# Patient Record
Sex: MALE | Race: WHITE | NOT HISPANIC OR LATINO | Employment: OTHER | URBAN - METROPOLITAN AREA
[De-identification: names, ages, dates, MRNs, and addresses within clinical notes are randomized per-mention and may not be internally consistent; named-entity substitution may affect disease eponyms.]

---

## 2017-01-11 RX ORDER — TRAMADOL HYDROCHLORIDE 50 MG/1
TABLET ORAL
Qty: 60 TAB | Refills: 1 | Status: SHIPPED
Start: 2017-01-11 | End: 2017-03-28 | Stop reason: SDUPTHER

## 2017-02-10 RX ORDER — ALPRAZOLAM 1 MG/1
TABLET ORAL
Qty: 30 TAB | Refills: 1 | Status: SHIPPED
Start: 2017-02-10 | End: 2017-04-09 | Stop reason: SDUPTHER

## 2017-03-28 RX ORDER — TRAMADOL HYDROCHLORIDE 50 MG/1
TABLET ORAL
Qty: 60 TAB | Refills: 0 | Status: SHIPPED
Start: 2017-03-28 | End: 2017-05-02 | Stop reason: SDUPTHER

## 2017-03-28 NOTE — TELEPHONE ENCOUNTER
Was the patient seen in the last year in this department? Yes     Does patient have an active prescription for medications requested? No     Received Request Via: Pharmacy       in media

## 2017-03-29 RX ORDER — NAPROXEN 500 MG/1
500 TABLET ORAL 2 TIMES DAILY PRN
Qty: 60 TAB | Refills: 3 | Status: SHIPPED | OUTPATIENT
Start: 2017-03-29 | End: 2017-12-22

## 2017-03-29 NOTE — TELEPHONE ENCOUNTER
Was the patient seen in the last year in this department? Yes     Does patient have an active prescription for medications requested? No     Received Request Via: Pharmacy     RX: Naprosyn

## 2017-04-09 DIAGNOSIS — F41.9 ANXIETY: ICD-10-CM

## 2017-04-10 RX ORDER — ALPRAZOLAM 1 MG/1
TABLET ORAL
Qty: 30 TAB | Refills: 0 | Status: SHIPPED
Start: 2017-04-10 | End: 2017-05-18 | Stop reason: SDUPTHER

## 2017-04-10 NOTE — TELEPHONE ENCOUNTER
Was the patient seen in the last year in this department? Yes     Does patient have an active prescription for medications requested? No     Received Request Via: Pharmacy      scanned in media

## 2017-04-19 RX ORDER — PANTOPRAZOLE SODIUM 20 MG/1
20 TABLET, DELAYED RELEASE ORAL DAILY
Qty: 30 TAB | Refills: 0 | Status: SHIPPED | OUTPATIENT
Start: 2017-04-19 | End: 2017-05-26 | Stop reason: SDUPTHER

## 2017-05-02 NOTE — TELEPHONE ENCOUNTER
Was the patient seen in the last year in this department? Yes   last filled 3/28/2017 #60  Does patient have an active prescription for medications requested? No   Received Request Via: Pharmacy

## 2017-05-03 RX ORDER — TRAMADOL HYDROCHLORIDE 50 MG/1
50 TABLET ORAL EVERY 8 HOURS PRN
Qty: 60 TAB | Refills: 0 | Status: SHIPPED
Start: 2017-05-03 | End: 2017-05-28 | Stop reason: SDUPTHER

## 2017-05-19 RX ORDER — ALPRAZOLAM 1 MG/1
TABLET ORAL
Qty: 30 TAB | Refills: 0 | Status: SHIPPED
Start: 2017-05-19 | End: 2017-06-28 | Stop reason: SDUPTHER

## 2017-05-31 RX ORDER — PANTOPRAZOLE SODIUM 20 MG/1
TABLET, DELAYED RELEASE ORAL
Qty: 30 TAB | Refills: 0 | Status: SHIPPED | OUTPATIENT
Start: 2017-05-31 | End: 2017-12-22

## 2017-05-31 RX ORDER — TRAMADOL HYDROCHLORIDE 50 MG/1
TABLET ORAL
Qty: 60 TAB | Refills: 0 | Status: SHIPPED
Start: 2017-05-31 | End: 2017-06-28 | Stop reason: SDUPTHER

## 2017-06-28 DIAGNOSIS — M25.50 CHRONIC JOINT PAIN: ICD-10-CM

## 2017-06-28 DIAGNOSIS — G89.29 CHRONIC JOINT PAIN: ICD-10-CM

## 2017-06-28 RX ORDER — TRAMADOL HYDROCHLORIDE 50 MG/1
50 TABLET ORAL 2 TIMES DAILY PRN
Qty: 60 TAB | Refills: 3 | Status: SHIPPED | OUTPATIENT
Start: 2017-06-28 | End: 2017-11-20 | Stop reason: SDUPTHER

## 2017-06-28 RX ORDER — ALPRAZOLAM 1 MG/1
1 TABLET ORAL
Qty: 30 TAB | Refills: 0 | Status: SHIPPED | OUTPATIENT
Start: 2017-06-28 | End: 2017-08-07 | Stop reason: SDUPTHER

## 2017-07-12 DIAGNOSIS — R26.89 SHUFFLING GAIT: ICD-10-CM

## 2017-07-12 DIAGNOSIS — R27.8 WORSENED HANDWRITING: ICD-10-CM

## 2017-07-14 DIAGNOSIS — R27.8 WORSENED HANDWRITING: ICD-10-CM

## 2017-07-14 DIAGNOSIS — R26.89 SHUFFLING GAIT: ICD-10-CM

## 2017-08-07 RX ORDER — ALPRAZOLAM 1 MG/1
1 TABLET ORAL
Qty: 30 TAB | Refills: 0 | Status: SHIPPED
Start: 2017-08-07 | End: 2017-09-01 | Stop reason: SDUPTHER

## 2017-09-01 RX ORDER — ALPRAZOLAM 1 MG/1
1 TABLET ORAL
Qty: 30 TAB | Refills: 0 | Status: SHIPPED
Start: 2017-09-01 | End: 2017-09-29 | Stop reason: SDUPTHER

## 2017-09-29 RX ORDER — ALPRAZOLAM 1 MG/1
1 TABLET ORAL
Qty: 30 TAB | Refills: 0 | Status: SHIPPED | OUTPATIENT
Start: 2017-09-29 | End: 2017-10-30 | Stop reason: SDUPTHER

## 2017-09-29 NOTE — TELEPHONE ENCOUNTER
Was the patient seen in the last year in this department? NO   Last seen 12/16//2016    Does patient have an active prescription for medications requested? No     Received Request Via: Pharmacy

## 2017-10-30 RX ORDER — ALPRAZOLAM 1 MG/1
1 TABLET ORAL
Qty: 30 TAB | Refills: 0 | Status: SHIPPED
Start: 2017-10-30 | End: 2017-11-28 | Stop reason: SDUPTHER

## 2017-11-07 ENCOUNTER — OFFICE VISIT (OUTPATIENT)
Dept: NEUROLOGY | Facility: MEDICAL CENTER | Age: 67
End: 2017-11-07
Payer: MEDICARE

## 2017-11-07 VITALS
WEIGHT: 188.45 LBS | SYSTOLIC BLOOD PRESSURE: 136 MMHG | DIASTOLIC BLOOD PRESSURE: 76 MMHG | HEART RATE: 103 BPM | BODY MASS INDEX: 24.98 KG/M2 | TEMPERATURE: 97.8 F | HEIGHT: 73 IN | RESPIRATION RATE: 16 BRPM | OXYGEN SATURATION: 96 %

## 2017-11-07 DIAGNOSIS — G20.C PARKINSONISM, UNSPECIFIED PARKINSONISM TYPE: Primary | ICD-10-CM

## 2017-11-07 PROCEDURE — 99205 OFFICE O/P NEW HI 60 MIN: CPT | Performed by: PSYCHIATRY & NEUROLOGY

## 2017-11-07 RX ORDER — FLUTICASONE PROPIONATE 0.05 %
1 CREAM (GRAM) TOPICAL 2 TIMES DAILY PRN
Status: ON HOLD | COMMUNITY
End: 2020-06-20

## 2017-11-07 RX ORDER — AMOXICILLIN 500 MG/1
500 CAPSULE ORAL 3 TIMES DAILY
COMMUNITY
End: 2018-03-08

## 2017-11-07 RX ORDER — CELECOXIB 200 MG/1
200 CAPSULE ORAL 2 TIMES DAILY
COMMUNITY
End: 2017-12-01 | Stop reason: SDUPTHER

## 2017-11-07 RX ORDER — OMEPRAZOLE 20 MG/1
20 CAPSULE, DELAYED RELEASE ORAL DAILY
COMMUNITY
End: 2018-03-08

## 2017-11-07 ASSESSMENT — ENCOUNTER SYMPTOMS
LOSS OF CONSCIOUSNESS: 0
DEPRESSION: 0
TREMORS: 0
DIZZINESS: 0
HEADACHES: 0
MEMORY LOSS: 0

## 2017-11-07 ASSESSMENT — PATIENT HEALTH QUESTIONNAIRE - PHQ9: CLINICAL INTERPRETATION OF PHQ2 SCORE: 0

## 2017-11-07 ASSESSMENT — PAIN SCALES - GENERAL: PAINLEVEL: 4=SLIGHT-MODERATE PAIN

## 2017-11-07 NOTE — PROGRESS NOTES
Subjective:      Jeremiah Huber is a 67 y.o. male who presents with his wife Nolvia, from the office of Dr. Granados, for consultation with a history of parkinsonism.    MAY Lamar pleasant 67 year right-handed  gentleman who got his first wiff of parkinsonian symptoms when he was in physical therapy after undergoing a right TKA. The physical therapist asked him a direct question as to whether or not he had Parkinson's disease. They had noticed that his stride length was diminished and that he was shuffling. In light of this, they did eventually see an outside neurologist. With his evaluations, they recognized that he had over this preceding several years several issues including a soft voice, he acknowledges he was drooling a little bit more though swallowing was stable, actually has a loss of smell for quite some time, and movements in general were slower. It takes him longer to finish physical activities though he does do so independently. He is also been feeling unsteady, but he does not trip or fall with any type of regularity. Dexterity is clearly curtailed, especially with the right hand, handwriting has gotten smaller. His posture is maintained, but both he and Nolvia have noted that his stride can shorten if he is not careful. When walking, she will often have to wait for him to catch up.    In general, he has remained very physically active, he walks regularly, he would like to get back to skiing. Energy level has been good. He denies issues with loss of appetite, early satiety with nausea and vomiting, excessive constipation, orthostatic dizziness and increasing syncope, dizziness, visual changes with double vision, emotional lability, etc. Cognition has been stable, he is no more forgetful than usual, he is a little more distractible, and can get back on task though takes him a little longer. They deny any slowness of mental processing or emotional lability. There are no issues with sleep  "distortion or non-REM sleep behavior patterns.    He has a history of gout, DJD, dyslipidemia, hypertension (no longer on medication since he has lost weight), GERD, but no history of diagnosed neurodegenerative disease, MS, seizure, migraine, CVA, CAD, thyroid disease, malignancy, other autoimmune disease, blood dyscrasia, liver or kidney disease, with significant psychiatric disease. He is status post right TKA, he is pending the same procedure on the left. No one in the immediate family has a history of diagnosed neurodegenerative disease, his mother had heart disease, his father heart disease, diabetes and bladder cancer. His maternal grandfather did suffer from Parkinson's disease. He does not smoke, but drinks one bottle of wine or more every evening. He is a retired , trained as a .    He is on Prilosec, Celebrex, Ultram, Protonix, Zyloprim, Naprosyn and niacin. The rest as per the electronic health record, is noncontributory from my standpoint.    Review of Systems   Cardiovascular: Negative for leg swelling.   Neurological: Negative for dizziness, tremors, loss of consciousness and headaches.   Psychiatric/Behavioral: Negative for depression and memory loss.        Objective:     /76   Pulse (!) 103   Temp 36.6 °C (97.8 °F)   Resp 16   Ht 1.854 m (6' 1\")   Wt 85.5 kg (188 lb 7.2 oz)   SpO2 96%   BMI 24.86 kg/m²      Physical Exam    He appears in no acute distress. His vital signs are stable, his depression rating scale is zero, he has no fall risk. There is no malar rash, sialorrhea, he is not drooling excessively. The neck is supple, range of motion is full, carotid pulses are present without asymmetry. Cardiac evaluation reveals a regular rhythm. There is no lower extremity edema.    He is fully oriented, there is no aphasia, agnosia, apraxia, or inattention.    PERRLA/EOMI, visual fields are full to finger counting confrontation, funduscopic exam reveals " sharp disc margins, there is no facial asymmetry, or sensory loss. There is hypophonia, facial bradykinesia is also clearly present, glabellar tap is absent. The tongue and uvula are midline, shoulder shrug is symmetric.    There is no tremor, either at rest or with action, there is minimal rigidity but only with distraction in the right upper extremity. There is minimal bradykinesia with movements in general. There is no drift or asterixis. Strength is 5/5 in all muscle groups. Reflexes are present at all points without asymmetries, both toes are downgoing.    He stands easily, there is some minimal hesitancy with gait initiation only. There is some minimal retropulsion, but otherwise no major postural instability is demonstrated. Repetitive movements in all 4 extremities are diminished in amplitude and increased in frequency. There is no appendicular dystaxia. He walks maintaining good stride length, posture is quite good, he still requires 4 steps to turn. Arm swing is symmetric.    Sensory exam is intact to vibration and temperature, Romberg is absent.    He did undergo MRI imaging in August of this year, I reviewed a report which revealed only generalized atrophy, no mention of white matter ischemic changes, NPH, tumor or mass, etc.     Assessment/Plan:     1. Parkinsonism, unspecified Parkinsonism type (CMS-HCC)  I agree with Dr. Purvis that this generally does have parkinsonism, most likely early Parkinson's disease but the unusual feature is the absence of tremor. He has some axial rigidity and general bradykinesia, eye movements are symmetric and intact, and he has no history suggestive of dysautonomia, bulbar dysfunction, or upper motor neuron dysfunction. Imaging his rule out significant structural pathologies so far. Still, Parkinson's disease in its earliest stages will often resemble illnesses such as PSP, Shy-Drager or other MSA, even Lewy body dementia, etc. This is not medication effect, it is  not related to exposure to manganese, zinc, mercury, etc., all of which he had exposure to when he was much younger, not surprising given that he is a . This is not likely hereditary issue.    For now, I agree also that we can simply watch and observe. Was told that regardless of the cause, all of these conditions are degenerative, we do not have medicines that you're or slow the process is down, they're used symptomatically only. Thus treatment can be started when there is significant disability from the symptoms. With the use of dopamine, a positive response is more suggestive of Parkinson's disease rather than some of these other conditions. Signs and symptoms suggestive of Parkinson's disease versus some of the other possibilities were reviewed. Were told that all of these conditions progressed very slowly, typically over years, and that circumstance, and annual visit will be scheduled. I may elect to repeat his MRI of the brain at that point for my own perusal.     Face-to-face time was spent reviewing all of the above, we spent some time especially talking about Parkinson's disease, its manifestations, etc. I encouraged him to remain physically active, hopefully he can get back to skiing downhill though he was told to do so cautiously even some of postural instability is that he does have.    I will follow-up in one year. They are electing to follow with me at this time rather than follow back up with Dr. Purvis.    Time: Evaluation of 60 minutes for exam, review, discussion, and education  Discussion: As mentioned in the assessment, over 50% of the time spent face-to-face counseling and coronary care

## 2017-11-20 RX ORDER — TRAMADOL HYDROCHLORIDE 50 MG/1
50 TABLET ORAL 2 TIMES DAILY PRN
Qty: 60 TAB | Refills: 0 | Status: SHIPPED
Start: 2017-11-20 | End: 2017-12-22 | Stop reason: SDUPTHER

## 2017-11-28 NOTE — TELEPHONE ENCOUNTER
Was the patient seen in the last year in this department? Yes       Does patient have an active prescription for medications requested? No     Received Request Via: Pharmacy     in media.

## 2017-11-29 RX ORDER — ALPRAZOLAM 1 MG/1
1 TABLET ORAL
Qty: 30 TAB | Refills: 0 | Status: SHIPPED
Start: 2017-11-29 | End: 2017-12-26 | Stop reason: SDUPTHER

## 2017-12-01 RX ORDER — CELECOXIB 200 MG/1
200 CAPSULE ORAL 2 TIMES DAILY
Qty: 60 CAP | Refills: 0 | Status: SHIPPED | OUTPATIENT
Start: 2017-12-01 | End: 2018-03-08 | Stop reason: SDUPTHER

## 2017-12-21 ENCOUNTER — TELEPHONE (OUTPATIENT)
Dept: MEDICAL GROUP | Facility: PHYSICIAN GROUP | Age: 67
End: 2017-12-21

## 2017-12-21 NOTE — TELEPHONE ENCOUNTER
ESTABLISHED PATIENT PRE-VISIT PLANNING     Note: Patient will not be contacted if there is no indication to call.     1.  Reviewed notes from the last few office visits within the medical group: Yes    2.  If any orders were placed at last visit or intended to be done for this visit (i.e. 6 mos follow-up), do we have Results/Consult Notes?        •  Labs - Labs ordered, NOT completed. Patient advised to complete prior to next appointment.   Note: If patient appointment is for lab review and patient did not complete labs, check with provider if OK to reschedule patient until labs completed.       •  Imaging - Imaging was not ordered at last office visit.       •  Referrals - No referrals were ordered at last office visit.    3. Is this appointment scheduled as a Hospital Follow-Up? No    4.  Immunizations were updated in [employer] using WebIZ?: Yes       •  Web Iz Recommendations: PREVNAR (PCV13)  and TD    5.  Patient is due for the following Health Maintenance Topics:   Health Maintenance Due   Topic Date Due   • Annual Wellness Visit  1950   • IMM PNEUMOCOCCAL 65+ (ADULT) LOW/MEDIUM RISK SERIES (1 of 2 - PCV13) 07/13/2017   • IMM INFLUENZA (1) 09/01/2017       - Patient has completed FLU, TDAP and ZOSTAVAX (Shingles) Immunization(s) per WebIZ. Chart has been updated.      6.  Patient was NOT informed to arrive 15 min prior to their scheduled appointment and bring in their medication bottles.

## 2017-12-22 ENCOUNTER — OFFICE VISIT (OUTPATIENT)
Dept: MEDICAL GROUP | Facility: PHYSICIAN GROUP | Age: 67
End: 2017-12-22
Payer: MEDICARE

## 2017-12-22 VITALS
HEART RATE: 74 BPM | WEIGHT: 186 LBS | SYSTOLIC BLOOD PRESSURE: 130 MMHG | TEMPERATURE: 97.1 F | RESPIRATION RATE: 16 BRPM | OXYGEN SATURATION: 94 % | DIASTOLIC BLOOD PRESSURE: 88 MMHG | HEIGHT: 73 IN | BODY MASS INDEX: 24.65 KG/M2

## 2017-12-22 DIAGNOSIS — F41.9 ANXIETY: ICD-10-CM

## 2017-12-22 DIAGNOSIS — M17.0 PRIMARY OSTEOARTHRITIS OF BOTH KNEES: ICD-10-CM

## 2017-12-22 DIAGNOSIS — M25.50 CHRONIC JOINT PAIN: ICD-10-CM

## 2017-12-22 DIAGNOSIS — K21.9 GASTROESOPHAGEAL REFLUX DISEASE WITHOUT ESOPHAGITIS: ICD-10-CM

## 2017-12-22 DIAGNOSIS — Z86.79 HISTORY OF HYPERTENSION: ICD-10-CM

## 2017-12-22 DIAGNOSIS — Z87.39 HISTORY OF GOUT: ICD-10-CM

## 2017-12-22 DIAGNOSIS — F10.90 CHRONIC ALCOHOL USE: ICD-10-CM

## 2017-12-22 DIAGNOSIS — G89.29 CHRONIC JOINT PAIN: ICD-10-CM

## 2017-12-22 PROCEDURE — 99214 OFFICE O/P EST MOD 30 MIN: CPT | Performed by: INTERNAL MEDICINE

## 2017-12-22 RX ORDER — TRAMADOL HYDROCHLORIDE 50 MG/1
50 TABLET ORAL 2 TIMES DAILY PRN
Qty: 60 TAB | Refills: 65 | Status: SHIPPED | OUTPATIENT
Start: 2017-12-22 | End: 2018-01-21

## 2017-12-22 ASSESSMENT — PAIN SCALES - GENERAL: PAINLEVEL: NO PAIN

## 2017-12-22 NOTE — PROGRESS NOTES
"Subjective:  Jeremiah is a 67 y.o. male with the following   Past Medical History:   Diagnosis Date   • Back spasm    • GOUT    • Gout    • Hypertension       Family History   Problem Relation Age of Onset   • Heart Disease Mother    • Heart Disease Father    • Cancer Father      prostate cancer   • Arthritis Brother      The patient is on the following medications:   Current Outpatient Prescriptions:   •  celecoxib (CELEBREX) 200 MG Cap, Take 1 Cap by mouth 2 times a day., Disp: 60 Cap, Rfl: 0  •  alprazolam (XANAX) 1 MG Tab, Take 1 Tab by mouth 1 time daily as needed for Sleep., Disp: 30 Tab, Rfl: 0  •  tramadol (ULTRAM) 50 MG Tab, Take 1 Tab by mouth 2 times a day as needed. AS NEEDED FOR PAIN, Disp: 60 Tab, Rfl: 0  •  omeprazole (PRILOSEC) 20 MG delayed-release capsule, Take 20 mg by mouth every day., Disp: , Rfl:   •  amoxicillin (AMOXIL) 500 MG Cap, Take 500 mg by mouth 3 times a day., Disp: , Rfl:   •  fluticasone (CUTIVATE) 0.05 % cream, Apply  to affected area(s) 2 times a day., Disp: , Rfl:   •  Multiple Vitamins-Minerals (PROTEC PO), Take  by mouth., Disp: , Rfl:     HPI; Patient is here today for follow-up visit regarding his chronic arthritic pain, stating that had right knee total replacement, continues with Celebrex and tramadol as needed, requesting refill of medication. Patient is also on omeprazole for GERD denies having dysphagia, on Xanax as needed for anxiety denies having suicidal ideation. Patient continues with chronic alcohol use, was seen by neurologist after physical therapist had suggested for early parkinsonian symptoms denies having resting tremor or difficulty with balance..         ROS: All other systems reviewed and they are negative.    Blood pressure 130/88, pulse 74, temperature 36.2 °C (97.1 °F), resp. rate 16, height 1.854 m (6' 1\"), weight 84.4 kg (186 lb), SpO2 94 %.on RA        Objective:      Patient is well appearing and in no acute distress.  Eyes; pupils are equally reactive " to light and accommodation. Ears are clear, no tympanic membranes bulging. Pharynx is clear.  Neck is soft and supple, nontender,no thyromegaly or mass.  lymphatic;  no cervical or supraclavicular lymphadenopathy.  Respiratory;  Lungs clear to auscultation bilaterally with normal respiratory effort. Gastrointestinal; abdomen is soft, non-tender on palpation,not distended. Cardiovascular ; Heart regular rate and rhythm without murmur, no JVD.  Extremities without any clubbing, cyanosis, or edema. Neuro - psychiatric ;  alert and oriented to time, location and person, motor and sensory intact. Skin; no rash or erythema. Musculoskeletal; no tenderness on palpation, no joint swelling or erythema    Assessment ;   1. History of HTN; BP is  stable off  valsartan 320mg daily, off hydrochlorothiazide.        2. History of Gout; stable,  no longer needed allopurinol 300 mg as a prophylactic medication after being off hydrochlorothiazide ,          4. Intermittent low back pain/ osteoarthritic knee pain; responds to tramadol prn or celebrex.         5. Chronic alcohol use; continoues to drink, one bottle of white wine per night        6. Increased BMI; per patient he has lost 50 pounds intentionally.            7. Elevated PSA; status post urology consultation and negative biopsies. Currently is asymptomatic.   Ref. Range 11/11/2015 11:41 12/15/2015 13:03 12/14/2016 14:26   Prostatic Specific Antigen Tot Latest Ref Range: 0.00 - 4.00 ng/mL 4.84 (H) 4.92 (H) 3.16        8. GERD; responds to omeprazole 20 mg as needed.        9. Anxiety; responds to Xanax as needed.       10. Early parkinsonian; suggested by neurologist, currently has no resting tremor or cogwheel rigidity.        Plan;    Patient is advised in preventive and supportive care, diet and lifestyle modification, daily walking ,exercise and weight loss, cutting back on alcohol consumption and potential complication of chronic drinking, discuss alternative  therapies.    Please note that this dictation was created using voice recognition software. I have worked with consultants from the vendor as well as technical experts from [facility] to optimize the interface. I have made every reasonable attempt to correct obvious errors, but I expect that there are errors of grammar and possibly content that I did not discover before finalizing the note.

## 2017-12-26 DIAGNOSIS — F41.9 ANXIETY: ICD-10-CM

## 2017-12-27 RX ORDER — ALPRAZOLAM 1 MG/1
TABLET ORAL
Qty: 30 TAB | Refills: 0 | Status: SHIPPED | OUTPATIENT
Start: 2017-12-27 | End: 2018-01-26

## 2018-01-24 ENCOUNTER — TELEPHONE (OUTPATIENT)
Dept: NEUROLOGY | Facility: MEDICAL CENTER | Age: 68
End: 2018-01-24

## 2018-01-24 DIAGNOSIS — G20.C PARKINSONISM, UNSPECIFIED PARKINSONISM TYPE: ICD-10-CM

## 2018-01-24 NOTE — TELEPHONE ENCOUNTER
Please tell the patient that we had discussed treating him for his parkinsonian symptoms, but that he had elected to watch and wait, something I had agreed to. We can always start medications for his symptoms when the symptoms are severe enough and incapacitating enough. I understand what his brother's point is, but since medicines are used to treat symptoms only, not affecting the underlying disease process, there was no need to decker. I explained this to him at length when I last saw him in November, 2017. If he does wish to start medicine, I can always call some in to the pharmacy.    PS: Tell the patient to go easy on his brother.

## 2018-01-24 NOTE — TELEPHONE ENCOUNTER
----- Message from Dilip Medrano, Med Ass't sent at 1/24/2018  8:42 AM PST -----  Regarding: FW: Non-Urgent Medical Question  Contact: [phone]      ----- Message -----  From: Jeremiah Huber  Sent: 1/23/2018   5:43 PM  To: Neurology [facility]  Subject: Non-Urgent Medical Question                      Hi Dr. 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  He was wondering why I was not on medication for my parkinson.  He mentioned some various medications he thought would help.   I told him I would ask your advice.  I am very week from my knee issues and have joined [employer] too get back my strength.  I have also cut my drinking to one glass of wine a night.  I still have a left leg that seems to drag a bit and when tired I slightly slur my words.      What do you think.  Other than telling my brother to stick to his OBGYN.    Thanks  Farhat

## 2018-01-24 NOTE — TELEPHONE ENCOUNTER
"----- Message from Dilip SARMIENTO Mitchell, Med Ass't sent at 1/24/2018  2:08 PM PST -----  Regarding: FW:Reply from Neurology  Contact: [phone]      ----- Message -----  From: Jeremiah Huber  Sent: 1/24/2018   1:02 PM  To: Dilip SARMIENTO Mitchell Med Ass't  Subject: RE:Reply from Neurology                          Thanks for the quick reply. I would like to try medication to see if it would work.   I'll take it easy on my brother,,  but after all he is my brother.  Thanks   Farhat  ----- Message -----  From: Dilip SARMIENTO Mitchell Med Ass't  Sent: 1/24/2018 12:53 PM PST  To: Jeremiah Huber  Subject: Reply from Neurology  Hi Jeremiah-    Per Dr. Mclain:    \"Please tell the patient that we had discussed treating him for his parkinsonian symptoms, but that he had elected to watch and wait, something I had agreed to. We can always start medications for his symptoms when the symptoms are severe enough and incapacitating enough. I understand what his brother's point is, but since medicines are used to treat symptoms only, not affecting the underlying disease process, there was no need to decker. I explained this to him at length when I last saw him in November, 2017. If he does wish to start medicine, I can always call some in to the pharmacy.     PS: Tell the patient to go easy on his brother.\"      Please let us know if you would like Dr. Mclain to prescribe you any medication.     Thank you.  "

## 2018-01-24 NOTE — TELEPHONE ENCOUNTER
I have called in Sinemet 25/100 tablets, #1 tablet, 3 times a day. Tell him the dose is low, he may not see benefit initially, but I want to go cautiously, and increase slowly to avoid side effects.

## 2018-01-29 ENCOUNTER — PATIENT MESSAGE (OUTPATIENT)
Dept: NEUROLOGY | Facility: MEDICAL CENTER | Age: 68
End: 2018-01-29

## 2018-01-30 NOTE — TELEPHONE ENCOUNTER
Let the patient know that [facility] itself has a long list of internists that are still excepting patients. The easiest way to get a hold of these physicians is to review the list that is available on the [facility] website. Any one of these individuals would be acceptable.

## 2018-02-02 ENCOUNTER — NON-PROVIDER VISIT (OUTPATIENT)
Dept: MEDICAL GROUP | Facility: PHYSICIAN GROUP | Age: 68
End: 2018-02-02
Payer: MEDICARE

## 2018-02-02 ENCOUNTER — OFFICE VISIT (OUTPATIENT)
Dept: MEDICAL GROUP | Facility: PHYSICIAN GROUP | Age: 68
End: 2018-02-02
Payer: MEDICARE

## 2018-02-02 VITALS
RESPIRATION RATE: 18 BRPM | HEART RATE: 75 BPM | HEIGHT: 73 IN | TEMPERATURE: 98.1 F | BODY MASS INDEX: 24.78 KG/M2 | OXYGEN SATURATION: 98 % | WEIGHT: 187 LBS | SYSTOLIC BLOOD PRESSURE: 128 MMHG | DIASTOLIC BLOOD PRESSURE: 88 MMHG

## 2018-02-02 DIAGNOSIS — Z23 NEED FOR PNEUMOCOCCAL VACCINATION: ICD-10-CM

## 2018-02-02 DIAGNOSIS — Z02.9 ADMINISTRATIVE ENCOUNTER: ICD-10-CM

## 2018-02-02 DIAGNOSIS — F41.9 ANXIETY: ICD-10-CM

## 2018-02-02 DIAGNOSIS — G47.00 INSOMNIA, UNSPECIFIED TYPE: ICD-10-CM

## 2018-02-02 PROCEDURE — 90670 PCV13 VACCINE IM: CPT | Performed by: PHYSICIAN ASSISTANT

## 2018-02-02 PROCEDURE — G0009 ADMIN PNEUMOCOCCAL VACCINE: HCPCS | Performed by: PHYSICIAN ASSISTANT

## 2018-02-02 PROCEDURE — 99214 OFFICE O/P EST MOD 30 MIN: CPT | Performed by: PHYSICIAN ASSISTANT

## 2018-02-02 RX ORDER — ALPRAZOLAM 1 MG/1
1 TABLET ORAL NIGHTLY PRN
COMMUNITY
End: 2018-02-02 | Stop reason: SDUPTHER

## 2018-02-02 RX ORDER — ALPRAZOLAM 1 MG/1
1 TABLET ORAL NIGHTLY PRN
Qty: 30 TAB | Refills: 0 | Status: SHIPPED | OUTPATIENT
Start: 2018-02-02 | End: 2018-03-04

## 2018-02-02 RX ORDER — TRAMADOL HYDROCHLORIDE 50 MG/1
50 TABLET ORAL EVERY 4 HOURS PRN
COMMUNITY
End: 2018-06-28 | Stop reason: SDUPTHER

## 2018-02-02 ASSESSMENT — PAIN SCALES - GENERAL: PAINLEVEL: NO PAIN

## 2018-02-02 NOTE — PROGRESS NOTES
"Jeremiah Huber is a 67 y.o. male here for a non-provider visit for:   PREVNAR (PCV13) 1 of 1    Reason for immunization: Overdue/Provider Recommended  Immunization records indicate need for vaccine: Yes, confirmed with Epic  Minimum interval has been met for this vaccine: Yes  ABN completed: Yes    Order and dose verified by: MB  VIS Dated   was given to patient: Yes  All IAC Questionnaire questions were answered \"No.\"    Patient tolerated injection and no adverse effects were observed or reported: Yes    Pt scheduled for next dose in series: Not Indicated    "

## 2018-02-05 NOTE — PROGRESS NOTES
Chief Complaint   Patient presents with   • Other     Paper workfor parkinsons       HISTORY OF PRESENT ILLNESS: Jeremiah Huber is an established 67 y.o. male here to discuss the evaluation and management of:    Patient states he was diagnosed with Parkinsonism  summer of 2017. States he is following up with neurology regularly. Patient is requesting for paperwork to be completed for [facility]'s 3 month course on chronic disease management for Parkinson's disease. Patient is requesting a Xanax refill for insomnia and anxiety. 8. Then prescribed the medication for several years. Denies drug use. Admits to drinking 4 glasses of wine daily. Patient states he has been out of Xanax prescription for 5 days. Denies side effects or complications from not being on medication. States he exercises rarely. Exercise were contained consist of swimming 5 days a week 30-60 minutes per time.      Patient Active Problem List    Diagnosis Date Noted   • Hypertension 11/11/2014     Priority: Medium     Class: Chronic   • Dyslipidemia 11/11/2014     Priority: Medium     Class: Chronic   • Chronic joint pain 11/11/2014     Priority: Medium     Class: Chronic   • Gout, joint 12/19/2013     Priority: Medium   • Insomnia 12/19/2013     Priority: Low   • Chronic alcohol use 12/22/2017   • Parkinsonism (CMS-HCC) 11/07/2017   • Elevated PSA 06/10/2016   • Hoarse voice quality 06/10/2016   • Anxiety 06/10/2016   • Right-sided low back pain without sciatica 11/11/2015   • Right knee pain 05/11/2015       Allergies:Nkda.    Current Outpatient Prescriptions   Medication Sig Dispense Refill   • tramadol (ULTRAM) 50 MG Tab Take 50 mg by mouth every four hours as needed.     • ALPRAZolam (XANAX) 1 MG Tab Take 1 Tab by mouth at bedtime as needed for Sleep for up to 30 days. 30 Tab 0   • carbidopa-levodopa (SINEMET)  MG Tab Take 1 Tab by mouth 3 times a day. 90 Tab 2   • celecoxib (CELEBREX) 200 MG Cap Take 1 Cap by mouth 2 times a day. 60 Cap  0   • fluticasone (CUTIVATE) 0.05 % cream Apply  to affected area(s) 2 times a day.     • omeprazole (PRILOSEC) 20 MG delayed-release capsule Take 20 mg by mouth every day.     • amoxicillin (AMOXIL) 500 MG Cap Take 500 mg by mouth 3 times a day.     • Multiple Vitamins-Minerals (PROTEC PO) Take  by mouth.       No current facility-administered medications for this visit.        Social History   Substance Use Topics   • Smoking status: Never Smoker   • Smokeless tobacco: Never Used   • Alcohol use 4.2 oz/week     4 Glasses of wine, 3 Standard drinks or equivalent per week      Comment: daily       Family Status   Relation Status   • Mother    • Father    • Sister Alive   • Brother Alive     Family History   Problem Relation Age of Onset   • Heart Disease Mother    • Heart Disease Father    • Cancer Father      prostate cancer   • Arthritis Brother        ROS:  Review of Systems   Constitutional: Negative for fever, chills, weight loss and malaise/fatigue.   HENT: Negative for ear pain, nosebleeds, congestion, sore throat and neck pain.    Eyes: Negative for blurred vision.   Respiratory: Negative for cough, sputum production, shortness of breath and wheezing.    Cardiovascular: Negative for chest pain, palpitations, orthopnea and leg swelling.   Gastrointestinal: Negative for heartburn, nausea, vomiting and abdominal pain.   Genitourinary: Negative for dysuria, urgency and frequency.   Musculoskeletal: Negative for myalgias, back pain and joint pain.   Skin: Negative for rash and itching.   Neurological: Negative for dizziness, tingling, tremors, sensory change, focal weakness and headaches.   Endo/Heme/Allergies: Does not bruise/bleed easily.   Psychiatric/Behavioral: Negative for depression, suicidal ideas and memory loss.  The patient is not nervous/anxious and does not insomnia.    All other systems reviewed and are negative except as in HPI.    Exam: Blood pressure 128/88, pulse 75, temperature  "36.7 °C (98.1 °F), resp. rate 18, height 1.854 m (6' 1\"), weight 84.8 kg (187 lb), SpO2 98 %. Body mass index is 24.67 kg/m².  General: Normal appearing. No distress.  HEENT: Normocephalic. Eyes conjunctiva clear lids without ptosis, pupils equal and reactive to light accommodation, ears normal shape and contour, canals are clear bilaterally, tympanic membranes are benign, nasal mucosa benign, oropharynx is without erythema, edema or exudates. Positive for facial bradykinesia.  Neck: Supple without JVD or bruit. Thyroid is not enlarged.  Pulmonary: Clear to ausculation.  Normal effort. No rales, ronchi, or wheezing.  Cardiovascular: Regular rate and rhythm without murmur. Carotid and radial pulses are intact and equal bilaterally.  Abdomen: Soft, nontender, nondistended. Normal bowel sounds. Liver and spleen are not palpable  Neurologic: Grossly nonfocal.  Cranial nerves are normal. LE DTR's normal and symmetric.  Lymph: No cervical, supraclavicular or axillary lymph nodes are palpable  Skin: Warm and dry.  No rashes or suspicious skin lesions.  Musculoskeletal: Abnormal gait. No extremity cyanosis, clubbing, or edema. + for bradykinesia.  Psych: Normal mood and affect. Alert and oriented x3. Judgment and insight is normal.      Medical decision-making and discussion:    1. Anxiety  2. Insomnia, unspecified type  Today's appointment patient has been prescribed 30 Xanax 1 mg tab; take one tablet by mouth at bedtime as needed for sleep and anxiety. Discussed with patient I will refill his prescription one time. Patient does not have an updated urine drug screen on file or controlled substance agreement. Did not urine drug screen patient during today's appointment due to the fact patient has been out of medication for several days.  Patient understands this prescription is a controlled substance which is potentially habit-forming and its use is regulated by the [employer]. We also discussed the new \"black box\" warning " regarding the lethal combination of opioids and benzodiazepines. Refills are subject to terms of a controlled substance agreement and patient does not have an updated one on file. Any refill requires an office visit. Narcotics have may adverse effects and the risks of addiction, accidental overdose and death were emphasized.     - ALPRAZolam (XANAX) 1 MG Tab; Take 1 Tab by mouth at bedtime as needed for Sleep for up to 30 days.  Dispense: 30 Tab; Refill: 0    NARxCHeck: No red flags.    3. Administrative encounter  Completed form for  3 month course on chronic disease management for Parkinson's disease. Form has been scanned into patient's chart.      Please note that this dictation was created using voice recognition software. I have made every reasonable attempt to correct obvious errors, but I expect that there are errors of grammar and possibly content that I did not discover before finalizing the note.      Return if symptoms worsen or fail to improve.

## 2018-03-08 ENCOUNTER — OFFICE VISIT (OUTPATIENT)
Dept: MEDICAL GROUP | Facility: MEDICAL CENTER | Age: 68
End: 2018-03-08
Payer: MEDICARE

## 2018-03-08 VITALS
DIASTOLIC BLOOD PRESSURE: 78 MMHG | OXYGEN SATURATION: 95 % | RESPIRATION RATE: 16 BRPM | HEART RATE: 84 BPM | SYSTOLIC BLOOD PRESSURE: 138 MMHG | BODY MASS INDEX: 24.84 KG/M2 | WEIGHT: 187.39 LBS | HEIGHT: 73 IN | TEMPERATURE: 98.2 F

## 2018-03-08 DIAGNOSIS — M25.50 CHRONIC JOINT PAIN: ICD-10-CM

## 2018-03-08 DIAGNOSIS — F10.90 CHRONIC ALCOHOL USE: ICD-10-CM

## 2018-03-08 DIAGNOSIS — R97.20 ELEVATED PSA: ICD-10-CM

## 2018-03-08 DIAGNOSIS — F10.982 ALCOHOL-INDUCED INSOMNIA (HCC): ICD-10-CM

## 2018-03-08 DIAGNOSIS — Z00.00 PREVENTATIVE HEALTH CARE: ICD-10-CM

## 2018-03-08 DIAGNOSIS — G89.29 CHRONIC RIGHT-SIDED LOW BACK PAIN WITHOUT SCIATICA: ICD-10-CM

## 2018-03-08 DIAGNOSIS — G89.29 CHRONIC JOINT PAIN: ICD-10-CM

## 2018-03-08 DIAGNOSIS — G20.C PRIMARY PARKINSONISM: ICD-10-CM

## 2018-03-08 DIAGNOSIS — Z76.89 ESTABLISHING CARE WITH NEW DOCTOR, ENCOUNTER FOR: ICD-10-CM

## 2018-03-08 DIAGNOSIS — M54.50 CHRONIC RIGHT-SIDED LOW BACK PAIN WITHOUT SCIATICA: ICD-10-CM

## 2018-03-08 PROCEDURE — 99214 OFFICE O/P EST MOD 30 MIN: CPT | Performed by: INTERNAL MEDICINE

## 2018-03-08 RX ORDER — ALPRAZOLAM 1 MG/1
1 TABLET ORAL NIGHTLY PRN
COMMUNITY
End: 2018-03-08 | Stop reason: SDUPTHER

## 2018-03-08 RX ORDER — ALPRAZOLAM 1 MG/1
1 TABLET ORAL NIGHTLY PRN
Qty: 30 TAB | Refills: 0 | Status: SHIPPED | OUTPATIENT
Start: 2018-03-08 | End: 2018-04-07

## 2018-03-08 RX ORDER — CELECOXIB 200 MG/1
200 CAPSULE ORAL DAILY
Qty: 30 CAP | Refills: 3 | Status: SHIPPED | OUTPATIENT
Start: 2018-03-08 | End: 2018-07-05 | Stop reason: SDUPTHER

## 2018-03-08 NOTE — PROGRESS NOTES
Chief Complaint   Patient presents with   • Establish Care   • Medication Refill     celebrex,xanax     Chief complaint: Insomnia and joint pain, requesting refills      HISTORY OF PRESENT ILLNESS: Patient is a 67 y.o. male patient who presents today to discuss the evaluation and management of:          1. Establishing care with new doctor, encounter for    Patient was previously followed by , last seen December 2017. Several chronic medical problems, see below.    2. Alcohol-induced insomnia (CMS-HCC)    Patient takes Xanax 1 mg tablet about every other night for insomnia. He is requesting a refill today.  was checked, last filled February 2, 2018.    3. Chronic right-sided low back pain without sciatica    Patient has chronic back pain as well as chronic knee pain. He is status post right total knee replacement and is planning on having his left knee replaced this summer. He takes tramadol 50 mg twice a day routinely for this.    4. Chronic joint pain    See above, patient also takes Celebrex 200 mg daily. He ran out of this and was just taking naproxen which he is concerned will upset his stomach. He is requesting a refill of the Celebrex.    5. Primary Parkinsonism (CMS-HCC)    Patient was recently diagnosed with Parkinson's disease. He is followed at the [facility], they recommended exercise and diet in addition to medication. Patient has no tremor, primary symptoms are bradykinesia. He currently is taking Sinemet 3 times a day, he has not noticed a significant change in his symptoms.    6. Preventative health care    Last blood work to check cholesterol, and blood sugar was in May 2016.  He is up-to-date on his colonoscopy.    7. Elevated PSA    Patient was evaluated by urology with several prostate biopsies due to an elevated PSA. Last PSA was 3.16 in December 2016.    8. Chronic alcohol use    Patient drinks approximately one bottle of wine nightly.        Patient Active Problem  "List    Diagnosis Date Noted   • Chronic joint pain 11/11/2014     Priority: Medium     Class: Chronic   • Insomnia 12/19/2013     Priority: Low   • Chronic alcohol use 12/22/2017   • Parkinsonism (CMS-HCC) 11/07/2017   • Elevated PSA 06/10/2016   • Right-sided low back pain without sciatica 11/11/2015        Allergies:Nkda [no known drug allergy]    Current meds including changes today  Current Outpatient Prescriptions   Medication Sig Dispense Refill   • celecoxib (CELEBREX) 200 MG Cap Take 1 Cap by mouth every day. 30 Cap 3   • ALPRAZolam (XANAX) 1 MG Tab Take 1 Tab by mouth at bedtime as needed for Sleep for up to 30 days. 30 Tab 0   • tramadol (ULTRAM) 50 MG Tab Take 50 mg by mouth every four hours as needed.     • carbidopa-levodopa (SINEMET)  MG Tab Take 1 Tab by mouth 3 times a day. 90 Tab 2   • fluticasone (CUTIVATE) 0.05 % cream Apply  to affected area(s) 2 times a day.     • Multiple Vitamins-Minerals (PROTEC PO) Take  by mouth.       No current facility-administered medications for this visit.      Social History   Substance Use Topics   • Smoking status: Never Smoker   • Smokeless tobacco: Never Used   • Alcohol use 4.2 oz/week     4 Glasses of wine, 3 Standard drinks or equivalent per week      Comment: daily     Social History     Social History Narrative   • No narrative on file       Family History   Problem Relation Age of Onset   • Heart Disease Mother    • Heart Disease Father    • Cancer Father      prostate cancer   • Arthritis Brother        Review of Systems:  No chest pain, No shortness of breath, No dyspnea on exertion  Gastrointestinal ROS: No abdominal pain, No nausea, vomiting, diarrhea, or constipation        Exam:      Blood pressure 138/78, pulse 84, temperature 36.8 °C (98.2 °F), resp. rate 16, height 1.854 m (6' 1\"), weight 85 kg (187 lb 6.3 oz), SpO2 95 %.  General:  Well nourished, well developed male in NAD affect and mood within normal limits  Head is grossly " normal.  Neck: Supple without adenopathy  Pulmonary: Clear to ausculation.  Normal effort. No rales, rhonchi, or wheezing.  Cardiovascular: Regular rate and rhythm without murmur.   Extremities: no clubbing, cyanosis, or edema.  Neuro: moves all extremities symmetrically    Please note that this dictation was created using voice recognition software. I have made every reasonable attempt to correct obvious errors, but I expect that there are errors of grammar and possibly content that I did not discover before finalizing the note.    Assessment/Plan:  1. Establishing care with new doctor, encounter for        2. Alcohol-induced insomnia (CMS-HCC)      - ALPRAZolam (XANAX) 1 MG Tab; Take 1 Tab by mouth at bedtime as needed for Sleep for up to 30 days.  Dispense: 30 Tab; Refill: 0    3. Chronic right-sided low back pain without sciatica      - celecoxib (CELEBREX) 200 MG Cap; Take 1 Cap by mouth every day.  Dispense: 30 Cap; Refill: 3    4. Chronic joint pain      - celecoxib (CELEBREX) 200 MG Cap; Take 1 Cap by mouth every day.  Dispense: 30 Cap; Refill: 3    5. Primary Parkinsonism (CMS-HCC)    Stable, continue Sinemet and follow up with neurology.    6. Preventative health care      - LIPID PROFILE; Future  - COMP METABOLIC PANEL; Future    7. Elevated PSA      - PROSTATE SPECIFIC AG SCREENING; Future    8. Chronic alcohol use        Followup: Return in about 6 months (around 9/8/2018).

## 2018-05-01 DIAGNOSIS — G20.C PARKINSONISM, UNSPECIFIED PARKINSONISM TYPE: ICD-10-CM

## 2018-05-22 ENCOUNTER — TELEPHONE (OUTPATIENT)
Dept: MEDICAL GROUP | Facility: MEDICAL CENTER | Age: 68
End: 2018-05-22

## 2018-05-22 DIAGNOSIS — M17.11 PRIMARY OSTEOARTHRITIS OF RIGHT KNEE: ICD-10-CM

## 2018-05-22 DIAGNOSIS — G20.C PRIMARY PARKINSONISM: ICD-10-CM

## 2018-05-22 NOTE — TELEPHONE ENCOUNTER
1. Caller Name: Jeremiah Huber                                           Call Back Number: [phone] (home)         Patient approves a detailed voicemail message: yes    2. SPECIFIC Action To Be Taken: Spoke with both the Pt and 's office about pt having a surgical clearance done. He will be have a knee surgery and needs to be cleared by his PCP. Pt has an appt this Thursday 5/24 with you to get his clearance done. Please order labs and chest x-ray for pt to have done before his appt. Pt informed to get these done tomorrow. Pt has forms that need to be filled out.     3. Diagnosis/Clinical Reason for Request: knee surgery    4. Specialty & Provider Name/Lab/Imaging Location: labs and chest x-ray     5. Is appointment scheduled for requested order/referral: yes - 5/24/18    Patient informed they will receive a return phone call from the office ONLY if there are any questions before processing their request. Advised to call back if they haven't received a call from the referral department in 5 days.

## 2018-05-24 ENCOUNTER — OFFICE VISIT (OUTPATIENT)
Dept: MEDICAL GROUP | Facility: MEDICAL CENTER | Age: 68
End: 2018-05-24
Payer: MEDICARE

## 2018-05-24 VITALS
WEIGHT: 185.19 LBS | RESPIRATION RATE: 16 BRPM | BODY MASS INDEX: 24.54 KG/M2 | SYSTOLIC BLOOD PRESSURE: 126 MMHG | DIASTOLIC BLOOD PRESSURE: 80 MMHG | OXYGEN SATURATION: 95 % | TEMPERATURE: 97.4 F | HEART RATE: 86 BPM | HEIGHT: 73 IN

## 2018-05-24 DIAGNOSIS — M17.12 PRIMARY OSTEOARTHRITIS OF LEFT KNEE: ICD-10-CM

## 2018-05-24 DIAGNOSIS — G89.29 CHRONIC RIGHT-SIDED LOW BACK PAIN WITHOUT SCIATICA: ICD-10-CM

## 2018-05-24 DIAGNOSIS — G20.C PARKINSONISM, UNSPECIFIED PARKINSONISM TYPE: ICD-10-CM

## 2018-05-24 DIAGNOSIS — F10.982 ALCOHOL-INDUCED INSOMNIA (HCC): ICD-10-CM

## 2018-05-24 DIAGNOSIS — F10.90 CHRONIC ALCOHOL USE: ICD-10-CM

## 2018-05-24 DIAGNOSIS — M54.50 CHRONIC RIGHT-SIDED LOW BACK PAIN WITHOUT SCIATICA: ICD-10-CM

## 2018-05-24 PROCEDURE — 99213 OFFICE O/P EST LOW 20 MIN: CPT | Performed by: INTERNAL MEDICINE

## 2018-05-24 NOTE — PROGRESS NOTES
Chief Complaint   Patient presents with   • Medical Clearance     surgery L knee     Chief complaint: Osteoarthritis left knee      HISTORY OF PRESENT ILLNESS: Patient is a 67 y.o. male patient who presents today to discuss the evaluation and management of:          1. Parkinsonism, unspecified Parkinsonism type (HCC)    Patient was diagnosed with Parkinson's disease several months ago when he went to a clinic at [facility]. He notes that his voice has been softer and his wife has a difficult time hearing him. It was recommended that he see a speech therapist at [facility]. He feels that since starting Sinemet this is somewhat better.    2. Primary osteoarthritis of left knee    Patient has OA of the left knee, he currently takes Celebrex 200 mg in the morning. He is scheduled to have knee replacement surgery in October. He has a preop clearance form for this, however we will wait until closer to the time of his surgery to complete it.    3. Alcohol-induced insomnia (HCC)    Patient takes alprazolam one half of a 1 mg tablet nightly for insomnia.    4. Chronic right-sided low back pain without sciatica    Patient has chronic low back pain, he takes tramadol 50 mg occasionally for this. He does not need a refill at this time.    5. Chronic alcohol use    Patient has actually has a cut back from one bottle of wine a night to half a bottle of wine per night.        Patient Active Problem List    Diagnosis Date Noted   • Insomnia 12/19/2013     Priority: Low   • Primary osteoarthritis of left knee 05/24/2018   • Chronic alcohol use 12/22/2017   • Parkinsonism (HCC) 11/07/2017   • Elevated PSA 06/10/2016   • Right-sided low back pain without sciatica 11/11/2015        Allergies:Nkda [no known drug allergy]    Current meds including changes today  Current Outpatient Prescriptions   Medication Sig Dispense Refill   • carbidopa-levodopa (SINEMET)  MG Tab TAKE ONE (1) TABLET BY MOUTH THREE TIMES A DAY  90 Tab 11   •  "ALPRAZolam (XANAX) 1 MG Tab TAKE ONE TABLET BY MOUTH AT BEDTIME AS NEEDED FOR SLEEP.  30 Tab 0   • celecoxib (CELEBREX) 200 MG Cap Take 1 Cap by mouth every day. 30 Cap 3   • tramadol (ULTRAM) 50 MG Tab Take 50 mg by mouth every four hours as needed.     • fluticasone (CUTIVATE) 0.05 % cream Apply  to affected area(s) 2 times a day.     • Multiple Vitamins-Minerals (PROTEC PO) Take  by mouth.       No current facility-administered medications for this visit.      Social History   Substance Use Topics   • Smoking status: Never Smoker   • Smokeless tobacco: Never Used   • Alcohol use 4.2 oz/week     4 Glasses of wine, 3 Standard drinks or equivalent per week      Comment: daily     Social History     Social History Narrative   • No narrative on file       Family History   Problem Relation Age of Onset   • Heart Disease Mother    • Heart Disease Father    • Cancer Father      prostate cancer   • Arthritis Brother        Review of Systems:  No chest pain, No shortness of breath, No dyspnea on exertion  Gastrointestinal ROS: No abdominal pain, No nausea, vomiting, diarrhea, or constipation        Exam:      Blood pressure 126/80, pulse 86, temperature 36.3 °C (97.4 °F), resp. rate 16, height 1.854 m (6' 1\"), weight 84 kg (185 lb 3 oz), SpO2 95 %.  General:  Well nourished, well developed male in NAD affect and mood within normal limits  Head is grossly normal.  Neck: Supple without adenopathy  Pulmonary: Clear to ausculation.  Normal effort. No rales, rhonchi, or wheezing.  Cardiovascular: Regular rate and rhythm without murmur.   Extremities: no clubbing, cyanosis, or edema.  Neuro: moves all extremities symmetrically    Please note that this dictation was created using voice recognition software. I have made every reasonable attempt to correct obvious errors, but I expect that there are errors of grammar and possibly content that I did not discover before finalizing the note.    Assessment/Plan:  1. Parkinsonism, " unspecified Parkinsonism type (HCC)    -Stable, continue Sinemet  - REFERRAL TO SPEECH THERAPY Reason for Therapy: Eval/Treat/Report    2. Primary osteoarthritis of left knee    We will postpone patient's surgical clearance paperwork until September, he will have his labs done prior to that visit. We will also do an EKG at that time.    3. Alcohol-induced insomnia (HCC)    Stable, continue alprazolam.    4. Chronic right-sided low back pain without sciatica    Stable, continue when necessary tramadol    5. Chronic alcohol use    Patient counseled on importance of moderating his alcohol intake, he has actually been compliant with this and has cut down to half a bottle of wine nightly.    Followup: Return in about 4 months (around 9/24/2018).

## 2018-06-26 DIAGNOSIS — F10.982 INSOMNIA DUE TO ALCOHOL (HCC): ICD-10-CM

## 2018-06-26 RX ORDER — ALPRAZOLAM 1 MG/1
TABLET ORAL
Qty: 30 TAB | Refills: 0 | Status: SHIPPED
Start: 2018-06-26 | End: 2018-07-10 | Stop reason: SDUPTHER

## 2018-06-26 NOTE — TELEPHONE ENCOUNTER
Was the patient seen in the last year in this department? Yes     Does patient have an active prescription for medications requested? No     Received Request Via: Pharmacy      in media, last fill 5/1/18  Last office visit 5/24/18

## 2018-06-28 DIAGNOSIS — M54.40 CHRONIC RIGHT-SIDED LOW BACK PAIN WITH SCIATICA, SCIATICA LATERALITY UNSPECIFIED: ICD-10-CM

## 2018-06-28 DIAGNOSIS — G89.29 CHRONIC RIGHT-SIDED LOW BACK PAIN WITH SCIATICA, SCIATICA LATERALITY UNSPECIFIED: ICD-10-CM

## 2018-06-28 RX ORDER — TRAMADOL HYDROCHLORIDE 50 MG/1
50 TABLET ORAL EVERY 8 HOURS PRN
Qty: 60 TAB | Refills: 0 | Status: SHIPPED
Start: 2018-06-28 | End: 2018-07-10 | Stop reason: SDUPTHER

## 2018-06-28 NOTE — TELEPHONE ENCOUNTER
Was the patient seen in the last year in this department? Yes   last filled 5/9/2018 #60  Does patient have an active prescription for medications requested? No   Received Request Via: Pharmacy

## 2018-07-03 ENCOUNTER — TELEPHONE (OUTPATIENT)
Dept: MEDICAL GROUP | Facility: MEDICAL CENTER | Age: 68
End: 2018-07-03

## 2018-07-03 NOTE — TELEPHONE ENCOUNTER
At this point, patient would need to do a UDS to document that he does not have Xanax or tramadol in his system. If the UDS is negative then I can refill his tramadol and Xanax.

## 2018-07-03 NOTE — TELEPHONE ENCOUNTER
----- Message from Suyapa Rueda sent at 7/2/2018  9:22 AM PDT -----  Regarding:  RX refill  So patient walked in asking if he can get a refill on his ALPRAZolam (XANAX) 1 MG and his tramadol (ULTRAM) 50 MG   I realized that he has just gotten a refill 06/26 he showed me where he put his pills in and said he washed them, did look a little suspicious but I told them I would send  a message and we will go from there. Patient looked a bit off and unresponsive when I was speaking to him.

## 2018-07-03 NOTE — TELEPHONE ENCOUNTER
Informed Pt he will need to come down to sign a controlled sub. Agreement since we do not have one on file. I also informed Pt his insurance may not cover his prescriptions since he just filled them end of June and may pay out of pocket. Pt voiced understanding and will be coming in the office Thursday.

## 2018-07-05 ENCOUNTER — HOSPITAL ENCOUNTER (OUTPATIENT)
Facility: MEDICAL CENTER | Age: 68
End: 2018-07-05
Attending: INTERNAL MEDICINE
Payer: MEDICARE

## 2018-07-05 DIAGNOSIS — M54.50 CHRONIC RIGHT-SIDED LOW BACK PAIN WITHOUT SCIATICA: ICD-10-CM

## 2018-07-05 DIAGNOSIS — G89.29 CHRONIC JOINT PAIN: ICD-10-CM

## 2018-07-05 DIAGNOSIS — G89.29 CHRONIC RIGHT-SIDED LOW BACK PAIN WITHOUT SCIATICA: ICD-10-CM

## 2018-07-05 DIAGNOSIS — M25.50 CHRONIC JOINT PAIN: ICD-10-CM

## 2018-07-05 PROCEDURE — G0480 DRUG TEST DEF 1-7 CLASSES: HCPCS

## 2018-07-05 PROCEDURE — 80299 QUANTITATIVE ASSAY DRUG: CPT

## 2018-07-05 PROCEDURE — 80307 DRUG TEST PRSMV CHEM ANLYZR: CPT

## 2018-07-05 RX ORDER — CELECOXIB 200 MG/1
200 CAPSULE ORAL DAILY
Qty: 30 CAP | Refills: 3 | Status: SHIPPED | OUTPATIENT
Start: 2018-07-05 | End: 2019-03-25

## 2018-07-07 LAB
AMPHET CTO UR CFM-MCNC: NEGATIVE NG/ML
BARBITURATES CTO UR CFM-MCNC: NEGATIVE NG/ML
BENZODIAZ CTO UR CFM-MCNC: POSITIVE NG/ML
BUPRENORPHINE UR-MCNC: NEGATIVE NG/ML
CANNABINOIDS CTO UR CFM-MCNC: NEGATIVE NG/ML
CARISOPRODOL UR-MCNC: NEGATIVE NG/ML
COCAINE CTO UR CFM-MCNC: NEGATIVE NG/ML
DRUG SCREEN COMMENT UR-IMP: NORMAL
ETHYL GLUCURONIDE UR CFM-MCNC: NORMAL NG/ML
ETHYL GLUCURONIDE UR QL SCN: POSITIVE NG/ML
ETHYL SULFATE UR CFM-MCNC: NORMAL NG/ML
FENTANYL UR-MCNC: NEGATIVE NG/ML
MEPERIDINE CTO UR SCN-MCNC: NEGATIVE NG/ML
METHADONE CTO UR CFM-MCNC: NEGATIVE NG/ML
OPIATES UR QL SCN: NEGATIVE NG/ML
OXYCDOXYM URSCRN 2005102: NEGATIVE NG/ML
PCP CTO UR CFM-MCNC: NEGATIVE NG/ML
PROPOXYPH CTO UR CFM-MCNC: NEGATIVE NG/ML
TAPENTADOL UR-MCNC: NEGATIVE NG/ML
TRAMADOL CTO UR SCN-MCNC: POSITIVE NG/ML
ZOLPIDEM UR-MCNC: NEGATIVE NG/ML

## 2018-07-09 LAB
1OH-MIDAZOLAM UR CFM-MCNC: <20 NG/ML
7AMINOCLONAZEPAM UR CFM-MCNC: <5 NG/ML
A-OH ALPRAZ UR CFM-MCNC: 92 NG/ML
ALPRAZ UR CFM-MCNC: 42 NG/ML
CHLORDIAZEP UR CFM-MCNC: <20 NG/ML
CLONAZEPAM UR CFM-MCNC: <5 NG/ML
DIAZEPAM UR CFM-MCNC: <20 NG/ML
LORAZEPAM UR CFM-MCNC: <20 NG/ML
MIDAZOLAM UR CFM-MCNC: <20 NG/ML
NORDIAZEPAM UR CFM-MCNC: <20 NG/ML
OXAZEPAM UR CFM-MCNC: <20 NG/ML
TEMAZEPAM UR CFM-MCNC: <20 NG/ML

## 2018-07-10 ENCOUNTER — PATIENT MESSAGE (OUTPATIENT)
Dept: MEDICAL GROUP | Facility: MEDICAL CENTER | Age: 68
End: 2018-07-10

## 2018-07-10 DIAGNOSIS — M54.40 CHRONIC RIGHT-SIDED LOW BACK PAIN WITH SCIATICA, SCIATICA LATERALITY UNSPECIFIED: ICD-10-CM

## 2018-07-10 DIAGNOSIS — F10.982 INSOMNIA DUE TO ALCOHOL (HCC): ICD-10-CM

## 2018-07-10 DIAGNOSIS — G89.29 CHRONIC RIGHT-SIDED LOW BACK PAIN WITH SCIATICA, SCIATICA LATERALITY UNSPECIFIED: ICD-10-CM

## 2018-07-10 LAB
NORTRAMADOL UR-MCNC: 295 NG/ML
TRAMADOL UR-MCNC: 764 NG/ML

## 2018-07-10 RX ORDER — TRAMADOL HYDROCHLORIDE 50 MG/1
50 TABLET ORAL EVERY 8 HOURS PRN
Qty: 60 TAB | Refills: 0 | Status: SHIPPED
Start: 2018-07-10 | End: 2018-08-09

## 2018-07-10 RX ORDER — ALPRAZOLAM 1 MG/1
TABLET ORAL
Qty: 30 TAB | Refills: 0 | Status: SHIPPED
Start: 2018-07-10 | End: 2018-08-28 | Stop reason: SDUPTHER

## 2018-07-11 NOTE — TELEPHONE ENCOUNTER
From: Jeremiah Huber  To: Shawna Fay M.D.  Sent: 7/10/2018 5:03 PM PDT  Subject: Prescription Question    I had put both in a  change purse to keep them with me at all times. I carry them in my front left pocket and forgot to take them out prior to putting my pants in the wash. I have never done this before. The pills were in lumps or completely desolved. The Xanax I scraped together and had approximately 1/2 pill the night before the test. The Tramadol I had not taken since I washed the batch on Tuesday. I have never had this happen before. Please confirm this with the staff at the pharmacy, which I have used for several years. Sorry for the hassle.    Jeremiah   ----- Message -----  From: Shawna Fay M.D.  Sent: 7/10/2018 4:28 PM PDT  To: Jeremiah Huber  Subject: RE: Prescription Question  Your urine drug test showed that you had tramadol and Xanax in your system. When did you last take the medication? What happened to the month's prescription that I gave to you at the end of last month?  Dr. Fay    ----- Message -----   From: Jeremiah Huber   Sent: 7/10/2018 1:23 PM PDT   To: Shawna Fay M.D.  Subject: Prescription Question    I went in last thursday for a drug test and to sign the drug form. I went to [facility] [employer] to  my request this morning and they had heard nothing. Just want to know what is going on.

## 2018-08-28 DIAGNOSIS — F10.982 INSOMNIA DUE TO ALCOHOL (HCC): ICD-10-CM

## 2018-08-28 DIAGNOSIS — M54.40 CHRONIC RIGHT-SIDED LOW BACK PAIN WITH SCIATICA, SCIATICA LATERALITY UNSPECIFIED: ICD-10-CM

## 2018-08-28 DIAGNOSIS — G89.29 CHRONIC RIGHT-SIDED LOW BACK PAIN WITH SCIATICA, SCIATICA LATERALITY UNSPECIFIED: ICD-10-CM

## 2018-08-28 NOTE — TELEPHONE ENCOUNTER
Was the patient seen in the last year in this department? Yes    Does patient have an active prescription for medications requested? No     Received Request Via: Pharmacy     Last fill 07/11/2018

## 2018-08-30 RX ORDER — TRAMADOL HYDROCHLORIDE 50 MG/1
50 TABLET ORAL EVERY 8 HOURS PRN
Qty: 60 TAB | Refills: 0
Start: 2018-08-30 | End: 2018-09-29

## 2018-08-30 RX ORDER — ALPRAZOLAM 1 MG/1
TABLET ORAL
Qty: 30 TAB | Refills: 0 | Status: SHIPPED
Start: 2018-08-30 | End: 2018-10-22 | Stop reason: SDUPTHER

## 2018-09-07 ENCOUNTER — HOSPITAL ENCOUNTER (OUTPATIENT)
Dept: LAB | Facility: MEDICAL CENTER | Age: 68
End: 2018-09-07
Attending: INTERNAL MEDICINE
Payer: MEDICARE

## 2018-09-07 DIAGNOSIS — R97.20 ELEVATED PSA: ICD-10-CM

## 2018-09-07 DIAGNOSIS — Z00.00 PREVENTATIVE HEALTH CARE: ICD-10-CM

## 2018-09-07 LAB
ALBUMIN SERPL BCP-MCNC: 4.3 G/DL (ref 3.2–4.9)
ALBUMIN/GLOB SERPL: 1.7 G/DL
ALP SERPL-CCNC: 50 U/L (ref 30–99)
ALT SERPL-CCNC: 5 U/L (ref 2–50)
ANION GAP SERPL CALC-SCNC: 6 MMOL/L (ref 0–11.9)
AST SERPL-CCNC: 14 U/L (ref 12–45)
BILIRUB SERPL-MCNC: 0.8 MG/DL (ref 0.1–1.5)
BUN SERPL-MCNC: 13 MG/DL (ref 8–22)
CALCIUM SERPL-MCNC: 9.5 MG/DL (ref 8.5–10.5)
CHLORIDE SERPL-SCNC: 105 MMOL/L (ref 96–112)
CHOLEST SERPL-MCNC: 188 MG/DL (ref 100–199)
CO2 SERPL-SCNC: 27 MMOL/L (ref 20–33)
CREAT SERPL-MCNC: 1 MG/DL (ref 0.5–1.4)
FASTING STATUS PATIENT QL REPORTED: NORMAL
GLOBULIN SER CALC-MCNC: 2.5 G/DL (ref 1.9–3.5)
GLUCOSE SERPL-MCNC: 87 MG/DL (ref 65–99)
HDLC SERPL-MCNC: 83 MG/DL
LDLC SERPL CALC-MCNC: 92 MG/DL
POTASSIUM SERPL-SCNC: 4.8 MMOL/L (ref 3.6–5.5)
PROT SERPL-MCNC: 6.8 G/DL (ref 6–8.2)
PSA SERPL-MCNC: 2.34 NG/ML (ref 0–4)
SODIUM SERPL-SCNC: 138 MMOL/L (ref 135–145)
TRIGL SERPL-MCNC: 67 MG/DL (ref 0–149)

## 2018-09-07 PROCEDURE — 80053 COMPREHEN METABOLIC PANEL: CPT

## 2018-09-07 PROCEDURE — 80061 LIPID PANEL: CPT | Mod: GA

## 2018-09-07 PROCEDURE — 84153 ASSAY OF PSA TOTAL: CPT | Mod: GA

## 2018-09-07 PROCEDURE — 36415 COLL VENOUS BLD VENIPUNCTURE: CPT | Mod: GA

## 2018-09-11 ENCOUNTER — OFFICE VISIT (OUTPATIENT)
Dept: MEDICAL GROUP | Facility: MEDICAL CENTER | Age: 68
End: 2018-09-11
Payer: MEDICARE

## 2018-09-11 VITALS
HEART RATE: 76 BPM | BODY MASS INDEX: 24.12 KG/M2 | DIASTOLIC BLOOD PRESSURE: 78 MMHG | TEMPERATURE: 97.4 F | WEIGHT: 182 LBS | RESPIRATION RATE: 16 BRPM | HEIGHT: 73 IN | SYSTOLIC BLOOD PRESSURE: 126 MMHG | OXYGEN SATURATION: 95 %

## 2018-09-11 DIAGNOSIS — Z23 NEED FOR VACCINATION: ICD-10-CM

## 2018-09-11 DIAGNOSIS — M54.50 CHRONIC RIGHT-SIDED LOW BACK PAIN WITHOUT SCIATICA: ICD-10-CM

## 2018-09-11 DIAGNOSIS — G89.29 CHRONIC RIGHT-SIDED LOW BACK PAIN WITHOUT SCIATICA: ICD-10-CM

## 2018-09-11 DIAGNOSIS — Z01.818 ENCOUNTER FOR OTHER PREPROCEDURAL EXAMINATION: ICD-10-CM

## 2018-09-11 DIAGNOSIS — R97.20 ELEVATED PSA: ICD-10-CM

## 2018-09-11 DIAGNOSIS — G20.A1 PARKINSON'S DISEASE: ICD-10-CM

## 2018-09-11 DIAGNOSIS — F10.90 CHRONIC ALCOHOL USE: ICD-10-CM

## 2018-09-11 DIAGNOSIS — M17.12 PRIMARY OSTEOARTHRITIS OF LEFT KNEE: ICD-10-CM

## 2018-09-11 PROCEDURE — 99214 OFFICE O/P EST MOD 30 MIN: CPT | Mod: 25 | Performed by: INTERNAL MEDICINE

## 2018-09-11 PROCEDURE — 90662 IIV NO PRSV INCREASED AG IM: CPT | Performed by: INTERNAL MEDICINE

## 2018-09-11 PROCEDURE — G0008 ADMIN INFLUENZA VIRUS VAC: HCPCS | Performed by: INTERNAL MEDICINE

## 2018-09-11 RX ORDER — TRAMADOL HYDROCHLORIDE 50 MG/1
50 TABLET ORAL
Qty: 30 TAB | Refills: 0 | Status: SHIPPED
Start: 2018-09-11 | End: 2018-10-11

## 2018-09-11 RX ORDER — TRAMADOL HYDROCHLORIDE 50 MG/1
50 TABLET ORAL
COMMUNITY
End: 2018-09-11 | Stop reason: SDUPTHER

## 2018-09-11 NOTE — PROGRESS NOTES
Chief Complaint   Patient presents with   • Medical Clearance     surgery for L knee replacement       HISTORY OF PRESENT ILLNESS: Patient is a 68 y.o. male patient who presents today to discuss the evaluation and management of:      Chief complaint: Chronic back pain and left knee osteoarthritis      1. Chronic right-sided low back pain without sciatica    Patient states that he has scoliosis and has had chronic back pain due to this for many years.  He has never had back surgery.  He takes Celebrex, which she feels does not help.  He relies on tramadol 50 mg which he takes about once a day for the pain.  He also will take as much as 4-6 aspirin daily.  Patient is quite insistent that he needs to have tramadol, as the alternatives are not effective for him.  I let them know that there is a black box warning regarding pain medication combined with benzodiazepines.  He takes alprazolam nightly for sleep.  He acknowledges the increased risk of combining these medications, however still wishes to have a small amount of tramadol.    2. Primary osteoarthritis of left knee    Patient is status post right total knee replacement 1 year ago, he is scheduled to undergo left total knee replacement in October.  He is here for surgical clearance.  Patient is able to exercise on a daily basis, he walks and hikes up in [place], and goes to the gym and rides a stationary bike.  He denies any chest pain or undue dyspnea with this.    3. Parkinson's disease (HCC)    Stable, on Sinemet.  Recently went to speech therapy at [facility], no therapy indicated apparently.    4. Need for vaccination    Requesting flu vaccine    5. Chronic alcohol use    Patient states he is drinking about half a bottle at night, which is stable for him.    6. Elevated PSA    Patient had elevated PSA a couple of years ago of 4.92.  He underwent prostate biopsy which was negative.  Recent PSA was normal at 2.34.        Patient Active Problem List    Diagnosis  "Date Noted   • Insomnia 12/19/2013     Priority: Low   • Primary osteoarthritis of left knee 05/24/2018   • Chronic alcohol use 12/22/2017   • Parkinsonism (HCC) 11/07/2017   • Elevated PSA 06/10/2016   • Right-sided low back pain without sciatica 11/11/2015        Allergies:Nkda [no known drug allergy]    Current meds including changes today  Current Outpatient Prescriptions   Medication Sig Dispense Refill   • tramadol (ULTRAM) 50 MG Tab Take 1 Tab by mouth 1 time daily as needed for up to 30 days. 30 Tab 0   • ALPRAZolam (XANAX) 1 MG Tab TAKE ONE TABLET BY MOUTH AT BEDTIME AS NEEDED FOR SLEEP. 30 Tab 0   • celecoxib (CELEBREX) 200 MG Cap Take 1 Cap by mouth every day. 30 Cap 3   • carbidopa-levodopa (SINEMET)  MG Tab TAKE ONE (1) TABLET BY MOUTH THREE TIMES A DAY  90 Tab 11   • fluticasone (CUTIVATE) 0.05 % cream Apply  to affected area(s) 2 times a day.     • Multiple Vitamins-Minerals (PROTEC PO) Take  by mouth.       No current facility-administered medications for this visit.      Social History   Substance Use Topics   • Smoking status: Never Smoker   • Smokeless tobacco: Never Used   • Alcohol use 4.2 oz/week     4 Glasses of wine, 3 Standard drinks or equivalent per week      Comment: daily     Social History     Social History Narrative   • No narrative on file       Family History   Problem Relation Age of Onset   • Heart Disease Mother    • Heart Disease Father    • Cancer Father         prostate cancer   • Arthritis Brother        Review of Systems:  No chest pain, No shortness of breath, No dyspnea on exertion  Gastrointestinal ROS: No abdominal pain, No nausea, vomiting, diarrhea, or constipation        Exam:      Blood pressure 126/78, pulse 76, temperature 36.3 °C (97.4 °F), resp. rate 16, height 1.854 m (6' 1\"), weight 82.6 kg (182 lb), SpO2 95 %.  General:  Well nourished, well developed male in NAD affect and mood within normal limits  Head is grossly normal.  Neck: Supple without " adenopathy  Pulmonary: Clear to ausculation.  Normal effort. No rales, rhonchi, or wheezing.  Cardiovascular: Regular rate and rhythm without murmur.   Extremities: no clubbing, cyanosis, or edema.  Neuro: moves all extremities symmetrically    Please note that this dictation was created using voice recognition software. I have made every reasonable attempt to correct obvious errors, but I expect that there are errors of grammar and possibly content that I did not discover before finalizing the note.    Assessment/Plan:  1. Chronic right-sided low back pain without sciatica    -Up-to-date with UDS and consent has been signed  - tramadol (ULTRAM) 50 MG Tab; Take 1 Tab by mouth 1 time daily as needed for up to 30 days.  Dispense: 30 Tab; Refill: 0    2. Primary osteoarthritis of left knee    -Patient deemed low surgical risk for proposed surgery.  EKG in office today normal.    3. Parkinson's disease (HCC)    Stable, continue Sinemet    4. Need for vaccination      - INFLUENZA VACCINE, HIGH DOSE (65+ ONLY)    5. Chronic alcohol use        6. Elevated PSA    No longer an issue    Followup: No appointment was made today, patient will need to be seen in about 6 months, 3 months if he continues to require tramadol.

## 2018-09-25 ENCOUNTER — OFFICE VISIT (OUTPATIENT)
Dept: NEUROLOGY | Facility: MEDICAL CENTER | Age: 68
End: 2018-09-25
Payer: MEDICARE

## 2018-09-25 VITALS
BODY MASS INDEX: 24.12 KG/M2 | DIASTOLIC BLOOD PRESSURE: 78 MMHG | SYSTOLIC BLOOD PRESSURE: 140 MMHG | HEIGHT: 73 IN | TEMPERATURE: 98.2 F | WEIGHT: 182 LBS | HEART RATE: 97 BPM | RESPIRATION RATE: 16 BRPM | OXYGEN SATURATION: 89 %

## 2018-09-25 DIAGNOSIS — G20.C PARKINSONISM, UNSPECIFIED PARKINSONISM TYPE: Primary | ICD-10-CM

## 2018-09-25 PROCEDURE — 99213 OFFICE O/P EST LOW 20 MIN: CPT | Performed by: PSYCHIATRY & NEUROLOGY

## 2018-09-25 ASSESSMENT — ENCOUNTER SYMPTOMS
MEMORY LOSS: 0
TREMORS: 0
CONSTIPATION: 0
FALLS: 0

## 2018-09-25 ASSESSMENT — PAIN SCALES - GENERAL: PAINLEVEL: 5=MODERATE PAIN

## 2018-09-25 ASSESSMENT — PATIENT HEALTH QUESTIONNAIRE - PHQ9: CLINICAL INTERPRETATION OF PHQ2 SCORE: 0

## 2018-09-25 NOTE — PROGRESS NOTES
"Subjective:      Jeremiah Huber is a 68 y.o. male who presents with his wife Nolvia, for follow-up, with a history of parkinsonism.    MAY Daniels states the symptoms have continued, he still has the stiffness in the muscles, there is a loss of dexterity with the right hand, his handwriting is still small, he is having more difficulties with buttoning buttons, etc. Tremor is not an issue. He walks well, has not been falling, his voice is a little softer, but he is not drooling and voice quality and swallowing are normal. He does have anosmia. Constipation is not a problem. He sleeps well, they deny REM-behavioral changes, he has not had issues with early satiety with nausea and vomiting or urinary retention and orthostatic dizziness/syncope.    We had started Sinemet 25/100, 3 times a day, late last year, neither one of them is convinced it is providing any benefit. He denies any side effects, on/off episodes, freezing or wearing-off.    Medical, surgical and family histories are reviewed in the electronic health record, there are no new drug allergies. He is scheduled for left knee surgery in several weeks, is asking for clearance. He is on Sinemet 25/100, 3 times a day, Xanax and Celebrex.    Review of Systems   Gastrointestinal: Negative for constipation.   Genitourinary: Negative for dysuria.   Musculoskeletal: Negative for falls.   Neurological: Negative for tremors.   Psychiatric/Behavioral: Negative for memory loss.   All other systems reviewed and are negative.       Objective:     /78 (BP Location: Right leg, Patient Position: Sitting)   Pulse 97   Temp 36.8 °C (98.2 °F) (Temporal)   Resp 16   Ht 1.854 m (6' 1\")   Wt 82.6 kg (182 lb)   SpO2 89%   BMI 24.01 kg/m²      Physical Exam    He appears in no acute distress. His vital signs are stable, there is no malar rash or sialorrhea. The neck is supple, range of motion is full. Cardiac evaluation is unremarkable.    He is fully oriented, " there is no aphasia or inattention. Eye blink frequency is still diminished, PERRLA/EOMI, visual fields are full, there is hypophonia but no tachyphemia or dysarthria. Facial movements are symmetric. There is no tremor, rigidity is mild, more with right upper extremity, only with distraction. There is no drift. Strength is 5/5. Repetitive movements again show a diminished amplitude and increased frequency with all 4 extremities, right greater than left-sided. He can stand independently, the right leg is moved more stiffly, stride length is diminished with the right as well. He can stand on his heels and toes without issue. There is no postural instability. There is no appendicular dystaxia. Romberg is absent.     Assessment/Plan:     1. Parkinsonism, unspecified Parkinsonism type (HCC)  Clinically stable, since his symptoms were always mild, never tremor dominant, it is sometimes difficult to determine whether medication is providing benefit. In the circumstances, I recommend stopping the drug since loss of efficacy is more often easily discerned. He will let me know about this. If there is benefit, we will simply reinitiate and follow along. Unfortunately, positive response can be seen in any of the parkinsonian conditions we are considering including Parkinson's disease. Only time will tell. Again, absent dementia and dysautonomia, Lewy Body Disease and Shy-Drager, respectively, are less likely. We will follow-up in 6 months. He is surgically cleared from my standpoint to get his left knee TKA done.    Time: 20 minutes spent face-to-face with the patient for exam, review, discussion, and education, this over 60% of the time spent counseling and coordinating care.

## 2018-10-05 ENCOUNTER — TELEPHONE (OUTPATIENT)
Dept: NEUROLOGY | Facility: MEDICAL CENTER | Age: 68
End: 2018-10-05

## 2018-10-05 NOTE — TELEPHONE ENCOUNTER
----- Message from Dilip Medrano, Med Ass't sent at 10/5/2018  9:06 AM PDT -----  Regarding: FW: Non-Urgent Medical Question  Contact: [phone]      ----- Message -----  From: Jeremiah Huber  Sent: 10/1/2018   6:18 PM  To: Neu[facility]  Subject: Non-Urgent Medical Question                      Per your request:  I stopped taking Sinemet and we have not noticed any changes - good or bad.   Is this good news or bad.  Or is it too soon to know?    I also had stopped taking the daily dose of Celebrex prior to seeing you last week.  No ill affects noticed.      Regards,  Farhat

## 2018-10-05 NOTE — TELEPHONE ENCOUNTER
It is good in the fact he does not need the drug. Over the long-term, an absence of response now, symptoms worsening off the drug, does not mean much. What we do right now is observe. I think it is simply excellent back he doesn't need it.

## 2018-10-22 DIAGNOSIS — F10.982 INSOMNIA DUE TO ALCOHOL (HCC): ICD-10-CM

## 2018-10-22 NOTE — TELEPHONE ENCOUNTER
Was the patient seen in the last year in this department? Yes  last filled 8/30/2018 #30  Does patient have an active prescription for medications requested? No   Received Request Via: Pharmacy

## 2018-10-23 RX ORDER — ALPRAZOLAM 1 MG/1
TABLET ORAL
Qty: 30 TAB | Refills: 0 | Status: SHIPPED
Start: 2018-10-23 | End: 2018-11-19 | Stop reason: SDUPTHER

## 2018-11-19 DIAGNOSIS — F10.982 INSOMNIA DUE TO ALCOHOL (HCC): ICD-10-CM

## 2018-11-19 NOTE — TELEPHONE ENCOUNTER
Was the patient seen in the last year in this department? Yes     Does patient have an active prescription for medications requested? No     Received Request Via: Pharmacy      printed and placed on Dr. Markham Desk.

## 2018-11-20 RX ORDER — ALPRAZOLAM 1 MG/1
TABLET ORAL
Qty: 30 TAB | Refills: 0 | Status: SHIPPED
Start: 2018-11-20 | End: 2018-12-21 | Stop reason: SDUPTHER

## 2018-11-29 ENCOUNTER — APPOINTMENT (RX ONLY)
Dept: URBAN - METROPOLITAN AREA CLINIC 35 | Facility: CLINIC | Age: 68
Setting detail: DERMATOLOGY
End: 2018-11-29

## 2018-11-29 DIAGNOSIS — L81.4 OTHER MELANIN HYPERPIGMENTATION: ICD-10-CM

## 2018-11-29 DIAGNOSIS — L82.1 OTHER SEBORRHEIC KERATOSIS: ICD-10-CM

## 2018-11-29 DIAGNOSIS — L57.0 ACTINIC KERATOSIS: ICD-10-CM

## 2018-11-29 DIAGNOSIS — B35.3 TINEA PEDIS: ICD-10-CM

## 2018-11-29 DIAGNOSIS — D22 MELANOCYTIC NEVI: ICD-10-CM

## 2018-11-29 DIAGNOSIS — Z71.89 OTHER SPECIFIED COUNSELING: ICD-10-CM

## 2018-11-29 PROBLEM — D22.5 MELANOCYTIC NEVI OF TRUNK: Status: ACTIVE | Noted: 2018-11-29

## 2018-11-29 PROCEDURE — ? PRESCRIPTION

## 2018-11-29 PROCEDURE — 17000 DESTRUCT PREMALG LESION: CPT

## 2018-11-29 PROCEDURE — ? COUNSELING

## 2018-11-29 PROCEDURE — ? LIQUID NITROGEN

## 2018-11-29 PROCEDURE — 17003 DESTRUCT PREMALG LES 2-14: CPT

## 2018-11-29 PROCEDURE — 99213 OFFICE O/P EST LOW 20 MIN: CPT | Mod: 25

## 2018-11-29 RX ORDER — FLUOROURACIL 50 MG/G
1 CREAM TOPICAL BID
Qty: 1 | Refills: 1 | Status: ERX

## 2018-11-29 RX ORDER — CALCIPOTRIENE 50 UG/G
1 CREAM TOPICAL BID
Qty: 1 | Refills: 0 | Status: ERX

## 2018-11-29 ASSESSMENT — LOCATION ZONE DERM
LOCATION ZONE: FACE
LOCATION ZONE: ARM
LOCATION ZONE: FEET
LOCATION ZONE: TRUNK
LOCATION ZONE: SCALP
LOCATION ZONE: LEG

## 2018-11-29 ASSESSMENT — LOCATION DETAILED DESCRIPTION DERM
LOCATION DETAILED: LEFT SUPERIOR OCCIPITAL SCALP
LOCATION DETAILED: LEFT LATERAL MALAR CHEEK
LOCATION DETAILED: RIGHT PROXIMAL PRETIBIAL REGION
LOCATION DETAILED: RIGHT INFERIOR MEDIAL UPPER BACK
LOCATION DETAILED: LEFT DORSAL FOOT
LOCATION DETAILED: RIGHT SUPERIOR OCCIPITAL SCALP
LOCATION DETAILED: RIGHT DORSAL FOOT
LOCATION DETAILED: SUPERIOR THORACIC SPINE
LOCATION DETAILED: LEFT CLAVICULAR SKIN
LOCATION DETAILED: LEFT PROXIMAL PRETIBIAL REGION
LOCATION DETAILED: LEFT DISTAL POSTERIOR UPPER ARM
LOCATION DETAILED: RIGHT SUPERIOR POSTERIOR PARIETAL SCALP
LOCATION DETAILED: RIGHT SUPERIOR FOREHEAD
LOCATION DETAILED: RIGHT PROXIMAL POSTERIOR UPPER ARM
LOCATION DETAILED: LEFT SUPERIOR POSTERIOR PARIETAL SCALP
LOCATION DETAILED: LEFT SUPERIOR MEDIAL FOREHEAD
LOCATION DETAILED: INFERIOR THORACIC SPINE
LOCATION DETAILED: LEFT MEDIAL SUPERIOR CHEST

## 2018-11-29 ASSESSMENT — LOCATION SIMPLE DESCRIPTION DERM
LOCATION SIMPLE: LEFT CHEEK
LOCATION SIMPLE: LEFT FOOT
LOCATION SIMPLE: RIGHT UPPER ARM
LOCATION SIMPLE: RIGHT UPPER BACK
LOCATION SIMPLE: LEFT PRETIBIAL REGION
LOCATION SIMPLE: RIGHT FOREHEAD
LOCATION SIMPLE: LEFT UPPER ARM
LOCATION SIMPLE: CHEST
LOCATION SIMPLE: LEFT CLAVICULAR SKIN
LOCATION SIMPLE: LEFT FOREHEAD
LOCATION SIMPLE: RIGHT PRETIBIAL REGION
LOCATION SIMPLE: RIGHT FOOT
LOCATION SIMPLE: UPPER BACK
LOCATION SIMPLE: POSTERIOR SCALP

## 2018-11-29 NOTE — PROCEDURE: LIQUID NITROGEN
Detail Level: Detailed
Post-Care Instructions: I reviewed with the patient in detail post-care instructions. Patient is to wear sunprotection, and avoid picking at any of the treated lesions. Pt may apply Vaseline to crusted or scabbing areas.
Render Post-Care Instructions In Note?: no
Duration Of Freeze Thaw-Cycle (Seconds): 10
Consent: The patient's consent was obtained including but not limited to risks of crusting, scabbing, blistering, scarring, darker or lighter pigmentary change, recurrence, incomplete removal and infection.

## 2018-12-21 DIAGNOSIS — F10.982 INSOMNIA DUE TO ALCOHOL (HCC): ICD-10-CM

## 2018-12-21 NOTE — TELEPHONE ENCOUNTER
Was the patient seen in the last year in this department? Yes    Does patient have an active prescription for medications requested? No     Received Request Via: Pharmacy        Last Fill 11/20/2018

## 2018-12-26 RX ORDER — CEPHALEXIN 250 MG/1
CAPSULE ORAL
COMMUNITY
Start: 2018-10-18 | End: 2019-03-25

## 2018-12-26 RX ORDER — TAMSULOSIN HYDROCHLORIDE 0.4 MG/1
CAPSULE ORAL
COMMUNITY
Start: 2018-10-18 | End: 2019-03-25

## 2018-12-26 RX ORDER — OXYCODONE HYDROCHLORIDE AND ACETAMINOPHEN 5; 325 MG/1; MG/1
TABLET ORAL
COMMUNITY
Start: 2018-10-22 | End: 2019-03-25

## 2018-12-27 RX ORDER — ALPRAZOLAM 1 MG/1
TABLET ORAL
Qty: 30 TAB | Refills: 0 | Status: SHIPPED
Start: 2018-12-27 | End: 2019-01-28 | Stop reason: SDUPTHER

## 2019-01-28 DIAGNOSIS — F10.982 INSOMNIA DUE TO ALCOHOL (HCC): ICD-10-CM

## 2019-01-29 RX ORDER — ALPRAZOLAM 1 MG/1
TABLET ORAL
Qty: 30 TAB | Refills: 0 | Status: SHIPPED
Start: 2019-01-29 | End: 2019-02-17

## 2019-03-25 ENCOUNTER — OFFICE VISIT (OUTPATIENT)
Dept: NEUROLOGY | Facility: MEDICAL CENTER | Age: 69
End: 2019-03-25
Payer: MEDICARE

## 2019-03-25 VITALS
HEIGHT: 73 IN | SYSTOLIC BLOOD PRESSURE: 118 MMHG | WEIGHT: 195.4 LBS | DIASTOLIC BLOOD PRESSURE: 80 MMHG | OXYGEN SATURATION: 94 % | RESPIRATION RATE: 16 BRPM | TEMPERATURE: 97 F | BODY MASS INDEX: 25.9 KG/M2 | HEART RATE: 74 BPM

## 2019-03-25 DIAGNOSIS — G20.C PARKINSONISM, UNSPECIFIED PARKINSONISM TYPE: Primary | ICD-10-CM

## 2019-03-25 PROCEDURE — 99214 OFFICE O/P EST MOD 30 MIN: CPT | Performed by: PSYCHIATRY & NEUROLOGY

## 2019-03-25 RX ORDER — AMOXICILLIN 500 MG/1
500 CAPSULE ORAL 3 TIMES DAILY
COMMUNITY
End: 2020-06-12

## 2019-03-25 ASSESSMENT — ENCOUNTER SYMPTOMS
DEPRESSION: 0
TREMORS: 0
FALLS: 0
SPEECH CHANGE: 1
CONSTIPATION: 0
INSOMNIA: 0
MEMORY LOSS: 0

## 2019-03-25 ASSESSMENT — PATIENT HEALTH QUESTIONNAIRE - PHQ9: CLINICAL INTERPRETATION OF PHQ2 SCORE: 0

## 2019-03-25 ASSESSMENT — PAIN SCALES - GENERAL: PAINLEVEL: 3=SLIGHT PAIN

## 2019-06-05 DIAGNOSIS — G20.C PARKINSONISM, UNSPECIFIED PARKINSONISM TYPE: ICD-10-CM

## 2019-09-12 ENCOUNTER — RX ONLY (OUTPATIENT)
Age: 69
Setting detail: RX ONLY
End: 2019-09-12

## 2019-09-12 RX ORDER — FLUTICASONE PROPIONATE 0.5 MG/G
CREAM TOPICAL BID
Qty: 1 | Refills: 1 | Status: ERX

## 2019-09-16 ENCOUNTER — OFFICE VISIT (OUTPATIENT)
Dept: MEDICAL GROUP | Facility: MEDICAL CENTER | Age: 69
End: 2019-09-16
Payer: MEDICARE

## 2019-09-16 VITALS
WEIGHT: 196.43 LBS | TEMPERATURE: 97.7 F | OXYGEN SATURATION: 91 % | DIASTOLIC BLOOD PRESSURE: 62 MMHG | HEIGHT: 73 IN | BODY MASS INDEX: 26.03 KG/M2 | SYSTOLIC BLOOD PRESSURE: 104 MMHG | HEART RATE: 87 BPM

## 2019-09-16 DIAGNOSIS — M67.40 GANGLION CYST: ICD-10-CM

## 2019-09-16 DIAGNOSIS — Z00.00 WELL ADULT EXAM: ICD-10-CM

## 2019-09-16 DIAGNOSIS — G89.29 CHRONIC RIGHT-SIDED LOW BACK PAIN WITHOUT SCIATICA: ICD-10-CM

## 2019-09-16 DIAGNOSIS — M54.50 CHRONIC RIGHT-SIDED LOW BACK PAIN WITHOUT SCIATICA: ICD-10-CM

## 2019-09-16 DIAGNOSIS — Z23 NEED FOR VACCINATION: ICD-10-CM

## 2019-09-16 DIAGNOSIS — F10.90 CHRONIC ALCOHOL USE: ICD-10-CM

## 2019-09-16 DIAGNOSIS — Z79.891 CHRONIC USE OF OPIATE FOR THERAPEUTIC PURPOSE: ICD-10-CM

## 2019-09-16 PROCEDURE — 90662 IIV NO PRSV INCREASED AG IM: CPT | Performed by: FAMILY MEDICINE

## 2019-09-16 PROCEDURE — G0008 ADMIN INFLUENZA VIRUS VAC: HCPCS | Performed by: FAMILY MEDICINE

## 2019-09-16 PROCEDURE — 99214 OFFICE O/P EST MOD 30 MIN: CPT | Mod: 25 | Performed by: FAMILY MEDICINE

## 2019-09-16 RX ORDER — TRAMADOL HYDROCHLORIDE 50 MG/1
50 TABLET ORAL
Qty: 30 TAB | Refills: 0 | Status: SHIPPED | OUTPATIENT
Start: 2019-09-16 | End: 2019-10-16

## 2019-09-16 NOTE — ASSESSMENT & PLAN NOTE
Chronic problem. Takes tramadol daily when it is available. However, haven't taken it in some time. Has been taking tylenol, aleve or asa over the past year or so but states the tramadol works much better. He used to take xanax but does not take that anymore either. 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 Never seen by pain management. He did see Dr. Griffin in the past for his knees which is who ordered his imaging.

## 2019-09-17 NOTE — PROGRESS NOTES
Subjective:     CC: Diagnoses of Chronic right-sided low back pain without sciatica, Ganglion cyst, Chronic alcohol use, Well adult exam, Chronic use of opiate for therapeutic purpose, and Need for vaccination were pertinent to this visit.    HPI: Patient is a 69 y.o. male established patient who presents today for a refill on tramadol.      Right-sided low back pain without sciatica  Chronic problem. Takes tramadol daily when it is available. However, haven't taken it in some time. Has been taking tylenol, aleve or asa over the past year or so but states the tramadol works much better. He used to take xanax but does not take that anymore either. 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 Never seen by pain management. He did see Dr. Griffin in the past for his knees which is who ordered his imaging.     Chronic alcohol use  Patient reports that he drinks 2-3 beers every night.  He denies use.    Ganglion cyst  New problem.  The patient states that he has a cystic-like structure that he noted on his right lateral foot/ankle.  Approximately 1.5 cm.  It does not cause him any pain or limited range of motion.  He has a history of a ganglion cyst on his wrist in the past.      Past Medical History:   Diagnosis Date   • Back spasm    • GOUT    • Gout    • Hypertension        Social History     Tobacco Use   • Smoking status: Never Smoker   • Smokeless tobacco: Never Used   Substance Use Topics   • Alcohol use: Yes     Alcohol/week: 4.2 oz     Types: 4 Glasses of wine, 3 Standard drinks or equivalent per week     Comment: daily   • Drug use: No       Current Outpatient Medications Ordered in Epic   Medication Sig Dispense Refill   • tramadol (ULTRAM) 50 MG Tab Take 1 Tab by mouth 1 time daily as needed for Moderate Pain or Severe Pain for up to 30 days. 30 Tab 0   • carbidopa-levodopa (SINEMET)  MG Tab Take 1 Tab by mouth 3 times a day. 90 Tab 2   • fluticasone (CUTIVATE) 0.05 %  "cream Apply  to affected area(s) 2 times a day.     • amoxicillin (AMOXIL) 500 MG Cap Take 500 mg by mouth 3 times a day.       No current [facility]-ordered facility-administered medications on file.        Allergies:  Nkda [no known drug allergy]    Health Maintenance: Completed    ROS:  Pulm: no sob, no cough  CV: no chest pain, no palpitations      Objective:       Exam:  /62 (BP Location: Right arm, Patient Position: Sitting, BP Cuff Size: Adult)   Pulse 87   Temp 36.5 °C (97.7 °F) (Temporal)   Ht 1.854 m (6' 1\")   Wt 89.1 kg (196 lb 6.9 oz)   SpO2 91%   BMI 25.92 kg/m²  Body mass index is 25.92 kg/m².    General: Normal appearing. No distress.  HEAD: NCAT  EYES: conjunctiva clear, lids without ptosis, pupils equal and reactive to light  EARS: ears normal shape and contour.  MOUTH: normal dentition   Neck:  Normal ROM  Pulmonary: CTAB, no W/R/R. Normal effort. Normal respiratory rate.  Cardiovascular: RRR, no M/R/G. Well perfused. No LE edema  Neurologic: Grossly normal, no focal deficits  Skin: Warm and dry.  No obvious lesions.  Musculoskeletal: Normal gait and station.   Psych: Normal mood and affect. Alert and oriented x3. Judgment and insight is normal.      Assessment & Plan:     69 y.o. male with the following -     1. Chronic right-sided low back pain without sciatica  Chronic problem.  The patient is requesting a refill on his tramadol.  I advised him that I do not do chronic opioid prescriptions for chronic pain.  I have referred him to pain management.  In the meantime, he did fill out a consent for opiate prescription as well as a controlled substance agreement.  I have reviewed the medical records, the Prescription Monitoring Program and I have determined that tramadol is medically indicated. The patient denies using any other controlled substances not prescribed or prescribed. I discussed risks and benefits of the medication. I discussed proper use, storage and disposal of the medication. "      There are no known early refill attempts or claims of pain medication being lost or stollen.      There have been no major changes in the patient's health that would affect medical appropriateness of this pain medication.     I have advised patient to keep medication in a safe place.    - REFERRAL TO PAIN MANAGEMENT  - tramadol (ULTRAM) 50 MG Tab; Take 1 Tab by mouth 1 time daily as needed for Moderate Pain or Severe Pain for up to 30 days.  Dispense: 30 Tab; Refill: 0  - Consent for Opiate Prescription  - Controlled Substance Treatment Agreement    2. Ganglion cyst  New problem.  I advised unless it is causing him discomfort or limited range of motion that we can leave it alone.  However, if he would like to pursue treatment we can certainly aspirated here in clinic.    3. Chronic alcohol use  Chronic problem.  The patient denies abuse.  He drinks 2-3 beers per night.  I discussed with him that he cannot drink alcohol while taking tramadol, he voiced understanding.    4. Well adult exam  - Lipid Profile; Future  - Comp Metabolic Panel; Future    5. Chronic use of opiate for therapeutic purpose  As above, consent for opiate prescription as well as controlled substance treatment agreement was signed.  We discussed the risks and potential addictive side effects of tramadol.  He voiced understanding.    6. Need for vaccination  - Influenza Vaccine, High Dose (65+ Only)    Return in about 6 months (around 3/16/2020).    Please note that this dictation was created using voice recognition software. I have made every reasonable attempt to correct obvious errors, but I expect that there are errors of grammar and possibly content that I did not discover before finalizing the note.

## 2019-09-17 NOTE — ASSESSMENT & PLAN NOTE
New problem.  The patient states that he has a cystic-like structure that he noted on his right lateral foot/ankle.  Approximately 1.5 cm.  It does not cause him any pain or limited range of motion.  He has a history of a ganglion cyst on his wrist in the past.

## 2019-10-31 ENCOUNTER — APPOINTMENT (RX ONLY)
Dept: URBAN - METROPOLITAN AREA CLINIC 35 | Facility: CLINIC | Age: 69
Setting detail: DERMATOLOGY
End: 2019-10-31

## 2019-10-31 ENCOUNTER — OFFICE VISIT (OUTPATIENT)
Dept: PHYSICAL MEDICINE AND REHAB | Facility: MEDICAL CENTER | Age: 69
End: 2019-10-31
Payer: MEDICARE

## 2019-10-31 VITALS
TEMPERATURE: 97.6 F | OXYGEN SATURATION: 94 % | BODY MASS INDEX: 26.58 KG/M2 | SYSTOLIC BLOOD PRESSURE: 120 MMHG | HEART RATE: 79 BPM | DIASTOLIC BLOOD PRESSURE: 82 MMHG | WEIGHT: 196.21 LBS | HEIGHT: 72 IN

## 2019-10-31 DIAGNOSIS — M51.36 LUMBAR DEGENERATIVE DISC DISEASE: ICD-10-CM

## 2019-10-31 DIAGNOSIS — M41.9 SCOLIOSIS OF THORACOLUMBAR SPINE, UNSPECIFIED SCOLIOSIS TYPE: ICD-10-CM

## 2019-10-31 DIAGNOSIS — L82.1 OTHER SEBORRHEIC KERATOSIS: ICD-10-CM

## 2019-10-31 DIAGNOSIS — M43.16 SPONDYLOLISTHESIS OF LUMBAR REGION: ICD-10-CM

## 2019-10-31 DIAGNOSIS — L57.0 ACTINIC KERATOSIS: ICD-10-CM

## 2019-10-31 DIAGNOSIS — M47.816 LUMBAR SPONDYLOSIS: ICD-10-CM

## 2019-10-31 DIAGNOSIS — M47.816 FACET ARTHROPATHY, LUMBAR: ICD-10-CM

## 2019-10-31 PROCEDURE — ? COUNSELING

## 2019-10-31 PROCEDURE — ? LIQUID NITROGEN

## 2019-10-31 PROCEDURE — 17003 DESTRUCT PREMALG LES 2-14: CPT

## 2019-10-31 PROCEDURE — 17000 DESTRUCT PREMALG LESION: CPT

## 2019-10-31 PROCEDURE — 99205 OFFICE O/P NEW HI 60 MIN: CPT | Performed by: PHYSICAL MEDICINE & REHABILITATION

## 2019-10-31 PROCEDURE — ? TREATMENT REGIMEN

## 2019-10-31 PROCEDURE — ? BENIGN DESTRUCTION COSMETIC

## 2019-10-31 RX ORDER — MELOXICAM 15 MG/1
15 TABLET ORAL DAILY
Qty: 30 TAB | Refills: 0 | Status: SHIPPED | OUTPATIENT
Start: 2019-10-31 | End: 2019-11-30

## 2019-10-31 ASSESSMENT — LOCATION ZONE DERM
LOCATION ZONE: NECK
LOCATION ZONE: SCALP
LOCATION ZONE: FACE

## 2019-10-31 ASSESSMENT — PATIENT HEALTH QUESTIONNAIRE - PHQ9
5. POOR APPETITE OR OVEREATING: 0
6. FEELING BAD ABOUT YOURSELF - OR THAT YOU ARE A FAILURE OR HAVE LET YOURSELF OR YOUR FAMILY DOWN: 0
8. MOVING OR SPEAKING SO SLOWLY THAT OTHER PEOPLE COULD HAVE NOTICED. OR THE OPPOSITE, BEING SO FIGETY OR RESTLESS THAT YOU HAVE BEEN MOVING AROUND A LOT MORE THAN USUAL: 3
1. LITTLE INTEREST OR PLEASURE IN DOING THINGS: 0
SUM OF ALL RESPONSES TO PHQ9 QUESTIONS 1 AND 2: 0
SUM OF ALL RESPONSES TO PHQ QUESTIONS 1-9: 7
CLINICAL INTERPRETATION OF PHQ2 SCORE: 0
3. TROUBLE FALLING OR STAYING ASLEEP OR SLEEPING TOO MUCH: 2
4. FEELING TIRED OR HAVING LITTLE ENERGY: 2
2. FEELING DOWN, DEPRESSED, IRRITABLE, OR HOPELESS: 0
9. THOUGHTS THAT YOU WOULD BE BETTER OFF DEAD, OR OF HURTING YOURSELF: 0
7. TROUBLE CONCENTRATING ON THINGS, SUCH AS READING THE NEWSPAPER OR WATCHING TELEVISION: 0

## 2019-10-31 ASSESSMENT — LOCATION DETAILED DESCRIPTION DERM
LOCATION DETAILED: POSTERIOR MID-PARIETAL SCALP
LOCATION DETAILED: LEFT CLAVICULAR NECK
LOCATION DETAILED: LEFT MEDIAL FRONTAL SCALP
LOCATION DETAILED: RIGHT MEDIAL FRONTAL SCALP
LOCATION DETAILED: LEFT SUPERIOR OCCIPITAL SCALP
LOCATION DETAILED: RIGHT SUPERIOR MEDIAL FOREHEAD
LOCATION DETAILED: MID-OCCIPITAL SCALP
LOCATION DETAILED: LEFT SUPERIOR PARIETAL SCALP

## 2019-10-31 ASSESSMENT — PAIN SCALES - GENERAL: PAINLEVEL: 8=MODERATE-SEVERE PAIN

## 2019-10-31 ASSESSMENT — LOCATION SIMPLE DESCRIPTION DERM
LOCATION SIMPLE: RIGHT SCALP
LOCATION SIMPLE: LEFT ANTERIOR NECK
LOCATION SIMPLE: LEFT SCALP
LOCATION SIMPLE: POSTERIOR SCALP
LOCATION SIMPLE: RIGHT FOREHEAD
LOCATION SIMPLE: SCALP

## 2019-10-31 ASSESSMENT — ENCOUNTER SYMPTOMS: BACK PAIN: 1

## 2019-10-31 NOTE — Clinical Note
Dear Shawna Fay M.D. and An Perez M.D., Thank you for the referral of Jeremiah Huber.  Please see my note for more details Should you have any questions or concerns please do not hesitate to contact me. Nazario Sandoval M.D.

## 2019-10-31 NOTE — PROCEDURE: LIQUID NITROGEN
Render Note In Bullet Format When Appropriate: No
Post-Care Instructions: I reviewed with the patient in detail post-care instructions. Patient is to wear sunprotection, and avoid picking at any of the treated lesions. Pt may apply Vaseline to crusted or scabbing areas.
Duration Of Freeze Thaw-Cycle (Seconds): 10
Number Of Freeze-Thaw Cycles: 1 freeze-thaw cycle
Detail Level: Detailed
Consent: The patient's consent was obtained including but not limited to risks of crusting, scabbing, blistering, scarring, darker or lighter pigmentary change, recurrence, incomplete removal and infection.

## 2019-10-31 NOTE — PROCEDURE: TREATMENT REGIMEN
Detail Level: Zone
Continue Regimen: Fluorouracil 5% topical cream apply to affected areas on scalp twice daily for four days\\nCalcipotriene 0.005% topical cream apply directly on top of fluorouracil cream to affected areas on the scalp twice daily for four days
Plan: Patient will plan to use the field treatment in the winter.

## 2019-10-31 NOTE — PROGRESS NOTES
New patient note    Physiatry (physical medicine and  Rehabilitation), interventional spine and sports medicine    Date of Service: 10/31/2019    Chief complaint:   Chief Complaint   Patient presents with   • New Patient     back pain        HISTORY    HPI: Jeremiah Huber 69 y.o. male who presents today with Diagnoses of Lumbar spondylosis, Spondylolisthesis of lumbar region, Scoliosis of thoracolumbar spine, unspecified scoliosis type, Lumbar degenerative disc disease, and Facet arthropathy, lumbar were pertinent to this visit.     Back Pain   This is a chronic (since 2013) problem. The current episode started more than 1 year ago. The problem occurs constantly. The pain is present in the lumbar spine and sacro-iliac (right). The quality of the pain is described as aching. The pain does not radiate. Pain scale: 4-8/10.  Worse during: worse in the morning. improves with sitting.   The symptoms are aggravated by standing and twisting (walking). He has tried ice, heat, home exercises, muscle relaxant, walking, NSAIDs and analgesics (tramadol, percocet, xanax, tylenol) for the symptoms.     The patient walks 3 to 4 miles nearly daily.  He has 2 dogs.    Has not had PT, injections or surgeries for the back.       The patient is on Xanax for sleep.    Reports the tramadol and his medications have not been effective for the pain.    Medical records review:  I reviewed the note from the referring provider An Perez M.D. including the note dated 9/16/2019.  Chronic right-sided low back pain without sciatica, requesting refill of tramadol at that time.  Referred to pain medicine.  He was given Ultram at that visit.  Chronic alcohol use.  Drinking 2-3 beers per night.    I reviewed the note from Dr. Lalo Alejandre from 3/25/2019 from neurology.  History of Parkinson Nissen.  Not on drugs for now.    ROS:   Red Flags ROS:   Fever, Chills, Sweats: Denies  Involuntary Weight Loss: Denies  Bladder Incontinence:  Denies  Bowel Incontinence: denies  Saddle Anesthesia: Denies    All other systems reviewed and negative.       PMHx:   Past Medical History:   Diagnosis Date   • Back spasm    • GOUT    • Gout    • Hypertension          Current Outpatient Medications on File Prior to Visit   Medication Sig Dispense Refill   • carbidopa-levodopa (SINEMET)  MG Tab Take 1 Tab by mouth 3 times a day. 90 Tab 2   • amoxicillin (AMOXIL) 500 MG Cap Take 500 mg by mouth 3 times a day.     • fluticasone (CUTIVATE) 0.05 % cream Apply  to affected area(s) 2 times a day.       No current facility-administered medications on file prior to visit.         PSHx:   Past Surgical History:   Procedure Laterality Date   • KNEE ARTHROSCOPY Right    • SHOULDER SURGERY      right shoulder, a-c seperation   • TONSILLECTOMY         Family history   Family History   Problem Relation Age of Onset   • Heart Disease Mother    • Heart Disease Father    • Cancer Father         prostate cancer   • Arthritis Brother          Medications: reviewed on epic.   Outpatient Medications Marked as Taking for the 10/31/19 encounter (Office Visit) with Nazario Sandoval M.D.   Medication Sig Dispense Refill   • meloxicam (MOBIC) 15 MG tablet Take 1 Tab by mouth every day for 30 days. For 30 days then stop. Do not take other NSAIDs. No refills. 30 Tab 0   • carbidopa-levodopa (SINEMET)  MG Tab Take 1 Tab by mouth 3 times a day. 90 Tab 2   • amoxicillin (AMOXIL) 500 MG Cap Take 500 mg by mouth 3 times a day.     • fluticasone (CUTIVATE) 0.05 % cream Apply  to affected area(s) 2 times a day.          Allergies:   Allergies   Allergen Reactions   • Nkda [No Known Drug Allergy]        Social Hx:   Social History     Socioeconomic History   • Marital status:      Spouse name: Not on file   • Number of children: Not on file   • Years of education: Not on file   • Highest education level: Not on file   Occupational History   • Not on file   Social Needs   •  Financial resource strain: Not on file   • Food insecurity:     Worry: Not on file     Inability: Not on file   • Transportation needs:     Medical: Not on file     Non-medical: Not on file   Tobacco Use   • Smoking status: Never Smoker   • Smokeless tobacco: Never Used   Substance and Sexual Activity   • Alcohol use: Yes     Alcohol/week: 4.2 oz     Types: 4 Glasses of wine, 3 Standard drinks or equivalent per week     Comment: daily   • Drug use: No   • Sexual activity: Yes     Partners: Female   Lifestyle   • Physical activity:     Days per week: Not on file     Minutes per session: Not on file   • Stress: Not on file   Relationships   • Social connections:     Talks on phone: Not on file     Gets together: Not on file     Attends Nondenominational service: Not on file     Active member of club or organization: Not on file     Attends meetings of clubs or organizations: Not on file     Relationship status: Not on file   • Intimate partner violence:     Fear of current or ex partner: Not on file     Emotionally abused: Not on file     Physically abused: Not on file     Forced sexual activity: Not on file   Other Topics Concern   • Not on file   Social History Narrative   • Not on file         EXAMINATION     Physical Exam:   Vitals: /82 (BP Location: Right arm, Patient Position: Sitting, BP Cuff Size: Adult)   Pulse 79   Temp 36.4 °C (97.6 °F) (Temporal)   Ht 1.829 m (6')   Wt 89 kg (196 lb 3.4 oz)   SpO2 94%     Constitutional:   Body Habitus: Body mass index is 26.61 kg/m².  Cooperation: Fully cooperates with exam  Appearance: Well-groomed, well-nourished, not disheveled     Eyes: No scleral icterus to suggest severe liver disease, no proptosis to suggest severe hyperthyroid    ENT -no obvious auditory deficits, no obvious tongue lesions, tongue midline, no facial droop     Skin -no rashes or lesions noted     Respiratory-  breathing comfortable on room air, no audible wheezing    Cardiovascular- capillary  refills less than 2 seconds. No lower extremity edema is noted.     Gastrointestinal - no obvious abdominal masses, No tenderness to palpation in the abdomen    Psychiatric- alert and oriented ×3. Normal affect.     Gait - normal gait, no use of ambulatory device, nonantalgic. the patient can toe walk with ease..     Musculoskeletal -     Thoracic/Lumbar Spine/Sacral Spine/Hips   Inspection: No evidence of atrophy in bilateral lower extremities throughout     ROM: full  active range of motion with flexion, lateral flexion, and rotation bilaterally.   There is decreased active range of motion with lumbar extension.    There is pain with lumbar extension.   There is pain with facet loading maneuver (extension rotation) with axial low back pain on the RIGHT side(s)    Palpation:   No tenderness to palpation in midline at T1-T12 levels. No tenderness to palpation in the left and right of the midline T1-L5, NEGATIVE for tenderness to palpation to the para-midline region in the lower lumbar levels.  palpation over SI joint: negative bilaterally    palpation over buttock: negative bilaterally    palpation in hip or over the greater trochanters: negative bilaterally      Lumbar spine Special tests  Neuro tension  Straight leg test negative bilaterally    Slump test negative bilaterally      HIP  FAIR test negative bilaterally    Range of motion in the hips is within normal limits in flexion, extension, abduction, internal rotation, external rotation.    SI joint tests  Observation patient sits on one buttocks: Negative  SI joint compression negative bilaterally    SI joint distraction negative bilaterally    Thigh thrust test negative bilaterally    LUTHER test negative bilaterally       Neuro       Key points for the international standards for neurological classification of spinal cord injury (ISNCSCI) to light touch.     Dermatome R L                                      L2 2 2   L3 2 2   L4 2 2   L5 2 2   S1 2 2   S2 2 2        Motor Exam Lower Extremities    ? Myotome R L   Hip flexion L2 5 5   Knee extension L3 5 5   Ankle dorsiflexion L4 5 5   Toe extension L5 5 5   Ankle plantarflexion S1 5 5       reflexes  Valenzuela’s sign negative bilaterally   Babinski sign negative bilaterally   Clonus of the ankle negative bilaterally           MEDICAL DECISION MAKING    Medical records review: see under HPI section.     DATA    Labs:   Lab Results   Component Value Date/Time    SODIUM 138 09/07/2018 11:40 AM    POTASSIUM 4.8 09/07/2018 11:40 AM    CHLORIDE 105 09/07/2018 11:40 AM    CO2 27 09/07/2018 11:40 AM    ANION 6.0 09/07/2018 11:40 AM    GLUCOSE 87 09/07/2018 11:40 AM    BUN 13 09/07/2018 11:40 AM    CREATININE 1.00 09/07/2018 11:40 AM    CALCIUM 9.5 09/07/2018 11:40 AM    ASTSGOT 14 09/07/2018 11:40 AM    ALTSGPT 5 09/07/2018 11:40 AM    TBILIRUBIN 0.8 09/07/2018 11:40 AM    ALBUMIN 4.3 09/07/2018 11:40 AM    TOTPROTEIN 6.8 09/07/2018 11:40 AM    GLOBULIN 2.5 09/07/2018 11:40 AM    AGRATIO 1.7 09/07/2018 11:40 AM   ]    No results found for: PROTHROMBTM, INR     Lab Results   Component Value Date/Time    WBC 5.4 05/25/2016 10:00 AM    RBC 4.76 05/25/2016 10:00 AM    HEMOGLOBIN 14.9 05/25/2016 10:00 AM    HEMATOCRIT 44.2 05/25/2016 10:00 AM    MCV 92.9 05/25/2016 10:00 AM    MCH 31.3 05/25/2016 10:00 AM    MCHC 33.7 05/25/2016 10:00 AM    MPV 10.2 05/25/2016 10:00 AM    NEUTSPOLYS 64.8 11/08/2014 11:20 AM    LYMPHOCYTES 24.4 11/08/2014 11:20 AM    MONOCYTES 9.0 11/08/2014 11:20 AM    EOSINOPHILS 1.3 11/08/2014 11:20 AM    BASOPHILS 0.5 11/08/2014 11:20 AM        No results found for: HBA1C     I reviewed the urine drug screen from 7/5/2018.  At that time this was positive for alcohol, positive for benzodiazepines which were prescribed and possible for tramadol which was prescribed.    Imaging:   I personally reviewed following images, these are my reads  X-ray lumbar spine 11/11/2015.  Degenerative scoliosis is present.  Facet  arthropathy worse in the lower lumbar levels right worse than left.  Degenerative disc disease is present. CT renal colic evaluation.  I reviewed the study specifically look at the patient's lumbar spine.  Please see radiologist dictation for the remainder of the report.  Degenerative disc disease at multiple levels worst at the L2-3 level.  Also present L4-5 and L5-S1.  Grade 1 spondylolisthesis L2-3.  Significant facet arthropathy L3-4, L4-5, L5-S1 right side worse than left.      IMAGING radiology reads. I reviewed the following radiology reads                                                             Results for orders placed in visit on 11/11/15   DX-LUMBAR SPINE-2 OR 3 VIEWS    Impression 1.  Lumbar scoliosis convex left. L2-L3 retrolisthesis.                                    Results for orders placed in visit on 06/10/16   DX-WRIST-COMPLETE 3+ RIGHT    Impression No acute fracture identified. Scapholunate widening consistent with ligamentous injury.           Diagnosis   Visit Diagnoses     ICD-10-CM   1. Lumbar spondylosis M47.816   2. Spondylolisthesis of lumbar region M43.16   3. Scoliosis of thoracolumbar spine, unspecified scoliosis type M41.9   4. Lumbar degenerative disc disease M51.36   5. Facet arthropathy, lumbar M47.816           ASSESSMENT AND PLAN:  Jeermiah Russell Huber 69 y.o. male      Jeremiah was seen today for new patient.    Diagnoses and all orders for this visit:    Lumbar spondylosis  -     meloxicam (MOBIC) 15 MG tablet; Take 1 Tab by mouth every day for 30 days. For 30 days then stop. Do not take other NSAIDs. No refills.  -     REFERRAL TO PHYSICAL THERAPY Reason for Therapy: Eval/Treat/Report    Spondylolisthesis of lumbar region  -     meloxicam (MOBIC) 15 MG tablet; Take 1 Tab by mouth every day for 30 days. For 30 days then stop. Do not take other NSAIDs. No refills.  -     REFERRAL TO PHYSICAL THERAPY Reason for Therapy: Eval/Treat/Report    Scoliosis of thoracolumbar spine,  unspecified scoliosis type  -     meloxicam (MOBIC) 15 MG tablet; Take 1 Tab by mouth every day for 30 days. For 30 days then stop. Do not take other NSAIDs. No refills.  -     REFERRAL TO PHYSICAL THERAPY Reason for Therapy: Eval/Treat/Report    Lumbar degenerative disc disease  -     meloxicam (MOBIC) 15 MG tablet; Take 1 Tab by mouth every day for 30 days. For 30 days then stop. Do not take other NSAIDs. No refills.  -     REFERRAL TO PHYSICAL THERAPY Reason for Therapy: Eval/Treat/Report    Facet arthropathy, lumbar  -     meloxicam (MOBIC) 15 MG tablet; Take 1 Tab by mouth every day for 30 days. For 30 days then stop. Do not take other NSAIDs. No refills.  -     REFERRAL TO PHYSICAL THERAPY Reason for Therapy: Eval/Treat/Report      I will consider the patient for an MRI lumbar spine if he fails physical therapy.  I believe the majority the patient's pain is coming from facet arthropathy worst in the lower lumbar levels on the right side which can be seen on the previous CT as above.  This is consistent with his exam.  I would consider the patient for diagnostic medial branch block targeting the right L3-4, L4-5 and L5-S1 facet joints if he fails the above treatments.    We discussed that if he is on Xanax or benzodiazepines that I would not be prescribing pain medications for him.  We also discussed that he would need to be off of alcohol to be eligible for controlled substances for pain.    Last opioid risk scale: 10/31/2019   Last urine drug screen: I reviewed the urine drug screen from 7/5/2018.  At that time this was positive for alcohol, positive for benzodiazepines which were prescribed and possible for tramadol which was prescribed.  Last controlled substance agreement: 10/31/2019    reviewed: 10/31/2019          Opioid Risk Score: 3    Interpretation of Opioid Risk Score   Score 0-3 = Low risk of abuse. Do UDS at least once per year.  Score 4-7 = Moderate risk of abuse. Do UDS 1-4 times per  year.  Score 8+ = High risk of abuse. Refer to specialist.     I reviewed the     In prescribing controlled substances to this patient, I certify that I have obtained and reviewed the medical history of Jeremiah Huber. I have also made a good linus effort to obtain applicable records from other providers who have treated the patient and records did not demonstrate any increased risk of substance abuse that would prevent me from prescribing controlled substances.     I have conducted a physical exam and documented it. I have reviewed Mr. Huber’s prescription history as maintained by the [place] Prescription Monitoring Program.     I have assessed the patient’s risk for abuse, dependency, and addiction using the validated Opioid Risk Tool available at https://www.mdcalc.com/oddkum-rilu-wfuu-ort-narcotic-abuse.     Given the above, I believe the benefits of controlled substance therapy outweigh the risks. The reasons for prescribing controlled substances include non-narcotic, oral analgesic alternatives have been inadequate for pain control. Accordingly, I have discussed the risk and benefits, treatment plan, and alternative therapies with the patient.               Follow-up: After the PT      Please note that this dictation was created using voice recognition software. I have made every reasonable attempt to correct obvious errors but there may be errors of grammar and content that I may have overlooked prior to finalization of this note.      Nazario Sandoval MD  Physical Medicine and Rehabilitation  Interventional Spine and Sports Physiatry  [facility]               An Lopez M.D.   ISABEL Fay M.D.

## 2019-11-08 DIAGNOSIS — G20.C PARKINSONISM, UNSPECIFIED PARKINSONISM TYPE: ICD-10-CM

## 2019-11-11 RX ORDER — CARBIDOPA/LEVODOPA 25MG-250MG
TABLET ORAL
Qty: 90 TAB | Refills: 2 | Status: SHIPPED | OUTPATIENT
Start: 2019-11-11 | End: 2020-02-11

## 2019-12-26 ENCOUNTER — HOSPITAL ENCOUNTER (OUTPATIENT)
Dept: LAB | Facility: MEDICAL CENTER | Age: 69
End: 2019-12-26
Attending: FAMILY MEDICINE
Payer: MEDICARE

## 2019-12-26 DIAGNOSIS — Z00.00 WELL ADULT EXAM: ICD-10-CM

## 2019-12-26 LAB
ALBUMIN SERPL BCP-MCNC: 4.2 G/DL (ref 3.2–4.9)
ALBUMIN/GLOB SERPL: 1.7 G/DL
ALP SERPL-CCNC: 49 U/L (ref 30–99)
ALT SERPL-CCNC: 15 U/L (ref 2–50)
ANION GAP SERPL CALC-SCNC: 9 MMOL/L (ref 0–11.9)
AST SERPL-CCNC: 13 U/L (ref 12–45)
BILIRUB SERPL-MCNC: 0.8 MG/DL (ref 0.1–1.5)
BUN SERPL-MCNC: 12 MG/DL (ref 8–22)
CALCIUM SERPL-MCNC: 9 MG/DL (ref 8.5–10.5)
CHLORIDE SERPL-SCNC: 105 MMOL/L (ref 96–112)
CHOLEST SERPL-MCNC: 198 MG/DL (ref 100–199)
CO2 SERPL-SCNC: 26 MMOL/L (ref 20–33)
CREAT SERPL-MCNC: 0.94 MG/DL (ref 0.5–1.4)
FASTING STATUS PATIENT QL REPORTED: NORMAL
GLOBULIN SER CALC-MCNC: 2.5 G/DL (ref 1.9–3.5)
GLUCOSE SERPL-MCNC: 96 MG/DL (ref 65–99)
HDLC SERPL-MCNC: 62 MG/DL
LDLC SERPL CALC-MCNC: 104 MG/DL
POTASSIUM SERPL-SCNC: 4.1 MMOL/L (ref 3.6–5.5)
PROT SERPL-MCNC: 6.7 G/DL (ref 6–8.2)
SODIUM SERPL-SCNC: 140 MMOL/L (ref 135–145)
TRIGL SERPL-MCNC: 160 MG/DL (ref 0–149)

## 2019-12-26 PROCEDURE — 36415 COLL VENOUS BLD VENIPUNCTURE: CPT | Mod: GY

## 2019-12-26 PROCEDURE — 80053 COMPREHEN METABOLIC PANEL: CPT | Mod: GY

## 2019-12-26 PROCEDURE — 80061 LIPID PANEL: CPT | Mod: GY

## 2019-12-31 ENCOUNTER — OFFICE VISIT (OUTPATIENT)
Dept: MEDICAL GROUP | Facility: MEDICAL CENTER | Age: 69
End: 2019-12-31
Payer: MEDICARE

## 2019-12-31 VITALS
HEIGHT: 74 IN | SYSTOLIC BLOOD PRESSURE: 100 MMHG | BODY MASS INDEX: 24.78 KG/M2 | HEART RATE: 96 BPM | DIASTOLIC BLOOD PRESSURE: 70 MMHG | TEMPERATURE: 98.1 F | OXYGEN SATURATION: 93 % | WEIGHT: 193.12 LBS

## 2019-12-31 DIAGNOSIS — G89.29 CHRONIC RIGHT-SIDED LOW BACK PAIN WITHOUT SCIATICA: ICD-10-CM

## 2019-12-31 DIAGNOSIS — E78.00 HYPERCHOLESTEREMIA: ICD-10-CM

## 2019-12-31 DIAGNOSIS — Z23 NEED FOR VACCINATION: ICD-10-CM

## 2019-12-31 DIAGNOSIS — Z96.653 HISTORY OF BILATERAL KNEE REPLACEMENT: ICD-10-CM

## 2019-12-31 DIAGNOSIS — M54.50 CHRONIC RIGHT-SIDED LOW BACK PAIN WITHOUT SCIATICA: ICD-10-CM

## 2019-12-31 DIAGNOSIS — Z12.12 SCREENING FOR COLORECTAL CANCER: ICD-10-CM

## 2019-12-31 DIAGNOSIS — G25.81 RESTLESS LEG SYNDROME: ICD-10-CM

## 2019-12-31 DIAGNOSIS — G20.C PARKINSONISM, UNSPECIFIED PARKINSONISM TYPE: ICD-10-CM

## 2019-12-31 DIAGNOSIS — D50.8 OTHER IRON DEFICIENCY ANEMIA: ICD-10-CM

## 2019-12-31 DIAGNOSIS — Z12.11 SCREENING FOR COLORECTAL CANCER: ICD-10-CM

## 2019-12-31 PROBLEM — M17.12 PRIMARY OSTEOARTHRITIS OF LEFT KNEE: Status: RESOLVED | Noted: 2018-05-24 | Resolved: 2019-12-31

## 2019-12-31 PROCEDURE — 99214 OFFICE O/P EST MOD 30 MIN: CPT | Mod: 25 | Performed by: FAMILY MEDICINE

## 2019-12-31 PROCEDURE — G0009 ADMIN PNEUMOCOCCAL VACCINE: HCPCS | Performed by: FAMILY MEDICINE

## 2019-12-31 PROCEDURE — 90732 PPSV23 VACC 2 YRS+ SUBQ/IM: CPT | Performed by: FAMILY MEDICINE

## 2019-12-31 NOTE — ASSESSMENT & PLAN NOTE
Sees Dr. Mclain once per year. Next appt is in march. Currently on sinamet. Feels that he is starting to shuffle a little more. No tremor. No falls. Slow speech.

## 2019-12-31 NOTE — ASSESSMENT & PLAN NOTE
New problem. Started about 1 yr ago. Seems to be getting worse over the past few months. Making it difficult for him to sleep. No improvement with magnesium. Just started walking.

## 2019-12-31 NOTE — ASSESSMENT & PLAN NOTE
Chronic problem. Doing PT. Seeing Dr. Sandoval. Given antiiflammatory for 1 month with significant improvement.

## 2019-12-31 NOTE — PROGRESS NOTES
Subjective:     CC: Diagnoses of Need for vaccination, Screening for colorectal cancer, Hypercholesteremia, Parkinsonism, unspecified Parkinsonism type (HCC), History of bilateral knee replacement, Chronic right-sided low back pain without sciatica, Restless leg syndrome, and Other iron deficiency anemia  were pertinent to this visit.    HPI: Patient is a 69 y.o. male established patient who presents today to officially establish care.  Patient was seen by Dr. Fay previously.      Hypercholesteremia  New problem.  Recent lipid panel showed slightly elevated LDL at 104 and elevated triglycerides at 160.  This is increased from his last lipid panel in September 2018.  The patient believes it is due to his daily smoothies.  However, he is stop doing these in the past couple days.    Parkinsonism (CMS-HCC) (HCC)  Chronic problem.  Currently on Sinemet.  Sees Dr. Mclain once per year. Next appt is in march. Currently on sinamet. Feels that he is starting to shuffle a little more. No tremor. No falls. Slow speech.     History of bilateral knee replacement  Sees Dr. Griffin. Has amox for dental procedures.  Reports no knee pain.    Right-sided low back pain without sciatica  Chronic problem. Doing PT. Seeing Dr. Sandoval. Given anti-inflammatory for 1 month with significant improvement.     Restless leg syndrome  New problem. Started about 1 yr ago. Seems to be getting worse over the past few months. Making it difficult for him to sleep. No improvement with magnesium. Just started trying to increase exercise over the past 2 days.      Past Medical History:   Diagnosis Date   • Back spasm    • GOUT    • Gout    • Hypertension        Social History     Tobacco Use   • Smoking status: Never Smoker   • Smokeless tobacco: Never Used   Substance Use Topics   • Alcohol use: Yes     Alcohol/week: 4.2 oz     Types: 4 Glasses of wine, 3 Standard drinks or equivalent per week     Comment: daily   • Drug use: No       Current  "Outpatient Medications Ordered in Epic   Medication Sig Dispense Refill   • carbidopa-levodopa (SINEMET)  MG Tab TAKE ONE TABLET BY MOUTH THREE TIMES A DAY 90 Tab 2   • fluticasone (CUTIVATE) 0.05 % cream Apply  to affected area(s) 2 times a day.     • amoxicillin (AMOXIL) 500 MG Cap Take 500 mg by mouth 3 times a day.       No current [facility]-ordered facility-administered medications on file.        Allergies:  Nkda [no known drug allergy]    Health Maintenance: Completed    ROS:  Pulm: no sob, no cough  CV: no chest pain, no palpitations        Objective:       Exam:  /70 (BP Location: Left arm, Patient Position: Sitting, BP Cuff Size: Adult)   Pulse 96   Temp 36.7 °C (98.1 °F) (Temporal)   Ht 1.88 m (6' 2\")   Wt 87.6 kg (193 lb 2 oz)   SpO2 93%   BMI 24.80 kg/m²  Body mass index is 24.8 kg/m².    General: Normal appearing. No distress.  HEAD: NCAT  EYES: conjunctiva clear, lids without ptosis, pupils equal and reactive to light  EARS: ears normal shape and contour.  MOUTH: normal dentition   Neck:  Normal ROM  Pulmonary: Clear to auscultation bilaterally, no wheezes rales or rhonchi.  Normal effort. Normal respiratory rate.  Cardiovascular: Regular rate and rhythm, no murmurs rubs or gallops.  Well perfused. No LE edema  Neurologic: Grossly normal, no focal deficits  Skin: Warm and dry.  No obvious lesions.  Musculoskeletal: Normal gait and station.   Psych: Normal mood and affect. Alert and oriented x3. Judgment and insight is normal.      Labs: 12/26/2019 results reviewed and discussed with the patient, questions answered.    Assessment & Plan:     69 y.o. male with the following -     1. Hypercholesteremia  New problem.  Mildly elevated LDL at 104 and mildly elevated triglycerides at 160.  Noted increase since last labs done.  The patient feels this is likely due to a change in diet, however, is already working on it.    2. Parkinsonism, unspecified Parkinsonism type (HCC)  Chronic problem, " followed by neurology.  Currently on Sinemet.  No tremor on exam today.  He does have a mildly shuffling gait.  Somewhat masked face.  Slow speech.  His next appointment with neurology is in March.    3. History of bilateral knee replacement  Chronic problem.  Patient has bilateral knee replacements.  Doing quite well.  He has a prescription for amoxicillin which he takes as needed for dental procedures for the next 2 years.    4. Chronic right-sided low back pain without sciatica  Chronic problem, somewhat well-controlled.  Managed by Dr. Sandoval, pain management.  Currently in physical therapy.    5. Restless leg syndrome  New problem.  The patient reports restless leg syndrome that has been worsening over the past few weeks.  Orders placed to rule out iron deficiency.  Generally first-line management of restless leg syndrome is a dopamine agonist, however, unsure how this will interact with his current dopamine agonist.  I plan to copy his neurologist, Dr. Alejandre on this note to see what he recommends.  - CBC WITH DIFFERENTIAL; Future  - IRON/TOTAL IRON BIND; Future  - FERRITIN; Future    6. Other iron deficiency anemia   - IRON/TOTAL IRON BIND; Future  - FERRITIN; Future    7. Need for vaccination  - Pneumovax Vaccine (PPSV23)    8. Screening for colorectal cancer  - REFERRAL TO GI FOR COLONOSCOPY      Return in about 1 year (around 12/31/2020), or if symptoms worsen or fail to improve.    Please note that this dictation was created using voice recognition software. I have made every reasonable attempt to correct obvious errors, but I expect that there are errors of grammar and possibly content that I did not discover before finalizing the note.

## 2020-01-30 ENCOUNTER — OFFICE VISIT (OUTPATIENT)
Dept: PHYSICAL MEDICINE AND REHAB | Facility: MEDICAL CENTER | Age: 70
End: 2020-01-30
Payer: MEDICARE

## 2020-01-30 VITALS
SYSTOLIC BLOOD PRESSURE: 118 MMHG | HEIGHT: 74 IN | DIASTOLIC BLOOD PRESSURE: 78 MMHG | OXYGEN SATURATION: 94 % | WEIGHT: 198.19 LBS | BODY MASS INDEX: 25.44 KG/M2 | HEART RATE: 88 BPM | TEMPERATURE: 97.9 F

## 2020-01-30 DIAGNOSIS — M47.816 FACET ARTHROPATHY, LUMBAR: ICD-10-CM

## 2020-01-30 DIAGNOSIS — M51.36 LUMBAR DEGENERATIVE DISC DISEASE: ICD-10-CM

## 2020-01-30 DIAGNOSIS — M47.816 LUMBAR SPONDYLOSIS: ICD-10-CM

## 2020-01-30 DIAGNOSIS — G89.29 CHRONIC RIGHT-SIDED LOW BACK PAIN WITHOUT SCIATICA: ICD-10-CM

## 2020-01-30 DIAGNOSIS — M41.9 SCOLIOSIS OF THORACOLUMBAR SPINE, UNSPECIFIED SCOLIOSIS TYPE: ICD-10-CM

## 2020-01-30 DIAGNOSIS — Z96.653 HISTORY OF BILATERAL KNEE REPLACEMENT: ICD-10-CM

## 2020-01-30 DIAGNOSIS — M43.16 SPONDYLOLISTHESIS OF LUMBAR REGION: ICD-10-CM

## 2020-01-30 DIAGNOSIS — M54.50 CHRONIC RIGHT-SIDED LOW BACK PAIN WITHOUT SCIATICA: ICD-10-CM

## 2020-01-30 PROCEDURE — 99214 OFFICE O/P EST MOD 30 MIN: CPT | Performed by: PHYSICAL MEDICINE & REHABILITATION

## 2020-01-30 RX ORDER — MELOXICAM 15 MG/1
15 TABLET ORAL DAILY
Qty: 30 TAB | Refills: 0 | Status: SHIPPED | OUTPATIENT
Start: 2020-01-30 | End: 2020-02-29

## 2020-01-30 RX ORDER — ACETAMINOPHEN 500 MG
1000 TABLET ORAL 3 TIMES DAILY PRN
Qty: 180 TAB | Refills: 6 | Status: ON HOLD
Start: 2020-01-30 | End: 2020-06-20

## 2020-01-30 ASSESSMENT — PATIENT HEALTH QUESTIONNAIRE - PHQ9: CLINICAL INTERPRETATION OF PHQ2 SCORE: 0

## 2020-01-30 ASSESSMENT — PAIN SCALES - GENERAL: PAINLEVEL: 6=MODERATE PAIN

## 2020-01-30 NOTE — Clinical Note
Dear An Perez M.D. , Thank you for the referral of Jeremiah Huber.  Please see my note for more details Should you have any questions or concerns please do not hesitate to contact me. Nazario Sandoval M.D. 2

## 2020-01-30 NOTE — PROGRESS NOTES
Follow up patient note  Interventional spine and sports physiatry, Physical medicine rehabilitation      Chief complaint:   Chief Complaint   Patient presents with   • Follow-Up     back          HISTORY    Please see new patient note by Dr Sandoval,  for more details.     HPI  Patient identification: Jeremiah Huber 69 y.o. male  With Diagnoses of Lumbar spondylosis, Spondylolisthesis of lumbar region, Scoliosis of thoracolumbar spine, unspecified scoliosis type, Lumbar degenerative disc disease, Facet arthropathy, lumbar, Chronic right-sided low back pain without sciatica, and History of bilateral knee replacement were pertinent to this visit.       -The patient has right low back pain, axial, aching in quality, worse with lumbar extension typically, 6/10 in intensity.  This is improved with physical therapy but he is still getting significant pain in his back.    I reviewed several notes from the patient's outside records from active physical therapy with the patient has an extensive home exercise program and has done significant work with physical therapy.       ROS Red Flags :   Fever, Chills, Sweats: Denies  Involuntary Weight Loss: Denies  Bowel/Bladder Incontinence: Denies  Saddle Anesthesia: Denies        PMHx:   Past Medical History:   Diagnosis Date   • Back spasm    • GOUT    • Gout    • Hypertension        PSHx:   Past Surgical History:   Procedure Laterality Date   • KNEE ARTHROSCOPY Right    • SHOULDER SURGERY      right shoulder, a-c seperation   • TONSILLECTOMY         Family history   Family History   Problem Relation Age of Onset   • Heart Disease Mother    • Heart Disease Father    • Cancer Father         prostate cancer   • Arthritis Brother          Medications:   Outpatient Medications Marked as Taking for the 1/30/20 encounter (Office Visit) with Nazario Sandoval M.D.   Medication Sig Dispense Refill   • acetaminophen (TYLENOL) 500 MG Tab Take 2 Tabs by mouth 3 times a day as needed (pain.   Do not exceed 3000 mg of total acetaminophen per day). 180 Tab 6   • meloxicam (MOBIC) 15 MG tablet Take 1 Tab by mouth every day for 30 days. For 30 days then stop. Do not take other NSAIDs. No refills. 30 Tab 0   • carbidopa-levodopa (SINEMET)  MG Tab TAKE ONE TABLET BY MOUTH THREE TIMES A DAY 90 Tab 2   • amoxicillin (AMOXIL) 500 MG Cap Take 500 mg by mouth 3 times a day.     • fluticasone (CUTIVATE) 0.05 % cream Apply  to affected area(s) 2 times a day.          Current Outpatient Medications on File Prior to Visit   Medication Sig Dispense Refill   • carbidopa-levodopa (SINEMET)  MG Tab TAKE ONE TABLET BY MOUTH THREE TIMES A DAY 90 Tab 2   • amoxicillin (AMOXIL) 500 MG Cap Take 500 mg by mouth 3 times a day.     • fluticasone (CUTIVATE) 0.05 % cream Apply  to affected area(s) 2 times a day.       No current facility-administered medications on file prior to visit.          Allergies:   Allergies   Allergen Reactions   • Nkda [No Known Drug Allergy]        Social Hx:   Social History     Socioeconomic History   • Marital status:      Spouse name: Not on file   • Number of children: Not on file   • Years of education: Not on file   • Highest education level: Not on file   Occupational History   • Not on file   Social Needs   • Financial resource strain: Not on file   • Food insecurity:     Worry: Not on file     Inability: Not on file   • Transportation needs:     Medical: Not on file     Non-medical: Not on file   Tobacco Use   • Smoking status: Never Smoker   • Smokeless tobacco: Never Used   Substance and Sexual Activity   • Alcohol use: Yes     Alcohol/week: 4.2 oz     Types: 4 Glasses of wine, 3 Standard drinks or equivalent per week     Comment: daily   • Drug use: No   • Sexual activity: Yes     Partners: Female   Lifestyle   • Physical activity:     Days per week: Not on file     Minutes per session: Not on file   • Stress: Not on file   Relationships   • Social connections:     Talks  "on phone: Not on file     Gets together: Not on file     Attends [employer] service: Not on file     Active member of club or organization: Not on file     Attends meetings of clubs or organizations: Not on file     Relationship status: Not on file   • Intimate partner violence:     Fear of current or ex partner: Not on file     Emotionally abused: Not on file     Physically abused: Not on file     Forced sexual activity: Not on file   Other Topics Concern   • Not on file   Social History Narrative   • Not on file         EXAMINATION     Physical Exam:   Vitals: /78 (BP Location: Right arm, Patient Position: Sitting, BP Cuff Size: Adult)   Pulse 88   Temp 36.6 °C (97.9 °F) (Temporal)   Ht 1.88 m (6' 2\")   Wt 89.9 kg (198 lb 3.1 oz)   SpO2 94%     Constitutional:   Body Habitus: Body mass index is 25.45 kg/m².  Cooperation: Fully cooperates with exam  Appearance: Well-groomed no disheveled    Respiratory-  breathing comfortable on room air, no audible wheezing  Cardiovascular- capillary refills less than 2 seconds. No lower extremity edema is noted.   Psychiatric- alert and oriented ×3. Normal affect.    MSK: -  There are no signs of infection around the injection sites.   full  active range of motion with flexion, lateral flexion, and rotation bilaterally.   There is decreased active range of motion with lumbar extension.    There is pain with lumbar extension.   There is pain with facet loading maneuver (extension rotation) with axial low back pain on the RIGHT side(s)    Palpation:   No tenderness to palpation in midline at T1-T12 levels. No tenderness to palpation in the left and right of the midline T1-L5, NEGATIVE for tenderness to palpation to the para-midline region in the lower lumbar levels.  palpation over SI joint: negative bilaterally    palpation in hip or over the greater trochanters: negative bilaterally      Lumbar spine Special tests  Neuro tension  Straight leg test negative bilaterally  "   Slump test negative bilaterally      Key points for the international standards for neurological classification of spinal cord injury (ISNCSCI) to light touch.     Dermatome R L                                      L2 2 2   L3 2 2   L4 2 2   L5 2 2   S1 2 2   S2 2 2         Motor Exam Lower Extremities    ? Myotome R L   Hip flexion L2 5 5   Knee extension L3 5 5   Ankle dorsiflexion L4 5 5   Toe extension L5 5 5   Ankle plantarflexion S1 5 5                 MEDICAL DECISION MAKING    DATA    Labs:   Lab Results   Component Value Date/Time    SODIUM 140 12/26/2019 10:10 AM    POTASSIUM 4.1 12/26/2019 10:10 AM    CHLORIDE 105 12/26/2019 10:10 AM    CO2 26 12/26/2019 10:10 AM    GLUCOSE 96 12/26/2019 10:10 AM    BUN 12 12/26/2019 10:10 AM    CREATININE 0.94 12/26/2019 10:10 AM        No results found for: PROTHROMBTM, INR     Lab Results   Component Value Date/Time    WBC 5.4 05/25/2016 10:00 AM    RBC 4.76 05/25/2016 10:00 AM    HEMOGLOBIN 14.9 05/25/2016 10:00 AM    HEMATOCRIT 44.2 05/25/2016 10:00 AM    MCV 92.9 05/25/2016 10:00 AM    MCH 31.3 05/25/2016 10:00 AM    MCHC 33.7 05/25/2016 10:00 AM    MPV 10.2 05/25/2016 10:00 AM    NEUTSPOLYS 64.8 11/08/2014 11:20 AM    LYMPHOCYTES 24.4 11/08/2014 11:20 AM    MONOCYTES 9.0 11/08/2014 11:20 AM    EOSINOPHILS 1.3 11/08/2014 11:20 AM    BASOPHILS 0.5 11/08/2014 11:20 AM        No results found for: HBA1C       Imaging:   I personally reviewed following images    X-ray lumbar spine 11/11/2015.  Degenerative scoliosis is present.  Facet arthropathy worse in the lower lumbar levels right worse than left.  Degenerative disc disease is present. CT renal colic evaluation.  I reviewed the study specifically look at the patient's lumbar spine.  Please see radiologist dictation for the remainder of the report.  Degenerative disc disease at multiple levels worst at the L2-3 level.  Also present L4-5 and L5-S1.  Grade 1 spondylolisthesis L2-3.  Significant facet arthropathy  L3-4, L4-5, L5-S1 right side worse than left.    I reviewed the following radiology reports           Results for orders placed in visit on 08/25/17   MR-BRAIN-W/O                                                                                                                                                                                                                                               Results for orders placed in visit on 11/11/15   DX-LUMBAR SPINE-2 OR 3 VIEWS    Impression 1.  Lumbar scoliosis convex left. L2-L3 retrolisthesis.                                    Results for orders placed in visit on 06/10/16   DX-WRIST-COMPLETE 3+ RIGHT    Impression No acute fracture identified. Scapholunate widening consistent with ligamentous injury.           DIAGNOSIS   Visit Diagnoses     ICD-10-CM   1. Lumbar spondylosis M47.816   2. Spondylolisthesis of lumbar region M43.16   3. Scoliosis of thoracolumbar spine, unspecified scoliosis type M41.9   4. Lumbar degenerative disc disease M51.36   5. Facet arthropathy, lumbar M47.816   6. Chronic right-sided low back pain without sciatica M54.5    G89.29   7. History of bilateral knee replacement Z96.653         ASSESSMENT and PLAN:     Jeremiah Russell Huber 69 y.o. male      Jeremiah was seen today for follow-up.    Diagnoses and all orders for this visit:    Lumbar spondylosis  -     MR-LUMBAR SPINE-W/O; Future  -     acetaminophen (TYLENOL) 500 MG Tab; Take 2 Tabs by mouth 3 times a day as needed (pain.  Do not exceed 3000 mg of total acetaminophen per day).  -     meloxicam (MOBIC) 15 MG tablet; Take 1 Tab by mouth every day for 30 days. For 30 days then stop. Do not take other NSAIDs. No refills.    Spondylolisthesis of lumbar region  -     MR-LUMBAR SPINE-W/O; Future  -     acetaminophen (TYLENOL) 500 MG Tab; Take 2 Tabs by mouth 3 times a day as needed (pain.  Do not exceed 3000 mg of total acetaminophen per day).  -     meloxicam (MOBIC) 15 MG tablet; Take 1 Tab by  mouth every day for 30 days. For 30 days then stop. Do not take other NSAIDs. No refills.    Scoliosis of thoracolumbar spine, unspecified scoliosis type  -     MR-LUMBAR SPINE-W/O; Future  -     acetaminophen (TYLENOL) 500 MG Tab; Take 2 Tabs by mouth 3 times a day as needed (pain.  Do not exceed 3000 mg of total acetaminophen per day).  -     meloxicam (MOBIC) 15 MG tablet; Take 1 Tab by mouth every day for 30 days. For 30 days then stop. Do not take other NSAIDs. No refills.    Lumbar degenerative disc disease  -     MR-LUMBAR SPINE-W/O; Future  -     acetaminophen (TYLENOL) 500 MG Tab; Take 2 Tabs by mouth 3 times a day as needed (pain.  Do not exceed 3000 mg of total acetaminophen per day).  -     meloxicam (MOBIC) 15 MG tablet; Take 1 Tab by mouth every day for 30 days. For 30 days then stop. Do not take other NSAIDs. No refills.    Facet arthropathy, lumbar  -     MR-LUMBAR SPINE-W/O; Future  -     acetaminophen (TYLENOL) 500 MG Tab; Take 2 Tabs by mouth 3 times a day as needed (pain.  Do not exceed 3000 mg of total acetaminophen per day).  -     meloxicam (MOBIC) 15 MG tablet; Take 1 Tab by mouth every day for 30 days. For 30 days then stop. Do not take other NSAIDs. No refills.    Chronic right-sided low back pain without sciatica  -     MR-LUMBAR SPINE-W/O; Future  -     acetaminophen (TYLENOL) 500 MG Tab; Take 2 Tabs by mouth 3 times a day as needed (pain.  Do not exceed 3000 mg of total acetaminophen per day).  -     meloxicam (MOBIC) 15 MG tablet; Take 1 Tab by mouth every day for 30 days. For 30 days then stop. Do not take other NSAIDs. No refills.    History of bilateral knee replacement  -     acetaminophen (TYLENOL) 500 MG Tab; Take 2 Tabs by mouth 3 times a day as needed (pain.  Do not exceed 3000 mg of total acetaminophen per day).  -     meloxicam (MOBIC) 15 MG tablet; Take 1 Tab by mouth every day for 30 days. For 30 days then stop. Do not take other NSAIDs. No refills.        -I believe the  majority the patient's symptoms is from lumbar spondylosis and he has significant facet arthropathy which correlates with his symptoms worse on the right side.  He is done conservative treatments with medication management as well as extensive physical therapy and has a home exercise program which she has been consistent with.  I have ordered an MRI lumbar spine to assist with the work-up for the likely facet arthropathy.  We discussed potential diagnostic medial branch blocks and neurotomy if those are positive.  We will discuss these after the MRI for the exact levels.        Follow up: After the above diagnostic work-up    Thank you for allowing me to participate in the care of this patient. If you have any questions please not hesitate to contact me.          Please note that this dictation was created using voice recognition software. I have made every reasonable attempt to correct obvious errors but there may be errors of grammar and content that I may have overlooked prior to finalization of this note.      Nazario Sandoval MD  Interventional Spine and Sports Physiatry  Physical Medicine and Rehabilitation  [facility]

## 2020-02-09 DIAGNOSIS — G20.C PARKINSONISM, UNSPECIFIED PARKINSONISM TYPE (HCC): ICD-10-CM

## 2020-02-10 ENCOUNTER — HOSPITAL ENCOUNTER (OUTPATIENT)
Dept: RADIOLOGY | Facility: MEDICAL CENTER | Age: 70
End: 2020-02-10
Attending: PHYSICAL MEDICINE & REHABILITATION
Payer: MEDICARE

## 2020-02-10 DIAGNOSIS — M41.9 SCOLIOSIS OF THORACOLUMBAR SPINE, UNSPECIFIED SCOLIOSIS TYPE: ICD-10-CM

## 2020-02-10 DIAGNOSIS — M51.36 LUMBAR DEGENERATIVE DISC DISEASE: ICD-10-CM

## 2020-02-10 DIAGNOSIS — M54.50 CHRONIC RIGHT-SIDED LOW BACK PAIN WITHOUT SCIATICA: ICD-10-CM

## 2020-02-10 DIAGNOSIS — M47.816 LUMBAR SPONDYLOSIS: ICD-10-CM

## 2020-02-10 DIAGNOSIS — G89.29 CHRONIC RIGHT-SIDED LOW BACK PAIN WITHOUT SCIATICA: ICD-10-CM

## 2020-02-10 DIAGNOSIS — M43.16 SPONDYLOLISTHESIS OF LUMBAR REGION: ICD-10-CM

## 2020-02-10 DIAGNOSIS — M47.816 FACET ARTHROPATHY, LUMBAR: ICD-10-CM

## 2020-02-10 PROCEDURE — 72148 MRI LUMBAR SPINE W/O DYE: CPT

## 2020-02-11 RX ORDER — CARBIDOPA/LEVODOPA 25MG-250MG
TABLET ORAL
Qty: 90 TAB | Refills: 2 | Status: SHIPPED | OUTPATIENT
Start: 2020-02-11 | End: 2020-03-13

## 2020-02-11 NOTE — TELEPHONE ENCOUNTER
Received request via: Pharmacy    Was the patient seen in the last year in this department? Yes    Does the patient have an active prescription (recently filled or refills available) for medication(s) requested? Yes.  Pt has appt 3/25/20

## 2020-02-19 ENCOUNTER — OFFICE VISIT (OUTPATIENT)
Dept: PHYSICAL MEDICINE AND REHAB | Facility: MEDICAL CENTER | Age: 70
End: 2020-02-19
Payer: MEDICARE

## 2020-02-19 VITALS
DIASTOLIC BLOOD PRESSURE: 72 MMHG | TEMPERATURE: 98.1 F | SYSTOLIC BLOOD PRESSURE: 110 MMHG | WEIGHT: 198.85 LBS | HEIGHT: 72 IN | HEART RATE: 77 BPM | OXYGEN SATURATION: 97 % | BODY MASS INDEX: 26.93 KG/M2

## 2020-02-19 DIAGNOSIS — M41.9 SCOLIOSIS OF THORACOLUMBAR SPINE, UNSPECIFIED SCOLIOSIS TYPE: ICD-10-CM

## 2020-02-19 DIAGNOSIS — M43.16 SPONDYLOLISTHESIS OF LUMBAR REGION: ICD-10-CM

## 2020-02-19 DIAGNOSIS — G89.29 CHRONIC RIGHT-SIDED LOW BACK PAIN WITHOUT SCIATICA: ICD-10-CM

## 2020-02-19 DIAGNOSIS — M47.816 LUMBAR SPONDYLOSIS: ICD-10-CM

## 2020-02-19 DIAGNOSIS — M51.36 LUMBAR DEGENERATIVE DISC DISEASE: ICD-10-CM

## 2020-02-19 DIAGNOSIS — M47.816 FACET ARTHROPATHY, LUMBAR: ICD-10-CM

## 2020-02-19 DIAGNOSIS — M54.50 CHRONIC RIGHT-SIDED LOW BACK PAIN WITHOUT SCIATICA: ICD-10-CM

## 2020-02-19 PROCEDURE — 99214 OFFICE O/P EST MOD 30 MIN: CPT | Performed by: PHYSICAL MEDICINE & REHABILITATION

## 2020-02-19 ASSESSMENT — PAIN SCALES - GENERAL: PAINLEVEL: 6=MODERATE PAIN

## 2020-02-19 ASSESSMENT — PATIENT HEALTH QUESTIONNAIRE - PHQ9: CLINICAL INTERPRETATION OF PHQ2 SCORE: 0

## 2020-02-19 NOTE — PROGRESS NOTES
Follow up patient note  Interventional spine and sports physiatry, Physical medicine rehabilitation      Chief complaint:   Chief Complaint   Patient presents with   • Follow-Up     Back          HISTORY    Please see new patient note by Dr Sandoval,  for more details.     HPI  Patient identification: Jeremiah Huber 69 y.o. male  With Diagnoses of Lumbar spondylosis, Spondylolisthesis of lumbar region, Scoliosis of thoracolumbar spine, unspecified scoliosis type, Lumbar degenerative disc disease, Facet arthropathy, lumbar, and Chronic right-sided low back pain without sciatica were pertinent to this visit.       Right low back pain, aching quality, chronic, 6/ 10 in intensity, worse with lumbar extension, improved with lumbar flexion.  Denies radiating pains.  Denies weakness.  He went to physical therapy with no significant improvement in his back pain.  He still does the home exercise program and stretching routine.         ROS Red Flags :   Fever, Chills, Sweats: Denies  Involuntary Weight Loss: Denies  Bowel/Bladder Incontinence: Denies  Saddle Anesthesia: Denies        PMHx:   Past Medical History:   Diagnosis Date   • Back spasm    • GOUT    • Gout    • Hypertension        PSHx:   Past Surgical History:   Procedure Laterality Date   • KNEE ARTHROSCOPY Right    • SHOULDER SURGERY      right shoulder, a-c seperation   • TONSILLECTOMY         Family history   Family History   Problem Relation Age of Onset   • Heart Disease Mother    • Heart Disease Father    • Cancer Father         prostate cancer   • Arthritis Brother          Medications:   Outpatient Medications Marked as Taking for the 2/19/20 encounter (Office Visit) with Nazario Sandoval M.D.   Medication Sig Dispense Refill   • carbidopa-levodopa (SINEMET)  MG Tab TAKE ONE TABLET BY MOUTH THREE TIMES A DAY 90 Tab 2   • acetaminophen (TYLENOL) 500 MG Tab Take 2 Tabs by mouth 3 times a day as needed (pain.  Do not exceed 3000 mg of total  acetaminophen per day). 180 Tab 6   • meloxicam (MOBIC) 15 MG tablet Take 1 Tab by mouth every day for 30 days. For 30 days then stop. Do not take other NSAIDs. No refills. 30 Tab 0   • amoxicillin (AMOXIL) 500 MG Cap Take 500 mg by mouth 3 times a day.     • fluticasone (CUTIVATE) 0.05 % cream Apply  to affected area(s) 2 times a day.          Current Outpatient Medications on File Prior to Visit   Medication Sig Dispense Refill   • carbidopa-levodopa (SINEMET)  MG Tab TAKE ONE TABLET BY MOUTH THREE TIMES A DAY 90 Tab 2   • acetaminophen (TYLENOL) 500 MG Tab Take 2 Tabs by mouth 3 times a day as needed (pain.  Do not exceed 3000 mg of total acetaminophen per day). 180 Tab 6   • meloxicam (MOBIC) 15 MG tablet Take 1 Tab by mouth every day for 30 days. For 30 days then stop. Do not take other NSAIDs. No refills. 30 Tab 0   • amoxicillin (AMOXIL) 500 MG Cap Take 500 mg by mouth 3 times a day.     • fluticasone (CUTIVATE) 0.05 % cream Apply  to affected area(s) 2 times a day.       No current facility-administered medications on file prior to visit.          Allergies:   Allergies   Allergen Reactions   • Nkda [No Known Drug Allergy]        Social Hx:   Social History     Socioeconomic History   • Marital status:      Spouse name: Not on file   • Number of children: Not on file   • Years of education: Not on file   • Highest education level: Not on file   Occupational History   • Not on file   Social Needs   • Financial resource strain: Not on file   • Food insecurity     Worry: Not on file     Inability: Not on file   • Transportation needs     Medical: Not on file     Non-medical: Not on file   Tobacco Use   • Smoking status: Never Smoker   • Smokeless tobacco: Never Used   Substance and Sexual Activity   • Alcohol use: Yes     Alcohol/week: 4.2 oz     Types: 4 Glasses of wine, 3 Standard drinks or equivalent per week     Comment: daily   • Drug use: No   • Sexual activity: Yes     Partners: Female    Lifestyle   • Physical activity     Days per week: Not on file     Minutes per session: Not on file   • Stress: Not on file   Relationships   • Social connections     Talks on phone: Not on file     Gets together: Not on file     Attends Voodoo service: Not on file     Active member of club or organization: Not on file     Attends meetings of clubs or organizations: Not on file     Relationship status: Not on file   • Intimate partner violence     Fear of current or ex partner: Not on file     Emotionally abused: Not on file     Physically abused: Not on file     Forced sexual activity: Not on file   Other Topics Concern   •  Service No   • Blood Transfusions No   • Caffeine Concern No   • Occupational Exposure No   • Hobby Hazards No   • Sleep Concern No   • Stress Concern No   • Weight Concern No   • Special Diet No   • Back Care Yes     Comment: Streching   • Exercise No   • Bike Helmet Yes   • Seat Belt Yes   • Self-Exams Yes   Social History Narrative   • Not on file         EXAMINATION     Physical Exam:   Vitals: /72 (BP Location: Left arm, Patient Position: Sitting, BP Cuff Size: Adult)   Pulse 77   Temp 36.7 °C (98.1 °F) (Temporal)   Ht 1.829 m (6')   Wt 90.2 kg (198 lb 13.7 oz)   SpO2 97%     Constitutional:   Body Habitus: Body mass index is 26.97 kg/m².  Cooperation: Fully cooperates with exam  Appearance: Well-groomed no disheveled    Respiratory-  breathing comfortable on room air, no audible wheezing  Cardiovascular- capillary refills less than 2 seconds. No lower extremity edema is noted.   Psychiatric- alert and oriented ×3. Normal affect.    MSK: -  Normal gait.  At least 4 out of 5 strength throughout the bilateral lower extremities.  See previous exam for more details.      MEDICAL DECISION MAKING    DATA    Labs:   Lab Results   Component Value Date/Time    SODIUM 140 12/26/2019 10:10 AM    POTASSIUM 4.1 12/26/2019 10:10 AM    CHLORIDE 105 12/26/2019 10:10 AM    CO2 26  12/26/2019 10:10 AM    GLUCOSE 96 12/26/2019 10:10 AM    BUN 12 12/26/2019 10:10 AM    CREATININE 0.94 12/26/2019 10:10 AM        No results found for: PROTHROMBTM, INR     Lab Results   Component Value Date/Time    WBC 5.4 05/25/2016 10:00 AM    RBC 4.76 05/25/2016 10:00 AM    HEMOGLOBIN 14.9 05/25/2016 10:00 AM    HEMATOCRIT 44.2 05/25/2016 10:00 AM    MCV 92.9 05/25/2016 10:00 AM    MCH 31.3 05/25/2016 10:00 AM    MCHC 33.7 05/25/2016 10:00 AM    MPV 10.2 05/25/2016 10:00 AM    NEUTSPOLYS 64.8 11/08/2014 11:20 AM    LYMPHOCYTES 24.4 11/08/2014 11:20 AM    MONOCYTES 9.0 11/08/2014 11:20 AM    EOSINOPHILS 1.3 11/08/2014 11:20 AM    BASOPHILS 0.5 11/08/2014 11:20 AM        No results found for: HBA1C       Imaging:   I personally reviewed following images    X-ray lumbar spine 11/11/2015.  Degenerative scoliosis is present.  Facet arthropathy worse in the lower lumbar levels right worse than left.  Degenerative disc disease is present. CT renal colic evaluation.  I reviewed the study specifically look at the patient's lumbar spine.  Please see radiologist dictation for the remainder of the report.  Degenerative disc disease at multiple levels worst at the L2-3 level.  Also present L4-5 and L5-S1.  Grade 1 spondylolisthesis L2-3.  Significant facet arthropathy L3-4, L4-5, L5-S1 right side worse than left.    MRI lumbar spine 2/10/2020  Lumbar scoliosis likely degenerative.  Significant right L2-3 and L3-4 neuroforaminal stenosis likely secondary to disc herniation and osteophyte complex.  Moderate right L4-5 neuroforaminal stenosis.  Severe left L4-5 and L5-S1 neuroforaminal stenosis.  Central canal stenosis at L2-3, L3-4, L4-5.  Significant facet arthropathy bilaterally worst at L3-4, L4-5, L5-S1 bilaterally.    I reviewed the following radiology reports  MRI lumbar spine 2/10/2020  1.  Severe multilevel degenerative disc disease and facet degeneration. There is multilevel central canal and neural foraminal  narrowing as well as attenuation of the lateral recesses bilaterally as specifically described above. Central canal narrowing   is most severe at L3-4 and L4-5.     2.  Severe convex left scoliotic curvature.     3.  Multilevel degenerative subluxation.           Results for orders placed in visit on 08/25/17   MR-BRAIN-W/O                                                                                                                                                                                                                                               Results for orders placed in visit on 11/11/15   DX-LUMBAR SPINE-2 OR 3 VIEWS    Impression 1.  Lumbar scoliosis convex left. L2-L3 retrolisthesis.                                    Results for orders placed in visit on 06/10/16   DX-WRIST-COMPLETE 3+ RIGHT    Impression No acute fracture identified. Scapholunate widening consistent with ligamentous injury.           DIAGNOSIS   Visit Diagnoses     ICD-10-CM   1. Lumbar spondylosis M47.816   2. Spondylolisthesis of lumbar region M43.16   3. Scoliosis of thoracolumbar spine, unspecified scoliosis type M41.9   4. Lumbar degenerative disc disease M51.36   5. Facet arthropathy, lumbar M47.816   6. Chronic right-sided low back pain without sciatica M54.5    G89.29         ASSESSMENT and PLAN:     Jeremiah Huber 69 y.o. male      Jeremiah was seen today for follow-up.    Diagnoses and all orders for this visit:    Lumbar spondylosis  Comments:  not controlled. see below  Orders:  -     REFERRAL TO PHYSICAL MEDICINE REHAB  -     REFERRAL TO PHYSICAL MEDICINE REHAB    Spondylolisthesis of lumbar region    Scoliosis of thoracolumbar spine, unspecified scoliosis type    Lumbar degenerative disc disease    Facet arthropathy, lumbar  Comments:  not controlled. see below    Chronic right-sided low back pain without sciatica        I advised patient to stop meloxicam 5 days prior to the procedure below    The patient does  have imaging findings of central canal stenosis and neuroforaminal stenosis but he has no clinical findings of these issues.    I believe the majority of his symptoms is consistent with his imaging and his exam and history which is that his pain is mostly secondary to his facet arthritis and lumbar spondylosis worst L3-4, L4-5, L5-S1.  I ordered diagnostic medial branch blocks #1 and #2 targeting the right L3-4, L4-5, L5-S1 facet joints with fluoroscopic guidance.  Block #2 is only to be done a block #1 is a positive block.  We discussed potential radiofrequency neurotomy of those joints if the blocks are positive.  The patient wishes to discuss with his neurologist and PCP prior to scheduling.    The risks benefits and alternatives to this procedure were discussed and the patient wishes to proceed with the procedure. Risks include but are not limited to damage to surrounding structures, infection, bleeding, worsening of pain which can be permanent, weakness which can be permanent. Benefits include pain relief, improved function. Alternatives includes not doing the procedure.      I also discussed this with the patient's wife who was at today's visit.    Follow up: After the above diagnostic procedures    Thank you for allowing me to participate in the care of this patient. If you have any questions please not hesitate to contact me.          Please note that this dictation was created using voice recognition software. I have made every reasonable attempt to correct obvious errors but there may be errors of grammar and content that I may have overlooked prior to finalization of this note.      Nazario Sandoval MD  Interventional Spine and Sports Physiatry  Physical Medicine and Rehabilitation  [facility]

## 2020-02-19 NOTE — Clinical Note
Dear Dr Perez and Dr Mclain,   I believe the majority the patient's symptoms are secondary to facet arthropathy which is consistent with his history and exam and imaging.  I have ordered diagnostic medial branch block starting the right L3-4, L4-5, L5-S1 facet joints.  The patient states he wishes to discuss with both Dr. Perez and Dr. Mclain prior to scheduling.  He does have imaging findings of central canal stenosis and neuroforaminal stenosis however he has no clinical findings of these and I do not think the intervention of those structures would be helpful for him.  Nazario Sandoval MD

## 2020-02-19 NOTE — PATIENT INSTRUCTIONS
Your procedure will be at the [facility] special procedure suite.    [facility] [place] [address]       PRE-PROCEDURE INSTRUCTIONS  You may take your regular medications except:   · No Anti-inflammatories 5 days prior to your procedure. Anti-inflammatories include medicines such as  ibuprofen (Motrin, Advil), Excedrin, Naproxen (Aleve, Anaprox, Naprelan, Naprosyn), Celecoxib (Celebrex), Diclofenac (Voltaren-XR tab), and Meloxicam (Mobic).   · No Glucophage or Metformin 24 hours before your procedure. You may resume next day after your procedure.  · Call the physiatry office if you are taking or prescribed anti-biotics within five days of procedure.  · Please ask provider if you are taking any new diabetes medication.  · CONTINUE TAKING BLOOD PRESSURE MEDICATIONS AS PRESCRIBED.  · Pain medications will not be prescribed on the procedure day. Procedural pain medication may be used by your provider   · Call your doctor's office performing the procedure if you have a fever, chills, rash or new illness prior to your procedure    Anticoagulation/antiplatelet medications  No Blood thinning medications such as Coumadin or Plavix 5 days prior to procedure unless your doctor said to continue these medications. Call your doctor if a new medication is prescribed in this class.     Restrictions for eating before procedure:   · If you are getting procedural sedation, then do not eat to for 8 hours prior to procedure appointment time. Do not drink fluids for four hours prior to your procedure time.   · If you are not having procedural sedation, then Skip the meal prior to your procedure. If you have a morning procedure then skip breakfast. If you have an afternoon procedure then skip lunch.   · You may drink clear liquids up to 2 hours prior to your procedure  · You must have a  the day of procedure to accompany you home.      POST PROCEDURE INSTRUCTIONS   · No heavy lifting, strenuous bending or  strenuous exercise for 3 days after your procedure.  · No hot tubs, baths, swimming for 3 days after your procedure  · You can remove the bandage the day after the procedure.  · IF YOU RECEIVED A DIAGNOSTIC PROCEDURE (SUCH AS A MEDIAL BRANCH BLOCK), PLEASE NOTE THAT WE DO EXPECT THIS INJECTION TO WEAR OFF.  IT IS IMPORTANT TO COMPLETE THE PAIN DIARY AND LIST THE PAIN SCORE ONLY FOR THE REGION WHERE THE PROCEDURE WAS AND BRING THIS TO YOUR FOLLOW UP VISIT.  · IF YOU EXPERIENCE PROLONGED WEAKNESS LONGER THAN ONE DAY, BOWEL OR BLADDER INCONTINENCE THEN PLEASE CALL THE PHYSIATRY OFFICE.  · Your leg may feel heavy, weak and numb for up to 1-2 days. Be very careful walking.   ·  You may resume normal activities 3 days after procedure.

## 2020-02-20 ENCOUNTER — TELEPHONE (OUTPATIENT)
Dept: NEUROLOGY | Facility: MEDICAL CENTER | Age: 70
End: 2020-02-20

## 2020-02-20 ENCOUNTER — TELEPHONE (OUTPATIENT)
Dept: MEDICAL GROUP | Facility: MEDICAL CENTER | Age: 70
End: 2020-02-20

## 2020-02-20 NOTE — TELEPHONE ENCOUNTER
Patients wife called in stating that the patient pain doctor recommended nerve blocks for his back pain. Patients wife was wondering if there were any concerns Dr Perez would have to the patient getting the procedure done.

## 2020-02-21 NOTE — TELEPHONE ENCOUNTER
Dr. Mclain,    Patient called on 2/19/2020 and stated that there has been an Increase in Seizures, needs to know if he can increase the medication, and patient would like to know that his other Doctor recommended a nerve block treatment and was wondering if this was ok for him to do?  Please Advise.

## 2020-02-21 NOTE — TELEPHONE ENCOUNTER
"Tell him I was in contact with his other doctor, he can undergo the nerve blocks as was recommended for his back pain.  As for \"seizures\", I am not treating him for seizures.  Thus I am not sure what he is talking about.  "

## 2020-02-26 ENCOUNTER — APPOINTMENT (OUTPATIENT)
Dept: PHYSICAL MEDICINE AND REHAB | Facility: MEDICAL CENTER | Age: 70
End: 2020-02-26
Payer: MEDICARE

## 2020-02-26 ENCOUNTER — TELEPHONE (OUTPATIENT)
Dept: NEUROLOGY | Facility: MEDICAL CENTER | Age: 70
End: 2020-02-26

## 2020-02-26 NOTE — TELEPHONE ENCOUNTER
----- Message from Jeremiah Huber sent at 2/25/2020 10:38 AM PST -----  Regarding: RE:Reply from Dr. Mclain  Contact: [phone]  Thank you. I am not having seizures but I am having moments were my movements are stuttering steps. My wife called these freezings. Can anything be done I.e. increase in medication.

## 2020-02-27 NOTE — TELEPHONE ENCOUNTER
"Freezing sounds more logical.  People have difficulty picking their feet up, they simply stop mid stride.  The steps become quite shortened, consistent with what he looks at as \"stuttering\".  Increasing medication is the only real help.  Given how atypical his disease is, I am not sure how much help we will get, but I also do not want to increase too quickly because he is more likely to have side effects on a more rapid titration.    If you would like to, I would recommend taking Sinemet 25/250, 2 tablets in the morning and then 1 tablet midday and into the evening.  We can then add a second tablet to the midday dose, allowing several weeks to transpire before adding a second tablet to the third dose if needed.  "

## 2020-03-03 ENCOUNTER — HOSPITAL ENCOUNTER (OUTPATIENT)
Dept: RADIOLOGY | Facility: REHABILITATION | Age: 70
End: 2020-03-03
Attending: PHYSICAL MEDICINE & REHABILITATION
Payer: MEDICARE

## 2020-03-03 ENCOUNTER — HOSPITAL ENCOUNTER (OUTPATIENT)
Dept: PAIN MANAGEMENT | Facility: REHABILITATION | Age: 70
End: 2020-03-03
Attending: PHYSICAL MEDICINE & REHABILITATION
Payer: MEDICARE

## 2020-03-03 VITALS
BODY MASS INDEX: 25.52 KG/M2 | DIASTOLIC BLOOD PRESSURE: 82 MMHG | HEART RATE: 86 BPM | RESPIRATION RATE: 16 BRPM | TEMPERATURE: 97.1 F | WEIGHT: 198.85 LBS | HEIGHT: 74 IN | OXYGEN SATURATION: 93 % | SYSTOLIC BLOOD PRESSURE: 131 MMHG

## 2020-03-03 PROCEDURE — 64493 INJ PARAVERT F JNT L/S 1 LEV: CPT

## 2020-03-03 PROCEDURE — 64494 INJ PARAVERT F JNT L/S 2 LEV: CPT

## 2020-03-03 PROCEDURE — 700101 HCHG RX REV CODE 250

## 2020-03-03 PROCEDURE — 700111 HCHG RX REV CODE 636 W/ 250 OVERRIDE (IP)

## 2020-03-03 PROCEDURE — 64495 INJ PARAVERT F JNT L/S 3 LEV: CPT

## 2020-03-03 PROCEDURE — 700117 HCHG RX CONTRAST REV CODE 255

## 2020-03-03 RX ORDER — ASPIRIN 325 MG
325 TABLET ORAL 2 TIMES DAILY
Status: ON HOLD | COMMUNITY
End: 2020-06-20

## 2020-03-03 RX ORDER — LIDOCAINE HYDROCHLORIDE 20 MG/ML
INJECTION, SOLUTION EPIDURAL; INFILTRATION; INTRACAUDAL; PERINEURAL
Status: COMPLETED
Start: 2020-03-03 | End: 2020-03-03

## 2020-03-03 RX ORDER — LIDOCAINE HYDROCHLORIDE 10 MG/ML
INJECTION, SOLUTION EPIDURAL; INFILTRATION; INTRACAUDAL; PERINEURAL
Status: COMPLETED
Start: 2020-03-03 | End: 2020-03-03

## 2020-03-03 RX ADMIN — LIDOCAINE HYDROCHLORIDE 10 ML: 10 INJECTION, SOLUTION EPIDURAL; INFILTRATION; INTRACAUDAL; PERINEURAL at 10:56

## 2020-03-03 RX ADMIN — IOHEXOL 3 ML: 240 INJECTION, SOLUTION INTRATHECAL; INTRAVASCULAR; INTRAVENOUS; ORAL at 10:56

## 2020-03-03 RX ADMIN — LIDOCAINE HYDROCHLORIDE 5 ML: 20 INJECTION, SOLUTION EPIDURAL; INFILTRATION; INTRACAUDAL; PERINEURAL at 10:56

## 2020-03-03 NOTE — PROCEDURES
Date of Service: 3/3/2020     Patient: Jeremiah Huber 69 y.o. male     MRN: [id number]     Physician/s: Nazario Sandoval MD    Pre-operative Diagnosis: Lumbosacral spondylosis, facet arthropathy. The patient was NOT seen for lumbar radiculopathy today.     Post-operative Diagnosis: Lumbosacral spondylosis, facet arthropathy. The patient was NOT seen for lumbar radiculopathy today.     Procedure: Medial Branch Blocks targeting the right L3-4, L4-5 and L5-S1  facet joints     Description of procedure:    The risks, benefits, and alternatives of the procedure were reviewed and discussed with the patient.  Written informed consent was freely obtained. A pre-procedural time-out was conducted by the physician verifying patient’s identity, procedure to be performed, procedure site and side, and allergy verification. Appropriate equipment was determined to be in place for the procedure.     In the fluoroscopy suite the patient was placed in a prone position and the skin was prepped and draped in the usual sterile fashion. The fluoroscope was placed over the lower back at the appropriate angles, and the targets for injection were marked. A 27g needle was placed into each of the markings at the levels below, and approx 1cc of 1% Lidocaine was injected subcutaneously into the epidermal and dermal layers. The needle was removed intact.     Using an oblique view, A 25g 3.5 inch needle was then placed at the intersection of the transverse process and superior articular process at the S1 on the right side. The needle tips were then verified by AP, oblique, and lateral views.     Using an oblique view, A 25g 3.5 inch needle was then placed at the intersection of the transverse process and superior articular process at the L5-S1 on the right side. The needle tips were then verified by AP, oblique, and lateral views.     Using an oblique view, A 25g 3.5 inch needle was then placed at the intersection of the transverse process and  superior articular process at the L4-5 on the right side. The needle tips were then verified by AP, oblique, and lateral views.     Using an oblique view, A 25g 3.5 inch needle was then placed at the intersection of the transverse process and superior articular process at the L3-4 on the right side. The needle tips were then verified by AP, oblique, and lateral views.         In the AP view, less than 1 cc of contrast dye was used to highlight the medial branch while the fluoroscope was running live at the levels above. Following negative aspiration, 0.5cc of 2% lidocaine  was then injected at the above levels, and the needles were removed intact after restyleted. The patient's back was covered with a 4x4 gauze, the area was cleansed with sterile normal saline, and a dressing was applied.     There were no complications noted, the patient was hemodynamically stable, and tolerated the procedure well.     The patient was given a pain diary for the next several hours to see if there was improvement in his pain.     Follow-up appointment is scheduled for 3/23/2020       Nazario Sandoval MD  Physical Medicine and Rehabilitation  Interventional Spine and Sports Physiatry  [facility]            CPT  Intraarticular joint or medial branch block (MBB) - lumbar or sacral (1st level):  27645  Intraarticular joint or medial branch block (MBB) - lumbar or sacral (2nd level):  37084  Intraarticular joint or medial branch block (MBB) - lumbar or sacral (3rd level):  45716

## 2020-03-03 NOTE — NON-PROVIDER
Preprocedure assessment completed.  Pertinent health information Parkinsons communicated to physician and staff during time out.  Patient positioned preprocedure by RN, CST and XRAY.  Pillow under knees for support.

## 2020-03-03 NOTE — NON-PROVIDER
Patient escorted to pre procedure room. Confirmation using name and . POC reviewed, opportunity provided to address patient's questions and concerns prior to obtaining consent for planned procedure. Current Physician H&P present with appropriate plan that matches consent. Updated mediation list as needed. Allergies confirmed. Reviewed recent falls in the last 6 months and confirmed what current baseline state of mobility is.: Patient does have Parkinsons and has a shuffling gait for which he does NOT use cane or walker and denies falling in last 6 months. Respiratory and cardiac issues reviewed: denies issues. Kidney function addressed: denies issues. Not diabetic. Patient states takes 325 Aspirin for pain occasionally and had one time dose yesterday otherwise does not take any blood thinning medications.  Confirmed that he has a . Reviewed home care instructions with patient prior to procedure. Patient educated on importance of handwashing and other measures to minimize spread of infection. Hand off to procedure nurse, Frannie Simons RN. Dr Sandvoal aware of patients one time dose 325 Aspirin yesterday and although he does not take this daily,Dr Sandoval discussed risks but okay to proceed.

## 2020-03-05 ENCOUNTER — TELEPHONE (OUTPATIENT)
Dept: PHYSICAL MEDICINE AND REHAB | Facility: MEDICAL CENTER | Age: 70
End: 2020-03-05

## 2020-03-05 NOTE — TELEPHONE ENCOUNTER
Spoke to Pt in regards to his SP that was done with Dr. Sandoval dated 03/03/2020 for his Diagnostic medial branch blocks targeting the RIGHT L3-4, L4-5 and L5-S1 facet joints with fluoroscopic guidance and he mentioned that he is fine.    Thank you  Zhanna

## 2020-03-12 ENCOUNTER — TELEPHONE (OUTPATIENT)
Dept: NEUROLOGY | Facility: MEDICAL CENTER | Age: 70
End: 2020-03-12

## 2020-03-12 DIAGNOSIS — G20.C PARKINSONISM, UNSPECIFIED PARKINSONISM TYPE (HCC): ICD-10-CM

## 2020-03-12 NOTE — TELEPHONE ENCOUNTER
Received a fax from [facility]'s pharmacy in regards to patient's Carbidopa-levodopa  MG oral tablet . The medication was ordered as  Take on tablet three time a day . Per patient he is taking tabs four times a day , I do see a note from 02/27/2020 that it looks like patient should be taking four a day? Please let me know Dr Mclain Thank you

## 2020-03-13 ENCOUNTER — APPOINTMENT (RX ONLY)
Dept: URBAN - METROPOLITAN AREA CLINIC 35 | Facility: CLINIC | Age: 70
Setting detail: DERMATOLOGY
End: 2020-03-13

## 2020-03-13 DIAGNOSIS — D22 MELANOCYTIC NEVI: ICD-10-CM

## 2020-03-13 DIAGNOSIS — L82.1 OTHER SEBORRHEIC KERATOSIS: ICD-10-CM

## 2020-03-13 DIAGNOSIS — L57.0 ACTINIC KERATOSIS: ICD-10-CM

## 2020-03-13 DIAGNOSIS — Z71.89 OTHER SPECIFIED COUNSELING: ICD-10-CM

## 2020-03-13 DIAGNOSIS — L81.4 OTHER MELANIN HYPERPIGMENTATION: ICD-10-CM

## 2020-03-13 PROBLEM — D22.5 MELANOCYTIC NEVI OF TRUNK: Status: ACTIVE | Noted: 2020-03-13

## 2020-03-13 PROCEDURE — ? ADDITIONAL NOTES

## 2020-03-13 PROCEDURE — 17003 DESTRUCT PREMALG LES 2-14: CPT

## 2020-03-13 PROCEDURE — 99213 OFFICE O/P EST LOW 20 MIN: CPT | Mod: 25

## 2020-03-13 PROCEDURE — 17000 DESTRUCT PREMALG LESION: CPT

## 2020-03-13 PROCEDURE — ? SUNSCREEN RECOMMENDATIONS

## 2020-03-13 PROCEDURE — ? COUNSELING

## 2020-03-13 PROCEDURE — ? LIQUID NITROGEN

## 2020-03-13 RX ORDER — CARBIDOPA/LEVODOPA 25MG-250MG
TABLET ORAL
Qty: 150 TAB | Refills: 5 | Status: SHIPPED | OUTPATIENT
Start: 2020-03-13 | End: 2020-03-26

## 2020-03-13 ASSESSMENT — LOCATION SIMPLE DESCRIPTION DERM
LOCATION SIMPLE: RIGHT UPPER BACK
LOCATION SIMPLE: SCALP
LOCATION SIMPLE: RIGHT ZYGOMA
LOCATION SIMPLE: UPPER BACK
LOCATION SIMPLE: RIGHT OCCIPITAL SCALP
LOCATION SIMPLE: LEFT FOREHEAD

## 2020-03-13 ASSESSMENT — LOCATION ZONE DERM
LOCATION ZONE: SCALP
LOCATION ZONE: TRUNK
LOCATION ZONE: FACE

## 2020-03-13 ASSESSMENT — LOCATION DETAILED DESCRIPTION DERM
LOCATION DETAILED: INFERIOR THORACIC SPINE
LOCATION DETAILED: RIGHT SUPERIOR PARIETAL SCALP
LOCATION DETAILED: SUPERIOR THORACIC SPINE
LOCATION DETAILED: RIGHT SUPERIOR OCCIPITAL SCALP
LOCATION DETAILED: RIGHT MEDIAL ZYGOMA
LOCATION DETAILED: LEFT SUPERIOR PARIETAL SCALP
LOCATION DETAILED: LEFT MEDIAL FOREHEAD
LOCATION DETAILED: RIGHT INFERIOR MEDIAL UPPER BACK

## 2020-03-13 NOTE — PROCEDURE: ADDITIONAL NOTES
Additional Notes: Patient has 5FU on, recommended that patient do a course of 5FU to the face bid x 4 days; follow through with Calcipotriene ointment as directed.
Detail Level: Simple

## 2020-03-13 NOTE — PROCEDURE: LIQUID NITROGEN
Post-Care Instructions: I reviewed with the patient in detail post-care instructions. Patient is to wear sunprotection, and avoid picking at any of the treated lesions. Pt may apply Vaseline to crusted or scabbing areas.
Detail Level: Detailed
Render Post-Care Instructions In Note?: no
Duration Of Freeze Thaw-Cycle (Seconds): 10
Consent: The patient's consent was obtained including but not limited to risks of crusting, scabbing, blistering, scarring, darker or lighter pigmentary change, recurrence, incomplete removal and infection.

## 2020-03-23 ENCOUNTER — TELEPHONE (OUTPATIENT)
Dept: NEUROLOGY | Facility: MEDICAL CENTER | Age: 70
End: 2020-03-23

## 2020-03-23 ENCOUNTER — APPOINTMENT (OUTPATIENT)
Dept: PAIN MANAGEMENT | Facility: REHABILITATION | Age: 70
End: 2020-03-23
Attending: PHYSICAL MEDICINE & REHABILITATION
Payer: MEDICARE

## 2020-03-23 NOTE — TELEPHONE ENCOUNTER
I do not ever remember having to order specifically how medication is packaged and delivered to a patient.  Does  Mayo know if the doctor has to be very specific in this regard?

## 2020-03-23 NOTE — TELEPHONE ENCOUNTER
----- Message from Jeremiah Huber sent at 3/21/2020 11:15 AM PDT -----  Regarding: RE:Reply from Dr. Mclain  Contact: [phone]  Dr. Mclain,  Could you please contact the [facility] pharmacy on [facility] to see if they can provide increase dosage.  I picked up my last dose last week and she said she would call you for the additional pill\day .  The latest was packaged form which is hard to get out due to arthritic hands.  Could you have them switch back to pills in bottle .  Could you also see if they will provide 90 day supply (90 x 4 = 360 pills).   I guess I'll see you on March 25 at 10am unless it gets canceled.  Thank you,

## 2020-03-25 ENCOUNTER — TELEPHONE (OUTPATIENT)
Dept: NEUROLOGY | Facility: MEDICAL CENTER | Age: 70
End: 2020-03-25

## 2020-03-25 ENCOUNTER — APPOINTMENT (OUTPATIENT)
Dept: NEUROLOGY | Facility: MEDICAL CENTER | Age: 70
End: 2020-03-25
Payer: MEDICARE

## 2020-03-25 NOTE — TELEPHONE ENCOUNTER
----- Message from Jeremiah Huber sent at 3/24/2020 11:37 AM PDT -----  Regarding: RE:Reply from Dr. Mclain  Contact: [phone]  I will deal with pharmacy directly on the packaging.  I cancelled my visit scheduled for tomorrow because of the on going virus issue.  Can we call in.  Or do we need to reschedule the visit after the virus is minimized.    Thank you

## 2020-03-25 NOTE — TELEPHONE ENCOUNTER
Derek, find out from Jeremiah how much Sinemet he is taking, the original prescription was for titration beginning at 2 tablets in the morning and 1 tablet at midday and evening, titrating up to 2 tablets, 3 times a day.  Find out what the dose is so that I can give him a 90-day supply.    As for the appointment itself, I do need to see him, simply have them reschedule.

## 2020-03-25 NOTE — TELEPHONE ENCOUNTER
Dr. Mclain can we put in a refill please for this patients:  carbidopa-levodopa (SINEMET)  MG Tab 150 Tab 5/5 3/13/2020     Si tab qAM and 1 tab midday and qPM. Increase as directed and needed to 2 tab tid      He would like a 90 day supply of this medication.

## 2020-03-26 ENCOUNTER — TELEPHONE (OUTPATIENT)
Dept: NEUROLOGY | Facility: MEDICAL CENTER | Age: 70
End: 2020-03-26

## 2020-03-26 DIAGNOSIS — G20.C PARKINSONISM, UNSPECIFIED PARKINSONISM TYPE (HCC): ICD-10-CM

## 2020-03-26 RX ORDER — CARBIDOPA/LEVODOPA 25MG-250MG
TABLET ORAL
Qty: 360 TAB | Refills: 3 | Status: SHIPPED
Start: 2020-03-26 | End: 2020-06-12

## 2020-03-26 NOTE — TELEPHONE ENCOUNTER
----- Message from Jeremiah Huber sent at 3/25/2020  2:59 PM PDT -----  Regarding: RE:Reply from Dr. Mclain  Contact: [phone]  I am taking two in the morning and one on after noon and one at night for a total of 4 pre day.

## 2020-04-06 ENCOUNTER — APPOINTMENT (OUTPATIENT)
Dept: PHYSICAL MEDICINE AND REHAB | Facility: MEDICAL CENTER | Age: 70
End: 2020-04-06
Payer: MEDICARE

## 2020-05-22 NOTE — NON-PROVIDER
PAT note: PAT call made 05/22/2020 at 1322. Spoke with Jeremiah. Health history, allergies, medications and pre-procedure/sedation instructions reviewed with patient. Pre-procedure respiratory screening completed. Pt denies being sick/symptomatic(fever, cough, shortness of breath)  without treatment within 72 hours ,having traveled outside [place] in the past 14 days or having been in close contact with symptomatic individuals in the past 2 weeks.Pt was informed to contact his physician should he or any close personal contact become symptomatic prior to the procedure. Pt was told to bring a mask as he would be wearing it during the entire stay. It was recommended that the pt self isolate until the procedure and that his  would have to remain on site in vehicle til pt is d/juventino. Pt verbalized understanding of instructions.

## 2020-05-26 ENCOUNTER — HOSPITAL ENCOUNTER (OUTPATIENT)
Dept: RADIOLOGY | Facility: REHABILITATION | Age: 70
End: 2020-05-26
Attending: PHYSICAL MEDICINE & REHABILITATION
Payer: MEDICARE

## 2020-05-26 ENCOUNTER — HOSPITAL ENCOUNTER (OUTPATIENT)
Dept: PAIN MANAGEMENT | Facility: REHABILITATION | Age: 70
End: 2020-05-26
Attending: PHYSICAL MEDICINE & REHABILITATION
Payer: MEDICARE

## 2020-05-26 VITALS
BODY MASS INDEX: 25.35 KG/M2 | SYSTOLIC BLOOD PRESSURE: 136 MMHG | OXYGEN SATURATION: 92 % | HEART RATE: 74 BPM | RESPIRATION RATE: 16 BRPM | TEMPERATURE: 97.6 F | WEIGHT: 197.53 LBS | DIASTOLIC BLOOD PRESSURE: 80 MMHG | HEIGHT: 74 IN

## 2020-05-26 PROCEDURE — 64493 INJ PARAVERT F JNT L/S 1 LEV: CPT

## 2020-05-26 PROCEDURE — 64494 INJ PARAVERT F JNT L/S 2 LEV: CPT

## 2020-05-26 PROCEDURE — 700101 HCHG RX REV CODE 250

## 2020-05-26 PROCEDURE — 700111 HCHG RX REV CODE 636 W/ 250 OVERRIDE (IP)

## 2020-05-26 PROCEDURE — 64495 INJ PARAVERT F JNT L/S 3 LEV: CPT

## 2020-05-26 PROCEDURE — 700117 HCHG RX CONTRAST REV CODE 255

## 2020-05-26 RX ORDER — LIDOCAINE HYDROCHLORIDE 20 MG/ML
INJECTION, SOLUTION EPIDURAL; INFILTRATION; INTRACAUDAL; PERINEURAL
Status: COMPLETED
Start: 2020-05-26 | End: 2020-05-26

## 2020-05-26 RX ORDER — LIDOCAINE HYDROCHLORIDE 10 MG/ML
INJECTION, SOLUTION EPIDURAL; INFILTRATION; INTRACAUDAL; PERINEURAL
Status: COMPLETED
Start: 2020-05-26 | End: 2020-05-26

## 2020-05-26 RX ADMIN — LIDOCAINE HYDROCHLORIDE 10 ML: 10 INJECTION, SOLUTION EPIDURAL; INFILTRATION; INTRACAUDAL; PERINEURAL at 09:48

## 2020-05-26 RX ADMIN — LIDOCAINE HYDROCHLORIDE 5 ML: 20 INJECTION, SOLUTION EPIDURAL; INFILTRATION; INTRACAUDAL; PERINEURAL at 09:48

## 2020-05-26 RX ADMIN — IOHEXOL 3 ML: 240 INJECTION, SOLUTION INTRATHECAL; INTRAVASCULAR; INTRAVENOUS; ORAL at 09:49

## 2020-05-26 NOTE — NON-PROVIDER
Pt refused food and fluid post procedure. Denies nausea or vomiting. Ambulated without difficulty using cane with standby assist.. Discharged into care of responsible adult. Pain diary given and explained to pt prior to d/c. Pt verbalized understanding.

## 2020-05-26 NOTE — PROCEDURES
Date of Service: 5/26/2020     Patient: Jeremiah Huber 69 y.o. male     MRN: [id number]     Physician/s: Nazario Sandoval MD    Pre-operative Diagnosis: Lumbosacral spondylosis, facet arthropathy. The patient was NOT seen for lumbar radiculopathy today.     Post-operative Diagnosis: Lumbosacral spondylosis, facet arthropathy. The patient was NOT seen for lumbar radiculopathy today.     Procedure: Medial Branch Blocks targeting the right L3-4, L4-5 and L5-S1  facet joints     Description of procedure:    The risks, benefits, and alternatives of the procedure were reviewed and discussed with the patient.  Written informed consent was freely obtained. A pre-procedural time-out was conducted by the physician verifying patient’s identity, procedure to be performed, procedure site and side, and allergy verification. Appropriate equipment was determined to be in place for the procedure.     In the fluoroscopy suite the patient was placed in a prone position and the skin was prepped and draped in the usual sterile fashion. The fluoroscope was placed over the lower back at the appropriate angles, and the targets for injection were marked. A 27g needle was placed into each of the markings at the levels below, and approx 1cc of 1% Lidocaine was injected subcutaneously into the epidermal and dermal layers. The needle was removed intact.     Using an oblique view, A 25g 3.5 inch needle was then placed at the intersection of the transverse process and superior articular process at the L3-4 on the right side. The needle tips were then verified by AP, oblique, and lateral views.     Using an oblique view, A 25g 3.5 inch needle was then placed at the intersection of the transverse process and superior articular process at the L4-5 on the right side. The needle tips were then verified by AP, oblique, and lateral views.     Using an oblique view, A 25g 3.5 inch needle was then placed at the intersection of the transverse process and  superior articular process at the L5-S1 on the right side. The needle tips were then verified by AP, oblique, and lateral views.     Using an oblique view, A 25g 3.5 inch needle was then placed at the intersection of the transverse process and superior articular process at the S1 on the right side. The needle tips were then verified by AP, oblique, and lateral views.          In the AP view, less than 1 cc of contrast dye was used to highlight the medial branch while the fluoroscope was running live at the levels above. Following negative aspiration, 0.5cc of 2% lidocaine  was then injected at the above levels, and the needles were removed intact after restyleted. The patient's back was covered with a 4x4 gauze, the area was cleansed with sterile normal saline, and a dressing was applied.     There were no complications noted, the patient was hemodynamically stable, and tolerated the procedure well.     The patient had 100% pain relief immediately post procedure.    Preprocedure pain 7/10  Postprocedure pain 0/10    Follow-up appointment is scheduled for 6/8/2020       Nazario Sandoval MD  Physical Medicine and Rehabilitation  Interventional Spine and Sports Physiatry  [facility]            CPT  Intraarticular joint or medial branch block (MBB) - lumbar or sacral (1st level):  14573  Intraarticular joint or medial branch block (MBB) - lumbar or sacral (2nd level):  34937  Intraarticular joint or medial branch block (MBB) - lumbar or sacral (3rd level):  16982

## 2020-05-26 NOTE — NON-PROVIDER
Preprocedure assessment completed.  Pertinent health information has Parkinsons uses cane communicated to physician and staff during time out.  Patient positioned preprocedure by RN, CST and XRAY.  Pillow under knees for support.

## 2020-05-26 NOTE — H&P
Interventional spine and sports physiatry, Physical medicine rehabilitation        Chief complaint:        Chief Complaint   Patient presents with   • Follow-Up       Back            HISTORY    Please see new patient note by Dr Sandoval,  for more details.      HPI  Patient identification: Jeremiah Huber 69 y.o. male  With Diagnoses of Lumbar spondylosis, Spondylolisthesis of lumbar region, Scoliosis of thoracolumbar spine, unspecified scoliosis type, Lumbar degenerative disc disease, Facet arthropathy, lumbar, and Chronic right-sided low back pain without sciatica were pertinent to this visit.         Right low back pain, aching quality, chronic, 6/ 10 in intensity, worse with lumbar extension, improved with lumbar flexion.  Denies radiating pains.  Denies weakness.  He went to physical therapy with no significant improvement in his back pain.  He still does the home exercise program and stretching routine.           ROS Red Flags :   Fever, Chills, Sweats: Denies  Involuntary Weight Loss: Denies  Bowel/Bladder Incontinence: Denies  Saddle Anesthesia: Denies          PMHx:   Past Medical History        Past Medical History:   Diagnosis Date   • Back spasm     • GOUT     • Gout     • Hypertension             PSHx:   Past Surgical History         Past Surgical History:   Procedure Laterality Date   • KNEE ARTHROSCOPY Right     • SHOULDER SURGERY         right shoulder, a-c seperation   • TONSILLECTOMY               Family history   Family History         Family History   Problem Relation Age of Onset   • Heart Disease Mother     • Heart Disease Father     • Cancer Father           prostate cancer   • Arthritis Brother                Medications:   Active Medications          Outpatient Medications Marked as Taking for the 2/19/20 encounter (Office Visit) with Nzaario Sandoval M.D.   Medication Sig Dispense Refill   • carbidopa-levodopa (SINEMET)  MG Tab TAKE ONE TABLET BY MOUTH THREE TIMES A DAY 90 Tab 2   •  acetaminophen (TYLENOL) 500 MG Tab Take 2 Tabs by mouth 3 times a day as needed (pain.  Do not exceed 3000 mg of total acetaminophen per day). 180 Tab 6   • meloxicam (MOBIC) 15 MG tablet Take 1 Tab by mouth every day for 30 days. For 30 days then stop. Do not take other NSAIDs. No refills. 30 Tab 0   • amoxicillin (AMOXIL) 500 MG Cap Take 500 mg by mouth 3 times a day.       • fluticasone (CUTIVATE) 0.05 % cream Apply  to affected area(s) 2 times a day.                       Current Outpatient Medications on File Prior to Visit   Medication Sig Dispense Refill   • carbidopa-levodopa (SINEMET)  MG Tab TAKE ONE TABLET BY MOUTH THREE TIMES A DAY 90 Tab 2   • acetaminophen (TYLENOL) 500 MG Tab Take 2 Tabs by mouth 3 times a day as needed (pain.  Do not exceed 3000 mg of total acetaminophen per day). 180 Tab 6   • meloxicam (MOBIC) 15 MG tablet Take 1 Tab by mouth every day for 30 days. For 30 days then stop. Do not take other NSAIDs. No refills. 30 Tab 0   • amoxicillin (AMOXIL) 500 MG Cap Take 500 mg by mouth 3 times a day.       • fluticasone (CUTIVATE) 0.05 % cream Apply  to affected area(s) 2 times a day.          No current facility-administered medications on file prior to visit.            Allergies:        Allergies   Allergen Reactions   • Nkda [No Known Drug Allergy]          Social Hx:   Social History               Socioeconomic History   • Marital status:        Spouse name: Not on file   • Number of children: Not on file   • Years of education: Not on file   • Highest education level: Not on file   Occupational History   • Not on file   Social Needs   • Financial resource strain: Not on file   • Food insecurity       Worry: Not on file       Inability: Not on file   • Transportation needs       Medical: Not on file       Non-medical: Not on file   Tobacco Use   • Smoking status: Never Smoker   • Smokeless tobacco: Never Used   Substance and Sexual Activity   • Alcohol use: Yes        Alcohol/week: 4.2 oz       Types: 4 Glasses of wine, 3 Standard drinks or equivalent per week       Comment: daily   • Drug use: No   • Sexual activity: Yes       Partners: Female   Lifestyle   • Physical activity       Days per week: Not on file       Minutes per session: Not on file   • Stress: Not on file   Relationships   • Social connections       Talks on phone: Not on file       Gets together: Not on file       Attends Sabianism service: Not on file       Active member of club or organization: Not on file       Attends meetings of clubs or organizations: Not on file       Relationship status: Not on file   • Intimate partner violence       Fear of current or ex partner: Not on file       Emotionally abused: Not on file       Physically abused: Not on file       Forced sexual activity: Not on file   Other Topics Concern   •  Service No   • Blood Transfusions No   • Caffeine Concern No   • Occupational Exposure No   • Hobby Hazards No   • Sleep Concern No   • Stress Concern No   • Weight Concern No   • Special Diet No   • Back Care Yes       Comment: Streching   • Exercise No   • Bike Helmet Yes   • Seat Belt Yes   • Self-Exams Yes   Social History Narrative   • Not on file              EXAMINATION      Physical Exam:   Vitals: /72 (BP Location: Left arm, Patient Position: Sitting, BP Cuff Size: Adult)   Pulse 77   Temp 36.7 °C (98.1 °F) (Temporal)   Ht 1.829 m (6')   Wt 90.2 kg (198 lb 13.7 oz)   SpO2 97%      Constitutional:   Body Habitus: Body mass index is 26.97 kg/m².  Cooperation: Fully cooperates with exam  Appearance: Well-groomed no disheveled     Respiratory-  breathing comfortable on room air, no audible wheezing  Cardiovascular- capillary refills less than 2 seconds. No lower extremity edema is noted.   Psychiatric- alert and oriented ×3. Normal affect.    MSK: -  Normal gait.  At least 4 out of 5 strength throughout the bilateral lower extremities.  See previous exam for more  details.        MEDICAL DECISION MAKING     DATA     Labs:         Lab Results   Component Value Date/Time     SODIUM 140 12/26/2019 10:10 AM     POTASSIUM 4.1 12/26/2019 10:10 AM     CHLORIDE 105 12/26/2019 10:10 AM     CO2 26 12/26/2019 10:10 AM     GLUCOSE 96 12/26/2019 10:10 AM     BUN 12 12/26/2019 10:10 AM     CREATININE 0.94 12/26/2019 10:10 AM         No results found for: PROTHROMBTM, INR            Lab Results   Component Value Date/Time     WBC 5.4 05/25/2016 10:00 AM     RBC 4.76 05/25/2016 10:00 AM     HEMOGLOBIN 14.9 05/25/2016 10:00 AM     HEMATOCRIT 44.2 05/25/2016 10:00 AM     MCV 92.9 05/25/2016 10:00 AM     MCH 31.3 05/25/2016 10:00 AM     MCHC 33.7 05/25/2016 10:00 AM     MPV 10.2 05/25/2016 10:00 AM     NEUTSPOLYS 64.8 11/08/2014 11:20 AM     LYMPHOCYTES 24.4 11/08/2014 11:20 AM     MONOCYTES 9.0 11/08/2014 11:20 AM     EOSINOPHILS 1.3 11/08/2014 11:20 AM     BASOPHILS 0.5 11/08/2014 11:20 AM         No results found for: HBA1C         Imaging:   I personally reviewed following images     X-ray lumbar spine 11/11/2015.  Degenerative scoliosis is present.  Facet arthropathy worse in the lower lumbar levels right worse than left.  Degenerative disc disease is present. CT renal colic evaluation.  I reviewed the study specifically look at the patient's lumbar spine.  Please see radiologist dictation for the remainder of the report.  Degenerative disc disease at multiple levels worst at the L2-3 level.  Also present L4-5 and L5-S1.  Grade 1 spondylolisthesis L2-3.  Significant facet arthropathy L3-4, L4-5, L5-S1 right side worse than left.     MRI lumbar spine 2/10/2020  Lumbar scoliosis likely degenerative.  Significant right L2-3 and L3-4 neuroforaminal stenosis likely secondary to disc herniation and osteophyte complex.  Moderate right L4-5 neuroforaminal stenosis.  Severe left L4-5 and L5-S1 neuroforaminal stenosis.  Central canal stenosis at L2-3, L3-4, L4-5.  Significant facet arthropathy  bilaterally worst at L3-4, L4-5, L5-S1 bilaterally.     I reviewed the following radiology reports  MRI lumbar spine 2/10/2020  1.  Severe multilevel degenerative disc disease and facet degeneration. There is multilevel central canal and neural foraminal narrowing as well as attenuation of the lateral recesses bilaterally as specifically described above. Central canal narrowing   is most severe at L3-4 and L4-5.     2.  Severe convex left scoliotic curvature.     3.  Multilevel degenerative subluxation.           Results for orders placed in visit on 08/25/17   MR-BRAIN-W/O                                                                                                                                                                                                                                                      Results for orders placed in visit on 11/11/15   DX-LUMBAR SPINE-2 OR 3 VIEWS     Impression 1.  Lumbar scoliosis convex left. L2-L3 retrolisthesis.                                         Results for orders placed in visit on 06/10/16   DX-WRIST-COMPLETE 3+ RIGHT     Impression No acute fracture identified. Scapholunate widening consistent with ligamentous injury.            DIAGNOSIS        Visit Diagnoses       ICD-10-CM   1. Lumbar spondylosis M47.816   2. Spondylolisthesis of lumbar region M43.16   3. Scoliosis of thoracolumbar spine, unspecified scoliosis type M41.9   4. Lumbar degenerative disc disease M51.36   5. Facet arthropathy, lumbar M47.816   6. Chronic right-sided low back pain without sciatica M54.5     G89.29           ASSESSMENT and PLAN:     Jeremiah Huber 69 y.o. male       Jeremiah was seen today for follow-up.     Diagnoses and all orders for this visit:     Lumbar spondylosis  Comments:  not controlled. see below  Orders:  -     REFERRAL TO PHYSICAL MEDICINE REHAB  -     REFERRAL TO PHYSICAL MEDICINE REHAB     Spondylolisthesis of lumbar region     Scoliosis of thoracolumbar spine,  unspecified scoliosis type     Lumbar degenerative disc disease     Facet arthropathy, lumbar  Comments:  not controlled. see below     Chronic right-sided low back pain without sciatica          I advised patient to stop meloxicam 5 days prior to the procedure below     The patient does have imaging findings of central canal stenosis and neuroforaminal stenosis but he has no clinical findings of these issues.     I believe the majority of his symptoms is consistent with his imaging and his exam and history which is that his pain is mostly secondary to his facet arthritis and lumbar spondylosis worst L3-4, L4-5, L5-S1.  I ordered diagnostic medial branch blocks #1 and #2 targeting the right L3-4, L4-5, L5-S1 facet joints with fluoroscopic guidance.  Block #2 is only to be done a block #1 is a positive block.  We discussed potential radiofrequency neurotomy of those joints if the blocks are positive.  The patient wishes to discuss with his neurologist and PCP prior to scheduling.     The risks benefits and alternatives to this procedure were discussed and the patient wishes to proceed with the procedure. Risks include but are not limited to damage to surrounding structures, infection, bleeding, worsening of pain which can be permanent, weakness which can be permanent. Benefits include pain relief, improved function. Alternatives includes not doing the procedure.       I also discussed this with the patient's wife who was at today's visit.     Follow up: After the above diagnostic procedures     Thank you for allowing me to participate in the care of this patient. If you have any questions please not hesitate to contact me.              Please note that this dictation was created using voice recognition software. I have made every reasonable attempt to correct obvious errors but there may be errors of grammar and content that I may have overlooked prior to finalization of this note.        Nazario Sandoval,  "MD  Interventional Spine and Sports Physiatry  Physical Medicine and Rehabilitation  [facility]     ADDENDUM     I reviewed the pain diary when the patient brought from his procedure from 3/3/2020 where we did a diagnostic medial branch block targeting the right L3-4, 4 5, 5 S1 facet joints #1.  Preprocedural pain 5 out of 10.  Postprocedural pain 1 out of 10 which lasted for the first several hours and then slowly wore off.  8 hours after the procedure was back to his baseline of 5 out of 10.  There was 80% improvement in pain during the diagnostic phase with significant functional improvement.    No significant changes. Patient is not on anticoagulation, antibiotics, antiplatelet drugs. Still with right low back pain       Vitals:    05/26/20 0900   BP: 136/91   Pulse: 80   Resp: 18   Temp: 36.4 °C (97.6 °F)   SpO2: 92%   Weight: 89.6 kg (197 lb 8.5 oz)   Height: 1.88 m (6' 2\")      Gen: NAD  Resp: Clear to auscultation bilaterally, no wheezing. Breathing comfortable on room air.  Cardiovascular: Regular rate and rhythm, no murmurs rubs or gallops.       PMHx:   Past Medical History:   Diagnosis Date   • Back spasm    • GOUT    • Gout    • Hypertension        PSHx:   Past Surgical History:   Procedure Laterality Date   • KNEE ARTHROSCOPY Right    • SHOULDER SURGERY      right shoulder, a-c seperation   • TONSILLECTOMY         Family history   Family History   Problem Relation Age of Onset   • Heart Disease Mother    • Heart Disease Father    • Cancer Father         prostate cancer   • Arthritis Brother          Medications:   Current Outpatient Medications   Medication   • carbidopa-levodopa (SINEMET)  MG Tab   • aspirin (ASA) 325 MG Tab   • acetaminophen (TYLENOL) 500 MG Tab   • amoxicillin (AMOXIL) 500 MG Cap   • fluticasone (CUTIVATE) 0.05 % cream     No current facility-administered medications for this encounter.        Allergies:   Allergies   Allergen Reactions   • Nkda [No Known Drug " Allergy]        Social Hx:   Social History     Socioeconomic History   • Marital status:      Spouse name: Not on file   • Number of children: Not on file   • Years of education: Not on file   • Highest education level: Not on file   Occupational History   • Not on file   Social Needs   • Financial resource strain: Not on file   • Food insecurity     Worry: Not on file     Inability: Not on file   • Transportation needs     Medical: Not on file     Non-medical: Not on file   Tobacco Use   • Smoking status: Never Smoker   • Smokeless tobacco: Never Used   Substance and Sexual Activity   • Alcohol use: Yes     Alcohol/week: 4.2 oz     Types: 4 Glasses of wine, 3 Standard drinks or equivalent per week     Comment: daily   • Drug use: No   • Sexual activity: Yes     Partners: Female   Lifestyle   • Physical activity     Days per week: Not on file     Minutes per session: Not on file   • Stress: Not on file   Relationships   • Social connections     Talks on phone: Not on file     Gets together: Not on file     Attends Gnosticist service: Not on file     Active member of club or organization: Not on file     Attends meetings of clubs or organizations: Not on file     Relationship status: Not on file   • Intimate partner violence     Fear of current or ex partner: Not on file     Emotionally abused: Not on file     Physically abused: Not on file     Forced sexual activity: Not on file   Other Topics Concern   •  Service No   • Blood Transfusions No   • Caffeine Concern No   • Occupational Exposure No   • Hobby Hazards No   • Sleep Concern No   • Stress Concern No   • Weight Concern No   • Special Diet No   • Back Care Yes     Comment: Streching   • Exercise No   • Bike Helmet Yes   • Seat Belt Yes   • Self-Exams Yes   Social History Narrative   • Not on file          Okay to proceed with Diagnostic medial branch blocks targeting the RIGHT L3-4, L4-5 and L5-S1 facet joints with fluoroscopic guidance  #2    Nazario Sandoval MD  Physical Medicine and Rehabilitation  Interventional Spine and Sports Physiatry  [facility]

## 2020-05-26 NOTE — NON-PROVIDER
Current meds. See medication reconciliation form. Reviewed with pt. Pt denies taking ASA,other blood thinners or anti-inflammatories. Pre-procedure assessment completed and information  communicated to procedure room RN during handoff. (HX Parkinson's and uses cane)Pt has a ride post-procedure (wife, Nolvia is ). Printed and verbal discharge instructions as well as education regarding infection prevention given to pt/pt's family who verbalized understanding.

## 2020-05-28 ENCOUNTER — TELEPHONE (OUTPATIENT)
Dept: PHYSICAL MEDICINE AND REHAB | Facility: MEDICAL CENTER | Age: 70
End: 2020-05-28

## 2020-05-28 NOTE — TELEPHONE ENCOUNTER
Spoke to Pt in regards to his SP that was done with Dr. Sandoval dated 05/26/2020 for his Diagnostic medial branch blocks targeting the RIGHT L3-4, L4-5 and L5-S1 facet joints with fluoroscopic guidance #2 and he mentioned that he is fine.    Thank you  Zhanna

## 2020-06-08 ENCOUNTER — OFFICE VISIT (OUTPATIENT)
Dept: PHYSICAL MEDICINE AND REHAB | Facility: MEDICAL CENTER | Age: 70
End: 2020-06-08
Payer: MEDICARE

## 2020-06-08 VITALS
OXYGEN SATURATION: 95 % | HEART RATE: 102 BPM | HEIGHT: 74 IN | WEIGHT: 200.4 LBS | BODY MASS INDEX: 25.72 KG/M2 | TEMPERATURE: 97.5 F | SYSTOLIC BLOOD PRESSURE: 112 MMHG | DIASTOLIC BLOOD PRESSURE: 64 MMHG

## 2020-06-08 DIAGNOSIS — M47.816 LUMBAR SPONDYLOSIS: ICD-10-CM

## 2020-06-08 DIAGNOSIS — Z96.653 HISTORY OF BILATERAL KNEE REPLACEMENT: ICD-10-CM

## 2020-06-08 PROCEDURE — 99214 OFFICE O/P EST MOD 30 MIN: CPT | Performed by: PHYSICAL MEDICINE & REHABILITATION

## 2020-06-08 RX ORDER — AMANTADINE HYDROCHLORIDE 100 MG/1
100 TABLET ORAL 2 TIMES DAILY
COMMUNITY
End: 2021-11-09

## 2020-06-08 ASSESSMENT — PAIN SCALES - GENERAL: PAINLEVEL: 7=MODERATE-SEVERE PAIN

## 2020-06-08 ASSESSMENT — PATIENT HEALTH QUESTIONNAIRE - PHQ9: CLINICAL INTERPRETATION OF PHQ2 SCORE: 0

## 2020-06-08 NOTE — PROGRESS NOTES
Follow up patient note  Interventional spine and sports physiatry, Physical medicine rehabilitation      Chief complaint:   Chief Complaint   Patient presents with   • Follow-Up     Back pain          HISTORY    Please see new patient note by Dr Sandoval,  for more details.     HPI  Patient identification: Jeremiah Huber 69 y.o. male  With Diagnoses of Lumbar spondylosis and History of bilateral knee replacement were pertinent to this visit.   Procedures:  3/3/2020 diagnostic medial branch blocks targeting the right L3-4, L4-5, L5-S1 facet joints with 100% pain relief post procedure  5/26/2020 diagnostic medial branch blocks targeting the right L3-4, L4-5, L5-S1 facet joints with preprocedure pain 7/10 postprocedure pain 0/10    After the diagnostic medial branch blocks the patient had an outstanding response with significant pain relief with greater than 80% pain relief with 100% pain relief on both procedures during the diagnostic phase for the first several hours and then the pain returning back to its baseline after the diagnostic phase as expected.  The patient has right low back pain 7/10 in intensity aching quality nonradiating worse with lumbar extension and with bending.  Is causing functional deficits and difficulty with ADLs because of his pain.       ROS Red Flags :   Fever, Chills, Sweats: Denies  Involuntary Weight Loss: Denies  Bowel/Bladder Incontinence: Denies  Saddle Anesthesia: Denies        PMHx:   Past Medical History:   Diagnosis Date   • Back spasm    • GOUT    • Gout    • Hypertension        PSHx:   Past Surgical History:   Procedure Laterality Date   • KNEE ARTHROSCOPY Right    • SHOULDER SURGERY      right shoulder, a-c seperation   • TONSILLECTOMY         Family history   Family History   Problem Relation Age of Onset   • Heart Disease Mother    • Heart Disease Father    • Cancer Father         prostate cancer   • Arthritis Brother          Medications:   Outpatient Medications Marked as  Taking for the 6/8/20 encounter (Office Visit) with Nazario Sandoval M.D.   Medication Sig Dispense Refill   • amantadine (SYMMETREL) 100 MG Tab      • carbidopa-levodopa (SINEMET)  MG Tab 2 tab qAM and 1 tab midday and qPM. 360 Tab 3   • aspirin (ASA) 325 MG Tab Take 325 mg by mouth. Indications: Pain     • acetaminophen (TYLENOL) 500 MG Tab Take 2 Tabs by mouth 3 times a day as needed (pain.  Do not exceed 3000 mg of total acetaminophen per day). 180 Tab 6   • amoxicillin (AMOXIL) 500 MG Cap Take 500 mg by mouth 3 times a day.     • fluticasone (CUTIVATE) 0.05 % cream Apply  to affected area(s) 2 times a day.          Current Outpatient Medications on File Prior to Visit   Medication Sig Dispense Refill   • amantadine (SYMMETREL) 100 MG Tab      • carbidopa-levodopa (SINEMET)  MG Tab 2 tab qAM and 1 tab midday and qPM. 360 Tab 3   • aspirin (ASA) 325 MG Tab Take 325 mg by mouth. Indications: Pain     • acetaminophen (TYLENOL) 500 MG Tab Take 2 Tabs by mouth 3 times a day as needed (pain.  Do not exceed 3000 mg of total acetaminophen per day). 180 Tab 6   • amoxicillin (AMOXIL) 500 MG Cap Take 500 mg by mouth 3 times a day.     • fluticasone (CUTIVATE) 0.05 % cream Apply  to affected area(s) 2 times a day.       No current facility-administered medications on file prior to visit.          Allergies:   Allergies   Allergen Reactions   • Nkda [No Known Drug Allergy]        Social Hx:   Social History     Socioeconomic History   • Marital status:      Spouse name: Not on file   • Number of children: Not on file   • Years of education: Not on file   • Highest education level: Not on file   Occupational History   • Not on file   Social Needs   • Financial resource strain: Not on file   • Food insecurity     Worry: Not on file     Inability: Not on file   • Transportation needs     Medical: Not on file     Non-medical: Not on file   Tobacco Use   • Smoking status: Never Smoker   • Smokeless tobacco:  "Never Used   Substance and Sexual Activity   • Alcohol use: Yes     Alcohol/week: 4.2 oz     Types: 4 Glasses of wine, 3 Standard drinks or equivalent per week     Comment: daily   • Drug use: No   • Sexual activity: Yes     Partners: Female   Lifestyle   • Physical activity     Days per week: Not on file     Minutes per session: Not on file   • Stress: Not on file   Relationships   • Social connections     Talks on phone: Not on file     Gets together: Not on file     Attends Sabianism service: Not on file     Active member of club or organization: Not on file     Attends meetings of clubs or organizations: Not on file     Relationship status: Not on file   • Intimate partner violence     Fear of current or ex partner: Not on file     Emotionally abused: Not on file     Physically abused: Not on file     Forced sexual activity: Not on file   Other Topics Concern   •  Service No   • Blood Transfusions No   • Caffeine Concern No   • Occupational Exposure No   • Hobby Hazards No   • Sleep Concern No   • Stress Concern No   • Weight Concern No   • Special Diet No   • Back Care Yes     Comment: Streching   • Exercise No   • Bike Helmet Yes   • Seat Belt Yes   • Self-Exams Yes   Social History Narrative   • Not on file         EXAMINATION     Physical Exam:   Vitals: /64 (BP Location: Left arm, Patient Position: Sitting, BP Cuff Size: Adult)   Pulse (!) 102   Temp 36.4 °C (97.5 °F) (Temporal)   Ht 1.88 m (6' 2\")   Wt 90.9 kg (200 lb 6.4 oz)   SpO2 95%     Constitutional:   Body Habitus: Body mass index is 25.73 kg/m².  Cooperation: Fully cooperates with exam  Appearance: Well-groomed no disheveled    Respiratory-  breathing comfortable on room air, no audible wheezing  Cardiovascular- capillary refills less than 2 seconds. No lower extremity edema is noted.   Psychiatric- alert and oriented ×3. Normal affect.    MSK/NEURO: -  There are no signs of infection around the injection sites.   full  active " range of motion with flexion, lateral flexion, and rotation bilaterally.   There is decreased active range of motion with lumbar extension.    There is pain with lumbar extension.   There is pain with facet loading maneuver (extension rotation) with axial low back pain on the RIGHT side(s)    Palpation:   No tenderness to palpation in midline at T1-T12 levels. No tenderness to palpation in the left and right of the midline T1-L5, NEGATIVE for tenderness to palpation to the para-midline region in the lower lumbar levels.  palpation over SI joint: negative bilaterally    palpation in hip or over the greater trochanters: negative bilaterally      Lumbar spine Special tests  Neuro tension  Straight leg test negative bilaterally    Slump test negative bilaterally      Key points for the international standards for neurological classification of spinal cord injury (ISNCSCI) to light touch.     Dermatome R L                                      L2 2 2   L3 2 2   L4 2 2   L5 2 2   S1 2 2   S2 2 2         Motor Exam Lower Extremities    ? Myotome R L   Hip flexion L2 5 5   Knee extension L3 5 5   Ankle dorsiflexion L4 5 5   Toe extension L5 5 5   Ankle plantarflexion S1 5 5           MEDICAL DECISION MAKING    DATA    Labs:   Lab Results   Component Value Date/Time    SODIUM 140 12/26/2019 10:10 AM    POTASSIUM 4.1 12/26/2019 10:10 AM    CHLORIDE 105 12/26/2019 10:10 AM    CO2 26 12/26/2019 10:10 AM    GLUCOSE 96 12/26/2019 10:10 AM    BUN 12 12/26/2019 10:10 AM    CREATININE 0.94 12/26/2019 10:10 AM        No results found for: PROTHROMBTM, INR     Lab Results   Component Value Date/Time    WBC 5.4 05/25/2016 10:00 AM    RBC 4.76 05/25/2016 10:00 AM    HEMOGLOBIN 14.9 05/25/2016 10:00 AM    HEMATOCRIT 44.2 05/25/2016 10:00 AM    MCV 92.9 05/25/2016 10:00 AM    MCH 31.3 05/25/2016 10:00 AM    MCHC 33.7 05/25/2016 10:00 AM    MPV 10.2 05/25/2016 10:00 AM    NEUTSPOLYS 64.8 11/08/2014 11:20 AM    LYMPHOCYTES 24.4 11/08/2014  11:20 AM    MONOCYTES 9.0 11/08/2014 11:20 AM    EOSINOPHILS 1.3 11/08/2014 11:20 AM    BASOPHILS 0.5 11/08/2014 11:20 AM        No results found for: HBA1C       Imaging:   I personally reviewed following images    X-ray lumbar spine 11/11/2015.  Degenerative scoliosis is present.  Facet arthropathy worse in the lower lumbar levels right worse than left.  Degenerative disc disease is present. CT renal colic evaluation.  I reviewed the study specifically look at the patient's lumbar spine.  Please see radiologist dictation for the remainder of the report.  Degenerative disc disease at multiple levels worst at the L2-3 level.  Also present L4-5 and L5-S1.  Grade 1 spondylolisthesis L2-3.  Significant facet arthropathy L3-4, L4-5, L5-S1 right side worse than left.    MRI lumbar spine 2/10/2020  Lumbar scoliosis likely degenerative.  Significant right L2-3 and L3-4 neuroforaminal stenosis likely secondary to disc herniation and osteophyte complex.  Moderate right L4-5 neuroforaminal stenosis.  Severe left L4-5 and L5-S1 neuroforaminal stenosis.  Central canal stenosis at L2-3, L3-4, L4-5.  Significant facet arthropathy bilaterally worst at L3-4, L4-5, L5-S1 bilaterally.    I reviewed the following radiology reports  MRI lumbar spine 2/10/2020  1.  Severe multilevel degenerative disc disease and facet degeneration. There is multilevel central canal and neural foraminal narrowing as well as attenuation of the lateral recesses bilaterally as specifically described above. Central canal narrowing   is most severe at L3-4 and L4-5.     2.  Severe convex left scoliotic curvature.     3.  Multilevel degenerative subluxation.           Results for orders placed in visit on 08/25/17   MR-BRAIN-W/O                                                                                                                                                                                                                                                Results for orders placed in visit on 11/11/15   DX-LUMBAR SPINE-2 OR 3 VIEWS    Impression 1.  Lumbar scoliosis convex left. L2-L3 retrolisthesis.                                    Results for orders placed in visit on 06/10/16   DX-WRIST-COMPLETE 3+ RIGHT    Impression No acute fracture identified. Scapholunate widening consistent with ligamentous injury.           DIAGNOSIS   Visit Diagnoses     ICD-10-CM   1. Lumbar spondylosis  M47.816   2. History of bilateral knee replacement  Z96.653         ASSESSMENT and PLAN:     Jeremiah Huber 69 y.o. male      Jeremiah was seen today for follow-up.    Diagnoses and all orders for this visit:    Lumbar spondylosis  -     REFERRAL TO PHYSICAL MEDICINE REHAB    History of bilateral knee replacement      Status post diagnostic medial branch blocks targeting the right L3-4, L4-5, L5-S1 facet joints with 100% pain relief during the diagnostic phase x 2 and significant functional improvements during the diagnostic phase.    We decided to proceed with a right radiofrequency neurotomy targeting the medial branches innervating the right L3-4, L4-5, L5-S1 facet joints with fluoroscopic guidance and sedation.    We discussed stopping NSAIDs including aspirin 5 days prior to the procedure.  His aspirin use he states was for intermittent pain and not used for antiplatelet purposes.  The patient has a standing prescription for amoxicillin for procedures because of his previous hardware from his bilateral knee replacements.  We discussed proceeding with amoxicillin for the day of the neurotomy as above.    The risks benefits and alternatives to this procedure were discussed and the patient wishes to proceed with the procedure. Risks include but are not limited to damage to surrounding structures, infection, bleeding, worsening of pain which can be permanent, weakness which can be permanent. Benefits include pain relief, improved function. Alternatives includes not doing the  procedure.         Follow up: After the above procedure    Thank you for allowing me to participate in the care of this patient. If you have any questions please not hesitate to contact me.          Please note that this dictation was created using voice recognition software. I have made every reasonable attempt to correct obvious errors but there may be errors of grammar and content that I may have overlooked prior to finalization of this note.      Nazario Sandoval MD  Interventional Spine and Sports Physiatry  Physical Medicine and Rehabilitation  [facility]

## 2020-06-08 NOTE — PATIENT INSTRUCTIONS
Your procedure will be at the [facility] special procedure suite.    [address] [place] [id number]     OK to take antibiotics with amoxacillin for prophylaxis on the day of the procedure.     PRE-PROCEDURE INSTRUCTIONS  You may take your regular medications except:   · No Anti-inflammatories 5 days prior to your procedure. Anti-inflammatories include medicines such as  ibuprofen (Motrin, Advil), Excedrin, Naproxen (Aleve, Anaprox, Naprelan, Naprosyn), Celecoxib (Celebrex), Diclofenac (Voltaren-XR tab), and Meloxicam (Mobic).   · No Glucophage or Metformin 24 hours before your procedure. You may resume next day after your procedure.  · Call the physiatry office if you are taking or prescribed anti-biotics within five days of procedure.  · Please ask provider if you are taking any new diabetes medication.  · CONTINUE TAKING BLOOD PRESSURE MEDICATIONS AS PRESCRIBED.  · Pain medications will not be prescribed on the procedure day. Procedural pain medication may be used by your provider   · Call your doctor's office performing the procedure if you have a fever, chills, rash or new illness prior to your procedure    Anticoagulation/antiplatelet medications  No Blood thinning medications such as Coumadin or Plavix 5 days prior to procedure unless your doctor said to continue these medications. Call your doctor if a new medication is prescribed in this class.     Restrictions for eating before procedure:   · If you are getting procedural sedation, then do not eat to for 8 hours prior to procedure appointment time. Do not drink fluids for four hours prior to your procedure time.   · If you are not having procedural sedation, then Skip the meal prior to your procedure. If you have a morning procedure then skip breakfast. If you have an afternoon procedure then skip lunch.   · You may drink clear liquids up to 2 hours prior to your procedure  · You must have a  the day of procedure to accompany you  home.      POST PROCEDURE INSTRUCTIONS   · No heavy lifting, strenuous bending or strenuous exercise for 3 days after your procedure.  · No hot tubs, baths, swimming for 3 days after your procedure  · You can remove the bandage the day after the procedure.  · IF YOU RECEIVED A RADIOFREQUENCY PROCEDURE, THERE MAY BE SOME SORENESS AFTER THE PROCEDURE FOR A FEW WEEKS.  THIS IS NORMAL.  · IF YOU EXPERIENCE PROLONGED WEAKNESS LONGER THAN ONE DAY, BOWEL OR BLADDER INCONTINENCE THEN PLEASE CALL THE PHYSIATRY OFFICE.  · Your leg may feel heavy, weak and numb for up to 1-2 days. Be very careful walking.   ·  You may resume normal activities 3 days after procedure.

## 2020-06-12 ENCOUNTER — APPOINTMENT (OUTPATIENT)
Dept: RADIOLOGY | Facility: MEDICAL CENTER | Age: 70
DRG: 064 | End: 2020-06-12
Attending: EMERGENCY MEDICINE
Payer: MEDICARE

## 2020-06-12 ENCOUNTER — HOSPITAL ENCOUNTER (INPATIENT)
Facility: MEDICAL CENTER | Age: 70
LOS: 8 days | DRG: 064 | End: 2020-06-20
Attending: EMERGENCY MEDICINE | Admitting: HOSPITALIST
Payer: MEDICARE

## 2020-06-12 DIAGNOSIS — H53.40 VISUAL FIELD CUT: ICD-10-CM

## 2020-06-12 DIAGNOSIS — F41.9 ANXIETY: ICD-10-CM

## 2020-06-12 DIAGNOSIS — I63.9 ACUTE CVA (CEREBROVASCULAR ACCIDENT) (HCC): ICD-10-CM

## 2020-06-12 PROBLEM — R79.89 ELEVATED TROPONIN: Status: ACTIVE | Noted: 2020-06-12

## 2020-06-12 LAB
ABO + RH BLD: NORMAL
ABO GROUP BLD: NORMAL
ALBUMIN SERPL BCP-MCNC: 4.4 G/DL (ref 3.2–4.9)
ALBUMIN/GLOB SERPL: 1.6 G/DL
ALP SERPL-CCNC: 60 U/L (ref 30–99)
ALT SERPL-CCNC: 19 U/L (ref 2–50)
ANION GAP SERPL CALC-SCNC: 13 MMOL/L (ref 7–16)
APTT PPP: 25.9 SEC (ref 24.7–36)
AST SERPL-CCNC: 21 U/L (ref 12–45)
BASOPHILS # BLD AUTO: 0.4 % (ref 0–1.8)
BASOPHILS # BLD: 0.03 K/UL (ref 0–0.12)
BILIRUB SERPL-MCNC: 0.3 MG/DL (ref 0.1–1.5)
BLD GP AB SCN SERPL QL: NORMAL
BUN SERPL-MCNC: 14 MG/DL (ref 8–22)
CALCIUM SERPL-MCNC: 9.3 MG/DL (ref 8.5–10.5)
CHLORIDE SERPL-SCNC: 104 MMOL/L (ref 96–112)
CO2 SERPL-SCNC: 24 MMOL/L (ref 20–33)
CREAT SERPL-MCNC: 1.05 MG/DL (ref 0.5–1.4)
EKG IMPRESSION: NORMAL
EOSINOPHIL # BLD AUTO: 0.08 K/UL (ref 0–0.51)
EOSINOPHIL NFR BLD: 1.2 % (ref 0–6.9)
ERYTHROCYTE [DISTWIDTH] IN BLOOD BY AUTOMATED COUNT: 43.7 FL (ref 35.9–50)
GLOBULIN SER CALC-MCNC: 2.7 G/DL (ref 1.9–3.5)
GLUCOSE BLD-MCNC: 114 MG/DL (ref 65–99)
GLUCOSE SERPL-MCNC: 103 MG/DL (ref 65–99)
HCT VFR BLD AUTO: 46.2 % (ref 42–52)
HGB BLD-MCNC: 15.5 G/DL (ref 14–18)
IMM GRANULOCYTES # BLD AUTO: 0.02 K/UL (ref 0–0.11)
IMM GRANULOCYTES NFR BLD AUTO: 0.3 % (ref 0–0.9)
INR PPP: 0.96 (ref 0.87–1.13)
LYMPHOCYTES # BLD AUTO: 0.81 K/UL (ref 1–4.8)
LYMPHOCYTES NFR BLD: 12 % (ref 22–41)
MCH RBC QN AUTO: 30.9 PG (ref 27–33)
MCHC RBC AUTO-ENTMCNC: 33.5 G/DL (ref 33.7–35.3)
MCV RBC AUTO: 92 FL (ref 81.4–97.8)
MONOCYTES # BLD AUTO: 0.48 K/UL (ref 0–0.85)
MONOCYTES NFR BLD AUTO: 7.1 % (ref 0–13.4)
NEUTROPHILS # BLD AUTO: 5.33 K/UL (ref 1.82–7.42)
NEUTROPHILS NFR BLD: 79 % (ref 44–72)
NRBC # BLD AUTO: 0 K/UL
NRBC BLD-RTO: 0 /100 WBC
PLATELET # BLD AUTO: 262 K/UL (ref 164–446)
PMV BLD AUTO: 9.5 FL (ref 9–12.9)
POTASSIUM SERPL-SCNC: 4.2 MMOL/L (ref 3.6–5.5)
PROT SERPL-MCNC: 7.1 G/DL (ref 6–8.2)
PROTHROMBIN TIME: 13 SEC (ref 12–14.6)
RBC # BLD AUTO: 5.02 M/UL (ref 4.7–6.1)
RH BLD: NORMAL
SODIUM SERPL-SCNC: 141 MMOL/L (ref 135–145)
TROPONIN T SERPL-MCNC: 608 NG/L (ref 6–19)
TROPONIN T SERPL-MCNC: 675 NG/L (ref 6–19)
WBC # BLD AUTO: 6.8 K/UL (ref 4.8–10.8)

## 2020-06-12 PROCEDURE — 93005 ELECTROCARDIOGRAM TRACING: CPT | Performed by: EMERGENCY MEDICINE

## 2020-06-12 PROCEDURE — 85610 PROTHROMBIN TIME: CPT

## 2020-06-12 PROCEDURE — 70553 MRI BRAIN STEM W/O & W/DYE: CPT

## 2020-06-12 PROCEDURE — 84484 ASSAY OF TROPONIN QUANT: CPT | Mod: 91

## 2020-06-12 PROCEDURE — 80053 COMPREHEN METABOLIC PANEL: CPT

## 2020-06-12 PROCEDURE — 70498 CT ANGIOGRAPHY NECK: CPT

## 2020-06-12 PROCEDURE — 700117 HCHG RX CONTRAST REV CODE 255: Performed by: EMERGENCY MEDICINE

## 2020-06-12 PROCEDURE — 82962 GLUCOSE BLOOD TEST: CPT

## 2020-06-12 PROCEDURE — 36415 COLL VENOUS BLD VENIPUNCTURE: CPT

## 2020-06-12 PROCEDURE — 99223 1ST HOSP IP/OBS HIGH 75: CPT | Mod: AI | Performed by: HOSPITALIST

## 2020-06-12 PROCEDURE — 99285 EMERGENCY DEPT VISIT HI MDM: CPT

## 2020-06-12 PROCEDURE — 93005 ELECTROCARDIOGRAM TRACING: CPT | Performed by: HOSPITALIST

## 2020-06-12 PROCEDURE — 86900 BLOOD TYPING SEROLOGIC ABO: CPT

## 2020-06-12 PROCEDURE — 86850 RBC ANTIBODY SCREEN: CPT

## 2020-06-12 PROCEDURE — 86901 BLOOD TYPING SEROLOGIC RH(D): CPT

## 2020-06-12 PROCEDURE — 96374 THER/PROPH/DIAG INJ IV PUSH: CPT

## 2020-06-12 PROCEDURE — 70450 CT HEAD/BRAIN W/O DYE: CPT

## 2020-06-12 PROCEDURE — 70496 CT ANGIOGRAPHY HEAD: CPT

## 2020-06-12 PROCEDURE — 85025 COMPLETE CBC W/AUTO DIFF WBC: CPT

## 2020-06-12 PROCEDURE — 71045 X-RAY EXAM CHEST 1 VIEW: CPT

## 2020-06-12 PROCEDURE — 85730 THROMBOPLASTIN TIME PARTIAL: CPT

## 2020-06-12 PROCEDURE — 770006 HCHG ROOM/CARE - MED/SURG/GYN SEMI*

## 2020-06-12 PROCEDURE — A9576 INJ PROHANCE MULTIPACK: HCPCS | Performed by: EMERGENCY MEDICINE

## 2020-06-12 PROCEDURE — 0042T CT-CEREBRAL PERFUSION ANALYSIS: CPT

## 2020-06-12 RX ORDER — ASPIRIN 325 MG
325 TABLET ORAL 2 TIMES DAILY
Status: DISCONTINUED | OUTPATIENT
Start: 2020-06-13 | End: 2020-06-13

## 2020-06-12 RX ORDER — AMANTADINE HYDROCHLORIDE 100 MG/1
100 CAPSULE, GELATIN COATED ORAL DAILY
Status: DISCONTINUED | OUTPATIENT
Start: 2020-06-13 | End: 2020-06-20 | Stop reason: HOSPADM

## 2020-06-12 RX ORDER — CLOPIDOGREL BISULFATE 75 MG/1
75 TABLET ORAL DAILY
Status: DISCONTINUED | OUTPATIENT
Start: 2020-06-12 | End: 2020-06-12

## 2020-06-12 RX ORDER — ACETAMINOPHEN 500 MG
1000 TABLET ORAL 3 TIMES DAILY PRN
Status: DISCONTINUED | OUTPATIENT
Start: 2020-06-12 | End: 2020-06-20 | Stop reason: HOSPADM

## 2020-06-12 RX ORDER — AMOXICILLIN 250 MG
2 CAPSULE ORAL 2 TIMES DAILY
Status: DISCONTINUED | OUTPATIENT
Start: 2020-06-12 | End: 2020-06-13

## 2020-06-12 RX ORDER — ATORVASTATIN CALCIUM 80 MG/1
80 TABLET, FILM COATED ORAL EVERY EVENING
Status: DISCONTINUED | OUTPATIENT
Start: 2020-06-12 | End: 2020-06-20 | Stop reason: HOSPADM

## 2020-06-12 RX ORDER — ASPIRIN 325 MG
325 TABLET ORAL 2 TIMES DAILY
Status: DISCONTINUED | OUTPATIENT
Start: 2020-06-12 | End: 2020-06-12

## 2020-06-12 RX ORDER — POLYETHYLENE GLYCOL 3350 17 G/17G
1 POWDER, FOR SOLUTION ORAL
Status: DISCONTINUED | OUTPATIENT
Start: 2020-06-12 | End: 2020-06-13

## 2020-06-12 RX ORDER — BISACODYL 10 MG
10 SUPPOSITORY, RECTAL RECTAL
Status: DISCONTINUED | OUTPATIENT
Start: 2020-06-12 | End: 2020-06-13

## 2020-06-12 RX ADMIN — GADOTERIDOL 20 ML: 279.3 INJECTION, SOLUTION INTRAVENOUS at 16:55

## 2020-06-12 RX ADMIN — IOHEXOL 40 ML: 350 INJECTION, SOLUTION INTRAVENOUS at 08:15

## 2020-06-12 RX ADMIN — IOHEXOL 100 ML: 350 INJECTION, SOLUTION INTRAVENOUS at 14:05

## 2020-06-12 SDOH — ECONOMIC STABILITY: TRANSPORTATION INSECURITY
IN THE PAST 12 MONTHS, HAS LACK OF TRANSPORTATION KEPT YOU FROM MEETINGS, WORK, OR FROM GETTING THINGS NEEDED FOR DAILY LIVING?: NO

## 2020-06-12 SDOH — ECONOMIC STABILITY: FOOD INSECURITY: WITHIN THE PAST 12 MONTHS, THE FOOD YOU BOUGHT JUST DIDN'T LAST AND YOU DIDN'T HAVE MONEY TO GET MORE.: NEVER TRUE

## 2020-06-12 SDOH — HEALTH STABILITY: MENTAL HEALTH: HOW OFTEN DO YOU HAVE A DRINK CONTAINING ALCOHOL?: 4 OR MORE TIMES A WEEK

## 2020-06-12 SDOH — HEALTH STABILITY: MENTAL HEALTH: HOW MANY STANDARD DRINKS CONTAINING ALCOHOL DO YOU HAVE ON A TYPICAL DAY?: 1 OR 2

## 2020-06-12 SDOH — ECONOMIC STABILITY: TRANSPORTATION INSECURITY
IN THE PAST 12 MONTHS, HAS THE LACK OF TRANSPORTATION KEPT YOU FROM MEDICAL APPOINTMENTS OR FROM GETTING MEDICATIONS?: NO

## 2020-06-12 SDOH — HEALTH STABILITY: MENTAL HEALTH: HOW OFTEN DO YOU HAVE 6 OR MORE DRINKS ON ONE OCCASION?: LESS THAN MONTHLY

## 2020-06-12 SDOH — ECONOMIC STABILITY: FOOD INSECURITY: WITHIN THE PAST 12 MONTHS, YOU WORRIED THAT YOUR FOOD WOULD RUN OUT BEFORE YOU GOT MONEY TO BUY MORE.: NEVER TRUE

## 2020-06-12 ASSESSMENT — COGNITIVE AND FUNCTIONAL STATUS - GENERAL
SUGGESTED CMS G CODE MODIFIER MOBILITY: CH
DAILY ACTIVITIY SCORE: 24
SUGGESTED CMS G CODE MODIFIER DAILY ACTIVITY: CH
MOBILITY SCORE: 24

## 2020-06-12 ASSESSMENT — PATIENT HEALTH QUESTIONNAIRE - PHQ9
1. LITTLE INTEREST OR PLEASURE IN DOING THINGS: NOT AT ALL
2. FEELING DOWN, DEPRESSED, IRRITABLE, OR HOPELESS: NOT AT ALL
SUM OF ALL RESPONSES TO PHQ9 QUESTIONS 1 AND 2: 0

## 2020-06-12 ASSESSMENT — ENCOUNTER SYMPTOMS
SORE THROAT: 0
MYALGIAS: 0
COUGH: 0
STRIDOR: 0
SHORTNESS OF BREATH: 0
SEIZURES: 0
HEMOPTYSIS: 0
FOCAL WEAKNESS: 0
BLURRED VISION: 1
SPUTUM PRODUCTION: 0
SPEECH CHANGE: 0
VOMITING: 0
ABDOMINAL PAIN: 0
EYE PAIN: 0
HALLUCINATIONS: 0
FEVER: 0
CHILLS: 0
BRUISES/BLEEDS EASILY: 0
BLOOD IN STOOL: 0
NERVOUS/ANXIOUS: 0
FLANK PAIN: 0
EYE REDNESS: 0
DIARRHEA: 0
EYE DISCHARGE: 0
LOSS OF CONSCIOUSNESS: 0
PALPITATIONS: 0

## 2020-06-12 ASSESSMENT — LIFESTYLE VARIABLES
ALCOHOL_USE: YES
HOW MANY TIMES IN THE PAST YEAR HAVE YOU HAD 5 OR MORE DRINKS IN A DAY: 2
ON A TYPICAL DAY WHEN YOU DRINK ALCOHOL HOW MANY DRINKS DO YOU HAVE: 2
HAVE YOU EVER FELT YOU SHOULD CUT DOWN ON YOUR DRINKING: NO
EVER_SMOKED: YES
TOTAL SCORE: 0
CONSUMPTION TOTAL: POSITIVE
EVER HAD A DRINK FIRST THING IN THE MORNING TO STEADY YOUR NERVES TO GET RID OF A HANGOVER: NO
AVERAGE NUMBER OF DAYS PER WEEK YOU HAVE A DRINK CONTAINING ALCOHOL: 7
EVER FELT BAD OR GUILTY ABOUT YOUR DRINKING: NO
HAVE PEOPLE ANNOYED YOU BY CRITICIZING YOUR DRINKING: NO
DOES PATIENT WANT TO STOP DRINKING: NO

## 2020-06-12 ASSESSMENT — COPD QUESTIONNAIRES
DURING THE PAST 4 WEEKS HOW MUCH DID YOU FEEL SHORT OF BREATH: NONE/LITTLE OF THE TIME
IN THE PAST 12 MONTHS DO YOU DO LESS THAN YOU USED TO BECAUSE OF YOUR BREATHING PROBLEMS: DISAGREE/UNSURE
HAVE YOU SMOKED AT LEAST 100 CIGARETTES IN YOUR ENTIRE LIFE: YES
DO YOU EVER COUGH UP ANY MUCUS OR PHLEGM?: NO/ONLY WITH OCCASIONAL COLDS OR INFECTIONS
COPD SCREENING SCORE: 4

## 2020-06-12 ASSESSMENT — FIBROSIS 4 INDEX
FIB4 SCORE: 1.23
FIB4 SCORE: 1.23

## 2020-06-12 NOTE — ED NOTES
Lab called with critical result of trop 608. Critical lab result read back.   Dr. Heredia notified of critical lab result.  Critical lab result read back by Dr. Heredia.

## 2020-06-12 NOTE — PROGRESS NOTES
68yo male referred to ED by ophtalmologist for painless  rt vision loss started  last night at 9pm   NIH 1 CT concerning for  acute subacute left occipital stroke  Dr Torres from neurology consulted    Dr Alexsander addisonlate patient for admission.

## 2020-06-12 NOTE — ED PROVIDER NOTES
ED Provider Note    ER PROVIDER NOTE      CHIEF COMPLAINT  Chief Complaint   Patient presents with   • Loss of Vision     sent from opthamologist; started at 2100       HPI  Jeremiah Huber is a 69 y.o. male who presents to the emergency department complaining of vision loss.  Patient reports that at 9:00 last night, all of a sudden he could not see out of his right eye, states his left eye feels somewhat funny as well although vision seems normal.  He denies any other focal weakness numbness or tingling, no headaches, no neck pain, no chest pain, no palpitations.  He denies any recent illness, fevers chills, cough or congestion.  No eye pain.    Was seen by his ophthalmologist and sent here    History of Parkinson's    REVIEW OF SYSTEMS  Pertinent positives include vision loss. Pertinent negatives include no headache. See HPI for details. All other systems reviewed and are negative.    PAST MEDICAL HISTORY   has a past medical history of Back spasm, GOUT, Gout, Hypertension, and Parkinson disease (HCC).    SURGICAL HISTORY   has a past surgical history that includes shoulder surgery; tonsillectomy; and knee arthroscopy (Right).    FAMILY HISTORY  Family History   Problem Relation Age of Onset   • Heart Disease Mother    • Heart Disease Father    • Cancer Father         prostate cancer   • Arthritis Brother        SOCIAL HISTORY  Social History     Socioeconomic History   • Marital status:      Spouse name: Not on file   • Number of children: Not on file   • Years of education: Not on file   • Highest education level: Not on file   Occupational History   • Not on file   Social Needs   • Financial resource strain: Not on file   • Food insecurity     Worry: Not on file     Inability: Not on file   • Transportation needs     Medical: Not on file     Non-medical: Not on file   Tobacco Use   • Smoking status: Never Smoker   • Smokeless tobacco: Never Used   Substance and Sexual Activity   • Alcohol use: Yes      "Alcohol/week: 4.2 oz     Types: 4 Glasses of wine, 3 Standard drinks or equivalent per week     Comment: daily   • Drug use: No   • Sexual activity: Yes     Partners: Female   Lifestyle   • Physical activity     Days per week: Not on file     Minutes per session: Not on file   • Stress: Not on file   Relationships   • Social connections     Talks on phone: Not on file     Gets together: Not on file     Attends [employer] service: Not on file     Active member of club or organization: Not on file     Attends meetings of clubs or organizations: Not on file     Relationship status: Not on file   • Intimate partner violence     Fear of current or ex partner: Not on file     Emotionally abused: Not on file     Physically abused: Not on file     Forced sexual activity: Not on file   Other Topics Concern   •  Service No   • Blood Transfusions No   • Caffeine Concern No   • Occupational Exposure No   • Hobby Hazards No   • Sleep Concern No   • Stress Concern No   • Weight Concern No   • Special Diet No   • Back Care Yes     Comment: Streching   • Exercise No   • Bike Helmet Yes   • Seat Belt Yes   • Self-Exams Yes   Social History Narrative   • Not on file      Social History     Substance and Sexual Activity   Drug Use No       CURRENT MEDICATIONS  Home Medications     Reviewed by Carlos Gomez (Pharmacy Tech) on 06/12/20 at 1440  Med List Status: Complete   Medication Last Dose Status   acetaminophen (TYLENOL) 500 MG Tab 6/12/2020 Active   amantadine (SYMMETREL) 100 MG Tab 6/12/2020 Active   aspirin (ASA) 325 MG Tab 6/11/2020 Active   carbidopa-levodopa (SINEMET)  MG Tab 6/12/2020 Active   fluticasone (CUTIVATE) 0.05 % cream FEW DAYS AGO Active                ALLERGIES  Allergies   Allergen Reactions   • Nkda [No Known Drug Allergy]        PHYSICAL EXAM  VITAL SIGNS: /84   Pulse 76   Temp 36.8 °C (98.2 °F) (Temporal)   Resp 20   Ht 1.88 m (6' 2\")   Wt 91 kg (200 lb 9.9 oz)   SpO2 96%   BMI " 25.76 kg/m²   Pulse ox interpretation: I interpret this pulse ox as normal.    Constitutional: Alert in no apparent distress.  HENT: No signs of trauma, Bilateral external ears normal, Nose normal.   Eyes: Pupils are equal and reactive, Conjunctiva normal, Non-icteric.   Neck: Normal range of motion, No tenderness, Supple, No stridor.   Lymphatic: No lymphadenopathy noted.   Cardiovascular: Regular rate and rhythm, no murmurs.   Thorax & Lungs: Normal breath sounds, No respiratory distress, No wheezing, No chest tenderness.   Abdomen: Bowel sounds normal, Soft, No tenderness, No masses, No pulsatile masses. No peritoneal signs.  Skin: Warm, Dry, No erythema, No rash.   Back: No bony tenderness, No CVA tenderness.   Extremities: Intact distal pulses, No edema, No tenderness, No cyanosis, Negative Huan's sign.  Musculoskeletal: Good range of motion in all major joints. No tenderness to palpation or major deformities noted.   Neurologic: Alert, cranial nerves intact, no nystagmus, patient appears to have a inferior temporal field cut deficit on the right seen quadrant on the left speech is appropriate or not slurred, upper extremities bilaterally exhibit no drift, no dysmetria, 5 out of 5 strength with bilateral bicep/tricep/, sensation intact to light touch throughout upper extremities. Lower extremities strength 5 out of 5 thigh extension/flexion/abduction/adduction, knee extension/flexion, dorsiflexion plantar flexion. No clonus.  2+ patella reflexes.  sensation intact to light touch.  No focal deficits noted. Ambulates with steady gait  Psychiatric: Affect normal, Judgment normal, Mood normal.       NIH 1    DIAGNOSTIC STUDIES / PROCEDURES    EKG  Results for orders placed or performed during the hospital encounter of 06/12/20   CBC WITH DIFFERENTIAL   Result Value Ref Range    WBC 6.8 4.8 - 10.8 K/uL    RBC 5.02 4.70 - 6.10 M/uL    Hemoglobin 15.5 14.0 - 18.0 g/dL    Hematocrit 46.2 42.0 - 52.0 %    MCV 92.0  81.4 - 97.8 fL    MCH 30.9 27.0 - 33.0 pg    MCHC 33.5 (L) 33.7 - 35.3 g/dL    RDW 43.7 35.9 - 50.0 fL    Platelet Count 262 164 - 446 K/uL    MPV 9.5 9.0 - 12.9 fL    Neutrophils-Polys 79.00 (H) 44.00 - 72.00 %    Lymphocytes 12.00 (L) 22.00 - 41.00 %    Monocytes 7.10 0.00 - 13.40 %    Eosinophils 1.20 0.00 - 6.90 %    Basophils 0.40 0.00 - 1.80 %    Immature Granulocytes 0.30 0.00 - 0.90 %    Nucleated RBC 0.00 /100 WBC    Neutrophils (Absolute) 5.33 1.82 - 7.42 K/uL    Lymphs (Absolute) 0.81 (L) 1.00 - 4.80 K/uL    Monos (Absolute) 0.48 0.00 - 0.85 K/uL    Eos (Absolute) 0.08 0.00 - 0.51 K/uL    Baso (Absolute) 0.03 0.00 - 0.12 K/uL    Immature Granulocytes (abs) 0.02 0.00 - 0.11 K/uL    NRBC (Absolute) 0.00 K/uL   COMP METABOLIC PANEL   Result Value Ref Range    Sodium 141 135 - 145 mmol/L    Potassium 4.2 3.6 - 5.5 mmol/L    Chloride 104 96 - 112 mmol/L    Co2 24 20 - 33 mmol/L    Anion Gap 13.0 7.0 - 16.0    Glucose 103 (H) 65 - 99 mg/dL    Bun 14 8 - 22 mg/dL    Creatinine 1.05 0.50 - 1.40 mg/dL    Calcium 9.3 8.5 - 10.5 mg/dL    AST(SGOT) 21 12 - 45 U/L    ALT(SGPT) 19 2 - 50 U/L    Alkaline Phosphatase 60 30 - 99 U/L    Total Bilirubin 0.3 0.1 - 1.5 mg/dL    Albumin 4.4 3.2 - 4.9 g/dL    Total Protein 7.1 6.0 - 8.2 g/dL    Globulin 2.7 1.9 - 3.5 g/dL    A-G Ratio 1.6 g/dL   PROTHROMBIN TIME   Result Value Ref Range    PT 13.0 12.0 - 14.6 sec    INR 0.96 0.87 - 1.13   APTT   Result Value Ref Range    APTT 25.9 24.7 - 36.0 sec   COD (ADULT)   Result Value Ref Range    ABO Grouping Only B     Rh Grouping Only POS     Antibody Screen-Cod NEG    TROPONIN   Result Value Ref Range    Troponin T 608 (H) 6 - 19 ng/L   ESTIMATED GFR   Result Value Ref Range    GFR If African American >60 >60 mL/min/1.73 m 2    GFR If Non African American >60 >60 mL/min/1.73 m 2   ACCU-CHEK GLUCOSE   Result Value Ref Range    Glucose - Accu-Ck 114 (H) 65 - 99 mg/dL   EKG (NOW)   Result Value Ref Range    Report       [facility]  [facility] Emergency Dept.    Test Date:  2020  Pt Name:    GRIFFIN NEGRON                 Department: ER  MRN:        [id number]                      Room:       RD 06  Gender:     Male                         Technician: [id number]  :        1950                   Requested By:TRENTON NAVARRO  Order #:    [id number]                    Reading MD: TRENTON NAVARRO MD    Measurements  Intervals                                Axis  Rate:       78                           P:          36  AK:         200                          QRS:        -8  QRSD:       98                           T:          76  QT:         392  QTc:        447    Interpretive Statements  SINUS RHYTHM    No previous ECG available for comparison  Electronically Signed On 2020 15:13:56 PDT by TRENTON NAVARRO MD           RADIOLOGY  DX-CHEST-PORTABLE (1 VIEW)   Final Result      1.  There is no acute cardiopulmonary process.      CT-CEREBRAL PERFUSION ANALYSIS   Final Result      1.  Cerebral blood flow less than 30% likely representing completed infarct = 0 mL.      2.  T Max more than 6 seconds likely representing combination of completed infarct and ischemia = 4 mL.      3.  Mismatched volume likely representing ischemic brain/penumbra = 4 mL      4.  Please note that the cerebral perfusion was performed on the limited brain tissue around the basal ganglia region. Infarct/ischemia outside the CT perfusion sections can be missed in this study.      CT-CTA NECK WITH & W/O-POST PROCESSING   Final Result      1.  There is no evidence of vascular occlusion or clinically significant stenosis.      CT-CTA HEAD WITH & W/O-POST PROCESS   Final Result      CT angiogram of the [place] within normal limits.      Small hypodensity in medial left occipital lobe again noted suspicious for small acute/subacute infarct. No acute intracranial hemorrhage.      CT-HEAD W/O   Final Result      1.  There is no acute intracranial hemorrhage.   2.   There is a small area of hypodensity in the left medial occipital lobe which could represent an area of evolving ischemia.   3.  There is mild diffuse atrophy.      MR-BRAIN-WITH & W/O    (Results Pending)     The radiologist's interpretation of all radiological studies have been reviewed by me.    COURSE & MEDICAL DECISION MAKING  Nursing notes, VS, PMSFHx reviewed in chart.    1:41 PM Patient seen and examined at as per stroke protocol.      2:20 PM  Patient reevaluated on return from CT.  No change in symptoms    Discussed the case with the hospitalist, as well as neurology for hospitalization    2:54 PM  Patient reevaluated, his troponin is returned is quite elevated.  No chest pain.  No change in his symptoms.  Cardiology paged    3:00 PM  Discussed case with Dr. Maddox.  Recommends echocardiogram, stress test before leaving the hospital, no other intervention at this point      The total critical care time spent on this patient was 40 minutes, resuscitating patient, speaking with admitting physician, and interpreting test results. This 40 minutes is exclusive of separately billable procedures.    This patient was cared for during the COVID-19 pandemic.  History and physical exam may be limited/truncated by the inherent challenges of PPE and the need to decrease staff exposure to novel coronavirus.  Some aspects of disease management may be different to protect staff and help slow the spread of disease. I verified that, if possible, the patient was wearing a mask and I was wearing appropriate PPE every time I encountered the patient.     Current [facility] COVID19 protocol followed          Decision Making:  This is a 69 y.o. male presenting with vision loss.  This appears to be a visual field cut bilaterally concerning for CVA.  His NIH would be 1, he is not a candidate for alteplase due to the duration of his symptoms well outside the time window.  CT shows no intracranial bleed or large mass, potentially a  small area of ischemia though.  There is no evidence of large occlusion on his CTA.  Patient will be admitted for further care and evaluation, neurology consulted as well.  His troponin was elevated over 600, although he has no chest pain or ischemic findings on his ECG, cardiology consult as above, will proceed with echo, likely stress before he leaves the hospital.     Patient is admitted in guarded condition    FINAL IMPRESSION  1. Visual field cut    Elevated troponin  Concern for CVA     The note accurately reflects work and decisions made by me.  Jerry Heredia M.D.  6/12/2020  3:13 PM

## 2020-06-12 NOTE — H&P
Hospital Medicine History & Physical Note    Date of Service  6/12/2020    Primary Care Physician  An Perez M.D.    Code Status  Full Code     Chief Complaint  Chief Complaint   Patient presents with   • Loss of Vision     sent from opthamologist; started at 2100       History of Presenting Illness  69 y.o. male with a past medical history of gout, Parkinson's disease on Sinemet, chronic back pain and dyslipidemia who presented 6/12/2020 with sudden onset of blurry vision started 9 PM on 6/11.  Patient denies having focal weakness or other sensory loss associated with loss of vision he denies having eye pain tearing or fevers or chills.  He has been evaluated by an ophthalmologist who recommended evaluation in the hospital CT scan of the head is concerning for acute/subacute left occipital stroke.  Neurology have been consulted.  Patient was also found to have elevated troponin however he denies having chest pain and shortness of breath.    Review of Systems  Review of Systems   Constitutional: Negative for chills, fever and malaise/fatigue.   HENT: Negative for congestion and sore throat.    Eyes: Positive for blurred vision (both, more on left ). Negative for pain, discharge and redness.        Loss of vision    Respiratory: Negative for cough, hemoptysis, sputum production, shortness of breath and stridor.    Cardiovascular: Negative for chest pain, palpitations and leg swelling.   Gastrointestinal: Negative for abdominal pain, blood in stool, diarrhea and vomiting.   Genitourinary: Negative for flank pain, hematuria and urgency.   Musculoskeletal: Negative for myalgias.   Skin: Negative.    Neurological: Negative for speech change, focal weakness, seizures and loss of consciousness.   Endo/Heme/Allergies: Does not bruise/bleed easily.   Psychiatric/Behavioral: Negative for hallucinations and suicidal ideas. The patient is not nervous/anxious.        Past Medical History   has a past medical history of  Back spasm, GOUT, Gout, Hypertension, and Parkinson disease (HCC).    Surgical History   has a past surgical history that includes shoulder surgery; tonsillectomy; knee arthroscopy (Right); and knee replacement, total (Bilateral).     Family History  family history includes Arthritis in his brother; Cancer in his father; Heart Disease in his father and mother.     Social History   reports that he quit smoking about 50 years ago. His smoking use included cigarettes. He has never used smokeless tobacco. He reports current alcohol use of about 4.2 oz of alcohol per week. He reports that he does not use drugs.    Allergies  Allergies   Allergen Reactions   • Nkda [No Known Drug Allergy]        Medications  Prior to Admission Medications   Prescriptions Last Dose Informant Patient Reported? Taking?   acetaminophen (TYLENOL) 500 MG Tab 6/12/2020 at AM Significant Other No No   Sig: Take 2 Tabs by mouth 3 times a day as needed (pain.  Do not exceed 3000 mg of total acetaminophen per day).   amantadine (SYMMETREL) 100 MG Tab 6/12/2020 at AM Significant Other Yes No   Sig: Take 100 mg by mouth every day.   aspirin (ASA) 325 MG Tab 6/11/2020 at PM Significant Other Yes No   Sig: Take 325 mg by mouth 2 Times a Day. Indications: Pain   carbidopa-levodopa (SINEMET)  MG Tab 6/12/2020 at AM Significant Other Yes Yes   Sig: Take 1 Tab by mouth 3 times a day.   fluticasone (CUTIVATE) 0.05 % cream FEW DAYS AGO at PRN Significant Other Yes No   Sig: Apply 1 Application to affected area(s) 2 times a day as needed.      Facility-Administered Medications: None     Physical Exam  Temp:  [36.2 °C (97.1 °F)-36.8 °C (98.2 °F)] 36.2 °C (97.1 °F)  Pulse:  [74-84] 77  Resp:  [16-20] 16  BP: (117-132)/(74-85) 119/82  SpO2:  [96 %-99 %] 96 %    Physical Exam  Constitutional:       General: He is not in acute distress.     Appearance: He is not ill-appearing or diaphoretic.   HENT:      Head: Atraumatic.      Right Ear: External ear normal.       Left Ear: External ear normal.      Nose: No congestion or rhinorrhea.      Mouth/Throat:      Mouth: Mucous membranes are moist.   Eyes:      General: No scleral icterus.        Right eye: No discharge.         Left eye: No discharge.      Pupils: Pupils are equal, round, and reactive to light.   Neck:      Musculoskeletal: Neck supple. No neck rigidity or muscular tenderness.   Cardiovascular:      Rate and Rhythm: Normal rate and regular rhythm.      Heart sounds: No friction rub. No gallop.    Pulmonary:      Effort: No respiratory distress.      Breath sounds: No stridor. No wheezing.   Abdominal:      Palpations: Abdomen is soft.      Tenderness: There is no abdominal tenderness. There is no guarding or rebound.   Musculoskeletal:      Right lower leg: No edema.      Left lower leg: No edema.   Skin:     Coloration: Skin is not jaundiced or pale.   Neurological:      Mental Status: He is alert and oriented to person, place, and time.      Cranial Nerves: Cranial nerve deficit (Blurry vision, worse on the right side compared to left) present.      Coordination: Coordination abnormal.      Comments: Cogwheel rigidity more on the right upper extremity   Psychiatric:         Mood and Affect: Mood normal.         Behavior: Behavior normal.       Laboratory:  Recent Labs     06/12/20  1339   WBC 6.8   RBC 5.02   HEMOGLOBIN 15.5   HEMATOCRIT 46.2   MCV 92.0   MCH 30.9   MCHC 33.5*   RDW 43.7   PLATELETCT 262   MPV 9.5     Recent Labs     06/12/20  1339   SODIUM 141   POTASSIUM 4.2   CHLORIDE 104   CO2 24   GLUCOSE 103*   BUN 14   CREATININE 1.05   CALCIUM 9.3     Recent Labs     06/12/20  1339   ALTSGPT 19   ASTSGOT 21   ALKPHOSPHAT 60   TBILIRUBIN 0.3   GLUCOSE 103*     Recent Labs     06/12/20  1339   APTT 25.9   INR 0.96     No results for input(s): NTPROBNP in the last 72 hours.      Recent Labs     06/12/20  1339   TROPONINT 608*       Imaging:  MR-BRAIN-WITH & W/O   Final Result            1.  Acute  gyriform area of infarction involving the left medial occipital lobe extending to the cortex.      2.  Age-related cerebral atrophy      3.  Minimal periventricular white matter changes consistent with chronic microvascular ischemic gliosis.      DX-CHEST-PORTABLE (1 VIEW)   Final Result      1.  There is no acute cardiopulmonary process.      CT-CEREBRAL PERFUSION ANALYSIS   Final Result      1.  Cerebral blood flow less than 30% likely representing completed infarct = 0 mL.      2.  T Max more than 6 seconds likely representing combination of completed infarct and ischemia = 4 mL.      3.  Mismatched volume likely representing ischemic brain/penumbra = 4 mL      4.  Please note that the cerebral perfusion was performed on the limited brain tissue around the basal ganglia region. Infarct/ischemia outside the CT perfusion sections can be missed in this study.      CT-CTA NECK WITH & W/O-POST PROCESSING   Final Result      1.  There is no evidence of vascular occlusion or clinically significant stenosis.      CT-CTA HEAD WITH & W/O-POST PROCESS   Final Result      CT angiogram of the [place] within normal limits.      Small hypodensity in medial left occipital lobe again noted suspicious for small acute/subacute infarct. No acute intracranial hemorrhage.      CT-HEAD W/O   Final Result      1.  There is no acute intracranial hemorrhage.   2.  There is a small area of hypodensity in the left medial occipital lobe which could represent an area of evolving ischemia.   3.  There is mild diffuse atrophy.      EC-ECHOCARDIOGRAM COMPLETE W/ CONT    (Results Pending)     I personally reviewed patients EKG shows a sinus rhythm of 78, T wave inversion in lead aVL, V2-4, mild ST elevation, nearly 1 mm in leads V5-V6.  QTC of 447    Assessment/Plan:    * Stroke (HCC)- (present on admission)  Assessment & Plan  Admit to Neuro, with continuous cardiac monitoring  CT concerning for acute stroke in occipital area.  MRI  shows acute gyriform area of infarction involving the left medial occipital lobe extending to the cortex.  Echo with contrast ordered  NPO, till screened by speech  Aspiration, Fall, and seizure precautions    Neuro checks   Started on high intensity statin, patient is already on aspirin that he uses for chronic back pain  Permissive hypertension 24-48 hours-or according to neurology recommendations  Physical, occupational therapy evaluation ordered  Speech therapy evaluation ordered     Elevated troponin  Assessment & Plan  Elevated troponin ~ 600 found on urgent labs done in ED   EKG shows a sinus rhythm of 78, T wave inversion in lead aVL, V2-4, mild ST elevation, nearly 1 mm in leads V5-V6.  QTC of 447  Patient denies having chest pain and shortness of breath.  Follow-up troponin and EKG.  Continuous cardiac monitoring.   Patient started on high intensity statin, he is already on high-dose aspirin   Stat EKG and troponin for chest pain or shortness of breath   Cardiology consult     Parkinson disease (HCC)  Assessment & Plan  Resume home levodopa carbidopa, amantadine     Gout  Assessment & Plan  No acute flare as the time.  Tylenol as needed.    Hypercholesteremia- (present on admission)  Assessment & Plan  Start high intensity statin     Right-sided low back pain without sciatica- (present on admission)  Assessment & Plan  Chronic, multimodal pain control, patient takes Tylenol and aspirin

## 2020-06-12 NOTE — ED TRIAGE NOTES
"Chief Complaint   Patient presents with   • Loss of Vision     sent from opthamologist; started at 2100       Presented to Triage with vision loss right eye that started approximately 2100 last night.  Saw opthamologist today stated \"everything looked good.\"  Patient is on 325 mg ASA daily. Right upper facial droop noted on presentation.  FSBS 114.      Stroke IR activated.   "

## 2020-06-13 ENCOUNTER — APPOINTMENT (OUTPATIENT)
Dept: CARDIOLOGY | Facility: MEDICAL CENTER | Age: 70
DRG: 064 | End: 2020-06-13
Attending: HOSPITALIST
Payer: MEDICARE

## 2020-06-13 DIAGNOSIS — G20.A1 PARKINSON DISEASE (HCC): ICD-10-CM

## 2020-06-13 DIAGNOSIS — I63.50 CEREBROVASCULAR ACCIDENT (CVA) DUE TO OCCLUSION OF CEREBRAL ARTERY (HCC): ICD-10-CM

## 2020-06-13 PROBLEM — I21.4 NSTEMI (NON-ST ELEVATED MYOCARDIAL INFARCTION) (HCC): Status: ACTIVE | Noted: 2020-06-12

## 2020-06-13 PROBLEM — E78.00 HYPERCHOLESTEREMIA: Status: RESOLVED | Noted: 2019-12-31 | Resolved: 2020-06-13

## 2020-06-13 PROBLEM — Z91.199 MEDICAL NON-COMPLIANCE: Status: ACTIVE | Noted: 2020-06-13

## 2020-06-13 LAB
ALBUMIN SERPL BCP-MCNC: 4.1 G/DL (ref 3.2–4.9)
ALBUMIN/GLOB SERPL: 1.5 G/DL
ALP SERPL-CCNC: 58 U/L (ref 30–99)
ALT SERPL-CCNC: 30 U/L (ref 2–50)
ANION GAP SERPL CALC-SCNC: 9 MMOL/L (ref 7–16)
APTT PPP: 30.2 SEC (ref 24.7–36)
APTT PPP: 67.5 SEC (ref 24.7–36)
AST SERPL-CCNC: 19 U/L (ref 12–45)
BASOPHILS # BLD AUTO: 0.8 % (ref 0–1.8)
BASOPHILS # BLD: 0.05 K/UL (ref 0–0.12)
BILIRUB SERPL-MCNC: 0.5 MG/DL (ref 0.1–1.5)
BUN SERPL-MCNC: 11 MG/DL (ref 8–22)
CALCIUM SERPL-MCNC: 9.5 MG/DL (ref 8.5–10.5)
CHLORIDE SERPL-SCNC: 102 MMOL/L (ref 96–112)
CHOLEST SERPL-MCNC: 159 MG/DL (ref 100–199)
CO2 SERPL-SCNC: 26 MMOL/L (ref 20–33)
COVID ORDER STATUS COVID19: NORMAL
CREAT SERPL-MCNC: 1.04 MG/DL (ref 0.5–1.4)
EKG IMPRESSION: NORMAL
EOSINOPHIL # BLD AUTO: 0.11 K/UL (ref 0–0.51)
EOSINOPHIL NFR BLD: 1.7 % (ref 0–6.9)
ERYTHROCYTE [DISTWIDTH] IN BLOOD BY AUTOMATED COUNT: 42.6 FL (ref 35.9–50)
GLOBULIN SER CALC-MCNC: 2.8 G/DL (ref 1.9–3.5)
GLUCOSE SERPL-MCNC: 103 MG/DL (ref 65–99)
HCT VFR BLD AUTO: 44.6 % (ref 42–52)
HDLC SERPL-MCNC: 57 MG/DL
HGB BLD-MCNC: 15.1 G/DL (ref 14–18)
IMM GRANULOCYTES # BLD AUTO: 0.02 K/UL (ref 0–0.11)
IMM GRANULOCYTES NFR BLD AUTO: 0.3 % (ref 0–0.9)
INR PPP: 0.97 (ref 0.87–1.13)
LDLC SERPL CALC-MCNC: 77 MG/DL
LV EJECT FRACT  99904: 55
LV EJECT FRACT MOD 2C 99903: 62.43
LV EJECT FRACT MOD 4C 99902: 62.44
LV EJECT FRACT MOD BP 99901: 63.62
LYMPHOCYTES # BLD AUTO: 1.07 K/UL (ref 1–4.8)
LYMPHOCYTES NFR BLD: 16.2 % (ref 22–41)
MAGNESIUM SERPL-MCNC: 2.3 MG/DL (ref 1.5–2.5)
MCH RBC QN AUTO: 30.8 PG (ref 27–33)
MCHC RBC AUTO-ENTMCNC: 33.9 G/DL (ref 33.7–35.3)
MCV RBC AUTO: 90.8 FL (ref 81.4–97.8)
MONOCYTES # BLD AUTO: 0.52 K/UL (ref 0–0.85)
MONOCYTES NFR BLD AUTO: 7.9 % (ref 0–13.4)
NEUTROPHILS # BLD AUTO: 4.83 K/UL (ref 1.82–7.42)
NEUTROPHILS NFR BLD: 73.1 % (ref 44–72)
NRBC # BLD AUTO: 0 K/UL
NRBC BLD-RTO: 0 /100 WBC
PLATELET # BLD AUTO: 256 K/UL (ref 164–446)
PLATELET # BLD AUTO: 270 K/UL (ref 164–446)
PMV BLD AUTO: 9.5 FL (ref 9–12.9)
POTASSIUM SERPL-SCNC: 4 MMOL/L (ref 3.6–5.5)
PROT SERPL-MCNC: 6.9 G/DL (ref 6–8.2)
PROTHROMBIN TIME: 13.1 SEC (ref 12–14.6)
RBC # BLD AUTO: 4.91 M/UL (ref 4.7–6.1)
SARS-COV-2 RNA RESP QL NAA+PROBE: NOTDETECTED
SODIUM SERPL-SCNC: 137 MMOL/L (ref 135–145)
SPECIMEN SOURCE: NORMAL
TRIGL SERPL-MCNC: 125 MG/DL (ref 0–149)
TROPONIN T SERPL-MCNC: 692 NG/L (ref 6–19)
WBC # BLD AUTO: 6.6 K/UL (ref 4.8–10.8)

## 2020-06-13 PROCEDURE — 80053 COMPREHEN METABOLIC PANEL: CPT

## 2020-06-13 PROCEDURE — 700102 HCHG RX REV CODE 250 W/ 637 OVERRIDE(OP): Performed by: HOSPITALIST

## 2020-06-13 PROCEDURE — 99223 1ST HOSP IP/OBS HIGH 75: CPT | Performed by: PSYCHIATRY & NEUROLOGY

## 2020-06-13 PROCEDURE — 36415 COLL VENOUS BLD VENIPUNCTURE: CPT

## 2020-06-13 PROCEDURE — 84484 ASSAY OF TROPONIN QUANT: CPT

## 2020-06-13 PROCEDURE — 92610 EVALUATE SWALLOWING FUNCTION: CPT

## 2020-06-13 PROCEDURE — 700102 HCHG RX REV CODE 250 W/ 637 OVERRIDE(OP): Performed by: INTERNAL MEDICINE

## 2020-06-13 PROCEDURE — 93010 ELECTROCARDIOGRAM REPORT: CPT | Performed by: INTERNAL MEDICINE

## 2020-06-13 PROCEDURE — 85730 THROMBOPLASTIN TIME PARTIAL: CPT | Mod: 91

## 2020-06-13 PROCEDURE — 85025 COMPLETE CBC W/AUTO DIFF WBC: CPT

## 2020-06-13 PROCEDURE — 93306 TTE W/DOPPLER COMPLETE: CPT | Mod: 26 | Performed by: INTERNAL MEDICINE

## 2020-06-13 PROCEDURE — 770006 HCHG ROOM/CARE - MED/SURG/GYN SEMI*

## 2020-06-13 PROCEDURE — 97161 PT EVAL LOW COMPLEX 20 MIN: CPT

## 2020-06-13 PROCEDURE — 99233 SBSQ HOSP IP/OBS HIGH 50: CPT | Performed by: INTERNAL MEDICINE

## 2020-06-13 PROCEDURE — 85049 AUTOMATED PLATELET COUNT: CPT

## 2020-06-13 PROCEDURE — 99223 1ST HOSP IP/OBS HIGH 75: CPT | Performed by: INTERNAL MEDICINE

## 2020-06-13 PROCEDURE — 97165 OT EVAL LOW COMPLEX 30 MIN: CPT

## 2020-06-13 PROCEDURE — C9803 HOPD COVID-19 SPEC COLLECT: HCPCS | Performed by: INTERNAL MEDICINE

## 2020-06-13 PROCEDURE — 83735 ASSAY OF MAGNESIUM: CPT

## 2020-06-13 PROCEDURE — 85610 PROTHROMBIN TIME: CPT

## 2020-06-13 PROCEDURE — 93306 TTE W/DOPPLER COMPLETE: CPT

## 2020-06-13 PROCEDURE — 80061 LIPID PANEL: CPT

## 2020-06-13 PROCEDURE — 700117 HCHG RX CONTRAST REV CODE 255: Performed by: HOSPITALIST

## 2020-06-13 PROCEDURE — 700111 HCHG RX REV CODE 636 W/ 250 OVERRIDE (IP): Performed by: INTERNAL MEDICINE

## 2020-06-13 PROCEDURE — A9270 NON-COVERED ITEM OR SERVICE: HCPCS | Performed by: INTERNAL MEDICINE

## 2020-06-13 PROCEDURE — A9270 NON-COVERED ITEM OR SERVICE: HCPCS | Performed by: HOSPITALIST

## 2020-06-13 PROCEDURE — U0004 COV-19 TEST NON-CDC HGH THRU: HCPCS

## 2020-06-13 PROCEDURE — 700111 HCHG RX REV CODE 636 W/ 250 OVERRIDE (IP): Performed by: HOSPITALIST

## 2020-06-13 RX ORDER — ONDANSETRON 2 MG/ML
4 INJECTION INTRAMUSCULAR; INTRAVENOUS EVERY 4 HOURS PRN
Status: DISCONTINUED | OUTPATIENT
Start: 2020-06-13 | End: 2020-06-20 | Stop reason: HOSPADM

## 2020-06-13 RX ORDER — ONDANSETRON 4 MG/1
4 TABLET, ORALLY DISINTEGRATING ORAL EVERY 4 HOURS PRN
Status: DISCONTINUED | OUTPATIENT
Start: 2020-06-13 | End: 2020-06-20 | Stop reason: HOSPADM

## 2020-06-13 RX ORDER — HEPARIN SODIUM 5000 [USP'U]/100ML
INJECTION, SOLUTION INTRAVENOUS CONTINUOUS
Status: DISCONTINUED | OUTPATIENT
Start: 2020-06-13 | End: 2020-06-20 | Stop reason: HOSPADM

## 2020-06-13 RX ORDER — CLOPIDOGREL BISULFATE 75 MG/1
75 TABLET ORAL DAILY
Status: DISCONTINUED | OUTPATIENT
Start: 2020-06-13 | End: 2020-06-20 | Stop reason: HOSPADM

## 2020-06-13 RX ORDER — LABETALOL HYDROCHLORIDE 5 MG/ML
10 INJECTION, SOLUTION INTRAVENOUS EVERY 4 HOURS PRN
Status: DISCONTINUED | OUTPATIENT
Start: 2020-06-13 | End: 2020-06-20 | Stop reason: HOSPADM

## 2020-06-13 RX ORDER — ALPRAZOLAM 0.25 MG/1
0.25 TABLET ORAL 3 TIMES DAILY PRN
Status: DISCONTINUED | OUTPATIENT
Start: 2020-06-13 | End: 2020-06-20 | Stop reason: HOSPADM

## 2020-06-13 RX ADMIN — ALPRAZOLAM 0.25 MG: 0.25 TABLET ORAL at 20:36

## 2020-06-13 RX ADMIN — ACETAMINOPHEN 1000 MG: 500 TABLET ORAL at 22:43

## 2020-06-13 RX ADMIN — HUMAN ALBUMIN MICROSPHERES AND PERFLUTREN 3 ML: 10; .22 INJECTION, SOLUTION INTRAVENOUS at 12:49

## 2020-06-13 RX ADMIN — CARBIDOPA AND LEVODOPA 1 TABLET: 25; 100 TABLET ORAL at 22:41

## 2020-06-13 RX ADMIN — ENOXAPARIN SODIUM 40 MG: 100 INJECTION SUBCUTANEOUS at 11:20

## 2020-06-13 RX ADMIN — CLOPIDOGREL BISULFATE 75 MG: 75 TABLET ORAL at 15:32

## 2020-06-13 RX ADMIN — ACETAMINOPHEN 1000 MG: 500 TABLET ORAL at 11:20

## 2020-06-13 RX ADMIN — CARBIDOPA AND LEVODOPA 1 TABLET: 25; 100 TABLET ORAL at 15:32

## 2020-06-13 RX ADMIN — HEPARIN SODIUM 1450 UNITS/HR: 5000 INJECTION, SOLUTION INTRAVENOUS at 15:33

## 2020-06-13 RX ADMIN — AMANTADINE HYDROCHLORIDE 100 MG: 100 CAPSULE, GELATIN COATED ORAL at 11:20

## 2020-06-13 RX ADMIN — ASPIRIN 325 MG: 325 TABLET, FILM COATED ORAL at 11:19

## 2020-06-13 RX ADMIN — CARBIDOPA AND LEVODOPA 1 TABLET: 25; 100 TABLET ORAL at 11:19

## 2020-06-13 RX ADMIN — ATORVASTATIN CALCIUM 80 MG: 80 TABLET, FILM COATED ORAL at 16:50

## 2020-06-13 ASSESSMENT — COGNITIVE AND FUNCTIONAL STATUS - GENERAL
MOBILITY SCORE: 24
SUGGESTED CMS G CODE MODIFIER MOBILITY: CH
SUGGESTED CMS G CODE MODIFIER DAILY ACTIVITY: CH
DAILY ACTIVITIY SCORE: 24

## 2020-06-13 ASSESSMENT — ACTIVITIES OF DAILY LIVING (ADL): TOILETING: INDEPENDENT

## 2020-06-13 ASSESSMENT — ENCOUNTER SYMPTOMS
BLOOD IN STOOL: 0
ABDOMINAL PAIN: 0
VOMITING: 0
HEADACHES: 0
HEMOPTYSIS: 0
PALPITATIONS: 0
CHILLS: 0
FEVER: 0
DIARRHEA: 0
PND: 0
BACK PAIN: 0
MYALGIAS: 0
COUGH: 0
DEPRESSION: 0
SHORTNESS OF BREATH: 0
BLURRED VISION: 0
DIZZINESS: 0
CONSTIPATION: 0
NECK PAIN: 0
DOUBLE VISION: 0
FALLS: 0
NAUSEA: 0
SPEECH CHANGE: 1

## 2020-06-13 ASSESSMENT — FIBROSIS 4 INDEX: FIB4 SCORE: 0.91

## 2020-06-13 ASSESSMENT — GAIT ASSESSMENTS
GAIT LEVEL OF ASSIST: SUPERVISED
DISTANCE (FEET): 200

## 2020-06-13 NOTE — CONSULTS
Cardiology Initial Consult Note    DOS: 6/13/2020    Referring physician: Dr Garner    Chief complaint/Reason for consult: Stroke    HPI: 70 y/o M with Parkinsons and HTN initially admitted with acute onset vision loss, found to have L medial occipital lobe CVA, ischemic. Troponin noted to be elevated. ST elevations noted on ECG upon arrival and also on repeat ECG last night. Echo today shows LV apical akinesis and organized thrombus.    He currently denies chest pain. No SOB. Endorses persistent vision changes. Wishes to go home sooner than later. He initially denied any history of chest pain, but when I informed him he likely recently had a heart attack, he admits he felt a bad case of indigestion about a week ago, which he thought was unusual but did not seek care for.    ROS (+ highlighted in bold):  Constitutional: Fevers/chills/fatigue/weightloss  HEENT: Blurry vision/eye pain/sore throat/hearing loss  Respiratory: Shortness of breath/cough  Cardiovascular: Chest pain/palpitations/edema/orthopnea/syncope  GI: Nausea/vomitting/diarrhea  MSK: Arthralgias/myagias/muscle weakness  Skin: Rash/sores  Neurological: Numbness/tremors/vertigo  Endocrine: Excessive thirst/polyuria/cold intolerance/heat intolerance  Psych: Depression/anxiety    Past Medical History:   Diagnosis Date   • Back spasm    • GOUT    • Gout    • Hypertension    • Parkinson disease (HCC)        Past Surgical History:   Procedure Laterality Date   • KNEE ARTHROSCOPY Right    • KNEE REPLACEMENT, TOTAL Bilateral    • SHOULDER SURGERY      right shoulder, a-c seperation   • TONSILLECTOMY         Social History     Socioeconomic History   • Marital status:      Spouse name: Not on file   • Number of children: Not on file   • Years of education: Not on file   • Highest education level: Not on file   Occupational History   • Not on file   Social Needs   • Financial resource strain: Not on file   • Food insecurity     Worry: Never true      Inability: Never true   • Transportation needs     Medical: No     Non-medical: No   Tobacco Use   • Smoking status: Former Smoker     Types: Cigarettes     Last attempt to quit: 1970     Years since quittin.4   • Smokeless tobacco: Never Used   Substance and Sexual Activity   • Alcohol use: Yes     Alcohol/week: 4.2 oz     Types: 4 Glasses of wine, 3 Standard drinks or equivalent per week     Frequency: 4 or more times a week     Drinks per session: 1 or 2     Binge frequency: Less than monthly     Comment: daily   • Drug use: No   • Sexual activity: Yes     Partners: Female   Lifestyle   • Physical activity     Days per week: Not on file     Minutes per session: Not on file   • Stress: Not on file   Relationships   • Social connections     Talks on phone: Not on file     Gets together: Not on file     Attends Tenriism service: Not on file     Active member of club or organization: Not on file     Attends meetings of clubs or organizations: Not on file     Relationship status: Not on file   • Intimate partner violence     Fear of current or ex partner: Not on file     Emotionally abused: Not on file     Physically abused: Not on file     Forced sexual activity: Not on file   Other Topics Concern   •  Service No   • Blood Transfusions No   • Caffeine Concern No   • Occupational Exposure No   • Hobby Hazards No   • Sleep Concern No   • Stress Concern No   • Weight Concern No   • Special Diet No   • Back Care Yes     Comment: Streching   • Exercise No   • Bike Helmet Yes   • Seat Belt Yes   • Self-Exams Yes   Social History Narrative   • Not on file       Family History   Problem Relation Age of Onset   • Heart Disease Mother    • Heart Disease Father    • Cancer Father         prostate cancer   • Arthritis Brother        Allergies   Allergen Reactions   • Nkda [No Known Drug Allergy]        Current Facility-Administered Medications   Medication Dose Route Frequency Provider Last Rate Last Dose   •  aspirin EC (ECOTRIN) tablet 81 mg  81 mg Oral DAILY Marie Garner M.D.       • MD Alert...HEPARIN WEIGHT BASED PROTOCOL Pharmacist to implement   Other PRN Marei Garner M.D.       • labetalol (NORMODYNE/TRANDATE) injection 10 mg  10 mg Intravenous Q4HRS PRN Marie Garner M.D.       • ondansetron (ZOFRAN ODT) dispertab 4 mg  4 mg Oral Q4HRS PRN Marie Garner M.D.       • ondansetron (ZOFRAN) syringe/vial injection 4 mg  4 mg Intravenous Q4HRS PRN Marie Garner M.D.       • acetaminophen (TYLENOL) tablet 1,000 mg  1,000 mg Oral TID PRN Titi Beltre M.D.   1,000 mg at 06/13/20 1120   • amantadine (SYMMETREL) capsule 100 mg  100 mg Oral DAILY Titi Beltre M.D.   100 mg at 06/13/20 1120   • carbidopa-levodopa (SINEMET)  MG tablet 1 Tab  1 Tab Oral TID Asezain Beltre M.D.   1 Tab at 06/13/20 1119   • atorvastatin (LIPITOR) tablet 80 mg  80 mg Oral Q EVENING Titi Beltre M.D.   Stopped at 06/12/20 1945       Physical Exam:  Vitals:    06/12/20 2000 06/12/20 2231 06/13/20 0400 06/13/20 0800   BP: 131/87 131/88 122/85 122/87   Pulse: 72 75 80 80   Resp: 16 17 16 18   Temp: 36.2 °C (97.1 °F) 36.7 °C (98 °F) 36.1 °C (96.9 °F) 36.4 °C (97.5 °F)   TempSrc: Temporal Temporal Temporal Temporal   SpO2: 95% 94% 94% 91%   Weight:    88.6 kg (195 lb 5.2 oz)   Height:         General appearance: NAD, conversant   Eyes: anicteric sclerae, moist conjunctivae; no lid-lag; PERRLA  HENT: Atraumatic; oropharynx clear with moist mucous membranes and no mucosal ulcerations; normal hard and soft palate  Neck: Trachea midline; FROM, supple, no thyromegaly or lymphadenopathy  Lungs: CTA, with normal respiratory effort and no intercostal retractions  CV: RRR, no MRGs, no JVD   Abdomen: Soft, non-tender; no masses or HSM  Extremities: No peripheral edema or extremity lymphadenopathy  Skin: Normal temperature, turgor and texture; no rash, ulcers or subcutaneous nodules  Psych: Appropriate affect, alert and oriented to person, place  and time    Data:  Lipids:   Lab Results   Component Value Date/Time    CHOLSTRLTOT 159 06/13/2020 05:09 AM    TRIGLYCERIDE 125 06/13/2020 05:09 AM    HDL 57 06/13/2020 05:09 AM    LDL 77 06/13/2020 05:09 AM        BMP:  Lab Results   Component Value Date/Time    SODIUM 137 06/13/2020 0509    POTASSIUM 4.0 06/13/2020 0509    CHLORIDE 102 06/13/2020 0509    CO2 26 06/13/2020 0509    GLUCOSE 103 (H) 06/13/2020 0509    BUN 11 06/13/2020 0509    CREATININE 1.04 06/13/2020 0509    CALCIUM 9.5 06/13/2020 0509    ANION 9.0 06/13/2020 0509        TSH:   No results found for: TSHULTRASEN     THYROXINE (T4):   No results found for: FREEDIR     CBC:   Lab Results   Component Value Date/Time    WBC 6.6 06/13/2020 05:09 AM    RBC 4.91 06/13/2020 05:09 AM    HEMOGLOBIN 15.1 06/13/2020 05:09 AM    HEMATOCRIT 44.6 06/13/2020 05:09 AM    MCV 90.8 06/13/2020 05:09 AM    MCH 30.8 06/13/2020 05:09 AM    MCHC 33.9 06/13/2020 05:09 AM    RDW 42.6 06/13/2020 05:09 AM    PLATELETCT 256 06/13/2020 05:09 AM    MPV 9.5 06/13/2020 05:09 AM    NEUTSPOLYS 73.10 (H) 06/13/2020 05:09 AM    LYMPHOCYTES 16.20 (L) 06/13/2020 05:09 AM    MONOCYTES 7.90 06/13/2020 05:09 AM    EOSINOPHILS 1.70 06/13/2020 05:09 AM    BASOPHILS 0.80 06/13/2020 05:09 AM    IMMGRAN 0.30 06/13/2020 05:09 AM    NRBC 0.00 06/13/2020 05:09 AM    NEUTS 4.83 06/13/2020 05:09 AM    LYMPHS 1.07 06/13/2020 05:09 AM    MONOS 0.52 06/13/2020 05:09 AM    EOS 0.11 06/13/2020 05:09 AM    BASO 0.05 06/13/2020 05:09 AM    IMMGRANAB 0.02 06/13/2020 05:09 AM    NRBCAB 0.00 06/13/2020 05:09 AM        CBC w/o DIFF  Lab Results   Component Value Date/Time    WBC 6.6 06/13/2020 05:09 AM    RBC 4.91 06/13/2020 05:09 AM    HEMOGLOBIN 15.1 06/13/2020 05:09 AM    MCV 90.8 06/13/2020 05:09 AM    MCH 30.8 06/13/2020 05:09 AM    MCHC 33.9 06/13/2020 05:09 AM    RDW 42.6 06/13/2020 05:09 AM    MPV 9.5 06/13/2020 05:09 AM       Prior echo/stress results reviewed: Echo today shows LV thrombus, apical  aneurysm, EF 45%    EKG interpreted by me: Anterior Q waves and ST elevations indicative of MI, 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 Acute ischemic CVA, embolic  2. LV thrombus  3. Likely ischemic cardiomyopathy  4. Likely CAD with prior MI, LAD territory    - Discussed case with Dr Alexis and Dr Garner. Recent acute stroke. Because of this, he is high risk for coronary angiogram. Additionally, although troponins are elevated and rising slightly, these are less likely elevated due to acute MI, more likely stephen-infarct ischemia from old MI (as suggested by apical aneurysm with thrombus). Given his story, likely had acute MI 1 week ago. Additionally, he has no chest pain. As such, we will recommend proceeding with anticoagulation for the LV thrombus and embolic CVA, heparin gtt bridge to warfarin, and would additionally recommend Plavix 300mg x1 and 75mg daily, for treatment of presumed MI.   - Recommend running anticoagulation/antiplatelet strategy by neuro to ensure that risk of hemorrhagic conversion of CVA is acceptably low with this strategy.    Thank you for this consult. Cardiology will continue to follow.    Elver Owens MD  Cardiac Electrophysiology

## 2020-06-13 NOTE — THERAPY
"Occupational Therapy   Initial Evaluation     Patient Name: Jeremiah Huber  Age:  69 y.o., Sex:  male  Medical Record #: [id number]  Today's Date: 6/13/2020     Precautions  Precautions: Swallow Precautions ( See Comments)  Comments: Hx of Parkinsons    Assessment  Patient is 69 y.o. male with a diagnosis of Loss of vision in R eye.  Additional factors influencing patient status / progress: Hx of parkinsons. Patient is pleasant and cooperative, he reports a RLQ visual loss, but he demos excellent compensatory techniques. He has a history of Parkinson's Disease, onset around 4 years ago, however has maintained independence with ADL and IADLs. He demos functional transfers today with supv, has minor balance deficits associated with PD, particularly when starting initially and with turning corners. However, he is very aware of his deficits and exercises good safety awareness. No further acute OT needs at this time.       Plan    Recommend Occupational Therapy for Evaluation only.    Discharge recommendations:  Anticipate that the patient will have no further occupational therapy needs after discharge from the hospital.     Subjective    \"I had my knee replacement surgery and they just thought I had club foot.\"      Objective       06/13/20 0952   Prior Living Situation   Prior Services Home With Outpatient Therapy   Housing / Facility [address]   Steps Into Home 0   Steps In Home 0   Bathroom Set up Walk In Shower   Equipment Owned Front-Wheel Walker;Single Point Cane  (Usually ambulatory without AD)   Lives with - Patient's Self Care Capacity Spouse;Unrelated Adult   Comments Patient lives with his wife and adult daughter   Prior Level of ADL Function   Comments Independent with ADL   Prior Level of IADL Function   Comments Patient in independent with IADL, reports that her was driving prior to onset of visual issues   Precautions   Precautions Fall Risk;Swallow Precautions ( See Comments)  (Pt reports that he has " never fallen, trips but catches self)   Comments Hx of Parkinsons   ADL Assessment   Lower Body Dressing Modified Independent   Comments patient has briefs on, reports no issue with it or with toileting   Functional Mobility   Sit to Stand Supervised   Bed, Chair, Wheelchair Transfer Supervised   Transfer Method Stand Pivot   Mobility sup><sit, STS

## 2020-06-13 NOTE — CONSULTS
Stroke Consult        Consulting MD: Elver Torres MD  Requesting Physician: Marie Garner M.D.  Patient name:      Jeremiah Huber  :         1950  MRN:         [id number]      Date of Service: 2020    Chief Complaint: ED Acute stroke code   Chief Complaint   Patient presents with   • Loss of Vision     sent from opthamologist; started at 2100     Referral Reason:  stroke      HPI:    Jeremiah Huber is a 69 y.o. male with Parkinson's disease followed by Dr. Alejandre who around 9 PM on  developed loss of vision to the right side of his vision.  He was seen by an ophthalmologist on  who noted the visual field deficit and referred him to the emergency room for further evaluation.  The patient denies any other new neurological symptoms.  He feels like his Parkinson's disease symptoms are stable and at baseline.  He denies any new speech or swallowing difficulties.  He denies any numbness, tingling or weakness.  He denies any chest pain, shortness of breath or palpitations.          ROS:   Otherwise negative except for as mentioned above.    Past Medical History:   Past Medical History:   Diagnosis Date   • Back spasm    • GOUT    • Gout    • Hypertension    • Parkinson disease (HCC)        Past Surgical History:   Past Surgical History:   Procedure Laterality Date   • KNEE ARTHROSCOPY Right    • KNEE REPLACEMENT, TOTAL Bilateral    • SHOULDER SURGERY      right shoulder, a-c seperation   • TONSILLECTOMY         Social History:   Social History     Socioeconomic History   • Marital status:      Spouse name: Not on file   • Number of children: Not on file   • Years of education: Not on file   • Highest education level: Not on file   Occupational History   • Not on file   Social Needs   • Financial resource strain: Not on file   • Food insecurity     Worry: Never true     Inability: Never true   • Transportation needs     Medical: No     Non-medical: No   Tobacco Use   • Smoking  status: Former Smoker     Types: Cigarettes     Last attempt to quit: 1970     Years since quittin.4   • Smokeless tobacco: Never Used   Substance and Sexual Activity   • Alcohol use: Yes     Alcohol/week: 4.2 oz     Types: 4 Glasses of wine, 3 Standard drinks or equivalent per week     Frequency: 4 or more times a week     Drinks per session: 1 or 2     Binge frequency: Less than monthly     Comment: daily   • Drug use: No   • Sexual activity: Yes     Partners: Female   Lifestyle   • Physical activity     Days per week: Not on file     Minutes per session: Not on file   • Stress: Not on file   Relationships   • Social connections     Talks on phone: Not on file     Gets together: Not on file     Attends Holiness service: Not on file     Active member of club or organization: Not on file     Attends meetings of clubs or organizations: Not on file     Relationship status: Not on file   • Intimate partner violence     Fear of current or ex partner: Not on file     Emotionally abused: Not on file     Physically abused: Not on file     Forced sexual activity: Not on file   Other Topics Concern   •  Service No   • Blood Transfusions No   • Caffeine Concern No   • Occupational Exposure No   • Hobby Hazards No   • Sleep Concern No   • Stress Concern No   • Weight Concern No   • Special Diet No   • Back Care Yes     Comment: Streching   • Exercise No   • Bike Helmet Yes   • Seat Belt Yes   • Self-Exams Yes   Social History Narrative   • Not on file       Family Hx:   Family History   Problem Relation Age of Onset   • Heart Disease Mother    • Heart Disease Father    • Cancer Father         prostate cancer   • Arthritis Brother        Current Medications:   Current Facility-Administered Medications   Medication Dose Route Frequency Provider Last Rate Last Dose   • MD Alert...HEPARIN WEIGHT BASED PROTOCOL Pharmacist to implement   Other PRN Marie Garner M.D.       • labetalol (NORMODYNE/TRANDATE) injection 10 mg   10 mg Intravenous Q4HRS PRN Marie Garner M.D.       • ondansetron (ZOFRAN ODT) dispertab 4 mg  4 mg Oral Q4HRS PRN Marie Garner M.D.       • ondansetron (ZOFRAN) syringe/vial injection 4 mg  4 mg Intravenous Q4HRS PRN Marie Garner M.D.       • clopidogrel (PLAVIX) tablet 75 mg  75 mg Oral DAILY Marie Garner M.D.       • acetaminophen (TYLENOL) tablet 1,000 mg  1,000 mg Oral TID PRN Titi Beltre M.D.   1,000 mg at 06/13/20 1120   • amantadine (SYMMETREL) capsule 100 mg  100 mg Oral DAILY Titi Beltre M.D.   100 mg at 06/13/20 1120   • carbidopa-levodopa (SINEMET)  MG tablet 1 Tab  1 Tab Oral TID Titi Beltre M.D.   1 Tab at 06/13/20 1119   • atorvastatin (LIPITOR) tablet 80 mg  80 mg Oral Q EVENING Titi Beltre M.D.   Stopped at 06/12/20 1945       Allergies:   Allergies   Allergen Reactions   • Nkda [No Known Drug Allergy]          Physical Exam:   Vitals:    06/12/20 2000 06/12/20 2231 06/13/20 0400 06/13/20 0800   BP: 131/87 131/88 122/85 122/87   Pulse: 72 75 80 80   Resp: 16 17 16 18   Temp: 36.2 °C (97.1 °F) 36.7 °C (98 °F) 36.1 °C (96.9 °F) 36.4 °C (97.5 °F)   TempSrc: Temporal Temporal Temporal Temporal   SpO2: 95% 94% 94% 91%   Weight:    88.6 kg (195 lb 5.2 oz)   Height:           Physical Exam  CONSTITUTIONAL:  Lying in the hospital bed in no apparent distress, appears well nourished  EYES:  conjunctiva are clear without icterus, pupils are equal and reactive to light  HEENT: Atraumatic and normocephalic, hearing is symmetric, dentition intact  NECK:  Supple and non tender, no masses  CV:  Carotid pulses are present bilaterally and normal amplitude, extremities are well perfused without edema  MUSCULOSKELETAL:  see neurological exam for details of gait and station, muscle strength and range of motion in the extremities  SKIN:  no rashes, induration or excessive bruising  PSYCH:  appears to have good judgement and insight, see neurological exam for mental status    NEUROLOGICAL  EXAM  MENTAL STATUS:  Awake, alert, oriented times 3.  Speech is fluent, comprehension is intact.  CRANIAL NERVES:  PERRL, EOMI with no nystagmus, face is symmetric, facial sensation is intact, tongue is in the midline, palate is symmetric.  Visual field testing shows a right inferior quadrantanopsia.  He is hypophonic.  He has a mild masked facies.  MOTOR:  5/5 throughout with no drift in the upper or lower extremities.  No resting tremor in the right hand.  Mild bradykinesia in the right hand with mild cogwheel rigidity in the right arm.  REFLEXES:  2+ and symmetric, toes are downgoing bilaterally  SENSATION:  Intact to light touch and proprioception throughout  COORDINATION:  Normal finger to nose and heel to shin bilaterally  GAIT:  Deferred, due to weakness and fall risk    NIH Stroke Scale:    1a. Level of Consciousness (Alert, drowsy, etc): 0= Alert    1b. LOC Questions (Month, age): 0= Answers both correctly    1c. LOC Commands (Open/close eyes make fist/let go): 0= Obeys both correctly    2.   Best Gaze (Eyes open - patient follows examiner's finger on face): 0= Normal    3.   Visual Fields (introduce visual stimulus/threat to patient's field quadrants): 1= Partial Hemiania    4.   Facial Paresis (Show teeth, raise eyebrows and squeeze eyes shut): 0= Normal     5a. Motor Arm - Left (Elevate arm to 90 degrees if patient is sitting, 45 degrees if  supine): 0= No drift    5b. Motor Arm - Right (Elevate arm to 90 degrees if patient is sitting, 45 degrees if supine): 0= No drift    6a. Motor Leg - Left (Elevate leg 30 degrees with patient supine): 0= No drift    6b. Motor Leg - Right  (Elevate leg 30 degrees with patient supine): 0= No drift    7.   Limb Ataxia (Finger-nose, heel down shin): 0= No ataxia    8.   Sensory (Pin prick to face, arm, trunk and leg - compare side to side): 0= Normal    9.  Best Language (Name item, describe a picture and read sentences): 0= No aphasia    10. Dysarthria (Evaluate speech  clarity by patient repeating listed words): 0= Normal articulation    11. Extinction and Inattention (Use information from prior testing to identify neglect or  double simultaneous stimuli testing): 0= No neglect    Total NIH Score:1    Labs:  Recent Labs     06/12/20  1339 06/13/20  0509   WBC 6.8 6.6   RBC 5.02 4.91   HEMOGLOBIN 15.5 15.1   HEMATOCRIT 46.2 44.6   MCV 92.0 90.8   MCH 30.9 30.8   MCHC 33.5* 33.9   RDW 43.7 42.6   PLATELETCT 262 256   MPV 9.5 9.5     Recent Labs     06/12/20  1339 06/13/20  0509   SODIUM 141 137   POTASSIUM 4.2 4.0   CHLORIDE 104 102   CO2 24 26   GLUCOSE 103* 103*   BUN 14 11   CREATININE 1.05 1.04   CALCIUM 9.3 9.5     Recent Labs     06/12/20  1339   APTT 25.9   INR 0.96             Recent Labs     06/13/20  0509   TRIGLYCERIDE 125   HDL 57   LDL 77     Recent Labs     06/12/20  1339 06/13/20  0509   SODIUM 141 137   POTASSIUM 4.2 4.0   CHLORIDE 104 102   CO2 24 26   GLUCOSE 103* 103*   BUN 14 11     Recent Labs     06/12/20  1339 06/13/20  0509   SODIUM 141 137   POTASSIUM 4.2 4.0   CHLORIDE 104 102   CO2 24 26   BUN 14 11   CREATININE 1.05 1.04   MAGNESIUM  --  2.3   CALCIUM 9.3 9.5     Recent Labs     06/12/20  1339   APTT 25.9   INR 0.96     No results found for this or any previous visit.      Imaging reviewed:    EC-ECHOCARDIOGRAM COMPLETE W/ CONT   Final Result      MR-BRAIN-WITH & W/O   Final Result            1.  Acute gyriform area of infarction involving the left medial occipital lobe extending to the cortex.      2.  Age-related cerebral atrophy      3.  Minimal periventricular white matter changes consistent with chronic microvascular ischemic gliosis.      DX-CHEST-PORTABLE (1 VIEW)   Final Result      1.  There is no acute cardiopulmonary process.      CT-CEREBRAL PERFUSION ANALYSIS   Final Result      1.  Cerebral blood flow less than 30% likely representing completed infarct = 0 mL.      2.  T Max more than 6 seconds likely representing combination of completed  infarct and ischemia = 4 mL.      3.  Mismatched volume likely representing ischemic brain/penumbra = 4 mL      4.  Please note that the cerebral perfusion was performed on the limited brain tissue around the basal ganglia region. Infarct/ischemia outside the CT perfusion sections can be missed in this study.      CT-CTA NECK WITH & W/O-POST PROCESSING   Final Result      1.  There is no evidence of vascular occlusion or clinically significant stenosis.      CT-CTA HEAD WITH & W/O-POST PROCESS   Final Result      CT angiogram of the [place] within normal limits.      Small hypodensity in medial left occipital lobe again noted suspicious for small acute/subacute infarct. No acute intracranial hemorrhage.      CT-HEAD W/O   Final Result      1.  There is no acute intracranial hemorrhage.   2.  There is a small area of hypodensity in the left medial occipital lobe which could represent an area of evolving ischemia.   3.  There is mild diffuse atrophy.             Assessment/Plan:  Jeremiah is a 69 y.o. male with a left posterior cerebral artery territory infarct causing the right quadrantanopsia.  TTE has shown decreased ejection fraction with possible mural thrombus making the etiology likely a cardioembolic stroke.    -Stroke territory is small enough it is okay to initiate anticoagulation but would avoid a bolus of heparin.  Eventual transition to oral anticoagulation once cardiac work-up is complete.  -Okay to continue aspirin 81 mg for now  -Intensity statin  -PT, OT speech therapy  -Patient can follow-up as an outpatient in the neurology clinic once discharged.

## 2020-06-13 NOTE — THERAPY
Speech Language Pathology   Clinical Swallow Evaluation     Patient Name: Jeremiah Huber  AGE:  69 y.o., SEX:  male  Medical Record #: [id number]  Today's Date: 6/13/2020     Precautions  Precautions: Swallow Precautions (See Comments)    Assessment  Patient is 69 y.o. male with. Admit 6/12 with loss of vision. PMHx: gout, parkinson's disease on sinemet, chronic back pain, dyslipidemia. MRI reports acute gyriform area of infarction involving the L medial occipital lobe extending into the cortex.    Pt was very pleasant and agreeable to PO. Oral mechanism exam completed and revealed intact orofacial musculature. Pt does have decreased intensity of vocal quality, which patient reports is baseline, 2/2 parkinson's disease. Reports distant hx of outpatient voice therapy at [place]. PO of single ice chips, MTL, purees, minced&moist, soft&bite sized, and easy to chew textures resulted in complete hyolaryngeal elevation and timely initiation of swallow trigger. No changes in vocal quality and no overt s/sx concerning for aspiration.Trials of jarad cracker did resulted in subtle throat clearing and some delayed coughing, which pt reports is baseline d/t intermittent difficulty with dry crumbly textures. At this time, pt appears appropriate to initiate a EC7/TN0 (regular-easy to chew/thin liquids) with monitoring from nursing staff. Please hold PO with any difficulty or s/sx concerning for penetration/aspiration.     Plan  Recommend Speech Therapy 3 times per week until therapy goals are met for the following treatments:  Dysphagia Training.    Discharge recommendations: Recommend inpatient transitional care services for continued speech therapy services.         Objective     06/13/20 1035   Precautions   Precautions Swallow Precautions ( See Comments)   Oral Food Presentation   Regular (7) Minimal   Dysphagia Strategies / Recommendations   Strategies / Interventions Recommended (Yes / No) Yes   Compensatory Strategies  Monitor During Meals;Head of Bed 90 Degrees During Eating / Drinking;Single Sips / Bites   Diet / Liquid Recommendation Regular - Easy to Chew (7);Thin (0)   Medication Administration  Float Whole with Puree   Therapy Interventions Dysphagia Therapy By Speech Language Pathologist   Short Term Goals   Short Term Goal # 1 Pt will consume EC7/TN0 diet with monitoring from nursing staff and min cues to posted swallow precautions with no overt s/sx concerning for penetration/aspiration.

## 2020-06-13 NOTE — THERAPY
"Physical Therapy   Initial Evaluation     Patient Name: Jeremiah Huber  Age:  69 y.o., Sex:  male  Medical Record #: [id number]  Today's Date: 6/13/2020     Precautions: Swallow Precautions ( See Comments)    Assessment  Patient is 69 y.o. male with a diagnosis of CVA.  Additional factors influencing patient status / progress h/o PD which is usually well controlled when at home per his report. Today, pt shows difficulty initiating gait, but once started, does not show a loss of balance. Pt does report a Right LQ field cut and is quite aware of need to compensate for safety. Pt is close to baseline function, will have assist at home from wife and adult daughter as needed.       Plan    Recommend Physical Therapy for Evaluation only     Discharge recommendations:  Patient will not be actively followed for physical therapy services at this time, however may be seen if requested by physician for 1 more visit within 30 days to address any discharge or equipment needs         Objective       06/13/20 0950   Precautions   Comments hx of PD, trips but \"catches himself\" per pt report.    Prior Living Situation   Prior Services Home With Outpatient Therapy   Housing / Facility 1 Story House   Steps Into Home 0   Steps In Home 0   Elevator No   Equipment Owned Front-Wheel Walker;Single Point Cane  (cane \"once in a while\")   Lives with - Patient's Self Care Capacity Spouse;Adult Children  (wife and adult daughter, help as needed at home)   Prior Level of Functional Mobility   Bed Mobility Independent   Transfer Status Independent   Ambulation Independent   Stairs Independent   Cognition    Level of Consciousness Alert   Comments flat affect, soft voice, pt aware of these PD characteristics.   Neurological Concerns   Comments PD usually well controlled per pt    Gait Analysis   Gait Level Of Assist Supervised   Assistive Device None   Distance (Feet) 200   Comments trouble initiating gait at first and festinating with turns, pt " reports this is typical. Pt reports field cut R lower quadrant. Pt aware that he will need to compensate for safety.    Bed Mobility    Supine to Sit Supervised   Sit to Supine Supervised   Scooting Supervised   Rolling Supervised   Functional Mobility   Sit to Stand Supervised   Bed, Chair, Wheelchair Transfer Supervised   Anticipated Discharge Equipment   DC Equipment None   Session Information   Priority 0

## 2020-06-13 NOTE — ASSESSMENT & PLAN NOTE
EKG suggestive of old infarct   Echocardiogram with wall motion abnormalities  On Plavix, high intensity statin therapy and anticoagulation as reviewed above  Follow-up with cardiology as outpatient    Denies heartburn, chest pain or discomfort   Continuous cardiac monitoring

## 2020-06-13 NOTE — ASSESSMENT & PLAN NOTE
"Embolic - Cardiac in etiology  MRI of the brain revealing \"Acute gyriform area of infarction involving the left medial occipital lobe extending to the cortex\"   Vascular evaluation of the head and neck is negative   Patient with a left ventricular mural thrombus  Okay for anticoagulation per neurology    Patient is on IV heparin at this time, monitoring heparin infusion, monitoring APTT  Continue Coumadin, daily monitoring of CBC, INR, APTT as per protocol of heparin infusion.   Goal INR 2-3. Continue to trend and pharmacy adjusting dose per protocol.     In addition patient on Plavix and high intensity statin therapy per cardiology recommendations.    PT/OT/SLP following  Aspiration, fall precautions    "

## 2020-06-13 NOTE — PROGRESS NOTES
2 RN skin check  .  Back - red and blanching    Michael heels - red and blanching    Michael ears - red and blanching.

## 2020-06-13 NOTE — CARE PLAN
Problem: Pain Management  Goal: Pain level will decrease to patient's comfort goal  Outcome: PROGRESSING AS EXPECTED  Note: PRN tylenol given for pain. Effective per pt.      Problem: Venous Thromboembolism (VTW)/Deep Vein Thrombosis (DVT) Prevention:  Goal: Patient will participate in Venous Thrombosis (VTE)/Deep Vein Thrombosis (DVT)Prevention Measures  Outcome: PROGRESSING SLOWER THAN EXPECTED  Note: Clot noted on echo, heparin drip and plavix started.

## 2020-06-13 NOTE — CARE PLAN
Problem: Venous Thromboembolism (VTW)/Deep Vein Thrombosis (DVT) Prevention:  Goal: Patient will participate in Venous Thrombosis (VTE)/Deep Vein Thrombosis (DVT)Prevention Measures  Outcome: PROGRESSING AS EXPECTED  Flowsheets (Taken 6/12/2020 1744)  Mechanical Prophylaxis: SCDs, Sequential Compression Device  SCDs, Sequential Compression Device: On  Pharmacologic Prophylaxis Used: LMWH: Enoxaparin(Lovenox)  Note: Mechanical and pharmacological DVT prophylaxis in place per stroke protocol.      Problem: Safety  Goal: Will remain free from injury  Outcome: PROGRESSING AS EXPECTED  Note: Fall / aspiration / seizure precautions all in place per stroke protocol. Pt alert and oriented x 4, utilizes call light.   Goal: Will remain free from falls  Outcome: PROGRESSING AS EXPECTED

## 2020-06-13 NOTE — PROGRESS NOTES
[facility] Medicine Daily Progress Note    Date of Service  6/13/2020    Chief Complaint  69 y.o. male admitted 6/12/2020 with Loss of vision    Hospital Course    Patient admitted with right-sided visual complaints and found to have an acute stroke      Interval Problem Update  Patient seen and evaluated by me on rounds  Discussed with nursing staff and charge nursing staff, Emanuel on rounds.    Upon evaluation by me, patient is very rude.  He is noted to have a very hoarse voice, minimal right-sided facial droop, element of dysarthria.  Patient otherwise does not have any evidence of expressive aphasia.  Upon evaluation by me, patient is alert and oriented x4, he is aware of the month, date, year, president of the country and that he is at [facility].  He further informs me that he is admitted to the hospital because of an acute stroke, he is able to exercise logic and able to make his own informed medical decisions and verbalized understanding's of the involved benefits and risks with those medical decisions.    Patient is extremely upset, he is requesting to be discharged, he has been n.p.o. and requesting initiation of a diet.  Counseled and educated that dysphagia precautions are maintained in the setting of a stroke, if initiated on a diet this could increase the risk of aspiration, advised that speech therapy evaluate him formally prior to initiation of a diet as recommended by nursing staff.  He is extremely upset regarding this opinion and would like the speech therapist to see him immediately and he does not want the therapist to be seeing other patients rather come and see him immediately so he can be started on a diet.    Further requesting all work-up including specialist to see him immediately including any other evaluation such as echocardiogram, stress testing or any other testing and once completed he wants to discharge home.  Patient reports that if we did not discharge him home, he  will leave AGAINST MEDICAL ADVICE.    I extensively counseled and educated the patient.  My efforts were witnessed by patient's nursing staff Vanessa and charge nurse Cierra.  I personally informed the patient that he is having an acute stroke and an acute myocardial infarction based on highly elevated troponin levels.  I extensively discussed with the patient that he should remain hospitalized, this would be the standard of care elsewhere in the country and across to work.  I discussed benefits of hospitalization including appropriate work-up, diagnosis of the etiology of underlying stroke and myocardial infarction and input from other consultants.  I discussed the risks of leaving AGAINST MEDICAL ADVICE including but not limited to a worsening stroke which could lead to irreversible paralysis and permanent debility, worsening acute myocardial infarction with cardiac/respiratory arrest and need for life support such as CPR and mechanical ventilation and potentially death from his decision to leave AGAINST MEDICAL ADVICE and not to proceed with further medical evaluations as recommended by me.  Patient verbalized understanding.  He reports that irrespective of the risks, he will leave today and try to come back tomorrow.  I tried to persuade the patient against this decision, extensively counseled and educated and reiterated the importance of ongoing hospitalization.  Patient is able to understand and verbalize complete understanding of the underlying benefits of hospitalization and risks of leaving AGAINST MEDICAL ADVICE, he is able to make his own medical decisions.  I informed the patient that if he decides to leave AGAINST MEDICAL ADVICE he would be allowed full autonomy to make his own medical decisions and refuse medical care as he seems appropriate.  The burden of these decisions including adverse complications and risks to his health including potential death, were clearly verbalized by the patient.    I  consulted neurology for evaluation of this patient.  I have personally discussed the patient's care with Dr. Torres.     I consulted cardiology for evaluation of this patient.  I personally discussed the patient's care with Dr Owens.     I requested a COVID 19 evaluation given underlying stroke without any other underlying risk factors with findings of lymphopenia.    Findings of depressed EF with wall motion abnormalities on echocardiogram, discussed with cardiology, discussed with neurology and they have cleared the patient for initiation of anticoagulation and antiplatelet therapy in combination, cardiology recommends aspirin 81 and IV heparin without bolus, these orders were placed by me.  Neurology and cardiology team continue to evaluate the patient at this time.    Of note, I did inform the patient that if he decided to indeed leave AGAINST MEDICAL ADVICE, he should at least inform the nurses and allow his hospitalist team enough time to be able to prescribe medications.  Discussed with the patient that he should not consider this as an appropriate and safe alternative, this would not follow the appropriate standards of care in the country and would only be done to allow for his best interest/maximum benefit despite him leaving AGAINST MEDICAL ADVICE.  He verbalized understanding.    Consultants/Specialty  Cardiology  Neurology     Code Status  FULL CODE     Disposition  PT/OT/SLP evaluations to continue  Postacute placement if indicated based on above  Physiatry consultation  Patient would like to leave AMA, counseled and educated as reviewed above    Review of Systems  Review of Systems   Constitutional: Negative for chills, fever and malaise/fatigue.   HENT: Negative for hearing loss.    Eyes: Negative for blurred vision and double vision.        R sided visual loss - r field    Respiratory: Negative for cough, hemoptysis and shortness of breath.    Cardiovascular: Negative for chest pain, palpitations and  PND.   Gastrointestinal: Negative for abdominal pain, blood in stool, constipation, diarrhea, melena, nausea and vomiting.   Genitourinary: Negative for dysuria and urgency.   Musculoskeletal: Negative for back pain, falls, joint pain, myalgias and neck pain.   Skin: Negative for rash.   Neurological: Positive for speech change. Negative for dizziness and headaches.   Psychiatric/Behavioral: Negative for depression and suicidal ideas.        Physical Exam  Temp:  [36.1 °C (96.9 °F)-36.7 °C (98 °F)] 36.4 °C (97.5 °F)  Pulse:  [72-84] 80  Resp:  [16-20] 18  BP: (117-132)/(74-88) 122/87  SpO2:  [91 %-96 %] 91 %    Physical Exam  HENT:      Head: Normocephalic.      Right Ear: External ear normal.      Left Ear: External ear normal.      Nose: Nose normal.      Mouth/Throat:      Mouth: Mucous membranes are moist.   Eyes:      General: No scleral icterus.     Conjunctiva/sclera: Conjunctivae normal.      Pupils: Pupils are equal, round, and reactive to light.   Neck:      Musculoskeletal: Normal range of motion and neck supple.   Cardiovascular:      Rate and Rhythm: Regular rhythm.      Pulses: Normal pulses.      Heart sounds: Murmur present. No friction rub. No gallop.    Pulmonary:      Effort: Pulmonary effort is normal. No respiratory distress.      Breath sounds: Normal breath sounds. No stridor. No wheezing or rhonchi.   Abdominal:      General: Abdomen is flat. Bowel sounds are normal. There is no distension.      Palpations: There is no mass.      Tenderness: There is no abdominal tenderness. There is no right CVA tenderness, left CVA tenderness, guarding or rebound.      Hernia: No hernia is present.   Musculoskeletal: Normal range of motion.   Skin:     General: Skin is warm and dry.      Capillary Refill: Capillary refill takes less than 2 seconds.      Coloration: Skin is not jaundiced.      Findings: No erythema.   Neurological:      General: No focal deficit present.      Mental Status: He is alert and  oriented to person, place, and time. Mental status is at baseline.      Comments: Right-sided homonymous hemianopia, hoarseness and dysarthria.  Minimal right-sided droop.  Minimal if any right-sided motor deficits but overall power is 5 out of 5 in bilateral upper and lower extremities.   Psychiatric:         Mood and Affect: Mood normal.         Behavior: Behavior normal.         Thought Content: Thought content normal.         Judgment: Judgment normal.         Fluids    Intake/Output Summary (Last 24 hours) at 6/13/2020 1323  Last data filed at 6/13/2020 0720  Gross per 24 hour   Intake --   Output 500 ml   Net -500 ml       Laboratory  Recent Labs     06/12/20  1339 06/13/20  0509   WBC 6.8 6.6   RBC 5.02 4.91   HEMOGLOBIN 15.5 15.1   HEMATOCRIT 46.2 44.6   MCV 92.0 90.8   MCH 30.9 30.8   MCHC 33.5* 33.9   RDW 43.7 42.6   PLATELETCT 262 256   MPV 9.5 9.5     Recent Labs     06/12/20  1339 06/13/20  0509   SODIUM 141 137   POTASSIUM 4.2 4.0   CHLORIDE 104 102   CO2 24 26   GLUCOSE 103* 103*   BUN 14 11   CREATININE 1.05 1.04   CALCIUM 9.3 9.5     Recent Labs     06/12/20  1339   APTT 25.9   INR 0.96         Recent Labs     06/13/20  0509   TRIGLYCERIDE 125   HDL 57   LDL 77       Imaging  EC-ECHOCARDIOGRAM COMPLETE W/ CONT   Final Result      MR-BRAIN-WITH & W/O   Final Result            1.  Acute gyriform area of infarction involving the left medial occipital lobe extending to the cortex.      2.  Age-related cerebral atrophy      3.  Minimal periventricular white matter changes consistent with chronic microvascular ischemic gliosis.      DX-CHEST-PORTABLE (1 VIEW)   Final Result      1.  There is no acute cardiopulmonary process.      CT-CEREBRAL PERFUSION ANALYSIS   Final Result      1.  Cerebral blood flow less than 30% likely representing completed infarct = 0 mL.      2.  T Max more than 6 seconds likely representing combination of completed infarct and ischemia = 4 mL.      3.  Mismatched volume likely  "representing ischemic brain/penumbra = 4 mL      4.  Please note that the cerebral perfusion was performed on the limited brain tissue around the basal ganglia region. Infarct/ischemia outside the CT perfusion sections can be missed in this study.      CT-CTA NECK WITH & W/O-POST PROCESSING   Final Result      1.  There is no evidence of vascular occlusion or clinically significant stenosis.      CT-CTA HEAD WITH & W/O-POST PROCESS   Final Result      CT angiogram of the [place] within normal limits.      Small hypodensity in medial left occipital lobe again noted suspicious for small acute/subacute infarct. No acute intracranial hemorrhage.      CT-HEAD W/O   Final Result      1.  There is no acute intracranial hemorrhage.   2.  There is a small area of hypodensity in the left medial occipital lobe which could represent an area of evolving ischemia.   3.  There is mild diffuse atrophy.           Assessment/Plan  * Acute CVA (cerebrovascular accident) (HCC)- (present on admission)  Assessment & Plan  Embolic - Cardiac in etiology    MRI of the brain revealing \"Acute gyriform area of infarction involving the left medial occipital lobe extending to the cortex\"     Vascular evaluation of the head and neck is negative     Echocardiogram reveals depressed ejection fraction, left ventricular mural thrombus.  I have discussed with neurology, they are okay with aspirin 81/initiation of anticoagulation with heparin drip  Discussed with Dr. Owens from cardiology, they recommend anticoagulation and antiplatelet therapy  Cardiology neurology team to evaluate the patient  Continue aspirin 81, anticoagulation with IV heparin  High intensity statin therapy  No further need for permissive hypertension, maintain normotensive control  Check hemoglobin A1c, TSH, folate, B12  Obtain COVID 19 evaluation given no other risk factors seen  PT/OT/SLP evaluations  Aspiration, fall precautions  Postacute placement as clinically " appropriate    NSTEMI (non-ST elevated myocardial infarction) (HCC)- (present on admission)  Assessment & Plan  Echocardiogram with depressed ejection fraction, wall motion abnormalities  I have requested formal cardiology consultation, patient to be seen by Dr Owens  Aspirin 81, anticoagulation therapy and high intensity statin therapy  Further recommendations per cardiology team    Medical non-compliance- (present on admission)  Assessment & Plan  Patient noncompliant, threatening to leave AGAINST MEDICAL ADVICE, does not follow professional recommendations provided.  I have personally spend extensive time with this patient providing extensive counseling and education, discussing the underlying pathologies.  He is noncompliant, does not want to follow dietary restrictions/dysphagia restrictions in the setting of an acute stroke, persistently requesting to eat and if not allowed to eat that he would leave AGAINST MEDICAL ADVICE.  Requesting that speech therapy and oral therapy see him first, discussed with the patient that we would continue to provide the best care to him while we continue to do so for all other patients.  Patient wants all evaluations completed in a stat manner, and then be discharged home today.  Counseled and educated extensively, patient has been informed that he would not be cleared for discharge today given underlying acute myocardial infarction and acute stroke and that he would need to be closely followed by neurology and cardiology.  Benefits of hospitalization, risks of leaving AGAINST MEDICAL ADVICE has been extensively discussed by me with the patient.  To note patient is able to make his own medical decisions and is of sound mind.    Parkinson disease (HCC)- (present on admission)  Assessment & Plan  Continue home levodopa carbidopa, amantadine     Right-sided low back pain without sciatica- (present on admission)  Assessment & Plan  Chronic, multimodal pain control, patient takes  Tylenol PRN       VTE prophylaxis: IV heparin

## 2020-06-13 NOTE — CARE PLAN
Problem: Pain Management  Goal: Pain level will decrease to patient's comfort goal  Outcome: PROGRESSING AS EXPECTED     Problem: Venous Thromboembolism (VTW)/Deep Vein Thrombosis (DVT) Prevention:  Goal: Patient will participate in Venous Thrombosis (VTE)/Deep Vein Thrombosis (DVT)Prevention Measures  Outcome: PROGRESSING AS EXPECTED  Note: SCDs in place, ambulation encouraged, patient understands risk, all questions answered

## 2020-06-13 NOTE — PROGRESS NOTES
Monitor summary: SR 75-81, SC 0.20, QRS 0l10, QT 0.32, with rare PVCs per strip from monitor room.

## 2020-06-13 NOTE — CARE PLAN
Problem: Pain Management  Goal: Pain level will decrease to patient's comfort goal  Outcome: PROGRESSING AS EXPECTED  Note: PRN tylenol given for pain. Effective per pt.      Problem: Venous Thromboembolism (VTW)/Deep Vein Thrombosis (DVT) Prevention:  Goal: Patient will participate in Venous Thrombosis (VTE)/Deep Vein Thrombosis (DVT)Prevention Measures  Outcome: PROGRESSING SLOWER THAN EXPECTED  Note: Clot noted on echo, heparin drip and plavix started.      Problem: Communication:  Goal: The ability to communicate needs accurately and effectively will improve  Outcome: MET  Note: Pt able to communicate effectively, no signs of aphasia.      Problem: Safety:  Goal: Risk of aspiration will decrease  Outcome: MET  Note: Speech consult today for swallow eval per stroke protocol, pt passed swallow eval.

## 2020-06-13 NOTE — PROGRESS NOTES
Cardiology would like to initiate plavix opposed to aspirin  Orders placed for plavix, aspirin stopped  No contraindication to anti platelet therapy or watchful anticoagulation from neurology perspective     Marie Garner M.D.  06/13/20  1:58 PM

## 2020-06-14 LAB
ALBUMIN SERPL BCP-MCNC: 4 G/DL (ref 3.2–4.9)
ALBUMIN/GLOB SERPL: 1.5 G/DL
ALP SERPL-CCNC: 59 U/L (ref 30–99)
ALT SERPL-CCNC: 8 U/L (ref 2–50)
ANION GAP SERPL CALC-SCNC: 12 MMOL/L (ref 7–16)
APTT PPP: 79.1 SEC (ref 24.7–36)
APTT PPP: 80.6 SEC (ref 24.7–36)
AST SERPL-CCNC: 16 U/L (ref 12–45)
BASOPHILS # BLD AUTO: 1.1 % (ref 0–1.8)
BASOPHILS # BLD: 0.06 K/UL (ref 0–0.12)
BILIRUB SERPL-MCNC: 0.5 MG/DL (ref 0.1–1.5)
BUN SERPL-MCNC: 9 MG/DL (ref 8–22)
CALCIUM SERPL-MCNC: 9.1 MG/DL (ref 8.5–10.5)
CHLORIDE SERPL-SCNC: 102 MMOL/L (ref 96–112)
CO2 SERPL-SCNC: 24 MMOL/L (ref 20–33)
CREAT SERPL-MCNC: 1.02 MG/DL (ref 0.5–1.4)
EOSINOPHIL # BLD AUTO: 0.14 K/UL (ref 0–0.51)
EOSINOPHIL NFR BLD: 2.6 % (ref 0–6.9)
ERYTHROCYTE [DISTWIDTH] IN BLOOD BY AUTOMATED COUNT: 42.8 FL (ref 35.9–50)
EST. AVERAGE GLUCOSE BLD GHB EST-MCNC: 103 MG/DL
FOLATE SERPL-MCNC: 15.5 NG/ML
GLOBULIN SER CALC-MCNC: 2.6 G/DL (ref 1.9–3.5)
GLUCOSE SERPL-MCNC: 101 MG/DL (ref 65–99)
HBA1C MFR BLD: 5.2 % (ref 0–5.6)
HCT VFR BLD AUTO: 46.3 % (ref 42–52)
HGB BLD-MCNC: 15.6 G/DL (ref 14–18)
IMM GRANULOCYTES # BLD AUTO: 0.02 K/UL (ref 0–0.11)
IMM GRANULOCYTES NFR BLD AUTO: 0.4 % (ref 0–0.9)
LYMPHOCYTES # BLD AUTO: 1.29 K/UL (ref 1–4.8)
LYMPHOCYTES NFR BLD: 23.8 % (ref 22–41)
MAGNESIUM SERPL-MCNC: 2.3 MG/DL (ref 1.5–2.5)
MCH RBC QN AUTO: 30.8 PG (ref 27–33)
MCHC RBC AUTO-ENTMCNC: 33.7 G/DL (ref 33.7–35.3)
MCV RBC AUTO: 91.5 FL (ref 81.4–97.8)
MONOCYTES # BLD AUTO: 0.54 K/UL (ref 0–0.85)
MONOCYTES NFR BLD AUTO: 10 % (ref 0–13.4)
NEUTROPHILS # BLD AUTO: 3.37 K/UL (ref 1.82–7.42)
NEUTROPHILS NFR BLD: 62.1 % (ref 44–72)
NRBC # BLD AUTO: 0 K/UL
NRBC BLD-RTO: 0 /100 WBC
PHOSPHATE SERPL-MCNC: 3 MG/DL (ref 2.5–4.5)
PLATELET # BLD AUTO: 249 K/UL (ref 164–446)
PMV BLD AUTO: 9.1 FL (ref 9–12.9)
POTASSIUM SERPL-SCNC: 4.3 MMOL/L (ref 3.6–5.5)
PROT SERPL-MCNC: 6.6 G/DL (ref 6–8.2)
RBC # BLD AUTO: 5.06 M/UL (ref 4.7–6.1)
SODIUM SERPL-SCNC: 138 MMOL/L (ref 135–145)
TSH SERPL DL<=0.005 MIU/L-ACNC: 3.27 UIU/ML (ref 0.38–5.33)
VIT B12 SERPL-MCNC: 329 PG/ML (ref 211–911)
WBC # BLD AUTO: 5.4 K/UL (ref 4.8–10.8)

## 2020-06-14 PROCEDURE — 99232 SBSQ HOSP IP/OBS MODERATE 35: CPT | Performed by: PSYCHIATRY & NEUROLOGY

## 2020-06-14 PROCEDURE — 99232 SBSQ HOSP IP/OBS MODERATE 35: CPT | Performed by: NURSE PRACTITIONER

## 2020-06-14 PROCEDURE — 99233 SBSQ HOSP IP/OBS HIGH 50: CPT | Performed by: INTERNAL MEDICINE

## 2020-06-14 PROCEDURE — 82607 VITAMIN B-12: CPT

## 2020-06-14 PROCEDURE — 84100 ASSAY OF PHOSPHORUS: CPT

## 2020-06-14 PROCEDURE — 92523 SPEECH SOUND LANG COMPREHEN: CPT

## 2020-06-14 PROCEDURE — 83735 ASSAY OF MAGNESIUM: CPT

## 2020-06-14 PROCEDURE — A9270 NON-COVERED ITEM OR SERVICE: HCPCS | Performed by: HOSPITALIST

## 2020-06-14 PROCEDURE — 84443 ASSAY THYROID STIM HORMONE: CPT

## 2020-06-14 PROCEDURE — 83036 HEMOGLOBIN GLYCOSYLATED A1C: CPT

## 2020-06-14 PROCEDURE — 85730 THROMBOPLASTIN TIME PARTIAL: CPT | Mod: 91

## 2020-06-14 PROCEDURE — 36415 COLL VENOUS BLD VENIPUNCTURE: CPT

## 2020-06-14 PROCEDURE — 700102 HCHG RX REV CODE 250 W/ 637 OVERRIDE(OP): Performed by: HOSPITALIST

## 2020-06-14 PROCEDURE — 80053 COMPREHEN METABOLIC PANEL: CPT

## 2020-06-14 PROCEDURE — 82746 ASSAY OF FOLIC ACID SERUM: CPT

## 2020-06-14 PROCEDURE — 770020 HCHG ROOM/CARE - TELE (206)

## 2020-06-14 PROCEDURE — 700111 HCHG RX REV CODE 636 W/ 250 OVERRIDE (IP): Performed by: INTERNAL MEDICINE

## 2020-06-14 PROCEDURE — 85025 COMPLETE CBC W/AUTO DIFF WBC: CPT

## 2020-06-14 PROCEDURE — A9270 NON-COVERED ITEM OR SERVICE: HCPCS | Performed by: INTERNAL MEDICINE

## 2020-06-14 PROCEDURE — 700102 HCHG RX REV CODE 250 W/ 637 OVERRIDE(OP): Performed by: INTERNAL MEDICINE

## 2020-06-14 RX ORDER — WARFARIN SODIUM 6 MG/1
6 TABLET ORAL
Status: COMPLETED | OUTPATIENT
Start: 2020-06-14 | End: 2020-06-14

## 2020-06-14 RX ADMIN — WARFARIN SODIUM 6 MG: 6 TABLET ORAL at 16:16

## 2020-06-14 RX ADMIN — CARBIDOPA AND LEVODOPA 1 TABLET: 25; 100 TABLET ORAL at 14:52

## 2020-06-14 RX ADMIN — CARBIDOPA AND LEVODOPA 1 TABLET: 25; 100 TABLET ORAL at 23:02

## 2020-06-14 RX ADMIN — AMANTADINE HYDROCHLORIDE 100 MG: 100 CAPSULE, GELATIN COATED ORAL at 05:33

## 2020-06-14 RX ADMIN — ALPRAZOLAM 0.25 MG: 0.25 TABLET ORAL at 21:10

## 2020-06-14 RX ADMIN — HEPARIN SODIUM 1450 UNITS/HR: 5000 INJECTION, SOLUTION INTRAVENOUS at 08:13

## 2020-06-14 RX ADMIN — ATORVASTATIN CALCIUM 80 MG: 80 TABLET, FILM COATED ORAL at 16:16

## 2020-06-14 RX ADMIN — CARBIDOPA AND LEVODOPA 1 TABLET: 25; 100 TABLET ORAL at 05:34

## 2020-06-14 RX ADMIN — CLOPIDOGREL BISULFATE 75 MG: 75 TABLET ORAL at 05:33

## 2020-06-14 RX ADMIN — ACETAMINOPHEN 1000 MG: 500 TABLET ORAL at 08:13

## 2020-06-14 ASSESSMENT — ENCOUNTER SYMPTOMS
DIARRHEA: 0
DIZZINESS: 0
MYALGIAS: 0
BACK PAIN: 0
CHOKING: 0
BLURRED VISION: 0
CONSTIPATION: 0
WHEEZING: 0
CHILLS: 0
FALLS: 0
DEPRESSION: 0
COUGH: 0
NAUSEA: 0
FEVER: 0
DOUBLE VISION: 0
BLOOD IN STOOL: 0
CHEST TIGHTNESS: 0
ABDOMINAL PAIN: 0
PND: 0
STRIDOR: 0
HEADACHES: 0
VOMITING: 0
HEMOPTYSIS: 0
NECK PAIN: 0
SPEECH CHANGE: 1
APNEA: 0
PALPITATIONS: 0
SHORTNESS OF BREATH: 0

## 2020-06-14 ASSESSMENT — FIBROSIS 4 INDEX: FIB4 SCORE: 1.52

## 2020-06-14 NOTE — CARE PLAN
Problem: Pain Management  Goal: Pain level will decrease to patient's comfort goal  Outcome: PROGRESSING AS EXPECTED   Pain managed with prn Acetaminophen.  Problem: Venous Thromboembolism (VTW)/Deep Vein Thrombosis (DVT) Prevention:  Goal: Patient will participate in Venous Thrombosis (VTE)/Deep Vein Thrombosis (DVT)Prevention Measures  Outcome: PROGRESSING AS EXPECTED   SCDs in place.   Problem: Safety  Goal: Will remain free from falls  Outcome: PROGRESSING AS EXPECTED   Patient remains free from falls. Bed in the lowest position, call light within reach, bed alarm on, hourly rounding completed, and non-slip footwear used when out of bed.

## 2020-06-14 NOTE — DISCHARGE PLANNING
[facility] Transitional Care Coordination     Referral from: Dr. Garner    Facesheet indicates: Medicare   Potential Rehab Diagnosis:  Per MRI  Acute gyriform area of infarction involving the left medial occipital lobe extending to the cortex.           Physiatry consultation denied  per protocol.        Current documentation does not support therapy need for IRF level of care. May benefit from outpatient physiatry follow up please reach out to Dr. Johnson  Ext [phone]     Thank you for the referral.

## 2020-06-14 NOTE — PROGRESS NOTES
Inpatient Anticoagulation Service Note    Date: 6/14/2020    Reason for Anticoagulation: Other (Comments)(Mural Thrombus)   Target INR: 2.0 to 3.0         Hemoglobin Value: 15.6  Hematocrit Value: 46.3  Lab Platelet Value: 249    INR from last 7 days     Date/Time INR Value    06/13/20 1500  0.97    06/12/20 1339  0.96        Dose from last 7 days     Date/Time Dose (mg)    06/14/20 1100  6        Significant Interactions: Clopidogrel  Bridge Therapy: Yes  Bridge Therapy Start Date: 06/13/20  Days of Overlap Therapy: 0   Reversal Agent Administered: Not Applicable    Comments:  68 y/o M admitted on 06/12/20 for Stroke. PMHx gout, Parkinson's disease, chronic back pain, dyslipidemia.  06/13/20 Echo: Thrombus is observed in the left ventricular apex. DDI noted. No documented signs of overt bleeding. On HWBP.     Plan:  Warfarin 6mg po tonight , INR in AM.   Education Material Provided?: No  Pharmacist suggested discharge dosing: Warfarin 5-6 mg po daily. Follow up with anticoagulation clinic after discharge.      Brayan Fox, PharmD, BCPS

## 2020-06-14 NOTE — PROGRESS NOTES
Neurology Progress Note        Subjective:    Farhat reports he is feeling better today.  He slept well overnight which is helped.  He denies any new neurological symptoms.  He reports his vision is about the same.  He denies any chest pain or shortness of breath.    Objective:  Vitals:  Vitals:    06/13/20 2300 06/14/20 0352 06/14/20 0800 06/14/20 0813   BP: 136/94 116/81  118/84   Pulse: 75 76  76   Resp: 16 16  16   Temp: 36.4 °C (97.6 °F) 36.1 °C (97 °F)  36.4 °C (97.5 °F)   TempSrc: Temporal Temporal  Temporal   SpO2: 94% 93%  94%   Weight:   88.3 kg (194 lb 10.7 oz)    Height:         MENTAL STATUS:  Awake, alert, oriented times 3.  Speech is fluent, comprehension is intact.  CRANIAL NERVES:  PERRL, EOMI with no nystagmus, face is symmetric, facial sensation is intact, tongue is in the midline, palate is symmetric.  Visual field testing shows a right inferior quadrantanopsia.  He is hypophonic.  He has a mild masked facies.  MOTOR:  5/5 throughout with no drift in the upper or lower extremities.  No resting tremor in the right hand.  Mild bradykinesia in the right hand with mild cogwheel rigidity in the right arm.  REFLEXES:  2+ and symmetric, toes are downgoing bilaterally  SENSATION:  Intact to light touch and proprioception throughout  COORDINATION:  Normal finger to nose and heel to shin bilaterally  GAIT:  Deferred, due to weakness and fall risk    Recent Labs     06/12/20 1339 06/13/20  0509 06/13/20  1500 06/14/20  0444   WBC 6.8 6.6  --  5.4   RBC 5.02 4.91  --  5.06   HEMOGLOBIN 15.5 15.1  --  15.6   HEMATOCRIT 46.2 44.6  --  46.3   MCV 92.0 90.8  --  91.5   MCH 30.9 30.8  --  30.8   MCHC 33.5* 33.9  --  33.7   RDW 43.7 42.6  --  42.8   PLATELETCT 262 256 270 249   MPV 9.5 9.5  --  9.1     Recent Labs     06/12/20  1339 06/13/20  0509 06/14/20  0444   SODIUM 141 137 138   POTASSIUM 4.2 4.0 4.3   CHLORIDE 104 102 102   CO2 24 26 24   GLUCOSE 103* 103* 101*   BUN 14 11 9   CREATININE 1.05 1.04 1.02    CALCIUM 9.3 9.5 9.1     Recent Labs     06/12/20  1339 06/13/20  1500 06/13/20  2147 06/14/20  0444 06/14/20  1039   APTT 25.9 30.2 67.5* 80.6* 79.1*   INR 0.96 0.97  --   --   --              Recent Labs     06/13/20  0509   TRIGLYCERIDE 125   HDL 57   LDL 77     Recent Labs     06/12/20  1339 06/13/20  0509 06/14/20  0444   SODIUM 141 137 138   POTASSIUM 4.2 4.0 4.3   CHLORIDE 104 102 102   CO2 24 26 24   GLUCOSE 103* 103* 101*   BUN 14 11 9     Recent Labs     06/12/20  1339 06/13/20  0509 06/14/20  0444   SODIUM 141 137 138   POTASSIUM 4.2 4.0 4.3   CHLORIDE 104 102 102   CO2 24 26 24   BUN 14 11 9   CREATININE 1.05 1.04 1.02   MAGNESIUM  --  2.3 2.3   PHOSPHORUS  --   --  3.0   CALCIUM 9.3 9.5 9.1     Recent Labs     06/12/20  1339 06/13/20  1500 06/13/20  2147 06/14/20  0444 06/14/20  1039   APTT 25.9 30.2 67.5* 80.6* 79.1*   INR 0.96 0.97  --   --   --      No results found for this or any previous visit.           Imaging: neuroimaging reviewed and directly visualized by me  EC-ECHOCARDIOGRAM COMPLETE W/ CONT   Final Result      MR-BRAIN-WITH & W/O   Final Result            1.  Acute gyriform area of infarction involving the left medial occipital lobe extending to the cortex.      2.  Age-related cerebral atrophy      3.  Minimal periventricular white matter changes consistent with chronic microvascular ischemic gliosis.      DX-CHEST-PORTABLE (1 VIEW)   Final Result      1.  There is no acute cardiopulmonary process.      CT-CEREBRAL PERFUSION ANALYSIS   Final Result      1.  Cerebral blood flow less than 30% likely representing completed infarct = 0 mL.      2.  T Max more than 6 seconds likely representing combination of completed infarct and ischemia = 4 mL.      3.  Mismatched volume likely representing ischemic brain/penumbra = 4 mL      4.  Please note that the cerebral perfusion was performed on the limited brain tissue around the basal ganglia region. Infarct/ischemia outside the CT perfusion  sections can be missed in this study.      CT-CTA NECK WITH & W/O-POST PROCESSING   Final Result      1.  There is no evidence of vascular occlusion or clinically significant stenosis.      CT-CTA HEAD WITH & W/O-POST PROCESS   Final Result      CT angiogram of the [place] within normal limits.      Small hypodensity in medial left occipital lobe again noted suspicious for small acute/subacute infarct. No acute intracranial hemorrhage.      CT-HEAD W/O   Final Result      1.  There is no acute intracranial hemorrhage.   2.  There is a small area of hypodensity in the left medial occipital lobe which could represent an area of evolving ischemia.   3.  There is mild diffuse atrophy.            Jeremiah is a 69 y.o. male with a left posterior cerebral artery territory infarct causing the right quadrantanopsia.  TTE has shown apical akinesis with a mural thrombus making the etiology of his stroke cardioembolic.     -Stroke territory is small enough it is okay to initiate anticoagulation but would avoid a bolus of heparin.    Goal is to transition to warfarin with an INR of 2-3.  -Cardiology would like patient to take Plavix due to concern for recent acute MI by patient history and TTE.  This is okay from a neurological standpoint.  -Intensity statin  -PT, OT speech therapy  -Patient can follow-up as an outpatient in the neurology clinic once discharged.  Referral has been placed.  -No further neurological recommendations at this time.  Neurology will sign off, please call if any further questions or concerns arise.

## 2020-06-14 NOTE — PROGRESS NOTES
Hospital Medicine Daily Progress Note    Date of Service  6/14/2020    Chief Complaint  69 y.o. male admitted 6/12/2020 with Loss of vision    Hospital Course    Patient admitted with right-sided visual complaints and found to have an acute stroke      Interval Problem Update  Patient seen and evaluated by me on rounds  Doing significantly better compared to yesterday  More calm  Agreeable to ongoing hospitalization at this time  On anticoagulation  Cardiology neurology team evaluating  Starting Coumadin  Continue close clinical monitoring    Consultants/Specialty  Cardiology  Neurology     Code Status  FULL CODE     Disposition  PT/OT/SLP evaluations to continue  Postacute placement if indicated based on above  Physiatry consultation  Patient counseled and educated regarding benefits of ongoing hospitalization and risk of leaving AMA extensively this hospitalization    Review of Systems  Review of Systems   Constitutional: Negative for chills, fever and malaise/fatigue.   HENT: Negative for hearing loss.    Eyes: Negative for blurred vision and double vision.        R sided visual loss - r field    Respiratory: Negative for cough, hemoptysis and shortness of breath.    Cardiovascular: Negative for chest pain, palpitations and PND.   Gastrointestinal: Negative for abdominal pain, blood in stool, constipation, diarrhea, melena, nausea and vomiting.   Genitourinary: Negative for dysuria and urgency.   Musculoskeletal: Negative for back pain, falls, joint pain, myalgias and neck pain.   Skin: Negative for rash.   Neurological: Positive for speech change. Negative for dizziness and headaches.   Psychiatric/Behavioral: Negative for depression and suicidal ideas.        Physical Exam  Temp:  [36.1 °C (97 °F)-36.7 °C (98 °F)] 36.4 °C (97.5 °F)  Pulse:  [75-85] 76  Resp:  [16-18] 16  BP: (116-136)/(63-94) 118/84  SpO2:  [93 %-98 %] 94 %    Physical Exam  HENT:      Head: Normocephalic.      Right Ear: External ear normal.       Left Ear: External ear normal.      Nose: Nose normal.      Mouth/Throat:      Mouth: Mucous membranes are moist.   Eyes:      General: No scleral icterus.     Conjunctiva/sclera: Conjunctivae normal.      Pupils: Pupils are equal, round, and reactive to light.   Neck:      Musculoskeletal: Normal range of motion and neck supple.   Cardiovascular:      Rate and Rhythm: Regular rhythm.      Pulses: Normal pulses.      Heart sounds: Murmur present. No friction rub. No gallop.    Pulmonary:      Effort: Pulmonary effort is normal. No respiratory distress.      Breath sounds: Normal breath sounds. No stridor. No wheezing or rhonchi.   Abdominal:      General: Abdomen is flat. Bowel sounds are normal. There is no distension.      Palpations: There is no mass.      Tenderness: There is no abdominal tenderness. There is no right CVA tenderness, left CVA tenderness, guarding or rebound.      Hernia: No hernia is present.   Musculoskeletal: Normal range of motion.   Skin:     General: Skin is warm and dry.      Capillary Refill: Capillary refill takes less than 2 seconds.      Coloration: Skin is not jaundiced.      Findings: No erythema.   Neurological:      General: No focal deficit present.      Mental Status: He is alert and oriented to person, place, and time. Mental status is at baseline.      Comments: Right-sided homonymous hemianopia, hoarseness and dysarthria.  Minimal right-sided droop.  Minimal if any right-sided motor deficits but overall power is 5 out of 5 in bilateral upper and lower extremities.   Psychiatric:         Mood and Affect: Mood normal.         Behavior: Behavior normal.         Thought Content: Thought content normal.         Judgment: Judgment normal.         Fluids    Intake/Output Summary (Last 24 hours) at 6/14/2020 1005  Last data filed at 6/14/2020 1000  Gross per 24 hour   Intake 1451.05 ml   Output 2050 ml   Net -598.95 ml       Laboratory  Recent Labs     06/12/20  1332  06/13/20  0509 06/13/20  1500 06/14/20  0444   WBC 6.8 6.6  --  5.4   RBC 5.02 4.91  --  5.06   HEMOGLOBIN 15.5 15.1  --  15.6   HEMATOCRIT 46.2 44.6  --  46.3   MCV 92.0 90.8  --  91.5   MCH 30.9 30.8  --  30.8   MCHC 33.5* 33.9  --  33.7   RDW 43.7 42.6  --  42.8   PLATELETCT 262 256 270 249   MPV 9.5 9.5  --  9.1     Recent Labs     06/12/20  1339 06/13/20  0509 06/14/20  0444   SODIUM 141 137 138   POTASSIUM 4.2 4.0 4.3   CHLORIDE 104 102 102   CO2 24 26 24   GLUCOSE 103* 103* 101*   BUN 14 11 9   CREATININE 1.05 1.04 1.02   CALCIUM 9.3 9.5 9.1     Recent Labs     06/12/20  1339 06/13/20  1500 06/13/20  2147 06/14/20  0444   APTT 25.9 30.2 67.5* 80.6*   INR 0.96 0.97  --   --          Recent Labs     06/13/20  0509   TRIGLYCERIDE 125   HDL 57   LDL 77       Imaging  EC-ECHOCARDIOGRAM COMPLETE W/ CONT   Final Result      MR-BRAIN-WITH & W/O   Final Result            1.  Acute gyriform area of infarction involving the left medial occipital lobe extending to the cortex.      2.  Age-related cerebral atrophy      3.  Minimal periventricular white matter changes consistent with chronic microvascular ischemic gliosis.      DX-CHEST-PORTABLE (1 VIEW)   Final Result      1.  There is no acute cardiopulmonary process.      CT-CEREBRAL PERFUSION ANALYSIS   Final Result      1.  Cerebral blood flow less than 30% likely representing completed infarct = 0 mL.      2.  T Max more than 6 seconds likely representing combination of completed infarct and ischemia = 4 mL.      3.  Mismatched volume likely representing ischemic brain/penumbra = 4 mL      4.  Please note that the cerebral perfusion was performed on the limited brain tissue around the basal ganglia region. Infarct/ischemia outside the CT perfusion sections can be missed in this study.      CT-CTA NECK WITH & W/O-POST PROCESSING   Final Result      1.  There is no evidence of vascular occlusion or clinically significant stenosis.      CT-CTA HEAD WITH & W/O-POST  "PROCESS   Final Result      CT angiogram of the [place] within normal limits.      Small hypodensity in medial left occipital lobe again noted suspicious for small acute/subacute infarct. No acute intracranial hemorrhage.      CT-HEAD W/O   Final Result      1.  There is no acute intracranial hemorrhage.   2.  There is a small area of hypodensity in the left medial occipital lobe which could represent an area of evolving ischemia.   3.  There is mild diffuse atrophy.           Assessment/Plan  * Acute CVA (cerebrovascular accident) (HCC)- (present on admission)  Assessment & Plan  Embolic - Cardiac in etiology    MRI of the brain revealing \"Acute gyriform area of infarction involving the left medial occipital lobe extending to the cortex\"     Vascular evaluation of the head and neck is negative     Patient with a left ventricular mural thrombus  Seen by cardiology, neurology  Okay for anticoagulation per neurology    Patient is on IV heparin at this time, monitoring heparin infusion, monitoring APTT  Start Coumadin, orders placed, daily monitoring of CBC, INR, APTT as per protocol of heparin infusion    Benefits of anticoagulation, risks of anticoagulation including bleeding and hemorrhagic conversion of the stroke discussed with the patient, patient agreeable to proceeding with anticoagulation.    In addition patient on Plavix and high intensity statin therapy per cardiology recommendations    PT/OT/SLP evaluations  Aspiration, fall precautions  Postacute placement as clinically appropriate    NSTEMI (non-ST elevated myocardial infarction) ([facility])- (present on admission)  Assessment & Plan  Likely reflects subacute MI  Cardiogram with wall motion abnormalities  Cardiology team evaluating  On Plavix, high intensity statin therapy and anticoagulation as reviewed above  Further recommendations per cardiology team    Medical non-compliance- (present on admission)  Assessment & Plan  Improved, counseled and " educated    Parkinson disease (HCC)- (present on admission)  Assessment & Plan  Continue home levodopa carbidopa, amantadine     Right-sided low back pain without sciatica- (present on admission)  Assessment & Plan  Chronic, multimodal pain control, patient takes Tylenol PRN       VTE prophylaxis: IV heparin

## 2020-06-14 NOTE — THERAPY
Speech Language Pathology   Initial Assessment     Patient Name: Jeremiah Huber  AGE:  69 y.o., SEX:  male  Medical Record #: [id number]  Today's Date: 6/14/2020     Precautions  Precautions: Swallow Precautions (See Comments)  Comments: Hx of Parkinsons    Assessment  Cognitive evaluation completed this date. Wife present during evaluation. Portions of standardized assessment revealed pt is WFL under cognitive domains of: auditory comprehension, memory, thought organization, attention, verbal expression, reasoning, problem solving, and reading comprehension. Considering this, pt does not appear to require acute care SLP services to address cognition at this time.     Of note, decreased vocal intensity 2/2 parkinson's disease was a barrier to effective communication. Pt and wife requesting information on voice therapy; provided education and handout provided.     Plan  Recommend Speech Therapy for Evaluation only for the following treatments:  Cognitive-Linguistic Training.   (Pt is receiving SLP acute care services for dysphagia).     Discharge recommendations: Anticipate that the patient will have no further speech therapy needs after discharge from the hospital.       Objective     06/14/20 1534   Precautions   Precautions Swallow Precautions ( See Comments)   Verbal Expression   Verbal Expression / Aphasia Eval (WDL) WDL   Auditory Comprehension   Auditory Comprehension (WDL) WDL   Reading Comprehension   Reading Comprehension (WDL) WDL   Cognitive-Linguistic   Cognitive-Linguistic (WDL) WDL   Social / Pragmatic Communication   Social / Pragmatic Communication WDL

## 2020-06-14 NOTE — PROGRESS NOTES
Cardiology Follow Up Progress Note    Date of Service  6/14/2020    Attending Physician  Marie Garner M.D.    Chief Complaint   Sudden onset of blurry vision, evaluated by an ophthalmologist, underwent CT scan concerning for acute/subacute left occipital stroke.        Cardiology consultation for cardioembolic stroke      HPI  Farhat Huber is a 69 y.o. male admitted 6/12/2020 with blurry vision MRI head confirmed acute/subacute CVA.      Past medical history significant for gout, Parkinson's disease, chronic back pain, dyslipidemia.        Labs reviewed  CBC and CMP in excellent order aside from glucose 101 this morning  Normal TSH      Interim Events    6/14/2020 no overnight cardiac events, sinus rhythm, no arrhythmia, denies chest discomfort/indigestion, and no dyspnea.    Review of Systems  Review of Systems   Eyes: Positive for visual disturbance.   Respiratory: Negative for apnea, cough, choking, chest tightness, shortness of breath, wheezing and stridor.        Vital signs in last 24 hours  Temp:  [36.1 °C (97 °F)-36.7 °C (98 °F)] 36.4 °C (97.5 °F)  Pulse:  [75-85] 76  Resp:  [16-18] 16  BP: (116-136)/(63-94) 118/84  SpO2:  [93 %-98 %] 94 %    Physical Exam  Physical Exam  Cardiovascular:      Rate and Rhythm: Normal rate and regular rhythm.   Pulmonary:      Effort: Pulmonary effort is normal.   Abdominal:      General: Abdomen is flat.   Skin:     General: Skin is warm.   Neurological:      General: No focal deficit present.      Mental Status: He is alert.      Comments: Aside from hoarseness, vision loss more prominent on the right side   Psychiatric:         Mood and Affect: Mood normal.         Lab Review  Lab Results   Component Value Date/Time    WBC 5.4 06/14/2020 04:44 AM    RBC 5.06 06/14/2020 04:44 AM    HEMOGLOBIN 15.6 06/14/2020 04:44 AM    HEMATOCRIT 46.3 06/14/2020 04:44 AM    MCV 91.5 06/14/2020 04:44 AM    MCH 30.8 06/14/2020 04:44 AM    MCHC 33.7 06/14/2020 04:44 AM    MPV 9.1  06/14/2020 04:44 AM      Lab Results   Component Value Date/Time    SODIUM 138 06/14/2020 04:44 AM    POTASSIUM 4.3 06/14/2020 04:44 AM    CHLORIDE 102 06/14/2020 04:44 AM    CO2 24 06/14/2020 04:44 AM    GLUCOSE 101 (H) 06/14/2020 04:44 AM    BUN 9 06/14/2020 04:44 AM    CREATININE 1.02 06/14/2020 04:44 AM      Lab Results   Component Value Date/Time    ASTSGOT 16 06/14/2020 04:44 AM    ALTSGPT 8 06/14/2020 04:44 AM     Lab Results   Component Value Date/Time    CHOLSTRLTOT 159 06/13/2020 05:09 AM    LDL 77 06/13/2020 05:09 AM    HDL 57 06/13/2020 05:09 AM    TRIGLYCERIDE 125 06/13/2020 05:09 AM    TROPONINT 692 (H) 06/13/2020 05:09 AM       No results for input(s): NTPROBNP in the last 72 hours.    Cardiac Imaging and Procedures Review  EKG: Anterior Q waves, ST elevations indicative of MI, probably old MI with aneurysm.    Echocardiogram: 6/13/2020, thrombus is observed in the LV apex, apical akinesis, LVEF 55%.    Cardiac Catheterization: Not applicable    Imaging  Chest X-Ray: No acute cardiopulmonary process    Stress Test: Not applicable         CT head without 6/12/2020   1.  There is no acute intracranial hemorrhage.  2.  There is a small area of hypodensity in the left medial occipital lobe which could represent an area of evolving ischemia.  3.  There is mild diffuse atrophy.    CTA head 6/12/2020  CT angiogram of the [place] within normal limits.   Small hypodensity in medial left occipital lobe again noted suspicious for small acute/subacute infarct. No acute intracranial hemorrhage    CTA neck 6/12/2020  There is no evidence of vascular occlusion or clinically significant stenosis      CT cerebral perfusion 6/12/2020  1.  Cerebral blood flow less than 30% likely representing completed infarct = 0 mL.     2.  T Max more than 6 seconds likely representing combination of completed infarct and ischemia = 4 mL.     3.  Mismatched volume likely representing ischemic brain/penumbra = 4 mL     4.   Please note that the cerebral perfusion was performed on the limited brain tissue around the basal ganglia region. Infarct/ischemia outside the CT perfusion sections can be missed in this study.      MR brain 6/12/2020  1.  Acute gyriform area of infarction involving the left medial occipital lobe extending to the cortex.     2.  Age-related cerebral atrophy     3.  Minimal periventricular white matter changes consistent with chronic microvascular ischemic gliosis.          Assessment    Acute ischemic CVA  Left posterior cerebral artery infarct affecting the visual field  TTE 6/13/20 revealed thrombus in the LV apex, apical akinesis  Elevated troponin less likely due to acute MI more likely prior MI/LAD territory as suggested by apical aneurysm with thrombus.  Per patient history probably had acute MI a week ago.  CVA likely secondary to cardioembolic stroke  Ischemic cardiomyopathy  History of Parkinson's disease  History of chronic back pain        Plan  Optimal guided medical therapy  Due to acute stroke currently high risk for coronary angiography.  Anticoagulation with IV heparin for LV thrombus bridging to warfarin.  Recommend Plavix, bolus 300 mg, 75 mg thereafter.  High intensity statin therapy.  Appreciate neurology, okay to initiate anticoagulation without bolus in addition to antiplatelet therapy with Plavix.  Remains chest pain-free.    Please contact me with any questions.    DANA Lau.   Cardiologist, [facility]  [phone]

## 2020-06-14 NOTE — CARE PLAN
Problem: Pain Management  Goal: Pain level will decrease to patient's comfort goal  Outcome: PROGRESSING AS EXPECTED  Note: Only complaints of pain is a headache, treated with tylenol, states this does help.      Problem: Infection  Goal: Will remain free from infection  Outcome: PROGRESSING AS EXPECTED  Note: No signs of infection during stroke evaluation.      Problem: Infection:  Goal: Will remain free from infection  Outcome: PROGRESSING AS EXPECTED  Note: No signs of infection during stroke evaluation.      Problem: Peripheral Vascular Perfusion:  Goal: Neurologic status will improve  Outcome: PROGRESSING AS EXPECTED  Note: Vision deficits post stroke continue to improve.      Problem: Venous Thromboembolism (VTW)/Deep Vein Thrombosis (DVT) Prevention:  Goal: Patient will participate in Venous Thrombosis (VTE)/Deep Vein Thrombosis (DVT)Prevention Measures  Outcome: PROGRESSING SLOWER THAN EXPECTED  Note: Clot noted on Echo. Heparin drip on place.

## 2020-06-15 ENCOUNTER — PATIENT MESSAGE (OUTPATIENT)
Dept: NEUROLOGY | Facility: MEDICAL CENTER | Age: 70
End: 2020-06-15

## 2020-06-15 PROBLEM — Z91.199 MEDICAL NON-COMPLIANCE: Status: RESOLVED | Noted: 2020-06-13 | Resolved: 2020-06-15

## 2020-06-15 LAB
APTT PPP: 85.9 SEC (ref 24.7–36)
INR PPP: 1 (ref 0.87–1.13)
PROTHROMBIN TIME: 13.4 SEC (ref 12–14.6)

## 2020-06-15 PROCEDURE — A9270 NON-COVERED ITEM OR SERVICE: HCPCS | Performed by: HOSPITALIST

## 2020-06-15 PROCEDURE — 85730 THROMBOPLASTIN TIME PARTIAL: CPT

## 2020-06-15 PROCEDURE — 36415 COLL VENOUS BLD VENIPUNCTURE: CPT

## 2020-06-15 PROCEDURE — A9270 NON-COVERED ITEM OR SERVICE: HCPCS | Performed by: INTERNAL MEDICINE

## 2020-06-15 PROCEDURE — 700102 HCHG RX REV CODE 250 W/ 637 OVERRIDE(OP): Performed by: INTERNAL MEDICINE

## 2020-06-15 PROCEDURE — 85610 PROTHROMBIN TIME: CPT

## 2020-06-15 PROCEDURE — 700111 HCHG RX REV CODE 636 W/ 250 OVERRIDE (IP): Performed by: INTERNAL MEDICINE

## 2020-06-15 PROCEDURE — 700102 HCHG RX REV CODE 250 W/ 637 OVERRIDE(OP): Performed by: HOSPITALIST

## 2020-06-15 PROCEDURE — 99233 SBSQ HOSP IP/OBS HIGH 50: CPT | Performed by: INTERNAL MEDICINE

## 2020-06-15 PROCEDURE — 770020 HCHG ROOM/CARE - TELE (206)

## 2020-06-15 RX ORDER — WARFARIN SODIUM 5 MG/1
5 TABLET ORAL DAILY
Status: DISCONTINUED | OUTPATIENT
Start: 2020-06-15 | End: 2020-06-17

## 2020-06-15 RX ADMIN — CLOPIDOGREL BISULFATE 75 MG: 75 TABLET ORAL at 05:48

## 2020-06-15 RX ADMIN — CARBIDOPA AND LEVODOPA 1 TABLET: 25; 100 TABLET ORAL at 23:11

## 2020-06-15 RX ADMIN — CARBIDOPA AND LEVODOPA 1 TABLET: 25; 100 TABLET ORAL at 17:05

## 2020-06-15 RX ADMIN — HEPARIN SODIUM 1450 UNITS/HR: 5000 INJECTION, SOLUTION INTRAVENOUS at 01:45

## 2020-06-15 RX ADMIN — ATORVASTATIN CALCIUM 80 MG: 80 TABLET, FILM COATED ORAL at 17:05

## 2020-06-15 RX ADMIN — CARBIDOPA AND LEVODOPA 1 TABLET: 25; 100 TABLET ORAL at 05:49

## 2020-06-15 RX ADMIN — WARFARIN SODIUM 5 MG: 5 TABLET ORAL at 17:05

## 2020-06-15 RX ADMIN — AMANTADINE HYDROCHLORIDE 100 MG: 100 CAPSULE, GELATIN COATED ORAL at 05:48

## 2020-06-15 RX ADMIN — ALPRAZOLAM 0.25 MG: 0.25 TABLET ORAL at 20:02

## 2020-06-15 RX ADMIN — HEPARIN SODIUM 1450 UNITS/HR: 5000 INJECTION, SOLUTION INTRAVENOUS at 19:59

## 2020-06-15 RX ADMIN — ACETAMINOPHEN 1000 MG: 500 TABLET ORAL at 20:02

## 2020-06-15 ASSESSMENT — ENCOUNTER SYMPTOMS
DIARRHEA: 0
COUGH: 0
CONSTIPATION: 0
DIZZINESS: 0
HEMOPTYSIS: 0
PALPITATIONS: 0
VOMITING: 0
DEPRESSION: 0
MYALGIAS: 0
SHORTNESS OF BREATH: 0
NAUSEA: 0
DOUBLE VISION: 0
BLURRED VISION: 0
BACK PAIN: 0
PND: 0
NECK PAIN: 0
FEVER: 0
SPEECH CHANGE: 1
HEADACHES: 0
CHILLS: 0
FALLS: 0
ABDOMINAL PAIN: 0
BLOOD IN STOOL: 0

## 2020-06-15 NOTE — CARE PLAN
Problem: Pain Management  Goal: Pain level will decrease to patient's comfort goal  Outcome: PROGRESSING AS EXPECTED   Pain controlled.   Problem: Skin Integrity  Goal: Risk for impaired skin integrity will decrease  Outcome: PROGRESSING AS EXPECTED   No skin issues. Patient educated on the importance of turning self in bed and ambulating.   Problem: Knowledge Deficit:  Goal: Knowledge of disease process/condition, treatment plan, diagnostic tests, and medications will improve  Outcome: PROGRESSING AS EXPECTED   Patient educated on the plan of care.

## 2020-06-15 NOTE — PROGRESS NOTES
[facility] Medicine Daily Progress Note    Date of Service  6/15/2020    Chief Complaint  69 y.o. male admitted 6/12/2020 with Loss of vision    Hospital Course    Patient admitted with right-sided visual complaints and found to have an acute stroke      Interval Problem Update  Patient seen and evaluated by me on rounds  Doing significantly better compared to yesterday  No acute concerns  Continuing anticoagulation and maintaining close monitoring    Consultants/Specialty  Cardiology  Neurology     Code Status  FULL CODE     Disposition  Anticipate discharge home once patient has a therapeutic INR    Review of Systems  Review of Systems   Constitutional: Negative for chills, fever and malaise/fatigue.   HENT: Negative for hearing loss.    Eyes: Negative for blurred vision and double vision.        R sided visual loss - r field    Respiratory: Negative for cough, hemoptysis and shortness of breath.    Cardiovascular: Negative for chest pain, palpitations and PND.   Gastrointestinal: Negative for abdominal pain, blood in stool, constipation, diarrhea, melena, nausea and vomiting.   Genitourinary: Negative for dysuria and urgency.   Musculoskeletal: Negative for back pain, falls, joint pain, myalgias and neck pain.   Skin: Negative for rash.   Neurological: Positive for speech change. Negative for dizziness and headaches.   Psychiatric/Behavioral: Negative for depression and suicidal ideas.        Physical Exam  Temp:  [35.9 °C (96.6 °F)-36.3 °C (97.3 °F)] 36.3 °C (97.3 °F)  Pulse:  [76-80] 76  Resp:  [16-18] 17  BP: ()/(66-88) 112/84  SpO2:  [90 %-94 %] 92 %    Physical Exam  HENT:      Head: Normocephalic.      Right Ear: External ear normal.      Left Ear: External ear normal.      Nose: Nose normal.      Mouth/Throat:      Mouth: Mucous membranes are moist.   Eyes:      General: No scleral icterus.     Conjunctiva/sclera: Conjunctivae normal.      Pupils: Pupils are equal, round, and reactive to light.   Neck:       Musculoskeletal: Normal range of motion and neck supple.   Cardiovascular:      Rate and Rhythm: Regular rhythm.      Pulses: Normal pulses.      Heart sounds: Murmur present. No friction rub. No gallop.    Pulmonary:      Effort: Pulmonary effort is normal. No respiratory distress.      Breath sounds: Normal breath sounds. No stridor. No wheezing or rhonchi.   Abdominal:      General: Abdomen is flat. Bowel sounds are normal. There is no distension.      Palpations: There is no mass.      Tenderness: There is no abdominal tenderness. There is no right CVA tenderness, left CVA tenderness, guarding or rebound.      Hernia: No hernia is present.   Musculoskeletal: Normal range of motion.   Skin:     General: Skin is warm and dry.      Capillary Refill: Capillary refill takes less than 2 seconds.      Coloration: Skin is not jaundiced.      Findings: No erythema.   Neurological:      General: No focal deficit present.      Mental Status: He is alert and oriented to person, place, and time. Mental status is at baseline.      Comments: Right-sided homonymous hemianopia, hoarseness and dysarthria.  Minimal right-sided droop.  Minimal if any right-sided motor deficits but overall power is 5 out of 5 in bilateral upper and lower extremities.   Psychiatric:         Mood and Affect: Mood normal.         Behavior: Behavior normal.         Thought Content: Thought content normal.         Judgment: Judgment normal.         Fluids    Intake/Output Summary (Last 24 hours) at 6/15/2020 1125  Last data filed at 6/15/2020 0404  Gross per 24 hour   Intake 660 ml   Output 2650 ml   Net -1990 ml       Laboratory  Recent Labs     06/12/20  1339 06/13/20  0509 06/13/20  1500 06/14/20  0444   WBC 6.8 6.6  --  5.4   RBC 5.02 4.91  --  5.06   HEMOGLOBIN 15.5 15.1  --  15.6   HEMATOCRIT 46.2 44.6  --  46.3   MCV 92.0 90.8  --  91.5   MCH 30.9 30.8  --  30.8   MCHC 33.5* 33.9  --  33.7   RDW 43.7 42.6  --  42.8   PLATELETCT 262 256 609  249   MPV 9.5 9.5  --  9.1     Recent Labs     06/12/20  1339 06/13/20  0509 06/14/20  0444   SODIUM 141 137 138   POTASSIUM 4.2 4.0 4.3   CHLORIDE 104 102 102   CO2 24 26 24   GLUCOSE 103* 103* 101*   BUN 14 11 9   CREATININE 1.05 1.04 1.02   CALCIUM 9.3 9.5 9.1     Recent Labs     06/12/20  1339 06/13/20  1500 06/13/20  2147 06/14/20  0444 06/14/20  1039 06/15/20  0334   APTT 25.9 30.2 67.5* 80.6* 79.1*  --    INR 0.96 0.97  --   --   --  1.00         Recent Labs     06/13/20  0509   TRIGLYCERIDE 125   HDL 57   LDL 77       Imaging  EC-ECHOCARDIOGRAM COMPLETE W/ CONT   Final Result      MR-BRAIN-WITH & W/O   Final Result            1.  Acute gyriform area of infarction involving the left medial occipital lobe extending to the cortex.      2.  Age-related cerebral atrophy      3.  Minimal periventricular white matter changes consistent with chronic microvascular ischemic gliosis.      DX-CHEST-PORTABLE (1 VIEW)   Final Result      1.  There is no acute cardiopulmonary process.      CT-CEREBRAL PERFUSION ANALYSIS   Final Result      1.  Cerebral blood flow less than 30% likely representing completed infarct = 0 mL.      2.  T Max more than 6 seconds likely representing combination of completed infarct and ischemia = 4 mL.      3.  Mismatched volume likely representing ischemic brain/penumbra = 4 mL      4.  Please note that the cerebral perfusion was performed on the limited brain tissue around the basal ganglia region. Infarct/ischemia outside the CT perfusion sections can be missed in this study.      CT-CTA NECK WITH & W/O-POST PROCESSING   Final Result      1.  There is no evidence of vascular occlusion or clinically significant stenosis.      CT-CTA HEAD WITH & W/O-POST PROCESS   Final Result      CT angiogram of the [place] within normal limits.      Small hypodensity in medial left occipital lobe again noted suspicious for small acute/subacute infarct. No acute intracranial hemorrhage.      CT-HEAD  "W/O   Final Result      1.  There is no acute intracranial hemorrhage.   2.  There is a small area of hypodensity in the left medial occipital lobe which could represent an area of evolving ischemia.   3.  There is mild diffuse atrophy.           Assessment/Plan  * Acute CVA (cerebrovascular accident) (HCC)- (present on admission)  Assessment & Plan  Embolic - Cardiac in etiology    MRI of the brain revealing \"Acute gyriform area of infarction involving the left medial occipital lobe extending to the cortex\"     Vascular evaluation of the head and neck is negative     Patient with a left ventricular mural thrombus  Seen by cardiology, neurology  Okay for anticoagulation per neurology    Patient is on IV heparin at this time, monitoring heparin infusion, monitoring APTT  Continue Coumadin, daily monitoring of CBC, INR, APTT as per protocol of heparin infusion    Benefits of anticoagulation, risks of anticoagulation including bleeding and hemorrhagic conversion of the stroke discussed with the patient, patient agreeable to proceeding with anticoagulation.    In addition patient on Plavix and high intensity statin therapy per cardiology recommendations    PT/OT/SLP evaluations  Aspiration, fall precautions  Postacute placement as clinically appropriate    NSTEMI (non-ST elevated myocardial infarction) ([facility])- (present on admission)  Assessment & Plan  Likely reflects subacute MI  Echocardiogram with wall motion abnormalities  Cardiology team evaluating  On Plavix, high intensity statin therapy and anticoagulation as reviewed above  Further recommendations per cardiology team    Parkinson disease ([facility])- (present on admission)  Assessment & Plan  Continue home levodopa carbidopa, amantadine     Right-sided low back pain without sciatica- (present on admission)  Assessment & Plan  Chronic, multimodal pain control, patient takes Tylenol PRN       VTE prophylaxis: IV heparin       "

## 2020-06-15 NOTE — TELEPHONE ENCOUNTER
Regarding: Non-Urgent Medical Question  ----- Message from Ruiz Newman Ass't sent at 6/15/2020  3:02 PM PDT -----       ----- Message from Farhat Huber to Lalo Mclain M.D. sent at 6/15/2020 12:21 PM -----   Hello    My name is Nolvia Hong, I am Jeremiah Huber's wife.   I left a voicemail with your office. Jeremiah is currently in the hospital at [facility].  He had a heart attack friday.  He has clots in his heart and he is to remain in the hospital until the clots go away - we expect he will be there a few more days.  The initial sign was partial vision loss in one eye which is the neurological portion of this.  ER did a lot of tests and admitted him. The ultimate  conclusion I believe is that the clots caused this.  I may not have this completely correct but wanted to get you up to speed on this.  pls call me at [phone] for questions. The hospital has also contacted your office. I don't think this is non urgent but see no other way to send this.

## 2020-06-15 NOTE — TELEPHONE ENCOUNTER
----- Message from Jeremiah Huber sent at 6/15/2020 12:21 PM PDT -----  Regarding: Non-Urgent Medical Question  Contact: [phone]  [place]    My name is Nolvia Hong, I am Jeremiah Huber's wife.   I left a voicemail with your office. Jeremiah is currently in the hospital at [facility].  He had a heart attack friday.  He has clots in his heart and he is to remain in the hospital until the clots go away - we expect he will be there a few more days.  The initial sign was partial vision loss in one eye which is the neurological portion of this.  ER did a lot of tests and admitted him. The ultimate  conclusion I believe is that the clots caused this.  I may not have this completely correct but wanted to get you up to speed on this.  pls call me at [phone] for questions. The hospital has also contacted your office. I don't think this is non urgent but see no other way to send this.

## 2020-06-15 NOTE — CARE PLAN
Problem: Pain Management  Goal: Pain level will decrease to patient's comfort goal  Outcome: PROGRESSING AS EXPECTED     Problem: Skin Integrity  Goal: Risk for impaired skin integrity will decrease  Outcome: PROGRESSING AS EXPECTED     Problem: Medication  Goal: Compliance with prescribed medication will improve  Outcome: PROGRESSING AS EXPECTED     Problem: Knowledge Deficit  Goal: Knowledge of disease process/condition, treatment plan, diagnostic tests, and medications will improve  Outcome: PROGRESSING AS EXPECTED     Problem: Knowledge Deficit:  Goal: Knowledge of disease process/condition, treatment plan, diagnostic tests, and medications will improve  Outcome: PROGRESSING AS EXPECTED  Pt updated on POC including plan to bridge to oral anticoagulants. Pt verbalizes understanding.

## 2020-06-15 NOTE — PROGRESS NOTES
Inpatient Anticoagulation Service Note    Date: 6/15/2020    Reason for Anticoagulation: Stroke, Other (Comments)   Target INR: 2.0 to 3.0         Hemoglobin Value: 15.6  Hematocrit Value: 46.3  Lab Platelet Value: 249    INR from last 7 days     Date/Time INR Value    06/15/20 0334  1    06/13/20 1500  0.97    06/12/20 1339  0.96        Dose from last 7 days     Date/Time Dose (mg)    06/15/20 1247  5    06/14/20 1100  6        Significant Interactions: Clopidogrel, Statin  Bridge Therapy: Yes  Bridge Therapy Start Date: 06/14/20    Reversal Agent Administered: Not Applicable  Comments: New start warfarin for cardioembolic stroke, bridging with heparin. LV mural thrombus also seen, cleared by neurology to start anticoagulation. Continue with warfarin 5 mg PO qday and watch INR trend. Cardiology consulting, cleared to continue with clopidogrel with recent MI event.    Plan:  Warfarin 5 mg PO  Education Material Provided?: No(will need education prior to discharge.)  Pharmacist suggested discharge dosing: warfarin 5 mg PO qday with INR within 48-72h upon discharge.     Ermelinda Hernadez, PharmD

## 2020-06-15 NOTE — PATIENT COMMUNICATION
Let the patient's wife know that I appreciate your update on this matter.  When I follow-up with him in the office, I can review the electronic health record at length to get the impressions of the consulting neurologist as well as the medical team who took care of him.  I hope he is doing well.

## 2020-06-16 ENCOUNTER — TELEPHONE (OUTPATIENT)
Dept: MEDICAL GROUP | Facility: MEDICAL CENTER | Age: 70
End: 2020-06-16

## 2020-06-16 LAB
ANION GAP SERPL CALC-SCNC: 8 MMOL/L (ref 7–16)
APTT PPP: 102 SEC (ref 24.7–36)
APTT PPP: 71.8 SEC (ref 24.7–36)
BUN SERPL-MCNC: 10 MG/DL (ref 8–22)
CALCIUM SERPL-MCNC: 9 MG/DL (ref 8.5–10.5)
CHLORIDE SERPL-SCNC: 103 MMOL/L (ref 96–112)
CO2 SERPL-SCNC: 26 MMOL/L (ref 20–33)
CREAT SERPL-MCNC: 1.08 MG/DL (ref 0.5–1.4)
ERYTHROCYTE [DISTWIDTH] IN BLOOD BY AUTOMATED COUNT: 43.3 FL (ref 35.9–50)
GLUCOSE SERPL-MCNC: 98 MG/DL (ref 65–99)
HCT VFR BLD AUTO: 47.1 % (ref 42–52)
HGB BLD-MCNC: 15.7 G/DL (ref 14–18)
INR PPP: 1.09 (ref 0.87–1.13)
MCH RBC QN AUTO: 30.5 PG (ref 27–33)
MCHC RBC AUTO-ENTMCNC: 33.3 G/DL (ref 33.7–35.3)
MCV RBC AUTO: 91.5 FL (ref 81.4–97.8)
PLATELET # BLD AUTO: 262 K/UL (ref 164–446)
PMV BLD AUTO: 9.3 FL (ref 9–12.9)
POTASSIUM SERPL-SCNC: 4.4 MMOL/L (ref 3.6–5.5)
PROTHROMBIN TIME: 14.4 SEC (ref 12–14.6)
RBC # BLD AUTO: 5.15 M/UL (ref 4.7–6.1)
SODIUM SERPL-SCNC: 137 MMOL/L (ref 135–145)
WBC # BLD AUTO: 4.7 K/UL (ref 4.8–10.8)

## 2020-06-16 PROCEDURE — 700111 HCHG RX REV CODE 636 W/ 250 OVERRIDE (IP): Performed by: INTERNAL MEDICINE

## 2020-06-16 PROCEDURE — A9270 NON-COVERED ITEM OR SERVICE: HCPCS | Performed by: INTERNAL MEDICINE

## 2020-06-16 PROCEDURE — A9270 NON-COVERED ITEM OR SERVICE: HCPCS | Performed by: HOSPITALIST

## 2020-06-16 PROCEDURE — 85730 THROMBOPLASTIN TIME PARTIAL: CPT

## 2020-06-16 PROCEDURE — 770020 HCHG ROOM/CARE - TELE (206)

## 2020-06-16 PROCEDURE — 700102 HCHG RX REV CODE 250 W/ 637 OVERRIDE(OP): Performed by: HOSPITALIST

## 2020-06-16 PROCEDURE — 85610 PROTHROMBIN TIME: CPT

## 2020-06-16 PROCEDURE — 85027 COMPLETE CBC AUTOMATED: CPT

## 2020-06-16 PROCEDURE — 80048 BASIC METABOLIC PNL TOTAL CA: CPT

## 2020-06-16 PROCEDURE — 700102 HCHG RX REV CODE 250 W/ 637 OVERRIDE(OP): Performed by: INTERNAL MEDICINE

## 2020-06-16 PROCEDURE — 36415 COLL VENOUS BLD VENIPUNCTURE: CPT

## 2020-06-16 PROCEDURE — 99233 SBSQ HOSP IP/OBS HIGH 50: CPT | Performed by: HOSPITALIST

## 2020-06-16 RX ADMIN — CARBIDOPA AND LEVODOPA 1 TABLET: 25; 100 TABLET ORAL at 21:55

## 2020-06-16 RX ADMIN — AMANTADINE HYDROCHLORIDE 100 MG: 100 CAPSULE, GELATIN COATED ORAL at 05:38

## 2020-06-16 RX ADMIN — CARBIDOPA AND LEVODOPA 1 TABLET: 25; 100 TABLET ORAL at 16:29

## 2020-06-16 RX ADMIN — ALPRAZOLAM 0.25 MG: 0.25 TABLET ORAL at 20:01

## 2020-06-16 RX ADMIN — CARBIDOPA AND LEVODOPA 1 TABLET: 25; 100 TABLET ORAL at 05:38

## 2020-06-16 RX ADMIN — ATORVASTATIN CALCIUM 80 MG: 80 TABLET, FILM COATED ORAL at 17:08

## 2020-06-16 RX ADMIN — HEPARIN SODIUM 1250 UNITS/HR: 5000 INJECTION, SOLUTION INTRAVENOUS at 15:22

## 2020-06-16 RX ADMIN — CLOPIDOGREL BISULFATE 75 MG: 75 TABLET ORAL at 05:38

## 2020-06-16 RX ADMIN — WARFARIN SODIUM 5 MG: 5 TABLET ORAL at 17:08

## 2020-06-16 ASSESSMENT — ENCOUNTER SYMPTOMS
PHOTOPHOBIA: 0
ABDOMINAL PAIN: 0
CONSTITUTIONAL NEGATIVE: 1
EYE PAIN: 0
NAUSEA: 0
DOUBLE VISION: 0
CARDIOVASCULAR NEGATIVE: 1
DIZZINESS: 0
BLURRED VISION: 0
VOMITING: 0
HEADACHES: 0
RESPIRATORY NEGATIVE: 1

## 2020-06-16 NOTE — PROGRESS NOTES
Hospital Medicine Daily Progress Note    Date of Service  6/16/2020    Chief Complaint  69 y.o. male admitted 6/12/2020 with Loss of vision    Hospital Course    Patient admitted with right-sided visual complaints and found to have an acute stroke      Interval Problem Update  6/16: No overnight events. He denies any complaints at this time. Still with subtherapeutic INR. VSS on room air.       Consultants/Specialty  Cardiology  Neurology , signed off     Code Status  FULL CODE     Disposition  Anticipate discharge home once patient has a therapeutic INR    Review of Systems  Review of Systems   Constitutional: Negative.    Eyes: Negative for blurred vision, double vision, photophobia and pain.   Respiratory: Negative.    Cardiovascular: Negative.    Gastrointestinal: Negative for abdominal pain, nausea and vomiting.   Genitourinary: Negative for dysuria, frequency and urgency.   Neurological: Negative for dizziness and headaches.        Physical Exam  Temp:  [35.9 °C (96.7 °F)-36.4 °C (97.6 °F)] 36.1 °C (97 °F)  Pulse:  [72-89] 77  Resp:  [17-20] 18  BP: (100-117)/(62-81) 117/73  SpO2:  [91 %-96 %] 92 %    Physical Exam  Vitals signs and nursing note reviewed.   Constitutional:       General: He is not in acute distress.     Appearance: He is not ill-appearing.   HENT:      Head: Normocephalic.      Nose: No congestion or rhinorrhea.      Mouth/Throat:      Mouth: Mucous membranes are moist.   Eyes:      General: No scleral icterus.        Right eye: No discharge.         Left eye: No discharge.   Neck:      Musculoskeletal: Normal range of motion and neck supple.   Cardiovascular:      Rate and Rhythm: Regular rhythm.      Pulses: Normal pulses.      Heart sounds: Murmur present. No friction rub. No gallop.    Pulmonary:      Effort: Pulmonary effort is normal. No respiratory distress.      Breath sounds: Normal breath sounds. No stridor. No wheezing or rhonchi.   Abdominal:      General: Bowel sounds are normal.  There is no distension.      Palpations: Abdomen is soft. There is no mass.      Tenderness: There is no abdominal tenderness. There is no right CVA tenderness, left CVA tenderness, guarding or rebound.      Hernia: No hernia is present.   Musculoskeletal: Normal range of motion.      Right lower leg: No edema.      Left lower leg: No edema.   Skin:     General: Skin is warm and dry.      Coloration: Skin is not jaundiced.      Findings: No erythema.   Neurological:      General: No focal deficit present.      Mental Status: He is alert and oriented to person, place, and time. Mental status is at baseline.      Comments: Right-sided homonymous hemianopia, hoarseness and dysarthria.  Minimal right-sided droop.  Minimal if any right-sided motor deficits but overall power is 5 out of 5 in bilateral upper and lower extremities.   Psychiatric:         Mood and Affect: Mood normal.         Behavior: Behavior normal.         Thought Content: Thought content normal.         Judgment: Judgment normal.         Fluids  No intake or output data in the 24 hours ending 06/16/20 1204    Laboratory  Recent Labs     06/13/20  1500 06/14/20  0444 06/16/20  0457   WBC  --  5.4 4.7*   RBC  --  5.06 5.15   HEMOGLOBIN  --  15.6 15.7   HEMATOCRIT  --  46.3 47.1   MCV  --  91.5 91.5   MCH  --  30.8 30.5   MCHC  --  33.7 33.3*   RDW  --  42.8 43.3   PLATELETCT 270 249 262   MPV  --  9.1 9.3     Recent Labs     06/14/20  0444 06/16/20  0457   SODIUM 138 137   POTASSIUM 4.3 4.4   CHLORIDE 102 103   CO2 24 26   GLUCOSE 101* 98   BUN 9 10   CREATININE 1.02 1.08   CALCIUM 9.1 9.0     Recent Labs     06/13/20  1500  06/14/20  1039 06/15/20  0334 06/15/20  1058 06/16/20  0457 06/16/20  1024   APTT 30.2   < > 79.1*  --  85.9*  --  102.0*   INR 0.97  --   --  1.00  --  1.09  --     < > = values in this interval not displayed.               Imaging  EC-ECHOCARDIOGRAM COMPLETE W/ CONT   Final Result      MR-BRAIN-WITH & W/O   Final Result           "  1.  Acute gyriform area of infarction involving the left medial occipital lobe extending to the cortex.      2.  Age-related cerebral atrophy      3.  Minimal periventricular white matter changes consistent with chronic microvascular ischemic gliosis.      DX-CHEST-PORTABLE (1 VIEW)   Final Result      1.  There is no acute cardiopulmonary process.      CT-CEREBRAL PERFUSION ANALYSIS   Final Result      1.  Cerebral blood flow less than 30% likely representing completed infarct = 0 mL.      2.  T Max more than 6 seconds likely representing combination of completed infarct and ischemia = 4 mL.      3.  Mismatched volume likely representing ischemic brain/penumbra = 4 mL      4.  Please note that the cerebral perfusion was performed on the limited brain tissue around the basal ganglia region. Infarct/ischemia outside the CT perfusion sections can be missed in this study.      CT-CTA NECK WITH & W/O-POST PROCESSING   Final Result      1.  There is no evidence of vascular occlusion or clinically significant stenosis.      CT-CTA HEAD WITH & W/O-POST PROCESS   Final Result      CT angiogram of the [place] within normal limits.      Small hypodensity in medial left occipital lobe again noted suspicious for small acute/subacute infarct. No acute intracranial hemorrhage.      CT-HEAD W/O   Final Result      1.  There is no acute intracranial hemorrhage.   2.  There is a small area of hypodensity in the left medial occipital lobe which could represent an area of evolving ischemia.   3.  There is mild diffuse atrophy.           Assessment/Plan  * Acute CVA (cerebrovascular accident) (HCC)- (present on admission)  Assessment & Plan  Embolic - Cardiac in etiology  MRI of the brain revealing \"Acute gyriform area of infarction involving the left medial occipital lobe extending to the cortex\"   Vascular evaluation of the head and neck is negative     Patient with a left ventricular mural thrombus  Okay for " anticoagulation per neurology  Cardiology following    Patient is on IV heparin at this time, monitoring heparin infusion, monitoring APTT  Continue Coumadin, daily monitoring of CBC, INR, APTT as per protocol of heparin infusion. Still with subtherapeutic IN of 1.09. Goal INR 2-3. Continue to trend and pharmacy adjusting dose per protocol.     In addition patient on Plavix and high intensity statin therapy per cardiology recommendations.    PT/OT/SLP following  Aspiration, fall precautions      NSTEMI (non-ST elevated myocardial infarction) (HCC)- (present on admission)  Assessment & Plan  Likely reflects subacute MI  Echocardiogram with wall motion abnormalities  Cardiology team evaluating  On Plavix, high intensity statin therapy and anticoagulation as reviewed above  Further recommendations per cardiology team    Parkinson disease (HCC)- (present on admission)  Assessment & Plan  Continue home levodopa carbidopa, amantadine     Right-sided low back pain without sciatica- (present on admission)  Assessment & Plan  Chronic, multimodal pain control, patient takes Tylenol PRN       VTE prophylaxis: IV heparin bridging to warfarin.     I have performed a physical exam and reviewed and updated ROS and Plan today (6/16/2020). In review of yesterday's note (6/15/2020), there are no changes except as documented above.

## 2020-06-16 NOTE — PROGRESS NOTES
Inpatient Anticoagulation Service Note    Date: 6/16/2020    Reason for Anticoagulation: Stroke, Other (Comments)   Target INR: 2.0 to 3.0         Hemoglobin Value: 15.7  Hematocrit Value: 47.1  Lab Platelet Value: 262    INR from last 7 days     Date/Time INR Value    06/16/20 0457  1.09    06/15/20 0334  1    06/13/20 1500  0.97    06/12/20 1339  0.96        Dose from last 7 days     Date/Time Dose (mg)    06/16/20 1139  5    06/15/20 1247  5    06/14/20 1100  6        Significant Interactions: Clopidogrel, Statin  Bridge Therapy: Yes  Bridge Therapy Start Date: 06/14/20    Reversal Agent Administered: Not Applicable  Comments: New start warfarin, bridging with heparin. On clopidogrel per cardiology for recent MI event. Continue with warfarin 5 mg PO tonight and follow INR trend. If no movement in INR tomorrow, recommend to increase dosing to 7.5 mg PO qday.    Plan:  Warfarin 5 mg PO  Education Material Provided?: Yes(warfarin pamphlet)  Pharmacist suggested discharge dosing: warfarin 5 mg PO qday with INR within 48-72h upon discharge     Ermelinda Hernadez, PharmD

## 2020-06-16 NOTE — CARE PLAN
Problem: Pain Management  Goal: Pain level will decrease to patient's comfort goal  Outcome: PROGRESSING AS EXPECTED   Pain controlled with prn tylenol.   Problem: Peripheral Vascular Perfusion:  Goal: Neurologic status will improve  Outcome: PROGRESSING AS EXPECTED   Vision loss improving.   Problem: Safety  Goal: Will remain free from falls  Outcome: PROGRESSING AS EXPECTED   Patient remains free from falls. Bed in the lowest position, call light within reach, bed alarm on, hourly rounding completed, non-slip footwear used when out of bed.

## 2020-06-16 NOTE — TELEPHONE ENCOUNTER
1. Caller Name: Jeremiah Huber    Call Back Number: [phone] (home)    How would the patient prefer to be contacted with a response: Phone call OK to leave a detailed message    2. SPECIFIC Action To Be Taken: Referral pending, please sign.    3. Diagnosis/Clinical Reason for Request: Heart Attack      4. Specialty & Provider Name/Lab/Imaging Location: [facility]     5. Is appointment scheduled for requested order/referral: no    Patient was informed they will receive a return phone call from the office ONLY if there are any questions before processing their request. Advised to call back if they haven't received a call from the referral department in 5 days.

## 2020-06-16 NOTE — CARE PLAN
Problem: Knowledge Deficit  Goal: Knowledge of the prescribed therapeutic regimen will improve  Outcome: PROGRESSING AS EXPECTED     Problem: Discharge Barriers/Planning  Goal: Patient's continuum of care needs will be met  Outcome: PROGRESSING AS EXPECTED  Time spent educating patient and family members. Pt and family members stating they feel more informed of POC and are agreeable.

## 2020-06-16 NOTE — PROGRESS NOTES
Monitor Summary: SR/ST 70-95, PT 0.20, QRS 0.10, QT 0.32 with rare PVC per strip from monitor room.

## 2020-06-17 LAB
APTT PPP: 73.9 SEC (ref 24.7–36)
INR PPP: 1.19 (ref 0.87–1.13)
PROTHROMBIN TIME: 15.5 SEC (ref 12–14.6)

## 2020-06-17 PROCEDURE — 700102 HCHG RX REV CODE 250 W/ 637 OVERRIDE(OP): Performed by: HOSPITALIST

## 2020-06-17 PROCEDURE — 770020 HCHG ROOM/CARE - TELE (206)

## 2020-06-17 PROCEDURE — A9270 NON-COVERED ITEM OR SERVICE: HCPCS | Performed by: HOSPITALIST

## 2020-06-17 PROCEDURE — 700102 HCHG RX REV CODE 250 W/ 637 OVERRIDE(OP): Performed by: INTERNAL MEDICINE

## 2020-06-17 PROCEDURE — 92526 ORAL FUNCTION THERAPY: CPT

## 2020-06-17 PROCEDURE — 36415 COLL VENOUS BLD VENIPUNCTURE: CPT

## 2020-06-17 PROCEDURE — 700111 HCHG RX REV CODE 636 W/ 250 OVERRIDE (IP): Performed by: INTERNAL MEDICINE

## 2020-06-17 PROCEDURE — 85730 THROMBOPLASTIN TIME PARTIAL: CPT

## 2020-06-17 PROCEDURE — 99232 SBSQ HOSP IP/OBS MODERATE 35: CPT | Performed by: HOSPITALIST

## 2020-06-17 PROCEDURE — 85610 PROTHROMBIN TIME: CPT

## 2020-06-17 PROCEDURE — A9270 NON-COVERED ITEM OR SERVICE: HCPCS | Performed by: INTERNAL MEDICINE

## 2020-06-17 RX ORDER — WARFARIN SODIUM 7.5 MG/1
7.5 TABLET ORAL DAILY
Status: DISCONTINUED | OUTPATIENT
Start: 2020-06-17 | End: 2020-06-20 | Stop reason: HOSPADM

## 2020-06-17 RX ADMIN — ALPRAZOLAM 0.25 MG: 0.25 TABLET ORAL at 20:45

## 2020-06-17 RX ADMIN — CARBIDOPA AND LEVODOPA 1 TABLET: 25; 100 TABLET ORAL at 23:04

## 2020-06-17 RX ADMIN — CLOPIDOGREL BISULFATE 75 MG: 75 TABLET ORAL at 05:45

## 2020-06-17 RX ADMIN — HEPARIN SODIUM 1250 UNITS/HR: 5000 INJECTION, SOLUTION INTRAVENOUS at 10:57

## 2020-06-17 RX ADMIN — CARBIDOPA AND LEVODOPA 1 TABLET: 25; 100 TABLET ORAL at 14:39

## 2020-06-17 RX ADMIN — AMANTADINE HYDROCHLORIDE 100 MG: 100 CAPSULE, GELATIN COATED ORAL at 05:45

## 2020-06-17 RX ADMIN — CARBIDOPA AND LEVODOPA 1 TABLET: 25; 100 TABLET ORAL at 05:45

## 2020-06-17 RX ADMIN — ATORVASTATIN CALCIUM 80 MG: 80 TABLET, FILM COATED ORAL at 17:03

## 2020-06-17 RX ADMIN — WARFARIN SODIUM 7.5 MG: 7.5 TABLET ORAL at 17:05

## 2020-06-17 ASSESSMENT — ENCOUNTER SYMPTOMS
EYE PAIN: 0
BLURRED VISION: 0
DIZZINESS: 0
CONSTITUTIONAL NEGATIVE: 1
ABDOMINAL PAIN: 0
NAUSEA: 0
PHOTOPHOBIA: 0
RESPIRATORY NEGATIVE: 1
CARDIOVASCULAR NEGATIVE: 1
VOMITING: 0
DOUBLE VISION: 0
HEADACHES: 0

## 2020-06-17 ASSESSMENT — FIBROSIS 4 INDEX: FIB4 SCORE: 1.45

## 2020-06-17 NOTE — PROGRESS NOTES
Monitor summary: SR 65-84, RI 0.20, QRS 0.08, QT 0.38, rare to occasional PVCs and PACs per strip from monitor room.

## 2020-06-17 NOTE — THERAPY
"Speech Language Pathology  Daily Treatment     Patient Name: Jeremiah Huber  Age:  69 y.o., Sex:  male  Medical Record #: [id number]  Today's Date: 6/17/2020     Precautions  Precautions: Swallow Precautions ( See Comments)  Comments: Hx of Parkinsons     Assessment  Patient seen this date for dysphagia tx session. Patient has a hx of PD and reports intermittent difficulty w/ \"crumbly, dry textures.\" Patient's vocal intensity reduced, which is consistent with PD and patient reports this is baseline. Per RN, patient appears to be tolerating regular ETC/TN0 diet without difficulty. Patient seen with lunch tray. Patient consumed several bites of grilled cheese sandwich, french fries, and thin liquids independently and with no s/sx of aspiration. PO trials of all consistencies were masticated effectively and no oral residue was noted. Laryngeal elevation palpated as complete and initiation of swallow trigger was timely.     At this time, recommend patient continue regular ETC/TN0 diet (per pt preference). Patient does not require any further acute SLP services. Please reconsult SLP if needed.     Plan    Discharge secondary to goals met.    Discharge recommendations:  SLP evaluation completed.  Patient is currently not being actively followed for therapy services at this time, however may be seen if requested by attending provider for 1 more visit within 30 days to address any discharge needs or if the patient has a change in status.      Objective     06/17/20 1215   Precautions   Comments Hx of Parkinsons    Vitals   O2 Delivery Device None - Room Air   Dysphagia    Positioning / Behavior Modification Self Monitoring   Diet / Liquid Recommendation Regular - Easy to Chew (7);Thin (0)  (Per pt preference )   Nursing Communication Swallow Precaution Sign Posted at Head of Bed   Skilled Intervention Verbal Cueing;Compensatory Strategies   Recommended Route of Medication Administration   Medication Administration  Whole " with Liquid Wash   Short Term Goals   Short Term Goal # 1 Pt will consume EC7/TN0 diet with monitoring from nursing staff and min cues to posted swallow precautions with no overt s/sx concerning for penetration/aspiration.   Goal Outcome # 1 Goal met   Short Term Goal # 2 Pt/family will verbalize/demonstrate understanding of risks and s/sx concerning for penetration/aspiration 4/5 opportunities given min A.    Goal Outcome # 2  Goal met

## 2020-06-17 NOTE — TELEPHONE ENCOUNTER
Although the patient will likely require a cardiology consultation in follow up, it appears that he is still hospitalized and hasn't left AMA.  PCP appt should be made upon discharge. For now, okay to keep 6/22 appt with Dr. Perez.

## 2020-06-17 NOTE — TELEPHONE ENCOUNTER
It looks like he left the hospital AMA and had both a stoke and a heart attack? He should see one of the MDs in our office this week if possible and then they can place the appropriate referral - it may need to be urgent. Thanks! Chantelle

## 2020-06-17 NOTE — TELEPHONE ENCOUNTER
Phone Number Called: [phone] (home)     Call outcome: Spoke to patient regarding message below.    Message: Patient asked if he could call me back.  I spoke with Dr. Hargrove to see if I could put pt on his schedule for tomorrow, per Dr. Hargrove his schedule is full.  Made pt an appt with Dr. Perez for Monday (6/22/20) at 1:20 PM.  Per Dr. Hargrove to CC the chart to him to verify if pt can wait until his appt on Monday or if pt needs to be seen sooner.  Waiting for pt to call back to confirm if this appt day & time works for him.

## 2020-06-17 NOTE — PROGRESS NOTES
Inpatient Anticoagulation Service Note    Date: 6/17/2020    Reason for Anticoagulation: Stroke, Other (Comments)   Target INR: 2.0 to 3.0         Hemoglobin Value: 15.7  Hematocrit Value: 47.1  Lab Platelet Value: 262    INR from last 7 days     Date/Time INR Value    06/17/20 0028  (!) 1.19    06/16/20 0457  1.09    06/15/20 0334  1    06/13/20 1500  0.97    06/12/20 1339  0.96        Dose from last 7 days     Date/Time Dose (mg)    06/17/20 0859  7.5    06/16/20 1139  5    06/15/20 1247  5    06/14/20 1100  6        Significant Interactions: Clopidogrel, Statin  Bridge Therapy: Yes  Bridge Therapy Start Date: 06/14/20    Reversal Agent Administered: Not Applicable  Comments: New start warfarin, bridging with heparin. On clopidogrel per cardiology for recent MI event. All doses administered, slow move of INR. H/H stable, no noted issues. Increase warfarin dose 7.5 mg PO qday.    Plan:  Warfarin 7.5 mg PO  Education Material Provided?: Yes(warfarin pamphlet)  Pharmacist suggested discharge dosing: warfarin 7.5 mg PO qday with INR within 48h of discharge     Ermelinda Hernadez, PharmD

## 2020-06-17 NOTE — CARE PLAN
Problem: Venous Thromboembolism (VTW)/Deep Vein Thrombosis (DVT) Prevention:  Goal: Patient will participate in Venous Thrombosis (VTE)/Deep Vein Thrombosis (DVT)Prevention Measures  Outcome: PROGRESSING AS EXPECTED  Intervention: Assess and monitor for anticoagulation complications  Note: Therapeutic heparin gtt in place for blood clot in heart after stroke.        Problem: Skin Integrity  Goal: Risk for impaired skin integrity will decrease  Outcome: PROGRESSING AS EXPECTED  Intervention: Assess risk factors for impaired skin integrity and/or pressure ulcers  Note: Skin remains intact. Will continue to encourage mobilization post stroke.

## 2020-06-17 NOTE — PROGRESS NOTES
Received report from night nurse at the bedside. Assume care of Pt at 0700. Assessment completed. Pt A&O X 4 . Pt denies numbness and tingling, Pt denies  pain,  Assist of one. Pt in bed, bed in lowest position, call light in place, bed/chiar alarm is on,  treaded slipper socks on.

## 2020-06-17 NOTE — TELEPHONE ENCOUNTER
Spoke with pt and he is still hospitalized. I encouraged pt to keep his vitaly with Dr. Perez for Monday if he is discharged before then. Pt to call us if further questions.

## 2020-06-17 NOTE — PROGRESS NOTES
Hospital Medicine Daily Progress Note    Date of Service  6/17/2020    Chief Complaint  69 y.o. male admitted 6/12/2020 with Loss of vision    Hospital Course    Patient admitted with right-sided visual complaints and found to have an acute stroke      Interval Problem Update  6/16: No overnight events. He denies any complaints at this time. Still with subtherapeutic INR. VSS on room air.     6/17: No overnight events. VSS on room air. Patient denies any physical complaints. Continue here until INR of 2.     Consultants/Specialty  Cardiology, signed off   Neurology , signed off     Code Status  FULL CODE     Disposition  Anticipate discharge home once patient has a therapeutic INR    Review of Systems  Review of Systems   Constitutional: Negative.    Eyes: Negative for blurred vision, double vision, photophobia and pain.   Respiratory: Negative.    Cardiovascular: Negative.    Gastrointestinal: Negative for abdominal pain, nausea and vomiting.   Genitourinary: Negative for dysuria, frequency and urgency.   Skin: Positive for rash. Negative for itching.   Neurological: Negative for dizziness and headaches.        Physical Exam  Temp:  [36.1 °C (97 °F)-36.5 °C (97.7 °F)] 36.1 °C (97 °F)  Pulse:  [67-85] 74  Resp:  [16-18] 16  BP: (106-131)/(64-74) 115/69  SpO2:  [92 %-94 %] 94 %    Physical Exam  Vitals signs and nursing note reviewed.   Constitutional:       General: He is not in acute distress.     Appearance: He is not ill-appearing.   HENT:      Head: Normocephalic.      Nose: No congestion or rhinorrhea.      Mouth/Throat:      Mouth: Mucous membranes are moist.   Eyes:      General: No scleral icterus.        Right eye: No discharge.         Left eye: No discharge.   Neck:      Musculoskeletal: Normal range of motion and neck supple.   Cardiovascular:      Rate and Rhythm: Regular rhythm.      Pulses: Normal pulses.      Heart sounds: Murmur present. No friction rub. No gallop.    Pulmonary:      Effort:  Pulmonary effort is normal. No respiratory distress.      Breath sounds: Normal breath sounds. No stridor. No wheezing or rhonchi.   Abdominal:      General: Bowel sounds are normal. There is no distension.      Palpations: Abdomen is soft. There is no mass.      Tenderness: There is no abdominal tenderness. There is no right CVA tenderness, left CVA tenderness, guarding or rebound.      Hernia: No hernia is present.   Musculoskeletal: Normal range of motion.      Right lower leg: No edema.      Left lower leg: No edema.   Skin:     General: Skin is warm and dry.      Coloration: Skin is not jaundiced.      Findings: Rash (Back ) present. No erythema.   Neurological:      General: No focal deficit present.      Mental Status: He is alert and oriented to person, place, and time. Mental status is at baseline.      Comments: Right-sided homonymous hemianopia, hoarseness and dysarthria.  Minimal right-sided droop.  Minimal if any right-sided motor deficits but overall power is 5 out of 5 in bilateral upper and lower extremities.   Psychiatric:         Mood and Affect: Mood normal.         Behavior: Behavior normal.         Thought Content: Thought content normal.         Judgment: Judgment normal.         Fluids    Intake/Output Summary (Last 24 hours) at 6/17/2020 1238  Last data filed at 6/17/2020 0007  Gross per 24 hour   Intake --   Output 850 ml   Net -850 ml       Laboratory  Recent Labs     06/16/20 0457   WBC 4.7*   RBC 5.15   HEMOGLOBIN 15.7   HEMATOCRIT 47.1   MCV 91.5   MCH 30.5   MCHC 33.3*   RDW 43.3   PLATELETCT 262   MPV 9.3     Recent Labs     06/16/20  0457   SODIUM 137   POTASSIUM 4.4   CHLORIDE 103   CO2 26   GLUCOSE 98   BUN 10   CREATININE 1.08   CALCIUM 9.0     Recent Labs     06/15/20  0334  06/16/20  0457 06/16/20  1024 06/16/20  1731 06/17/20  0028   APTT  --    < >  --  102.0* 71.8* 73.9*   INR 1.00  --  1.09  --   --  1.19*    < > = values in this interval not displayed.            "    Imaging  EC-ECHOCARDIOGRAM COMPLETE W/ CONT   Final Result      MR-BRAIN-WITH & W/O   Final Result            1.  Acute gyriform area of infarction involving the left medial occipital lobe extending to the cortex.      2.  Age-related cerebral atrophy      3.  Minimal periventricular white matter changes consistent with chronic microvascular ischemic gliosis.      DX-CHEST-PORTABLE (1 VIEW)   Final Result      1.  There is no acute cardiopulmonary process.      CT-CEREBRAL PERFUSION ANALYSIS   Final Result      1.  Cerebral blood flow less than 30% likely representing completed infarct = 0 mL.      2.  T Max more than 6 seconds likely representing combination of completed infarct and ischemia = 4 mL.      3.  Mismatched volume likely representing ischemic brain/penumbra = 4 mL      4.  Please note that the cerebral perfusion was performed on the limited brain tissue around the basal ganglia region. Infarct/ischemia outside the CT perfusion sections can be missed in this study.      CT-CTA NECK WITH & W/O-POST PROCESSING   Final Result      1.  There is no evidence of vascular occlusion or clinically significant stenosis.      CT-CTA HEAD WITH & W/O-POST PROCESS   Final Result      CT angiogram of the [place] within normal limits.      Small hypodensity in medial left occipital lobe again noted suspicious for small acute/subacute infarct. No acute intracranial hemorrhage.      CT-HEAD W/O   Final Result      1.  There is no acute intracranial hemorrhage.   2.  There is a small area of hypodensity in the left medial occipital lobe which could represent an area of evolving ischemia.   3.  There is mild diffuse atrophy.           Assessment/Plan  * Acute CVA (cerebrovascular accident) (HCC)- (present on admission)  Assessment & Plan  Embolic - Cardiac in etiology  MRI of the brain revealing \"Acute gyriform area of infarction involving the left medial occipital lobe extending to the cortex\"   Vascular " evaluation of the head and neck is negative   Patient with a left ventricular mural thrombus  Okay for anticoagulation per neurology    Patient is on IV heparin at this time, monitoring heparin infusion, monitoring APTT  Continue Coumadin, daily monitoring of CBC, INR, APTT as per protocol of heparin infusion.   Still with subtherapeutic IN of 1.19. Goal INR 2-3. Continue to trend and pharmacy adjusting dose per protocol.     In addition patient on Plavix and high intensity statin therapy per cardiology recommendations.    PT/OT/SLP following  Aspiration, fall precautions      NSTEMI (non-ST elevated myocardial infarction) (HCC)- (present on admission)  Assessment & Plan  Likely reflects subacute MI  Echocardiogram with wall motion abnormalities  Cardiology team evaluating  On Plavix, high intensity statin therapy and anticoagulation as reviewed above  Further recommendations per cardiology team    Parkinson disease ([facility])- (present on admission)  Assessment & Plan  Continue home levodopa carbidopa, amantadine     Right-sided low back pain without sciatica- (present on admission)  Assessment & Plan  Chronic, multimodal pain control, patient takes Tylenol PRN       VTE prophylaxis: IV heparin bridging to warfarin.     I have performed a physical exam and reviewed and updated ROS and Plan today (6/17/2020). In review of yesterday's note (6/16/2020), there are no changes except as documented above.

## 2020-06-18 LAB
ANION GAP SERPL CALC-SCNC: 10 MMOL/L (ref 7–16)
APTT PPP: 83.2 SEC (ref 24.7–36)
BUN SERPL-MCNC: 10 MG/DL (ref 8–22)
CALCIUM SERPL-MCNC: 9.3 MG/DL (ref 8.5–10.5)
CHLORIDE SERPL-SCNC: 98 MMOL/L (ref 96–112)
CO2 SERPL-SCNC: 25 MMOL/L (ref 20–33)
CREAT SERPL-MCNC: 1.04 MG/DL (ref 0.5–1.4)
EKG IMPRESSION: NORMAL
EKG IMPRESSION: NORMAL
GLUCOSE SERPL-MCNC: 127 MG/DL (ref 65–99)
INR PPP: 1.23 (ref 0.87–1.13)
MAGNESIUM SERPL-MCNC: 2.1 MG/DL (ref 1.5–2.5)
POTASSIUM SERPL-SCNC: 4.2 MMOL/L (ref 3.6–5.5)
PROTHROMBIN TIME: 15.9 SEC (ref 12–14.6)
SODIUM SERPL-SCNC: 133 MMOL/L (ref 135–145)
TROPONIN T SERPL-MCNC: 85 NG/L (ref 6–19)

## 2020-06-18 PROCEDURE — 99233 SBSQ HOSP IP/OBS HIGH 50: CPT | Performed by: HOSPITALIST

## 2020-06-18 PROCEDURE — 85730 THROMBOPLASTIN TIME PARTIAL: CPT

## 2020-06-18 PROCEDURE — 36415 COLL VENOUS BLD VENIPUNCTURE: CPT

## 2020-06-18 PROCEDURE — 83735 ASSAY OF MAGNESIUM: CPT

## 2020-06-18 PROCEDURE — 700102 HCHG RX REV CODE 250 W/ 637 OVERRIDE(OP): Performed by: HOSPITALIST

## 2020-06-18 PROCEDURE — 700102 HCHG RX REV CODE 250 W/ 637 OVERRIDE(OP): Performed by: INTERNAL MEDICINE

## 2020-06-18 PROCEDURE — 93010 ELECTROCARDIOGRAM REPORT: CPT | Performed by: INTERNAL MEDICINE

## 2020-06-18 PROCEDURE — 93005 ELECTROCARDIOGRAM TRACING: CPT | Performed by: INTERNAL MEDICINE

## 2020-06-18 PROCEDURE — 80048 BASIC METABOLIC PNL TOTAL CA: CPT

## 2020-06-18 PROCEDURE — 700111 HCHG RX REV CODE 636 W/ 250 OVERRIDE (IP): Performed by: INTERNAL MEDICINE

## 2020-06-18 PROCEDURE — 93010 ELECTROCARDIOGRAM REPORT: CPT | Mod: 77 | Performed by: INTERNAL MEDICINE

## 2020-06-18 PROCEDURE — 84484 ASSAY OF TROPONIN QUANT: CPT

## 2020-06-18 PROCEDURE — A9270 NON-COVERED ITEM OR SERVICE: HCPCS | Performed by: HOSPITALIST

## 2020-06-18 PROCEDURE — A9270 NON-COVERED ITEM OR SERVICE: HCPCS | Performed by: INTERNAL MEDICINE

## 2020-06-18 PROCEDURE — 85610 PROTHROMBIN TIME: CPT

## 2020-06-18 PROCEDURE — 770020 HCHG ROOM/CARE - TELE (206)

## 2020-06-18 RX ADMIN — CARBIDOPA AND LEVODOPA 1 TABLET: 25; 100 TABLET ORAL at 15:41

## 2020-06-18 RX ADMIN — ATORVASTATIN CALCIUM 80 MG: 80 TABLET, FILM COATED ORAL at 18:24

## 2020-06-18 RX ADMIN — CLOPIDOGREL BISULFATE 75 MG: 75 TABLET ORAL at 05:29

## 2020-06-18 RX ADMIN — CARBIDOPA AND LEVODOPA 1 TABLET: 25; 100 TABLET ORAL at 05:49

## 2020-06-18 RX ADMIN — AMANTADINE HYDROCHLORIDE 100 MG: 100 CAPSULE, GELATIN COATED ORAL at 05:28

## 2020-06-18 RX ADMIN — HEPARIN SODIUM 1250 UNITS/HR: 5000 INJECTION, SOLUTION INTRAVENOUS at 09:47

## 2020-06-18 RX ADMIN — WARFARIN SODIUM 7.5 MG: 7.5 TABLET ORAL at 18:24

## 2020-06-18 RX ADMIN — ALPRAZOLAM 0.25 MG: 0.25 TABLET ORAL at 21:17

## 2020-06-18 RX ADMIN — CARBIDOPA AND LEVODOPA 1 TABLET: 25; 100 TABLET ORAL at 21:47

## 2020-06-18 ASSESSMENT — ENCOUNTER SYMPTOMS
BLURRED VISION: 0
EYE PAIN: 0
CONSTITUTIONAL NEGATIVE: 1
NAUSEA: 0
ABDOMINAL PAIN: 0
CARDIOVASCULAR NEGATIVE: 1
VOMITING: 0
DOUBLE VISION: 0
PHOTOPHOBIA: 0
RESPIRATORY NEGATIVE: 1
HEADACHES: 0
DIZZINESS: 0

## 2020-06-18 NOTE — CARE PLAN
Problem: Venous Thromboembolism (VTW)/Deep Vein Thrombosis (DVT) Prevention:  Goal: Patient will participate in Venous Thrombosis (VTE)/Deep Vein Thrombosis (DVT)Prevention Measures  Outcome: PROGRESSING AS EXPECTED     Problem: Knowledge Deficit  Goal: Knowledge of disease process/condition, treatment plan, diagnostic tests, and medications will improve  Outcome: PROGRESSING AS EXPECTED  Note: Educated this pt about vitamin K intake, heparin, coumadin, current plan of care, and future treatment plan with [place] at bedside also educating pt, pt accepting and eager to learn      Problem: Self-Care:  Goal: Ability to participate in self-care as condition permits will improve  Outcome: PROGRESSING AS EXPECTED  Note: Pt performing ADLs including showering independently with stand by assist for safety,      Problem: Knowledge Deficit:  Goal: Knowledge of disease process/condition, treatment plan, diagnostic tests, and medications will improve  Outcome: PROGRESSING AS EXPECTED  Note: Educated this pt about vitamin K intake, heparin, coumadin, current plan of care, and future treatment plan with [place] at bedside also educating pt, pt accepting and eager to learn      Problem: Safety  Goal: Will remain free from injury  Outcome: PROGRESSING AS EXPECTED  Goal: Will remain free from falls  Outcome: PROGRESSING AS EXPECTED

## 2020-06-18 NOTE — CARE PLAN
Problem: Pain Management  Goal: Pain level will decrease to patient's comfort goal  Outcome: PROGRESSING AS EXPECTED  Intervention: Follow pain managment plan developed in collaboration with patient and Interdisciplinary Team  Note: No pain     Problem: Infection  Goal: Will remain free from infection  Outcome: PROGRESSING AS EXPECTED  Note: No falls     Problem: Infection:  Goal: Will remain free from infection  Outcome: PROGRESSING AS EXPECTED  Note: No falls

## 2020-06-18 NOTE — PROGRESS NOTES
[facility] Medicine Daily Progress Note    Date of Service  6/18/2020    Chief Complaint  69 y.o. male admitted 6/12/2020 with Loss of vision    Hospital Course    Patient admitted with right-sided visual complaints and found to have an acute stroke      Interval Problem Update  6/16: No overnight events. He denies any complaints at this time. Still with subtherapeutic INR. VSS on room air.     6/17: No overnight events. VSS on room air. Patient denies any physical complaints. Continue here until INR of 2.   6/18: EKG ordered overnight for ST elevation on Telemonitor, and without evidence of acute infarction. Patient denies any symptoms of chest pain or discomfort, SOB, dyspnea, or diaphoresis. Troponin down to 85. Patient was doing well on room air this morning and denied any physical complaints. INR slowly trending toward goal, now 1.23.     Consultants/Specialty  Cardiology  Neurology , signed off     Code Status  FULL CODE     Disposition  Anticipate discharge home once patient has a therapeutic INR    Review of Systems  Review of Systems   Constitutional: Negative.    Eyes: Negative for blurred vision, double vision, photophobia and pain.   Respiratory: Negative.    Cardiovascular: Negative.    Gastrointestinal: Negative for abdominal pain, nausea and vomiting.   Genitourinary: Negative for dysuria, frequency and urgency.   Skin: Negative for itching.   Neurological: Negative for dizziness and headaches.        Physical Exam  Temp:  [36.1 °C (96.9 °F)-36.2 °C (97.2 °F)] 36.1 °C (96.9 °F)  Pulse:  [74-81] 77  Resp:  [16-18] 18  BP: (103-122)/(68-75) 122/75  SpO2:  [92 %-99 %] 99 %    Physical Exam  Vitals signs and nursing note reviewed.   Constitutional:       General: He is not in acute distress.     Appearance: He is not ill-appearing.   HENT:      Head: Normocephalic.      Nose: No congestion or rhinorrhea.      Mouth/Throat:      Mouth: Mucous membranes are moist.   Eyes:      General: No scleral icterus.         Right eye: No discharge.         Left eye: No discharge.   Neck:      Musculoskeletal: Normal range of motion and neck supple.   Cardiovascular:      Rate and Rhythm: Normal rate and regular rhythm.      Pulses: Normal pulses.      Heart sounds: No friction rub. No gallop.    Pulmonary:      Effort: Pulmonary effort is normal. No respiratory distress.      Breath sounds: Normal breath sounds. No stridor. No wheezing or rhonchi.   Abdominal:      General: Bowel sounds are normal. There is no distension.      Palpations: Abdomen is soft. There is no mass.      Tenderness: There is no abdominal tenderness. There is no right CVA tenderness, left CVA tenderness, guarding or rebound.      Hernia: No hernia is present.   Musculoskeletal: Normal range of motion.      Right lower leg: No edema.      Left lower leg: No edema.   Skin:     General: Skin is warm and dry.      Coloration: Skin is not jaundiced.      Findings: No erythema.   Neurological:      General: No focal deficit present.      Mental Status: He is alert and oriented to person, place, and time. Mental status is at baseline.      Comments: Right-sided homonymous hemianopia, hoarseness and dysarthria.  Minimal right-sided droop.  Minimal if any right-sided motor deficits but overall power is 5 out of 5 in bilateral upper and lower extremities.   Psychiatric:         Mood and Affect: Mood normal.         Behavior: Behavior normal.         Thought Content: Thought content normal.         Judgment: Judgment normal.         Fluids    Intake/Output Summary (Last 24 hours) at 6/18/2020 1057  Last data filed at 6/18/2020 0500  Gross per 24 hour   Intake 2090.8 ml   Output 750 ml   Net 1340.8 ml       Laboratory  Recent Labs     06/16/20 0457   WBC 4.7*   RBC 5.15   HEMOGLOBIN 15.7   HEMATOCRIT 47.1   MCV 91.5   MCH 30.5   MCHC 33.3*   RDW 43.3   PLATELETCT 262   MPV 9.3     Recent Labs     06/16/20 0457 06/18/20  0906   SODIUM 137 133*   POTASSIUM 4.4 4.2    CHLORIDE 103 98   CO2 26 25   GLUCOSE 98 127*   BUN 10 10   CREATININE 1.08 1.04   CALCIUM 9.0 9.3     Recent Labs     06/16/20  0457  06/16/20  1731 06/17/20  0028 06/18/20  0047   APTT  --    < > 71.8* 73.9* 83.2*   INR 1.09  --   --  1.19* 1.23*    < > = values in this interval not displayed.               Imaging  EC-ECHOCARDIOGRAM COMPLETE W/ CONT   Final Result      MR-BRAIN-WITH & W/O   Final Result            1.  Acute gyriform area of infarction involving the left medial occipital lobe extending to the cortex.      2.  Age-related cerebral atrophy      3.  Minimal periventricular white matter changes consistent with chronic microvascular ischemic gliosis.      DX-CHEST-PORTABLE (1 VIEW)   Final Result      1.  There is no acute cardiopulmonary process.      CT-CEREBRAL PERFUSION ANALYSIS   Final Result      1.  Cerebral blood flow less than 30% likely representing completed infarct = 0 mL.      2.  T Max more than 6 seconds likely representing combination of completed infarct and ischemia = 4 mL.      3.  Mismatched volume likely representing ischemic brain/penumbra = 4 mL      4.  Please note that the cerebral perfusion was performed on the limited brain tissue around the basal ganglia region. Infarct/ischemia outside the CT perfusion sections can be missed in this study.      CT-CTA NECK WITH & W/O-POST PROCESSING   Final Result      1.  There is no evidence of vascular occlusion or clinically significant stenosis.      CT-CTA HEAD WITH & W/O-POST PROCESS   Final Result      CT angiogram of the [place] within normal limits.      Small hypodensity in medial left occipital lobe again noted suspicious for small acute/subacute infarct. No acute intracranial hemorrhage.      CT-HEAD W/O   Final Result      1.  There is no acute intracranial hemorrhage.   2.  There is a small area of hypodensity in the left medial occipital lobe which could represent an area of evolving ischemia.   3.  There is mild  "diffuse atrophy.           Assessment/Plan  * Acute CVA (cerebrovascular accident) ([facility])- (present on admission)  Assessment & Plan  Embolic - Cardiac in etiology  MRI of the brain revealing \"Acute gyriform area of infarction involving the left medial occipital lobe extending to the cortex\"   Vascular evaluation of the head and neck is negative   Patient with a left ventricular mural thrombus  Okay for anticoagulation per neurology    Patient is on IV heparin at this time, monitoring heparin infusion, monitoring APTT  Continue Coumadin, daily monitoring of CBC, INR, APTT as per protocol of heparin infusion.   Goal INR 2-3. Continue to trend and pharmacy adjusting dose per protocol.     In addition patient on Plavix and high intensity statin therapy per cardiology recommendations.    PT/OT/SLP following  Aspiration, fall precautions      NSTEMI (non-ST elevated myocardial infarction) ([facility])- (present on admission)  Assessment & Plan  EKG suggestive of old infarct   Echocardiogram with wall motion abnormalities  On Plavix, high intensity statin therapy and anticoagulation as reviewed above  Follow-up with cardiology as outpatient    Denies heartburn, chest pain or discomfort   Continuous cardiac monitoring     Parkinson disease ([facility])- (present on admission)  Assessment & Plan  Continue home levodopa carbidopa, amantadine     Right-sided low back pain without sciatica- (present on admission)  Assessment & Plan  Chronic, multimodal pain control, patient takes Tylenol PRN  Pain well controlled this morning        VTE prophylaxis: IV heparin bridging to warfarin.     I have performed a physical exam and reviewed and updated ROS and Plan today (6/18/2020). In review of yesterday's note (6/17/2020), there are no changes except as documented above.       "

## 2020-06-18 NOTE — PROGRESS NOTES
"Brief Cardiology Progress Note    Nurse asked if I would come speak with pt, his wife, and brother,  Mayo.  They had several questions.    I reviewed his chart thoroughtly.     Patient presented on 6/12/20 after seeing his ophthalmologist for R lower/lateral visual field deficits.  He was promptly referred to the ER where he was found to have L occipital infarct secondary to L PCA occlusion.  Further investigations found LV thrombus on Echo, EKG suggestive of old anterior infarct.   Patient admits he had \"heartburn\" symptoms several days prior to presentation.  He received Plavix bolus 300 mg followed by 75 mg daily, has been on heparin ggt with bridging to warfarin since 6/14/20 and high intensity statin.     I went over the patient's pathologies in detail with him and his family.     His brother specifically asked why we did not take him for PCI.   I explained why we would not promptly revascularize his late presentation MI - long-term outcomes were not shown to be improved with revascularization after 72 hours of the MI, in-fact there may be higher risk of re-infarct (OAT trial).  Additionally angiogram would be very high risk in the setting of acute CVA.      He was CP free at presentation and has been stable overall.  In-fact his EF is nearly preserved and he has no symptoms of CHF at this time.  Further ischemic evaluation may be discussed in the future.    He and his wife want to get established with a cardiologist in person while here.  Though the very much liked Dr. Owens, I explained that he was the electrophysiologist.   I asked Dr. Rodríguez if he would stop in tomorrow to meet and establish with patient.   He is agreeable.       Future Appointments   Date Time Provider Department Center   7/20/2020  3:30 PM MARITZA Poon. [facility] None       Lovely Manzano PA-C  6/17/2020      Met with patient at bedside, care discussed with him and his brother. I support prior recommendations for a period of " recovery from the stroke before proceeding with cardiac catheterization. I anticipate delaying catheterization 4-6 weeks. At which time he would need bridging with Lovenox. He knows to report any new chest pain, which might prompt us to seek catheterization sooner.

## 2020-06-18 NOTE — PROGRESS NOTES
0000 Monitor room called to ask me to change electrodes from telemonitor on patient because it appeared to show ST elevation. Assessed patient and patient resting on right side, asked if pain and patient stated no. Changed all 6 electrodes.   0015 Called back to monitor room which they state still shows ST elevation and will tube a strip to bedside  0020 Called hospitalistOnesimo S, for orders due to ST elevation on tele monitor. New orders placed.  0021 Called STAT EKG at [phone], left a voicemail.   0030 Updated charge nurse, she stated to call [phone]. Call placed to [phone] which never went through.   0035 Charge nurse informed   0040 Charge nurse stated would call NAM.   0058 EKG at bedside  0102 Hospitalist repaged with results from EKG  0115 Hospitalist called back, updated of cardiology note and EKG findings. Stated she will place orders in for 3 troponins. Orders to call her back with results.   0210

## 2020-06-18 NOTE — PROGRESS NOTES
Monitor Summary: SR with ST elevation with rare PAC and rare PVC 71-89, NV .22, QRS .08, QT .38 per strip from monitor room

## 2020-06-18 NOTE — PROGRESS NOTES
Pt alert and oriented x4, denies pain, denies numbness and tingling. Denies chest pain, shortness of breath, dizziness, heartburn, nausea, arm pain, jaw pain. Discussed EKG results and troponin results with KEIKO España at bedside, no new orders, stated plan is to monitor for new onset of symptoms, will continue to monitor. Discussed plan of care, heparin drip running at rate verfied by 2nd RN, bed low and locked, call light and belongings within reach, nonskid socks on, bed alarm on, all needs met at this time.

## 2020-06-19 LAB
ERYTHROCYTE [DISTWIDTH] IN BLOOD BY AUTOMATED COUNT: 42.2 FL (ref 35.9–50)
HCT VFR BLD AUTO: 47.7 % (ref 42–52)
HGB BLD-MCNC: 16.1 G/DL (ref 14–18)
INR PPP: 1.8 (ref 0.87–1.13)
MCH RBC QN AUTO: 30.4 PG (ref 27–33)
MCHC RBC AUTO-ENTMCNC: 33.8 G/DL (ref 33.7–35.3)
MCV RBC AUTO: 90.2 FL (ref 81.4–97.8)
PLATELET # BLD AUTO: 251 K/UL (ref 164–446)
PMV BLD AUTO: 9.2 FL (ref 9–12.9)
PROTHROMBIN TIME: 21.5 SEC (ref 12–14.6)
RBC # BLD AUTO: 5.29 M/UL (ref 4.7–6.1)
WBC # BLD AUTO: 5.6 K/UL (ref 4.8–10.8)

## 2020-06-19 PROCEDURE — 700102 HCHG RX REV CODE 250 W/ 637 OVERRIDE(OP): Performed by: INTERNAL MEDICINE

## 2020-06-19 PROCEDURE — A9270 NON-COVERED ITEM OR SERVICE: HCPCS | Performed by: HOSPITALIST

## 2020-06-19 PROCEDURE — 770020 HCHG ROOM/CARE - TELE (206)

## 2020-06-19 PROCEDURE — 700102 HCHG RX REV CODE 250 W/ 637 OVERRIDE(OP): Performed by: HOSPITALIST

## 2020-06-19 PROCEDURE — 36415 COLL VENOUS BLD VENIPUNCTURE: CPT

## 2020-06-19 PROCEDURE — 700111 HCHG RX REV CODE 636 W/ 250 OVERRIDE (IP): Performed by: INTERNAL MEDICINE

## 2020-06-19 PROCEDURE — 99232 SBSQ HOSP IP/OBS MODERATE 35: CPT | Performed by: HOSPITALIST

## 2020-06-19 PROCEDURE — 85610 PROTHROMBIN TIME: CPT | Mod: 91

## 2020-06-19 PROCEDURE — 85027 COMPLETE CBC AUTOMATED: CPT

## 2020-06-19 PROCEDURE — A9270 NON-COVERED ITEM OR SERVICE: HCPCS | Performed by: INTERNAL MEDICINE

## 2020-06-19 RX ADMIN — AMANTADINE HYDROCHLORIDE 100 MG: 100 CAPSULE, GELATIN COATED ORAL at 04:17

## 2020-06-19 RX ADMIN — WARFARIN SODIUM 7.5 MG: 7.5 TABLET ORAL at 18:27

## 2020-06-19 RX ADMIN — CLOPIDOGREL BISULFATE 75 MG: 75 TABLET ORAL at 04:17

## 2020-06-19 RX ADMIN — CARBIDOPA AND LEVODOPA 1 TABLET: 25; 100 TABLET ORAL at 23:43

## 2020-06-19 RX ADMIN — ALPRAZOLAM 0.25 MG: 0.25 TABLET ORAL at 23:43

## 2020-06-19 RX ADMIN — CARBIDOPA AND LEVODOPA 1 TABLET: 25; 100 TABLET ORAL at 06:42

## 2020-06-19 RX ADMIN — ATORVASTATIN CALCIUM 80 MG: 80 TABLET, FILM COATED ORAL at 18:27

## 2020-06-19 RX ADMIN — ALPRAZOLAM 0.25 MG: 0.25 TABLET ORAL at 20:27

## 2020-06-19 RX ADMIN — HEPARIN SODIUM 1250 UNITS/HR: 5000 INJECTION, SOLUTION INTRAVENOUS at 05:19

## 2020-06-19 RX ADMIN — CARBIDOPA AND LEVODOPA 1 TABLET: 25; 100 TABLET ORAL at 15:23

## 2020-06-19 ASSESSMENT — ENCOUNTER SYMPTOMS
DIZZINESS: 0
PHOTOPHOBIA: 0
EYE PAIN: 0
ABDOMINAL PAIN: 0
BLURRED VISION: 0
HEADACHES: 0
RESPIRATORY NEGATIVE: 1
CONSTITUTIONAL NEGATIVE: 1
DOUBLE VISION: 0
CARDIOVASCULAR NEGATIVE: 1
VOMITING: 0
NAUSEA: 0

## 2020-06-19 ASSESSMENT — FIBROSIS 4 INDEX: FIB4 SCORE: 1.51

## 2020-06-19 NOTE — PROGRESS NOTES
Inpatient Anticoagulation Service Note    Date: 6/19/2020    Reason for Anticoagulation: Stroke   Target INR: 2.0 to 3.0         Hemoglobin Value: 16.1  Hematocrit Value: 47.7  Lab Platelet Value: 251    INR from last 7 days     Date/Time INR Value    06/19/20 0358  (!) 1.8    06/18/20 0047  (!) 1.23    06/17/20 0028  (!) 1.19    06/16/20 0457  1.09    06/15/20 0334  1    06/13/20 1500  0.97    06/12/20 1339  0.96        Dose from last 7 days     Date/Time Dose (mg)    06/19/20 1246  7.5    06/18/20 1751  7.5    06/17/20 0859  7.5    06/16/20 1139  5    06/15/20 1247  5    06/14/20 1100  6        Average Dose (mg): (New start this admission)  Significant Interactions: Statin, Clopidogrel  Bridge Therapy: Yes  Bridge Therapy Start Date: 06/14/20  INR Value Greater than 2 Prior to Discontinuation of Parenteral Anticoagulation: Not Applicable (    Reversal Agent Administered: Not Applicable  Comments: Day 6 of warfarin therapy tonight. INR increased, hopefully will be within goal range tomorrow. Continue with 7.5 mg PO dose. Will assess tomorrow better discharge warfarin dosing. May need a couple 5 mg doses mixed with 7.5 mg daily doses. Plavix per cardiology for NSTEMI with planned cath in the future.    Plan:  Warfarin 7.5 mg PO  Education Material Provided?: Yes  Pharmacist suggested discharge dosing: warfarin 7.5 mg PO qday with INR within 48-72 hour of discharge.     Ermelinda Hernadez, PharmD

## 2020-06-19 NOTE — PROGRESS NOTES
Pt alert and oriented x4, denies pain, denies numbness and tingling. Denies chest pain, shortness of breath, dizziness, heartburn, nausea, arm pain, jaw pain.Discussed plan of care, heparin drip running at rate verfied by 2nd RN, bed low and locked, call light and belongings within reach, nonskid socks on, bed alarm on, all needs met at this time

## 2020-06-19 NOTE — CARE PLAN
Problem: Discharge Barriers/Planning  Goal: Patient's continuum of care needs will be met  Outcome: PROGRESSING AS EXPECTED  Note: Pt updated on plan of care. Care coordination involved.      Problem: Peripheral Vascular Perfusion:  Goal: Neurologic status will improve  Outcome: PROGRESSING AS EXPECTED  Note: Pt a&O x 4, ambulating all extremities with no ataxia, denies numbness or tingling. Absent of dysarthria or dysphagia.      Problem: Nutritional:  Goal: Dysphagia will improve  Outcome: PROGRESSING AS EXPECTED  Note: Pt tolerating oral intake adequately. HOB elevated above 45 degrees with meals. Bilateral lung sounds clear. Denies cough.     Problem: Urinary:  Goal: Ability to maintain continence will improve  Outcome: PROGRESSING AS EXPECTED  Note: Pt voiding appropriately using urinal or restroom.      Problem: Safety  Goal: Will remain free from falls  Outcome: PROGRESSING AS EXPECTED  Note: Bed alarm on, wheels locked, in lowest position. Non skid socks applied. Call light within reach.

## 2020-06-19 NOTE — CARE PLAN
Problem: Venous Thromboembolism (VTW)/Deep Vein Thrombosis (DVT) Prevention:  Goal: Patient will participate in Venous Thrombosis (VTE)/Deep Vein Thrombosis (DVT)Prevention Measures  Outcome: PROGRESSING AS EXPECTED  Intervention: Encourage patient to perform ankle flex, foot rotation, and knee flex exercises in addition to other prophylatic measures every hour while awake  Note: Educated pt about range of motion exercises to prevent DVTs     Problem: Knowledge Deficit  Goal: Knowledge of disease process/condition, treatment plan, diagnostic tests, and medications will improve  Outcome: PROGRESSING AS EXPECTED

## 2020-06-19 NOTE — PROGRESS NOTES
Monitor summary: SR 71-80, MN 0.22, QRS 0.08, QT 0.42, with rare PVCs and per strip from monitor room.

## 2020-06-19 NOTE — PROGRESS NOTES
Inpatient Anticoagulation Service Note    Date: 6/18/2020    Reason for Anticoagulation: Stroke, L ventricular mural thrombus  Target INR: 2.0 to 3.0         Hemoglobin Value: 15.7  Hematocrit Value: 47.1  Lab Platelet Value: 262    INR from last 7 days     Date/Time INR Value    06/18/20 0047  (!) 1.23    06/17/20 0028  (!) 1.19    06/16/20 0457  1.09    06/15/20 0334  1    06/13/20 1500  0.97    06/12/20 1339  0.96        Dose from last 7 days     Date/Time Dose (mg)    06/18/20 1751  7.5    06/17/20 0859  7.5    06/16/20 1139  5    06/15/20 1247  5    06/14/20 1100  6        Average Dose (mg): New start this admission  Significant Interactions: Statin, Clopidogrel  Bridge Therapy: Yes  Bridge Therapy Start Date: 06/14/20  Days of Overlap Therapy: 4  INR Value Greater than 2 Prior to Discontinuation of Parenteral Anticoagulation: Not Applicable   Reversal Agent Administered: Not Applicable    Comments: Remains on heparin drip for new stroke with a mural thrombus.  Coumadin initiated and dose increased starting last night.  All ordered doses charted as given.  INR today beginning to trend upwards.  Remains on Plavix combination therapy per cardiology recommendations with NSTEMI - per notes possible cath in 4-6 weeks.  No H/H today but previously stable and no documented s/s bleeding at this time.  D/W hospitalist APRN this evening to consider possible therapeutic bridge with Lovenox as an outpatient, if cardiology is in agreeance.      Plan:  Continue Coumadin 7.5 mg daily.  Therapeutic bridge therapy for at least 5 days of overlap and two therapeutic INRs.  INR daily x 4 ordered.  If patiet to remain in hospital to complete bridge therapy and INR not trending upwards again tomorrow would recommend increase of dose.  Pharmacy to monitor and adjust as needed.      Education Material Provided?: Yes  Pharmacist suggested discharge dosing: Coumadin 7.5 mg daily with therapeutic bridge for at least 5 days of overlap and  2 therapeutic INRs.  INR follow up 48 - 72 hrs post discharge.        Cintia Mendiola, PharmD, BCPS

## 2020-06-19 NOTE — PROGRESS NOTES
Monitor Summary: SR rate 67-80, NH -.24, QRS -.08, QT -.36, with rare PVC per strip from the monitor room.

## 2020-06-19 NOTE — PROGRESS NOTES
[facility] Medicine Daily Progress Note    Date of Service  6/19/2020    Chief Complaint  69 y.o. male admitted 6/12/2020 with Loss of vision    Hospital Course    Patient admitted with right-sided visual complaints and found to have an acute stroke      Interval Problem Update  6/16: No overnight events. He denies any complaints at this time. Still with subtherapeutic INR. VSS on room air.     6/17: No overnight events. VSS on room air. Patient denies any physical complaints. Continue here until INR of 2.   6/18: EKG ordered overnight for ST elevation on Telemonitor, and without evidence of acute infarction. Patient denies any symptoms of chest pain or discomfort, SOB, dyspnea, or diaphoresis. Troponin down to 85. Patient was doing well on room air this morning and denied any physical complaints. INR slowly trending toward goal, now 1.23.   6/19: No acute events overnight. Denies any complaints. INR 1.80 today. I anticipate discharge tomorrow or Sunday once INR is at goal.     Consultants/Specialty  Cardiology  Neurology , signed off     Code Status  FULL CODE     Disposition  Anticipate discharge home once patient has a therapeutic INR    Review of Systems  Review of Systems   Constitutional: Negative.    Eyes: Negative for blurred vision, double vision, photophobia and pain.   Respiratory: Negative.    Cardiovascular: Negative.    Gastrointestinal: Negative for abdominal pain, nausea and vomiting.   Genitourinary: Negative for dysuria, frequency and urgency.   Skin: Negative for itching.   Neurological: Negative for dizziness and headaches.        Physical Exam  Temp:  [36.1 °C (96.9 °F)-36.4 °C (97.6 °F)] 36.4 °C (97.6 °F)  Pulse:  [76-83] 79  Resp:  [16-18] 16  BP: (102-124)/(68-79) 121/74  SpO2:  [92 %-94 %] 94 %    Physical Exam  Vitals signs and nursing note reviewed.   Constitutional:       General: He is not in acute distress.     Appearance: He is not ill-appearing.   HENT:      Head: Normocephalic.       Nose: No congestion or rhinorrhea.      Mouth/Throat:      Mouth: Mucous membranes are moist.   Eyes:      General: No scleral icterus.        Right eye: No discharge.         Left eye: No discharge.   Neck:      Musculoskeletal: Normal range of motion and neck supple.   Cardiovascular:      Rate and Rhythm: Normal rate and regular rhythm.      Pulses: Normal pulses.      Heart sounds: No friction rub. No gallop.    Pulmonary:      Effort: Pulmonary effort is normal. No respiratory distress.      Breath sounds: Normal breath sounds. No stridor. No wheezing or rhonchi.   Abdominal:      General: Bowel sounds are normal. There is no distension.      Palpations: Abdomen is soft. There is no mass.      Tenderness: There is no abdominal tenderness. There is no right CVA tenderness, left CVA tenderness, guarding or rebound.      Hernia: No hernia is present.   Musculoskeletal: Normal range of motion.      Right lower leg: No edema.      Left lower leg: No edema.   Skin:     General: Skin is warm and dry.      Coloration: Skin is not jaundiced.      Findings: No erythema.   Neurological:      General: No focal deficit present.      Mental Status: He is alert and oriented to person, place, and time. Mental status is at baseline.      Comments: Right-sided homonymous hemianopia, hoarseness and dysarthria.  Minimal right-sided droop.  Minimal if any right-sided motor deficits but overall power is 5 out of 5 in bilateral upper and lower extremities.   Psychiatric:         Mood and Affect: Mood normal.         Behavior: Behavior normal.         Thought Content: Thought content normal.         Judgment: Judgment normal.         Fluids    Intake/Output Summary (Last 24 hours) at 6/19/2020 1346  Last data filed at 6/19/2020 0756  Gross per 24 hour   Intake 560 ml   Output 2085 ml   Net -1525 ml       Laboratory  Recent Labs     06/19/20  0358   WBC 5.6   RBC 5.29   HEMOGLOBIN 16.1   HEMATOCRIT 47.7   MCV 90.2   MCH 30.4   MCHC  33.8   RDW 42.2   PLATELETCT 251   MPV 9.2     Recent Labs     06/18/20  0906   SODIUM 133*   POTASSIUM 4.2   CHLORIDE 98   CO2 25   GLUCOSE 127*   BUN 10   CREATININE 1.04   CALCIUM 9.3     Recent Labs     06/16/20  1731 06/17/20  0028 06/18/20  0047 06/19/20  0358   APTT 71.8* 73.9* 83.2*  --    INR  --  1.19* 1.23* 1.80*               Imaging  EC-ECHOCARDIOGRAM COMPLETE W/ CONT   Final Result      MR-BRAIN-WITH & W/O   Final Result            1.  Acute gyriform area of infarction involving the left medial occipital lobe extending to the cortex.      2.  Age-related cerebral atrophy      3.  Minimal periventricular white matter changes consistent with chronic microvascular ischemic gliosis.      DX-CHEST-PORTABLE (1 VIEW)   Final Result      1.  There is no acute cardiopulmonary process.      CT-CEREBRAL PERFUSION ANALYSIS   Final Result      1.  Cerebral blood flow less than 30% likely representing completed infarct = 0 mL.      2.  T Max more than 6 seconds likely representing combination of completed infarct and ischemia = 4 mL.      3.  Mismatched volume likely representing ischemic brain/penumbra = 4 mL      4.  Please note that the cerebral perfusion was performed on the limited brain tissue around the basal ganglia region. Infarct/ischemia outside the CT perfusion sections can be missed in this study.      CT-CTA NECK WITH & W/O-POST PROCESSING   Final Result      1.  There is no evidence of vascular occlusion or clinically significant stenosis.      CT-CTA HEAD WITH & W/O-POST PROCESS   Final Result      CT angiogram of the [place] within normal limits.      Small hypodensity in medial left occipital lobe again noted suspicious for small acute/subacute infarct. No acute intracranial hemorrhage.      CT-HEAD W/O   Final Result      1.  There is no acute intracranial hemorrhage.   2.  There is a small area of hypodensity in the left medial occipital lobe which could represent an area of evolving  "ischemia.   3.  There is mild diffuse atrophy.           Assessment/Plan  * Acute CVA (cerebrovascular accident) ([facility])- (present on admission)  Assessment & Plan  Embolic - Cardiac in etiology  MRI of the brain revealing \"Acute gyriform area of infarction involving the left medial occipital lobe extending to the cortex\"   Vascular evaluation of the head and neck is negative   Patient with a left ventricular mural thrombus  Okay for anticoagulation per neurology    Patient is on IV heparin at this time, monitoring heparin infusion, monitoring APTT  Continue Coumadin, daily monitoring of CBC, INR, APTT as per protocol of heparin infusion.   Goal INR 2-3. Continue to trend and pharmacy adjusting dose per protocol.     In addition patient on Plavix and high intensity statin therapy per cardiology recommendations.    PT/OT/SLP following  Aspiration, fall precautions      NSTEMI (non-ST elevated myocardial infarction) ([facility])- (present on admission)  Assessment & Plan  EKG suggestive of old infarct   Echocardiogram with wall motion abnormalities  On Plavix, high intensity statin therapy and anticoagulation as reviewed above  Follow-up with cardiology as outpatient    Denies heartburn, chest pain or discomfort   Continuous cardiac monitoring     Parkinson disease ([facility])- (present on admission)  Assessment & Plan  Continue home levodopa carbidopa, amantadine     Right-sided low back pain without sciatica- (present on admission)  Assessment & Plan  Chronic, multimodal pain control, patient takes Tylenol PRN  Pain well controlled this morning        VTE prophylaxis: IV heparin bridging to warfarin.     I have performed a physical exam and reviewed and updated ROS and Plan today (6/19/2020). In review of yesterday's note (6/18/2020), there are no changes except as documented above.       "

## 2020-06-20 VITALS
RESPIRATION RATE: 16 BRPM | HEIGHT: 74 IN | OXYGEN SATURATION: 91 % | WEIGHT: 191.8 LBS | BODY MASS INDEX: 24.62 KG/M2 | SYSTOLIC BLOOD PRESSURE: 110 MMHG | HEART RATE: 90 BPM | DIASTOLIC BLOOD PRESSURE: 76 MMHG | TEMPERATURE: 97.3 F

## 2020-06-20 PROBLEM — I21.4 NSTEMI (NON-ST ELEVATED MYOCARDIAL INFARCTION) (HCC): Status: RESOLVED | Noted: 2020-06-12 | Resolved: 2020-06-20

## 2020-06-20 LAB
INR PPP: 2.27 (ref 0.87–1.13)
PROTHROMBIN TIME: 25.7 SEC (ref 12–14.6)

## 2020-06-20 PROCEDURE — 700102 HCHG RX REV CODE 250 W/ 637 OVERRIDE(OP): Performed by: HOSPITALIST

## 2020-06-20 PROCEDURE — 700102 HCHG RX REV CODE 250 W/ 637 OVERRIDE(OP): Performed by: INTERNAL MEDICINE

## 2020-06-20 PROCEDURE — 700111 HCHG RX REV CODE 636 W/ 250 OVERRIDE (IP): Performed by: INTERNAL MEDICINE

## 2020-06-20 PROCEDURE — A9270 NON-COVERED ITEM OR SERVICE: HCPCS | Performed by: HOSPITALIST

## 2020-06-20 PROCEDURE — 99239 HOSP IP/OBS DSCHRG MGMT >30: CPT | Performed by: HOSPITALIST

## 2020-06-20 PROCEDURE — A9270 NON-COVERED ITEM OR SERVICE: HCPCS | Performed by: INTERNAL MEDICINE

## 2020-06-20 RX ORDER — ATORVASTATIN CALCIUM 80 MG/1
80 TABLET, FILM COATED ORAL EVERY EVENING
Qty: 30 TAB | Refills: 3 | Status: SHIPPED | OUTPATIENT
Start: 2020-06-20 | End: 2020-09-10 | Stop reason: SDUPTHER

## 2020-06-20 RX ORDER — WARFARIN SODIUM 7.5 MG/1
7.5 TABLET ORAL DAILY
Qty: 30 TAB | Refills: 3 | Status: SHIPPED
Start: 2020-06-20 | End: 2020-07-10

## 2020-06-20 RX ORDER — CLOPIDOGREL BISULFATE 75 MG/1
75 TABLET ORAL DAILY
Qty: 30 TAB | Refills: 0 | Status: SHIPPED | OUTPATIENT
Start: 2020-06-21 | End: 2020-07-16 | Stop reason: SDUPTHER

## 2020-06-20 RX ORDER — ALPRAZOLAM 0.25 MG/1
0.25 TABLET ORAL 3 TIMES DAILY PRN
Qty: 9 TAB | Refills: 0 | Status: SHIPPED | OUTPATIENT
Start: 2020-06-20 | End: 2020-06-23

## 2020-06-20 RX ADMIN — CLOPIDOGREL BISULFATE 75 MG: 75 TABLET ORAL at 06:16

## 2020-06-20 RX ADMIN — AMANTADINE HYDROCHLORIDE 100 MG: 100 CAPSULE, GELATIN COATED ORAL at 06:16

## 2020-06-20 RX ADMIN — CARBIDOPA AND LEVODOPA 1 TABLET: 25; 100 TABLET ORAL at 07:47

## 2020-06-20 RX ADMIN — HEPARIN SODIUM 1250 UNITS/HR: 5000 INJECTION, SOLUTION INTRAVENOUS at 01:26

## 2020-06-20 NOTE — PROGRESS NOTES
Monitor Summary: SR rate 65-88, NM -.22, QRS -.10, QT -.44, with no ectopy per strip from the monitor room.

## 2020-06-20 NOTE — PROGRESS NOTES
Patient discharged home with no needs. Discharge information and education provided, all questions answered. PIV removed. All belongings returned. Patient escorted out via wheelchair.

## 2020-06-20 NOTE — PROGRESS NOTES
Spoke with patients wife post discharge and provided information to her about the coumadin clinic along with a phone number to call to get an appointment ([phone]).

## 2020-06-20 NOTE — PROGRESS NOTES
Assumed patient care at 0700 and received bedside report from RN. Patient alert and oriented times 4. Patient denies numbness and tingling. Patient denies chest pain, shortness of breath, blurry/double vision. Patient ambulating SBA. Patient contienent of bowel and bladder. Patient is tolerating regular diet. Plan of care discussed, education provided on all administered medications, hourly rounding and Q4 neuro checks in place.

## 2020-06-20 NOTE — DISCHARGE SUMMARY
Discharge Summary    CHIEF COMPLAINT ON ADMISSION  Chief Complaint   Patient presents with   • Loss of Vision     sent from opthamologist; started at 2100       Reason for Admission  SENT BY MD     Admission Date  6/12/2020    CODE STATUS  Full Code    HPI & HOSPITAL COURSE  Patient admitted with right-sided visual complaints and found to have L occipital infarct secondary to L PCA occlusion. Further investigation revealed LV thrombus on Echo, EKG suggestive of old anterior infarct. He was admitted for further management and close monitoring. Cardiology and Neurology were consulted. He was started on heparin ggt with bridging to warfarin on 6/14/20, statin and Plavix. He continued here until INR was therapeutic. At time of discharge, he reported no complaints of pain, was doing well on room air and eager to be discharged. He was instructed to follow-up at anticoagulation clinic in 48 hours for repeat INR and Warfarin management. Patient also said he already had a PCP appointment scheduled for Monday, 6/22/2020.     Therefore, he is discharged in good and stable condition to home with close outpatient follow-up.    The patient met 2-midnight criteria for an inpatient stay at the time of discharge.    Discharge Date  06/20/2020    FOLLOW UP ITEMS POST DISCHARGE  Follow-up at anticoagulation clinic 6/22/2020  Follow-up with neurology outpatient   Follow-up with cardiology outpatient    DISCHARGE DIAGNOSES  Principal Problem:    Acute CVA (cerebrovascular accident) (HCC) POA: Yes  Active Problems:    Right-sided low back pain without sciatica POA: Yes    Parkinson disease (HCC) POA: Yes  Resolved Problems:    NSTEMI (non-ST elevated myocardial infarction) (HCC) POA: Yes      FOLLOW UP  Future Appointments   Date Time Provider Department Center   6/22/2020  1:20 PM An Perez M.D. [facility]   7/7/2020  2:15 PM Lovely Manzano P.A.-C. [facility] None   7/14/2020  8:00 AM NANCY MayaPN None   8/26/2020   2:00 PM Nazario Sandoval M.D. [facility] None   12/31/2020  1:00 PM [facility]. [facility]  [address] [facility] [address]  [facility] [address]  [phone]    Schedule an appointment as soon as possible for a visit        MEDICATIONS ON DISCHARGE     Medication List      START taking these medications      Instructions   ALPRAZolam 0.25 MG Tabs  Commonly known as:  XANAX   Take 1 Tab by mouth 3 times a day as needed for Anxiety for up to 3 days.  Dose:  0.25 mg     atorvastatin 80 MG tablet  Commonly known as:  LIPITOR   Take 1 Tab by mouth every evening.  Dose:  80 mg     clopidogrel 75 MG Tabs  Start taking on:  June 21, 2020  Commonly known as:  PLAVIX   Take 1 Tab by mouth every day.  Dose:  75 mg     warfarin 7.5 MG Tabs  Commonly known as:  COUMADIN   Take 1 Tab by mouth every day at 6 PM.  Dose:  7.5 mg        CONTINUE taking these medications      Instructions   amantadine 100 MG Tabs  Commonly known as:  SYMMETREL   Take 100 mg by mouth every day.  Dose:  100 mg     carbidopa-levodopa  MG Tabs  Commonly known as:  SINEMET   Take 1 Tab by mouth 3 times a day.  Dose:  1 Tab        STOP taking these medications    acetaminophen 500 MG Tabs  Commonly known as:  TYLENOL     aspirin 325 MG Tabs  Commonly known as:  ASA     fluticasone 0.05 % cream  Commonly known as:  CUTIVATE            Allergies  Allergies   Allergen Reactions   • Nkda [No Known Drug Allergy]        DIET  Orders Placed This Encounter   Procedures   • Diet Order Regular     Standing Status:   Standing     Number of Occurrences:   1     Order Specific Question:   Diet:     Answer:   Regular [1]     Order Specific Question:   Texture Modifier     Answer:   Level 7 - Regular/Easy to Chew     Order Specific Question:   Liquid level     Answer:   Level 0 - Thin       ACTIVITY  As tolerated.  Weight bearing as tolerated    CONSULTATIONS  Neurology   Cardiology    PROCEDURES  None     LABORATORY  Lab Results   Component  Value Date    SODIUM 133 (L) 06/18/2020    POTASSIUM 4.2 06/18/2020    CHLORIDE 98 06/18/2020    CO2 25 06/18/2020    GLUCOSE 127 (H) 06/18/2020    BUN 10 06/18/2020    CREATININE 1.04 06/18/2020        Lab Results   Component Value Date    WBC 5.6 06/19/2020    HEMOGLOBIN 16.1 06/19/2020    HEMATOCRIT 47.7 06/19/2020    PLATELETCT 251 06/19/2020        Total time of the discharge process exceeds 35 minutes.

## 2020-06-20 NOTE — DISCHARGE INSTRUCTIONS
Discharge Instructions per ARUNA BrunoRADRIANA    FOLLOW-UP: Please go to the anticoagulation clinic on 6/22/2020 for INR monitoring and Coumadin Management. You will also need to follow-up at the [facility]rology [facility] outpatient. A referral was placed by Dr. Torres of Neurology during your hospitalization.     DIET: Cardiac Diet.    ACTIVITY: As tolerated.     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     Return to ER if fever greater than 38 degree Celsius or 100.4 degree Fahrenheit, worsening pain, chest pain at rest or associated with exertion, worsening shortness of breath, bloody coughs, bloody bowel movement, severe unrelenting abdominal pain, focal weakness.    VALENTIN Bruno     Discharge Instructions    Discharged to home by car with relative. Discharged via wheelchair, hospital escort: Yes.  Special equipment needed: Not Applicable    Be sure to schedule a follow-up appointment with your primary care doctor or any specialists as instructed.     Discharge Plan:   Diet Plan: Discussed  Activity Level: Discussed  Confirmed Follow up Appointment: Appointment Scheduled  Confirmed Symptoms Management: Discussed  Medication Reconciliation Updated: Yes    I understand that a diet low in cholesterol, fat, and sodium is recommended for good health. Unless I have been given specific instructions below for another diet, I accept this instruction as my diet prescription.   Other diet: heart healthy diet    Special Instructions: None    · Is patient discharged on Warfarin / Coumadin?   Yes    You are receiving the drug warfarin. Please understand the importance of monitoring warfarin with scheduled PT/INR blood draws.  Follow-up with the Coumadin Clinic on 6/22/2020 for INR lab..    IMPORTANT: HOW TO USE THIS INFORMATION:  This is a summary and does NOT have all possible information about this product. This information does not assure that this product is safe,  "effective, or appropriate for you. This information is not individual medical advice and does not substitute for the advice of your health care professional. Always ask your health care professional for complete information about this product and your specific health needs.      WARFARIN - ORAL (WARF-uh-rin)      COMMON BRAND NAME(S): Coumadin      WARNING:  Warfarin can cause very serious (possibly fatal) bleeding. This is more likely to occur when you first start taking this medication or if you take too much warfarin. To decrease your risk for bleeding, your doctor or other health care provider will monitor you closely and check your lab results (INR test) to make sure you are not taking too much warfarin. Keep all medical and laboratory appointments. Tell your doctor right away if you notice any signs of serious bleeding. See also Side Effects section.      USES:  This medication is used to treat blood clots (such as in deep vein thrombosis-DVT or pulmonary embolus-PE) and/or to prevent new clots from forming in your body. Preventing harmful blood clots helps to reduce the risk of a stroke or heart attack. Conditions that increase your risk of developing blood clots include a certain type of irregular heart rhythm (atrial fibrillation), heart valve replacement, recent heart attack, and certain surgeries (such as hip/knee replacement). Warfarin is commonly called a \"blood thinner,\" but the more correct term is \"anticoagulant.\" It helps to keep blood flowing smoothly in your body by decreasing the amount of certain substances (clotting proteins) in your blood.      HOW TO USE:  Read the Medication Guide provided by your pharmacist before you start taking warfarin and each time you get a refill. If you have any questions, ask your doctor or pharmacist. Take this medication by mouth with or without food as directed by your doctor or other health care professional, usually once a day. It is very important to take it " exactly as directed. Do not increase the dose, take it more frequently, or stop using it unless directed by your doctor. Dosage is based on your medical condition, laboratory tests (such as INR), and response to treatment. Your doctor or other health care provider will monitor you closely while you are taking this medication to determine the right dose for you. Use this medication regularly to get the most benefit from it. To help you remember, take it at the same time each day. It is important to eat a balanced, consistent diet while taking warfarin. Some foods can affect how warfarin works in your body and may affect your treatment and dose. Avoid sudden large increases or decreases in your intake of foods high in vitamin K (such as broccoli, cauliflower, cabbage, brussels sprouts, kale, spinach, and other green leafy vegetables, liver, green tea, certain vitamin supplements). If you are trying to lose weight, check with your doctor before you try to go on a diet. Cranberry products may also affect how your warfarin works. Limit the amount of cranberry juice (16 ounces/480 milliliters a day) or other cranberry products you may drink or eat.      SIDE EFFECTS:  Nausea, loss of appetite, or stomach/abdominal pain may occur. If any of these effects persist or worsen, tell your doctor or pharmacist promptly. Remember that your doctor has prescribed this medication because he or she has judged that the benefit to you is greater than the risk of side effects. Many people using this medication do not have serious side effects. This medication can cause serious bleeding if it affects your blood clotting proteins too much (shown by unusually high INR lab results). Even if your doctor stops your medication, this risk of bleeding can continue for up to a week. Tell your doctor right away if you have any signs of serious bleeding, including: unusual pain/swelling/discomfort, unusual/easy bruising, prolonged bleeding from cuts  or gums, persistent/frequent nosebleeds, unusually heavy/prolonged menstrual flow, pink/dark urine, coughing up blood, vomit that is bloody or looks like coffee grounds, severe headache, dizziness/fainting, unusual or persistent tiredness/weakness, bloody/black/tarry stools, chest pain, shortness of breath, difficulty swallowing. Tell your doctor right away if any of these unlikely but serious side effects occur: persistent nausea/vomiting, severe stomach/abdominal pain, yellowing eyes/skin. This drug rarely has caused very serious (possibly fatal) problems if its effects lead to small blood clots (usually at the beginning of treatment). This can lead to severe skin/tissue damage that may require surgery or amputation if left untreated. Patients with certain blood conditions (protein C or S deficiency) may be at greater risk. Get medical help right away if any of these rare but serious side effects occur: painful/red/purplish patches on the skin (such as on the toe, breast, abdomen), change in the amount of urine, vision changes, confusion, slurred speech, weakness on one side of the body. A very serious allergic reaction to this drug is rare. However, get medical help right away if you notice any symptoms of a serious allergic reaction, including: rash, itching/swelling (especially of the face/tongue/throat), severe dizziness, trouble breathing. This is not a complete list of possible side effects. If you notice other effects not listed above, contact your doctor or pharmacist. In the [place] - Call your doctor for medical advice about side effects. You may report side effects to FDA at 1-182-ZZE-2308. In [place] - Call your doctor for medical advice about side effects. You may report side effects to [facility] [place] at 1-750.247.5920.      PRECAUTIONS:  Before taking warfarin, tell your doctor or pharmacist if you are allergic to it; or if you have any other allergies. This product may contain inactive ingredients, which  can cause allergic reactions or other problems. Talk to your pharmacist for more details. Before using this medication, tell your doctor or pharmacist your medical history, especially of: blood disorders (such as anemia, hemophilia), bleeding problems (such as bleeding of the stomach/intestines, bleeding in the brain), blood vessel disorders (such as aneurysms), recent major injury/surgery, liver disease, alcohol use, mental/mood disorders (including memory problems), frequent falls/injuries. It is important that all your doctors and dentists know that you take warfarin. Before having surgery or any medical/dental procedures, tell your doctor or dentist that you are taking this medication and about all the products you use (including prescription drugs, nonprescription drugs, and herbal products). Avoid getting injections into the muscles. If you must have an injection into a muscle (for example, a flu shot), it should be given in the arm. This way, it will be easier to check for bleeding and/or apply pressure bandages. This medication may cause stomach bleeding. Daily use of alcohol while using this medicine will increase your risk for stomach bleeding and may also affect how this medication works. Limit or avoid alcoholic beverages. If you have not been eating well, if you have an illness or infection that causes fever, vomiting, or diarrhea for more than 2 days, or if you start using any antibiotic medications, contact your doctor or pharmacist immediately because these conditions can affect how warfarin works. This medication can cause heavy bleeding. To lower the chance of getting cut, bruised, or injured, use great caution with sharp objects like safety razors and nail cutters. Use an electric razor when shaving and a soft toothbrush when brushing your teeth. Avoid activities such as contact sports. If you fall or injure yourself, especially if you hit your head, call your doctor immediately. Your doctor may  "need to check you. The Food & Drug Administration has stated that generic warfarin products are interchangeable. However, consult your doctor or pharmacist before switching warfarin products. Be careful not to take more than one medication that contains warfarin unless specifically directed by the doctor or health care provider who is monitoring your warfarin treatment. Older adults may be at greater risk for bleeding while using this drug. This medication is not recommended for use during pregnancy because of serious (possibly fatal) harm to an unborn baby. Discuss the use of reliable forms of birth control with your doctor. If you become pregnant or think you may be pregnant, tell your doctor immediately. If you are planning pregnancy, discuss a plan for managing your condition with your doctor before you become pregnant. Your doctor may switch the type of medication you use during pregnancy. Very small amounts of this medication may pass into breast milk but is unlikely to harm a nursing infant. Consult your doctor before breast-feeding.      DRUG INTERACTIONS:  Drug interactions may change how your medications work or increase your risk for serious side effects. This document does not contain all possible drug interactions. Keep a list of all the products you use (including prescription/nonprescription drugs and herbal products) and share it with your doctor and pharmacist. Do not start, stop, or change the dosage of any medicines without your doctor's approval. Warfarin interacts with many prescription, nonprescription, vitamin, and herbal products. This includes medications that are applied to the skin or inside the vagina or rectum. The interactions with warfarin usually result in an increase or decrease in the \"blood-thinning\" (anticoagulant) effect. Your doctor or other health care professional should closely monitor you to prevent serious bleeding or clotting problems. While taking warfarin, it is very " important to tell your doctor or pharmacist of any changes in medications, vitamins, or herbal products that you are taking. Some products that may interact with this drug include: capecitabine, imatinib, mifepristone. Aspirin, aspirin-like drugs (salicylates), and nonsteroidal anti-inflammatory drugs (NSAIDs such as ibuprofen, naproxen, celecoxib) may have effects similar to warfarin. These drugs may increase the risk of bleeding problems if taken during treatment with warfarin. Carefully check all prescription/nonprescription product labels (including drugs applied to the skin such as pain-relieving creams) since the products may contain NSAIDs or salicylates. Talk to your doctor about using a different medication (such as acetaminophen) to treat pain/fever. Low-dose aspirin and related drugs (such as clopidogrel, ticlopidine) should be continued if prescribed by your doctor for specific medical reasons such as heart attack or stroke prevention. Consult your doctor or pharmacist for more details. Many herbal products interact with warfarin. Tell your doctor before taking any herbal products, especially bromelains, coenzyme Q10, cranberry, danshen, dong quai, fenugreek, garlic, ginkgo biloba, ginseng, and Kayleigh's wort, among others. This medication may interfere with a certain laboratory test to measure theophylline levels, possibly causing false test results. Make sure laboratory personnel and all your doctors know you use this drug.      OVERDOSE:  If overdose is suspected, contact a poison control center or emergency room immediately. [place] residents can call the [place] National Poison Hotline at 1-995.939.1410. [place] residents can call a provincial poison control center. Symptoms of overdose may include: bloody/black/tarry stools, pink/dark urine, unusual/prolonged bleeding.      NOTES:  Do not share this medication with others. Laboratory and/or medical tests (such as INR, complete blood count) must be performed  periodically to monitor your progress or check for side effects. Consult your doctor for more details.      MISSED DOSE:  For the best possible benefit, do not miss any doses. If you do miss a dose and remember on the same day, take it as soon as you remember. If you remember on the next day, skip the missed dose and resume your usual dosing schedule. Do not double the dose to catch up because this could increase your risk for bleeding. Keep a record of missed doses to give to your doctor or pharmacist. Contact your doctor or pharmacist if you miss 2 or more doses in a row.      STORAGE:  Store at room temperature away from light and moisture. Do not store in the bathroom. Keep all medications away from children and pets. Do not flush medications down the toilet or pour them into a drain unless instructed to do so. Properly discard this product when it is  or no longer needed. Consult your pharmacist or local waste disposal company for more details about how to safely discard your product.      MEDICAL ALERT:  Your condition and medication can cause complications in a medical emergency. For information about enrolling in MedicAlert, call 1-114.515.1619 ([place]) or 1-360.193.4268 ([place]).      Information last revised 2010 Copyright(c) 2010 [employer]             Depression / Suicide Risk    As you are discharged from this [facility] facility, it is important to learn how to keep safe from harming yourself.    Recognize the warning signs:  · Abrupt changes in personality, positive or negative- including increase in energy   · Giving away possessions  · Change in eating patterns- significant weight changes-  positive or negative  · Change in sleeping patterns- unable to sleep or sleeping all the time   · Unwillingness or inability to communicate  · Depression  · Unusual sadness, discouragement and loneliness  · Talk of wanting to die  · Neglect of personal appearance   · Rebelliousness-  reckless behavior  · Withdrawal from people/activities they love  · Confusion- inability to concentrate     If you or a loved one observes any of these behaviors or has concerns about self-harm, here's what you can do:  · Talk about it- your feelings and reasons for harming yourself  · Remove any means that you might use to hurt yourself (examples: pills, rope, extension cords, firearm)  · Get professional help from the community (Mental Health, Substance Abuse, psychological counseling)  · Do not be alone:Call your Safe Contact- someone whom you trust who will be there for you.  · Call your local CRISIS HOTLINE 792-6569 or 601-708-4654  · Call your local Children's Mobile Crisis Response Team [place] (270) 213-9291 or www.MeetCute  · Call the toll free National Suicide Prevention Hotlines   · National Suicide Prevention Lifeline 630-470-DWJA (3206)  · [facility] Line Network 800-SUICIDE (235-2360)            Ischemic Stroke  An ischemic stroke (cerebrovascular accident, or CVA) is the sudden death of brain tissue that occurs when an area of the brain does not get enough oxygen. It is a medical emergency that must be treated right away. An ischemic stroke can cause permanent loss of brain function. This can cause problems with how different parts of your body function.  What are the causes?  This condition is caused by a decrease of oxygen supply to an area of the brain, which may be the result of:  · A small blood clot (embolus) or a buildup of plaque in the blood vessels (atherosclerosis) that blocks blood flow in the brain.  · An abnormal heart rhythm (atrial fibrillation).  · A blocked or damaged artery in the head or neck.  What increases the risk?  Certain factors may make you more likely to develop this condition. Some of these factors are things that you can change, such as:  · Obesity.  · Smoking cigarettes.  · Taking oral birth control, especially if you also use tobacco.  · Physical  inactivity.  · Excessive alcohol use.  · Use of illegal drugs, especially cocaine and methamphetamine.  Other risk factors include:  · High blood pressure (hypertension).  · High cholesterol.  · Diabetes mellitus.  · Heart disease.  · Being , , , or .  · Being over age 60.  · Family history of stroke.  · Previous history of blood clots, stroke, or transient ischemic attack (TIA).  · Sickle cell disease.  · Being a woman with a history of preeclampsia.  · Migraine headache.  · Sleep apnea.  · Irregular heartbeats, such as atrial fibrillation.  · Chronic inflammatory diseases, such as rheumatoid arthritis or lupus.  · Blood clotting disorders (hypercoagulable state).  What are the signs or symptoms?  Symptoms of this condition usually develop suddenly, or you may notice them after waking up from sleep. Symptoms may include sudden:  · Weakness or numbness in your face, arm, or leg, especially on one side of your body.  · Trouble walking or difficulty moving your arms or legs.  · Loss of balance or coordination.  · Confusion.  · Slurred speech (dysarthria).  · Trouble speaking, understanding speech, or both (aphasia).  · Vision changes--such as double vision, blurred vision, or loss of vision--in one or both eyes.  · Dizziness.  · Nausea and vomiting.  · Severe headache with no known cause. The headache is often described as the worst headache ever experienced.  If possible, make note of the exact time that you last felt like your normal self and what time your symptoms started. Tell your health care provider.  If symptoms come and go, this could be a sign of a warning stroke, or TIA. Get help right away, even if you feel better.  How is this diagnosed?  This condition may be diagnosed based on:  · Your symptoms, your medical history, and a physical exam.  · CT scan of the brain.  · MRI.  · CT angiogram. This test uses a computer to take X-rays of your arteries. A dye  may be injected into your blood to show the inside of your blood vessels more clearly.  · MRI angiogram. This is a type of MRI that is used to evaluate the blood vessels.  · Cerebral angiogram. This test uses X-rays and a dye to show the blood vessels in the brain and neck.  You may need to see a health care provider who specializes in stroke care. A stroke specialist can be seen in person or through communication using telephone or television technology (telemedicine).  Other tests may also be done to find the cause of the stroke, such as:  · Electrocardiogram (ECG).  · Continuous heart monitoring.  · Echocardiogram.  · Carotid ultrasound.  · A scan of the brain circulation.  · Blood tests.  · Sleep study to check for sleep apnea.  How is this treated?  Treatment for this condition will depend on the duration, severity, and cause of your symptoms and on the area of the brain affected. It is very important to get treatment at the first sign of stroke symptoms. Some treatments work better if they are done within 3-6 hours of the onset of stroke symptoms. These initial treatments may include:  · Aspirin.  · Medicines to control blood pressure.  · Medicine given by injection to dissolve the blood clot (thrombolytic).  · Treatments given directly to the affected artery to remove or dissolve the blood clot.  Other treatment options may include:  · Oxygen.  · IV fluids.  · Medicines to thin the blood (anticoagulants or antiplatelets).  · Procedures to increase blood flow.  Medicines and changes to your diet may be used to help treat and manage risk factors for stroke, such as diabetes, high cholesterol, and high blood pressure.  After a stroke, you may work with physical, speech, mental health, or occupational therapists to help you recover.  Follow these instructions at home:  Medicines  · Take over-the-counter and prescription medicines only as told by your health care provider.  · If you were told to take a medicine to  thin your blood, such as aspirin or an anticoagulant, take it exactly as told by your health care provider.  ¨ Taking too much blood-thinning medicine can cause bleeding.  ¨ If you do not take enough blood-thinning medicine, you will not have the protection that you need against another stroke and other problems.  · Understand the side effects of taking anticoagulant medicine. When taking this type of medicine, make sure you:  ¨ Hold pressure over any cuts for longer than usual.  ¨ Tell your dentist and other health care providers that you are taking anticoagulants before you have any procedures that may cause bleeding.  ¨ Avoid activities that may cause trauma or injury.  Eating and drinking  · Follow instructions from your health care provider about diet.  · Eat healthy foods.  · If your ability to swallow was affected by the stroke, you may need to take steps to avoid choking, such as:  ¨ Taking small bites when eating.  ¨ Eating foods that are soft or pureed.  Safety  · Follow instructions from your health care team about physical activity.  · Use a walker or cane as told by your health care provider.  · Take steps to create a safe home environment in order to reduce the risk of falls. This may include:  ¨ Having your home looked at by specialists.  ¨ Installing grab bars in the bedroom and bathroom.  ¨ Using safety equipment, such as raised toilets and a seat in the shower.  General instructions  · Do not use any tobacco products, such as cigarettes, chewing tobacco, and e-cigarettes. If you need help quitting, ask your health care provider.  · Limit alcohol intake to no more than 1 drink a day for nonpregnant women and 2 drinks a day for men. One drink equals 12 oz of beer, 5 oz of wine, or 1½ oz of hard liquor.  · If you need help to stop using drugs or alcohol, ask your health care provider about a referral to a program or specialist.  · Maintain an active and healthy lifestyle. Get regular exercise as told  by your health care provider.  · Keep all follow-up visits as told by your health care provider, including visits with all specialists on your health care team. This is important.  How is this prevented?  Your risk of another stroke can be decreased by managing high blood pressure, high cholesterol, diabetes, heart disease, sleep apnea, and obesity. It can also be decreased by quitting smoking, limiting alcohol, and staying physically active.  Your health care provider will continue to work with you on measures to prevent short-term and long-term complications of stroke.  Get help right away if:  You have:  · Sudden weakness or numbness in your face, arm, or leg, especially on one side of your body.  · Sudden confusion.  · Sudden trouble speaking, understanding, or both (aphasia).  · Sudden trouble seeing with one or both eyes.  · Sudden trouble walking or difficulty moving your arms or legs.  · Sudden dizziness.  · Sudden loss of balance or coordination.  · Sudden, severe headache with no known cause.  · A partial or total loss of consciousness.  · A seizure.  Any of these symptoms may represent a serious problem that is an emergency. Do not wait to see if the symptoms will go away. Get medical help right away. Call your local emergency services (911 in [place] Do not drive yourself to the hospital.   This information is not intended to replace advice given to you by your health care provider. Make sure you discuss any questions you have with your health care provider.  Document Released: 12/18/2006 Document Revised: 05/30/2017 Document Reviewed: 03/15/2017  [employer] Interactive Patient Education © 2017 [employer]

## 2020-06-22 ENCOUNTER — ANTICOAGULATION VISIT (OUTPATIENT)
Dept: MEDICAL GROUP | Facility: PHYSICIAN GROUP | Age: 70
End: 2020-06-22
Payer: MEDICARE

## 2020-06-22 ENCOUNTER — TELEPHONE (OUTPATIENT)
Dept: VASCULAR LAB | Facility: MEDICAL CENTER | Age: 70
End: 2020-06-22

## 2020-06-22 ENCOUNTER — OFFICE VISIT (OUTPATIENT)
Dept: MEDICAL GROUP | Facility: MEDICAL CENTER | Age: 70
End: 2020-06-22
Payer: MEDICARE

## 2020-06-22 VITALS
DIASTOLIC BLOOD PRESSURE: 68 MMHG | HEART RATE: 85 BPM | WEIGHT: 193 LBS | OXYGEN SATURATION: 94 % | SYSTOLIC BLOOD PRESSURE: 118 MMHG | TEMPERATURE: 97 F | HEIGHT: 74 IN | BODY MASS INDEX: 24.77 KG/M2

## 2020-06-22 DIAGNOSIS — I51.3 LEFT VENTRICULAR THROMBOSIS: ICD-10-CM

## 2020-06-22 DIAGNOSIS — I63.9 ACUTE CVA (CEREBROVASCULAR ACCIDENT) (HCC): ICD-10-CM

## 2020-06-22 DIAGNOSIS — I21.9 MYOCARDIAL INFARCTION, UNSPECIFIED MI TYPE, UNSPECIFIED ARTERY (HCC): ICD-10-CM

## 2020-06-22 DIAGNOSIS — I23.6 LV (LEFT VENTRICULAR) MURAL THROMBUS FOLLOWING MI (HCC): ICD-10-CM

## 2020-06-22 DIAGNOSIS — Z79.01 CHRONIC ANTICOAGULATION: ICD-10-CM

## 2020-06-22 DIAGNOSIS — G20.A1 PARKINSON DISEASE (HCC): ICD-10-CM

## 2020-06-22 PROBLEM — F10.90 CHRONIC ALCOHOL USE: Status: RESOLVED | Noted: 2017-12-22 | Resolved: 2020-06-22

## 2020-06-22 LAB — INR PPP: 3.7 (ref 2–3.5)

## 2020-06-22 PROCEDURE — 99211 OFF/OP EST MAY X REQ PHY/QHP: CPT | Performed by: FAMILY MEDICINE

## 2020-06-22 PROCEDURE — 99214 OFFICE O/P EST MOD 30 MIN: CPT | Performed by: FAMILY MEDICINE

## 2020-06-22 PROCEDURE — 85610 PROTHROMBIN TIME: CPT | Performed by: FAMILY MEDICINE

## 2020-06-22 ASSESSMENT — FIBROSIS 4 INDEX: FIB4 SCORE: 1.51

## 2020-06-22 NOTE — TELEPHONE ENCOUNTER
[facility]    Called patient to establish care and set up a new patient appt. Patient is a new referral from [facility] for recent stroke and LV thrombus.   LM for patient to call us back in the next 24 hrs to set up a follow up appt in clinic.    PCP: non- [facility] = Dr. An Perez  INS: medicare  Location: Kieran Huitron PharmD

## 2020-06-22 NOTE — PATIENT INSTRUCTIONS
You will be seen at our drive thru service at [facility] located on the west side of the building at  [address] [place] [address].  [place] [address]         Please call [phone] with any questions.

## 2020-06-22 NOTE — PROGRESS NOTES
.  Anticoagulation Summary  As of 6/22/2020    INR goal:   2.0-3.0   TTR:   --   INR used for dosing:   3.70! (6/22/2020)   Warfarin maintenance plan:   3.75 mg (7.5 mg x 0.5) every Mon, Wed, Fri; 7.5 mg (7.5 mg x 1) all other days   Weekly warfarin total:   41.25 mg   Plan last modified:   Sarwat Watkins, PharmD (6/22/2020)   Next INR check:   6/24/2020   Target end date:       Indications    Acute CVA (cerebrovascular accident) (HCC) [I63.9]             Anticoagulation Episode Summary     INR check location:       Preferred lab:       Send INR reminders to:       Comments:         Anticoagulation Care Providers     Provider Role Specialty Phone number    VALENTIN Bruno Referring Gerontology [phone]        Anticoagulation Patient Findings      PCP Dr Perez  Cardiologist: none  Pt hx:    Per Hospital D/C note:  Patient admitted with right-sided visual complaints and found to have L occipital infarct secondary to L PCA occlusion. Further investigation revealed LV thrombus on Echo, EKG suggestive of old anterior infarct. He was admitted for further management and close monitoring. Cardiology and Neurology were consulted. He was started on heparin ggt with bridging to warfarin on 6/14/20, statin and Plavix. He continued here until INR was therapeutic. At time of discharge, he reported no complaints of pain, was doing well on room air and eager to be discharged. He was instructed to follow-up at anticoagulation clinic in 48 hours for repeat INR and Warfarin management. Patient also said he already had a PCP appointment scheduled for Monday, 6/22/2020.    [id number] = n/a  HAS-BLED = n/a  DOAC = n/a, LV thrombus    Pt is new to warfarin and new to [facility]. Discussed:   · Indication for warfarin therapy and INR goal range.   · Importance of monitoring and compliance.   · Monitoring parameters, signs and symptoms of bleeding or clotting.  · Warfarin therapy, side effects, potential DDIs, OTC  medications  · ASA Denies  · DDI Clopidogrel, but discharging physician requested the medication.   · Importance of diet consistency, ie vitamin K intake, supplements  · Lifestyle safety, ie smoking, ETOH, hobby safety, fall safety/prevention  · Procedures for missed doses or suspected missed doses, surgeries/procedures, travel, dental work, any medication changes    Pt denies any unusual s/s of bleeding, bruising, clotting or any changes to diet or medications.    INR is SUPRA therapeutic today.   Reduce warfarin dose today then begin reduced regimen  Recheck INR in 2 days.    LOT: TBD    Sarwat Watkins, PharmD    CC  Dr Bloch Dr Bruner

## 2020-06-22 NOTE — TELEPHONE ENCOUNTER
Received anticoagulation referral for warfarin due to CVA from [facility] on 06/20/20.    Per Hospital D/C note:  Patient admitted with right-sided visual complaints and found to have L occipital infarct secondary to L PCA occlusion. Further investigation revealed LV thrombus on Echo, EKG suggestive of old anterior infarct. He was admitted for further management and close monitoring. Cardiology and Neurology were consulted. He was started on heparin ggt with bridging to warfarin on 6/14/20, statin and Plavix. He continued here until INR was therapeutic. At time of discharge, he reported no complaints of pain, was doing well on room air and eager to be discharged. He was instructed to follow-up at anticoagulation clinic in 48 hours for repeat INR and Warfarin management. Patient also said he already had a PCP appointment scheduled for Monday, 6/22/2020.         Insurance: Medicare  PCP: [facility]  Locations to be seen: [facility] at [phone], fax [phone]    Vicki Albarado, SpeedyD

## 2020-06-23 ENCOUNTER — OFFICE VISIT (OUTPATIENT)
Dept: NEUROLOGY | Facility: MEDICAL CENTER | Age: 70
End: 2020-06-23
Payer: MEDICARE

## 2020-06-23 VITALS
SYSTOLIC BLOOD PRESSURE: 126 MMHG | RESPIRATION RATE: 18 BRPM | OXYGEN SATURATION: 96 % | WEIGHT: 186 LBS | HEART RATE: 90 BPM | DIASTOLIC BLOOD PRESSURE: 78 MMHG | BODY MASS INDEX: 23.87 KG/M2 | HEIGHT: 74 IN

## 2020-06-23 DIAGNOSIS — G20.A1 PARKINSON DISEASE (HCC): ICD-10-CM

## 2020-06-23 DIAGNOSIS — H53.9 CVA, OLD, DISTURBANCES OF VISION: Primary | ICD-10-CM

## 2020-06-23 DIAGNOSIS — I69.398 CVA, OLD, DISTURBANCES OF VISION: Primary | ICD-10-CM

## 2020-06-23 PROBLEM — I51.3 LEFT VENTRICULAR THROMBOSIS: Status: ACTIVE | Noted: 2020-06-23

## 2020-06-23 PROCEDURE — 99214 OFFICE O/P EST MOD 30 MIN: CPT | Performed by: PSYCHIATRY & NEUROLOGY

## 2020-06-23 ASSESSMENT — ENCOUNTER SYMPTOMS
WEAKNESS: 0
DOUBLE VISION: 0
MEMORY LOSS: 0
TREMORS: 0
BLURRED VISION: 1
HEADACHES: 0
SEIZURES: 0
FOCAL WEAKNESS: 0

## 2020-06-23 ASSESSMENT — FIBROSIS 4 INDEX: FIB4 SCORE: 1.51

## 2020-06-23 NOTE — PROGRESS NOTES
Subjective:     CC: There were no encounter diagnoses.    HPI: Patient is a 69 y.o. male established patient who presents today for hospital follow-up.    Acute CVA  LV thrombus  MI  Parkinson's    The patient was admitted on 2020.  He presented with loss of vision sent from his ophthalmologist.  He was found to have a left occipital infarct secondary to left PCA occlusion.  He was also found to have a left ventricular thrombus on echocardiogram as well as apical akinesis and an EKG that suggested an old anterior infarct.  He was seen by both cardiology and neurology while in the hospital.  He was started on heparin drip and bridged to warfarin.  He was also started on statin and Plavix.  He was discharged once his INR was therapeutic.  He states he is doing well although has not regained vision in the right eye.  The patient did not have cardiac cath in the hospital due to high risk as he had a stroke.  He states that he will be scheduled outpatient for cardiac catheterization.  He denies any chest pain.    The patient has an appointment with his neurologist, Dr. Alejandre on 2020.  He also has an appointment with cardiology on 2020.    The patient also has a history of Parkinson's.  He was recently evaluated by a parkinsonian specialist in [facility] and started on amantadine.  He has found this to be quite helpful and feels that he is walking better.      No problem-specific Assessment & Plan notes found for this encounter.      Past Medical History:   Diagnosis Date   • Back spasm    • GOUT    • Gout    • Hypertension    • Parkinson disease (HCC)        Social History     Tobacco Use   • Smoking status: Former Smoker     Types: Cigarettes     Last attempt to quit: 1970     Years since quittin.5   • Smokeless tobacco: Never Used   Substance Use Topics   • Alcohol use: Yes     Alcohol/week: 4.2 oz     Types: 4 Glasses of wine, 3 Standard drinks or equivalent per week     Frequency: 4 or  "more times a week     Drinks per session: 1 or 2     Binge frequency: Less than monthly     Comment: daily   • Drug use: No       Current Outpatient Medications Ordered in Epic   Medication Sig Dispense Refill   • ALPRAZolam (XANAX) 0.25 MG Tab Take 1 Tab by mouth 3 times a day as needed for Anxiety for up to 3 days. 9 Tab 0   • atorvastatin (LIPITOR) 80 MG tablet Take 1 Tab by mouth every evening. 30 Tab 3   • clopidogrel (PLAVIX) 75 MG Tab Take 1 Tab by mouth every day. 30 Tab 0   • warfarin (COUMADIN) 7.5 MG Tab Take 1 Tab by mouth every day at 6 PM. 30 Tab 3   • carbidopa-levodopa (SINEMET)  MG Tab Take 1 Tab by mouth 3 times a day.     • amantadine (SYMMETREL) 100 MG Tab Take 100 mg by mouth every day.       No current [facility]-ordered facility-administered medications on file.        Allergies:  Nkda [no known drug allergy]    Health Maintenance: Completed    ROS:  Pulm: no sob, no cough  CV: no chest pain, no palpitations        Objective:       Exam:  /68 (BP Location: Left arm, Patient Position: Sitting, BP Cuff Size: Adult long)   Pulse 85   Temp 36.1 °C (97 °F) (Temporal)   Ht 1.88 m (6' 2\")   Wt 87.5 kg (193 lb)   SpO2 94%   BMI 24.78 kg/m²  Body mass index is 24.78 kg/m².    General: Normal appearing. No distress.  HEAD: NCAT  EYES: conjunctiva clear, lids without ptosis, pupils equal and reactive to light  EARS: ears normal shape and contour.  MOUTH: normal dentition   Neck:  Normal ROM  Pulmonary: Clear to auscultation bilaterally, no wheezes rales or rhonchi.  Normal effort. Normal respiratory rate.  Cardiovascular: Regular rate and rhythm, no murmurs rubs or gallops.  Well perfused. No LE edema  Neurologic: Grossly normal, no focal deficits  Skin: Warm and dry.  No obvious lesions.  Musculoskeletal: Antalgic gait, uses cane.    Psych: Normal mood and affect. Alert and oriented x3. Judgment and insight is normal.      Labs/imagin2020 and 2020 results reviewed and discussed " with the patient, questions answered.    Assessment & Plan:     69 y.o. male with the following -     1. Acute CVA (cerebrovascular accident) (HCC)    2. LV (left ventricular) mural thrombus following MI (HCC    3. Parkinson disease (HCC)      4. Myocardial infarction, unspecified MI type, unspecified artery (HCC)    As above, the patient was admitted on 6/12/2020 found to have an acute stroke, MI as well as LV thrombus.  He was started on heparin drip and bridged to Coumadin.  He is now being followed by the Coumadin clinic.  He has appointments with cardiology as well as neurology later this week.  He states he is doing well aside from his loss of vision in the right.  He is now being followed by a parkinsonian specialist in [place], started on amantadine.  Gait has improved with this medication.    We will plan to follow-up in around 6 months.      Please note that this dictation was created using voice recognition software. I have made every reasonable attempt to correct obvious errors, but I expect that there are errors of grammar and possibly content that I did not discover before finalizing the note.

## 2020-06-23 NOTE — TELEPHONE ENCOUNTER
Initial anticoagulation clinic note and most recent discharge summary reviewed    Planning further recommendations from neurology and cardiology, we will continue with anticoagulation with warfarin and clopidogrel as recommended at discharge    We will defer all further cv care, aside from day to day management of anticoagulation to cardiology, neurology, and his PCP.     Michael Bloch, MD  Anticoagulation Clinic    Cc:  MAYLIN Perez

## 2020-06-23 NOTE — PROGRESS NOTES
Subjective:      Farhat Huber is a 69 y.o. male who presents with his wife, Nolvia, for urgent follow-up, having just suffered from a left occipital stroke on June 12, 2020, and who is been followed with a history of atypical Parkinson's disease.    HPI    Stroke: Farhat symptoms started suddenly on June 12 in the early morning.  He recognized right-sided visual deficits, no motor or sensory distortions.  Cognition was intact.  He saw an ophthalmologist whose examinations were unremarkable, but given the history, he was sent to the emergency room.    I reviewed the electronic health record of his evaluations here, CT of the brain revealed an evolving infarct of the left occipital lobe, CTA of the brain and neck were unremarkable.  He had presented well beyond the 4-1/2-hour window for IV TPA, there was no lesion amenable to thrombectomy.  Seen by cardiology, echocardiogram revealed a left apical thrombus, he was started both on Plavix and Coumadin.  MRI scan confirmed the acute, medial left occipital infarct.  A moderate degree of chronic microvascular ischemic change was seen bilaterally.    On discharge, he was on Coumadin, Plavix, Lipitor 80 mg daily and Xanax.  Since then, the visual deficits have remained, maybe a little better, he is aware of this mostly in the right inferior quadrant.  He denies new motor, sensory or speech deficits, swallowing is stable, he is walking and unstable fashion, related to his parkinsonism and nothing more.  There have been no problems with nosebleeds, bruising, melenic stool, hematuria, myalgias or arthralgias.    Atypical Parkinson's disease: Still mostly in axial process with balance instability and voice changes, he did see a movement disorder specialist in [place], [place].  It was their impression that he had Parkinson's disease, just an atypical presentation.  Through this office he has been on Sinemet 25/100, 3 times a day, with limited benefit.  They started amantadine  "100 mg daily and this is provided the most notable benefit today.  He tolerates this drug without nightmares, hallucinations, nausea, diarrhea, or agitation.  They are wondering if he can get off the Sinemet.    Medical, surgical and family histories are reviewed, there are no new drug allergies.  He is on Plavix 75 mg daily, Sinemet 25/100, 2 tablets every morning and then 1 tablet in the afternoon and evening, Lipitor 80 mg daily, Symmetrel 100 mg daily and Xanax 0.25 mg 3 times daily as needed.     Review of Systems   Eyes: Positive for blurred vision. Negative for double vision.   Neurological: Negative for tremors, focal weakness, seizures, weakness and headaches.   Psychiatric/Behavioral: Negative for memory loss.   All other systems reviewed and are negative.       Objective:     /78 (BP Location: Left arm, Patient Position: Sitting, BP Cuff Size: Adult)   Pulse 90   Resp 18   Ht 1.88 m (6' 2\")   Wt 84.4 kg (186 lb)   SpO2 96%   BMI 23.88 kg/m²      Physical Exam    He appears in no acute distress.  He is quite cooperative.  Vital signs are stable.  His rhythm is regular, pulse is 90.  There is no malar rash, sialorrhea, or jaw claudication.  The neck is supple.  Cardiac evaluation reveals a regular rhythm.    He is fully oriented, though he mistakes the date briefly, though he gives the appropriate day of the week for the date he does state.  He names and repeats easily, there is no apraxia or inattention.  Mental processing is still slowed.    PERRLA/EOMI.  There is a right inferior quadrantic homonymous deficit to movement detection and finger counting.  There is more noticeable hypophonia.  I blink frequency is slightly diminished.  Facial movements are not disturbed, sensory exam is intact to temperature bilaterally.    Musculoskeletal exam reveals mild rigidity bilaterally.  There is no tremor either at rest or with action.  There is some mild generalized bradykinesia.  There is no asterixis " or drift.  Strength is intact in all 4 extremities.    When he stands, he does so slowly, there is noticeable hypokinesia on gait initiation.  He does much better with more of a normal stride length bilaterally once he gets going.  Stop and turn again creates the same problems.  Repetitive movements with the hands bilaterally show diminished amplitude and increased frequencies.  To a lesser degree this is seen in the feet.  The changes are more noticeable on the right side.  There is no appendicular dystaxia with any of the extremities.    Sensory exam is intact to light touch, there is no lateralized, sensory extinction on the right side with double simultaneous stimulation.     Assessment/Plan:     1. CVA, old, disturbances of vision  Cardioembolic in nature, absent significant atherosclerotic disease in the vertebral arteries and basilar artery, given the presence of a thrombus in the left ventricular apex, he will need anticoagulation most likely lifelong.  The combination with Plavix has never proven more efficacious from a stroke standpoint, so Plavix can be discontinued from my standpoint.  I will defer this to his cardiologist to may have placed him on the drug for a short interval of time.  He will need to continue Lipitor.    We spent some time talking about the nature of stroke, the fact that his deficits will remain, may be improve slowly over time, so he does need to be cautious about walking.  Already problematic because of his Parkinson's disease, this can only create additional problems if he is not careful.  He is already using a cane appropriately.    2. Parkinson disease (HCC)  Atypical presentation, Sinemet probably is not providing real meaningful benefit, I recommended getting off the drug if he feels comfortable with this and this would be recommended by the movement disorder specialist who he will be following with back in [place], [place].  Amantadine always can be increased as well depending  on need and tolerability.    Face-to-face time spent talking about all the above.  He will be following up with myself and the other movement disorder specialist intermittently throughout the year.  I will be deferring most major medication recommendations and changes to them.    Time: 25 minutes spent face-to-face for exam, review, discussion, and education, of this over 50% of the time spent counseling and coordinating care.

## 2020-06-24 ENCOUNTER — ANTICOAGULATION VISIT (OUTPATIENT)
Dept: VASCULAR LAB | Facility: MEDICAL CENTER | Age: 70
End: 2020-06-24
Attending: INTERNAL MEDICINE
Payer: MEDICARE

## 2020-06-24 DIAGNOSIS — I69.398 CVA, OLD, DISTURBANCES OF VISION: ICD-10-CM

## 2020-06-24 DIAGNOSIS — I51.3 LEFT VENTRICULAR THROMBOSIS: ICD-10-CM

## 2020-06-24 DIAGNOSIS — H53.9 CVA, OLD, DISTURBANCES OF VISION: ICD-10-CM

## 2020-06-24 LAB — INR PPP: 4.6 (ref 2–3.5)

## 2020-06-24 PROCEDURE — 99212 OFFICE O/P EST SF 10 MIN: CPT | Performed by: NURSE PRACTITIONER

## 2020-06-24 PROCEDURE — 85610 PROTHROMBIN TIME: CPT

## 2020-06-24 NOTE — PROGRESS NOTES
Anticoagulation Summary  As of 2020    INR goal:   2.0-3.0   TTR:   --   INR used for dosin.60! (2020)   Warfarin maintenance plan:   3.75 mg (7.5 mg x 0.5) every Mon, Wed, Fri; 7.5 mg (7.5 mg x 1) all other days   Weekly warfarin total:   41.25 mg   Plan last modified:   Sarwat Watkins, PharmD (2020)   Next INR check:   2020   Target end date:   2020    Indications    CVA  old  disturbances of vision [I69.398  H53.9]  Left ventricular thrombosis [I51.3]             Anticoagulation Episode Summary     INR check location:       Preferred lab:       Send INR reminders to:       Comments:         Anticoagulation Care Providers     Provider Role Specialty Phone number    VALENTIN Bruno [facility] [phone]        Anticoagulation Patient Findings      HPI:  Jeremiah Huber seen in clinic today for follow up on anticoagulation therapy in the presence of CVA, lv thrombus.   Denies any changes to current medical/health status since last appointment.   Denies any medication or diet changes.   No current symptoms of bleeding or thrombosis reported.    A/P:   INR supratherapeutic despite dose decrease on Monday. Will HOLD one dose, decrease tomorrow, then follow up in 2 days. Cautioned pt about avoiding falls and monitoring for any prolonged bleeding.    Follow up appointment on Friday.    Next Appointment: 2020 at 4:00 pm.    Shahnaz BARBOSA

## 2020-06-25 ENCOUNTER — OFFICE VISIT (OUTPATIENT)
Dept: CARDIOLOGY | Facility: MEDICAL CENTER | Age: 70
End: 2020-06-25
Payer: MEDICARE

## 2020-06-25 ENCOUNTER — TELEPHONE (OUTPATIENT)
Dept: NEUROLOGY | Facility: MEDICAL CENTER | Age: 70
End: 2020-06-25

## 2020-06-25 ENCOUNTER — TELEPHONE (OUTPATIENT)
Dept: PHYSICAL MEDICINE AND REHAB | Facility: MEDICAL CENTER | Age: 70
End: 2020-06-25

## 2020-06-25 VITALS
HEIGHT: 74 IN | WEIGHT: 194.6 LBS | BODY MASS INDEX: 24.97 KG/M2 | DIASTOLIC BLOOD PRESSURE: 76 MMHG | OXYGEN SATURATION: 96 % | SYSTOLIC BLOOD PRESSURE: 130 MMHG | HEART RATE: 86 BPM

## 2020-06-25 DIAGNOSIS — G89.29 CHRONIC RIGHT-SIDED LOW BACK PAIN WITHOUT SCIATICA: ICD-10-CM

## 2020-06-25 DIAGNOSIS — I69.398 CVA, OLD, DISTURBANCES OF VISION: ICD-10-CM

## 2020-06-25 DIAGNOSIS — I25.5 ISCHEMIC CARDIOMYOPATHY: ICD-10-CM

## 2020-06-25 DIAGNOSIS — I25.2 MYOCARDIAL INFARCT, OLD: ICD-10-CM

## 2020-06-25 DIAGNOSIS — I51.3 LEFT VENTRICULAR THROMBOSIS: ICD-10-CM

## 2020-06-25 DIAGNOSIS — M54.50 CHRONIC RIGHT-SIDED LOW BACK PAIN WITHOUT SCIATICA: ICD-10-CM

## 2020-06-25 DIAGNOSIS — H53.9 CVA, OLD, DISTURBANCES OF VISION: ICD-10-CM

## 2020-06-25 PROBLEM — E78.49 OTHER HYPERLIPIDEMIA: Status: ACTIVE | Noted: 2019-12-31

## 2020-06-25 PROCEDURE — 99214 OFFICE O/P EST MOD 30 MIN: CPT | Performed by: PHYSICIAN ASSISTANT

## 2020-06-25 RX ORDER — ALPRAZOLAM 0.25 MG/1
0.25 TABLET ORAL NIGHTLY PRN
COMMUNITY
End: 2020-07-16 | Stop reason: SDUPTHER

## 2020-06-25 ASSESSMENT — ENCOUNTER SYMPTOMS
NEAR-SYNCOPE: 0
ORTHOPNEA: 0
DIZZINESS: 0
BLOATING: 0
VOMITING: 0
BLURRED VISION: 0
NAUSEA: 0
PALPITATIONS: 0
BRUISES/BLEEDS EASILY: 0
MYALGIAS: 0
DECREASED APPETITE: 0
SHORTNESS OF BREATH: 0
WEAKNESS: 0
ABDOMINAL PAIN: 0
SYNCOPE: 0
RIGHT EYE: 1
DIAPHORESIS: 0
FEVER: 0
SNORING: 0
DYSPNEA ON EXERTION: 0

## 2020-06-25 ASSESSMENT — FIBROSIS 4 INDEX: FIB4 SCORE: 1.51

## 2020-06-25 NOTE — PROGRESS NOTES
Cardiology Clinic Follow-up Note    Date of note:    6/25/2020  Primary Care Provider: An Perez M.D.    Name:             Jeremiah Huber  YOB: 1950  MRN:               [id number]    CC: late presentation MI, CVA, LV thrombus    Primary Cardiologist: Dr. Rodríguez.    Patient HPI:   Jeremiah Huber is a 69 y.o. male with relatively negative PMH aside from Parkinson's disease.   He presented to [facility] on 6/12/20 from his ophthalmologist's office after experiencing unilateral (R) vision loss.  Workup found small acute/subacute L occipital infarct, echo showed apical akinesis with left ventricle thrombus.  He admitted to having chest discomfort approx 1 week prior to visual symptoms, but did not seek medical attention at that time.  EKG also supports late presentation anterior MI.  Angiogram was not preformed in the hospital due to acute CVA, late presentation, hemodynamic stability and absence of cardiac symptoms.  He was started on medical therapy and warfarin.    Interim History:  Mr. Huber presents today for hospital follow up.    He is doing well.  Wants to get back on his stationary bike.  Denies shortness of breath or chest pain.  No LE swelling.    His vision deficits to the R eye are still present.     Review of Systems   Constitution: Negative for decreased appetite, diaphoresis, fever and malaise/fatigue.   Eyes: Positive for vision loss in right eye. Negative for blurred vision.   Cardiovascular: Negative for chest pain, dyspnea on exertion, leg swelling, near-syncope, orthopnea, palpitations and syncope.   Respiratory: Negative for shortness of breath and snoring.    Hematologic/Lymphatic: Negative for bleeding problem. Does not bruise/bleed easily.   Skin: Negative for rash.   Musculoskeletal: Negative for joint pain and myalgias.   Gastrointestinal: Negative for bloating, abdominal pain, nausea and vomiting.   Genitourinary: Negative for frequency.    Neurological: Negative for dizziness and weakness.         Past Medical History:   Diagnosis Date   • Back spasm    • GOUT    • Gout    • Hypertension    • Parkinson disease (HCC)        Family History   Problem Relation Age of Onset   • Heart Disease Mother    • Heart Disease Father    • Cancer Father         prostate cancer   • Arthritis Brother        Social History     Socioeconomic History   • Marital status:      Spouse name: Not on file   • Number of children: Not on file   • Years of education: Not on file   • Highest education level: Not on file   Occupational History   • Not on file   Social Needs   • Financial resource strain: Not on file   • Food insecurity     Worry: Never true     Inability: Never true   • Transportation needs     Medical: No     Non-medical: No   Tobacco Use   • Smoking status: Former Smoker     Types: Cigarettes     Last attempt to quit: 1970     Years since quittin.5   • Smokeless tobacco: Never Used   Substance and Sexual Activity   • Alcohol use: Yes     Alcohol/week: 4.2 oz     Types: 4 Glasses of wine, 3 Standard drinks or equivalent per week     Frequency: 4 or more times a week     Drinks per session: 1 or 2     Binge frequency: Less than monthly     Comment: daily   • Drug use: No   • Sexual activity: Yes     Partners: Female   Lifestyle   • Physical activity     Days per week: Not on file     Minutes per session: Not on file   • Stress: Not on file   Relationships   • Social connections     Talks on phone: Not on file     Gets together: Not on file     Attends Denominational service: Not on file     Active member of club or organization: Not on file     Attends meetings of clubs or organizations: Not on file     Relationship status: Not on file   • Intimate partner violence     Fear of current or ex partner: Not on file     Emotionally abused: Not on file     Physically abused: Not on file     Forced sexual activity: Not on file   Other Topics Concern   •   "Service No   • Blood Transfusions No   • Caffeine Concern No   • Occupational Exposure No   • Hobby Hazards No   • Sleep Concern No   • Stress Concern No   • Weight Concern No   • Special Diet No   • Back Care Yes     Comment: Streching   • Exercise No   • Bike Helmet Yes   • Seat Belt Yes   • Self-Exams Yes   Social History Narrative   • Not on file       Current Outpatient Medications   Medication Sig Dispense Refill   • ALPRAZolam (XANAX) 0.25 MG Tab Take 0.25 mg by mouth at bedtime as needed for Sleep.     • atorvastatin (LIPITOR) 80 MG tablet Take 1 Tab by mouth every evening. 30 Tab 3   • clopidogrel (PLAVIX) 75 MG Tab Take 1 Tab by mouth every day. 30 Tab 0   • warfarin (COUMADIN) 7.5 MG Tab Take 1 Tab by mouth every day at 6 PM. 30 Tab 3   • carbidopa-levodopa (SINEMET)  MG Tab Take 1 Tab by mouth 3 times a day.     • amantadine (SYMMETREL) 100 MG Tab Take 100 mg by mouth every day.       No current facility-administered medications for this visit.        Allergies   Allergen Reactions   • Nkda [No Known Drug Allergy]          Physical Exam:  Ambulatory Vitals  /76 (BP Location: Left arm, Patient Position: Sitting, BP Cuff Size: Adult)   Pulse 86   Ht 1.88 m (6' 2\")   Wt 88.3 kg (194 lb 9.6 oz)   SpO2 96%    BP Readings from Last 4 Encounters:   06/25/20 130/76   06/23/20 126/78   06/22/20 118/68   06/20/20 110/76       Weight/BMI: Body mass index is 24.99 kg/m².  Wt Readings from Last 4 Encounters:   06/25/20 88.3 kg (194 lb 9.6 oz)   06/23/20 84.4 kg (186 lb)   06/22/20 87.5 kg (193 lb)   06/19/20 87 kg (191 lb 12.8 oz)       General: no apparent distress  Lungs: normal respiratory effort, clear without crackles, no wheezing or rhonchi.  Heart: normal rate, regular rhythm, no murmurs, no rubs or gallops,   Ext:  no edema bilateral lower extremities. + bilateral pedal pulses. no cyanosis.  Abdomen: soft, non tender, non distended,  Neurological: No focal deficits, no facial asymmetry.  Soft " speech tone, shuffling gait.  Psychiatric: Appropriate affect, alert and oriented x 3.   Skin: Warm extremities, no rash.      Lab Data Review:  Lab Results   Component Value Date/Time    CHOLSTRLTOT 159 06/13/2020 05:09 AM    LDL 77 06/13/2020 05:09 AM    HDL 57 06/13/2020 05:09 AM    TRIGLYCERIDE 125 06/13/2020 05:09 AM       Lab Results   Component Value Date/Time    SODIUM 133 (L) 06/18/2020 09:06 AM    POTASSIUM 4.2 06/18/2020 09:06 AM    CHLORIDE 98 06/18/2020 09:06 AM    CO2 25 06/18/2020 09:06 AM    GLUCOSE 127 (H) 06/18/2020 09:06 AM    BUN 10 06/18/2020 09:06 AM    CREATININE 1.04 06/18/2020 09:06 AM     Lab Results   Component Value Date/Time    ALKPHOSPHAT 59 06/14/2020 04:44 AM    ASTSGOT 16 06/14/2020 04:44 AM    ALTSGPT 8 06/14/2020 04:44 AM    TBILIRUBIN 0.5 06/14/2020 04:44 AM      Lab Results   Component Value Date/Time    WBC 5.6 06/19/2020 03:58 AM         Cardiac Imaging and Procedures Review:    Personal EKG Interpretation 6/12/20:  NSR HR 78, Qrsd 98, anterior lateral Q waves with TWI in aterior leads suggestive of evolving MI.  6/18/20 EKG with similar findings - some elevation and TWI in anterior leads.    Echo 6/13/20:  CONCLUSIONS  No prior study is available for comparison.   Thrombus is observed in the left ventricular apex.  Apical akinesis.   Normal left ventricular systolic function.   Left ventricular ejection fraction is visually estimated to be 55%.   Right heart pressures are normal.   Care team notified at time of reading.    CTA neck 6/12/20:  1.  There is no evidence of vascular occlusion or clinically significant stenosis.    CTA head 6/12/20:  IMPRESSION:  CT angiogram of the [place] within normal limits.  Small hypodensity in medial left occipital lobe again noted suspicious for small acute/subacute infarct. No acute intracranial hemorrhage.    CT head 6/12/20:  IMPRESSION:  1.  There is no acute intracranial hemorrhage.  2.  There is a small area of hypodensity in  the left medial occipital lobe which could represent an area of evolving ischemia.  3.  There is mild diffuse atrophy.    Assessment and Clinical Decision Makin   Late presentation myocardial infarction.  -   Continue medical therapy including high intensity statin, Plavix.  -   Per Dr. Rodríguez, would plan for angiogram in 4- 6 weeks.   Will communicate with neurology regarding their recommendations.  -   Plans for bridging with Lovenox for procedure.    2   Left ventricle thrombus.  -   Continue warfarin, followed by anticoag clinic.  -   INR 4.6     3   Cardiomyopathy, apical akinesis with nearly preserved LVEF per echo.  -   Without overt heart failure.    4   L occipital CVA.  -   Following with Dr. Mclain     5   Hyperlipidemia.  LDL 77 20.  -   Continue high intensity statin - Lipitor 80 mg daily.    6   R lower back pain.   Also has some pretty significant back pain he was supposed to be getting a procedure (right radiofrequency neurotomy)  in the near future.  I told him I would relay this information to Dr. Rodríguez to take into consideration.      Future Appointments   Date Time Provider Department Center   2020  4:00 PM V EXAM 4 [facility] None   2020  3:00 PM [facility]KE PHARMACIST [address]   2020 11:00 AM Stroke [facility] None   2020  8:00 AM Nazario Sandoval M.D. [facility] None   2020  2:00 PM Nazario Sandoval M.D. [facility] None   9/15/2020  1:00 PM Harrison Rodríguez M.D. [facility] None   10/22/2020  2:20 PM Lalo Mclain M.D. [facility] None   2020  1:00 PM An Perez M.D. [facility] Hospital[facility]EDRICK Manzano PA-C     [facility]  [address] [address]  Phone: [phone]  Fax: [phone]    Collaborating Physician: Dr. Rodríguez    Addendum 1605:  Communicated with Dr. Mclain, nuerology, who recommends waiting 6 weeks for angiogram.    Will communicate with Dr. Rodríguez and  get scheduling in progress.    Lovely Manzano PA-C  6/25/2020

## 2020-06-25 NOTE — TELEPHONE ENCOUNTER
----- Message from Lovely Manzano P.A.-C. sent at 6/25/2020  3:44 PM PDT -----  Regarding: coronary angio  Hi Dr. Mclain,     I saw our patient Farhat Huber in the cardiology clinic today.  Dr. Rodríguez was wanting to do coronary anigo in 4-6 weeks.   We would interrupt his warfarin with Lovenox bridging.    Mr Huber is not currently having any CV symptoms, so we can be flexible with the timing.    I wanted to communicate with you regarding this plan and get your recommendations before proceeding.    Thanks,   Lovely Manzano, Cardiology PA-C  6/25/2020

## 2020-06-25 NOTE — TELEPHONE ENCOUNTER
Lovely, I hear what you are saying.  I would recommend giving it about a 6-week interval before getting him in for the angiogram.

## 2020-06-25 NOTE — TELEPHONE ENCOUNTER
Pts wife called to cancel his Special procedures, He had a Stroke and a Heart attack. He is now on plavix and coumadin and his cardiologist said he would not be able to have any procedures for now.

## 2020-06-26 ENCOUNTER — ANTICOAGULATION VISIT (OUTPATIENT)
Dept: VASCULAR LAB | Facility: MEDICAL CENTER | Age: 70
End: 2020-06-26
Attending: INTERNAL MEDICINE
Payer: MEDICARE

## 2020-06-26 DIAGNOSIS — I69.398 CVA, OLD, DISTURBANCES OF VISION: ICD-10-CM

## 2020-06-26 DIAGNOSIS — H53.9 CVA, OLD, DISTURBANCES OF VISION: ICD-10-CM

## 2020-06-26 DIAGNOSIS — I51.3 LEFT VENTRICULAR THROMBOSIS: ICD-10-CM

## 2020-06-26 LAB — INR PPP: 2.3 (ref 2–3.5)

## 2020-06-26 PROCEDURE — 85610 PROTHROMBIN TIME: CPT

## 2020-06-26 PROCEDURE — 99212 OFFICE O/P EST SF 10 MIN: CPT | Performed by: PHARMACIST

## 2020-06-26 NOTE — PROGRESS NOTES
Anticoagulation Summary  As of 2020    INR goal:   2.0-3.0   TTR:   --   INR used for dosin.30 (2020)   Warfarin maintenance plan:   7.5 mg (7.5 mg x 1) every Mon, Wed, Fri; 3.75 mg (7.5 mg x 0.5) all other days   Weekly warfarin total:   37.5 mg   Plan last modified:   Vicki Albarado, PharmD (2020)   Next INR check:   2020   Target end date:   2020    Indications    CVA  old  disturbances of vision [I69.398  H53.9]  Left ventricular thrombosis [I51.3]             Anticoagulation Episode Summary     INR check location:       Preferred lab:       Send INR reminders to:       Comments:         Anticoagulation Care Providers     Provider Role Specialty Phone number    VALENTIN Bruno [facility] Gerontology [phone]                Anticoagulation Patient Findings      HPI:  Jeremiah Huber seen in clinic today, on anticoagulation therapy with warfarin for LV thrombus  Changes to current medical/health status since last appt: denies  Denies signs/symptoms of bleeding and/or thrombosis since the last appt.    Denies any interval changes to diet  Denies any interval changes to medications since last appt.   Denies any complications or cost restrictions with current therapy.   Verified current warfarin dosing schedule.       A/P   INR  is-therapeutic.   Will have pt start a new weekly warfarin dose of 3.75mg daily except 7.5mg on MWF    Follow up appointment in 3 day(s).    Vicki Albarado, PharmD

## 2020-06-26 NOTE — TELEPHONE ENCOUNTER
Thanks for letting me know. Please cancel the procedure. I wish Mr Huber the best for his recovery.     Dr Sandoval

## 2020-06-29 ENCOUNTER — ANTICOAGULATION VISIT (OUTPATIENT)
Dept: MEDICAL GROUP | Facility: PHYSICIAN GROUP | Age: 70
End: 2020-06-29
Payer: MEDICARE

## 2020-06-29 DIAGNOSIS — I69.398 CVA, OLD, DISTURBANCES OF VISION: ICD-10-CM

## 2020-06-29 DIAGNOSIS — H53.9 CVA, OLD, DISTURBANCES OF VISION: ICD-10-CM

## 2020-06-29 DIAGNOSIS — I51.3 LEFT VENTRICULAR THROMBOSIS: ICD-10-CM

## 2020-06-29 LAB — INR PPP: 2.5 (ref 2–3.5)

## 2020-06-29 PROCEDURE — 93793 ANTICOAG MGMT PT WARFARIN: CPT | Performed by: FAMILY MEDICINE

## 2020-06-29 PROCEDURE — 85610 PROTHROMBIN TIME: CPT | Performed by: FAMILY MEDICINE

## 2020-06-29 NOTE — PROGRESS NOTES
Anticoagulation Summary  As of 2020    INR goal:   2.0-3.0   TTR:   --   INR used for dosin.50 (2020)   Warfarin maintenance plan:   7.5 mg (7.5 mg x 1) every Mon; 3.75 mg (7.5 mg x 0.5) all other days   Weekly warfarin total:   30 mg   Plan last modified:   Carmen Maradiaga (2020)   Next INR check:   2020   Target end date:   2020    Indications    CVA  old  disturbances of vision [I69.398  H53.9]  Left ventricular thrombosis [I51.3]             Anticoagulation Episode Summary     INR check location:       Preferred lab:       Send INR reminders to:       Comments:         Anticoagulation Care Providers     Provider Role Specialty Phone number    VALENTIN Bruno [facility] Ger[facility]ology [phone]        Anticoagulation Patient Findings  Patient Findings     Negatives:   Signs/symptoms of thrombosis, Signs/symptoms of bleeding, Laboratory test error suspected, Change in health, Change in alcohol use, Change in activity, Upcoming invasive procedure, Emergency department visit, Upcoming dental procedure, Missed doses, Extra doses, Change in medications, Change in diet/appetite, Hospital admission, Bruising, Other complaints              History of Present Illness: follow up appointment for chronic anticoagulation with the high risk medication, warfarin for CVA    Pt remains therapeutic today. Continue current dosing regimen.  Follow up in 1 weeks, to reduce the risk of adverse events related to this high risk medication, warfarin.    Camren Maradiaga, Clinical Pharmacist

## 2020-07-06 ENCOUNTER — ANTICOAGULATION VISIT (OUTPATIENT)
Dept: MEDICAL GROUP | Facility: PHYSICIAN GROUP | Age: 70
End: 2020-07-06
Payer: MEDICARE

## 2020-07-06 DIAGNOSIS — I51.3 LEFT VENTRICULAR THROMBOSIS: ICD-10-CM

## 2020-07-06 DIAGNOSIS — I69.398 CVA, OLD, DISTURBANCES OF VISION: ICD-10-CM

## 2020-07-06 DIAGNOSIS — H53.9 CVA, OLD, DISTURBANCES OF VISION: ICD-10-CM

## 2020-07-06 LAB — INR PPP: 2.3 (ref 2–3.5)

## 2020-07-06 PROCEDURE — 93793 ANTICOAG MGMT PT WARFARIN: CPT | Performed by: FAMILY MEDICINE

## 2020-07-06 PROCEDURE — 85610 PROTHROMBIN TIME: CPT | Performed by: FAMILY MEDICINE

## 2020-07-06 NOTE — PROGRESS NOTES
OP Anticoagulation Service Note    Date: 2020    Anticoagulation Summary  As of 2020    INR goal:   2.0-3.0   TTR:   100.0 % (4 d)   INR used for dosin.30 (2020)   Warfarin maintenance plan:   7.5 mg (7.5 mg x 1) every Mon; 3.75 mg (7.5 mg x 0.5) all other days   Weekly warfarin total:   30 mg   Plan last modified:   Carmen Maradiaga (2020)   Next INR check:   2020   Target end date:   2020    Indications    CVA  old  disturbances of vision [I69.398  H53.9]  Left ventricular thrombosis [I51.3]             Anticoagulation Episode Summary     INR check location:       Preferred lab:       Send INR reminders to:       Comments:         Anticoagulation Care Providers     Provider Role Specialty Phone number    VALENTIN Bruno [facility] Gerontology [phone]        Anticoagulation Patient Findings      HPI:   Jeremiah Huber seen in clinic today, they are here today for a INR check on anticoagulation therapy     The reason for today's visit is to prevent morbidity and mortality from a blood clot or stroke and to reduce the risk of bleeding while on a anticoagulant.     Dose the patient in the medical group have a [facility] primary care provider and proper insurance?  An Perez M.D.  [address] [facility] [address] [place] [id number] [phone]      Additional education provided today regarding reducing bleed risk and dietary constraints:  About how vitamin K and foods work with warfarin and the bleeding risk on a anticoagulant     Any upcoming procedures:   none    Confirmed warfarin dosing regimen  Interval Changes with foods rich in vitamin K: No  Interval Changes in ETOH:   No  Interval Changes in smoking status:  No  Interval Changes in medication:  No   Cost restriction:  No  S/S of bleeding or bruising:  No  Signs/symptoms  thrombosis since the last appt:  No  Bleed risk is:  moderate,       Assessment:   INR  therapeutic.     Medications reviewed and  updated--    3 vitals included with today's appt :  (BP, HR, weight, ht, RR)   There were no vitals filed for this visit.      Plan:  Continue weekly warfarin dose as noted      Follow up:  Follow up appointment in 2 week(s)       Other info:  Pt educated to contact our clinic with any changes in medications or s/s of bleeding or thrombosis    CHEST guidelines recommend frequent INR monitoring at regular intervals (a few days up to a max of 12 weeks) to ensure they are on the proper dose of warfarin and not having any complications from therapy.  INRs can dramatically change over a short time period due to diet, medications, and medical conditions.     Karan Ellis, PharmD, MS, BCACP, LCC    This note was created using voice recognition software (Dragon). The accuracy of the dictation is limited by the abilities of the software. I have reviewed the note prior to signing, however some errors in grammar and context are still possible. If you have any questions related to this note please do not hesitate to contact our office.

## 2020-07-08 ENCOUNTER — TELEPHONE (OUTPATIENT)
Dept: CARDIOLOGY | Facility: MEDICAL CENTER | Age: 70
End: 2020-07-08

## 2020-07-08 ENCOUNTER — APPOINTMENT (OUTPATIENT)
Dept: NEUROLOGY | Facility: MEDICAL CENTER | Age: 70
End: 2020-07-08
Payer: MEDICARE

## 2020-07-08 NOTE — TELEPHONE ENCOUNTER
Patient is scheduled on 8/4/2020 for a C w/poss with Dr. Rodríguez.  Patient to check in at 6am for a 7:30am procedure.  Patient instructed to hold coumadin 5 days before procedure and to contact the [facility] coumadin clinic.  Updated H & P to be done at time of admit by APRN.  Pre-admit to call patient.

## 2020-07-09 ENCOUNTER — TELEPHONE (OUTPATIENT)
Dept: VASCULAR LAB | Facility: MEDICAL CENTER | Age: 70
End: 2020-07-09

## 2020-07-09 RX ORDER — ROPINIROLE 0.25 MG/1
0.5 TABLET, FILM COATED ORAL 3 TIMES DAILY
COMMUNITY
End: 2021-11-09 | Stop reason: SDUPTHER

## 2020-07-09 NOTE — TELEPHONE ENCOUNTER
Spoke with pt. Clearance in media scan from 7/8/20. Will address at 7/20/20 appt.     Vicki Albarado, SpeedyD

## 2020-07-09 NOTE — TELEPHONE ENCOUNTER
----- Message from Vonnie Castro sent at 7/9/2020 11:35 AM PDT -----  Regarding: Bridging  Pt is scheduled for an upcoming procedure in 8/4. Pt requesting a call back to go over bridging instructions. Pt is also on a new medication and would like to discuss interactions with coumadin    Pt can be reached at Ph:[phone]

## 2020-07-10 ENCOUNTER — APPOINTMENT (OUTPATIENT)
Dept: RADIOLOGY | Facility: MEDICAL CENTER | Age: 70
End: 2020-07-10
Attending: EMERGENCY MEDICINE
Payer: MEDICARE

## 2020-07-10 ENCOUNTER — APPOINTMENT (OUTPATIENT)
Dept: RADIOLOGY | Facility: MEDICAL CENTER | Age: 70
End: 2020-07-10
Attending: INTERNAL MEDICINE
Payer: MEDICARE

## 2020-07-10 ENCOUNTER — APPOINTMENT (OUTPATIENT)
Dept: CARDIOLOGY | Facility: MEDICAL CENTER | Age: 70
End: 2020-07-10
Attending: INTERNAL MEDICINE
Payer: MEDICARE

## 2020-07-10 ENCOUNTER — HOSPITAL ENCOUNTER (OUTPATIENT)
Facility: MEDICAL CENTER | Age: 70
End: 2020-07-11
Attending: EMERGENCY MEDICINE | Admitting: INTERNAL MEDICINE
Payer: MEDICARE

## 2020-07-10 ENCOUNTER — TELEPHONE (OUTPATIENT)
Dept: CARDIOLOGY | Facility: MEDICAL CENTER | Age: 70
End: 2020-07-10

## 2020-07-10 DIAGNOSIS — R07.9 CHEST PAIN, UNSPECIFIED TYPE: ICD-10-CM

## 2020-07-10 LAB
ALBUMIN SERPL BCP-MCNC: 4.2 G/DL (ref 3.2–4.9)
ALBUMIN/GLOB SERPL: 1.5 G/DL
ALP SERPL-CCNC: 67 U/L (ref 30–99)
ALT SERPL-CCNC: 6 U/L (ref 2–50)
ANION GAP SERPL CALC-SCNC: 13 MMOL/L (ref 7–16)
AST SERPL-CCNC: 7 U/L (ref 12–45)
BASOPHILS # BLD AUTO: 0.4 % (ref 0–1.8)
BASOPHILS # BLD: 0.03 K/UL (ref 0–0.12)
BILIRUB SERPL-MCNC: 1 MG/DL (ref 0.1–1.5)
BUN SERPL-MCNC: 10 MG/DL (ref 8–22)
CALCIUM SERPL-MCNC: 9.3 MG/DL (ref 8.5–10.5)
CHLORIDE SERPL-SCNC: 99 MMOL/L (ref 96–112)
CO2 SERPL-SCNC: 24 MMOL/L (ref 20–33)
CREAT SERPL-MCNC: 1.03 MG/DL (ref 0.5–1.4)
D DIMER PPP IA.FEU-MCNC: 0.39 UG/ML (FEU) (ref 0–0.5)
EKG IMPRESSION: NORMAL
EKG IMPRESSION: NORMAL
EOSINOPHIL # BLD AUTO: 0.04 K/UL (ref 0–0.51)
EOSINOPHIL NFR BLD: 0.5 % (ref 0–6.9)
ERYTHROCYTE [DISTWIDTH] IN BLOOD BY AUTOMATED COUNT: 40.5 FL (ref 35.9–50)
GLOBULIN SER CALC-MCNC: 2.8 G/DL (ref 1.9–3.5)
GLUCOSE SERPL-MCNC: 106 MG/DL (ref 65–99)
HCT VFR BLD AUTO: 49.7 % (ref 42–52)
HGB BLD-MCNC: 16.7 G/DL (ref 14–18)
IMM GRANULOCYTES # BLD AUTO: 0.02 K/UL (ref 0–0.11)
IMM GRANULOCYTES NFR BLD AUTO: 0.3 % (ref 0–0.9)
INR PPP: 2.51 (ref 0.87–1.13)
LIPASE SERPL-CCNC: 20 U/L (ref 11–82)
LV EJECT FRACT  99904: 60
LV EJECT FRACT MOD 2C 99903: 57.76
LV EJECT FRACT MOD 4C 99902: 68.53
LV EJECT FRACT MOD BP 99901: 63.55
LYMPHOCYTES # BLD AUTO: 0.8 K/UL (ref 1–4.8)
LYMPHOCYTES NFR BLD: 10 % (ref 22–41)
MCH RBC QN AUTO: 30.3 PG (ref 27–33)
MCHC RBC AUTO-ENTMCNC: 33.6 G/DL (ref 33.7–35.3)
MCV RBC AUTO: 90 FL (ref 81.4–97.8)
MONOCYTES # BLD AUTO: 0.8 K/UL (ref 0–0.85)
MONOCYTES NFR BLD AUTO: 10 % (ref 0–13.4)
NEUTROPHILS # BLD AUTO: 6.29 K/UL (ref 1.82–7.42)
NEUTROPHILS NFR BLD: 78.8 % (ref 44–72)
NRBC # BLD AUTO: 0 K/UL
NRBC BLD-RTO: 0 /100 WBC
PLATELET # BLD AUTO: 209 K/UL (ref 164–446)
PMV BLD AUTO: 10.1 FL (ref 9–12.9)
POTASSIUM SERPL-SCNC: 4.2 MMOL/L (ref 3.6–5.5)
PROT SERPL-MCNC: 7 G/DL (ref 6–8.2)
PROTHROMBIN TIME: 27.9 SEC (ref 12–14.6)
RBC # BLD AUTO: 5.52 M/UL (ref 4.7–6.1)
SODIUM SERPL-SCNC: 136 MMOL/L (ref 135–145)
TROPONIN T SERPL-MCNC: 12 NG/L (ref 6–19)
TROPONIN T SERPL-MCNC: 14 NG/L (ref 6–19)
WBC # BLD AUTO: 8 K/UL (ref 4.8–10.8)

## 2020-07-10 PROCEDURE — 85025 COMPLETE CBC W/AUTO DIFF WBC: CPT

## 2020-07-10 PROCEDURE — 93010 ELECTROCARDIOGRAM REPORT: CPT | Performed by: INTERNAL MEDICINE

## 2020-07-10 PROCEDURE — 80053 COMPREHEN METABOLIC PANEL: CPT

## 2020-07-10 PROCEDURE — 85610 PROTHROMBIN TIME: CPT

## 2020-07-10 PROCEDURE — 85379 FIBRIN DEGRADATION QUANT: CPT

## 2020-07-10 PROCEDURE — 93306 TTE W/DOPPLER COMPLETE: CPT

## 2020-07-10 PROCEDURE — 84484 ASSAY OF TROPONIN QUANT: CPT

## 2020-07-10 PROCEDURE — 99214 OFFICE O/P EST MOD 30 MIN: CPT | Performed by: INTERNAL MEDICINE

## 2020-07-10 PROCEDURE — 99285 EMERGENCY DEPT VISIT HI MDM: CPT

## 2020-07-10 PROCEDURE — G0378 HOSPITAL OBSERVATION PER HR: HCPCS

## 2020-07-10 PROCEDURE — A9270 NON-COVERED ITEM OR SERVICE: HCPCS | Performed by: INTERNAL MEDICINE

## 2020-07-10 PROCEDURE — 93005 ELECTROCARDIOGRAM TRACING: CPT | Performed by: INTERNAL MEDICINE

## 2020-07-10 PROCEDURE — 700102 HCHG RX REV CODE 250 W/ 637 OVERRIDE(OP): Performed by: INTERNAL MEDICINE

## 2020-07-10 PROCEDURE — 99220 PR INITIAL OBSERVATION CARE,LEVL III: CPT | Performed by: INTERNAL MEDICINE

## 2020-07-10 PROCEDURE — 71045 X-RAY EXAM CHEST 1 VIEW: CPT

## 2020-07-10 PROCEDURE — 700117 HCHG RX CONTRAST REV CODE 255: Performed by: INTERNAL MEDICINE

## 2020-07-10 PROCEDURE — 76705 ECHO EXAM OF ABDOMEN: CPT

## 2020-07-10 PROCEDURE — 83690 ASSAY OF LIPASE: CPT

## 2020-07-10 PROCEDURE — 93306 TTE W/DOPPLER COMPLETE: CPT | Mod: 26 | Performed by: INTERNAL MEDICINE

## 2020-07-10 PROCEDURE — 93005 ELECTROCARDIOGRAM TRACING: CPT | Performed by: EMERGENCY MEDICINE

## 2020-07-10 PROCEDURE — 93005 ELECTROCARDIOGRAM TRACING: CPT

## 2020-07-10 RX ORDER — CLOPIDOGREL BISULFATE 75 MG/1
75 TABLET ORAL DAILY
Status: DISCONTINUED | OUTPATIENT
Start: 2020-07-11 | End: 2020-07-11 | Stop reason: HOSPADM

## 2020-07-10 RX ORDER — ALPRAZOLAM 0.25 MG/1
0.25 TABLET ORAL NIGHTLY PRN
Status: DISCONTINUED | OUTPATIENT
Start: 2020-07-10 | End: 2020-07-11 | Stop reason: HOSPADM

## 2020-07-10 RX ORDER — WARFARIN SODIUM 7.5 MG/1
3.75-7.5 TABLET ORAL DAILY
COMMUNITY
End: 2020-08-24

## 2020-07-10 RX ORDER — AMANTADINE HYDROCHLORIDE 100 MG/1
100 CAPSULE, GELATIN COATED ORAL 2 TIMES DAILY
Status: DISCONTINUED | OUTPATIENT
Start: 2020-07-10 | End: 2020-07-11 | Stop reason: HOSPADM

## 2020-07-10 RX ORDER — OXYCODONE HYDROCHLORIDE 10 MG/1
10 TABLET ORAL
Status: DISCONTINUED | OUTPATIENT
Start: 2020-07-10 | End: 2020-07-11 | Stop reason: HOSPADM

## 2020-07-10 RX ORDER — ROPINIROLE 0.25 MG/1
0.25 TABLET, FILM COATED ORAL EVERY EVENING
Status: DISCONTINUED | OUTPATIENT
Start: 2020-07-10 | End: 2020-07-11 | Stop reason: HOSPADM

## 2020-07-10 RX ORDER — ONDANSETRON 2 MG/ML
4 INJECTION INTRAMUSCULAR; INTRAVENOUS EVERY 4 HOURS PRN
Status: DISCONTINUED | OUTPATIENT
Start: 2020-07-10 | End: 2020-07-11 | Stop reason: HOSPADM

## 2020-07-10 RX ORDER — BISACODYL 10 MG
10 SUPPOSITORY, RECTAL RECTAL
Status: DISCONTINUED | OUTPATIENT
Start: 2020-07-10 | End: 2020-07-11 | Stop reason: HOSPADM

## 2020-07-10 RX ORDER — AMOXICILLIN 250 MG
2 CAPSULE ORAL 2 TIMES DAILY
Status: DISCONTINUED | OUTPATIENT
Start: 2020-07-10 | End: 2020-07-11 | Stop reason: HOSPADM

## 2020-07-10 RX ORDER — ATORVASTATIN CALCIUM 80 MG/1
80 TABLET, FILM COATED ORAL EVERY EVENING
Status: DISCONTINUED | OUTPATIENT
Start: 2020-07-10 | End: 2020-07-11 | Stop reason: HOSPADM

## 2020-07-10 RX ORDER — MORPHINE SULFATE 4 MG/ML
4 INJECTION, SOLUTION INTRAMUSCULAR; INTRAVENOUS
Status: DISCONTINUED | OUTPATIENT
Start: 2020-07-10 | End: 2020-07-11 | Stop reason: HOSPADM

## 2020-07-10 RX ORDER — OXYCODONE HYDROCHLORIDE 5 MG/1
5 TABLET ORAL
Status: DISCONTINUED | OUTPATIENT
Start: 2020-07-10 | End: 2020-07-11 | Stop reason: HOSPADM

## 2020-07-10 RX ORDER — ACETAMINOPHEN 325 MG/1
650 TABLET ORAL EVERY 6 HOURS PRN
Status: DISCONTINUED | OUTPATIENT
Start: 2020-07-10 | End: 2020-07-11 | Stop reason: HOSPADM

## 2020-07-10 RX ORDER — ACETAMINOPHEN 500 MG
1000 TABLET ORAL 3 TIMES DAILY
COMMUNITY

## 2020-07-10 RX ORDER — WARFARIN SODIUM 7.5 MG/1
3.75-7.5 TABLET ORAL DAILY
Status: DISCONTINUED | OUTPATIENT
Start: 2020-07-10 | End: 2020-07-10

## 2020-07-10 RX ORDER — FAMOTIDINE 20 MG/1
20 TABLET, FILM COATED ORAL 2 TIMES DAILY
Status: DISCONTINUED | OUTPATIENT
Start: 2020-07-10 | End: 2020-07-11 | Stop reason: HOSPADM

## 2020-07-10 RX ORDER — WARFARIN SODIUM 7.5 MG/1
7.5 TABLET ORAL
Status: DISCONTINUED | OUTPATIENT
Start: 2020-07-13 | End: 2020-07-11 | Stop reason: HOSPADM

## 2020-07-10 RX ORDER — POLYETHYLENE GLYCOL 3350 17 G/17G
1 POWDER, FOR SOLUTION ORAL
Status: DISCONTINUED | OUTPATIENT
Start: 2020-07-10 | End: 2020-07-11 | Stop reason: HOSPADM

## 2020-07-10 RX ORDER — ONDANSETRON 4 MG/1
4 TABLET, ORALLY DISINTEGRATING ORAL EVERY 4 HOURS PRN
Status: DISCONTINUED | OUTPATIENT
Start: 2020-07-10 | End: 2020-07-11 | Stop reason: HOSPADM

## 2020-07-10 RX ORDER — WARFARIN SODIUM 7.5 MG/1
3.75 TABLET ORAL
Status: DISCONTINUED | OUTPATIENT
Start: 2020-07-10 | End: 2020-07-11 | Stop reason: HOSPADM

## 2020-07-10 RX ADMIN — ACETAMINOPHEN 650 MG: 325 TABLET, FILM COATED ORAL at 23:17

## 2020-07-10 RX ADMIN — ALPRAZOLAM 0.25 MG: 0.25 TABLET ORAL at 23:17

## 2020-07-10 RX ADMIN — FAMOTIDINE 20 MG: 20 TABLET, FILM COATED ORAL at 13:35

## 2020-07-10 RX ADMIN — FAMOTIDINE 20 MG: 20 TABLET, FILM COATED ORAL at 20:16

## 2020-07-10 RX ADMIN — ACETAMINOPHEN 650 MG: 325 TABLET, FILM COATED ORAL at 13:39

## 2020-07-10 RX ADMIN — OXYCODONE HYDROCHLORIDE 10 MG: 10 TABLET ORAL at 20:14

## 2020-07-10 RX ADMIN — HUMAN ALBUMIN MICROSPHERES AND PERFLUTREN 3 ML: 10; .22 INJECTION, SOLUTION INTRAVENOUS at 15:15

## 2020-07-10 RX ADMIN — CARBIDOPA AND LEVODOPA 1 TABLET: 25; 100 TABLET ORAL at 20:13

## 2020-07-10 RX ADMIN — ROPINIROLE HYDROCHLORIDE 0.25 MG: 0.25 TABLET, FILM COATED ORAL at 20:13

## 2020-07-10 RX ADMIN — WARFARIN SODIUM 3.75 MG: 7.5 TABLET ORAL at 18:07

## 2020-07-10 RX ADMIN — CARBIDOPA AND LEVODOPA 1 TABLET: 25; 100 TABLET ORAL at 13:35

## 2020-07-10 RX ADMIN — ATORVASTATIN CALCIUM 80 MG: 80 TABLET, FILM COATED ORAL at 18:07

## 2020-07-10 ASSESSMENT — ENCOUNTER SYMPTOMS
CHILLS: 0
NERVOUS/ANXIOUS: 0
WEAKNESS: 0
HALLUCINATIONS: 0
BLURRED VISION: 0
DIZZINESS: 0
HEARTBURN: 0
BRUISES/BLEEDS EASILY: 0
MYALGIAS: 0
NECK PAIN: 0
NAUSEA: 0
BACK PAIN: 0
ABDOMINAL PAIN: 0
PALPITATIONS: 0
CONSTITUTIONAL NEGATIVE: 1
POLYDIPSIA: 0
MUSCULOSKELETAL NEGATIVE: 1
SPUTUM PRODUCTION: 0
HEADACHES: 0
DOUBLE VISION: 0
FEVER: 0
COUGH: 0
SPEECH CHANGE: 0
GASTROINTESTINAL NEGATIVE: 1
ORTHOPNEA: 0
TREMORS: 0
VOMITING: 0
FOCAL WEAKNESS: 0
PHOTOPHOBIA: 0
HEMOPTYSIS: 0
PSYCHIATRIC NEGATIVE: 1
FLANK PAIN: 0
RESPIRATORY NEGATIVE: 1
SHORTNESS OF BREATH: 0
NEUROLOGICAL NEGATIVE: 1
WEIGHT LOSS: 0

## 2020-07-10 ASSESSMENT — PATIENT HEALTH QUESTIONNAIRE - PHQ9
SUM OF ALL RESPONSES TO PHQ9 QUESTIONS 1 AND 2: 0
2. FEELING DOWN, DEPRESSED, IRRITABLE, OR HOPELESS: NOT AT ALL
1. LITTLE INTEREST OR PLEASURE IN DOING THINGS: NOT AT ALL
1. LITTLE INTEREST OR PLEASURE IN DOING THINGS: NOT AT ALL
SUM OF ALL RESPONSES TO PHQ9 QUESTIONS 1 AND 2: 0
2. FEELING DOWN, DEPRESSED, IRRITABLE, OR HOPELESS: NOT AT ALL

## 2020-07-10 ASSESSMENT — LIFESTYLE VARIABLES
EVER FELT BAD OR GUILTY ABOUT YOUR DRINKING: NO
HOW MANY TIMES IN THE PAST YEAR HAVE YOU HAD 5 OR MORE DRINKS IN A DAY: 0
TOTAL SCORE: 0
EVER_SMOKED: NEVER
SUBSTANCE_ABUSE: 0
HAVE PEOPLE ANNOYED YOU BY CRITICIZING YOUR DRINKING: NO
AVERAGE NUMBER OF DAYS PER WEEK YOU HAVE A DRINK CONTAINING ALCOHOL: 0
CONSUMPTION TOTAL: NEGATIVE
REASON UNABLE TO ASSESS: N
TOTAL SCORE: 0
TOTAL SCORE: 0
EVER HAD A DRINK FIRST THING IN THE MORNING TO STEADY YOUR NERVES TO GET RID OF A HANGOVER: NO
ON A TYPICAL DAY WHEN YOU DRINK ALCOHOL HOW MANY DRINKS DO YOU HAVE: 0
HAVE YOU EVER FELT YOU SHOULD CUT DOWN ON YOUR DRINKING: NO

## 2020-07-10 ASSESSMENT — COPD QUESTIONNAIRES
HAVE YOU SMOKED AT LEAST 100 CIGARETTES IN YOUR ENTIRE LIFE: NO/DON'T KNOW
HAVE YOU SMOKED AT LEAST 100 CIGARETTES IN YOUR ENTIRE LIFE: NO/DON'T KNOW
COPD SCREENING SCORE: 2
DURING THE PAST 4 WEEKS HOW MUCH DID YOU FEEL SHORT OF BREATH: NONE/LITTLE OF THE TIME
DO YOU EVER COUGH UP ANY MUCUS OR PHLEGM?: NO/ONLY WITH OCCASIONAL COLDS OR INFECTIONS
DURING THE PAST 4 WEEKS HOW MUCH DID YOU FEEL SHORT OF BREATH: NONE/LITTLE OF THE TIME
IN THE PAST 12 MONTHS DO YOU DO LESS THAN YOU USED TO BECAUSE OF YOUR BREATHING PROBLEMS: DISAGREE/UNSURE
DO YOU EVER COUGH UP ANY MUCUS OR PHLEGM?: NO/ONLY WITH OCCASIONAL COLDS OR INFECTIONS
COPD SCREENING SCORE: 2

## 2020-07-10 ASSESSMENT — FIBROSIS 4 INDEX
FIB4 SCORE: 1.51
FIB4 SCORE: 0.92
FIB4 SCORE: 0.92

## 2020-07-10 NOTE — TELEPHONE ENCOUNTER
BUNNY/dasha   Pt's wife Chen calling to report pt is in [facility] E/R at this time, in process for admission for pericarditis.  She wants  to be aware of this.    Chen is at  if a call needs to be placed.

## 2020-07-10 NOTE — ED PROVIDER NOTES
ED Provider Note    CHIEF COMPLAINT  Chief Complaint   Patient presents with   • Chest Pain     right sided since yesterday       HPI  Jeremiah Huber is a 69 y.o. male who presents with right shoulder pain and right-sided chest pain.  Started yesterday afternoon as right lower anterior chest pain and has since migrated to his right upper chest right shoulder region.  Sharp character.  Constant.  Slightly worse when he takes a deep breath but not worsened by any movement or movement of the shoulder itself.  He denies diaphoresis, nausea, vomiting, cough, dyspnea, leg swelling, leg pain, abdominal pain, nausea vomiting diarrhea or dysuria.    Patient has a history of recent stroke related to LV thrombus initiated on warfarin for this.  He has suffered an MI and there was a plan for him to have an angiogram on  with Dr. López.    REVIEW OF SYSTEMS  As per HPI, otherwise a 10 point review of systems is negative    PAST MEDICAL HISTORY  Past Medical History:   Diagnosis Date   • Back spasm    • GOUT    • Gout    • Hypertension    • Parkinson disease (HCC)        SOCIAL HISTORY  Social History     Tobacco Use   • Smoking status: Former Smoker     Types: Cigarettes     Last attempt to quit: 1970     Years since quittin.5   • Smokeless tobacco: Never Used   Substance Use Topics   • Alcohol use: Yes     Alcohol/week: 4.2 oz     Types: 4 Glasses of wine, 3 Standard drinks or equivalent per week     Frequency: 4 or more times a week     Drinks per session: 1 or 2     Binge frequency: Less than monthly     Comment: daily   • Drug use: No       SURGICAL HISTORY  Past Surgical History:   Procedure Laterality Date   • KNEE ARTHROSCOPY Right    • KNEE REPLACEMENT, TOTAL Bilateral    • SHOULDER SURGERY      right shoulder, a-c seperation   • TONSILLECTOMY         CURRENT MEDICATIONS  Home Medications     Reviewed by Ana Simons R.N. (Registered Nurse) on 07/10/20 at 0741  Med List Status: Unable to Obtain    Medication Last Dose Status   ALPRAZolam (XANAX) 0.25 MG Tab  Active   amantadine (SYMMETREL) 100 MG Tab  Active   atorvastatin (LIPITOR) 80 MG tablet  Active   carbidopa-levodopa (SINEMET)  MG Tab  Active   clopidogrel (PLAVIX) 75 MG Tab  Active   ROPINIRole (REQUIP) 0.25 MG Tab  Active   warfarin (COUMADIN) 7.5 MG Tab  Active                ALLERGIES  Allergies   Allergen Reactions   • Nkda [No Known Drug Allergy]        PHYSICAL EXAM  VITAL SIGNS: /79   Pulse 100   Temp 36.4 °C (97.6 °F) (Temporal)   Resp 16   Wt 86.3 kg (190 lb 4.1 oz)   SpO2 91%   BMI 24.43 kg/m²    Constitutional: Awake and alert.  Soft-spoken  HENT:  Atraumatic, Normocephalic.Oropharynx mildly dry mucus membranes, Nose normal inspection.   Eyes: Normal inspection  Neck: Supple  Cardiovascular: Mildly elevated heart rate, Normal rhythm.  Symmetric peripheral pulses.   Thorax & Lungs: No respiratory distress, No wheezing, No rales, No rhonchi, No chest tenderness.   Abdomen: Bowel sounds normal, soft, non-distended, nontender, no mass  Skin: Warm, Dry, No rash.   Back: No tenderness, No CVA tenderness.   Extremities: No clubbing, cyanosis, edema, no Homans or cords.  No pain with range of motion of the right shoulder.  There is a scar over the left clavicle from previous surgery.  Neurologic: Awake and alert.  Moves all extremities.      RADIOLOGY/PROCEDURES  DX-CHEST-PORTABLE (1 VIEW)   Final Result      Mild bilateral basilar atelectasis. Underlying infection is possible.      EC-ECHOCARDIOGRAM COMPLETE W/O CONT    (Results Pending)   US-RUQ    (Results Pending)        Imaging is interpreted by radiologist    Labs:  Results for orders placed or performed during the hospital encounter of 07/10/20   CBC with Differential   Result Value Ref Range    WBC 8.0 4.8 - 10.8 K/uL    RBC 5.52 4.70 - 6.10 M/uL    Hemoglobin 16.7 14.0 - 18.0 g/dL    Hematocrit 49.7 42.0 - 52.0 %    MCV 90.0 81.4 - 97.8 fL    MCH 30.3 27.0 - 33.0 pg     MCHC 33.6 (L) 33.7 - 35.3 g/dL    RDW 40.5 35.9 - 50.0 fL    Platelet Count 209 164 - 446 K/uL    MPV 10.1 9.0 - 12.9 fL    Neutrophils-Polys 78.80 (H) 44.00 - 72.00 %    Lymphocytes 10.00 (L) 22.00 - 41.00 %    Monocytes 10.00 0.00 - 13.40 %    Eosinophils 0.50 0.00 - 6.90 %    Basophils 0.40 0.00 - 1.80 %    Immature Granulocytes 0.30 0.00 - 0.90 %    Nucleated RBC 0.00 /100 WBC    Neutrophils (Absolute) 6.29 1.82 - 7.42 K/uL    Lymphs (Absolute) 0.80 (L) 1.00 - 4.80 K/uL    Monos (Absolute) 0.80 0.00 - 0.85 K/uL    Eos (Absolute) 0.04 0.00 - 0.51 K/uL    Baso (Absolute) 0.03 0.00 - 0.12 K/uL    Immature Granulocytes (abs) 0.02 0.00 - 0.11 K/uL    NRBC (Absolute) 0.00 K/uL   Complete Metabolic Panel (CMP)   Result Value Ref Range    Sodium 136 135 - 145 mmol/L    Potassium 4.2 3.6 - 5.5 mmol/L    Chloride 99 96 - 112 mmol/L    Co2 24 20 - 33 mmol/L    Anion Gap 13.0 7.0 - 16.0    Glucose 106 (H) 65 - 99 mg/dL    Bun 10 8 - 22 mg/dL    Creatinine 1.03 0.50 - 1.40 mg/dL    Calcium 9.3 8.5 - 10.5 mg/dL    AST(SGOT) 7 (L) 12 - 45 U/L    ALT(SGPT) 6 2 - 50 U/L    Alkaline Phosphatase 67 30 - 99 U/L    Total Bilirubin 1.0 0.1 - 1.5 mg/dL    Albumin 4.2 3.2 - 4.9 g/dL    Total Protein 7.0 6.0 - 8.2 g/dL    Globulin 2.8 1.9 - 3.5 g/dL    A-G Ratio 1.5 g/dL   Troponin STAT   Result Value Ref Range    Troponin T 12 6 - 19 ng/L   PT/INR   Result Value Ref Range    PT 27.9 (H) 12.0 - 14.6 sec    INR 2.51 (H) 0.87 - 1.13   D-DIMER   Result Value Ref Range    D-Dimer Screen 0.39 0.00 - 0.50 ug/mL (FEU)   ESTIMATED GFR   Result Value Ref Range    GFR If African American >60 >60 mL/min/1.73 m 2    GFR If Non African American >60 >60 mL/min/1.73 m 2   LIPASE   Result Value Ref Range    Lipase 20 11 - 82 U/L   EKG   Result Value Ref Range    Report       [facility]    Test Date:  2020-07-10  Pt Name:    GRIFFIN NEGRON                 Department: ER  MRN:        [id number]                       Room:  Gender:     Male                         Technician: [id number]  :        1950                   Requested By:ER TRIAGE PROTOCOL  Order #:    [id number]                    Reading MD: NATHALIE DIAMOND MD    Measurements  Intervals                                Axis  Rate:       85                           P:          15  IL:         204                          QRS:        -28  QRSD:       98                           T:          91  QT:         416  QTc:        495    Interpretive Statements  SINUS RHYTHM  PROBABLE INFEROLATERAL INFARCT, AGE INDETERM  ANTERIOR INFARCT, AGE INDETERMINATE  BORDERLINE PROLONGED QT INTERVAL  Compared to ECG 2020 08:12:36  First degree AV block no longer present  Myocardial infarct finding still present  Electronically Signed On 7- 8:14:35 PDT by NATHALIE DIAMOND MD         COURSE & MEDICAL DECISION MAKING  Patient presents with pleuritic chest pain.  His vital signs are stable.  His EKG is certainly abnormal, but there is no acute ischemia.  No tenderness in the right upper quadrant.  Work-up was initiated.      Oscar data returned as above.  His troponin is within normal limits.  Chest x-ray is negative.  D-dimer was negative ruling out PE.  He is fully anticoagulated.    I discussed case with Dr. Diaz.  He suspected pericarditis.  Patient will be admitted to the medicine service for echocardiogram and to initiate treatment.  I consulted hospitalist for admission.      FINAL IMPRESSION  1.  Pleuritic chest pain, evaluate for pericarditis  2.  Abnormal EKG      This dictation was created using voice recognition software. The accuracy of the dictation is limited to the abilities of the software.  The nursing notes were reviewed and certain aspects of this information were incorporated into this note.      Electronically signed by: Nathalie Diamond M.D., 7/10/2020 8:14 AM

## 2020-07-10 NOTE — ED NOTES
Med Rec completed per patient and wife   Allergies reviewed  No ORAL antibiotics in last 14 days    Patient takes Warfarin   7.5 mg on Monday   3.75 mg all other days

## 2020-07-10 NOTE — CONSULTS
Cardiology Initial Consultation    Date of Service  7/10/2020    Referring Physician  Dimitry Ramirez M.D.    Reason for Consultation  1. Atypical chest pain.    History of Presenting Illness  Farhat Huber is a 69 y.o. male with a past medical history of recent late presenting myocardial infarction in setting of cerebrovascular accident who presented 7/10/2020 with atypical chest pain. He describes his chest pain to be severe sharp pain sensation of his right chest and abdomen since yesterday. He denies associated shortness of breath, palpitations, diaphoresis, nausea and vomiting. His pain is aggravated by inspiration and positioning. His visual defect has not improved.     Review of Systems  Review of Systems   Constitutional: Negative.  Negative for chills and fever.   HENT: Negative.  Negative for hearing loss.    Eyes: Positive for visual disturbance.   Respiratory: Negative.  Negative for cough and shortness of breath.    Cardiovascular: Positive for chest pain. Negative for palpitations and leg swelling.   Gastrointestinal: Negative.  Negative for abdominal pain, nausea and vomiting.   Genitourinary: Negative.  Negative for dysuria and urgency.   Musculoskeletal: Negative.  Negative for myalgias.   Skin: Negative.  Negative for rash.   Neurological: Negative.  Negative for dizziness, weakness and headaches.   Hematological: Does not bruise/bleed easily.   Psychiatric/Behavioral: Negative.  The patient is not nervous/anxious.        Past Medical History   has a past medical history of Back spasm, GOUT, Gout, Hypertension, and Parkinson disease (HCC).    Surgical History   has a past surgical history that includes shoulder surgery; tonsillectomy; knee arthroscopy (Right); and knee replacement, total (Bilateral).    Family History  family history includes Arthritis in his brother; Cancer in his father; Heart Disease in his father and mother.    Social History   reports that he quit smoking about 50 years  ago. His smoking use included cigarettes. He has never used smokeless tobacco. He reports current alcohol use of about 4.2 oz of alcohol per week. He reports that he does not use drugs.    Medications  Prior to Admission Medications   Prescriptions Last Dose Informant Patient Reported? Taking?   ALPRAZolam (XANAX) 0.25 MG Tab ABOUT 1 WEEK AGO at PRN Patient Yes No   Sig: Take 0.25 mg by mouth at bedtime as needed for Sleep.   ROPINIRole (REQUIP) 0.25 MG Tab 7/9/2020 at PM Patient Yes No   Sig: Take 0.25 mg by mouth every evening.   acetaminophen (TYLENOL) 500 MG Tab 7/10/2020 at AM Patient Yes Yes   Sig: Take 1,000 mg by mouth 3 times a day.   amantadine (SYMMETREL) 100 MG Tab 7/10/2020 at AM Patient Yes No   Sig: Take 100 mg by mouth 2 Times a Day.   atorvastatin (LIPITOR) 80 MG tablet 7/9/2020 at PM Patient No No   Sig: Take 1 Tab by mouth every evening.   carbidopa-levodopa (SINEMET)  MG Tab 7/10/2020 at AM Patient Yes No   Sig: Take 1 Tab by mouth 3 times a day.   clopidogrel (PLAVIX) 75 MG Tab 7/10/2020 at AM Patient No No   Sig: Take 1 Tab by mouth every day.   warfarin (COUMADIN) 7.5 MG Tab 7/9/2020 at PM Patient Yes Yes   Sig: Take 3.75-7.5 mg by mouth every day. 7.5 mg on Monday   3.75 mg all other days      Facility-Administered Medications: None   (Medications reviewed.)    Allergies  Allergies   Allergen Reactions   • Nkda [No Known Drug Allergy]        Vital signs in last 24 hours  Temp:  [35.9 °C (96.7 °F)-36.4 °C (97.6 °F)] 35.9 °C (96.7 °F)  Pulse:  [] 79  Resp:  [16-20] 20  BP: (110-128)/(70-80) 118/76  SpO2:  [91 %-96 %] 96 %    Physical Exam  Physical Exam    Lab Review  Lab Results   Component Value Date/Time    WBC 8.0 07/10/2020 07:50 AM    RBC 5.52 07/10/2020 07:50 AM    HEMOGLOBIN 16.7 07/10/2020 07:50 AM    HEMATOCRIT 49.7 07/10/2020 07:50 AM    MCV 90.0 07/10/2020 07:50 AM    MCH 30.3 07/10/2020 07:50 AM    MCHC 33.6 (L) 07/10/2020 07:50 AM    MPV 10.1 07/10/2020 07:50 AM       Lab Results   Component Value Date/Time    SODIUM 136 07/10/2020 07:50 AM    POTASSIUM 4.2 07/10/2020 07:50 AM    CHLORIDE 99 07/10/2020 07:50 AM    CO2 24 07/10/2020 07:50 AM    GLUCOSE 106 (H) 07/10/2020 07:50 AM    BUN 10 07/10/2020 07:50 AM    CREATININE 1.03 07/10/2020 07:50 AM      Lab Results   Component Value Date/Time    ASTSGOT 7 (L) 07/10/2020 07:50 AM    ALTSGPT 6 07/10/2020 07:50 AM     Lab Results   Component Value Date/Time    CHOLSTRLTOT 159 06/13/2020 05:09 AM    LDL 77 06/13/2020 05:09 AM    HDL 57 06/13/2020 05:09 AM    TRIGLYCERIDE 125 06/13/2020 05:09 AM    TROPONINT 12 07/10/2020 07:50 AM       No results for input(s): NTPROBNP in the last 72 hours.  (Above labs reviewed.)     Cardiac Imaging and Procedures Review  CARDIAC STUDIES/PROCEDURES:    ECHOCARDIOGRAM CONCLUSIONS (06/13/20)  No prior study is available for comparison.   Thrombus is observed in the left ventricular apex.  Apical akinesis.   Normal left ventricular systolic function.   Left ventricular ejection fraction is visually estimated to be 55%.   Right heart pressures are normal.   Care team notified at time of reading.  (study result reviewed)    EKG performed on (07/10/20) was reviewed: EKG personally interpreted shows sinus rhythm with anterior lateral ST elevation.    Assessment/Plan  1. Atypical chest pain: He is experiencing chest pain which is atypical, likely pleuritic.  2. Late presenting myocardial infarction: He denies any angina. He is scheduled for cardiac catheterization with Harrison Cespedes on 08/08/20.  3. LV Thrombus on warfarin.  4. Recent cerebrovascular accident      Thank you for allowing me to participate in the care of this patient.    I will continue to follow this patient    Please contact me with any questions.    Kenji Diaz M.D.   Cardiologist, [facility]  [phone]

## 2020-07-10 NOTE — PROGRESS NOTES
Inpatient Anticoagulation Service Note    Date: 7/10/2020    Reason for Anticoagulation: LV thrombus  Target INR: 2.0 to 3.0         Hemoglobin Value: 16.7  Hematocrit Value: 49.7  Lab Platelet Value: 209    INR from last 7 days     Date/Time INR Value    07/10/20 0750  (!) 2.51        Dose from last 7 days     Date/Time Dose (mg)    07/10/20 1040  3.75        Average Dose (mg): 4.2(7.5mg on Monday 3.75mg all other days)    Significant Interactions: Clopidogrel, Statin    Bridge Therapy: No  Reversal Agent Administered: Not Applicable    Plan:  Continue home dose of 7.5mg Monday and 3.75mg all other days  Education Material Provided?: No(On warfarin prior to admission)  Pharmacist suggested discharge dosing: TBD - anticipate discharging patient on current home dose listed above     Sea Mcdonnell, PharmD

## 2020-07-10 NOTE — PROGRESS NOTES
ER ADMIT    68 yo man with Parkinson, CVA, LV thrombus, CHF who presented with chest pain.  Trop 12, EKG neg for STEMI.  Case reviewed with Dr. Diaz - possible pericarditis recommends TTE and he will consult on pt.    Admit to Dr. Ramirez

## 2020-07-10 NOTE — ED TRIAGE NOTES
Chief Complaint   Patient presents with   • Chest Pain     right sided since yesterday     Pt to triage after ekg, c.o right sided chest pain started yesterday, denies sob. Pt taken to red 10 via wheelchair.

## 2020-07-10 NOTE — H&P
Hospital Medicine History & Physical Note    Date of Service  7/10/2020    Primary Care Physician  An Perez M.D.    Code Status  Full Code    Chief Complaint  Chief Complaint   Patient presents with   • Chest Pain     right sided since yesterday       History of Presenting Illness  68 yo man with Parkinson, CVA, LV thrombus, on warfarin,  who presented with chest pain.  His pain is right-sided, radiating to the right shoulder, sharp, constant, worsened with deep inspiration, worsening the position laying flat and improving in the position upright, up to 9 out of 10.  He was taking Tylenol with some relief.  He also reports some right upper quadrant discomfort.  Case was discussed with Dr. Diaz who  believes patient might have pericarditis.  Recommended TTE, which was ordered    Review of Systems  Review of Systems   Constitutional: Negative for chills, fever and weight loss.   HENT: Negative for ear pain, hearing loss and tinnitus.    Eyes: Negative for blurred vision, double vision and photophobia.   Respiratory: Negative for cough, hemoptysis and sputum production.    Cardiovascular: Positive for chest pain. Negative for palpitations and orthopnea.   Gastrointestinal: Negative for heartburn, nausea and vomiting.   Genitourinary: Negative for dysuria, flank pain, frequency and hematuria.   Musculoskeletal: Negative for back pain, joint pain and neck pain.   Skin: Negative for itching and rash.   Neurological: Negative for tremors, speech change, focal weakness and headaches.   Endo/Heme/Allergies: Negative for environmental allergies and polydipsia. Does not bruise/bleed easily.   Psychiatric/Behavioral: Negative for hallucinations and substance abuse. The patient is not nervous/anxious.        Past Medical History   has a past medical history of Back spasm, GOUT, Gout, Hypertension, and Parkinson disease (HCC).    Surgical History   has a past surgical history that includes shoulder surgery;  tonsillectomy; knee arthroscopy (Right); and knee replacement, total (Bilateral).     Family History  family history includes Arthritis in his brother; Cancer in his father; Heart Disease in his father and mother.     Social History   reports that he quit smoking about 50 years ago. His smoking use included cigarettes. He has never used smokeless tobacco. He reports current alcohol use of about 4.2 oz of alcohol per week. He reports that he does not use drugs.    Allergies  Allergies   Allergen Reactions   • Nkda [No Known Drug Allergy]        Medications  Prior to Admission Medications   Prescriptions Last Dose Informant Patient Reported? Taking?   ALPRAZolam (XANAX) 0.25 MG Tab ABOUT 1 WEEK AGO at PRN Patient Yes No   Sig: Take 0.25 mg by mouth at bedtime as needed for Sleep.   ROPINIRole (REQUIP) 0.25 MG Tab 7/9/2020 at PM Patient Yes No   Sig: Take 0.25 mg by mouth every evening.   acetaminophen (TYLENOL) 500 MG Tab 7/10/2020 at AM Patient Yes Yes   Sig: Take 1,000 mg by mouth 3 times a day.   amantadine (SYMMETREL) 100 MG Tab 7/10/2020 at AM Patient Yes No   Sig: Take 100 mg by mouth 2 Times a Day.   atorvastatin (LIPITOR) 80 MG tablet 7/9/2020 at PM Patient No No   Sig: Take 1 Tab by mouth every evening.   carbidopa-levodopa (SINEMET)  MG Tab 7/10/2020 at AM Patient Yes No   Sig: Take 1 Tab by mouth 3 times a day.   clopidogrel (PLAVIX) 75 MG Tab 7/10/2020 at AM Patient No No   Sig: Take 1 Tab by mouth every day.   warfarin (COUMADIN) 7.5 MG Tab 7/9/2020 at PM Patient Yes Yes   Sig: Take 3.75-7.5 mg by mouth every day. 7.5 mg on Monday   3.75 mg all other days      Facility-Administered Medications: None       Physical Exam  Temp:  [36.4 °C (97.6 °F)] 36.4 °C (97.6 °F)  Pulse:  [] 84  Resp:  [16] 16  BP: (110-128)/(70-80) 121/80  SpO2:  [91 %-96 %] 94 %    Physical Exam  Vitals signs and nursing note reviewed.   Constitutional:       General: He is not in acute distress.     Appearance: Normal  appearance.   HENT:      Head: Normocephalic and atraumatic.      Nose: Nose normal.      Mouth/Throat:      Mouth: Mucous membranes are moist.   Eyes:      Extraocular Movements: Extraocular movements intact.      Pupils: Pupils are equal, round, and reactive to light.   Neck:      Musculoskeletal: Normal range of motion and neck supple.   Cardiovascular:      Rate and Rhythm: Normal rate and regular rhythm.   Pulmonary:      Effort: Pulmonary effort is normal.      Breath sounds: Normal breath sounds.   Abdominal:      General: Abdomen is flat. There is no distension.      Tenderness: There is abdominal tenderness in the right upper quadrant. There is no guarding or rebound.   Musculoskeletal: Normal range of motion.         General: No swelling or deformity.   Skin:     General: Skin is warm and dry.   Neurological:      General: No focal deficit present.      Mental Status: He is alert and oriented to person, place, and time.   Psychiatric:         Mood and Affect: Mood normal.         Behavior: Behavior normal.         Laboratory:  Recent Labs     07/10/20  0750   WBC 8.0   RBC 5.52   HEMOGLOBIN 16.7   HEMATOCRIT 49.7   MCV 90.0   MCH 30.3   MCHC 33.6*   RDW 40.5   PLATELETCT 209   MPV 10.1     Recent Labs     07/10/20  0750   SODIUM 136   POTASSIUM 4.2   CHLORIDE 99   CO2 24   GLUCOSE 106*   BUN 10   CREATININE 1.03   CALCIUM 9.3     Recent Labs     07/10/20  0750   ALTSGPT 6   ASTSGOT 7*   ALKPHOSPHAT 67   TBILIRUBIN 1.0   GLUCOSE 106*     Recent Labs     07/10/20  0750   INR 2.51*     No results for input(s): NTPROBNP in the last 72 hours.      Recent Labs     07/10/20  0750   TROPONINT 12       Imaging:  DX-CHEST-PORTABLE (1 VIEW)   Final Result      Mild bilateral basilar atelectasis. Underlying infection is possible.      EC-ECHOCARDIOGRAM COMPLETE W/O CONT    (Results Pending)   US-RUQ    (Results Pending)         Assessment/Plan:    Pain in the chest  Assessment & Plan  Troponin is elevated, EKG showed  age indeterminate anterolateral MI.  Suspicion for pericarditis.  Repeat troponin, EKG  Monitor on telemetry  TTE pending  Right upper quadrant ultrasound pending    Left ventricular thrombosis- (present on admission)  Assessment & Plan  Continue warfarin    Parkinson disease (HCC)- (present on admission)  Assessment & Plan  Stable.  Continue outpatient medications  Fall precautions

## 2020-07-10 NOTE — ASSESSMENT & PLAN NOTE
Troponin is elevated, EKG showed age indeterminate anterolateral MI.  Suspicion for pericarditis.  Repeat troponin, EKG  Monitor on telemetry  TTE pending  Right upper quadrant ultrasound pending

## 2020-07-11 VITALS
HEART RATE: 79 BPM | BODY MASS INDEX: 24.42 KG/M2 | OXYGEN SATURATION: 91 % | RESPIRATION RATE: 15 BRPM | SYSTOLIC BLOOD PRESSURE: 108 MMHG | DIASTOLIC BLOOD PRESSURE: 70 MMHG | WEIGHT: 190.26 LBS | HEIGHT: 74 IN | TEMPERATURE: 96.4 F

## 2020-07-11 LAB
ALBUMIN SERPL BCP-MCNC: 3.7 G/DL (ref 3.2–4.9)
ALBUMIN/GLOB SERPL: 1.3 G/DL
ALP SERPL-CCNC: 63 U/L (ref 30–99)
ALT SERPL-CCNC: 6 U/L (ref 2–50)
ANION GAP SERPL CALC-SCNC: 11 MMOL/L (ref 7–16)
AST SERPL-CCNC: <5 U/L (ref 12–45)
BASOPHILS # BLD AUTO: 0.7 % (ref 0–1.8)
BASOPHILS # BLD: 0.04 K/UL (ref 0–0.12)
BILIRUB SERPL-MCNC: 0.7 MG/DL (ref 0.1–1.5)
BUN SERPL-MCNC: 11 MG/DL (ref 8–22)
CALCIUM SERPL-MCNC: 8.9 MG/DL (ref 8.5–10.5)
CHLORIDE SERPL-SCNC: 102 MMOL/L (ref 96–112)
CO2 SERPL-SCNC: 25 MMOL/L (ref 20–33)
CREAT SERPL-MCNC: 1.14 MG/DL (ref 0.5–1.4)
EOSINOPHIL # BLD AUTO: 0.06 K/UL (ref 0–0.51)
EOSINOPHIL NFR BLD: 1 % (ref 0–6.9)
ERYTHROCYTE [DISTWIDTH] IN BLOOD BY AUTOMATED COUNT: 40.8 FL (ref 35.9–50)
GLOBULIN SER CALC-MCNC: 2.8 G/DL (ref 1.9–3.5)
GLUCOSE SERPL-MCNC: 104 MG/DL (ref 65–99)
HCT VFR BLD AUTO: 46.8 % (ref 42–52)
HGB BLD-MCNC: 15.8 G/DL (ref 14–18)
IMM GRANULOCYTES # BLD AUTO: 0.02 K/UL (ref 0–0.11)
IMM GRANULOCYTES NFR BLD AUTO: 0.3 % (ref 0–0.9)
INR PPP: 2.69 (ref 0.87–1.13)
LYMPHOCYTES # BLD AUTO: 1.17 K/UL (ref 1–4.8)
LYMPHOCYTES NFR BLD: 19.6 % (ref 22–41)
MCH RBC QN AUTO: 30.3 PG (ref 27–33)
MCHC RBC AUTO-ENTMCNC: 33.8 G/DL (ref 33.7–35.3)
MCV RBC AUTO: 89.7 FL (ref 81.4–97.8)
MONOCYTES # BLD AUTO: 0.57 K/UL (ref 0–0.85)
MONOCYTES NFR BLD AUTO: 9.5 % (ref 0–13.4)
NEUTROPHILS # BLD AUTO: 4.12 K/UL (ref 1.82–7.42)
NEUTROPHILS NFR BLD: 68.9 % (ref 44–72)
NRBC # BLD AUTO: 0 K/UL
NRBC BLD-RTO: 0 /100 WBC
PLATELET # BLD AUTO: 189 K/UL (ref 164–446)
PMV BLD AUTO: 9.8 FL (ref 9–12.9)
POTASSIUM SERPL-SCNC: 4.2 MMOL/L (ref 3.6–5.5)
PROT SERPL-MCNC: 6.5 G/DL (ref 6–8.2)
PROTHROMBIN TIME: 29.4 SEC (ref 12–14.6)
RBC # BLD AUTO: 5.22 M/UL (ref 4.7–6.1)
SODIUM SERPL-SCNC: 138 MMOL/L (ref 135–145)
WBC # BLD AUTO: 6 K/UL (ref 4.8–10.8)

## 2020-07-11 PROCEDURE — G0378 HOSPITAL OBSERVATION PER HR: HCPCS

## 2020-07-11 PROCEDURE — 85610 PROTHROMBIN TIME: CPT

## 2020-07-11 PROCEDURE — 85025 COMPLETE CBC W/AUTO DIFF WBC: CPT

## 2020-07-11 PROCEDURE — 80053 COMPREHEN METABOLIC PANEL: CPT

## 2020-07-11 PROCEDURE — 700102 HCHG RX REV CODE 250 W/ 637 OVERRIDE(OP): Performed by: INTERNAL MEDICINE

## 2020-07-11 PROCEDURE — A9270 NON-COVERED ITEM OR SERVICE: HCPCS | Performed by: INTERNAL MEDICINE

## 2020-07-11 PROCEDURE — 99217 PR OBSERVATION CARE DISCHARGE: CPT | Performed by: INTERNAL MEDICINE

## 2020-07-11 PROCEDURE — 99213 OFFICE O/P EST LOW 20 MIN: CPT | Performed by: INTERNAL MEDICINE

## 2020-07-11 RX ORDER — NITROGLYCERIN 0.4 MG/1
0.4 TABLET SUBLINGUAL PRN
Qty: 25 TAB | Refills: 2 | Status: SHIPPED | OUTPATIENT
Start: 2020-07-11 | End: 2022-11-08

## 2020-07-11 RX ADMIN — ACETAMINOPHEN 650 MG: 325 TABLET, FILM COATED ORAL at 08:05

## 2020-07-11 RX ADMIN — CARBIDOPA AND LEVODOPA 1 TABLET: 25; 100 TABLET ORAL at 06:13

## 2020-07-11 RX ADMIN — CLOPIDOGREL BISULFATE 75 MG: 75 TABLET ORAL at 06:13

## 2020-07-11 RX ADMIN — FAMOTIDINE 20 MG: 20 TABLET, FILM COATED ORAL at 06:13

## 2020-07-11 RX ADMIN — AMANTADINE HYDROCHLORIDE 100 MG: 100 CAPSULE, GELATIN COATED ORAL at 06:12

## 2020-07-11 ASSESSMENT — ENCOUNTER SYMPTOMS
NERVOUS/ANXIOUS: 0
CHILLS: 0
MUSCULOSKELETAL NEGATIVE: 1
NEUROLOGICAL NEGATIVE: 1
RESPIRATORY NEGATIVE: 1
FEVER: 0
PALPITATIONS: 0
PSYCHIATRIC NEGATIVE: 1
SHORTNESS OF BREATH: 0
ABDOMINAL PAIN: 0
CONSTITUTIONAL NEGATIVE: 1
MYALGIAS: 0
NAUSEA: 0
WEAKNESS: 0
BRUISES/BLEEDS EASILY: 0
VOMITING: 0
COUGH: 0
HEADACHES: 0
GASTROINTESTINAL NEGATIVE: 1
DIZZINESS: 0
CARDIOVASCULAR NEGATIVE: 1

## 2020-07-11 NOTE — PROGRESS NOTES
Pt brought from ED in wheel chair,pt awake,a&ox4,no c/o chest pain,on tele with first degree block,poc explained,pt anxious,ressured.

## 2020-07-11 NOTE — PROGRESS NOTES
IV Dc 'd. Discharge instructions provided to patient. Pt verbalizes understanding. Pt states all questions have been answered. Copy of DC provided to patient. Signed copy in chart. 1 prescription sent to his pharmacy. Pt states all personal belongings are in possession. Pt escorted off unit by tech via w/c without incident. Wife picking up in ER .

## 2020-07-11 NOTE — CARE PLAN
Problem: Discharge Barriers/Planning  Goal: Patient's Continuum of care needs are met  7/10/2020 1822 by Aisha Farooq R.N.  Outcome: PROGRESSING AS EXPECTED  7/10/2020 1822 by Aisha Farooq R.N.  Outcome: PROGRESSING AS EXPECTED

## 2020-07-11 NOTE — CARE PLAN
Problem: Safety  Goal: Free from accidental injury  Outcome: PROGRESSING AS EXPECTED     Problem: Knowledge Deficit  Goal: Patient/Significant other demonstrates understanding of disease process, treatment plan, medications and discharge instructions  Outcome: PROGRESSING AS EXPECTED     Problem: Psychosocial needs  Goal: Anxiety reduction  Outcome: PROGRESSING AS EXPECTED     Problem: Discharge Barriers/Planning  Goal: Patient's Continuum of care needs are met  Outcome: PROGRESSING AS EXPECTED     Problem: Skin Integrity  Goal: Skin Integrity is maintained or improved  Outcome: PROGRESSING AS EXPECTED     Problem: Risk for Impaired Mobility  Goal: Mobilize and/or Transfer Safely with Maximum [place]  Outcome: PROGRESSING AS EXPECTED  Goal: Activity Level appropriate for Discharge or Transfer  Outcome: PROGRESSING AS EXPECTED     Problem: Pain  Goal: Alleviation of Pain or a reduction in pain to the patient's comfort goal  Outcome: PROGRESSING AS EXPECTED

## 2020-07-11 NOTE — DISCHARGE INSTRUCTIONS
Discharge Instructions    Discharged to home by car with escort. Discharged via wheelchair, hospital escort: Yes.  Special equipment needed: Not Applicable    Be sure to schedule a follow-up appointment with your primary care doctor or any specialists as instructed.     Discharge Plan:   Diet Plan: Discussed  Activity Level: Discussed  Confirmed Follow up Appointment: Patient to Call and Schedule Appointment  Confirmed Symptoms Management: Discussed  Medication Reconciliation Updated: Yes    I understand that a diet low in cholesterol, fat, and sodium is recommended for good health. Unless I have been given specific instructions below for another diet, I accept this instruction as my diet prescription.   Other diet: heart healthy    Special Instructions: None    · Is patient discharged on Warfarin / Coumadin?   No     Depression / Suicide Risk    As you are [facility] from [facility], it is important to learn how to keep safe from harming yourself.    Recognize the warning signs:  · Abrupt changes in personality, positive or negative- including increase in energy   · Giving away possessions  · Change in eating patterns- significant weight changes-  positive or negative  · Change in sleeping patterns- unable to sleep or sleeping all the time   · Unwillingness or inability to communicate  · Depression  · Unusual sadness, discouragement and loneliness  · Talk of wanting to die  · Neglect of personal appearance   · Rebelliousness- reckless behavior  · Withdrawal from people/activities they love  · Confusion- inability to concentrate     If you or a loved one observes any of these behaviors or has concerns about self-harm, here's what you can do:  · Talk about it- your feelings and reasons for harming yourself  · Remove any means that you might use to hurt yourself (examples: pills, rope, extension cords, firearm)  · Get professional help from the community (Mental Health, Substance Abuse, psychological  counseling)  · Do not be alone:Call your Safe Contact- someone whom you trust who will be there for you.  · Call your local CRISIS HOTLINE 793-3602 or 499-219-0165  · Call your local Children's Mobile Crisis Response Team [place] (211) 966-4421 or www.buySAFE  · Call the toll free National Suicide Prevention Hotlines   · National Suicide Prevention Lifeline 761-981-NYZY (1617)  · National Hope Line Network 800-SUICIDE (565-7175)

## 2020-07-11 NOTE — PROGRESS NOTES
Assessment completed. Pt A&Ox4. Respirations are even and unlabored on RA. Pt reports 5/10 right chest pain at this time-medicated per MAR. Monitors applied, VS stable, call light and belongings within reach. POC updated (d/c per cardiology). Pt educated on room and call light, pt verbalized understanding. Communication board updated. Needs met.

## 2020-07-11 NOTE — DISCHARGE SUMMARY
Discharge Summary    CHIEF COMPLAINT ON ADMISSION  Chief Complaint   Patient presents with   • Chest Pain     right sided since yesterday       Reason for Admission  Right shoulder pain     Admission Date  7/10/2020    CODE STATUS  Full Code    HPI & HOSPITAL COURSE  This is a 69 y.o. male here with complaint of right shoulder pain and atypical chest pain.  Cardiologist was consulted.  Recommend patient to have echocardiogram.  Echocardiogram results showing no signs of regional wall motion abdominally or even decreased ejection fraction.  Patient also has no signs of pericardial effusion.  Patient symptom resolved and patient will be discharged home and cleared by cardiology and recommend patient follow-up with cardiology as outpatient.       Therefore, he is discharged in fair and stable condition to home with close outpatient follow-up.    The patient recovered much more quickly than anticipated on admission.    Discharge Date  7/11/2020    FOLLOW UP ITEMS POST DISCHARGE  none    DISCHARGE DIAGNOSES  Active Problems:    Left ventricular thrombosis POA: Yes    Pain in the chest POA: Yes    Parkinson disease (HCC) POA: Yes  Resolved Problems:    * No resolved hospital problems. *      FOLLOW UP  Future Appointments   Date Time Provider Department Center   7/20/2020  2:30 PM [facility]ARM[facility] [address]   8/4/2020  7:30 AM [facility] CATH LAB 4 CLOT None   8/26/2020 11:00 AM [facility] None   9/15/2020  1:00 PM Harrison Rodríguez M.D. [facility] None   10/22/2020  2:20 PM Lalo Mclain M.D. [facility] None   12/31/2020  1:00 PM An Perez M.D. [facility]     No follow-up provider specified.    MEDICATIONS ON DISCHARGE     Medication List      START taking these medications      Instructions   nitroglycerin 0.4 MG Subl  Commonly known as:  NITROSTAT   Doctor's comments:  PRN for chest pain  every 5 minutes , may repeat x 2, if pain persists call EMS  Place 1 Tab under tongue as needed for Chest  Pain.  Dose:  0.4 mg        CONTINUE taking these medications      Instructions   acetaminophen 500 MG Tabs  Commonly known as:  TYLENOL   Take 1,000 mg by mouth 3 times a day.  Dose:  1,000 mg     ALPRAZolam 0.25 MG Tabs  Commonly known as:  XANAX   Take 0.25 mg by mouth at bedtime as needed for Sleep.  Dose:  0.25 mg     amantadine 100 MG Tabs  Commonly known as:  SYMMETREL   Take 100 mg by mouth 2 Times a Day.  Dose:  100 mg     atorvastatin 80 MG tablet  Commonly known as:  LIPITOR   Take 1 Tab by mouth every evening.  Dose:  80 mg     carbidopa-levodopa  MG Tabs  Commonly known as:  SINEMET   Take 1 Tab by mouth 3 times a day.  Dose:  1 Tab     clopidogrel 75 MG Tabs  Commonly known as:  PLAVIX   Take 1 Tab by mouth every day.  Dose:  75 mg     ROPINIRole 0.25 MG Tabs  Commonly known as:  REQUIP   Take 0.25 mg by mouth every evening.  Dose:  0.25 mg     warfarin 7.5 MG Tabs  Commonly known as:  COUMADIN   Take 3.75-7.5 mg by mouth every day. 7.5 mg on Monday   3.75 mg all other days  Dose:  3.75-7.5 mg            Allergies  Allergies   Allergen Reactions   • Nkda [No Known Drug Allergy]        DIET  Orders Placed This Encounter   Procedures   • Diet Order Cardiac     Standing Status:   Standing     Number of Occurrences:   1     Order Specific Question:   Diet:     Answer:   Cardiac [6]     Order Specific Question:   Miscellaneous modifications:     Answer:   No Decaf, No Caffeine(for test) [11]       ACTIVITY  As tolerated.  Weight bearing as tolerated    CONSULTATIONS  card    PROCEDURES  None    EC-ECHOCARDIOGRAM COMPLETE W/ CONT   Final Result      US-RUQ   Final Result      1.  There is cholelithiasis without biliary dilatation or wall thickening.   2.  There are incidental simple hepatic cysts. These do not need further follow-up.      DX-CHEST-PORTABLE (1 VIEW)   Final Result      Mild bilateral basilar atelectasis. Underlying infection is possible.          PE:  Gen: AAOx3, NAD  Eyes: PELLA  Neck:  no JVD, no lymphadenopathy  Cardia: RRR, no mrg  Lungs: CTAB, no rales, rhonci or wheezing  Abd: NABS, soft, non extended, no mass  EXT: No C/C/E, peripheral pulse 2+ b/l  Neuro: CN II-XII intact, non focal, reflex 2+ symmetrical  Skin: Intact, no lesion, warm  Psych: Appropriate.      LABORATORY  Lab Results   Component Value Date    SODIUM 138 07/11/2020    POTASSIUM 4.2 07/11/2020    CHLORIDE 102 07/11/2020    CO2 25 07/11/2020    GLUCOSE 104 (H) 07/11/2020    BUN 11 07/11/2020    CREATININE 1.14 07/11/2020        Lab Results   Component Value Date    WBC 6.0 07/11/2020    HEMOGLOBIN 15.8 07/11/2020    HEMATOCRIT 46.8 07/11/2020    PLATELETCT 189 07/11/2020        Total time of the discharge process exceeds 38 minutes.

## 2020-07-11 NOTE — PROGRESS NOTES
Pt's wife visiting,anxious,wants to talk to MD,MD called and talked to her and wife came and apologized,spend time with wife explaining poc,she feels better.

## 2020-07-11 NOTE — PROGRESS NOTES
Cardiology Follow Up Progress Note    Date of Service  7/11/2020    Attending Physician  Amita Perkins M.D.    Chief Complaint   1. Atypical chest pain.    HPI  Farhat Huber is a 69 y.o. male admitted 7/10/2020 with above.    Interim Events  No significant changes noted from cardiac standpoint within the past 24 hours.    Review of Systems  Review of Systems   Constitutional: Negative.  Negative for chills and fever.   HENT: Negative.  Negative for hearing loss.    Eyes: Positive for visual disturbance.   Respiratory: Negative.  Negative for cough and shortness of breath.    Cardiovascular: Negative.  Negative for chest pain, palpitations and leg swelling.   Gastrointestinal: Negative.  Negative for abdominal pain, nausea and vomiting.   Genitourinary: Negative.  Negative for dysuria and urgency.   Musculoskeletal: Negative.  Negative for myalgias.   Skin: Negative.  Negative for rash.   Neurological: Negative.  Negative for dizziness, weakness and headaches.   Hematological: Does not bruise/bleed easily.   Psychiatric/Behavioral: Negative.  The patient is not nervous/anxious.        Vital signs in last 24 hours  Temp:  [35.8 °C (96.4 °F)-36.9 °C (98.4 °F)] 35.8 °C (96.4 °F)  Pulse:  [73-86] 79  Resp:  [15-20] 15  BP: ()/(65-76) 108/70  SpO2:  [91 %-96 %] 91 %    Physical Exam  Physical Exam  Constitutional:       Appearance: He is well-developed.   HENT:      Head: Normocephalic and atraumatic.   Eyes:      Conjunctiva/sclera: Conjunctivae normal.      Pupils: Pupils are equal, round, and reactive to light.   Neck:      Musculoskeletal: Normal range of motion and neck supple.   Cardiovascular:      Rate and Rhythm: Normal rate and regular rhythm.   Pulmonary:      Effort: Pulmonary effort is normal.      Breath sounds: Normal breath sounds.   Abdominal:      General: Bowel sounds are normal.      Palpations: Abdomen is soft.   Musculoskeletal: Normal range of motion.   Skin:     General: Skin is warm and dry.    Neurological:      Mental Status: He is alert and oriented to person, place, and time.         Lab Review  Lab Results   Component Value Date/Time    WBC 6.0 07/11/2020 04:21 AM    RBC 5.22 07/11/2020 04:21 AM    HEMOGLOBIN 15.8 07/11/2020 04:21 AM    HEMATOCRIT 46.8 07/11/2020 04:21 AM    MCV 89.7 07/11/2020 04:21 AM    MCH 30.3 07/11/2020 04:21 AM    MCHC 33.8 07/11/2020 04:21 AM    MPV 9.8 07/11/2020 04:21 AM      Lab Results   Component Value Date/Time    SODIUM 138 07/11/2020 04:21 AM    POTASSIUM 4.2 07/11/2020 04:21 AM    CHLORIDE 102 07/11/2020 04:21 AM    CO2 25 07/11/2020 04:21 AM    GLUCOSE 104 (H) 07/11/2020 04:21 AM    BUN 11 07/11/2020 04:21 AM    CREATININE 1.14 07/11/2020 04:21 AM      Lab Results   Component Value Date/Time    ASTSGOT <5 (L) 07/11/2020 04:21 AM    ALTSGPT 6 07/11/2020 04:21 AM     Lab Results   Component Value Date/Time    CHOLSTRLTOT 159 06/13/2020 05:09 AM    LDL 77 06/13/2020 05:09 AM    HDL 57 06/13/2020 05:09 AM    TRIGLYCERIDE 125 06/13/2020 05:09 AM    TROPONINT 14 07/10/2020 01:25 PM       No results for input(s): NTPROBNP in the last 72 hours.  (Above labs reviewed.)       Current Facility-Administered Medications:   •  senna-docusate (PERICOLACE or SENOKOT S) 8.6-50 MG per tablet 2 Tab, 2 Tab, Oral, BID **AND** polyethylene glycol/lytes (MIRALAX) PACKET 1 Packet, 1 Packet, Oral, QDAY PRN **AND** magnesium hydroxide (MILK OF MAGNESIA) suspension 30 mL, 30 mL, Oral, QDAY PRN **AND** bisacodyl (DULCOLAX) suppository 10 mg, 10 mg, Rectal, QDAY PRN, Dimitry Ramirez M.D.  •  Respiratory Therapy Consult, , Nebulization, Continuous RT, Dimitry Ramirez M.D.  •  acetaminophen (TYLENOL) tablet 650 mg, 650 mg, Oral, Q6HRS PRN, Dimitry Ramirez M.D., 650 mg at 07/11/20 0805  •  Notify provider if pain remains uncontrolled, , , CONTINUOUS **AND** Use the numeric rating scale (NRS-11) on regular floors and Critical-Care Pain Observation Tool (CPOT) on ICUs/Trauma to assess pain, ,  , CONTINUOUS **AND** Pulse Ox (Oximetry), , , CONTINUOUS **AND** Pharmacy Consult Request ...Pain Management Review 1 Each, 1 Each, Other, PHARMACY TO DOSE **AND** If patient difficult to arouse and/or has respiratory depression, stop any opiates that are currently infusing and call a Rapid Response., , , CONTINUOUS **AND** oxyCODONE immediate-release (ROXICODONE) tablet 5 mg, 5 mg, Oral, Q3HRS PRN **AND** oxyCODONE immediate release (ROXICODONE) tablet 10 mg, 10 mg, Oral, Q3HRS PRN, 10 mg at 07/10/20 2014 **AND** morphine (pf) 4 MG/ML injection 4 mg, 4 mg, Intravenous, Q3HRS PRN, Dimitry Ramirez M.D.  •  ondansetron (ZOFRAN) syringe/vial injection 4 mg, 4 mg, Intravenous, Q4HRS PRN, Dimitry Ramirez M.D.  •  ondansetron (ZOFRAN ODT) dispertab 4 mg, 4 mg, Oral, Q4HRS PRN, Dimitry Ramirez M.D.  •  famotidine (PEPCID) tablet 20 mg, 20 mg, Oral, BID, Dimitry Ramirez M.D., 20 mg at 07/11/20 0613  •  ALPRAZolam (XANAX) tablet 0.25 mg, 0.25 mg, Oral, HS PRN, Dimitry Ramirez M.D., 0.25 mg at 07/10/20 2317  •  amantadine (SYMMETREL) capsule 100 mg, 100 mg, Oral, BID, Dimitry Ramirez M.D., 100 mg at 07/11/20 0612  •  atorvastatin (LIPITOR) tablet 80 mg, 80 mg, Oral, Q EVENING, Dimitry Ramirez M.D., 80 mg at 07/10/20 1807  •  carbidopa-levodopa (SINEMET)  MG tablet 1 Tab, 1 Tab, Oral, TID, Dimitry Ramirez M.D., 1 Tab at 07/11/20 0613  •  clopidogrel (PLAVIX) tablet 75 mg, 75 mg, Oral, DAILY, Dimitry Ramirez M.D., 75 mg at 07/11/20 0613  •  ROPINIRole (REQUIP) tablet 0.25 mg, 0.25 mg, Oral, Q EVENING, Dimitry Ramirez M.D., 0.25 mg at 07/10/20 2013  •  MD Alert...Warfarin per Pharmacy, , Other, PHARMACY TO DOSE, Dimitry Ramirez M.D.  •  [START ON 7/13/2020] warfarin (COUMADIN) tablet 7.5 mg, 7.5 mg, Oral, Once per day on Mon, Dimitry Ramirez M.D.  •  warfarin (COUMADIN) tablet 3.75 mg, 3.75 mg, Oral, Once per day on Sun Tue Wed Thu Fri Sat, Dimitry Ramirez M.D., 3.75 mg at 07/10/20  1807  (Medications reviewed.)    Cardiac Imaging and Procedures Review  CARDIAC STUDIES/PROCEDURES:     ECHOCARDIOGRAM CONCLUSIONS (06/13/20)  No prior study is available for comparison.   Thrombus is observed in the left ventricular apex.  Apical akinesis.   Normal left ventricular systolic function.   Left ventricular ejection fraction is visually estimated to be 55%.   Right heart pressures are normal.   Care team notified at time of reading.  (study result reviewed)     EKG performed on (07/10/20) was reviewed: EKG personally interpreted shows sinus rhythm with anterior lateral ST elevation.    Assessment/Plan  1. Atypical chest pain: His chest pain has resolved and his cardiac studies were unremarkable. No further studies recommended at this time.  2. Late presenting myocardial infarction: He denies any angina. He is scheduled for cardiac catheterization with Harrison Cespedes on 08/08/20.  3. LV Thrombus on warfarin.  4. Recent cerebrovascular accident    We will sign off and follow up in clinic.Thank you for allowing me to participate in the care of this patient.  Cardiology will sign off on this patient    Please contact me with any questions.    Kenji Diaz M.D.   Cardiologist, [facility]  [phone]

## 2020-07-14 ENCOUNTER — APPOINTMENT (OUTPATIENT)
Dept: PAIN MANAGEMENT | Facility: REHABILITATION | Age: 70
End: 2020-07-14
Attending: PHYSICAL MEDICINE & REHABILITATION
Payer: MEDICARE

## 2020-07-16 ENCOUNTER — TELEMEDICINE (OUTPATIENT)
Dept: MEDICAL GROUP | Facility: MEDICAL CENTER | Age: 70
End: 2020-07-16
Payer: MEDICARE

## 2020-07-16 VITALS — WEIGHT: 187 LBS | TEMPERATURE: 98.6 F | HEIGHT: 74 IN | BODY MASS INDEX: 24 KG/M2

## 2020-07-16 DIAGNOSIS — D72.810 LYMPHOPENIA: ICD-10-CM

## 2020-07-16 DIAGNOSIS — F51.01 PRIMARY INSOMNIA: ICD-10-CM

## 2020-07-16 PROCEDURE — 99214 OFFICE O/P EST MOD 30 MIN: CPT | Mod: 95,CR | Performed by: FAMILY MEDICINE

## 2020-07-16 RX ORDER — ALPRAZOLAM 0.25 MG/1
0.25 TABLET ORAL NIGHTLY PRN
Qty: 20 TAB | Refills: 0 | Status: SHIPPED | OUTPATIENT
Start: 2020-07-16 | End: 2020-08-15

## 2020-07-16 RX ORDER — CARBIDOPA/LEVODOPA 25MG-250MG
TABLET ORAL
COMMUNITY
Start: 2020-07-11 | End: 2020-09-04

## 2020-07-16 RX ORDER — CLOPIDOGREL BISULFATE 75 MG/1
75 TABLET ORAL DAILY
Qty: 90 TAB | Refills: 3 | Status: SHIPPED | OUTPATIENT
Start: 2020-07-16 | End: 2021-06-22

## 2020-07-16 ASSESSMENT — FIBROSIS 4 INDEX: FIB4 SCORE: 0.66

## 2020-07-16 NOTE — PROGRESS NOTES
Telemedicine Visit: Established Patient     This encounter was conducted via [employer].   Verbal consent was obtained. Patient's identity was verified.    Subjective:   CC:   Jeremiah Huber is a 70 y.o. male presenting for hospital follow-up.  Patient was admitted on 7/10/2020 and discharged on 7/11/2020.  He presented with right shoulder pain/atypical chest pain.  Cardiology was consulted.  There was concern about possible pericarditis, however, echocardiogram was normal.  Lab and imaging work-up included troponin, CMP, PT/INR, d-dimer, lipase, CBC, chest x-ray, echocardiogram.  Work-up was all essentially negative although the patient did have multiple questions regarding some labs and imaging that were done.  The patient is now feeling well, he denies any further pain.  He has questions about his blood sugar, lymphocytes and chest x-ray.    Lymphopenia  New problem.  Quite mild.  He has had low lymphocyte counts in the past.  This is mildly low at 19%    Blood sugar  The patient was worried that his blood sugar may be elevated.  However, it was only 106 and the patient was not fasting, reassured that its normal.    Abnormal chest x-ray  New problem.  His chest x-ray showed by basilar atelectasis.  He denies any cough, fever, chills, generally feeling well.    ROS     Denies any recent fevers or chills. No nausea or vomiting. No chest pains or shortness of breath.     Allergies   Allergen Reactions   • Nkda [No Known Drug Allergy]        Current medicines (including changes today)  Current Outpatient Medications   Medication Sig Dispense Refill   • carbidopa-levodopa (SINEMET)  MG Tab      • ALPRAZolam (XANAX) 0.25 MG Tab Take 1 Tab by mouth at bedtime as needed for Sleep for up to 30 days. 20 Tab 0   • nitroglycerin (NITROSTAT) 0.4 MG SL Tab Place 1 Tab under tongue as needed for Chest Pain. 25 Tab 2   • warfarin (COUMADIN) 7.5 MG Tab Take 3.75-7.5 mg by mouth every day. 7.5 mg on Monday   3.75 mg all  "other days     • acetaminophen (TYLENOL) 500 MG Tab Take 1,000 mg by mouth 3 times a day.     • ROPINIRole (REQUIP) 0.25 MG Tab Take 0.25 mg by mouth every evening.     • atorvastatin (LIPITOR) 80 MG tablet Take 1 Tab by mouth every evening. 30 Tab 3   • carbidopa-levodopa (SINEMET)  MG Tab Take 1 Tab by mouth 3 times a day.     • amantadine (SYMMETREL) 100 MG Tab Take 100 mg by mouth 2 Times a Day.     • clopidogrel (PLAVIX) 75 MG Tab Take 1 Tab by mouth every day. 90 Tab 3     No current facility-administered medications for this visit.        Patient Active Problem List    Diagnosis Date Noted   • CVA, old, disturbances of vision 06/12/2020     Priority: High   • Myocardial infarct, old 06/12/2020     Priority: Medium   • Parkinson disease (HCC)      Priority: Low   • Other hyperlipidemia 12/31/2019     Priority: Low   • Right-sided low back pain without sciatica 11/11/2015     Priority: Low   • Primary insomnia 12/19/2013     Priority: Low   • Pain in the chest 07/10/2020   • Ischemic cardiomyopathy 06/25/2020   • Left ventricular thrombosis 06/23/2020   • Stroke (HCC) 06/22/2020   • History of bilateral knee replacement 12/31/2019   • Restless leg syndrome 12/31/2019   • Ganglion cyst 09/16/2019   • Elevated PSA 06/10/2016       Family History   Problem Relation Age of Onset   • Heart Disease Mother    • Heart Disease Father    • Cancer Father         prostate cancer   • Arthritis Brother        He  has a past medical history of Back spasm, GOUT, Gout, Hypertension, Parkinson disease (HCC), and Stroke (HCC).  He  has a past surgical history that includes shoulder surgery; tonsillectomy; knee arthroscopy (Right); and knee replacement, total (Bilateral).       Objective:   Temp 37 °C (98.6 °F) (Temporal)   Ht 1.88 m (6' 2\")   Wt 84.8 kg (187 lb)   BMI 24.01 kg/m²     Physical Exam:  General: Normal appearing. No distress.  HEAD: NCAT  EYES: conjunctiva clear, lids without ptosis, pupils equal and " reactive to light  EARS: ears normal shape and contour.  MOUTH: normal dentition   Neck:  Normal ROM  Pulmonary: Normal effort. Normal respiratory rate.  Cardiovascular: Well perfused.   Neurologic: Grossly normal, no focal deficits  Skin: Warm and dry.  No obvious lesions.  Musculoskeletal: Normal gait and station.  Psych: Normal mood and affect. Alert and oriented x3. Judgment and insight is normal.      Assessment and Plan:   The following treatment plan was discussed:     1. Lymphopenia  New problem, quite mild.  Plan to repeat CBC in 1 month.  - CBC WITH DIFFERENTIAL; Future    2. Primary insomnia  - ALPRAZolam (XANAX) 0.25 MG Tab; Take 1 Tab by mouth at bedtime as needed for Sleep for up to 30 days.  Dispense: 20 Tab; Refill: 0    3.  Shoulder pain  Recently hospitalized, resolved on its own, cardiac etiology ruled out.  Reviewed labs and imaging with the patient.

## 2020-07-20 ENCOUNTER — ANTICOAGULATION VISIT (OUTPATIENT)
Dept: MEDICAL GROUP | Facility: PHYSICIAN GROUP | Age: 70
End: 2020-07-20
Payer: MEDICARE

## 2020-07-20 DIAGNOSIS — H53.9 CVA, OLD, DISTURBANCES OF VISION: ICD-10-CM

## 2020-07-20 DIAGNOSIS — I69.398 CVA, OLD, DISTURBANCES OF VISION: ICD-10-CM

## 2020-07-20 DIAGNOSIS — I51.3 LEFT VENTRICULAR THROMBOSIS: ICD-10-CM

## 2020-07-20 LAB — INR PPP: 1.7 (ref 2–3.5)

## 2020-07-20 PROCEDURE — 85610 PROTHROMBIN TIME: CPT | Performed by: FAMILY MEDICINE

## 2020-07-20 PROCEDURE — 99212 OFFICE O/P EST SF 10 MIN: CPT | Performed by: FAMILY MEDICINE

## 2020-07-20 NOTE — PROGRESS NOTES
OP Anticoagulation Service Note    Date: 2020    Anticoagulation Summary  As of 2020    INR goal:   2.0-3.0   TTR:   84.7 % (2.6 wk)   INR used for dosin.70! (2020)   Warfarin maintenance plan:   7.5 mg (7.5 mg x 1) every Mon; 3.75 mg (7.5 mg x 0.5) all other days   Weekly warfarin total:   30 mg   Plan last modified:   Karan Ellis, PharmD (2020)   Next INR check:   8/10/2020   Target end date:   2020    Indications    CVA  old  disturbances of vision [I69.398  H53.9]  Left ventricular thrombosis [I51.3]             Anticoagulation Episode Summary     INR check location:       Preferred lab:       Send INR reminders to:       Comments:         Anticoagulation Care Providers     Provider Role Specialty Phone number    VALENTIN Bruno [facility] [phone]        Anticoagulation Patient Findings      HPI:   Jeremiah Huber seen in clinic today, they are here today for a INR check on anticoagulation therapy     The reason for today's visit is to prevent morbidity and mortality from a blood clot or stroke and to reduce the risk of bleeding while on a anticoagulant.     Dose the patient in the medical group have a [facility] primary care provider and proper insurance?  An Perez M.D.  [address] [facility], [place], operated by [facility] [address] [place] [id number] [phone]      Additional education provided today regarding reducing bleed risk and dietary constraints:  About how vitamin K and foods work with warfarin and the bleeding risk on a anticoagulant     Any upcoming procedures:   He has a cardiac catheterization on .  He was told to hold warfarin for 5 days prior to this procedure.  Based on our discussion they would like to use Lovenox for this procedure.    Confirmed warfarin dosing regimen  Interval Changes with foods rich in vitamin K: No  Interval Changes in ETOH:   No  Interval Changes in smoking status:  No  Interval Changes in medication:  No    Cost restriction:  No  S/S of bleeding or bruising:  No  Signs/symptoms  thrombosis since the last appt:  No  Bleed risk is:  moderate,       Assessment:   INR  sub-therapeutic.     Medications reviewed and updated--    3 vitals included with today's appt :  (BP, HR, weight, ht, RR)   Declined vitals today       Plan:  7.5 mg tomorrow then resume his normal dose.    Dosing instructions provided to the patient for bridging, we will review this in more detail at the next appointment.    Pt to have cardiac catheterization on 8/4. Due to pt history lovenox bridging is indicated.   Pt to follow instructions as below, after procedure to ask operating MD when safe to resume anticoagulation, if no instructions given, pt will follow as below.     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    Current weight approximately 80 kg  CrCl = greater than 60 mils per minute    Used Lovenox before yes        Date  Warfarin dosing PM Lovenox   5 Days before procedure 7/30 0mg NONE   4 Days before procedure 7/31 0mg Lovenox injection   3 Days before procedure 8/1 0mg Lovenox injection   2 Days before procedure 8/2 0mg Lovenox injection   1 Days before procedure 8/3 0mg NONE   PROCEDURE 8/4 7.5 NONE   1 Day after procedure 8/5 7.5 Restart Lovenox injections      2 Days after procedure 8/6 7.5 Lovenox injection   3 Days after procedure 8/7 7.5 Lovenox injection   4 Days after procedure 8/8 3.75 Lovenox injection   5 Days after procedure 8/9 3.75 lovenox injection        Follow up:  Follow up appointment in 1 week(s)       Other info:  Pt educated to contact our clinic with any changes in medications or s/s of bleeding or thrombosis    CHEST guidelines recommend frequent INR monitoring at regular intervals (a few days up to a max of 12 weeks) to ensure they are on the proper dose of warfarin and not having any complications from therapy.  INRs can dramatically change over a short time period due to diet, medications, and medical  conditions.     Karan Ellis, PharmD, MS, BCACP, C    This note was created using voice recognition software (Dragon). The accuracy of the dictation is limited by the abilities of the software. I have reviewed the note prior to signing, however some errors in grammar and context are still possible. If you have any questions related to this note please do not hesitate to contact our office.                                                    [facility] Consent      Name    Jeremiah Huber    MRN number: [id number]    the following are guidelines for participation in the [facility].     I, ____Jeremiah Huber_____, understand and voluntarily agree to participate in the [facility] and to have services provided to me by pharmacists working in collaboration with my provider.    I understand the pharmacist within the [facility] may initiate, modify or discontinue my medications, order appropriate testing and appointments, perform exams, monitor treatment, and make clinical evaluations and decisions pursuant to a collaborative practice agreement with my provider.  I understand the pharmacist within the [facility] is not a physician, osteopathic physician, advanced practice registered nurse or physician assistant and may not diagnose.  I will take all my medications as instructed and not change the way I take it without first talking to my provider or a pharmacist within the [facility].  I understand that if I am late to my appointment I may not be able to be seen by a pharmacist at that time and will have to reschedule my appointment.  During appointment with pharmacist I understand that pharmacist has the right not to answer questions or perform services outside the pharmacist’s scope of practice.  By signing below, I provide informed consent for the  pharmacist to provide these services and for my participation in the [facility].      Jeremiah Huber           [id number]          07/20/20  Patient Name                   MRN number  Date     ____Obtained verbal consent from pt, No signature due to COVID concerns___   Patient Signature

## 2020-07-27 ENCOUNTER — ANTICOAGULATION VISIT (OUTPATIENT)
Dept: MEDICAL GROUP | Facility: PHYSICIAN GROUP | Age: 70
End: 2020-07-27
Payer: MEDICARE

## 2020-07-27 DIAGNOSIS — I51.3 LEFT VENTRICULAR THROMBOSIS: ICD-10-CM

## 2020-07-27 DIAGNOSIS — I69.398 CVA, OLD, DISTURBANCES OF VISION: ICD-10-CM

## 2020-07-27 DIAGNOSIS — H53.9 CVA, OLD, DISTURBANCES OF VISION: ICD-10-CM

## 2020-07-27 LAB — INR PPP: 1.5 (ref 2–3.5)

## 2020-07-27 PROCEDURE — 99211 OFF/OP EST MAY X REQ PHY/QHP: CPT | Performed by: FAMILY MEDICINE

## 2020-07-27 PROCEDURE — 85610 PROTHROMBIN TIME: CPT | Performed by: FAMILY MEDICINE

## 2020-07-27 NOTE — PATIENT INSTRUCTIONS
Date  Warfarin dosing PM Lovenox   5 Days before procedure 7/30 0mg Lovenox injection   4 Days before procedure 7/31 0mg Lovenox injection   3 Days before procedure 8/1 0mg Lovenox injection   2 Days before procedure 8/2 0mg Lovenox injection   1 Days before procedure 8/3 0mg NONE   PROCEDURE 8/4 7.5 NONE   1 Day after procedure 8/5 7.5 Restart Lovenox injections      2 Days after procedure 8/6 7.5 Lovenox injection   3 Days after procedure 8/7 7.5 Lovenox injection   4 Days after procedure 8/8 3.75 Lovenox injection   5 Days after procedure 8/9 3.75 lovenox injection

## 2020-07-27 NOTE — PROGRESS NOTES
OP Anticoagulation Service Note    Date: 2020    Anticoagulation Summary  As of 2020    INR goal:   2.0-3.0   TTR:   61.6 % (3.6 wk)   INR used for dosin.50! (2020)   Warfarin maintenance plan:   7.5 mg (7.5 mg x 1) every Mon, Fri; 3.75 mg (7.5 mg x 0.5) all other days   Weekly warfarin total:   33.75 mg   Plan last modified:   Karan Ellis, PharmD (2020)   Next INR check:   8/10/2020   Target end date:   2020    Indications    CVA  old  disturbances of vision [I69.398  H53.9]  Left ventricular thrombosis [I51.3]             Anticoagulation Episode Summary     INR check location:       Preferred lab:       Send INR reminders to:       Comments:         Anticoagulation Care Providers     Provider Role Specialty Phone number    VALENTIN Bruno [facility] [phone]        Anticoagulation Patient Findings      HPI:   Jeremiah Huber seen in clinic today, they are here today for a INR check on anticoagulation therapy     The reason for today's visit is to prevent morbidity and mortality from a blood clot or stroke and to reduce the risk of bleeding while on a anticoagulant.     Dose the patient in the medical group have a [facility] primary care provider and proper insurance?  An Perez M.D.  [address]  [facility] [id number] [phone]      Additional education provided today regarding reducing bleed risk and dietary constraints:  About how vitamin K and foods work with warfarin and the bleeding risk on a anticoagulant     Any upcoming procedures:   He has a cardiac catheterization on .  He was told to hold warfarin for 5 days prior to this procedure.  Based on our discussion they would like to use Lovenox for this procedure.    Confirmed warfarin dosing regimen  Interval Changes with foods rich in vitamin K: No  Interval Changes in ETOH:   No  Interval Changes in smoking status:  No  Interval Changes in medication:   No   Cost restriction:  No  S/S of bleeding or bruising:  No  Signs/symptoms  thrombosis since the last appt:  No  Bleed risk is:  moderate,       Assessment:   INR  sub-therapeutic.     He was instructed by cardiology to continue taking Plavix for this procedure.    Medications reviewed and updated--    3 vitals included with today's appt :  (BP, HR, weight, ht, RR)   Declined vitals today       Plan:  11.25 mg today then resume normal dose and follow instructions listed below.    Pt to have cardiac catheterization on 8/4. Due to pt history lovenox bridging is indicated.   Pt to follow instructions as below, after procedure to ask operating MD when safe to resume anticoagulation, if no instructions given, pt will follow as below.     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    Current weight approximately 80 kg  CrCl = greater than 60 mils per minute    Used Lovenox before yes        Date  Warfarin dosing PM Lovenox   5 Days before procedure 7/30 0mg Lovenox injection   4 Days before procedure 7/31 0mg Lovenox injection   3 Days before procedure 8/1 0mg Lovenox injection   2 Days before procedure 8/2 0mg Lovenox injection   1 Days before procedure 8/3 0mg NONE   PROCEDURE 8/4 7.5 NONE   1 Day after procedure 8/5 7.5 Restart Lovenox injections      2 Days after procedure 8/6 7.5 Lovenox injection   3 Days after procedure 8/7 7.5 Lovenox injection   4 Days after procedure 8/8 3.75 Lovenox injection   5 Days after procedure 8/9 3.75 lovenox injection        Follow up:  Follow up appointment in 1 week(s)       Other info:  Pt educated to contact our clinic with any changes in medications or s/s of bleeding or thrombosis

## 2020-07-30 DIAGNOSIS — Z01.810 PRE-OPERATIVE CARDIOVASCULAR EXAMINATION: ICD-10-CM

## 2020-07-30 DIAGNOSIS — Z01.812 PRE-OPERATIVE LABORATORY EXAMINATION: ICD-10-CM

## 2020-07-30 LAB
ALBUMIN SERPL BCP-MCNC: 4.5 G/DL (ref 3.2–4.9)
ALBUMIN/GLOB SERPL: 2 G/DL
ALP SERPL-CCNC: 65 U/L (ref 30–99)
ALT SERPL-CCNC: 22 U/L (ref 2–50)
ANION GAP SERPL CALC-SCNC: 8 MMOL/L (ref 7–16)
APTT PPP: 32.4 SEC (ref 24.7–36)
AST SERPL-CCNC: 9 U/L (ref 12–45)
BASOPHILS # BLD AUTO: 0.7 % (ref 0–1.8)
BASOPHILS # BLD: 0.04 K/UL (ref 0–0.12)
BILIRUB SERPL-MCNC: 0.4 MG/DL (ref 0.1–1.5)
BUN SERPL-MCNC: 13 MG/DL (ref 8–22)
CALCIUM SERPL-MCNC: 9.6 MG/DL (ref 8.5–10.5)
CHLORIDE SERPL-SCNC: 102 MMOL/L (ref 96–112)
CO2 SERPL-SCNC: 27 MMOL/L (ref 20–33)
CREAT SERPL-MCNC: 1.16 MG/DL (ref 0.5–1.4)
EKG IMPRESSION: NORMAL
EOSINOPHIL # BLD AUTO: 0.07 K/UL (ref 0–0.51)
EOSINOPHIL NFR BLD: 1.1 % (ref 0–6.9)
ERYTHROCYTE [DISTWIDTH] IN BLOOD BY AUTOMATED COUNT: 40.9 FL (ref 35.9–50)
GLOBULIN SER CALC-MCNC: 2.2 G/DL (ref 1.9–3.5)
GLUCOSE SERPL-MCNC: 95 MG/DL (ref 65–99)
HCT VFR BLD AUTO: 48.5 % (ref 42–52)
HGB BLD-MCNC: 16.2 G/DL (ref 14–18)
IMM GRANULOCYTES # BLD AUTO: 0.02 K/UL (ref 0–0.11)
IMM GRANULOCYTES NFR BLD AUTO: 0.3 % (ref 0–0.9)
INR PPP: 2.07 (ref 0.87–1.13)
LYMPHOCYTES # BLD AUTO: 1.05 K/UL (ref 1–4.8)
LYMPHOCYTES NFR BLD: 17.2 % (ref 22–41)
MCH RBC QN AUTO: 29.9 PG (ref 27–33)
MCHC RBC AUTO-ENTMCNC: 33.4 G/DL (ref 33.7–35.3)
MCV RBC AUTO: 89.5 FL (ref 81.4–97.8)
MONOCYTES # BLD AUTO: 0.42 K/UL (ref 0–0.85)
MONOCYTES NFR BLD AUTO: 6.9 % (ref 0–13.4)
NEUTROPHILS # BLD AUTO: 4.51 K/UL (ref 1.82–7.42)
NEUTROPHILS NFR BLD: 73.8 % (ref 44–72)
NRBC # BLD AUTO: 0 K/UL
NRBC BLD-RTO: 0 /100 WBC
PLATELET # BLD AUTO: 289 K/UL (ref 164–446)
PMV BLD AUTO: 9.9 FL (ref 9–12.9)
POTASSIUM SERPL-SCNC: 5.2 MMOL/L (ref 3.6–5.5)
PROT SERPL-MCNC: 6.7 G/DL (ref 6–8.2)
PROTHROMBIN TIME: 23.9 SEC (ref 12–14.6)
RBC # BLD AUTO: 5.42 M/UL (ref 4.7–6.1)
SODIUM SERPL-SCNC: 137 MMOL/L (ref 135–145)
WBC # BLD AUTO: 6.1 K/UL (ref 4.8–10.8)

## 2020-07-30 PROCEDURE — 36415 COLL VENOUS BLD VENIPUNCTURE: CPT

## 2020-07-30 PROCEDURE — 80053 COMPREHEN METABOLIC PANEL: CPT

## 2020-07-30 PROCEDURE — 85730 THROMBOPLASTIN TIME PARTIAL: CPT

## 2020-07-30 PROCEDURE — 85025 COMPLETE CBC W/AUTO DIFF WBC: CPT

## 2020-07-30 PROCEDURE — 93010 ELECTROCARDIOGRAM REPORT: CPT | Performed by: INTERNAL MEDICINE

## 2020-07-30 PROCEDURE — 93005 ELECTROCARDIOGRAM TRACING: CPT

## 2020-07-30 PROCEDURE — 85610 PROTHROMBIN TIME: CPT

## 2020-07-30 ASSESSMENT — FIBROSIS 4 INDEX: FIB4 SCORE: 0.66

## 2020-07-31 ENCOUNTER — OFFICE VISIT (OUTPATIENT)
Dept: ADMISSIONS | Facility: MEDICAL CENTER | Age: 70
End: 2020-07-31
Attending: INTERNAL MEDICINE
Payer: MEDICARE

## 2020-07-31 DIAGNOSIS — Z01.812 PRE-OPERATIVE LABORATORY EXAMINATION: ICD-10-CM

## 2020-07-31 LAB — COVID ORDER STATUS COVID19: NORMAL

## 2020-07-31 PROCEDURE — U0003 INFECTIOUS AGENT DETECTION BY NUCLEIC ACID (DNA OR RNA); SEVERE ACUTE RESPIRATORY SYNDROME CORONAVIRUS 2 (SARS-COV-2) (CORONAVIRUS DISEASE [COVID-19]), AMPLIFIED PROBE TECHNIQUE, MAKING USE OF HIGH THROUGHPUT TECHNOLOGIES AS DESCRIBED BY CMS-2020-01-R: HCPCS

## 2020-08-02 LAB
SARS-COV-2 RNA RESP QL NAA+PROBE: NOTDETECTED
SPECIMEN SOURCE: NORMAL

## 2020-08-03 NOTE — OR NURSING
COVID-19 Pre-Surgery Screenin. Do you have an undiagnosed respiratory illness or symptoms such as coughing or sneezing? N     2. Do you have an unexplained fever greater than 100.4 degrees Fahrenheit or 38 degrees Celsius? N  ?  3. Have you had direct exposure to a patient who tested positive for Covid-19? N    4. Patient informed of current visitation and mask policies by this RN.

## 2020-08-04 ENCOUNTER — HOSPITAL ENCOUNTER (OUTPATIENT)
Facility: MEDICAL CENTER | Age: 70
End: 2020-08-04
Attending: INTERNAL MEDICINE | Admitting: INTERNAL MEDICINE
Payer: MEDICARE

## 2020-08-04 ENCOUNTER — TELEPHONE (OUTPATIENT)
Dept: CARDIOLOGY | Facility: MEDICAL CENTER | Age: 70
End: 2020-08-04

## 2020-08-04 ENCOUNTER — APPOINTMENT (OUTPATIENT)
Dept: CARDIOLOGY | Facility: MEDICAL CENTER | Age: 70
End: 2020-08-04
Attending: PHYSICIAN ASSISTANT
Payer: MEDICARE

## 2020-08-04 VITALS
TEMPERATURE: 96.8 F | SYSTOLIC BLOOD PRESSURE: 108 MMHG | BODY MASS INDEX: 24.67 KG/M2 | WEIGHT: 192.24 LBS | HEART RATE: 63 BPM | DIASTOLIC BLOOD PRESSURE: 75 MMHG | HEIGHT: 74 IN | RESPIRATION RATE: 16 BRPM | OXYGEN SATURATION: 96 %

## 2020-08-04 DIAGNOSIS — I25.2 MYOCARDIAL INFARCT, OLD: ICD-10-CM

## 2020-08-04 DIAGNOSIS — I25.5 ISCHEMIC CARDIOMYOPATHY: ICD-10-CM

## 2020-08-04 DIAGNOSIS — I51.3 LEFT VENTRICULAR THROMBOSIS: ICD-10-CM

## 2020-08-04 LAB
INR PPP: 0.99 (ref 0.87–1.13)
PROTHROMBIN TIME: 13.3 SEC (ref 12–14.6)

## 2020-08-04 PROCEDURE — 700111 HCHG RX REV CODE 636 W/ 250 OVERRIDE (IP)

## 2020-08-04 PROCEDURE — 93458 L HRT ARTERY/VENTRICLE ANGIO: CPT

## 2020-08-04 PROCEDURE — 700117 HCHG RX CONTRAST REV CODE 255: Performed by: INTERNAL MEDICINE

## 2020-08-04 PROCEDURE — 700105 HCHG RX REV CODE 258: Performed by: INTERNAL MEDICINE

## 2020-08-04 PROCEDURE — 160002 HCHG RECOVERY MINUTES (STAT)

## 2020-08-04 PROCEDURE — 700101 HCHG RX REV CODE 250

## 2020-08-04 PROCEDURE — 85610 PROTHROMBIN TIME: CPT

## 2020-08-04 RX ORDER — LIDOCAINE HYDROCHLORIDE 20 MG/ML
INJECTION, SOLUTION INFILTRATION; PERINEURAL
Status: COMPLETED
Start: 2020-08-04 | End: 2020-08-04

## 2020-08-04 RX ORDER — VERAPAMIL HYDROCHLORIDE 2.5 MG/ML
INJECTION, SOLUTION INTRAVENOUS
Status: COMPLETED
Start: 2020-08-04 | End: 2020-08-04

## 2020-08-04 RX ORDER — HEPARIN SODIUM 200 [USP'U]/100ML
INJECTION, SOLUTION INTRAVENOUS
Status: COMPLETED
Start: 2020-08-04 | End: 2020-08-04

## 2020-08-04 RX ORDER — MIDAZOLAM HYDROCHLORIDE 1 MG/ML
INJECTION INTRAMUSCULAR; INTRAVENOUS
Status: COMPLETED
Start: 2020-08-04 | End: 2020-08-04

## 2020-08-04 RX ORDER — SODIUM CHLORIDE 9 MG/ML
1000 INJECTION, SOLUTION INTRAVENOUS
Status: DISCONTINUED | OUTPATIENT
Start: 2020-08-04 | End: 2020-08-04 | Stop reason: HOSPADM

## 2020-08-04 RX ORDER — SODIUM CHLORIDE 9 MG/ML
INJECTION, SOLUTION INTRAVENOUS CONTINUOUS
Status: DISCONTINUED | OUTPATIENT
Start: 2020-08-04 | End: 2020-08-04 | Stop reason: HOSPADM

## 2020-08-04 RX ORDER — HEPARIN SODIUM,PORCINE 1000/ML
VIAL (ML) INJECTION
Status: COMPLETED
Start: 2020-08-04 | End: 2020-08-04

## 2020-08-04 RX ADMIN — HEPARIN SODIUM: 1000 INJECTION, SOLUTION INTRAVENOUS; SUBCUTANEOUS at 08:05

## 2020-08-04 RX ADMIN — MIDAZOLAM HYDROCHLORIDE 2 MG: 1 INJECTION, SOLUTION INTRAMUSCULAR; INTRAVENOUS at 08:05

## 2020-08-04 RX ADMIN — HEPARIN SODIUM 2000 UNITS: 200 INJECTION, SOLUTION INTRAVENOUS at 08:06

## 2020-08-04 RX ADMIN — NITROGLYCERIN 10 ML: 20 INJECTION INTRAVENOUS at 08:05

## 2020-08-04 RX ADMIN — IOHEXOL 28 ML: 350 INJECTION, SOLUTION INTRAVENOUS at 08:20

## 2020-08-04 RX ADMIN — VERAPAMIL HYDROCHLORIDE 5 MG: 2.5 INJECTION, SOLUTION INTRAVENOUS at 08:05

## 2020-08-04 RX ADMIN — LIDOCAINE HYDROCHLORIDE: 20 INJECTION, SOLUTION INFILTRATION; PERINEURAL at 08:05

## 2020-08-04 RX ADMIN — SODIUM CHLORIDE 1000 ML: 9 INJECTION, SOLUTION INTRAVENOUS at 07:15

## 2020-08-04 RX ADMIN — FENTANYL CITRATE 100 MCG: 50 INJECTION INTRAMUSCULAR; INTRAVENOUS at 08:05

## 2020-08-04 NOTE — DISCHARGE INSTRUCTIONS
ACTIVITY: Rest and take it easy for the first 24 hours.  A responsible adult is recommended to remain with you during that time.  It is normal to feel sleepy.  We encourage you to not do anything that requires balance, judgment or coordination.    MILD FLU-LIKE SYMPTOMS ARE NORMAL. YOU MAY EXPERIENCE GENERALIZED MUSCLE ACHES, THROAT IRRITATION, HEADACHE AND/OR SOME NAUSEA.    FOR 24 HOURS DO NOT:  Drive, operate machinery or run household appliances.  Drink beer or alcoholic beverages.   Make important decisions or sign legal documents.    SPECIAL INSTRUCTIONS: POST ANGIOGRAM    General Care Instructions  • Maintain a bandage over the incision site for 24 hours.  It's normal to find a small bruise or dime-sized lump at the insertion site. This should disappear within a few weeks.  • Do not apply lotions or powders to the site.  Do not immerse the catheter insertion site in water (bathtub/swimming) for five days. It is ok to shower 24 hours after the procedure.  • You may resume your normal diet immediately; on the day of your procedure, drink 6-10 glasses of water to help flush the contrast liquid out of your system.  • If the doctor inserted the catheter in your arm:  o For 24 hours, DO NOT lift anything heavier than 10 pounds (approximately a gallon of milk). DO NOT do any heavy pushing, pulling, or twisting.  o DO NOT do yard work, drive, lift heavy objects, or play sports for 2 days; or until your health care provider tells you it is OK.    Medications  • If your current medications need to be changed, you will be provided with an updated list of your medications prior to discharge.  • If you take warfarin (Coumadin), resume taking your usual dose the evening after the procedure.  • DO NOT STOP taking prescribed blood thinning (anti-platelet) medications unless instructed by your cardiologist.  These medications include:  o Aspirin, Clopidogrel (Plavix), Ticagrelor (Brilinta), or Prasugrel (Effient)   • If you  take one of the following anticoagulants, RESUME 24 HOURS after your procedure:  o Apixiban (Eliquis), Rivaroxaban (Xarelto), Dabigatran (Pradaxa), Edoxaban (Savaysa)  • If you take metformin (Glucophage), RESUME 48 HOURS after your procedure.    When to call your healthcare provider  Call your cardiologist right away at [phone] if you have any of the following:  ?  Problems/Concerns taking any of your prescribed heart medicines.  ?  The insertion site has increasing pain, swelling, redness, bleeding, or drainage.  ?  Your arm or leg below where the insertion site changes color, is cool, or is numb.  ?  You have chest pain or shortness of breath that does not go away with rest.  ?  Your pulse feels irregular -- very slow (less than 60 beats/minute) or very fast (over 100 beats/minute).  ?  You have dizziness, fainting, or you are very tired.  ?  You are coughing up blood or yellow or green mucus.  ?  You have chills or a fever over 101°F (38.3°C).   ?  If there is bleeding at the catheter insertion site, apply pressure for 10 minutes.  If bleeding persists, call 911, and continue to hold pressure until advanced medical support arrives.         DIET: To avoid nausea, slowly advance diet as tolerated, avoiding spicy or greasy foods for the first day.  Add more substantial food to your diet according to your physician's instructions.  INCREASE FLUIDS AND FIBER TO AVOID CONSTIPATION.    SURGICAL DRESSING/BATHING: leave dressing in place for 24 hours, may shower once removed    FOLLOW-UP APPOINTMENT:  A follow-up appointment should be arranged with your cardiologist; call to schedule.    You should CALL YOUR PHYSICIAN if you develop:  Fever greater than 101 degrees F.  Pain not relieved by medication, or persistent nausea or vomiting.  Excessive bleeding (blood soaking through dressing) or unexpected drainage from the wound.  Extreme redness or swelling around the incision site, drainage of pus or foul smelling  drainage.  Inability to urinate or empty your bladder within 8 hours.  Problems with breathing or chest pain.    You should call 911 if you develop problems with breathing or chest pain.  If you are unable to contact your doctor or surgical center, you should go to the nearest emergency room or urgent care center.  Physician's telephone #: [phone]    If any questions arise, call your doctor.  If your doctor is not available, please feel free to call the Surgical Center at [phone].  The Center is open Monday through Friday from 7AM to 7PM.  You can also call the HEALTH HOTLINE open 24 hours/day, 7 days/week and speak to a nurse at [phone], or toll free at [phone].    A registered nurse may call you a few days after your surgery to see how you are doing after your procedure.    MEDICATIONS: Resume taking daily medication.  Take prescribed pain medication with food.  If no medication is prescribed, you may take non-aspirin pain medication if needed.  PAIN MEDICATION CAN BE VERY CONSTIPATING.  Take a stool softener or laxative such as senokot, pericolace, or milk of magnesia if needed.    If your physician has prescribed pain medication that includes Acetaminophen (Tylenol), do not take additional Acetaminophen (Tylenol) while taking the prescribed medication.    Depression / Suicide Risk    As you are discharged from this [facility] facility, it is important to learn how to keep safe from harming yourself.    Recognize the warning signs:  · Abrupt changes in personality, positive or negative- including increase in energy   · Giving away possessions  · Change in eating patterns- significant weight changes-  positive or negative  · Change in sleeping patterns- unable to sleep or sleeping all the time   · Unwillingness or inability to communicate  · Depression  · Unusual sadness, discouragement and loneliness  · Talk of wanting to die  · Neglect of personal appearance   · Rebelliousness- reckless  behavior  · Withdrawal from people/activities they love  · Confusion- inability to concentrate     If you or a loved one observes any of these behaviors or has concerns about self-harm, here's what you can do:  · Talk about it- your feelings and reasons for harming yourself  · Remove any means that you might use to hurt yourself (examples: pills, rope, extension cords, firearm)  · Get professional help from the community (Mental Health, Substance Abuse, psychological counseling)  · Do not be alone:Call your Safe Contact- someone whom you trust who will be there for you.  · Call your local CRISIS HOTLINE 492-2156 or 395-421-7475  · Call your local Children's Mobile Crisis Response Team [place] (246) 123-8998 or www.SomethingIndie  · Call the toll free National Suicide Prevention Hotlines   · National Suicide Prevention Lifeline 901-248-PMCG (4378)  · National Hope Line Network 800-SUICIDE (357-1457)

## 2020-08-04 NOTE — OR NURSING
0835   PT RECEIVED FROM CATH LAB,  S/P CLEAN LEFT HEART CATH VIA RIGHT WRIST. PER REPORT THERE ARE ABOUT 18 CC IN TR BAND.  SITE IS SLIGHTLY PUFFY BUT NO HEMATOMA NOTED.  VS WNL.  PT TAKING PO FLUID AND SNACK OK.    0900  TR BAND INTACT.  SITE REMAINS UNCHANGED.    0915   TR BAND INTACT.  SITE IS UNCHANGED.  CALL TO WIFE FOR ESTIMATE TIME OF DC.    0945   1ST 2CC OF AIR RELEASED FROM TR BAND.  SITE IS CLEAR.    1000   NEXT 3CC OF AIR RELEASED FROM TR BAND.  SITE IS CLEAR.    1030  NEXT 3CC OF AIR RELEASED FROM TR BAND.  SITE IS CLEAR. PT TAKING PO FLUID AND SNACK WELL.    1045  NEXT 3CC OF AIR RELEASED FROM TR BAND.  SITE IS CLEAR. DR TERRAZAS IS HERE TALKING IN LENGTH WITH PT.    1100  NEXT 3CC OF AIR RELEASED FROM TR BAND.  SITE IS CLEAR.    1115  LAST 3CC OF AIR RELEASED FROM TR BAND.  SITE IS CLEAR.    1130  TR BAND REMOVED AND 2X2 WITH TEGADERM APPLIED.    1200   DC INSTRUCTIONS GIVEN TO PT AND WIFE OVER THE PHONE.  HL DC.  PT DC TO HOME,  VIA WHEELCHAIR,  ESCORTED OUT BY CNA.

## 2020-08-04 NOTE — TELEPHONE ENCOUNTER
LVM with pt at [phone] notifying of need for cardiac MRI prior to BE appt on 9/15. Directed him to image schedulers to arrange. Encouraged him to call our office with questions.     Ophelia Solis, Ruiz Ass't  TIAGO Mcneil,     It doesn't look like this patient has been notified he needs to schedule an MRI. The follow up is already scheduled.     Thank You,   Ophelia    Previous Messages     ----- Message -----   From: Harrison Rodríguez M.D.   Sent: 8/4/2020   8:35 AM PDT   To: Ruiz Bravo Ass't     Please arrange follow up with myself in 4-6 weeks. Needs cardiac MRI fist- order should be in

## 2020-08-04 NOTE — PROCEDURES
"CARDIAC CATHETERIZATION REPORT    REFERRING: Harrison Rodríguez M.D.    PROCEDURE PHYSICIAN: Harrison Rodríguez MD, [facility], [facility]  ASSISTANT: None    IMPRESSIONS:  1. 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 Sluggish flow throughout  3. Possible terminal occlusion of the PDA vs normal anatomic variation  4. Moderate elevation in LVEDP (24 mmHg)    Recommendations:  Cardiac MRI  Resume lovenox tomorrow, coumadin tonight  Follow up with myself in 6 weeks    Pre-procedure diagnosis: Missed STEMI  Post-procedure diagnosis: Stress cardiomyopathy    Procedure performed  Selective coronary angiography  Left heart catheterization    Conscious sedation was supervised by myself and administered by trained personnel using fentanyl and versed between 0802 and 0819. The patient tolerated sedation without complication.     Procedure Description  1. Access: 5 Polish right radial artery Micropuncture technique was utilized following local anesthesia with lidocaine.  A radial cocktail containing verapamil, heparin and saline was administered in the radial artery sheath    2. Diagnostic description: The catheter was passed to the central circulation with the aide of J tipped 0.35\" wire. 5F TIG 4.0 were used to inject the coronary circulationand enter the left ventricle during invasive hemodynamic monitoring. Sampling of the LVEDP was obtained from the LVOT to avoid disrupting the apical thrombus. Once all diagnostic information was obtained the equipment was removed from the body.      3. Hemostasis: Radial band      Findings   Hemodynamics: Aorta: 104/71 mmHg  LV: 104/24 mmHg    Coronary Anatomy   Left Main: Normal   LAD: Ectasia proximally, apical 30% stenosis   LCx: proximal vessel ectasia, MLI distally   RCA: Dominant, mild ectasia in the mid segment of the vessel, the PDA is short and terminates early, likely normal anatomic variation but distal occlusion not excluded      Technical " Factors  1. Complications: None  2. Estimated Blood Loss: <50 cc  3. Specimens: None  4. Contrast Volume: 28 ml  5. Medications: Radial cocktail (Verapamil 2.5 mg, Nitroglycerin 100 mcg) Heparin 5000 Units  6. Radiation (air kerma): 215 mGy

## 2020-08-05 ENCOUNTER — PATIENT MESSAGE (OUTPATIENT)
Dept: CARDIOLOGY | Facility: MEDICAL CENTER | Age: 70
End: 2020-08-05

## 2020-08-05 NOTE — PATIENT COMMUNICATION
Harrison Rodríguez M.D.  You 5 minutes ago (2:28 PM)     I share the concern of his neurologist but I recommend continuing to take Plavix for another 3 months.

## 2020-08-10 ENCOUNTER — ANTICOAGULATION VISIT (OUTPATIENT)
Dept: MEDICAL GROUP | Facility: PHYSICIAN GROUP | Age: 70
End: 2020-08-10
Payer: MEDICARE

## 2020-08-10 DIAGNOSIS — I69.398 CVA, OLD, DISTURBANCES OF VISION: ICD-10-CM

## 2020-08-10 DIAGNOSIS — H53.9 CVA, OLD, DISTURBANCES OF VISION: ICD-10-CM

## 2020-08-10 DIAGNOSIS — I51.3 LEFT VENTRICULAR THROMBOSIS: ICD-10-CM

## 2020-08-10 LAB — INR PPP: 1.8 (ref 2–3.5)

## 2020-08-10 PROCEDURE — 85610 PROTHROMBIN TIME: CPT | Performed by: FAMILY MEDICINE

## 2020-08-10 PROCEDURE — 99211 OFF/OP EST MAY X REQ PHY/QHP: CPT | Performed by: FAMILY MEDICINE

## 2020-08-10 NOTE — PROGRESS NOTES
OP Anticoagulation Service Note    Date: 8/10/2020    Anticoagulation Summary  As of 8/10/2020    INR goal:  2.0-3.0   TTR:  41.5 % (1.3 mo)   INR used for dosin.80 (8/10/2020)   Warfarin maintenance plan:  7.5 mg (7.5 mg x 1) every Mon; 3.75 mg (7.5 mg x 0.5) all other days   Weekly warfarin total:  30 mg   Plan last modified:  Karan Ellis, PharmD (8/10/2020)   Next INR check:  2020   Target end date:  2020    Indications    CVA  old  disturbances of vision [I69.398  H53.9]  Left ventricular thrombosis [I51.3]             Anticoagulation Episode Summary     INR check location:      Preferred lab:      Send INR reminders to:      Comments:        Anticoagulation Care Providers     Provider Role Specialty Phone number    VALENTIN Bruno [facility] Gerontology [phone]        Anticoagulation Patient Findings      HPI:   Jeremiah Huber seen in clinic today, they are here today for a INR check on anticoagulation therapy     The reason for today's visit is to prevent morbidity and mortality from a blood clot or stroke and to reduce the risk of bleeding while on a anticoagulant.     Dose the patient in the medical group have a [facility] primary care provider and proper insurance?  An Perez M.D.  [address] [facility] [address] [place] [id number] [phone]      Additional education provided today regarding reducing bleed risk and dietary constraints:  About how vitamin K and foods work with warfarin and the bleeding risk on a anticoagulant     Any upcoming procedures:   He had a cardiac catheterization on .  He was told to hold warfarin for 5 days prior to this procedure.        Confirmed warfarin dosing regimen  Interval Changes with foods rich in vitamin K: No  Interval Changes in ETOH:   No  Interval Changes in smoking status:  No  Interval Changes in medication:  No   Cost restriction:  No  S/S of bleeding or bruising:  No  Signs/symptoms  thrombosis  since the last appt:  No  Bleed risk is:  moderate,       Assessment:   INR  sub-therapeutic.  Status post cardiac catheterization currently on Lovenox.    Medications reviewed and updated--    3 vitals included with today's appt :  (BP, HR, weight, ht, RR)   There were no vitals filed for this visit.      Plan:  7.5mg tomorrow then Continue weekly warfarin dose as noted and one more day of lovenox       Follow up:  Follow up appointment in 1 week(s)       Other info:  Pt educated to contact our clinic with any changes in medications or s/s of bleeding or thrombosis    CHEST guidelines recommend frequent INR monitoring at regular intervals (a few days up to a max of 12 weeks) to ensure they are on the proper dose of warfarin and not having any complications from therapy.  INRs can dramatically change over a short time period due to diet, medications, and medical conditions.     Karan Ellis, PharmD, MS, BCACP, C    [facility]  Phone [phone] fax [phone]    This note was created using voice recognition software (Dragon). The accuracy of the dictation is limited by the abilities of the software. I have reviewed the note prior to signing, however some errors in grammar and context are still possible. If you have any questions related to this note please do not hesitate to contact our office.

## 2020-08-17 ENCOUNTER — ANTICOAGULATION VISIT (OUTPATIENT)
Dept: MEDICAL GROUP | Facility: PHYSICIAN GROUP | Age: 70
End: 2020-08-17
Payer: MEDICARE

## 2020-08-17 DIAGNOSIS — H53.9 CVA, OLD, DISTURBANCES OF VISION: ICD-10-CM

## 2020-08-17 DIAGNOSIS — I51.3 LEFT VENTRICULAR THROMBOSIS: ICD-10-CM

## 2020-08-17 DIAGNOSIS — I69.398 CVA, OLD, DISTURBANCES OF VISION: ICD-10-CM

## 2020-08-17 LAB — INR PPP: 2 (ref 2–3.5)

## 2020-08-17 PROCEDURE — 85610 PROTHROMBIN TIME: CPT | Performed by: FAMILY MEDICINE

## 2020-08-17 PROCEDURE — 93793 ANTICOAG MGMT PT WARFARIN: CPT | Performed by: FAMILY MEDICINE

## 2020-08-17 NOTE — PROGRESS NOTES
OP Anticoagulation Service Note    Date: 2020    Anticoagulation Summary  As of 2020    INR goal:  2.0-3.0   TTR:  35.3 % (1.5 mo)   INR used for dosin.00 (2020)   Warfarin maintenance plan:  7.5 mg (7.5 mg x 1) every Mon; 3.75 mg (7.5 mg x 0.5) all other days   Weekly warfarin total:  30 mg   Plan last modified:  Karan Ellis, PharmD (8/10/2020)   Next INR check:  2020   Target end date:  2020    Indications    CVA  old  disturbances of vision [I69.398  H53.9]  Left ventricular thrombosis [I51.3]             Anticoagulation Episode Summary     INR check location:      Preferred lab:      Send INR reminders to:      Comments:        Anticoagulation Care Providers     Provider Role Specialty Phone number    VALENTIN Bruno [facility] Gerontology [phone]        Anticoagulation Patient Findings      HPI:   Jeremiah Huber seen in clinic today, they are here today for a INR check on anticoagulation therapy     The reason for today's visit is to prevent morbidity and mortality from a blood clot or stroke and to reduce the risk of bleeding while on a anticoagulant.     Dose the patient in the medical group have a [facility] primary care provider and proper insurance?  An Perez M.D.  [address] [facility] [address] [place] [id number] [phone]      Additional education provided today regarding reducing bleed risk and dietary constraints:  About how vitamin K and foods work with warfarin and the bleeding risk on a anticoagulant     Any upcoming procedures:   none    Confirmed warfarin dosing regimen  Interval Changes with foods rich in vitamin K: No  Interval Changes in ETOH:   No  Interval Changes in smoking status:  No  Interval Changes in medication:  No   Cost restriction:  No  S/S of bleeding or bruising:  No  Signs/symptoms  thrombosis since the last appt:  No  Bleed risk is:  moderate,       Assessment:   INR  therapeutic.     Medications reviewed and  updated--    3 vitals included with today's appt :  (BP, HR, weight, ht, RR)   There were no vitals filed for this visit.      Plan:  Continue weekly warfarin dose as noted      Follow up:  Follow up appointment in 1 week(s)       Other info:  Pt educated to contact our clinic with any changes in medications or s/s of bleeding or thrombosis    CHEST guidelines recommend frequent INR monitoring at regular intervals (a few days up to a max of 12 weeks) to ensure they are on the proper dose of warfarin and not having any complications from therapy.  INRs can dramatically change over a short time period due to diet, medications, and medical conditions.     Karan Ellis, PharmD, MS, BCACP, [facility]  Phone [phone] fax [phone]    This note was created using voice recognition software (Dragon). The accuracy of the dictation is limited by the abilities of the software. I have reviewed the note prior to signing, however some errors in grammar and context are still possible. If you have any questions related to this note please do not hesitate to contact our office.

## 2020-08-21 ENCOUNTER — HOSPITAL ENCOUNTER (OUTPATIENT)
Dept: RADIOLOGY | Facility: MEDICAL CENTER | Age: 70
End: 2020-08-21
Attending: INTERNAL MEDICINE
Payer: MEDICARE

## 2020-08-21 DIAGNOSIS — I51.3 LEFT VENTRICULAR THROMBOSIS: ICD-10-CM

## 2020-08-21 PROCEDURE — 700117 HCHG RX CONTRAST REV CODE 255: Performed by: INTERNAL MEDICINE

## 2020-08-21 PROCEDURE — 75561 CARDIAC MRI FOR MORPH W/DYE: CPT

## 2020-08-21 PROCEDURE — A9577 INJ MULTIHANCE: HCPCS | Performed by: INTERNAL MEDICINE

## 2020-08-21 RX ADMIN — GADOBENATE DIMEGLUMINE 10 ML: 529 INJECTION, SOLUTION INTRAVENOUS at 10:34

## 2020-08-23 ENCOUNTER — PATIENT MESSAGE (OUTPATIENT)
Dept: CARDIOLOGY | Facility: MEDICAL CENTER | Age: 70
End: 2020-08-23

## 2020-08-24 ENCOUNTER — ANTICOAGULATION MONITORING (OUTPATIENT)
Dept: VASCULAR LAB | Facility: MEDICAL CENTER | Age: 70
End: 2020-08-24

## 2020-08-24 ENCOUNTER — TELEPHONE (OUTPATIENT)
Dept: VASCULAR LAB | Facility: MEDICAL CENTER | Age: 70
End: 2020-08-24

## 2020-08-24 ENCOUNTER — APPOINTMENT (OUTPATIENT)
Dept: MEDICAL GROUP | Facility: PHYSICIAN GROUP | Age: 70
End: 2020-08-24
Payer: MEDICARE

## 2020-08-24 DIAGNOSIS — I51.3 LEFT VENTRICULAR THROMBOSIS: ICD-10-CM

## 2020-08-24 DIAGNOSIS — I69.398 CVA, OLD, DISTURBANCES OF VISION: ICD-10-CM

## 2020-08-24 DIAGNOSIS — H53.9 CVA, OLD, DISTURBANCES OF VISION: ICD-10-CM

## 2020-08-24 RX ORDER — METOPROLOL SUCCINATE 25 MG/1
25 TABLET, EXTENDED RELEASE ORAL DAILY
Qty: 90 TAB | Refills: 3 | Status: SHIPPED | OUTPATIENT
Start: 2020-08-24 | End: 2020-09-15

## 2020-08-24 NOTE — TELEPHONE ENCOUNTER
----- Message from Wendy Fermin R.N. sent at 8/24/2020 12:02 PM PDT -----  Regarding: FW: Test Result Question  Contact: [phone]  FYI, pt was instructed to stop Coumadin.  Thanks!  ----- Message -----  From: Lovely Jerome, Med Ass't  Sent: 8/24/2020   8:31 AM PDT  To: Wendy Fermin R.N.  Subject: FW: Test Result Question                           ----- Message -----  From: Jeremiah Huber  Sent: 8/23/2020   3:25 PM PDT  To: James Gerard  Subject: Test Result Question                             I have a question about MR-CARDIAC MORPH/FUNC WITH & W/O resulted on 8/21/20, 5:56 PM.    Do I need to sooner than September 15?  Can I continue to exercise?

## 2020-08-24 NOTE — PATIENT COMMUNICATION
MR-CARDIAC MORPH/FUNC WITH & W/O  Harrison Rodríguez M.D.  Wendy Fermin R.N.             Cardiac MRI confirms prior heart attack (as opposed to other etiologies) in the territory of the LAD. This was shown to have spontaneously recanalized during angiogram. There is also no longer an LV apical thrombus and thus it would be reasonable to stop warfarin. Continue plavix and start aspirin 81 mg daily. It is also advisable to start low dose toprol XL at 25 mg daily to prevent another MI      D/w BE. No exercise restrictions needed. Rx sent for Toprol and med list updated.

## 2020-08-24 NOTE — PROGRESS NOTES
Warfarin discontinued by cardiology.  See my telephone encounter on 8/24.  Will d/c from [facility]  Speedy VillasenorD

## 2020-08-24 NOTE — TELEPHONE ENCOUNTER
[facility]    LVM for pt that since cardiology d/c'd warfarin, pt is discharged from [facility] and INR check today is cancelled.    Yue Schneider, SpeedyD

## 2020-09-04 RX ORDER — CARBIDOPA/LEVODOPA 25MG-250MG
TABLET ORAL
Qty: 180 TAB | Refills: 2 | Status: SHIPPED | OUTPATIENT
Start: 2020-09-04 | End: 2021-11-09 | Stop reason: SDUPTHER

## 2020-09-04 NOTE — TELEPHONE ENCOUNTER
Received request via: Pharmacy    Was the patient seen in the last year in this department? Yes    Does the patient have an active prescription (recently filled or refills available) for medication(s) requested? Yes.

## 2020-09-10 ENCOUNTER — PATIENT MESSAGE (OUTPATIENT)
Dept: CARDIOLOGY | Facility: MEDICAL CENTER | Age: 70
End: 2020-09-10

## 2020-09-10 RX ORDER — ATORVASTATIN CALCIUM 80 MG/1
80 TABLET, FILM COATED ORAL EVERY EVENING
Qty: 90 TAB | Refills: 2 | Status: SHIPPED | OUTPATIENT
Start: 2020-09-10 | End: 2021-05-26

## 2020-09-15 ENCOUNTER — OFFICE VISIT (OUTPATIENT)
Dept: CARDIOLOGY | Facility: MEDICAL CENTER | Age: 70
End: 2020-09-15
Payer: MEDICARE

## 2020-09-15 VITALS
WEIGHT: 191.7 LBS | OXYGEN SATURATION: 95 % | SYSTOLIC BLOOD PRESSURE: 110 MMHG | BODY MASS INDEX: 24.6 KG/M2 | HEIGHT: 74 IN | HEART RATE: 86 BPM | DIASTOLIC BLOOD PRESSURE: 88 MMHG

## 2020-09-15 DIAGNOSIS — I25.10 CORONARY ARTERY DISEASE DUE TO LIPID RICH PLAQUE: ICD-10-CM

## 2020-09-15 DIAGNOSIS — I69.398 CVA, OLD, DISTURBANCES OF VISION: ICD-10-CM

## 2020-09-15 DIAGNOSIS — I51.3 LEFT VENTRICULAR THROMBOSIS: ICD-10-CM

## 2020-09-15 DIAGNOSIS — I25.5 ISCHEMIC CARDIOMYOPATHY: ICD-10-CM

## 2020-09-15 DIAGNOSIS — I25.83 CORONARY ARTERY DISEASE DUE TO LIPID RICH PLAQUE: ICD-10-CM

## 2020-09-15 DIAGNOSIS — E78.5 DYSLIPIDEMIA: ICD-10-CM

## 2020-09-15 DIAGNOSIS — H53.9 CVA, OLD, DISTURBANCES OF VISION: ICD-10-CM

## 2020-09-15 PROCEDURE — 99214 OFFICE O/P EST MOD 30 MIN: CPT | Performed by: INTERNAL MEDICINE

## 2020-09-15 RX ORDER — ALPRAZOLAM 0.25 MG/1
0.25 TABLET ORAL NIGHTLY PRN
COMMUNITY
End: 2021-09-23

## 2020-09-15 RX ORDER — METOPROLOL SUCCINATE 50 MG/1
50 TABLET, EXTENDED RELEASE ORAL DAILY
Qty: 90 TAB | Refills: 3 | Status: SHIPPED | OUTPATIENT
Start: 2020-09-15 | End: 2021-08-20

## 2020-09-15 ASSESSMENT — FIBROSIS 4 INDEX: FIB4 SCORE: 0.45

## 2020-09-15 NOTE — LETTER
[facility]   [address]  [place] [address]  Phone: [phone]  Fax: [phone]              Jeremiah Huber  1950    Encounter Date: 9/15/2020    Harrison Rodríguez M.D.          PROGRESS NOTE:  CARDIOLOGY OUTPATIENT FOLLOWUP    PCP: An Perez M.D.    1. Ischemic cardiomyopathy  Mildly reduced LV systolic function with apical infarct.  He is recanalization of the coronary.  Euvolemic.  NYHA I  -Toprol increased    2. Left ventricular thrombosis  Resolved.    3. CVA, old, disturbances of vision  Persistent right lower quadrantanopia    4. Coronary artery disease due to lipid rich plaque  Nonobstructive three-vessel coronary artery disease on angiogram.  Continue aspirin and statin  - LDL ordered  - Refer to cardiac rehab (MI June 2020)    5. Dyslipidemia  Lipid profile ordered    6.  Preoperative cardiovascular examination  He is planning spinal injection.  Given the recent MI and stroke he is at elevated risk of perioperative cardiovascular complications, particularly with interruption of antiplatelet therapy.  As he does seem to have substantial amount of discomfort, I believe it would be reasonable to temporarily hold Plavix (5 days) while undergoing injection, but maintaining aspirin through the perioperative period.  If cessation of aspirin is also needed, risks of perioperative cardiac events would be further increased and would be preferable if this could be delayed 6 months (December 2020) from the MI/stroke.  If proceeding sooner is required, it would be at a risk of perioperative cardiovascular events which the patient may be willing to accept due to the severe discomfort he is having in his back.    Follow up with Harrison Rodríguez M.D. in 4 months    Chief Complaint   Patient presents with   • Cardiomyopathy (Ischemic)       History: Jeremiah Huber is a 70 y.o. male with a past medical history of Parkinson's presenting for  "follow up of coronary artery disease.  In June of this year he suffered a right inferior quadrantanopia.  He was diagnosed with occipital CVA related to LV thrombus.  EKG suggested recent infarct.  He received anticoagulation.  He underwent coronary angiography a few weeks ago which showed patent epicardial coronary arteries.  A subsequent cardiac MRI showed complete resolution of the thrombus and infarct of the apex with an LVEF of 48%.  Anticoagulation was discontinued in favor of dual antiplatelet therapy.  He has been tolerating these medications without difficulty.  He continues to be bothered primarily by low back pain which has been treated very successfully with injections in the past.  He strongly desires repeat injection therapy.    ROS:  All other systems reviewed and negative except as per the HPI    PE:  /88 (BP Location: Left arm, Patient Position: Sitting, BP Cuff Size: Adult)   Pulse 86   Ht 1.88 m (6' 2\")   Wt 87 kg (191 lb 11.2 oz)   SpO2 95%   BMI 24.61 kg/m²   Gen: Well appearing  HEENT: Symmetric face. Anicteric sclerae. Moist mucus membranes  NECK: No JVD. No lymphadenopathy  CARDIAC: Normal PMI, regular, normal S1, S2. without murmur  VASCULATURE: Normal carotid amplitude without bruit.   RESP: Clear to auscultation bilaterally  ABD: Soft, non-tender, non-distended  EXT: No edema, no clubbing or cyanosis  SKIN: Warm and dry  NEURO: No gross deficits  PSYCH: Appropriate affect, participates in conversation    Past Medical History:   Diagnosis Date   • Arthritis     osteo   • Back spasm    • Blood clotting disorder ([facility]) 06/2020    \"heart\"   • GOUT    • Gout    • Hypertension    • Myocardial infarct ([facility]) 06/2020   • Parkinson disease ([facility])    • Stroke ([facility]) 06/2020     Allergies   Allergen Reactions   • Nkda [No Known Drug Allergy]      Outpatient Encounter Medications as of 9/15/2020   Medication Sig Dispense Refill   • ALPRAZolam (XANAX) 0.25 MG Tab Take 0.25 mg by mouth at bedtime " as needed for Sleep.     • metoprolol SR (TOPROL XL) 50 MG TABLET SR 24 HR Take 1 Tab by mouth every day. 90 Tab 3   • atorvastatin (LIPITOR) 80 MG tablet Take 1 Tab by mouth every evening. 90 Tab 2   • carbidopa-levodopa (SINEMET)  MG Tab TAKE 2 TABS BY MOUTH IN THE MORNING,1 TAB IN MIDDAY,1TAB IN EVENING.INCREASE AS DIRECTED TO 2 TABS 3X A  Tab 2   • aspirin EC (ECOTRIN) 81 MG Tablet Delayed Response Take 1 Tab by mouth every day. 90 Tab 3   • clopidogrel (PLAVIX) 75 MG Tab Take 1 Tab by mouth every day. 90 Tab 3   • nitroglycerin (NITROSTAT) 0.4 MG SL Tab Place 1 Tab under tongue as needed for Chest Pain. 25 Tab 2   • acetaminophen (TYLENOL) 500 MG Tab Take 1,000 mg by mouth 3 times a day.     • ROPINIRole (REQUIP) 0.25 MG Tab Take 0.25 mg by mouth every evening.     • amantadine (SYMMETREL) 100 MG Tab Take 100 mg by mouth 2 Times a Day.     • [DISCONTINUED] metoprolol SR (TOPROL XL) 25 MG TABLET SR 24 HR Take 1 Tab by mouth every day. 90 Tab 3   • [DISCONTINUED] enoxaparin (LOVENOX) 120 MG/0.8ML Solution inj Inject 120 mg as instructed every day. 10 Each 1     No facility-administered encounter medications on file as of 9/15/2020.      Social History     Socioeconomic History   • Marital status:      Spouse name: Not on file   • Number of children: Not on file   • Years of education: Not on file   • Highest education level: Not on file   Occupational History   • Not on file   Social Needs   • Financial resource strain: Not on file   • Food insecurity     Worry: Never true     Inability: Never true   • Transportation needs     Medical: No     Non-medical: No   Tobacco Use   • Smoking status: Former Smoker     Types: Cigarettes     Quit date: 1970     Years since quittin.7   • Smokeless tobacco: Never Used   Substance and Sexual Activity   • Alcohol use: Not Currently     Alcohol/week: 4.2 oz     Types: 4 Glasses of wine, 3 Standard drinks or equivalent per week     Frequency: 4 or more  times a week     Drinks per session: 1 or 2     Binge frequency: Less than monthly     Comment: daily   • Drug use: No   • Sexual activity: Yes     Partners: Female   Lifestyle   • Physical activity     Days per week: Not on file     Minutes per session: Not on file   • Stress: Not on file   Relationships   • Social connections     Talks on phone: Not on file     Gets together: Not on file     Attends Christian service: Not on file     Active member of club or organization: Not on file     Attends meetings of clubs or organizations: Not on file     Relationship status: Not on file   • Intimate partner violence     Fear of current or ex partner: Not on file     Emotionally abused: Not on file     Physically abused: Not on file     Forced sexual activity: Not on file   Other Topics Concern   •  Service No   • Blood Transfusions No   • Caffeine Concern No   • Occupational Exposure No   • Hobby Hazards No   • Sleep Concern No   • Stress Concern No   • Weight Concern No   • Special Diet No   • Back Care Yes     Comment: Streching   • Exercise No   • Bike Helmet Yes   • Seat Belt Yes   • Self-Exams Yes   Social History Narrative   • Not on file       Studies  Lab Results   Component Value Date/Time    CHOLSTRLTOT 159 06/13/2020 05:09 AM    LDL 77 06/13/2020 05:09 AM    HDL 57 06/13/2020 05:09 AM    TRIGLYCERIDE 125 06/13/2020 05:09 AM       Lab Results   Component Value Date/Time    SODIUM 137 07/30/2020 12:52 PM    POTASSIUM 5.2 07/30/2020 12:52 PM    CHLORIDE 102 07/30/2020 12:52 PM    CO2 27 07/30/2020 12:52 PM    GLUCOSE 95 07/30/2020 12:52 PM    BUN 13 07/30/2020 12:52 PM    CREATININE 1.16 07/30/2020 12:52 PM     Lab Results   Component Value Date/Time    ALKPHOSPHAT 65 07/30/2020 12:52 PM    ASTSGOT 9 (L) 07/30/2020 12:52 PM    ALTSGPT 22 07/30/2020 12:52 PM    TBILIRUBIN 0.4 07/30/2020 12:52 PM        For this encounter I directly reviewed ECG tracings and medical records I agree with the interpretations  in the electronic health record          Nazario Sandoval M.D.  [id number] [address]  Via In Basket

## 2020-09-15 NOTE — PROGRESS NOTES
CARDIOLOGY OUTPATIENT FOLLOWUP    PCP: An Perez M.D.    1. Ischemic cardiomyopathy  Mildly reduced LV systolic function with apical infarct.  He is recanalization of the coronary.  Euvolemic.  NYHA I  -Toprol increased    2. Left ventricular thrombosis  Resolved.    3. CVA, old, disturbances of vision  Persistent right lower quadrantanopia    4. Coronary artery disease due to lipid rich plaque  Nonobstructive three-vessel coronary artery disease on angiogram.  Continue aspirin and statin  - LDL ordered  - Refer to cardiac rehab (MI June 2020)    5. Dyslipidemia  Lipid profile ordered    6.  Preoperative cardiovascular examination  He is planning spinal injection.  Given the recent MI and stroke he is at elevated risk of perioperative cardiovascular complications, particularly with interruption of antiplatelet therapy.  As he does seem to have substantial amount of discomfort, I believe it would be reasonable to temporarily hold Plavix (5 days) while undergoing injection, but maintaining aspirin through the perioperative period.  If cessation of aspirin is also needed, risks of perioperative cardiac events would be further increased and would be preferable if this could be delayed 6 months (December 2020) from the MI/stroke.  If proceeding sooner is required, it would be at a risk of perioperative cardiovascular events which the patient may be willing to accept due to the severe discomfort he is having in his back.    Follow up with Harrison Rodríguez M.D. in 4 months    Chief Complaint   Patient presents with   • Cardiomyopathy (Ischemic)       History: Jeremiah Huber is a 70 y.o. male with a past medical history of Parkinson's presenting for follow up of coronary artery disease.  In June of this year he suffered a right inferior quadrantanopia.  He was diagnosed with occipital CVA related to LV thrombus.  EKG suggested recent infarct.  He received anticoagulation.  He underwent coronary angiography a few  "weeks ago which showed patent epicardial coronary arteries.  A subsequent cardiac MRI showed complete resolution of the thrombus and infarct of the apex with an LVEF of 48%.  Anticoagulation was discontinued in favor of dual antiplatelet therapy.  He has been tolerating these medications without difficulty.  He continues to be bothered primarily by low back pain which has been treated very successfully with injections in the past.  He strongly desires repeat injection therapy.    ROS:  All other systems reviewed and negative except as per the HPI    PE:  /88 (BP Location: Left arm, Patient Position: Sitting, BP Cuff Size: Adult)   Pulse 86   Ht 1.88 m (6' 2\")   Wt 87 kg (191 lb 11.2 oz)   SpO2 95%   BMI 24.61 kg/m²   Gen: Well appearing  HEENT: Symmetric face. Anicteric sclerae. Moist mucus membranes  NECK: No JVD. No lymphadenopathy  CARDIAC: Normal PMI, regular, normal S1, S2. without murmur  VASCULATURE: Normal carotid amplitude without bruit.   RESP: Clear to auscultation bilaterally  ABD: Soft, non-tender, non-distended  EXT: No edema, no clubbing or cyanosis  SKIN: Warm and dry  NEURO: No gross deficits  PSYCH: Appropriate affect, participates in conversation    Past Medical History:   Diagnosis Date   • Arthritis     osteo   • Back spasm    • Blood clotting disorder ([facility]) 06/2020    \"heart\"   • GOUT    • Gout    • Hypertension    • Myocardial infarct ([facility]) 06/2020   • Parkinson disease ([facility])    • Stroke ([facility]) 06/2020     Allergies   Allergen Reactions   • Nkda [No Known Drug Allergy]      Outpatient Encounter Medications as of 9/15/2020   Medication Sig Dispense Refill   • ALPRAZolam (XANAX) 0.25 MG Tab Take 0.25 mg by mouth at bedtime as needed for Sleep.     • metoprolol SR (TOPROL XL) 50 MG TABLET SR 24 HR Take 1 Tab by mouth every day. 90 Tab 3   • atorvastatin (LIPITOR) 80 MG tablet Take 1 Tab by mouth every evening. 90 Tab 2   • carbidopa-levodopa (SINEMET)  MG Tab TAKE 2 TABS BY MOUTH " IN THE MORNING,1 TAB IN MIDDAY,1TAB IN EVENING.INCREASE AS DIRECTED TO 2 TABS 3X A  Tab 2   • aspirin EC (ECOTRIN) 81 MG Tablet Delayed Response Take 1 Tab by mouth every day. 90 Tab 3   • clopidogrel (PLAVIX) 75 MG Tab Take 1 Tab by mouth every day. 90 Tab 3   • nitroglycerin (NITROSTAT) 0.4 MG SL Tab Place 1 Tab under tongue as needed for Chest Pain. 25 Tab 2   • acetaminophen (TYLENOL) 500 MG Tab Take 1,000 mg by mouth 3 times a day.     • ROPINIRole (REQUIP) 0.25 MG Tab Take 0.25 mg by mouth every evening.     • amantadine (SYMMETREL) 100 MG Tab Take 100 mg by mouth 2 Times a Day.     • [DISCONTINUED] metoprolol SR (TOPROL XL) 25 MG TABLET SR 24 HR Take 1 Tab by mouth every day. 90 Tab 3   • [DISCONTINUED] enoxaparin (LOVENOX) 120 MG/0.8ML Solution inj Inject 120 mg as instructed every day. 10 Each 1     No facility-administered encounter medications on file as of 9/15/2020.      Social History     Socioeconomic History   • Marital status:      Spouse name: Not on file   • Number of children: Not on file   • Years of education: Not on file   • Highest education level: Not on file   Occupational History   • Not on file   Social Needs   • Financial resource strain: Not on file   • Food insecurity     Worry: Never true     Inability: Never true   • Transportation needs     Medical: No     Non-medical: No   Tobacco Use   • Smoking status: Former Smoker     Types: Cigarettes     Quit date: 1970     Years since quittin.7   • Smokeless tobacco: Never Used   Substance and Sexual Activity   • Alcohol use: Not Currently     Alcohol/week: 4.2 oz     Types: 4 Glasses of wine, 3 Standard drinks or equivalent per week     Frequency: 4 or more times a week     Drinks per session: 1 or 2     Binge frequency: Less than monthly     Comment: daily   • Drug use: No   • Sexual activity: Yes     Partners: Female   Lifestyle   • Physical activity     Days per week: Not on file     Minutes per session: Not on file    • Stress: Not on file   Relationships   • Social connections     Talks on phone: Not on file     Gets together: Not on file     Attends Taoism service: Not on file     Active member of club or organization: Not on file     Attends meetings of clubs or organizations: Not on file     Relationship status: Not on file   • Intimate partner violence     Fear of current or ex partner: Not on file     Emotionally abused: Not on file     Physically abused: Not on file     Forced sexual activity: Not on file   Other Topics Concern   •  Service No   • Blood Transfusions No   • Caffeine Concern No   • Occupational Exposure No   • Hobby Hazards No   • Sleep Concern No   • Stress Concern No   • Weight Concern No   • Special Diet No   • Back Care Yes     Comment: Streching   • Exercise No   • Bike Helmet Yes   • Seat Belt Yes   • Self-Exams Yes   Social History Narrative   • Not on file       Studies  Lab Results   Component Value Date/Time    CHOLSTRLTOT 159 06/13/2020 05:09 AM    LDL 77 06/13/2020 05:09 AM    HDL 57 06/13/2020 05:09 AM    TRIGLYCERIDE 125 06/13/2020 05:09 AM       Lab Results   Component Value Date/Time    SODIUM 137 07/30/2020 12:52 PM    POTASSIUM 5.2 07/30/2020 12:52 PM    CHLORIDE 102 07/30/2020 12:52 PM    CO2 27 07/30/2020 12:52 PM    GLUCOSE 95 07/30/2020 12:52 PM    BUN 13 07/30/2020 12:52 PM    CREATININE 1.16 07/30/2020 12:52 PM     Lab Results   Component Value Date/Time    ALKPHOSPHAT 65 07/30/2020 12:52 PM    ASTSGOT 9 (L) 07/30/2020 12:52 PM    ALTSGPT 22 07/30/2020 12:52 PM    TBILIRUBIN 0.4 07/30/2020 12:52 PM        For this encounter I directly reviewed ECG tracings and medical records I agree with the interpretations in the electronic health record

## 2020-09-17 ENCOUNTER — TELEPHONE (OUTPATIENT)
Dept: MEDICAL GROUP | Facility: MEDICAL CENTER | Age: 70
End: 2020-09-17

## 2020-09-17 DIAGNOSIS — I69.398 CVA, OLD, DISTURBANCES OF VISION: ICD-10-CM

## 2020-09-17 DIAGNOSIS — H53.9 CVA, OLD, DISTURBANCES OF VISION: ICD-10-CM

## 2020-09-17 DIAGNOSIS — F51.01 PRIMARY INSOMNIA: ICD-10-CM

## 2020-09-17 DIAGNOSIS — G20.A1 PARKINSON DISEASE (HCC): ICD-10-CM

## 2020-09-17 NOTE — TELEPHONE ENCOUNTER
1. Caller Name: Jeremiah Huber  Call Back Number: [phone] (home)   How would the patient prefer to be contacted with a response: Phone call do NOT leave a detailed message    2. SPECIFIC Action To Be Taken: Referral pending, please sign.    3. Diagnosis/Clinical Reason for Request: Physical therapy evaluation    4. Specialty & Provider Name/Lab/Imaging Location: [facility]    5. Is appointment scheduled for requested order/referral: no    Patient was informed they will receive a return phone call from the office ONLY if there are any questions before processing their request. Advised to call back if they haven't received a call from the referral department in 5 days.

## 2020-09-21 ENCOUNTER — TELEMEDICINE (OUTPATIENT)
Dept: PHYSICAL MEDICINE AND REHAB | Facility: MEDICAL CENTER | Age: 70
End: 2020-09-21
Payer: MEDICARE

## 2020-09-21 VITALS — BODY MASS INDEX: 24 KG/M2 | WEIGHT: 187 LBS | HEIGHT: 74 IN

## 2020-09-21 DIAGNOSIS — M47.816 FACET ARTHROPATHY, LUMBAR: ICD-10-CM

## 2020-09-21 DIAGNOSIS — G89.29 CHRONIC RIGHT-SIDED LOW BACK PAIN WITHOUT SCIATICA: ICD-10-CM

## 2020-09-21 DIAGNOSIS — M51.36 LUMBAR DEGENERATIVE DISC DISEASE: ICD-10-CM

## 2020-09-21 DIAGNOSIS — M54.50 CHRONIC RIGHT-SIDED LOW BACK PAIN WITHOUT SCIATICA: ICD-10-CM

## 2020-09-21 DIAGNOSIS — I63.9 CEREBROVASCULAR ACCIDENT (CVA), UNSPECIFIED MECHANISM (HCC): ICD-10-CM

## 2020-09-21 DIAGNOSIS — M41.9 SCOLIOSIS OF THORACOLUMBAR SPINE, UNSPECIFIED SCOLIOSIS TYPE: ICD-10-CM

## 2020-09-21 DIAGNOSIS — I25.2 MYOCARDIAL INFARCT, OLD: ICD-10-CM

## 2020-09-21 DIAGNOSIS — Z96.653 HISTORY OF BILATERAL KNEE REPLACEMENT: ICD-10-CM

## 2020-09-21 DIAGNOSIS — M47.816 LUMBAR SPONDYLOSIS: ICD-10-CM

## 2020-09-21 DIAGNOSIS — M43.16 SPONDYLOLISTHESIS OF LUMBAR REGION: ICD-10-CM

## 2020-09-21 PROCEDURE — 99214 OFFICE O/P EST MOD 30 MIN: CPT | Mod: 95,CR | Performed by: PHYSICAL MEDICINE & REHABILITATION

## 2020-09-21 ASSESSMENT — FIBROSIS 4 INDEX: FIB4 SCORE: 0.45

## 2020-09-21 ASSESSMENT — PAIN SCALES - GENERAL: PAINLEVEL: 7=MODERATE-SEVERE PAIN

## 2020-09-21 ASSESSMENT — PATIENT HEALTH QUESTIONNAIRE - PHQ9: CLINICAL INTERPRETATION OF PHQ2 SCORE: 0

## 2020-09-21 NOTE — H&P (VIEW-ONLY)
Telemedicine Visit: Established Patient     This encounter was conducted via Zoom.   Verbal consent was obtained. Patient's identity was verified.  This visit was initiated by the patient.   This was done during the covid 19 pandemic    Subjective:   CC:   Chief Complaint   Patient presents with   • Follow-Up     Back pain        Jeremiah Huber is a 70 y.o. male presenting for evaluation and management of:    The patient reports that he recently was seen by his cardiologist and he had a discussion of potentially proceeding with the radiofrequency neurotomy for his medial branch nerves in his lumbar spine.  The patient is here to discuss his back pain.  He continues to have right low back pain severe in intensity aching quality nonradiating worse with lumbar extension.  This is typically 8-10/10 in intensity.  He did about a percent pain relief during the diagnostic phase of diagnostic medial branch blocks with radiofrequency had to be delayed because the patient had a myocardial infarction and is undergoing treatment for this.      I reviewed the previous notes from Dr. Rodríguez the patient's cardiologist who cleared the patient to be off of Plavix for 5 days prior to the spine injection but he must remain on aspirin through the injection.    ROS   Denies any recent fevers or chills. No nausea or vomiting. No chest pains or shortness of breath.     Allergies   Allergen Reactions   • Nkda [No Known Drug Allergy]        Current medicines (including changes today)  Current Outpatient Medications   Medication Sig Dispense Refill   • ALPRAZolam (XANAX) 0.25 MG Tab Take 0.25 mg by mouth at bedtime as needed for Sleep.     • metoprolol SR (TOPROL XL) 50 MG TABLET SR 24 HR Take 1 Tab by mouth every day. 90 Tab 3   • atorvastatin (LIPITOR) 80 MG tablet Take 1 Tab by mouth every evening. 90 Tab 2   • carbidopa-levodopa (SINEMET)  MG Tab TAKE 2 TABS BY MOUTH IN THE MORNING,1 TAB IN MIDDAY,1TAB IN EVENING.INCREASE AS  DIRECTED TO 2 TABS 3X A  Tab 2   • aspirin EC (ECOTRIN) 81 MG Tablet Delayed Response Take 1 Tab by mouth every day. 90 Tab 3   • clopidogrel (PLAVIX) 75 MG Tab Take 1 Tab by mouth every day. 90 Tab 3   • nitroglycerin (NITROSTAT) 0.4 MG SL Tab Place 1 Tab under tongue as needed for Chest Pain. 25 Tab 2   • acetaminophen (TYLENOL) 500 MG Tab Take 1,000 mg by mouth 3 times a day.     • ROPINIRole (REQUIP) 0.25 MG Tab Take 0.25 mg by mouth every evening.     • amantadine (SYMMETREL) 100 MG Tab Take 100 mg by mouth 2 Times a Day.       No current facility-administered medications for this visit.        Patient Active Problem List    Diagnosis Date Noted   • CVA, old, disturbances of vision 06/12/2020     Priority: High   • Myocardial infarct, old 06/12/2020     Priority: Medium   • Parkinson disease (HCC)      Priority: Low   • Other hyperlipidemia 12/31/2019     Priority: Low   • Right-sided low back pain without sciatica 11/11/2015     Priority: Low   • Primary insomnia 12/19/2013     Priority: Low   • Pain in the chest 07/10/2020   • Ischemic cardiomyopathy 06/25/2020   • Left ventricular thrombosis 06/23/2020   • Stroke (HCC) 06/22/2020   • History of bilateral knee replacement 12/31/2019   • Restless leg syndrome 12/31/2019   • Ganglion cyst 09/16/2019   • Elevated PSA 06/10/2016       Family History   Problem Relation Age of Onset   • Heart Disease Mother    • Heart Disease Father    • Cancer Father         prostate cancer   • Arthritis Brother        He  has a past medical history of Arthritis, Back spasm, Blood clotting disorder (HCC) (06/2020), GOUT, Gout, Hypertension, Myocardial infarct (HCC) (06/2020), Parkinson disease ([facility]), and Stroke ([facility]) (06/2020).  He  has a past surgical history that includes shoulder surgery; tonsillectomy; knee arthroscopy (Right); and knee replacement, total (Bilateral).       Objective:     Physical Exam:  Respiratory: Unlabored respiratory effort, no cough or audible  wheeze  Psych: Alert and oriented x3, normal affect and mood.       Assessment and Plan:   The following treatment plan was discussed:     1. Lumbar spondylosis    2. History of bilateral knee replacement    3. Spondylolisthesis of lumbar region    4. Scoliosis of thoracolumbar spine, unspecified scoliosis type    5. Lumbar degenerative disc disease    6. Facet arthropathy, lumbar    7. Chronic right-sided low back pain without sciatica    8. Cerebrovascular accident (CVA), unspecified mechanism (HCC)    9. Myocardial infarct, old      I reviewed the notes from the patient's cardiologist Dr. Rodríguez clearing the patient be off of Plavix for 5 days prior to a spine procedure but the patient must remain on aspirin.  I had an extended discussion with the patient about this and that there is a theoretically increased risk of bleeding but if the patient is not on dual antiplatelet therapy this risk is reduced.   The patient should restart Plavix after the procedure.    We decided to proceed with the radiofrequency neurotomy targeting the medial branches innervating the right L3-4 and L4-5, L5-S1 facet joints.    The risks benefits and alternatives to this procedure were discussed and the patient wishes to proceed with the procedure. Risks include but are not limited to damage to surrounding structures, infection, bleeding, worsening of pain which can be permanent, weakness which can be permanent. Benefits include pain relief, improved function. Alternatives includes not doing the procedure.       I also discussed the case with the patient's wife who was at today's visit and assisted with the HPI    Follow-up: After the above procedure

## 2020-09-21 NOTE — PROGRESS NOTES
Telemedicine Visit: Established Patient     This encounter was conducted via Zoom.   Verbal consent was obtained. Patient's identity was verified.  This visit was initiated by the patient.   This was done during the covid 19 pandemic    Subjective:   CC:   Chief Complaint   Patient presents with   • Follow-Up     Back pain        Jeremiah Huber is a 70 y.o. male presenting for evaluation and management of:    The patient reports that he recently was seen by his cardiologist and he had a discussion of potentially proceeding with the radiofrequency neurotomy for his medial branch nerves in his lumbar spine.  The patient is here to discuss his back pain.  He continues to have right low back pain severe in intensity aching quality nonradiating worse with lumbar extension.  This is typically 8-10/10 in intensity.  He did about a percent pain relief during the diagnostic phase of diagnostic medial branch blocks with radiofrequency had to be delayed because the patient had a myocardial infarction and is undergoing treatment for this.      I reviewed the previous notes from Dr. Rodríguez the patient's cardiologist who cleared the patient to be off of Plavix for 5 days prior to the spine injection but he must remain on aspirin through the injection.    ROS   Denies any recent fevers or chills. No nausea or vomiting. No chest pains or shortness of breath.     Allergies   Allergen Reactions   • Nkda [No Known Drug Allergy]        Current medicines (including changes today)  Current Outpatient Medications   Medication Sig Dispense Refill   • ALPRAZolam (XANAX) 0.25 MG Tab Take 0.25 mg by mouth at bedtime as needed for Sleep.     • metoprolol SR (TOPROL XL) 50 MG TABLET SR 24 HR Take 1 Tab by mouth every day. 90 Tab 3   • atorvastatin (LIPITOR) 80 MG tablet Take 1 Tab by mouth every evening. 90 Tab 2   • carbidopa-levodopa (SINEMET)  MG Tab TAKE 2 TABS BY MOUTH IN THE MORNING,1 TAB IN MIDDAY,1TAB IN EVENING.INCREASE AS  DIRECTED TO 2 TABS 3X A  Tab 2   • aspirin EC (ECOTRIN) 81 MG Tablet Delayed Response Take 1 Tab by mouth every day. 90 Tab 3   • clopidogrel (PLAVIX) 75 MG Tab Take 1 Tab by mouth every day. 90 Tab 3   • nitroglycerin (NITROSTAT) 0.4 MG SL Tab Place 1 Tab under tongue as needed for Chest Pain. 25 Tab 2   • acetaminophen (TYLENOL) 500 MG Tab Take 1,000 mg by mouth 3 times a day.     • ROPINIRole (REQUIP) 0.25 MG Tab Take 0.25 mg by mouth every evening.     • amantadine (SYMMETREL) 100 MG Tab Take 100 mg by mouth 2 Times a Day.       No current facility-administered medications for this visit.        Patient Active Problem List    Diagnosis Date Noted   • CVA, old, disturbances of vision 06/12/2020     Priority: High   • Myocardial infarct, old 06/12/2020     Priority: Medium   • Parkinson disease (HCC)      Priority: Low   • Other hyperlipidemia 12/31/2019     Priority: Low   • Right-sided low back pain without sciatica 11/11/2015     Priority: Low   • Primary insomnia 12/19/2013     Priority: Low   • Pain in the chest 07/10/2020   • Ischemic cardiomyopathy 06/25/2020   • Left ventricular thrombosis 06/23/2020   • Stroke (HCC) 06/22/2020   • History of bilateral knee replacement 12/31/2019   • Restless leg syndrome 12/31/2019   • Ganglion cyst 09/16/2019   • Elevated PSA 06/10/2016       Family History   Problem Relation Age of Onset   • Heart Disease Mother    • Heart Disease Father    • Cancer Father         prostate cancer   • Arthritis Brother        He  has a past medical history of Arthritis, Back spasm, Blood clotting disorder (HCC) (06/2020), GOUT, Gout, Hypertension, Myocardial infarct (HCC) (06/2020), Parkinson disease ([facility]), and Stroke ([facility]) (06/2020).  He  has a past surgical history that includes shoulder surgery; tonsillectomy; knee arthroscopy (Right); and knee replacement, total (Bilateral).       Objective:     Physical Exam:  Respiratory: Unlabored respiratory effort, no cough or audible  wheeze  Psych: Alert and oriented x3, normal affect and mood.       Assessment and Plan:   The following treatment plan was discussed:     1. Lumbar spondylosis    2. History of bilateral knee replacement    3. Spondylolisthesis of lumbar region    4. Scoliosis of thoracolumbar spine, unspecified scoliosis type    5. Lumbar degenerative disc disease    6. Facet arthropathy, lumbar    7. Chronic right-sided low back pain without sciatica    8. Cerebrovascular accident (CVA), unspecified mechanism (HCC)    9. Myocardial infarct, old      I reviewed the notes from the patient's cardiologist Dr. Rodríguez clearing the patient be off of Plavix for 5 days prior to a spine procedure but the patient must remain on aspirin.  I had an extended discussion with the patient about this and that there is a theoretically increased risk of bleeding but if the patient is not on dual antiplatelet therapy this risk is reduced.   The patient should restart Plavix after the procedure.    We decided to proceed with the radiofrequency neurotomy targeting the medial branches innervating the right L3-4 and L4-5, L5-S1 facet joints.    The risks benefits and alternatives to this procedure were discussed and the patient wishes to proceed with the procedure. Risks include but are not limited to damage to surrounding structures, infection, bleeding, worsening of pain which can be permanent, weakness which can be permanent. Benefits include pain relief, improved function. Alternatives includes not doing the procedure.       I also discussed the case with the patient's wife who was at today's visit and assisted with the HPI    Follow-up: After the above procedure

## 2020-09-22 ENCOUNTER — HOSPITAL ENCOUNTER (OUTPATIENT)
Facility: REHABILITATION | Age: 70
End: 2020-09-22
Attending: PHYSICAL MEDICINE & REHABILITATION | Admitting: PHYSICAL MEDICINE & REHABILITATION
Payer: MEDICARE

## 2020-09-25 NOTE — PREPROCEDURE INSTRUCTIONS
PAT call made 9/25/2020 at approx 10:33 spoke with pt after confirming 2 pt identifiers. Health hx, medications, allergies  reviewed with pt, as well as pre-procedure/sedation instructions. Respiratory screen completed. Pt denies being sick/symptomatic (fever, cough, SOB, diarrhea) w/in last 72 hrs, or having close contact w/ sick individuals in the past 2 wks. Pt education to notify his MD should he become symptomatic prior to procedure. Pt verbalizes understanding. Pt told to bring a form fitting mask and the he will be wearing it entire stay. Informed pt it is recommended pt self isolate for 72 hrs or from time of this call until procedure, also that we request the  to remain on site until pt is discharged. Pt verbalizes understanding.

## 2020-09-29 ENCOUNTER — HOSPITAL ENCOUNTER (OUTPATIENT)
Facility: REHABILITATION | Age: 70
End: 2020-09-29
Attending: PHYSICAL MEDICINE & REHABILITATION | Admitting: PHYSICAL MEDICINE & REHABILITATION
Payer: MEDICARE

## 2020-09-29 ENCOUNTER — APPOINTMENT (OUTPATIENT)
Dept: RADIOLOGY | Facility: REHABILITATION | Age: 70
End: 2020-09-29
Attending: PHYSICAL MEDICINE & REHABILITATION
Payer: MEDICARE

## 2020-09-29 VITALS
TEMPERATURE: 97.1 F | RESPIRATION RATE: 18 BRPM | BODY MASS INDEX: 24.67 KG/M2 | WEIGHT: 192.24 LBS | HEART RATE: 69 BPM | SYSTOLIC BLOOD PRESSURE: 117 MMHG | DIASTOLIC BLOOD PRESSURE: 77 MMHG | HEIGHT: 74 IN | OXYGEN SATURATION: 94 %

## 2020-09-29 PROCEDURE — 700111 HCHG RX REV CODE 636 W/ 250 OVERRIDE (IP)

## 2020-09-29 PROCEDURE — 64636 DESTROY L/S FACET JNT ADDL: CPT

## 2020-09-29 PROCEDURE — 99152 MOD SED SAME PHYS/QHP 5/>YRS: CPT

## 2020-09-29 PROCEDURE — 99153 MOD SED SAME PHYS/QHP EA: CPT

## 2020-09-29 PROCEDURE — 64635 DESTROY LUMB/SAC FACET JNT: CPT

## 2020-09-29 RX ORDER — LIDOCAINE HYDROCHLORIDE 10 MG/ML
INJECTION, SOLUTION EPIDURAL; INFILTRATION; INTRACAUDAL; PERINEURAL
Status: COMPLETED
Start: 2020-09-29 | End: 2020-09-29

## 2020-09-29 RX ORDER — MIDAZOLAM HYDROCHLORIDE 1 MG/ML
INJECTION INTRAMUSCULAR; INTRAVENOUS
Status: COMPLETED
Start: 2020-09-29 | End: 2020-09-29

## 2020-09-29 RX ADMIN — MIDAZOLAM HYDROCHLORIDE 1 MG: 1 INJECTION, SOLUTION INTRAMUSCULAR; INTRAVENOUS at 10:35

## 2020-09-29 RX ADMIN — LIDOCAINE HYDROCHLORIDE 30 ML: 10 INJECTION, SOLUTION EPIDURAL; INFILTRATION; INTRACAUDAL; PERINEURAL at 10:38

## 2020-09-29 RX ADMIN — FENTANYL CITRATE 50 MCG: 50 INJECTION, SOLUTION INTRAMUSCULAR; INTRAVENOUS at 10:35

## 2020-09-29 ASSESSMENT — PAIN DESCRIPTION - PAIN TYPE
TYPE: CHRONIC PAIN

## 2020-09-29 ASSESSMENT — FIBROSIS 4 INDEX: FIB4 SCORE: 0.45

## 2020-09-29 NOTE — INTERVAL H&P NOTE
H&P reviewed. The patient was examined and there are no changes to the H&P     Lungs clear to auscultation bilaterally.  No abdominal tenderness.  Heart regular rate and rhythm.  Vitals reviewed.    Proceed with the originally scheduled procedure     radiofrequency neurotomy targeting the medial branches innervating the right L3-4 and L4-5, L5-S1 facet joints.

## 2020-09-29 NOTE — PROGRESS NOTES
Preprocedure assessment completed.  Pertinent health information HX Stroke, MI, Replaced bilateral knees, Parkinsons, uses a cane communicated to physician and staff during time out.  Patient positioned preprocedure by RN, CST and XRAY.  Pillow under ankles for support.

## 2020-09-29 NOTE — NON-PROVIDER
Name verified. Informed consent for procedure and site confirmed and signed.H&P updated.Current medications , allergies reviewed . Printed home discharged  verbal instructions given and verbalized understanding. Reviewed pertinent medical health information  (HTN, Anemia, GERD, splenomegaly, prostate CA ) . Off Plavix for 5 days. Aspirin 81 mg had this morning, communicated to Dr. Sandoval and OLI Simons RN.

## 2020-09-29 NOTE — NON-PROVIDER
Fluids  and food tolerated well. Ice compress applied to the affected area. Dr. Sandoval evaluated patient. Discharged  via wheelchair with designated .

## 2020-09-29 NOTE — OP REPORT
Patient: Jeremiah Huber 70 y.o. male MRN: [id number]     Date of Service: 9/29/2020     Physician/s: Nazario Sandoval MD    Pre-operative Diagnosis: Lumbar spondylosis, facet arthropathy    Post-operative Diagnosis: Lumbar spondylosis, facet arthropathy    Procedure: Medial Branch Radiofrequency neurotomy targeting the right L3-4, L4-5 and L5-S1 facet joint(s) with sedation.     Description of procedure:    The risks, benefits, and alternatives of the procedure were reviewed and discussed with the patient.  Written informed consent was freely obtained. A pre-procedural time-out was conducted by the physician verifying patient’s identity, procedure to be performed, procedure site and side, and allergy verification. Appropriate equipment was determined to be in place for the procedure.     Moderation sedation was achieved with Versed (1mg) and Fentanyl (50mcg). Monitoring of the patients vital signs and respiratory status was provided by trained independent registered nurse during the entire course of the procedures and under my supervision and recoded in the patient’s medical record. The duration of sedation was over 10 minutes.     The patient's vital signs were carefully monitored before, throughout, and after the procedure.     In the fluoroscopy suite the patient was placed in a prone position, and a pillow was placed underneath the level of the umbilicus. The skin was prepped and draped in the usual sterile fashion. The fluoroscope was placed over the low back at the appropriate angles, and the targets for needle/probe placement were marked. A 25g needle was placed into each of the markings at three levels, and approx 1cc of 1% Lidocaine was injected subcutaneously into the epidermal and dermal layers. The needle was removed.     A 18 guage, 10 cm RFN cannula with a 10 mm active tip was then placed into the skin using fluoroscopic guidance and advanced with an oblique view towards the intersection of the  transverse process and superior articular process at the L3-4 levels on the right side, where the medial branch runs. The needle/probe tips were then verified by AP, oblique, and lateral views.     A 18 guage, 10 cm RFN cannula with a 10 mm active tip was then placed into the skin using fluoroscopic guidance and advanced with an oblique view towards the intersection of the transverse process and superior articular process at the L4-5 levels on the right side, where the medial branch runs. The needle/probe tips were then verified by AP, oblique, and lateral views.     Then A 18 guage, 10 cm RFN cannula with a 10 mm active tip was then placed into the skin using fluoroscopic guidance and advanced with an oblique view towards the intersection of the transverse process and superior articular process at the L5-S1 levels on the right side, where the medial branch runs. The needle/probe tips were then verified by AP, oblique, and lateral views.     Then A 18 guage, 10 cm RFN cannula with a 10 mm active tip was then placed into the skin using fluoroscopic guidance and advanced with an oblique view towards the intersection of the transverse process and superior articular process at the S1 levels on the right side, where the medial branch runs. The needle/probe tips were then verified by AP, oblique, and lateral views.       Motor stimulation is used as an extra precaution to ensure the needle tips are off the lumbar nerve roots prior to each lesion. Following negative aspiration, a mixture ratio 1mL of 1% Lidocaine. The needles are not moved, but fluoroscope guidance is used to ensure the needles have not moved.       After a wait period of approximately 2 minutes, a radiofrequency lesion was then created at each level with a temperature of 80 degrees centigrade for 90 seconds. The probes were adjusted to a 2nd location and images were saved in 2+ views views. A 2nd radiofrequency lesion was made with 80°C for 90 seconds.    The cannulas were restyletted, and were then removed intact.     Fluoroscopic images in AP and lateral view were saved prior to each radiofrequency neurotomy.    The patient's back was covered with a 4x4 gauze, the area was cleansed with sterile normal saline, and a dressing was applied. There were no complications noted, the patient remained hemodynamically stable, and the patient tolerated the procedure well. The patient was examined in the postoperative area and his strength exam was identical as prior to the procedure.    The patient has a scheduled follow-up visit for Visit date not found     Nazario Sandoval MD  Physical Medicine and Rehabilitation  Interventional Spine and Sports Physiatry  [facility]            CPT  Radiofrequency ablation (RFA) - lumbar or sacral (1st joint):  91304  Radiofrequency ablation (RFA) - lumbar or sacral (each additional joint):  64636 x 2  moderate procedural sedation first 15 minutes: [id number]

## 2020-09-30 ENCOUNTER — TELEPHONE (OUTPATIENT)
Dept: PHYSICAL MEDICINE AND REHAB | Facility: MEDICAL CENTER | Age: 70
End: 2020-09-30

## 2020-09-30 NOTE — TELEPHONE ENCOUNTER
Spoke to Pt in regards to his SP that was done with Dr. Sandoval dated 09/29/2020 for his right radiofrequency neurotomies medial branch targeting the L3-4, L4-5 and L5-S1 facet joints with fluoroscopic guidance and sedation and he mentioned that he still hurting a little bit.    Pt was asking if he can take Tylenol 1000mg TID now or wait for few days.    Please Advise    Thank you  Zhanna

## 2020-10-19 ENCOUNTER — PATIENT MESSAGE (OUTPATIENT)
Dept: PHYSICAL MEDICINE AND REHAB | Facility: MEDICAL CENTER | Age: 70
End: 2020-10-19

## 2020-10-19 DIAGNOSIS — M47.816 LUMBAR SPONDYLOSIS: ICD-10-CM

## 2020-10-19 DIAGNOSIS — M62.838 MUSCLE SPASM: ICD-10-CM

## 2020-10-19 RX ORDER — BACLOFEN 10 MG/1
5-10 TABLET ORAL 3 TIMES DAILY PRN
Qty: 90 TAB | Refills: 1 | Status: SHIPPED | OUTPATIENT
Start: 2020-10-19 | End: 2020-11-16

## 2020-10-19 NOTE — PROGRESS NOTES
Patient with muscle spasms.  Start baclofen 5-10 mg 3 times daily as needed.  Okay to continue Tylenol 1000 mg 3 times daily.  Avoiding NSAIDs given that the patient is on Plavix and aspirin.

## 2020-10-22 ENCOUNTER — OFFICE VISIT (OUTPATIENT)
Dept: NEUROLOGY | Facility: MEDICAL CENTER | Age: 70
End: 2020-10-22
Payer: MEDICARE

## 2020-10-22 VITALS
TEMPERATURE: 97.9 F | OXYGEN SATURATION: 95 % | BODY MASS INDEX: 24.95 KG/M2 | DIASTOLIC BLOOD PRESSURE: 68 MMHG | WEIGHT: 194.45 LBS | SYSTOLIC BLOOD PRESSURE: 92 MMHG | HEIGHT: 74 IN | HEART RATE: 74 BPM

## 2020-10-22 DIAGNOSIS — I63.432 CEREBROVASCULAR ACCIDENT (CVA) DUE TO EMBOLISM OF LEFT POSTERIOR CEREBRAL ARTERY (HCC): ICD-10-CM

## 2020-10-22 DIAGNOSIS — G20.A1 PARKINSON DISEASE (HCC): Primary | ICD-10-CM

## 2020-10-22 PROCEDURE — 99214 OFFICE O/P EST MOD 30 MIN: CPT | Performed by: PSYCHIATRY & NEUROLOGY

## 2020-10-22 ASSESSMENT — ENCOUNTER SYMPTOMS
BLURRED VISION: 1
MEMORY LOSS: 0
SPEECH CHANGE: 1
INSOMNIA: 0
HALLUCINATIONS: 0
WEAKNESS: 1
TREMORS: 1

## 2020-10-22 ASSESSMENT — FIBROSIS 4 INDEX: FIB4 SCORE: 0.45

## 2020-10-23 NOTE — PROGRESS NOTES
Subjective:      Farhat Huber is a 70 y.o. male who presents with his wife Nolvia, for follow-up, with a history of atypical Parkinson's disease and a history of left occipital stroke.     HPI    CVA: Since last seen, originally on Coumadin for intracardiac mural thrombus, subsequent work-up including coronary angiography, repeat echocardiography, and cardiac MRI scanning, revealed resolution of the thrombus and no significant segmental or global wall function abnormalities.  Coumadin was discontinued, he is now on baby aspirin and Plavix, along with Lipitor.  He has had no recurrences of signs suggestive of focal CNS ischemia.  The right homonymous inferior quadrantic visual deficit remains unchanged.    Parkinson's disease: Since last seen, for seeing [facility]. Chrissie [facility] [place], [place], we had tried to reduce the Sinemet, he had remained on amantadine 100 mg, twice daily.  As soon as they tried, his speech worsened, as did the rigidity and tremor.  He is back on the drug but the dose was changed from 2 tablets, 3 times daily, now 1 tablet, 3 times daily.  Requip 0.25 mg, twice daily was added.  He seemed to be doing incredibly well with this cocktail.    They deny hallucinations, dyskinesias, there is no wearing-off.  He still can have some difficulty with gait initiation though he is not falling and this is easily overcome.  He has symptoms of a restlessness that also response to the ropinirole.  These are present throughout the day, mostly in the legs, always worsened into the evenings and especially as he goes to bed.    There are no fluctuations to a great degree, though they are there, there are no on/off episodes, peak-dose dyskinesias, wearing-off, etc.    They have been noting some wide fluctuations in blood pressure, today a little low at 92/68, though he denies a history of consistent orthostatic dizziness and syncope.  He has never had other signs suggestive of  "dysautonomia such as severe constipation, urinary retention, early satiety with nausea and vomiting, etc.  He has been on stable dose of metoprolol XL 50 mg daily for quite some time.    Medical, surgical and family histories are reviewed, there are no new drug allergies.  He is on Sinemet 25/250, 3 times daily, Requip 0.25 mg, twice daily, amantadine 100 mg, twice daily, Plavix and baby aspirin daily, Lipitor 80 mg daily, baclofen 10 mg as needed, Toprol-XL 50 mg daily, and Nitrostat as needed.    Review of Systems   Constitutional: Negative for malaise/fatigue.   Eyes: Positive for blurred vision.   Neurological: Positive for tremors, speech change and weakness.   Psychiatric/Behavioral: Negative for hallucinations and memory loss. The patient does not have insomnia.    All other systems reviewed and are negative.       Objective:     BP (!) 92/68 (BP Location: Right arm, Patient Position: Sitting, BP Cuff Size: Adult)   Pulse 74   Temp 36.6 °C (97.9 °F) (Temporal)   Ht 1.88 m (6' 2\")   Wt 88.2 kg (194 lb 7.1 oz)   SpO2 95%   BMI 24.97 kg/m²      Physical Exam    He appears in no acute distress.  Vital signs are stable, blood pressure is a little low as mentioned above, at 92/68, pulse is regular at 74.  He is asymptomatic when he stands.  There is no malar rash or sialorrhea.  The neck is supple.  Cardiac evaluation is unremarkable.    Fully oriented, there is no aphasia, apraxia, or inattention.    PERRLA/EOMI, visual fields are still remarkable for a right homonymous hemianopsia, mostly the inferior quadrant.  There is still notable hypophonia but no dysarthria or tachyphemia.  Facial movements are symmetric, there is still generalized bradykinesia with a reduction in eye blink frequency, but the tongue and uvula are midline and there is no bulbar dysfunction.  Shoulder shrug and head rotation are intact.    Musculoskeletal exam reveals no tremor, rigidity is mild bilaterally.  Bradykinesia remains though " it seems to be better.  There is no drift or asterixis.  Strength is 5/5 bilaterally.    He stands slowly but does so independently.  There is only slight hypokinesia with gait initiation, he can  the pace easily, his stride length remained shortened without shuffling.  Armswing is good and symmetric.  There is no appendicular dystaxia.  Repetitive movements with all 4 extremities still show a slightly diminished amplitude, there are no asymmetries.    Sensory exam is intact to vibration.     Assessment/Plan:     1. Parkinson disease (HCC)  He is being treated very well, I will defer specific recommendations for medication changes to the specialists in [place].  He is doing very well in this regard.     The restlessness is often a common part of Parkinson's disease, and we talked about the nature of the condition itself.  I believe that the Requip is helping both the symptoms as well as those of the Parkinson's.  We will follow-up in about 6 months.    2. Cerebrovascular accident (CVA) due to embolism of left posterior cerebral artery (HCC)  Also stable, there is no reason to rock the boat in this regard, he will stay on the baby aspirin and Plavix lifelong, along with the Lipitor 80 mg daily.  They understand signs and symptoms suggestive of stroke and what to do in the circumstances.    Time: 25 minutes spent face-to-face for exam, review, discussion, and education, of this over 60% of the time spent counseling and coordinating care.

## 2020-11-04 ENCOUNTER — OFFICE VISIT (OUTPATIENT)
Dept: PHYSICAL MEDICINE AND REHAB | Facility: MEDICAL CENTER | Age: 70
End: 2020-11-04
Payer: MEDICARE

## 2020-11-04 VITALS
HEART RATE: 75 BPM | WEIGHT: 193.56 LBS | SYSTOLIC BLOOD PRESSURE: 100 MMHG | OXYGEN SATURATION: 96 % | TEMPERATURE: 97.5 F | HEIGHT: 75 IN | DIASTOLIC BLOOD PRESSURE: 62 MMHG | BODY MASS INDEX: 24.07 KG/M2

## 2020-11-04 DIAGNOSIS — M47.816 LUMBAR SPONDYLOSIS: ICD-10-CM

## 2020-11-04 DIAGNOSIS — I69.398 CVA, OLD, DISTURBANCES OF VISION: ICD-10-CM

## 2020-11-04 DIAGNOSIS — M51.36 LUMBAR DEGENERATIVE DISC DISEASE: ICD-10-CM

## 2020-11-04 DIAGNOSIS — M43.16 SPONDYLOLISTHESIS OF LUMBAR REGION: ICD-10-CM

## 2020-11-04 DIAGNOSIS — H53.9 CVA, OLD, DISTURBANCES OF VISION: ICD-10-CM

## 2020-11-04 DIAGNOSIS — M62.838 MUSCLE SPASM: ICD-10-CM

## 2020-11-04 PROCEDURE — 99214 OFFICE O/P EST MOD 30 MIN: CPT | Performed by: PHYSICAL MEDICINE & REHABILITATION

## 2020-11-04 ASSESSMENT — FIBROSIS 4 INDEX: FIB4 SCORE: 0.45

## 2020-11-04 ASSESSMENT — PAIN SCALES - GENERAL: PAINLEVEL: 6=MODERATE PAIN

## 2020-11-04 ASSESSMENT — PATIENT HEALTH QUESTIONNAIRE - PHQ9: CLINICAL INTERPRETATION OF PHQ2 SCORE: 0

## 2020-11-04 NOTE — PROGRESS NOTES
"Follow up patient note  Interventional spine and sports physiatry, Physical medicine rehabilitation      Chief complaint:   Chief Complaint   Patient presents with   • Follow-Up     Back pain          HISTORY    Please see new patient note by Dr Sandoval,  for more details.     HPI  Patient identification: Jeremiah Huber  1950,   male  With Diagnoses of Muscle spasm, Lumbar spondylosis, Spondylolisthesis of lumbar region, Lumbar degenerative disc disease, and CVA, old, disturbances of vision were pertinent to this visit.   Procedures:  3/3/2020 diagnostic medial branch blocks targeting the right L3-4, L4-5, L5-S1 facet joints with 100% pain relief post procedure  2020 diagnostic medial branch blocks targeting the right L3-4, L4-5, L5-S1 facet joints with preprocedure pain 7/10 postprocedure pain 0/10  2020 Medial Branch Radiofrequency neurotomy targeting the right L3-4, L4-5 and L5-S1 facet joint(s) with sedation.  100% pain relief and index pain after this procedure at the follow-up visit.      The patient is very happy with the results after the radiofrequency neurotomy as he has had 100% improvement in his index pain.  He is able to go on more walks and more easily do his ADLs.  He is very happy with the results of this.  He does have intermittent soreness near the iliac crest bilaterally right worse than left which is different and he thinks this may be because he is exercising more often when he is working with a physical therapist.  These pains are intermittent nonradiating 5 out of 10 in intensity with exacerbations and no pain at rest.         ROS Red Flags :   Fever, Chills, Sweats: Denies  Involuntary Weight Loss: Denies  Bowel/Bladder Incontinence: Denies  Saddle Anesthesia: Denies        PMHx:   Past Medical History:   Diagnosis Date   • Arthritis     osteo   • Back spasm    • Blood clotting disorder (HCC) 2020    \"heart\"   • GOUT    • Gout    • Hypertension    • Myocardial infarct " ([facility]) 06/2020   • Parkinson disease ([facility])    • Stroke ([facility]) 06/2020       PSHx:   Past Surgical History:   Procedure Laterality Date   • NEURO DEST FACET L/S W/IG SNGL Right 9/29/2020    Procedure: DESTRUCTION, NERVE, FACET JOINT, LUMBOSACRAL, USING NEUROLYTIC AGENT, WITH FLUOROSCOPIC GUIDANCE;  Surgeon: Nazario Sandoval M.D.;  [facility];  Service: Pain Management   • KNEE ARTHROSCOPY Right    • KNEE REPLACEMENT, TOTAL Bilateral    • SHOULDER SURGERY      right shoulder, a-c seperation   • TONSILLECTOMY         Family history   Family History   Problem Relation Age of Onset   • Heart Disease Mother    • Heart Disease Father    • Cancer Father         prostate cancer   • Arthritis Brother          Medications:   Outpatient Medications Marked as Taking for the 11/4/20 encounter (Office Visit) with Nazario Sandoval M.D.   Medication Sig Dispense Refill   • baclofen (LIORESAL) 10 MG Tab Take 0.5-1 Tabs by mouth 3 times a day as needed (spasms). 90 Tab 1   • ALPRAZolam (XANAX) 0.25 MG Tab Take 0.25 mg by mouth at bedtime as needed for Sleep.     • metoprolol SR (TOPROL XL) 50 MG TABLET SR 24 HR Take 1 Tab by mouth every day. 90 Tab 3   • atorvastatin (LIPITOR) 80 MG tablet Take 1 Tab by mouth every evening. 90 Tab 2   • carbidopa-levodopa (SINEMET)  MG Tab TAKE 2 TABS BY MOUTH IN THE MORNING,1 TAB IN MIDDAY,1TAB IN EVENING.INCREASE AS DIRECTED TO 2 TABS 3X A  Tab 2   • aspirin EC (ECOTRIN) 81 MG Tablet Delayed Response Take 1 Tab by mouth every day. 90 Tab 3   • clopidogrel (PLAVIX) 75 MG Tab Take 1 Tab by mouth every day. 90 Tab 3   • nitroglycerin (NITROSTAT) 0.4 MG SL Tab Place 1 Tab under tongue as needed for Chest Pain. 25 Tab 2   • acetaminophen (TYLENOL) 500 MG Tab Take 1,000 mg by mouth 3 times a day.     • ROPINIRole (REQUIP) 0.25 MG Tab Take 0.25 mg by mouth 2 Times a Day.     • amantadine (SYMMETREL) 100 MG Tab Take 100 mg by mouth 2 Times a Day.          Current  Outpatient Medications on File Prior to Visit   Medication Sig Dispense Refill   • baclofen (LIORESAL) 10 MG Tab Take 0.5-1 Tabs by mouth 3 times a day as needed (spasms). 90 Tab 1   • ALPRAZolam (XANAX) 0.25 MG Tab Take 0.25 mg by mouth at bedtime as needed for Sleep.     • metoprolol SR (TOPROL XL) 50 MG TABLET SR 24 HR Take 1 Tab by mouth every day. 90 Tab 3   • atorvastatin (LIPITOR) 80 MG tablet Take 1 Tab by mouth every evening. 90 Tab 2   • carbidopa-levodopa (SINEMET)  MG Tab TAKE 2 TABS BY MOUTH IN THE MORNING,1 TAB IN MIDDAY,1TAB IN EVENING.INCREASE AS DIRECTED TO 2 TABS 3X A  Tab 2   • aspirin EC (ECOTRIN) 81 MG Tablet Delayed Response Take 1 Tab by mouth every day. 90 Tab 3   • clopidogrel (PLAVIX) 75 MG Tab Take 1 Tab by mouth every day. 90 Tab 3   • nitroglycerin (NITROSTAT) 0.4 MG SL Tab Place 1 Tab under tongue as needed for Chest Pain. 25 Tab 2   • acetaminophen (TYLENOL) 500 MG Tab Take 1,000 mg by mouth 3 times a day.     • ROPINIRole (REQUIP) 0.25 MG Tab Take 0.25 mg by mouth 2 Times a Day.     • amantadine (SYMMETREL) 100 MG Tab Take 100 mg by mouth 2 Times a Day.       No current facility-administered medications on file prior to visit.          Allergies:   Allergies   Allergen Reactions   • Nkda [No Known Drug Allergy]        Social Hx:   Social History     Socioeconomic History   • Marital status:      Spouse name: Not on file   • Number of children: Not on file   • Years of education: Not on file   • Highest education level: Not on file   Occupational History   • Not on file   Social Needs   • Financial resource strain: Not on file   • Food insecurity     Worry: Never true     Inability: Never true   • Transportation needs     Medical: No     Non-medical: No   Tobacco Use   • Smoking status: Former Smoker     Types: Cigarettes     Quit date: 1970     Years since quittin.8   • Smokeless tobacco: Never Used   Substance and Sexual Activity   • Alcohol use: Not  "Currently     Alcohol/week: 4.2 oz     Types: 4 Glasses of wine, 3 Standard drinks or equivalent per week     Frequency: 4 or more times a week     Drinks per session: 1 or 2     Binge frequency: Less than monthly     Comment: daily   • Drug use: No   • Sexual activity: Yes     Partners: Female   Lifestyle   • Physical activity     Days per week: Not on file     Minutes per session: Not on file   • Stress: Not on file   Relationships   • Social connections     Talks on phone: Not on file     Gets together: Not on file     Attends Yazidism service: Not on file     Active member of club or organization: Not on file     Attends meetings of clubs or organizations: Not on file     Relationship status: Not on file   • Intimate partner violence     Fear of current or ex partner: Not on file     Emotionally abused: Not on file     Physically abused: Not on file     Forced sexual activity: Not on file   Other Topics Concern   •  Service No   • Blood Transfusions No   • Caffeine Concern No   • Occupational Exposure No   • Hobby Hazards No   • Sleep Concern No   • Stress Concern No   • Weight Concern No   • Special Diet No   • Back Care Yes     Comment: Streching   • Exercise No   • Bike Helmet Yes   • Seat Belt Yes   • Self-Exams Yes   Social History Narrative   • Not on file         EXAMINATION     Physical Exam:   Vitals: /62 (BP Location: Left arm, Patient Position: Sitting, BP Cuff Size: Adult)   Pulse 75   Temp 36.4 °C (97.5 °F) (Temporal)   Ht 1.892 m (6' 2.5\")   Wt 87.8 kg (193 lb 9 oz)   SpO2 96%     Constitutional:   Body Habitus: Body mass index is 24.52 kg/m².  Cooperation: Fully cooperates with exam  Appearance: Well-groomed no disheveled    Respiratory-  breathing comfortable on room air, no audible wheezing  Cardiovascular- capillary refills less than 2 seconds. No lower extremity edema is noted.   Psychiatric- alert and oriented ×3. Normal affect.    MSK/NEURO: -    There are no signs of " infection around the injection sites.   full  active range of motion with flexion, lateral flexion, and rotation bilaterally.   There is full  active range of motion with lumbar extension.    There is no  pain with lumbar extension.   There is no pain with facet loading maneuver (extension rotation) with axial low back pain on the BILATERAL side(s)    Palpation:   No tenderness to palpation in midline at T1-T12 levels. No tenderness to palpation in the left and right of the midline T1-L5, NEGATIVE for tenderness to palpation to the para-midline region in the lower lumbar levels.  palpation over SI joint: negative bilaterally    palpation in hip or over the gluteus medius tendon insertion: negative bilaterally      Lumbar spine Special tests  Neuro tension  Straight leg test negative bilaterally    Slump test negative bilaterally      Key points for the international standards for neurological classification of spinal cord injury (ISNCSCI) to light touch.     Dermatome R L                                      L2 2 2   L3 2 2   L4 2 2   L5 2 2   S1 2 2   S2 2 2         Motor Exam Lower Extremities    ? Myotome R L   Hip flexion L2 5 5   Knee extension L3 5 5   Ankle dorsiflexion L4 5 5   Toe extension L5 5 5   Ankle plantarflexion S1 5 5             MEDICAL DECISION MAKING    DATA    Labs:   Lab Results   Component Value Date/Time    SODIUM 137 07/30/2020 12:52 PM    POTASSIUM 5.2 07/30/2020 12:52 PM    CHLORIDE 102 07/30/2020 12:52 PM    CO2 27 07/30/2020 12:52 PM    GLUCOSE 95 07/30/2020 12:52 PM    BUN 13 07/30/2020 12:52 PM    CREATININE 1.16 07/30/2020 12:52 PM        Lab Results   Component Value Date/Time    PROTHROMBTM 13.3 08/04/2020 06:52 AM    INR 2.00 08/17/2020 01:41 PM        Lab Results   Component Value Date/Time    WBC 6.1 07/30/2020 12:52 PM    RBC 5.42 07/30/2020 12:52 PM    HEMOGLOBIN 16.2 07/30/2020 12:52 PM    HEMATOCRIT 48.5 07/30/2020 12:52 PM    MCV 89.5 07/30/2020 12:52 PM    MCH 29.9  07/30/2020 12:52 PM    MCHC 33.4 (L) 07/30/2020 12:52 PM    MPV 9.9 07/30/2020 12:52 PM    NEUTSPOLYS 73.80 (H) 07/30/2020 12:52 PM    LYMPHOCYTES 17.20 (L) 07/30/2020 12:52 PM    MONOCYTES 6.90 07/30/2020 12:52 PM    EOSINOPHILS 1.10 07/30/2020 12:52 PM    BASOPHILS 0.70 07/30/2020 12:52 PM        Lab Results   Component Value Date/Time    HBA1C 5.2 06/14/2020 04:44 AM          Imaging:   I personally reviewed following images    X-ray lumbar spine 11/11/2015.  Degenerative scoliosis is present.  Facet arthropathy worse in the lower lumbar levels right worse than left.  Degenerative disc disease is present. CT renal colic evaluation.  I reviewed the study specifically look at the patient's lumbar spine.  Please see radiologist dictation for the remainder of the report.  Degenerative disc disease at multiple levels worst at the L2-3 level.  Also present L4-5 and L5-S1.  Grade 1 spondylolisthesis L2-3.  Significant facet arthropathy L3-4, L4-5, L5-S1 right side worse than left.    MRI lumbar spine 2/10/2020  Lumbar scoliosis likely degenerative.  Significant right L2-3 and L3-4 neuroforaminal stenosis likely secondary to disc herniation and osteophyte complex.  Moderate right L4-5 neuroforaminal stenosis.  Severe left L4-5 and L5-S1 neuroforaminal stenosis.  Central canal stenosis at L2-3, L3-4, L4-5.  Significant facet arthropathy bilaterally worst at L3-4, L4-5, L5-S1 bilaterally.    I reviewed the following radiology reports  MRI lumbar spine 2/10/2020  1.  Severe multilevel degenerative disc disease and facet degeneration. There is multilevel central canal and neural foraminal narrowing as well as attenuation of the lateral recesses bilaterally as specifically described above. Central canal narrowing   is most severe at L3-4 and L4-5.     2.  Severe convex left scoliotic curvature.     3.  Multilevel degenerative subluxation.           Results for orders placed in visit on 08/25/17   MR-BRAIN-W/O                                                                                                                                                                                                                                                Results for orders placed in visit on 11/11/15   DX-LUMBAR SPINE-2 OR 3 VIEWS    Impression 1.  Lumbar scoliosis convex left. L2-L3 retrolisthesis.                                    Results for orders placed in visit on 06/10/16   DX-WRIST-COMPLETE 3+ RIGHT    Impression No acute fracture identified. Scapholunate widening consistent with ligamentous injury.           DIAGNOSIS   Visit Diagnoses     ICD-10-CM   1. Muscle spasm  M62.838   2. Lumbar spondylosis  M47.816   3. Spondylolisthesis of lumbar region  M43.16   4. Lumbar degenerative disc disease  M51.36   5. CVA, old, disturbances of vision  I69.398    H53.9         ASSESSMENT and PLAN:     Jeremiah Huber  1950,   male      Jeremiah was seen today for follow-up.    Diagnoses and all orders for this visit:    Muscle spasm    Lumbar spondylosis    Spondylolisthesis of lumbar region    Lumbar degenerative disc disease    CVA, old, disturbances of vision        The patient is very happy with the results of the radiofrequency neurotomy has had a significant provement in his walking, exercise and movement.  Pain is dramatically improved.  He does have some pain around the iliac crest which may be around the area of the expected superior cluneal nerve right worse than left.  I would like for the patient continue with physical therapy and his home exercise program.  If he does not improve with this I would consider him for cluneal nerve blocks.     For his spasticity is reasonable for him to continue with baclofen.  I recommend the patient continue to follow-up with neurology.    Follow up: 6 months PRN    Thank you for allowing me to participate in the care of this patient. If you have any questions please not hesitate to contact  me.          Please note that this dictation was created using voice recognition software. I have made every reasonable attempt to correct obvious errors but there may be errors of grammar and content that I may have overlooked prior to finalization of this note.      Nazario Sandoval MD  Interventional Spine and Sports Physiatry  Physical Medicine and Rehabilitation  [facility]

## 2020-11-14 DIAGNOSIS — M62.838 MUSCLE SPASM: ICD-10-CM

## 2020-11-14 DIAGNOSIS — M47.816 LUMBAR SPONDYLOSIS: ICD-10-CM

## 2020-11-16 RX ORDER — BACLOFEN 10 MG/1
TABLET ORAL
Qty: 90 TAB | Refills: 1 | Status: SHIPPED | OUTPATIENT
Start: 2020-11-16 | End: 2021-01-27

## 2020-12-09 ENCOUNTER — APPOINTMENT (RX ONLY)
Dept: URBAN - METROPOLITAN AREA CLINIC 35 | Facility: CLINIC | Age: 70
Setting detail: DERMATOLOGY
End: 2020-12-09

## 2020-12-09 DIAGNOSIS — Z71.89 OTHER SPECIFIED COUNSELING: ICD-10-CM

## 2020-12-09 DIAGNOSIS — L20.89 OTHER ATOPIC DERMATITIS: ICD-10-CM

## 2020-12-09 DIAGNOSIS — L82.1 OTHER SEBORRHEIC KERATOSIS: ICD-10-CM

## 2020-12-09 DIAGNOSIS — D22 MELANOCYTIC NEVI: ICD-10-CM

## 2020-12-09 DIAGNOSIS — L57.0 ACTINIC KERATOSIS: ICD-10-CM

## 2020-12-09 DIAGNOSIS — B35.3 TINEA PEDIS: ICD-10-CM

## 2020-12-09 DIAGNOSIS — L81.4 OTHER MELANIN HYPERPIGMENTATION: ICD-10-CM

## 2020-12-09 PROBLEM — D22.5 MELANOCYTIC NEVI OF TRUNK: Status: ACTIVE | Noted: 2020-12-09

## 2020-12-09 PROCEDURE — ? COUNSELING

## 2020-12-09 PROCEDURE — ? TREATMENT REGIMEN

## 2020-12-09 PROCEDURE — ? SUNSCREEN RECOMMENDATIONS

## 2020-12-09 PROCEDURE — ? PRESCRIPTION

## 2020-12-09 PROCEDURE — 99213 OFFICE O/P EST LOW 20 MIN: CPT

## 2020-12-09 RX ORDER — FLUTICASONE PROPIONATE 0.5 MG/G
THIN LAYER CREAM TOPICAL BID
Qty: 1 | Refills: 1 | Status: ERX | COMMUNITY
Start: 2020-12-09

## 2020-12-09 RX ORDER — KETOCONAZOLE 20 MG/G
THIN LAYER CREAM TOPICAL BID
Qty: 1 | Refills: 11 | Status: ERX

## 2020-12-09 RX ADMIN — FLUTICASONE PROPIONATE THIN LAYER: 0.5 CREAM TOPICAL at 00:00

## 2020-12-09 ASSESSMENT — LOCATION SIMPLE DESCRIPTION DERM
LOCATION SIMPLE: LEFT PLANTAR SURFACE
LOCATION SIMPLE: LEFT PRETIBIAL REGION
LOCATION SIMPLE: UPPER BACK
LOCATION SIMPLE: RIGHT PRETIBIAL REGION
LOCATION SIMPLE: RIGHT UPPER BACK
LOCATION SIMPLE: RIGHT PLANTAR SURFACE

## 2020-12-09 ASSESSMENT — LOCATION DETAILED DESCRIPTION DERM
LOCATION DETAILED: LEFT PLANTAR FOREFOOT OVERLYING 2ND METATARSAL
LOCATION DETAILED: RIGHT INFERIOR MEDIAL UPPER BACK
LOCATION DETAILED: RIGHT PROXIMAL PRETIBIAL REGION
LOCATION DETAILED: RIGHT PLANTAR FOREFOOT OVERLYING 2ND METATARSAL
LOCATION DETAILED: SUPERIOR THORACIC SPINE
LOCATION DETAILED: LEFT PROXIMAL PRETIBIAL REGION
LOCATION DETAILED: INFERIOR THORACIC SPINE

## 2020-12-09 ASSESSMENT — LOCATION ZONE DERM
LOCATION ZONE: TRUNK
LOCATION ZONE: FEET
LOCATION ZONE: LEG

## 2020-12-09 NOTE — PROCEDURE: TREATMENT REGIMEN
Continue Regimen: Fluticasone cream 0.05% bid for 2 weeks alternating with 2 weeks off
Detail Level: Zone
Initiate Treatment: Ketoconazole cream 2% bid
Continue Regimen: Fluorouracil 5% bid for 4 days \\nCalcipotriene 0.005% bid for 4 days \\nFor field treatment prn, pt has rx

## 2020-12-29 ENCOUNTER — TELEPHONE (OUTPATIENT)
Dept: MEDICAL GROUP | Facility: MEDICAL CENTER | Age: 70
End: 2020-12-29

## 2020-12-30 NOTE — TELEPHONE ENCOUNTER
1. Caller Name: Jeremiah Huber               Call Back Number:[phone] (home)       How would the patient prefer to be contacted with a response: Gustt message    2. SPECIFIC Action To Be Taken: Patient requesting labs for upcoming appointment    3. Diagnosis/Clinical Reason for Request: Annual Exam     4. Specialty & Provider Name/Lab/Imaging Location: [facility]    Patient has upcoming appointment on the 01/22/2021 and is inquiring about getting labs done or if he needs labs done for this appointment. Please Advise, thank you.

## 2021-01-22 ENCOUNTER — OFFICE VISIT (OUTPATIENT)
Dept: MEDICAL GROUP | Facility: MEDICAL CENTER | Age: 71
End: 2021-01-22
Payer: MEDICARE

## 2021-01-22 VITALS
DIASTOLIC BLOOD PRESSURE: 66 MMHG | RESPIRATION RATE: 16 BRPM | SYSTOLIC BLOOD PRESSURE: 102 MMHG | TEMPERATURE: 96.9 F | OXYGEN SATURATION: 95 % | HEIGHT: 75 IN | BODY MASS INDEX: 24.37 KG/M2 | HEART RATE: 79 BPM | WEIGHT: 195.99 LBS

## 2021-01-22 DIAGNOSIS — G89.29 CHRONIC RIGHT-SIDED LOW BACK PAIN WITHOUT SCIATICA: ICD-10-CM

## 2021-01-22 DIAGNOSIS — H53.9 CVA, OLD, DISTURBANCES OF VISION: ICD-10-CM

## 2021-01-22 DIAGNOSIS — I69.398 CVA, OLD, DISTURBANCES OF VISION: ICD-10-CM

## 2021-01-22 DIAGNOSIS — I25.5 ISCHEMIC CARDIOMYOPATHY: ICD-10-CM

## 2021-01-22 DIAGNOSIS — G25.81 RESTLESS LEG SYNDROME: ICD-10-CM

## 2021-01-22 DIAGNOSIS — G20.A1 PARKINSON DISEASE (HCC): ICD-10-CM

## 2021-01-22 DIAGNOSIS — E78.49 OTHER HYPERLIPIDEMIA: ICD-10-CM

## 2021-01-22 DIAGNOSIS — M54.50 CHRONIC RIGHT-SIDED LOW BACK PAIN WITHOUT SCIATICA: ICD-10-CM

## 2021-01-22 DIAGNOSIS — F51.01 PRIMARY INSOMNIA: ICD-10-CM

## 2021-01-22 PROBLEM — R07.9 PAIN IN THE CHEST: Status: RESOLVED | Noted: 2020-07-10 | Resolved: 2021-01-22

## 2021-01-22 PROCEDURE — G0439 PPPS, SUBSEQ VISIT: HCPCS | Performed by: FAMILY MEDICINE

## 2021-01-22 RX ORDER — KETOCONAZOLE 20 MG/G
CREAM TOPICAL
COMMUNITY
Start: 2021-01-19

## 2021-01-22 RX ORDER — GABAPENTIN 300 MG/1
300 CAPSULE ORAL 2 TIMES DAILY
COMMUNITY
Start: 2021-01-19 | End: 2022-11-08 | Stop reason: SDUPTHER

## 2021-01-22 SDOH — HEALTH STABILITY: MENTAL HEALTH: HOW OFTEN DO YOU HAVE 6 OR MORE DRINKS ON ONE OCCASION?: NEVER

## 2021-01-22 SDOH — ECONOMIC STABILITY: INCOME INSECURITY: IN THE LAST 12 MONTHS, WAS THERE A TIME WHEN YOU WERE NOT ABLE TO PAY THE MORTGAGE OR RENT ON TIME?: NO

## 2021-01-22 SDOH — SOCIAL STABILITY: SOCIAL NETWORK: HOW OFTEN DO YOU ATTENT MEETINGS OF THE CLUB OR ORGANIZATION YOU BELONG TO?: NEVER

## 2021-01-22 SDOH — HEALTH STABILITY: MENTAL HEALTH: HOW OFTEN DO YOU HAVE A DRINK CONTAINING ALCOHOL?: MONTHLY OR LESS

## 2021-01-22 SDOH — HEALTH STABILITY: MENTAL HEALTH
STRESS IS WHEN SOMEONE FEELS TENSE, NERVOUS, ANXIOUS, OR CAN'T SLEEP AT NIGHT BECAUSE THEIR MIND IS TROUBLED. HOW STRESSED ARE YOU?: ONLY A LITTLE

## 2021-01-22 SDOH — ECONOMIC STABILITY: HOUSING INSECURITY
IN THE LAST 12 MONTHS, WAS THERE A TIME WHEN YOU DID NOT HAVE A STEADY PLACE TO SLEEP OR SLEPT IN A SHELTER (INCLUDING NOW)?: NO

## 2021-01-22 SDOH — HEALTH STABILITY: PHYSICAL HEALTH: ON AVERAGE, HOW MANY MINUTES DO YOU ENGAGE IN EXERCISE AT THIS LEVEL?: 30 MIN

## 2021-01-22 SDOH — SOCIAL STABILITY: SOCIAL NETWORK
DO YOU BELONG TO ANY CLUBS OR ORGANIZATIONS SUCH AS CHURCH GROUPS UNIONS, FRATERNAL OR ATHLETIC GROUPS, OR SCHOOL GROUPS?: NO

## 2021-01-22 SDOH — SOCIAL STABILITY: SOCIAL NETWORK: IN A TYPICAL WEEK, HOW MANY TIMES DO YOU TALK ON THE PHONE WITH FAMILY, FRIENDS, OR NEIGHBORS?: ONCE A WEEK

## 2021-01-22 SDOH — HEALTH STABILITY: PHYSICAL HEALTH: ON AVERAGE, HOW MANY DAYS PER WEEK DO YOU ENGAGE IN MODERATE TO STRENUOUS EXERCISE (LIKE A BRISK WALK)?: 3 DAYS

## 2021-01-22 SDOH — SOCIAL STABILITY: SOCIAL NETWORK: ARE YOU MARRIED, WIDOWED, DIVORCED, SEPARATED, NEVER MARRIED, OR LIVING WITH A PARTNER?: MARRIED

## 2021-01-22 SDOH — SOCIAL STABILITY: SOCIAL NETWORK: HOW OFTEN DO YOU GET TOGETHER WITH FRIENDS OR RELATIVES?: NEVER

## 2021-01-22 SDOH — ECONOMIC STABILITY: INCOME INSECURITY: HOW HARD IS IT FOR YOU TO PAY FOR THE VERY BASICS LIKE FOOD, HOUSING, MEDICAL CARE, AND HEATING?: NOT HARD AT ALL

## 2021-01-22 SDOH — HEALTH STABILITY: PHYSICAL HEALTH: ON AVERAGE, HOW MANY MINUTES DO YOU ENGAGE IN EXERCISE AT THIS LEVEL?: 30 MINUTES

## 2021-01-22 SDOH — SOCIAL STABILITY: SOCIAL NETWORK: HOW OFTEN DO YOU ATTEND CHURCH OR RELIGIOUS SERVICES?: PATIENT DECLINED

## 2021-01-22 SDOH — ECONOMIC STABILITY: HOUSING INSECURITY: IN THE LAST 12 MONTHS, HOW MANY PLACES HAVE YOU LIVED?: 1

## 2021-01-22 SDOH — ECONOMIC STABILITY: TRANSPORTATION INSECURITY
IN THE PAST 12 MONTHS, HAS LACK OF RELIABLE TRANSPORTATION KEPT YOU FROM MEDICAL APPOINTMENTS, MEETINGS, WORK OR FROM GETTING THINGS NEEDED FOR DAILY LIVING?: NO

## 2021-01-22 ASSESSMENT — SOCIAL DETERMINANTS OF HEALTH (SDOH)
HOW OFTEN DO YOU HAVE SIX OR MORE DRINKS ON ONE OCCASION: NEVER
HOW OFTEN DO YOU ATTENT MEETINGS OF THE CLUB OR ORGANIZATION YOU BELONG TO?: NEVER
HOW MANY DRINKS CONTAINING ALCOHOL DO YOU HAVE ON A TYPICAL DAY WHEN YOU ARE DRINKING: 1 OR 2
WITHIN THE PAST 12 MONTHS, THE FOOD YOU BOUGHT JUST DIDN'T LAST AND YOU DIDN'T HAVE MONEY TO GET MORE: NEVER TRUE
HOW OFTEN DO YOU HAVE A DRINK CONTAINING ALCOHOL: MONTHLY OR LESS
IN A TYPICAL WEEK, HOW MANY TIMES DO YOU TALK ON THE PHONE WITH FAMILY, FRIENDS, OR NEIGHBORS?: ONCE A WEEK
HOW OFTEN DO YOU ATTEND CHURCH OR RELIGIOUS SERVICES?: DECLINE
HOW OFTEN DO YOU GET TOGETHER WITH FRIENDS OR RELATIVES?: NEVER
HOW HARD IS IT FOR YOU TO PAY FOR THE VERY BASICS LIKE FOOD, HOUSING, MEDICAL CARE, AND HEATING?: NOT HARD AT ALL
WITHIN THE PAST 12 MONTHS, YOU WORRIED THAT YOUR FOOD WOULD RUN OUT BEFORE YOU GOT THE MONEY TO BUY MORE: NEVER TRUE
DO YOU BELONG TO ANY CLUBS OR ORGANIZATIONS SUCH AS CHURCH GROUPS UNIONS, FRATERNAL OR ATHLETIC GROUPS, OR SCHOOL GROUPS?: NO

## 2021-01-22 ASSESSMENT — PATIENT HEALTH QUESTIONNAIRE - PHQ9: CLINICAL INTERPRETATION OF PHQ2 SCORE: 0

## 2021-01-22 ASSESSMENT — ACTIVITIES OF DAILY LIVING (ADL): BATHING_REQUIRES_ASSISTANCE: 1

## 2021-01-22 ASSESSMENT — ENCOUNTER SYMPTOMS: GENERAL WELL-BEING: FAIR

## 2021-01-22 ASSESSMENT — FIBROSIS 4 INDEX: FIB4 SCORE: 0.45

## 2021-01-22 NOTE — ASSESSMENT & PLAN NOTE
Chronic problem. Followed by Dr. Mclain (neuro). Advised he will stay on the baby aspirin and Plavix lifelong, along with the Lipitor 80 mg daily.  They understand signs and symptoms suggestive of stroke and what to do in the circumstances.

## 2021-01-22 NOTE — PROGRESS NOTES
Chief Complaint   Patient presents with   • Medicare Annual Wellness         HPI:  Jeremiah Huber is a 70 y.o. here for Medicare Annual Wellness Visit     Parkinson disease (HCC)  Chronic problem. Followed by neurology, both here in [place] and in [place]. Doing PT and speech therapy. Currently on carbidopa-levadopa.     Right-sided low back pain without sciatica  Chronic problem. Notes significant improvement with gabapentin. That was started last night.   Had ablation through physiatry. Continues to follow with physiatry.     Primary insomnia  Chronic problem. Uses xanax sparingly. Generally sleeping well.     Ischemic cardiomyopathy  Chronic problem. Followed by Dr. Rodríguez. Currently on metoprolol, plavix, lipitor.     CVA, old, disturbances of vision  Chronic problem. Followed by Dr. Mclain (neuro). Advised he will stay on the baby aspirin and Plavix lifelong, along with the Lipitor 80 mg daily.  They understand signs and symptoms suggestive of stroke and what to do in the circumstances.    Other hyperlipidemia  Chronic problem. Currently on high intensity statin.     Restless leg syndrome  Chronic problem. Well controlled on ropinirole.     Patient Active Problem List    Diagnosis Date Noted   • CVA, old, disturbances of vision 06/12/2020     Priority: High   • Myocardial infarct, old 06/12/2020     Priority: Medium   • Parkinson disease (HCC)      Priority: Low   • Other hyperlipidemia 12/31/2019     Priority: Low   • Right-sided low back pain without sciatica 11/11/2015     Priority: Low   • Primary insomnia 12/19/2013     Priority: Low   • Ischemic cardiomyopathy 06/25/2020   • Left ventricular thrombosis 06/23/2020   • Stroke (HCC) 06/22/2020   • History of bilateral knee replacement 12/31/2019   • Restless leg syndrome 12/31/2019   • Ganglion cyst 09/16/2019   • Elevated PSA 06/10/2016       Current Outpatient Medications   Medication Sig Dispense Refill   • gabapentin (NEURONTIN) 300 MG Cap      •  baclofen (LIORESAL) 10 MG Tab TAKE 1/2 TO 1 TABLET BY MOUTH 3 TIMES A DAY AS NEEDED FOR SPASMS 90 Tab 1   • ALPRAZolam (XANAX) 0.25 MG Tab Take 0.25 mg by mouth at bedtime as needed for Sleep.     • metoprolol SR (TOPROL XL) 50 MG TABLET SR 24 HR Take 1 Tab by mouth every day. 90 Tab 3   • atorvastatin (LIPITOR) 80 MG tablet Take 1 Tab by mouth every evening. 90 Tab 2   • carbidopa-levodopa (SINEMET)  MG Tab TAKE 2 TABS BY MOUTH IN THE MORNING,1 TAB IN MIDDAY,1TAB IN EVENING.INCREASE AS DIRECTED TO 2 TABS 3X A  Tab 2   • aspirin EC (ECOTRIN) 81 MG Tablet Delayed Response Take 1 Tab by mouth every day. 90 Tab 3   • clopidogrel (PLAVIX) 75 MG Tab Take 1 Tab by mouth every day. 90 Tab 3   • nitroglycerin (NITROSTAT) 0.4 MG SL Tab Place 1 Tab under tongue as needed for Chest Pain. 25 Tab 2   • acetaminophen (TYLENOL) 500 MG Tab Take 1,000 mg by mouth 3 times a day.     • ROPINIRole (REQUIP) 0.25 MG Tab Take 0.25 mg by mouth 2 Times a Day.     • amantadine (SYMMETREL) 100 MG Tab Take 100 mg by mouth 2 Times a Day.     • ketoconazole (NIZORAL) 2 % Cream        No current facility-administered medications for this visit.             Current supplements as per medication list.       Allergies: Nkda [no known drug allergy]    Current social contact/activities: Call family.Dog alvarenga,see neighbors.     He  reports that he quit smoking about 51 years ago. His smoking use included cigarettes. He has never used smokeless tobacco. He reports previous alcohol use of about 4.2 oz of alcohol per week. He reports that he does not use drugs.  Counseling given: Not Answered      DPA/Advanced Directive:  Patient has Advanced Directive, but it is not on file. Instructed to bring in a copy to scan into their chart.    ROS:    Gait: Uses a walking stick   Ostomy: No  Other tubes: No  Amputations: No  Chronic oxygen use: No  Last eye exam: 06/2020  Wears hearing aids: No   : Denies any urinary leakage during the last 6  months    Screening:    Depression Screening    Little interest or pleasure in doing things?  0 - not at all  Feeling down, depressed , or hopeless? 0 - not at all  Patient Health Questionnaire Score: 0     If depressive symptoms identified deferred to follow up visit unless specifically addressed in assessment and plan.    Interpretation of PHQ-9 Total Score   Score Severity   1-4 No Depression   5-9 Mild Depression   10-14 Moderate Depression   15-19 Moderately Severe Depression   20-27 Severe Depression    Screening for Cognitive Impairment    Three Minute Recall (river, nation, finger) 3/3    Dontrell clock face with all 12 numbers and set the hands to show 10 past 11.  Yes    Cognitive concerns identified deferred for follow up unless specifically addressed in assessment and plan.    Fall Risk Assessment    Has the patient had two or more falls in the last year or any fall with injury in the last year?  No    Safety Assessment    Throw rugs on floor.  No  Handrails on all stairs.  No  Good lighting in all hallways.  Yes  Difficulty hearing.  No  Patient counseled about all safety risks that were identified.    Functional Assessment ADLs    Are there any barriers preventing you from cooking for yourself or meeting nutritional needs?  No.    Are there any barriers preventing you from driving safely or obtaining transportation?  No.    Are there any barriers preventing you from using a telephone or calling for help?  No.    Are there any barriers preventing you from shopping?  No.    Are there any barriers preventing you from taking care of your own finances?  No.    Are there any barriers preventing you from managing your medications?  No.    Are there any barriers preventing you from showering, bathing or dressing yourself?  Yes.    Are you currently engaging in any exercise or physical activity?  Yes.  Walking with dogs.   What is your perception of your health?  Fair.      Health Maintenance Summary                 "Annual Wellness Visit Overdue 1950     HEPATITIS C SCREENING Overdue 1952     COLONOSCOPY Overdue 2019      Done 2014 AMB REFERRAL TO GI FOR COLONOSCOPY    IMM DTaP/Tdap/Td Vaccine Next Due 2025      Done 2015 Imm Admin: Tdap Vaccine          Patient Care Team:  An Perez M.D. as PCP - General (Family Medicine)  Jose De Jesus Purvis M.D. (Neurology)  Mulugeta Johnson M.D. (Orthopaedics)  Robbie De La Cruz P.A.-C. (Urology)        Social History     Tobacco Use   • Smoking status: Former Smoker     Types: Cigarettes     Quit date: 1970     Years since quittin.0   • Smokeless tobacco: Never Used   Substance Use Topics   • Alcohol use: Not Currently     Alcohol/week: 4.2 oz     Types: 4 Glasses of wine, 3 Standard drinks or equivalent per week     Frequency: Monthly or less     Drinks per session: 1 or 2     Binge frequency: Never     Comment: daily   • Drug use: No     Family History   Problem Relation Age of Onset   • Heart Disease Mother    • Heart Disease Father    • Cancer Father         prostate cancer   • Arthritis Brother      He  has a past medical history of Arthritis, Back spasm, Blood clotting disorder (HCC) (2020), GOUT, Gout, Hypertension, Myocardial infarct (HCC) (2020), Parkinson disease (HCC), and Stroke (HCC) (2020).   Past Surgical History:   Procedure Laterality Date   • NEURO DEST FACET L/S W/IG SNGL Right 2020    Procedure: DESTRUCTION, NERVE, FACET JOINT, LUMBOSACRAL, USING NEUROLYTIC AGENT, WITH FLUOROSCOPIC GUIDANCE;  Surgeon: Nazario Sandoval M.D.;  [facility];  Service: Pain Management   • KNEE ARTHROSCOPY Right    • KNEE REPLACEMENT, TOTAL Bilateral    • SHOULDER SURGERY      right shoulder, a-c seperation   • TONSILLECTOMY         Exam:   /66 (BP Location: Left arm, Patient Position: Sitting)   Pulse 79   Temp 36.1 °C (96.9 °F) (Temporal)   Resp 16   Ht 1.892 m (6' 2.5\")   Wt 88.9 kg (195 lb " 15.8 oz)   SpO2 95%  Body mass index is 24.83 kg/m².    Hearing good.    Dentition good  Alert, oriented in no acute distress.  Eye contact is good, speech goal directed, affect calm    Assessment and Plan. The following treatment and monitoring plan is recommended:    1. Parkinson disease (HCC)     2. Chronic right-sided low back pain without sciatica     3. Primary insomnia     4. Ischemic cardiomyopathy     5. CVA, old, disturbances of vision     6. Other hyperlipidemia     7. Restless leg syndrome         Generally doing pretty well.  And physical therapy and speech therapy for his Parkinson's disease.  Chronic cardiac conditions are under control, followed by cardiology.      Services suggested: No services needed at this time  Health Care Screening: Age-appropriate preventive services recommended by USPTF and ACIP covered by Medicare were discussed today. Services ordered if indicated and agreed upon by the patient.  Referrals offered: Community-based lifestyle interventions to reduce health risks and promote self-management and wellness, fall prevention, nutrition, physical activity, tobacco-use cessation, weight loss, and mental health services as per orders if indicated.    Discussion today about general wellness and lifestyle habits:    · Prevent falls and reduce trip hazards; Cautioned about securing or removing rugs.  · Have a working fire alarm and carbon monoxide detector;   · Engage in regular physical activity and social activities     Follow-up: Return in about 6 months (around 7/22/2021).

## 2021-01-22 NOTE — ASSESSMENT & PLAN NOTE
Chronic problem. Followed by neurology, both here in [place] and in [place]. Doing PT and speech therapy. Currently on carbidopa-levadopa.

## 2021-01-22 NOTE — ASSESSMENT & PLAN NOTE
Chronic problem. Notes significant improvement with gabapentin. That was started last night.   Had ablation through physiatry. Continues to follow with physiatry.

## 2021-01-27 DIAGNOSIS — M47.816 LUMBAR SPONDYLOSIS: ICD-10-CM

## 2021-01-27 DIAGNOSIS — M62.838 MUSCLE SPASM: ICD-10-CM

## 2021-01-27 RX ORDER — BACLOFEN 10 MG/1
TABLET ORAL
Qty: 90 TAB | Refills: 1 | Status: SHIPPED | OUTPATIENT
Start: 2021-01-27 | End: 2021-02-22

## 2021-02-20 DIAGNOSIS — M62.838 MUSCLE SPASM: ICD-10-CM

## 2021-02-20 DIAGNOSIS — M47.816 LUMBAR SPONDYLOSIS: ICD-10-CM

## 2021-02-22 RX ORDER — BACLOFEN 10 MG/1
TABLET ORAL
Qty: 90 TABLET | Refills: 1 | Status: SHIPPED | OUTPATIENT
Start: 2021-02-22 | End: 2021-03-25

## 2021-03-01 ENCOUNTER — TELEPHONE (OUTPATIENT)
Dept: CARDIOLOGY | Facility: MEDICAL CENTER | Age: 71
End: 2021-03-01

## 2021-03-01 NOTE — TELEPHONE ENCOUNTER
Attempted to reach patient to remind him to complete fasting blood work (lipid panel) prior to upcoming appt with [facility] on 3/9/21. Left voicemail and asked that he call back with any questions or concerns.

## 2021-03-03 ENCOUNTER — HOSPITAL ENCOUNTER (OUTPATIENT)
Dept: LAB | Facility: MEDICAL CENTER | Age: 71
End: 2021-03-03
Attending: INTERNAL MEDICINE
Payer: MEDICARE

## 2021-03-03 DIAGNOSIS — I25.5 ISCHEMIC CARDIOMYOPATHY: ICD-10-CM

## 2021-03-03 DIAGNOSIS — H53.9 CVA, OLD, DISTURBANCES OF VISION: ICD-10-CM

## 2021-03-03 DIAGNOSIS — I69.398 CVA, OLD, DISTURBANCES OF VISION: ICD-10-CM

## 2021-03-03 DIAGNOSIS — I51.3 LEFT VENTRICULAR THROMBOSIS: ICD-10-CM

## 2021-03-03 DIAGNOSIS — Z23 NEED FOR VACCINATION: ICD-10-CM

## 2021-03-03 LAB
CHOLEST SERPL-MCNC: 119 MG/DL (ref 100–199)
FASTING STATUS PATIENT QL REPORTED: NORMAL
HDLC SERPL-MCNC: 51 MG/DL
LDLC SERPL CALC-MCNC: 44 MG/DL
TRIGL SERPL-MCNC: 121 MG/DL (ref 0–149)

## 2021-03-03 PROCEDURE — 36415 COLL VENOUS BLD VENIPUNCTURE: CPT

## 2021-03-03 PROCEDURE — 80061 LIPID PANEL: CPT

## 2021-03-09 ENCOUNTER — TELEMEDICINE (OUTPATIENT)
Dept: CARDIOLOGY | Facility: MEDICAL CENTER | Age: 71
End: 2021-03-09
Payer: MEDICARE

## 2021-03-09 VITALS
HEIGHT: 74 IN | DIASTOLIC BLOOD PRESSURE: 67 MMHG | BODY MASS INDEX: 24.38 KG/M2 | SYSTOLIC BLOOD PRESSURE: 106 MMHG | HEART RATE: 71 BPM | WEIGHT: 190 LBS | OXYGEN SATURATION: 96 %

## 2021-03-09 DIAGNOSIS — I69.398 CVA, OLD, DISTURBANCES OF VISION: ICD-10-CM

## 2021-03-09 DIAGNOSIS — I25.10 CORONARY ARTERY DISEASE INVOLVING NATIVE CORONARY ARTERY OF NATIVE HEART WITHOUT ANGINA PECTORIS: ICD-10-CM

## 2021-03-09 DIAGNOSIS — H53.9 CVA, OLD, DISTURBANCES OF VISION: ICD-10-CM

## 2021-03-09 DIAGNOSIS — I25.5 ISCHEMIC CARDIOMYOPATHY: ICD-10-CM

## 2021-03-09 DIAGNOSIS — I51.3 LEFT VENTRICULAR THROMBOSIS: ICD-10-CM

## 2021-03-09 PROCEDURE — 99214 OFFICE O/P EST MOD 30 MIN: CPT | Mod: 95,CR | Performed by: INTERNAL MEDICINE

## 2021-03-09 ASSESSMENT — FIBROSIS 4 INDEX: FIB4 SCORE: 0.45

## 2021-03-10 NOTE — PROGRESS NOTES
Cardiology Telemedicine Visit: Established Patient   This encounter was conducted via Zoom.   Verbal consent was obtained. Patient's identity was verified.    Assessment and Plan:   PCP: An Perez M.D.  The following treatment plan was discussed:     1. Coronary artery disease involving native coronary artery of native heart without angina pectoris    2. Left ventricular thrombosis    3. Ischemic cardiomyopathy    4. CVA, old, disturbances of vision        Jeremiah Huber is stable from a cardiovascular standpoint.  I recommend he continue on aspirin and Plavix through the 1 year anniversary of the missed MI and then switch to Plavix monotherapy.  He will continue metoprolol and atorvastatin.    Follow up: in 3 months    Chief Complaint   Patient presents with   • Cardiomyopathy (Ischemic)     F/V DX: Ischemic cardiomyopathy       History: Jeremiah Huber is a 70 y.o. male with past medical history of Parkinson's presenting for follow-up of coronary artery disease.  In June 2020 he suffered right inferior quadrantanopia and was diagnosed with an occipital CVA related to LV thrombus.  Subsequent evaluation revealed an occlusion of the terminal PDA which was confirmed after cardiac MRI demonstrated transmural infarct in the apex and apical septum.  The LVEF had recovered to 49% and LV thrombus had resolved.    Since her last evaluation he has remained free of cardiovascular symptoms.  The blood pressure and cholesterol are well controlled.  Is tolerating medical therapy without any side effects.  The right inferior quadrantanopia persists.  He has been working to increase physical activity.  He did have a dose escalation of carbidopa recently but remains fully independent.    ROS   Denies any recent fevers or chills. No nausea or vomiting. No chest pains or shortness of breath. All other systems reviewed and negative except as per the HPI       Objective:   Vitals obtained by patient:  Respirations  "through observation: 12  /67 (BP Location: Left arm, Patient Position: Sitting, BP Cuff Size: Adult)   Pulse 71   Ht 1.88 m (6' 2\")   Wt 86.2 kg (190 lb)   SpO2 96%   BMI 24.39 kg/m²     Physical Exam:  Constitutional: Alert, no distress, well-groomed.  Skin: No rashes in visible areas.  Eye: Round. Conjunctiva clear, lids normal. No icterus.   ENMT: Lips pink without lesions, good dentition, moist mucous membranes. Phonation normal.  Neck: No masses, no thyromegaly. Moves freely without pain.  CV: Pulse as reported by patient  Respiratory: Unlabored respiratory effort, no cough or audible wheeze  Psych: Alert and oriented x3, normal affect and mood.     Allergies   Allergen Reactions   • Nkda [No Known Drug Allergy]      Current medicines (including changes today)  Current Outpatient Medications   Medication Sig Dispense Refill   • baclofen (LIORESAL) 10 MG Tab TAKE 1/2 TO 1 TABLET BY MOUTH 3 TIMES A DAY AS NEEDED FOR SPASMS 90 tablet 1   • gabapentin (NEURONTIN) 300 MG Cap      • ketoconazole (NIZORAL) 2 % Cream      • ALPRAZolam (XANAX) 0.25 MG Tab Take 0.25 mg by mouth at bedtime as needed for Sleep.     • metoprolol SR (TOPROL XL) 50 MG TABLET SR 24 HR Take 1 Tab by mouth every day. 90 Tab 3   • atorvastatin (LIPITOR) 80 MG tablet Take 1 Tab by mouth every evening. 90 Tab 2   • carbidopa-levodopa (SINEMET)  MG Tab TAKE 2 TABS BY MOUTH IN THE MORNING,1 TAB IN MIDDAY,1TAB IN EVENING.INCREASE AS DIRECTED TO 2 TABS 3X A DAY (Patient taking differently: Take 1.5 Tablets by mouth 3 times a day.) 180 Tab 2   • aspirin EC (ECOTRIN) 81 MG Tablet Delayed Response Take 1 Tab by mouth every day. 90 Tab 3   • clopidogrel (PLAVIX) 75 MG Tab Take 1 Tab by mouth every day. 90 Tab 3   • nitroglycerin (NITROSTAT) 0.4 MG SL Tab Place 1 Tab under tongue as needed for Chest Pain. 25 Tab 2   • acetaminophen (TYLENOL) 500 MG Tab Take 1,000 mg by mouth 3 times a day.     • ROPINIRole (REQUIP) 0.25 MG Tab Take 0.25 mg " "by mouth 2 Times a Day.     • amantadine (SYMMETREL) 100 MG Tab Take 100 mg by mouth 2 Times a Day.       No current facility-administered medications for this visit.     Patient Active Problem List    Diagnosis Date Noted   • CVA, old, disturbances of vision 06/12/2020     Priority: High   • Myocardial infarct, old 06/12/2020     Priority: Medium   • Parkinson disease (HCC)      Priority: Low   • Other hyperlipidemia 12/31/2019     Priority: Low   • Right-sided low back pain without sciatica 11/11/2015     Priority: Low   • Primary insomnia 12/19/2013     Priority: Low   • Ischemic cardiomyopathy 06/25/2020   • Left ventricular thrombosis 06/23/2020   • Stroke ([facility]) 06/22/2020   • History of bilateral knee replacement 12/31/2019   • Restless leg syndrome 12/31/2019   • Ganglion cyst 09/16/2019   • Elevated PSA 06/10/2016     Family History   Problem Relation Age of Onset   • Heart Disease Mother    • Heart Disease Father    • Cancer Father         prostate cancer   • Arthritis Brother      He  has a past medical history of Arthritis, Back spasm, Blood clotting disorder ([facility]) (06/2020), GOUT, Gout, Hypertension, Myocardial infarct ([facility]) (06/2020), Parkinson disease ([facility]), and Stroke ([facility]) (06/2020).  He  has a past surgical history that includes shoulder surgery; tonsillectomy; knee arthroscopy (Right); knee replacement, total (Bilateral); and neuro dest facet l/s w/ig sngl (Right, 9/29/2020).  Past Medical History:   Diagnosis Date   • Arthritis     osteo   • Back spasm    • Blood clotting disorder ([facility]) 06/2020    \"heart\"   • GOUT    • Gout    • Hypertension    • Myocardial infarct ([facility]) 06/2020   • Parkinson disease ([facility])    • Stroke ([facility]) 06/2020     Allergies   Allergen Reactions   • Nkda [No Known Drug Allergy]      Outpatient Encounter Medications as of 3/9/2021   Medication Sig Dispense Refill   • baclofen (LIORESAL) 10 MG Tab TAKE 1/2 TO 1 TABLET BY MOUTH 3 TIMES A DAY AS NEEDED FOR SPASMS 90 tablet 1   • " gabapentin (NEURONTIN) 300 MG Cap      • ketoconazole (NIZORAL) 2 % Cream      • ALPRAZolam (XANAX) 0.25 MG Tab Take 0.25 mg by mouth at bedtime as needed for Sleep.     • metoprolol SR (TOPROL XL) 50 MG TABLET SR 24 HR Take 1 Tab by mouth every day. 90 Tab 3   • atorvastatin (LIPITOR) 80 MG tablet Take 1 Tab by mouth every evening. 90 Tab 2   • carbidopa-levodopa (SINEMET)  MG Tab TAKE 2 TABS BY MOUTH IN THE MORNING,1 TAB IN MIDDAY,1TAB IN EVENING.INCREASE AS DIRECTED TO 2 TABS 3X A DAY (Patient taking differently: Take 1.5 Tablets by mouth 3 times a day.) 180 Tab 2   • aspirin EC (ECOTRIN) 81 MG Tablet Delayed Response Take 1 Tab by mouth every day. 90 Tab 3   • clopidogrel (PLAVIX) 75 MG Tab Take 1 Tab by mouth every day. 90 Tab 3   • nitroglycerin (NITROSTAT) 0.4 MG SL Tab Place 1 Tab under tongue as needed for Chest Pain. 25 Tab 2   • acetaminophen (TYLENOL) 500 MG Tab Take 1,000 mg by mouth 3 times a day.     • ROPINIRole (REQUIP) 0.25 MG Tab Take 0.25 mg by mouth 2 Times a Day.     • amantadine (SYMMETREL) 100 MG Tab Take 100 mg by mouth 2 Times a Day.       No facility-administered encounter medications on file as of 3/9/2021.     Social History     Socioeconomic History   • Marital status:      Spouse name: Not on file   • Number of children: Not on file   • Years of education: Not on file   • Highest education level: Master's degree (e.g., MA, MS, [employer]ia, MEd, MSW, [employer])   Occupational History   • Not on file   Tobacco Use   • Smoking status: Former Smoker     Types: Cigarettes     Quit date: 1970     Years since quittin.2   • Smokeless tobacco: Never Used   Substance and Sexual Activity   • Alcohol use: Not Currently     Alcohol/week: 4.2 oz     Types: 4 Glasses of wine, 3 Standard drinks or equivalent per week     Comment: daily   • Drug use: No   • Sexual activity: Yes     Partners: Female   Other Topics Concern   •  Service No   • Blood Transfusions No   • Caffeine Concern No   •  Occupational Exposure No   • Hobby Hazards No   • Sleep Concern No   • Stress Concern No   • Weight Concern No   • Special Diet No   • Back Care Yes     Comment: Streching   • Exercise No   • Bike Helmet Yes   • Seat Belt Yes   • Self-Exams Yes   Social History Narrative   • Not on file     Social Determinants of Health     Financial Resource Strain: Low Risk    • Difficulty of Paying Living Expenses: Not hard at all   Food Insecurity: No Food Insecurity   • Worried About Running Out of Food in the Last Year: Never true   • Ran Out of Food in the Last Year: Never true   Transportation Needs: No Transportation Needs   • Lack of Transportation (Medical): No   • Lack of Transportation (Non-Medical): No   Physical Activity: Insufficiently Active   • Days of Exercise per Week: 3 days   • Minutes of Exercise per Session: 30 min   Stress: No Stress Concern Present   • Feeling of Stress : Only a little   Social Connections: Unknown   • Frequency of Communication with Friends and Family: Once a week   • Frequency of Social Gatherings with Friends and Family: Never   • Attends Orthodoxy Services: Patient refused   • Active Member of Clubs or Organizations: No   • Attends Club or Organization Meetings: Never   • Marital Status:    Intimate Partner Violence:    • Fear of Current or Ex-Partner:    • Emotionally Abused:    • Physically Abused:    • Sexually Abused:        Studies  Lab Results   Component Value Date/Time    CHOLSTRLTOT 119 03/03/2021 07:29 AM    LDL 44 03/03/2021 07:29 AM    HDL 51 03/03/2021 07:29 AM    TRIGLYCERIDE 121 03/03/2021 07:29 AM       Lab Results   Component Value Date/Time    SODIUM 137 07/30/2020 12:52 PM    POTASSIUM 5.2 07/30/2020 12:52 PM    CHLORIDE 102 07/30/2020 12:52 PM    CO2 27 07/30/2020 12:52 PM    GLUCOSE 95 07/30/2020 12:52 PM    BUN 13 07/30/2020 12:52 PM    CREATININE 1.16 07/30/2020 12:52 PM     Lab Results   Component Value Date/Time    ALKPHOSPHAT 65 07/30/2020 12:52 PM     ASTSGOT 9 (L) 07/30/2020 12:52 PM    ALTSGPT 22 07/30/2020 12:52 PM    TBILIRUBIN 0.4 07/30/2020 12:52 PM        For this encounter I reviewed the following medical records BMP, Lipid profile, Echo and MRI

## 2021-03-25 DIAGNOSIS — M47.816 LUMBAR SPONDYLOSIS: ICD-10-CM

## 2021-03-25 DIAGNOSIS — M62.838 MUSCLE SPASM: ICD-10-CM

## 2021-03-25 RX ORDER — BACLOFEN 10 MG/1
TABLET ORAL
Qty: 90 TABLET | Refills: 1 | Status: SHIPPED | OUTPATIENT
Start: 2021-03-25 | End: 2021-04-22

## 2021-04-08 ENCOUNTER — OFFICE VISIT (OUTPATIENT)
Dept: NEUROLOGY | Facility: MEDICAL CENTER | Age: 71
End: 2021-04-08
Attending: PSYCHIATRY & NEUROLOGY
Payer: MEDICARE

## 2021-04-08 VITALS
DIASTOLIC BLOOD PRESSURE: 62 MMHG | WEIGHT: 195.99 LBS | RESPIRATION RATE: 16 BRPM | TEMPERATURE: 97.8 F | HEART RATE: 75 BPM | SYSTOLIC BLOOD PRESSURE: 106 MMHG | BODY MASS INDEX: 25.16 KG/M2 | OXYGEN SATURATION: 95 %

## 2021-04-08 DIAGNOSIS — G20.A1 PARKINSON DISEASE (HCC): Primary | ICD-10-CM

## 2021-04-08 DIAGNOSIS — H53.9 CVA, OLD, DISTURBANCES OF VISION: ICD-10-CM

## 2021-04-08 DIAGNOSIS — I69.398 CVA, OLD, DISTURBANCES OF VISION: ICD-10-CM

## 2021-04-08 PROCEDURE — 99214 OFFICE O/P EST MOD 30 MIN: CPT | Performed by: PSYCHIATRY & NEUROLOGY

## 2021-04-08 PROCEDURE — 99212 OFFICE O/P EST SF 10 MIN: CPT | Performed by: PSYCHIATRY & NEUROLOGY

## 2021-04-08 ASSESSMENT — FIBROSIS 4 INDEX: FIB4 SCORE: 0.45

## 2021-04-08 ASSESSMENT — ENCOUNTER SYMPTOMS
MEMORY LOSS: 0
CONSTIPATION: 1
TREMORS: 0
LOSS OF CONSCIOUSNESS: 0
HALLUCINATIONS: 0
FALLS: 0

## 2021-04-08 NOTE — PROGRESS NOTES
Subjective:      Farhat Huber is a 70 y.o. male who presents with his wife Nolvia, for follow-up, with a history of Parkinson's disease and right occipital cardioembolic stroke.    HPI    Parkinson's disease: Jeremiah has been doing well, he and Nolvia noticed some improvement now that the movement disorder specialists in [place], [place], have increased his Sinemet and Requip.  Now on Requip 0.25 mg 3 times daily along with Sinemet 25/250, 1.5 tablet 3 times daily, movements are easier.  He still has difficulty with freezing and hypokinesia when he first stands up or when he turns.  A wider turn always is easier.  Once he gets going, stride length is easier to maintain.  He still uses a cane more more often.    Cognition is intact, voice volume is diminished but he is not drooling, he has had no problems swallowing.  Mood is stable.  The rigidity with the right upper extremity is a little easier, tremor has not been an issue for him.  Dexterity with the hand is stable.  The right leg is the one that is more likely to drag if he is not careful, tripping the bigger issue.    He is tolerating his new medication regimen without dyskinesias, wearing-off, fluctuations, or on/off episodes.  With the higher dose of Requip, he has had no problems with sleep attacks, hallucinations, impulse control, dizziness, or syncope.    Stroke: Stable, the right inferior homonymous quadrantic deficit is unchanged.  He denies any new episodes suggestive of new focal neurologic deficit.  He has been on aspirin and Plavix now with good tolerability.    Medical, surgical and family histories are reviewed, there are no new drug allergies.  He is on Requip and Sinemet as above, baby aspirin, Plavix 75 mg daily, Symmetrel 100 mg twice daily, baclofen, Neurontin, Toprol-XL, and the rest as per the electronic health record, noncontributory from my standpoint.    Review of Systems   Gastrointestinal: Positive for constipation.    Musculoskeletal: Negative for falls.   Neurological: Negative for tremors and loss of consciousness.   Psychiatric/Behavioral: Negative for hallucinations and memory loss.   All other systems reviewed and are negative.       Objective:     /62 (BP Location: Right arm)   Pulse 75   Temp 36.6 °C (97.8 °F) (Temporal)   Resp 16   Wt 88.9 kg (195 lb 15.8 oz)   SpO2 95%   BMI 25.16 kg/m²      Physical Exam    He appears in no acute distress.  Vital signs are stable.  There is no malar rash or sialorrhea.  His neck is supple.  Cardiac evaluation is unremarkable.    Mental processing speed is a little slowed, no worse than before.  He is fully oriented, there is no aphasia or apraxia.    PERRLA/EOMI, the hypophonia is still significant, but there is no dysarthria, he can enunciate clearly.  Visual field still reveals the right inferior quadrantic, homonymous deficit.  Facial movements are symmetric, sensory exam is intact to temperature bilaterally.  The tongue and uvula are midline.    Musculoskeletal exam again reveals the rigidity in the right upper extremity, little to none in the left.  There is no tremor on today's exam in either upper extremity, either with action or at rest.  Generalized bradykinesia remains.  There is no drift.  Strength is intact in all 4 extremities.    He stands slowly, there is clear hesitancy with gait initiation, but stride length improved bilaterally as he walks.  Stopping and turning to either side is problematic.  Armswing is diminished bilaterally, more on the right side.  There is no appendicular dystaxia, movements are simply slower with the right upper and lower extremity when compared to the left.  Amplitude of repetitive movements also notably diminished with the right hand when compared to the left, to a lesser degree the right foot when compared to the left.    Sensory exam is grossly intact to light touch.     Assessment/Plan:     1. Parkinson disease  ([facility])  Clinically, things are improved, they will be following up with the movement disorder specialist in the next couple of weeks.  He is tolerating his present regimen changes without issue.    2. CVA, old, disturbances of vision  Stable, visual distortion he has is unchanged, he understands this will be a chronic sequelae.  He is doing well with his aspirin and clopidogrel cocktail, he remains on a high-dose statin agent.  We will follow-up in 6 months.    Time: 25 minutes spent face-to-face for exam, review, discussion, and education, of this over 80% of the time spent counseling and coordinating care.

## 2021-04-22 DIAGNOSIS — M62.838 MUSCLE SPASM: ICD-10-CM

## 2021-04-22 DIAGNOSIS — M47.816 LUMBAR SPONDYLOSIS: ICD-10-CM

## 2021-04-22 RX ORDER — BACLOFEN 10 MG/1
TABLET ORAL
Qty: 90 TABLET | Refills: 1 | Status: SHIPPED | OUTPATIENT
Start: 2021-04-22 | End: 2021-05-17

## 2021-05-04 ENCOUNTER — TELEMEDICINE (OUTPATIENT)
Dept: PHYSICAL MEDICINE AND REHAB | Facility: MEDICAL CENTER | Age: 71
End: 2021-05-04
Payer: MEDICARE

## 2021-05-04 VITALS — BODY MASS INDEX: 24.38 KG/M2 | HEIGHT: 74 IN | WEIGHT: 190 LBS

## 2021-05-04 DIAGNOSIS — H53.9 CVA, OLD, DISTURBANCES OF VISION: ICD-10-CM

## 2021-05-04 DIAGNOSIS — M48.061 SPINAL STENOSIS OF LUMBAR REGION, UNSPECIFIED WHETHER NEUROGENIC CLAUDICATION PRESENT: ICD-10-CM

## 2021-05-04 DIAGNOSIS — Z96.653 HISTORY OF BILATERAL KNEE REPLACEMENT: ICD-10-CM

## 2021-05-04 DIAGNOSIS — M41.9 SCOLIOSIS OF THORACOLUMBAR SPINE, UNSPECIFIED SCOLIOSIS TYPE: ICD-10-CM

## 2021-05-04 DIAGNOSIS — M47.816 LUMBAR SPONDYLOSIS: ICD-10-CM

## 2021-05-04 DIAGNOSIS — M43.16 SPONDYLOLISTHESIS OF LUMBAR REGION: ICD-10-CM

## 2021-05-04 DIAGNOSIS — M47.816 FACET ARTHROPATHY, LUMBAR: ICD-10-CM

## 2021-05-04 DIAGNOSIS — M51.36 LUMBAR DEGENERATIVE DISC DISEASE: ICD-10-CM

## 2021-05-04 DIAGNOSIS — I69.398 CVA, OLD, DISTURBANCES OF VISION: ICD-10-CM

## 2021-05-04 PROCEDURE — 99214 OFFICE O/P EST MOD 30 MIN: CPT | Mod: 95 | Performed by: PHYSICAL MEDICINE & REHABILITATION

## 2021-05-04 ASSESSMENT — PATIENT HEALTH QUESTIONNAIRE - PHQ9: CLINICAL INTERPRETATION OF PHQ2 SCORE: 0

## 2021-05-04 ASSESSMENT — FIBROSIS 4 INDEX: FIB4 SCORE: 0.45

## 2021-05-04 ASSESSMENT — PAIN SCALES - GENERAL: PAINLEVEL: 7=MODERATE-SEVERE PAIN

## 2021-05-04 NOTE — PROGRESS NOTES
"Physical Therapy Treatment    Patient Name:  Shara Hutchins   MRN:  [id number]    Recommendations:     Discharge Recommendations:  nursing facility, skilled   Discharge Equipment Recommendations: (tbd)   Barriers to discharge: None    Assessment:     Shara Hutchins is a 71 y.o. female admitted with a medical diagnosis of Acute hypoxemic respiratory failure.  She presents with the following impairments/functional limitations:  weakness, impaired functional mobilty, gait instability, impaired endurance, impaired balance, impaired self care skills . Pt reports feeling "much better" and "I want to take a shower".     Rehab Prognosis: Good; patient would benefit from acute skilled PT services to address these deficits and reach maximum level of function.    Recent Surgery: * No surgery found *      Plan:     During this hospitalization, patient to be seen 5 x/week to address the identified rehab impairments via gait training, therapeutic activities, therapeutic exercises and progress toward the following goals:    · Plan of Care Expires:  04/23/20    Subjective     Chief Complaint: "I want to take a shower".   Patient/Family Comments/goals: to get stronger  Pain/Comfort:  · Pain Rating 1: 0/10      Objective:     Communicated with rn prior to session.  Patient found supine with telemetry, oxygen, lee catheter, pulse ox (continuous), blood pressure cuff, colostomy upon PT entry to room.     General Precautions: Standard, fall   Orthopedic Precautions:N/A   Braces: N/A     Functional Mobility:  · Bed Mobility:     · Rolling Right: modified independence  · Scooting: modified independence  · Supine to Sit: supervision  · Transfers:     · Sit to Stand:  contact guard assistance with rolling walker  · Gait: pt able to ambulate 25 ft RW min A for balance, no dizziness noted. O2 sats 2L 95% at rest, 89% sitting edge of bed, O2 increased to 3L at 92% and able to maintain with ambulation.       AM-PAC 6 CLICK MOBILITY  Turning over in " Follow up patient note  Interventional spine and sports physiatry, Physical medicine rehabilitation      Chief complaint:   Chief Complaint   Patient presents with   • Follow-Up     back           HISTORY    Please see new patient note by Dr Sandoval,  for more details.     HPI  Patient identification: Jeremiah Huber  1950,   male  With Diagnoses of Lumbar spondylosis, Spondylolisthesis of lumbar region, Spinal stenosis of lumbar region, unspecified whether neurogenic claudication present, History of bilateral knee replacement, Scoliosis of thoracolumbar spine, unspecified scoliosis type, Facet arthropathy, lumbar, Lumbar degenerative disc disease, and CVA, old, disturbances of vision were pertinent to this visit.   Procedures:  3/3/2020 diagnostic medial branch blocks targeting the right L3-4, L4-5, L5-S1 facet joints with 100% pain relief post procedure  2020 diagnostic medial branch blocks targeting the right L3-4, L4-5, L5-S1 facet joints with preprocedure pain 7/10 postprocedure pain 0/10  2020 Medial Branch Radiofrequency neurotomy targeting the right L3-4, L4-5 and L5-S1 facet joint(s) with sedation.  100% pain relief and index pain after this procedure at the follow-up visit.      This encounter was conducted via Zoom.   Audio and video were used with the platform above  Verbal consent was obtained. Patient's identity was verified.  This was done during the covid 19 pandemic      The pain has returned and the patient's low back bilaterally in the low back worse with standing walking lumbar extension.  This is now 9 out of 10 in intensity has been gradually worsening over the past several months.  He did have 6 months of pain relief of his low back pain with greater than 50% pain relief.  Now the pain is bilaterally and previously this was right-sided.  He denies pain in the legs.  His pain does improve with sitting and after sitting he is able to get up and walk again.  He can walk for  "approximately 1/2 mile.       ROS Red Flags :   Fever, Chills, Sweats: Denies  Involuntary Weight Loss: Denies  Bowel/Bladder Incontinence: Denies  Saddle Anesthesia: Denies        PMHx:   Past Medical History:   Diagnosis Date   • Arthritis     osteo   • Back spasm    • Blood clotting disorder ([facility]) 06/2020    \"heart\"   • GOUT    • Gout    • Hypertension    • Myocardial infarct ([facility]) 06/2020   • Parkinson disease ([facility])    • Stroke ([facility]) 06/2020       PSHx:   Past Surgical History:   Procedure Laterality Date   • NEURO DEST FACET L/S W/IG SNGL Right 9/29/2020    Procedure: DESTRUCTION, NERVE, FACET JOINT, LUMBOSACRAL, USING NEUROLYTIC AGENT, WITH FLUOROSCOPIC GUIDANCE;  Surgeon: Nazario Sandoval M.D.;  Location: [facility];  Service: Pain Management   • KNEE ARTHROSCOPY Right    • KNEE REPLACEMENT, TOTAL Bilateral    • SHOULDER SURGERY      right shoulder, a-c seperation   • TONSILLECTOMY         Family history   Family History   Problem Relation Age of Onset   • Heart Disease Mother    • Heart Disease Father    • Cancer Father         prostate cancer   • Arthritis Brother          Medications:   Outpatient Medications Marked as Taking for the 5/4/21 encounter (Telemedicine) with Nazario Sandoval M.D.   Medication Sig Dispense Refill   • baclofen (LIORESAL) 10 MG Tab TAKE 1/2 TO 1 TABLET BY MOUTH 3 TIMES A DAY AS NEEDED FOR SPASMS 90 tablet 1   • gabapentin (NEURONTIN) 300 MG Cap      • ketoconazole (NIZORAL) 2 % Cream      • metoprolol SR (TOPROL XL) 50 MG TABLET SR 24 HR Take 1 Tab by mouth every day. 90 Tab 3   • atorvastatin (LIPITOR) 80 MG tablet Take 1 Tab by mouth every evening. 90 Tab 2   • carbidopa-levodopa (SINEMET)  MG Tab TAKE 2 TABS BY MOUTH IN THE MORNING,1 TAB IN MIDDAY,1TAB IN EVENING.INCREASE AS DIRECTED TO 2 TABS 3X A DAY (Patient taking differently: Take 1.5 Tablets by mouth 3 times a day.) 180 Tab 2   • aspirin EC (ECOTRIN) 81 MG Tablet Delayed Response Take 1 Tab by " bed (including adjusting bedclothes, sheets and blankets)?: 4  Sitting down on and standing up from a chair with arms (e.g., wheelchair, bedside commode, etc.): 3  Moving from lying on back to sitting on the side of the bed?: 4  Moving to and from a bed to a chair (including a wheelchair)?: 3  Need to walk in hospital room?: 3  Climbing 3-5 steps with a railing?: 3  Basic Mobility Total Score: 20       Therapeutic Activities and Exercises:   education, fall prevention, poc, dc planning.    Patient left in bathroom on bench with extender with rn notified and extender present..O2 intact at 3L. [place] and colostomy intact.     GOALS:   Multidisciplinary Problems     Physical Therapy Goals        Problem: Physical Therapy Goal    Goal Priority Disciplines Outcome Goal Variances Interventions   Physical Therapy Goal     PT, PT/OT Ongoing, Progressing     Description:  Goals to be met by: D/C    Patient will increase functional independence with mobility by performin. Supine to sit with Modified [place]  2. Sit to stand transfer with Stand-by Assistance  3. Gait  x 100 feet with Stand-by Assistance using Rolling Walker.                       Time Tracking:     PT Received On: 20  PT Start Time: 0900     PT Stop Time: 923  PT Total Time (min): 23 min     Billable Minutes: Gait Training 12 and Therapeutic Activity 11    Treatment Type: Treatment  PT/PTA: PT     PTA Visit Number: 0     Chapis Clifton, PT  2020   mouth every day. 90 Tab 3   • clopidogrel (PLAVIX) 75 MG Tab Take 1 Tab by mouth every day. 90 Tab 3   • nitroglycerin (NITROSTAT) 0.4 MG SL Tab Place 1 Tab under tongue as needed for Chest Pain. 25 Tab 2   • acetaminophen (TYLENOL) 500 MG Tab Take 1,000 mg by mouth 3 times a day.     • ROPINIRole (REQUIP) 0.25 MG Tab Take 0.25 mg by mouth 3 times a day.     • amantadine (SYMMETREL) 100 MG Tab Take 100 mg by mouth 2 Times a Day.          Current Outpatient Medications on File Prior to Visit   Medication Sig Dispense Refill   • baclofen (LIORESAL) 10 MG Tab TAKE 1/2 TO 1 TABLET BY MOUTH 3 TIMES A DAY AS NEEDED FOR SPASMS 90 tablet 1   • gabapentin (NEURONTIN) 300 MG Cap      • ketoconazole (NIZORAL) 2 % Cream      • metoprolol SR (TOPROL XL) 50 MG TABLET SR 24 HR Take 1 Tab by mouth every day. 90 Tab 3   • atorvastatin (LIPITOR) 80 MG tablet Take 1 Tab by mouth every evening. 90 Tab 2   • carbidopa-levodopa (SINEMET)  MG Tab TAKE 2 TABS BY MOUTH IN THE MORNING,1 TAB IN MIDDAY,1TAB IN EVENING.INCREASE AS DIRECTED TO 2 TABS 3X A DAY (Patient taking differently: Take 1.5 Tablets by mouth 3 times a day.) 180 Tab 2   • aspirin EC (ECOTRIN) 81 MG Tablet Delayed Response Take 1 Tab by mouth every day. 90 Tab 3   • clopidogrel (PLAVIX) 75 MG Tab Take 1 Tab by mouth every day. 90 Tab 3   • nitroglycerin (NITROSTAT) 0.4 MG SL Tab Place 1 Tab under tongue as needed for Chest Pain. 25 Tab 2   • acetaminophen (TYLENOL) 500 MG Tab Take 1,000 mg by mouth 3 times a day.     • ROPINIRole (REQUIP) 0.25 MG Tab Take 0.25 mg by mouth 3 times a day.     • amantadine (SYMMETREL) 100 MG Tab Take 100 mg by mouth 2 Times a Day.     • ALPRAZolam (XANAX) 0.25 MG Tab Take 0.25 mg by mouth at bedtime as needed for Sleep.       No current facility-administered medications on file prior to visit.         Allergies:   Allergies   Allergen Reactions   • Nkda [No Known Drug Allergy]        Social Hx:   Social History     Socioeconomic History    • Marital status:      Spouse name: Not on file   • Number of children: Not on file   • Years of education: Not on file   • Highest education level: Master's degree (e.g., MA, MS, Phylicia, MEd, MSW, MURIEL)   Occupational History   • Not on file   Tobacco Use   • Smoking status: Former Smoker     Types: Cigarettes     Quit date: 1970     Years since quittin.3   • Smokeless tobacco: Never Used   Substance and Sexual Activity   • Alcohol use: Yes     Alcohol/week: 4.2 oz     Types: 4 Glasses of wine, 3 Standard drinks or equivalent per week     Comment: daily   • Drug use: No   • Sexual activity: Yes     Partners: Female   Other Topics Concern   •  Service No   • Blood Transfusions No   • Caffeine Concern No   • Occupational Exposure No   • Hobby Hazards No   • Sleep Concern No   • Stress Concern No   • Weight Concern No   • Special Diet No   • Back Care Yes     Comment: Streching   • Exercise No   • Bike Helmet Yes   • Seat Belt Yes   • Self-Exams Yes   Social History Narrative   • Not on file     Social Determinants of Health     Financial Resource Strain: Low Risk    • Difficulty of Paying Living Expenses: Not hard at all   Food Insecurity: No Food Insecurity   • Worried About Running Out of Food in the Last Year: Never true   • Ran Out of Food in the Last Year: Never true   Transportation Needs: No Transportation Needs   • Lack of Transportation (Medical): No   • Lack of Transportation (Non-Medical): No   Physical Activity: Insufficiently Active   • Days of Exercise per Week: 3 days   • Minutes of Exercise per Session: 30 min   Stress: No Stress Concern Present   • Feeling of Stress : Only a little   Social Connections: Unknown   • Frequency of Communication with Friends and Family: Once a week   • Frequency of Social Gatherings with Friends and Family: Never   • Attends [employer] Services: Patient refused   • Active Member of Clubs or Organizations: No   • Attends Club or Organization Meetings:  "Never   • Marital Status:    Intimate Partner Violence:    • Fear of Current or Ex-Partner:    • Emotionally Abused:    • Physically Abused:    • Sexually Abused:          EXAMINATION     Physical Exam:   Vitals: Ht 1.88 m (6' 2\")   Wt 86.2 kg (190 lb)     Constitutional:   Body Habitus: Body mass index is 24.39 kg/m².  Cooperation: Fully cooperates with exam  Appearance: Well-groomed no disheveled    Respiratory-  breathing comfortable on room air, no audible wheezing  Cardiovascular-  No lower extremity edema is noted.   Psychiatric- alert and oriented ×3. Normal affect.    MSK/NEURO: -    Exam is limited as this is a telehealth visit.  Limited range of motion with lumbar extension which reproduces axial back pain.  Least 4/5 strength in the bilateral lower extremities.        MEDICAL DECISION MAKING    DATA    Labs:   Lab Results   Component Value Date/Time    SODIUM 137 07/30/2020 12:52 PM    POTASSIUM 5.2 07/30/2020 12:52 PM    CHLORIDE 102 07/30/2020 12:52 PM    CO2 27 07/30/2020 12:52 PM    GLUCOSE 95 07/30/2020 12:52 PM    BUN 13 07/30/2020 12:52 PM    CREATININE 1.16 07/30/2020 12:52 PM        Lab Results   Component Value Date/Time    PROTHROMBTM 13.3 08/04/2020 06:52 AM    INR 2.00 08/17/2020 01:41 PM        Lab Results   Component Value Date/Time    WBC 6.1 07/30/2020 12:52 PM    RBC 5.42 07/30/2020 12:52 PM    HEMOGLOBIN 16.2 07/30/2020 12:52 PM    HEMATOCRIT 48.5 07/30/2020 12:52 PM    MCV 89.5 07/30/2020 12:52 PM    MCH 29.9 07/30/2020 12:52 PM    MCHC 33.4 (L) 07/30/2020 12:52 PM    MPV 9.9 07/30/2020 12:52 PM    NEUTSPOLYS 73.80 (H) 07/30/2020 12:52 PM    LYMPHOCYTES 17.20 (L) 07/30/2020 12:52 PM    MONOCYTES 6.90 07/30/2020 12:52 PM    EOSINOPHILS 1.10 07/30/2020 12:52 PM    BASOPHILS 0.70 07/30/2020 12:52 PM        Lab Results   Component Value Date/Time    HBA1C 5.2 06/14/2020 04:44 AM          Imaging:   I personally reviewed following images    X-ray lumbar spine 11/11/2015.  " Degenerative scoliosis is present.  Facet arthropathy worse in the lower lumbar levels right worse than left.  Degenerative disc disease is present. CT renal colic evaluation.  I reviewed the study specifically look at the patient's lumbar spine.  Please see radiologist dictation for the remainder of the report.  Degenerative disc disease at multiple levels worst at the L2-3 level.  Also present L4-5 and L5-S1.  Grade 1 spondylolisthesis L2-3.  Significant facet arthropathy L3-4, L4-5, L5-S1 right side worse than left.    MRI lumbar spine 2/10/2020  Lumbar scoliosis likely degenerative.  Significant right L2-3 and L3-4 neuroforaminal stenosis likely secondary to disc herniation and osteophyte complex.  Moderate right L4-5 neuroforaminal stenosis.  Severe left L4-5 and L5-S1 neuroforaminal stenosis.  Central canal stenosis at L2-3, L3-4, L4-5.  Significant facet arthropathy bilaterally worst at L3-4, L4-5, L5-S1 bilaterally.    I reviewed the following radiology reports  MRI lumbar spine 2/10/2020  1.  Severe multilevel degenerative disc disease and facet degeneration. There is multilevel central canal and neural foraminal narrowing as well as attenuation of the lateral recesses bilaterally as specifically described above. Central canal narrowing   is most severe at L3-4 and L4-5.     2.  Severe convex left scoliotic curvature.     3.  Multilevel degenerative subluxation.           Results for orders placed in visit on 08/25/17   MR-BRAIN-W/O                                                                                                                                                                                                                                               Results for orders placed in visit on 11/11/15   DX-LUMBAR SPINE-2 OR 3 VIEWS    Impression 1.  Lumbar scoliosis convex left. L2-L3 retrolisthesis.                                    Results for orders placed in visit on 06/10/16    DX-WRIST-COMPLETE 3+ RIGHT    Impression No acute fracture identified. Scapholunate widening consistent with ligamentous injury.           DIAGNOSIS   Visit Diagnoses     ICD-10-CM   1. Lumbar spondylosis  M47.816   2. Spondylolisthesis of lumbar region  M43.16   3. Spinal stenosis of lumbar region, unspecified whether neurogenic claudication present  M48.061   4. History of bilateral knee replacement  Z96.653   5. Scoliosis of thoracolumbar spine, unspecified scoliosis type  M41.9   6. Facet arthropathy, lumbar  M47.816   7. Lumbar degenerative disc disease  M51.36   8. CVA, old, disturbances of vision  I69.398    H53.9         ASSESSMENT and PLAN:     Jeremiah Huber  1950,   male      Jeremiah was seen today for follow-up.    Diagnoses and all orders for this visit:    Lumbar spondylosis  -     REFERRAL TO SPINE SURGERY    Spondylolisthesis of lumbar region  -     REFERRAL TO SPINE SURGERY    Spinal stenosis of lumbar region, unspecified whether neurogenic claudication present  -     REFERRAL TO SPINE SURGERY    History of bilateral knee replacement  -     REFERRAL TO SPINE SURGERY    Scoliosis of thoracolumbar spine, unspecified scoliosis type  -     REFERRAL TO SPINE SURGERY    Facet arthropathy, lumbar  -     REFERRAL TO SPINE SURGERY    Lumbar degenerative disc disease  -     REFERRAL TO SPINE SURGERY    CVA, old, disturbances of vision  -     REFERRAL TO SPINE SURGERY        The patient has had a recurrence of his low back pain which is now bilateral.  He does have severe spinal stenosis and has pain worse with walking.  He denies leg symptoms.  He did have excellent relief with the radiofrequency neurotomy done previously.  However given his severe spinal stenosis I would like for the patient to touch base with spine surgery for a consult first.  I would consider the patient for radiofrequency neurotomy on the right side with a repeat of the same levels.    Follow up: After the above  consult    Thank you for allowing me to participate in the care of this patient. If you have any questions please not hesitate to contact me.          Please note that this dictation was created using voice recognition software. I have made every reasonable attempt to correct obvious errors but there may be errors of grammar and content that I may have overlooked prior to finalization of this note.      Nazario Sandoval MD  Interventional Spine and Sports Physiatry  Physical Medicine and Rehabilitation  [facility]

## 2021-05-10 NOTE — HPI: SKIN LESION
LMOM relayed message 
Is This A New Presentation, Or A Follow-Up?: Mole
What Type Of Note Output Would You Prefer (Optional)?: Standard Output
How Severe Is Your Skin Lesion?: mild
Has Your Skin Lesion Been Treated?: not been treated

## 2021-05-26 DIAGNOSIS — E78.49 OTHER HYPERLIPIDEMIA: ICD-10-CM

## 2021-05-26 RX ORDER — ATORVASTATIN CALCIUM 80 MG/1
TABLET, FILM COATED ORAL
Qty: 90 TABLET | Refills: 3 | Status: SHIPPED | OUTPATIENT
Start: 2021-05-26 | End: 2022-05-04

## 2021-06-01 ENCOUNTER — PATIENT MESSAGE (OUTPATIENT)
Dept: CARDIOLOGY | Facility: MEDICAL CENTER | Age: 71
End: 2021-06-01

## 2021-06-22 DIAGNOSIS — I25.2 MYOCARDIAL INFARCT, OLD: ICD-10-CM

## 2021-06-22 RX ORDER — CLOPIDOGREL BISULFATE 75 MG/1
TABLET ORAL
Qty: 90 TABLET | Refills: 3 | Status: SHIPPED | OUTPATIENT
Start: 2021-06-22 | End: 2021-09-23

## 2021-08-11 DIAGNOSIS — I25.2 MYOCARDIAL INFARCT, OLD: ICD-10-CM

## 2021-08-11 RX ORDER — ASPIRIN 81 MG/1
TABLET ORAL
Qty: 90 TABLET | Refills: 2 | Status: SHIPPED | OUTPATIENT
Start: 2021-08-11 | End: 2022-04-29

## 2021-08-19 DIAGNOSIS — I25.5 ISCHEMIC CARDIOMYOPATHY: ICD-10-CM

## 2021-08-20 RX ORDER — METOPROLOL SUCCINATE 50 MG/1
TABLET, EXTENDED RELEASE ORAL
Qty: 90 TABLET | Refills: 2 | Status: SHIPPED | OUTPATIENT
Start: 2021-08-20 | End: 2022-05-17

## 2021-09-23 ENCOUNTER — TELEMEDICINE (OUTPATIENT)
Dept: CARDIOLOGY | Facility: MEDICAL CENTER | Age: 71
End: 2021-09-23
Payer: MEDICARE

## 2021-09-23 VITALS
DIASTOLIC BLOOD PRESSURE: 80 MMHG | HEIGHT: 74 IN | BODY MASS INDEX: 24.38 KG/M2 | HEART RATE: 66 BPM | OXYGEN SATURATION: 93 % | SYSTOLIC BLOOD PRESSURE: 112 MMHG | WEIGHT: 190 LBS

## 2021-09-23 DIAGNOSIS — H53.9 CVA, OLD, DISTURBANCES OF VISION: ICD-10-CM

## 2021-09-23 DIAGNOSIS — E78.5 DYSLIPIDEMIA: ICD-10-CM

## 2021-09-23 DIAGNOSIS — I69.398 CVA, OLD, DISTURBANCES OF VISION: ICD-10-CM

## 2021-09-23 DIAGNOSIS — I25.2 MYOCARDIAL INFARCT, OLD: ICD-10-CM

## 2021-09-23 DIAGNOSIS — I51.3 LEFT VENTRICULAR THROMBOSIS: ICD-10-CM

## 2021-09-23 PROCEDURE — 99214 OFFICE O/P EST MOD 30 MIN: CPT | Mod: 95 | Performed by: INTERNAL MEDICINE

## 2021-09-23 RX ORDER — SELEGILINE HYDROCHLORIDE 5 MG/1
5 TABLET ORAL
COMMUNITY
Start: 2021-08-16 | End: 2021-11-09

## 2021-09-23 RX ORDER — OMEPRAZOLE 20 MG/1
CAPSULE, DELAYED RELEASE ORAL
COMMUNITY
Start: 2021-09-09 | End: 2022-12-12 | Stop reason: SDUPTHER

## 2021-09-23 ASSESSMENT — FIBROSIS 4 INDEX: FIB4 SCORE: 0.46

## 2021-09-23 NOTE — PROGRESS NOTES
"Cardiology Telemedicine Visit: Established Patient   This encounter was conducted via Zoom.   Verbal consent was obtained. Patient's identity was verified.    Assessment and Plan:   PCP: An Perez M.D.  The following treatment plan was discussed:     1. Myocardial infarct, old - distal PDA occlusion, LVEF 49%    2. CVA, old, disturbances of vision    3. Left ventricular thrombosis - resolved    4. Dyslipidemia        Jeremiah Huber is stable from a cardiovascular standpoint and free of symptoms with well-controlled risk factors.  I recommended that he continue aspirin atorvastatin and metoprolol indefinitely.  I will see him back in 1 years time.    Follow up: 1 year    Subjective:     Chief Complaint   Patient presents with   • Cardiomyopathy (Ischemic)     History: Farhat Huber is a 71 y.o. male history of Parkinson's disease presenting for follow-up of coronary artery disease and CVA.  June 2020 he suffered right inferior quadrantanopia after 2 days of \"heartburn.\"  He was diagnosed with occipital CVA related to LV thrombus.  Subsequent evaluation demonstrated occlusion of the terminal PDA, confirmed by cardiac MRI which demonstrated a transmural infarct at the apex and apical septum.  The LVEF is recovered to 49% and the LV thrombus has since resolved.  Visual field deficits persist.     His health status continues to be dictated by Parkinson's disease. He has problems with intermittent freezing and Sinemet dosage has been further increased. He is no longer driving but able to attend to all personal care activities as well as. He never experiences heartburn or other cardiac symptoms. He has had no side effects from medications.    Farhat is accompanied by his wife today who relates much of the history.    ROS   Denies any recent fevers or chills. No nausea or vomiting. No chest pains or shortness of breath. All other systems reviewed and negative except as per the HPI       Objective:   Vitals " "obtained by patient:  Respirations through observation: 12  /80 (BP Location: Right arm, Patient Position: Sitting, BP Cuff Size: Adult)   Pulse 66   Ht 1.88 m (6' 2\")   Wt 86.2 kg (190 lb)   SpO2 93%   BMI 24.39 kg/m²     Physical Exam:  Constitutional: Alert, no distress, well-groomed.  Skin: No rashes in visible areas.  Eye: Round. Conjunctiva clear, lids normal. No icterus.   ENMT: Lips pink without lesions, good dentition, moist mucous membranes. Phonation normal.  Neck: No masses, no thyromegaly. Moves freely without pain.  CV: Pulse as reported by patient  Respiratory: Unlabored respiratory effort, no cough or audible wheeze  Psych: Alert and oriented x3, normal affect and mood.     The ASCVD Risk score (Manning SHAY Jr, et al., 2013) failed to calculate.  Allergies   Allergen Reactions   • Nkda [No Known Drug Allergy]      Current medicines (including changes today)  Current Outpatient Medications   Medication Sig Dispense Refill   • selegiline (ELDEPRYL) 5 MG Tab Take 5 mg by mouth.     • omeprazole (PRILOSEC) 20 MG delayed-release capsule TAKE 1 CAPSULE BY MOUTH ONCE A DAY 30 MINUTES BEFORE BREAKFAST MEAL     • meloxicam (MOBIC) 15 MG tablet TAKE 1 TABLET BY MOUTH EVERYDAY WITH FOOD 30 Tablet 3   • metoprolol SR (TOPROL XL) 50 MG TABLET SR 24 HR TAKE 1 TABLET BY MOUTH EVERY DAY 90 Tablet 2   • aspirin 81 MG EC tablet TAKE 1 TABLET BY MOUTH EVERY DAY 90 Tablet 2   • atorvastatin (LIPITOR) 80 MG tablet TAKE 1 TABLET BY MOUTH EVERY DAY IN THE EVENING 90 tablet 3   • baclofen (LIORESAL) 10 MG Tab TAKE 1/2 TO 1 TABLET BY MOUTH 3 TIMES A DAY AS NEEDED FOR SPASMS 90 tablet 4   • gabapentin (NEURONTIN) 300 MG Cap Take 300 mg by mouth.     • ketoconazole (NIZORAL) 2 % Cream      • carbidopa-levodopa (SINEMET)  MG Tab TAKE 2 TABS BY MOUTH IN THE MORNING,1 TAB IN MIDDAY,1TAB IN EVENING.INCREASE AS DIRECTED TO 2 TABS 3X A DAY (Patient taking differently: Take 2 Tablets by mouth 3 times a day.) 180 Tab 2 " "  • nitroglycerin (NITROSTAT) 0.4 MG SL Tab Place 1 Tab under tongue as needed for Chest Pain. 25 Tab 2   • acetaminophen (TYLENOL) 500 MG Tab Take 1,000 mg by mouth 3 times a day.     • ROPINIRole (REQUIP) 0.25 MG Tab Take 0.25 mg by mouth 3 times a day.     • amantadine (SYMMETREL) 100 MG Tab Take 100 mg by mouth 2 Times a Day.       No current facility-administered medications for this visit.     Patient Active Problem List    Diagnosis Date Noted   • CVA, old, disturbances of vision 06/12/2020     Priority: High   • Myocardial infarct, old - distal PDA occlusion, LVEF 49% 06/12/2020     Priority: Medium   • Parkinson disease (HCC)      Priority: Low   • Other hyperlipidemia 12/31/2019     Priority: Low   • Right-sided low back pain without sciatica 11/11/2015     Priority: Low   • Primary insomnia 12/19/2013     Priority: Low   • Ischemic cardiomyopathy 06/25/2020   • Left ventricular thrombosis - resolved 06/23/2020   • Stroke ([facility]) 06/22/2020   • History of bilateral knee replacement 12/31/2019   • Restless leg syndrome 12/31/2019   • Ganglion cyst 09/16/2019   • Elevated PSA 06/10/2016     Family History   Problem Relation Age of Onset   • Heart Disease Mother    • Heart Disease Father    • Cancer Father         prostate cancer   • Arthritis Brother      He  has a past medical history of Arthritis, Back spasm, Blood clotting disorder ([facility]) (06/2020), GOUT, Gout, Hypertension, Myocardial infarct ([facility]) (06/2020), Parkinson disease ([facility]ly [facility]), and Stroke ([facility]) (06/2020).  He  has a past surgical history that includes shoulder surgery; tonsillectomy; knee arthroscopy (Right); knee replacement, total (Bilateral); and neuro dest facet l/s w/ig sngl (Right, 9/29/2020).  Past Medical History:   Diagnosis Date   • Arthritis     osteo   • Back spasm    • Blood clotting disorder ([facility]ly [facility]) 06/2020    \"heart\"   • GOUT    • Gout    • Hypertension    • Myocardial infarct ([facility]) 06/2020   • Parkinson disease ([facility])    • Stroke ([facility]) " 06/2020     Allergies   Allergen Reactions   • Nkda [No Known Drug Allergy]      Outpatient Encounter Medications as of 9/23/2021   Medication Sig Dispense Refill   • selegiline (ELDEPRYL) 5 MG Tab Take 5 mg by mouth.     • omeprazole (PRILOSEC) 20 MG delayed-release capsule TAKE 1 CAPSULE BY MOUTH ONCE A DAY 30 MINUTES BEFORE BREAKFAST MEAL     • meloxicam (MOBIC) 15 MG tablet TAKE 1 TABLET BY MOUTH EVERYDAY WITH FOOD 30 Tablet 3   • metoprolol SR (TOPROL XL) 50 MG TABLET SR 24 HR TAKE 1 TABLET BY MOUTH EVERY DAY 90 Tablet 2   • aspirin 81 MG EC tablet TAKE 1 TABLET BY MOUTH EVERY DAY 90 Tablet 2   • atorvastatin (LIPITOR) 80 MG tablet TAKE 1 TABLET BY MOUTH EVERY DAY IN THE EVENING 90 tablet 3   • baclofen (LIORESAL) 10 MG Tab TAKE 1/2 TO 1 TABLET BY MOUTH 3 TIMES A DAY AS NEEDED FOR SPASMS 90 tablet 4   • gabapentin (NEURONTIN) 300 MG Cap Take 300 mg by mouth.     • ketoconazole (NIZORAL) 2 % Cream      • carbidopa-levodopa (SINEMET)  MG Tab TAKE 2 TABS BY MOUTH IN THE MORNING,1 TAB IN MIDDAY,1TAB IN EVENING.INCREASE AS DIRECTED TO 2 TABS 3X A DAY (Patient taking differently: Take 2 Tablets by mouth 3 times a day.) 180 Tab 2   • nitroglycerin (NITROSTAT) 0.4 MG SL Tab Place 1 Tab under tongue as needed for Chest Pain. 25 Tab 2   • acetaminophen (TYLENOL) 500 MG Tab Take 1,000 mg by mouth 3 times a day.     • ROPINIRole (REQUIP) 0.25 MG Tab Take 0.25 mg by mouth 3 times a day.     • amantadine (SYMMETREL) 100 MG Tab Take 100 mg by mouth 2 Times a Day.     • [DISCONTINUED] clopidogrel (PLAVIX) 75 MG Tab TAKE 1 TABLET BY MOUTH EVERY DAY 90 tablet 3   • [DISCONTINUED] ALPRAZolam (XANAX) 0.25 MG Tab Take 0.25 mg by mouth at bedtime as needed for Sleep.       No facility-administered encounter medications on file as of 9/23/2021.     Social History     Socioeconomic History   • Marital status:      Spouse name: Not on file   • Number of children: Not on file   • Years of education: Not on file   • Highest  education level: Master's degree (e.g., MA, MS, Phylicia, MEd, MSW, [employer])   Occupational History   • Not on file   Tobacco Use   • Smoking status: Former Smoker     Types: Cigarettes     Quit date: 1970     Years since quittin.7   • Smokeless tobacco: Never Used   Vaping Use   • Vaping Use: Never used   Substance and Sexual Activity   • Alcohol use: Yes     Alcohol/week: 4.2 oz     Types: 4 Glasses of wine, 3 Standard drinks or equivalent per week     Comment: daily   • Drug use: No   • Sexual activity: Yes     Partners: Female   Other Topics Concern   •  Service No   • Blood Transfusions No   • Caffeine Concern No   • Occupational Exposure No   • Hobby Hazards No   • Sleep Concern No   • Stress Concern No   • Weight Concern No   • Special Diet No   • Back Care Yes     Comment: Streching   • Exercise No   • Bike Helmet Yes   • Seat Belt Yes   • Self-Exams Yes   Social History Narrative   • Not on file     Social Determinants of Health     Financial Resource Strain: Low Risk    • Difficulty of Paying Living Expenses: Not hard at all   Food Insecurity:    • Worried About Running Out of Food in the Last Year:    • Ran Out of Food in the Last Year:    Transportation Needs:    • Lack of Transportation (Medical):    • Lack of Transportation (Non-Medical):    Physical Activity: Insufficiently Active   • Days of Exercise per Week: 3 days   • Minutes of Exercise per Session: 30 min   Stress: No Stress Concern Present   • Feeling of Stress : Only a little   Social Connections: Unknown   • Frequency of Communication with Friends and Family: Once a week   • Frequency of Social Gatherings with Friends and Family: Never   • Attends Gnosticist Services: Patient refused   • Active Member of Clubs or Organizations: No   • Attends Club or Organization Meetings: Never   • Marital Status:    Intimate Partner Violence:    • Fear of Current or Ex-Partner:    • Emotionally Abused:    • Physically Abused:    • Sexually  Abused:        Studies  Lab Results   Component Value Date/Time    CHOLSTRLTOT 119 03/03/2021 07:29 AM    LDL 44 03/03/2021 07:29 AM    HDL 51 03/03/2021 07:29 AM    TRIGLYCERIDE 121 03/03/2021 07:29 AM       Lab Results   Component Value Date/Time    SODIUM 137 07/30/2020 12:52 PM    POTASSIUM 5.2 07/30/2020 12:52 PM    CHLORIDE 102 07/30/2020 12:52 PM    CO2 27 07/30/2020 12:52 PM    GLUCOSE 95 07/30/2020 12:52 PM    BUN 13 07/30/2020 12:52 PM    CREATININE 1.16 07/30/2020 12:52 PM     Lab Results   Component Value Date/Time    ALKPHOSPHAT 65 07/30/2020 12:52 PM    ASTSGOT 9 (L) 07/30/2020 12:52 PM    ALTSGPT 22 07/30/2020 12:52 PM    TBILIRUBIN 0.4 07/30/2020 12:52 PM        For this encounter I reviewed the following medical records neurology notes, BMP and Lipid profile

## 2021-10-21 ENCOUNTER — APPOINTMENT (OUTPATIENT)
Dept: NEUROLOGY | Facility: MEDICAL CENTER | Age: 71
End: 2021-10-21
Payer: MEDICARE

## 2021-11-09 ENCOUNTER — OFFICE VISIT (OUTPATIENT)
Dept: NEUROLOGY | Facility: MEDICAL CENTER | Age: 71
End: 2021-11-09
Attending: PSYCHIATRY & NEUROLOGY
Payer: MEDICARE

## 2021-11-09 VITALS
HEART RATE: 70 BPM | DIASTOLIC BLOOD PRESSURE: 78 MMHG | OXYGEN SATURATION: 94 % | SYSTOLIC BLOOD PRESSURE: 114 MMHG | BODY MASS INDEX: 24.63 KG/M2 | WEIGHT: 191.8 LBS | TEMPERATURE: 97.4 F

## 2021-11-09 DIAGNOSIS — G25.81 RESTLESS LEG SYNDROME: ICD-10-CM

## 2021-11-09 DIAGNOSIS — G20.A1 PARKINSON DISEASE (HCC): Primary | ICD-10-CM

## 2021-11-09 PROCEDURE — 99214 OFFICE O/P EST MOD 30 MIN: CPT | Performed by: PSYCHIATRY & NEUROLOGY

## 2021-11-09 PROCEDURE — 99212 OFFICE O/P EST SF 10 MIN: CPT | Performed by: PSYCHIATRY & NEUROLOGY

## 2021-11-09 RX ORDER — ROPINIROLE 0.5 MG/1
1 TABLET, FILM COATED ORAL 3 TIMES DAILY
Qty: 180 TABLET | Refills: 0 | Status: SHIPPED | DISCHARGE
Start: 2021-11-09 | End: 2022-11-08 | Stop reason: SDUPTHER

## 2021-11-09 RX ORDER — CARBIDOPA/LEVODOPA 25MG-250MG
2 TABLET ORAL 3 TIMES DAILY
Qty: 180 TABLET | Refills: 0 | Status: SHIPPED | DISCHARGE
Start: 2021-11-09 | End: 2022-11-08 | Stop reason: SDUPTHER

## 2021-11-09 ASSESSMENT — ENCOUNTER SYMPTOMS
FOCAL WEAKNESS: 0
CONSTIPATION: 0
DEPRESSION: 0
SPEECH CHANGE: 1
MEMORY LOSS: 0
TREMORS: 0
HALLUCINATIONS: 0
LOSS OF CONSCIOUSNESS: 0
FALLS: 0

## 2021-11-09 ASSESSMENT — FIBROSIS 4 INDEX: FIB4 SCORE: 0.46

## 2021-11-09 NOTE — PROGRESS NOTES
Subjective     Farhat Huber is a 71 y.o. male who presents with his wife Nolvia, for follow-up, with a history of Parkinson's disease and RLS.    HPI    They have actually has continued to do well, still following with [facility]ose [facility] [place], [place].  They had taken him off amantadine without worsening of tremor, evidently he was tried on Eldepryl 5 mg twice a day without benefit.  His Sinemet and ropinirole dosing regimen was increased, he now takes Requip 1 mg 3 times daily and Sinemet 2 tablets 3 times a day.  With this they deny wearing-off, peak-dose dyskinesias or dystonia, drowsiness throughout the day, hallucinations, sleep distortions, etc.    When he walks, he does freeze a bit more often, especially while walking down a narrow aisle such as on an airplane, or when he first stands or tries to turn.  He does not fall.  If it happens they simply stop briefly and then he can  and continue walking.  He has a cane which he uses for longer distances.    His voice volume and quality are still difficult, very softened and coarsened, at times difficult to understand.  He can drool at nighttime, swallowing is simply slower.  He saw speech therapist and is about to undergo a type of gel treatment.  There was a question about whether or not he has a myasthenia gravis as a cause for the swallowing difficulties.  He has very little issue with tremor in the right hand, nor loss of dexterity in either hand.    He denies any issues with ptosis, fatigable diplopia, head droop, etc.  The symptoms from his Parkinson's are present throughout the day without real fatigability over the course of the morning and afternoon.    Medical, surgical and family histories are reviewed, there are no new drug allergies.  He is on ropinirole 0.5 mg, 2 tablets 3 times daily, Sinemet 25/250, 2 tablets 3 times daily, Neurontin 3 mg, twice daily, baby aspirin, Toprol-XL, Lipitor, Prilosec,  Mobic, and Tylenol as needed.    Review of Systems   Constitutional: Negative for malaise/fatigue.   Gastrointestinal: Negative for constipation.   Musculoskeletal: Negative for falls.   Neurological: Positive for speech change. Negative for tremors, focal weakness and loss of consciousness.   Psychiatric/Behavioral: Negative for depression, hallucinations and memory loss.   All other systems reviewed and are negative.    Objective     /78 (BP Location: Right arm, Patient Position: Sitting, BP Cuff Size: Adult)   Pulse 70   Temp 36.3 °C (97.4 °F) (Temporal)   Wt 87 kg (191 lb 12.8 oz)   SpO2 94%   BMI 24.63 kg/m²      Physical Exam    He appears in no acute distress.  Vital signs are stable.  There is no malar rash or sialorrhea.  His neck is supple, range of motion is full.  Cardiac evaluation is unremarkable.     Neurological Exam    Fully oriented, there is no aphasia or inattention.    PERRLA/EOMI, glabellar tap is absent, visual fields are full.  Voice volume is still notably softened and hoarsened.  The tongue and uvula are midline, there is no dysarthria.  Shoulder shrug and head rotation are intact.    Musculoskeletal exam reveals a mild degree of bradykinesia, there is no tremor, either at rest or with sustained posture against gravity, in either of the upper extremities.  There is good elbow extension bilaterally.  There is no asterixis or drift.  Even with distraction, I have difficulty eliciting rigidity.  Strength is 5/5 throughout.    When he stands there is slight hesitancy, there is noticeable retropulsion.  As he walks stride length is slightly shortened only, he is not shuffling, but when he turns though he requires only 4 steps, there is clear hesitancy and he does tend to slow down, though he does not actually freeze.  There is no difference whether he turns to either side.  He is slightly stooped in posture.  Armswing can be diminished bilaterally.  There is no appendicular dystaxia.   "Repetitive movements still show diminished amplitude and increased frequencies in the upper extremities especially.    Sensory exam is intact to light touch.    Assessment & Plan     1. Parkinson disease (HCC)  He is doing well, the medication changes being made by Dr. Dickens stand him well.  They will be following up with him in another several months, phone contact in the interim.  I will continue my role riding \"shotgun\".    As for the swallowing difficulties, I do not believe he has myasthenia gravis compounding the issue for him.  The softening of speech, etc., all can be due to his parkinsonian state.  He has no signs or stigmata by history on examination to suggest neuromuscular disease.  We will follow-up in 6 months.    - carbidopa-levodopa (SINEMET)  MG Tab; Take 2 Tablets by mouth 3 times a day.  Dispense: 180 Tablet; Refill: 0  - ROPINIRole (REQUIP) 0.5 MG Tab; Take 2 Tablets by mouth 3 times a day.  Dispense: 180 Tablet; Refill: 0    2. Restless leg syndrome  Effectively treated with ropinirole, and we will continue this regimen if for no other reason to control the symptoms.  He has no issue with augmentation or rebound phenomena.    - ROPINIRole (REQUIP) 0.5 MG Tab; Take 2 Tablets by mouth 3 times a day.  Dispense: 180 Tablet; Refill: 0    Time: 30 minutes in total spent on patient care for precharting, record review, discussions with healthcare staff and documentation.  This includes face-to-face time with the patient and his wife for exam, education, counseling and coordinating care.        "

## 2021-12-09 ENCOUNTER — APPOINTMENT (RX ONLY)
Dept: URBAN - METROPOLITAN AREA CLINIC 35 | Facility: CLINIC | Age: 71
Setting detail: DERMATOLOGY
End: 2021-12-09

## 2021-12-09 DIAGNOSIS — L82.1 OTHER SEBORRHEIC KERATOSIS: ICD-10-CM

## 2021-12-09 DIAGNOSIS — D485 NEOPLASM OF UNCERTAIN BEHAVIOR OF SKIN: ICD-10-CM

## 2021-12-09 DIAGNOSIS — L57.0 ACTINIC KERATOSIS: ICD-10-CM

## 2021-12-09 DIAGNOSIS — L81.4 OTHER MELANIN HYPERPIGMENTATION: ICD-10-CM

## 2021-12-09 DIAGNOSIS — D22 MELANOCYTIC NEVI: ICD-10-CM

## 2021-12-09 DIAGNOSIS — Z71.89 OTHER SPECIFIED COUNSELING: ICD-10-CM

## 2021-12-09 PROBLEM — D48.5 NEOPLASM OF UNCERTAIN BEHAVIOR OF SKIN: Status: ACTIVE | Noted: 2021-12-09

## 2021-12-09 PROBLEM — D22.5 MELANOCYTIC NEVI OF TRUNK: Status: ACTIVE | Noted: 2021-12-09

## 2021-12-09 PROCEDURE — ? BIOPSY BY SHAVE METHOD

## 2021-12-09 PROCEDURE — 17000 DESTRUCT PREMALG LESION: CPT | Mod: 59

## 2021-12-09 PROCEDURE — ? LIQUID NITROGEN

## 2021-12-09 PROCEDURE — ? SUNSCREEN RECOMMENDATIONS

## 2021-12-09 PROCEDURE — ? COUNSELING

## 2021-12-09 PROCEDURE — 99213 OFFICE O/P EST LOW 20 MIN: CPT | Mod: 25

## 2021-12-09 PROCEDURE — 11102 TANGNTL BX SKIN SINGLE LES: CPT

## 2021-12-09 ASSESSMENT — LOCATION SIMPLE DESCRIPTION DERM
LOCATION SIMPLE: SCALP
LOCATION SIMPLE: ABDOMEN
LOCATION SIMPLE: UPPER BACK
LOCATION SIMPLE: RIGHT UPPER BACK

## 2021-12-09 ASSESSMENT — LOCATION DETAILED DESCRIPTION DERM
LOCATION DETAILED: SUPERIOR THORACIC SPINE
LOCATION DETAILED: RIGHT INFERIOR MEDIAL UPPER BACK
LOCATION DETAILED: RIGHT SUPERIOR PARIETAL SCALP
LOCATION DETAILED: INFERIOR THORACIC SPINE
LOCATION DETAILED: EPIGASTRIC SKIN

## 2021-12-09 ASSESSMENT — LOCATION ZONE DERM
LOCATION ZONE: TRUNK
LOCATION ZONE: SCALP

## 2021-12-09 NOTE — PROCEDURE: LIQUID NITROGEN
After Visit Summary   12/5/2018    Court Farley    MRN: [id number]           Patient Information     Date Of Birth          2010        Visit Information        Provider Department      12/5/2018 1:30 PM Dhiraj Huertas MD [facility] Peds Eye General        Today's Diagnoses     Refractive amblyopia of right eye    -  1    Hyperopia of both eyes with astigmatism        Neurofibromatosis, type 1 (von Recklinghausen's disease) (H)        Lisch nodules          Care Instructions    I am happy to report that Court Farley has excellent vision and ocular health! It has been my pleasure taking care of her. I recommend graduating Court to our healthy eyes clinic with my partner, Dr. Clarisa Nance. Dr. Nance will monitor Satishs eyes, glasses and/or contact lenses prescriptions, and continue to optimize her visual development. In 9 months, call [phone] to schedule with Dr. Clarisa Nance for an appointment around 12/5/19.           Follow-ups after your visit        Follow-up notes from your care team     Return in about 1 year (around 12/5/2019) for Dr. Clarisa Nance: in 9 months, call [phone].      Your next 10 appointments already scheduled     Dec 11, 2018 11:00 AM CST   Return Visit with Pavan Lopez MD   Peds Hematology Oncology ([facility])    [facility]  9th Floor  [address] [address] [phone]              Who to contact     Please call your clinic at [phone] to:    Ask questions about your health    Make or cancel appointments    Discuss your medicines    Learn about your test results    Speak to your doctor            Additional Information About Your Visit        MyChart Information     [email or web address] is an electronic gateway that provides easy, online access to your medical records. With [email or web address], you can request a clinic appointment, read your test results, renew a prescription or communicate with your care team.     To sign up for 
Ashley, please contact your [facility] or call [phone] for assistance.           Care EveryWhere ID     This is your Care EveryWhere ID. This could be used by other organizations to access your [facility] medical records  [id number]         Blood Pressure from Last 3 Encounters:   06/12/18 (!) 88/69   05/09/17 106/69   04/26/16 125/65    Weight from Last 3 Encounters:   06/12/18 22.7 kg (50 lb 0.7 oz) (83 %)*   05/09/17 20 kg (44 lb 1.5 oz) (86 %)*   04/26/16 18 kg (39 lb 10.9 oz) (92 %)*     * Growth percentiles are based on [facility] 2-20 Years data.              Today, you had the following     No orders found for display       Primary Care Provider Office Phone # Fax #    Carlos Price [phone]       [address] [address]        Equal Access to Services     JIM WILEY : Hadii salas ku hadasho Soomaali, waaxda luqadaha, qaybta kaalmada adeegyabrisa, marianne gonzalez . So [facility] [phone].    ATENCIÓN: Si habla español, tiene a mendoza disposición servicios gratuitos de asistencia lingüística. Llame al [phone].    We comply with applicable federal civil rights laws and [place] laws. We do not discriminate on the basis of race, color, national origin, age, disability, sex, sexual orientation, or gender identity.            Thank you!     Thank you for choosing [facility]  for your care. Our goal is always to provide you with excellent care. Hearing back from our patients is one way we can continue to improve our services. Please take a few minutes to complete the written survey that you may receive in the mail after your visit with us. Thank you!             Your Updated Medication List - Protect others around you: Learn how to safely use, store and throw away your medicines at www.disposemymeds.org.      Notice  As of 12/5/2018  4:34 PM    You have not been prescribed any 
medications.      
Show Aperture Variable?: Yes
Consent: The patient's consent was obtained including but not limited to risks of crusting, scabbing, blistering, scarring, darker or lighter pigmentary change, recurrence, incomplete removal and infection.
Render Post-Care Instructions In Note?: no
Post-Care Instructions: I reviewed with the patient in detail post-care instructions. Patient is to wear sunprotection, and avoid picking at any of the treated lesions. Pt may apply Vaseline to crusted or scabbing areas.
Duration Of Freeze Thaw-Cycle (Seconds): 10
Number Of Freeze-Thaw Cycles: 1 freeze-thaw cycle
Detail Level: Detailed

## 2022-01-17 ENCOUNTER — APPOINTMENT (RX ONLY)
Dept: URBAN - METROPOLITAN AREA CLINIC 36 | Facility: CLINIC | Age: 72
Setting detail: DERMATOLOGY
End: 2022-01-17

## 2022-01-17 PROBLEM — C44.41 BASAL CELL CARCINOMA OF SKIN OF SCALP AND NECK: Status: ACTIVE | Noted: 2022-01-17

## 2022-01-17 PROCEDURE — 13121 CMPLX RPR S/A/L 2.6-7.5 CM: CPT

## 2022-01-17 PROCEDURE — 17311 MOHS 1 STAGE H/N/HF/G: CPT

## 2022-01-17 PROCEDURE — ? MOHS SURGERY

## 2022-01-17 NOTE — PROCEDURE: MOHS SURGERY
Mohs Case Number: [id number]
Previous Accession (Optional): [id number]
Biopsy Photograph Reviewed: Yes
Referring Physician (Optional): chaz
Consent Type: Consent 1 (Standard)
Eye Shield Used: No
Surgeon Performing Repair (Optional): Jacob
Initial Size Of Lesion: 0.5
Number Of Stages: 1
Primary Defect Length In Cm (Final Defect Size - Required For Flaps/Grafts): 1.1
Primary Defect Width In Cm (Final Defect Size - Required For Flaps/Grafts): 0.8
Repair Type: Complex Repair
Oculoplastic Surgeon (A): David
Oculoplastic Surgeon Procedure Text (A): After obtaining clear surgical margins the patient was sent to oculoplastics for surgical repair.  The patient understands they will receive post-surgical care and follow-up from the referring physician's office.
Otolaryngologist Procedure Text (A): After obtaining clear surgical margins the patient was sent to otolaryngology for surgical repair.  The patient understands they will receive post-surgical care and follow-up from the referring physician's office.
Plastic Surgeon Procedure Text (A): After obtaining clear surgical margins the patient was sent to plastics for surgical repair.  The patient understands they will receive post-surgical care and follow-up from the referring physician's office.
Mid-Level Procedure Text (A): After obtaining clear surgical margins the patient was sent to a mid-level provider for surgical repair.  The patient understands they will receive post-surgical care and follow-up from the mid-level provider.
Provider Procedure Text (A): After obtaining clear surgical margins the defect was repaired by another provider.
Asc Procedure Text (A): After obtaining clear surgical margins the patient was sent to an ASC for surgical repair.  The patient understands they will receive post-surgical care and follow-up from the ASC physician.
Simple / Intermediate / Complex Repair - Final Wound Length In Cm: 2.7
Suturegard Retention Suture: 2-0 Nylon
Retention Suture Bite Size: 3 mm
Length To Time In Minutes Device Was In Place: 10
Undermining Type: Entire Wound
Debridement Text: The wound edges were debrided prior to proceeding with the closure to facilitate wound healing.
Helical Rim Text: The closure involved the helical rim.
Vermilion Border Text: The closure involved the vermilion border.
Nostril Rim Text: The closure involved the nostril rim.
Retention Suture Text: Retention sutures were placed to support the closure and prevent dehiscence.
Secondary Defect Length In Cm (Required For Flaps): 0
Area H Indication Text: Tumors in this location are included in Area H (eyelids, eyebrows, nose, lips, chin, ear, pre-auricular, post-auricular, temple, genitalia, hands, feet, ankles and areola).  Tissue conservation is critical in these anatomic locations.
Area M Indication Text: Tumors in this location are included in Area M (cheek, forehead, scalp, neck, jawline and pretibial skin).  Mohs surgery is indicated for tumors in these anatomic locations.
Area L Indication Text: Tumors in this location are included in Area L (trunk and extremities).  Mohs surgery is indicated for larger tumors, or tumors with aggressive histologic features, in these anatomic locations.
Special Stains Stage 1 - Results: Base On Clearance Noted Above
Stage 2: Additional Anesthesia Type: 1% lidocaine with 1:100,000 epinephrine and 408mcg clindamycin/ml and a 1:10 solution of 8.4% sodium bicarbonate
Stage 4: Additional Anesthesia Type: 1% lidocaine with epinephrine
Include Tumor Staging In Mohs Note?: Please Select the Appropriate Response
Staging Info: By selecting yes to the question above you will include information on AJCC 8 tumor staging in your Mohs note. Information on tumor staging will be automatically added for SCCs on the head and neck. AJCC 8 includes tumor size, tumor depth, perineural involvement and bone invasion.
Tumor Depth: Less than 6mm from granular layer and no invasion beyond the subcutaneous fat
Medical Necessity Statement: Based on my medical judgement, Mohs surgery is the most appropriate treatment for this cancer compared to other treatments.
Alternatives Discussed Intro (Do Not Add Period): I discussed alternative treatments to Mohs surgery and specifically discussed the risks and benefits of
Consent 1/Introductory Paragraph: The rationale for Mohs was explained to the patient and consent was obtained. The risks, benefits and alternatives to therapy were discussed in detail. Specifically, the risks of infection, scarring, bleeding, prolonged wound healing, incomplete removal, allergy to anesthesia, nerve injury and recurrence were addressed. Prior to the procedure, the treatment site was clearly identified and confirmed by the patient. All components of Universal Protocol/PAUSE Rule completed.
Consent 2/Introductory Paragraph: Mohs surgery was explained to the patient and consent was obtained. The risks, benefits and alternatives to therapy were discussed in detail. Specifically, the risks of infection, scarring, bleeding, prolonged wound healing, incomplete removal, allergy to anesthesia, nerve injury and recurrence were addressed. Prior to the procedure, the treatment site was clearly identified and confirmed by the patient. All components of Universal Protocol/PAUSE Rule completed.
Consent 3/Introductory Paragraph: I gave the patient a chance to ask questions they had about the procedure.  Following this I explained the Mohs procedure and consent was obtained. The risks, benefits and alternatives to therapy were discussed in detail. Specifically, the risks of infection, scarring, bleeding, prolonged wound healing, incomplete removal, allergy to anesthesia, nerve injury and recurrence were addressed. Prior to the procedure, the treatment site was clearly identified and confirmed by the patient. All components of Universal Protocol/PAUSE Rule completed.
Consent (Temporal Branch)/Introductory Paragraph: The rationale for Mohs was explained to the patient and consent was obtained. The risks, benefits and alternatives to therapy were discussed in detail. Specifically, the risks of damage to the temporal branch of the facial nerve, infection, scarring, bleeding, prolonged wound healing, incomplete removal, allergy to anesthesia, and recurrence were addressed. Prior to the procedure, the treatment site was clearly identified and confirmed by the patient. All components of Universal Protocol/PAUSE Rule completed.
Consent (Marginal Mandibular)/Introductory Paragraph: The rationale for Mohs was explained to the patient and consent was obtained. The risks, benefits and alternatives to therapy were discussed in detail. Specifically, the risks of damage to the marginal mandibular branch of the facial nerve, infection, scarring, bleeding, prolonged wound healing, incomplete removal, allergy to anesthesia, and recurrence were addressed. Prior to the procedure, the treatment site was clearly identified and confirmed by the patient. All components of Universal Protocol/PAUSE Rule completed.
Consent (Spinal Accessory)/Introductory Paragraph: The rationale for Mohs was explained to the patient and consent was obtained. The risks, benefits and alternatives to therapy were discussed in detail. Specifically, the risks of damage to the spinal accessory nerve, infection, scarring, bleeding, prolonged wound healing, incomplete removal, allergy to anesthesia, and recurrence were addressed. Prior to the procedure, the treatment site was clearly identified and confirmed by the patient. All components of Universal Protocol/PAUSE Rule completed.
Consent (Near Eyelid Margin)/Introductory Paragraph: The rationale for Mohs was explained to the patient and consent was obtained. The risks, benefits and alternatives to therapy were discussed in detail. Specifically, the risks of ectropion or eyelid deformity, infection, scarring, bleeding, prolonged wound healing, incomplete removal, allergy to anesthesia, nerve injury and recurrence were addressed. Prior to the procedure, the treatment site was clearly identified and confirmed by the patient. All components of Universal Protocol/PAUSE Rule completed.
Consent (Ear)/Introductory Paragraph: The rationale for Mohs was explained to the patient and consent was obtained. The risks, benefits and alternatives to therapy were discussed in detail. Specifically, the risks of ear deformity, infection, scarring, bleeding, prolonged wound healing, incomplete removal, allergy to anesthesia, nerve injury and recurrence were addressed. Prior to the procedure, the treatment site was clearly identified and confirmed by the patient. All components of Universal Protocol/PAUSE Rule completed.
Consent (Nose)/Introductory Paragraph: The rationale for Mohs was explained to the patient and consent was obtained. The risks, benefits and alternatives to therapy were discussed in detail. Specifically, the risks of nasal deformity, changes in the flow of air through the nose, infection, scarring, bleeding, prolonged wound healing, incomplete removal, allergy to anesthesia, nerve injury and recurrence were addressed. Prior to the procedure, the treatment site was clearly identified and confirmed by the patient. All components of Universal Protocol/PAUSE Rule completed.
Consent (Lip)/Introductory Paragraph: The rationale for Mohs was explained to the patient and consent was obtained. The risks, benefits and alternatives to therapy were discussed in detail. Specifically, the risks of lip deformity, changes in the oral aperture, infection, scarring, bleeding, prolonged wound healing, incomplete removal, allergy to anesthesia, nerve injury and recurrence were addressed. Prior to the procedure, the treatment site was clearly identified and confirmed by the patient. All components of Universal Protocol/PAUSE Rule completed.
Consent (Scalp)/Introductory Paragraph: The rationale for Mohs was explained to the patient and consent was obtained. The risks, benefits and alternatives to therapy were discussed in detail. Specifically, the risks of changes in hair growth pattern secondary to repair, infection, scarring, bleeding, prolonged wound healing, incomplete removal, allergy to anesthesia, nerve injury and recurrence were addressed. Prior to the procedure, the treatment site was clearly identified and confirmed by the patient. All components of Universal Protocol/PAUSE Rule completed.
Detail Level: Detailed
Postop Diagnosis: same
Anesthesia Type: 0.5% lidocaine with 1:200,000 epinephrine and a 1:10 solution of 8.4% sodium bicarbonate and 408mcg clindamycin/ml
Anesthesia Volume In Cc: 6
Hemostasis: Electrocautery
Estimated Blood Loss (Cc): less than 5 cc
Repair Anesthesia Method: local infiltration
Brow Lift Text: A midfrontal incision was made medially to the defect to allow access to the tissues just superior to the left eyebrow. Following careful dissection inferiorly in a supraperiosteal plane to the level of the left eyebrow, several 3-0 monocryl sutures were used to resuspend the eyebrow orbicularis oculi muscular unit to the superior frontal bone periosteum. This resulted in an appropriate reapproximation of static eyebrow symmetry and correction of the left brow ptosis.
Deep Sutures: 3-0 Polysorb
Epidermal Sutures: 3-0 Prolene
Epidermal Closure: running locked
Suturegard Intro: Intraoperative tissue expansion was performed, utilizing the SUTUREGARD device, in order to reduce wound tension.
Suturegard Body: The suture ends were repeatedly re-tightened and re-clamped to achieve the desired tissue expansion.
Hemigard Intro: Due to skin fragility and wound tension, it was decided to use HEMIGARD adhesive retention suture devices to permit a linear closure. The skin was cleaned and dried for a 6cm distance away from the wound. Excessive hair, if present, was removed to allow for adhesion.
Hemigard Postcare Instructions: The HEMIGARD strips are to remain completely dry for at least 5-7 days.
Donor Site Anesthesia Type: same as repair anesthesia
Graft Basting Suture (Optional): 5-0 Fast Absorbing Gut
Graft Donor Site Epidermal Sutures (Optional): 5-0 Ethibond
Epidermal Closure Graft Donor Site (Optional): simple interrupted
Graft Donor Site Bandage (Optional-Leave Blank If You Don't Want In Note): Aquaphor and telefa placed on wound. Pressure dressing applied to donor site
Closure 2 Information: This tab is for additional flaps and grafts, including complex repair and grafts and complex repair and flaps. You can also specify a different location for the additional defect, if the location is the same you do not need to select a new one. We will insert the automated text for the repair you select below just as we do for solitary flaps and grafts. Please note that at this time if you select a location with a different insurance zone you will need to override the ICD10 and CPT if appropriate.
Closure 3 Information: This tab is for additional flaps and grafts above and beyond our usual structured repairs.  Please note if you enter information here it will not currently bill and you will need to add the billing information manually.
Wound Care: Aquaphor
Dressing: dry sterile dressing
Wound Care (No Sutures): Petrolatum
Suture Removal: 7 days
Unna Boot Text: An Unna boot was placed to help immobilize the limb and facilitate more rapid healing.
Home Suture Removal Text: Patient was provided instructions on removing sutures and will remove their sutures at home.  If they have any questions or difficulties they will call the office.
Post-Care Instructions: I reviewed with the patient in detail post-care instructions. Patient is not to engage in any heavy lifting, exercise, or swimming for the next 14 days. Should the patient develop any fevers, chills, bleeding, severe pain patient will contact the office immediately.
Pain Refusal Text: I offered to prescribe pain medication but the patient refused to take this medication.
Mauc Instructions: By selecting yes to the question below the MAUC number will be added into the note.  This will be calculated automatically based on the diagnosis chosen, the size entered, the body zone selected (H,M,L) and the specific indications you chose. You will also have the option to override the Mohs AUC if you disagree with the automatically calculated number and this option is found in the Case Summary tab.
Where Do You Want The Question To Include Opioid Counseling Located?: Case Summary Tab
Eye Protection Verbiage: Before proceeding with the stage, a plastic scleral shield was inserted. The globe was anesthetized with a few drops of 1% lidocaine with 1:100,000 epinephrine. Then, an appropriate sized scleral shield was chosen and coated with lacrilube ointment. The shield was gently inserted and left in place for the duration of each stage. After the stage was completed, the shield was gently removed.
Mohs Method Verbiage: An incision at a 45 degree angle following the standard Mohs approach was done and the specimen was harvested as a microscopic controlled layer.
Surgeon/Pathologist Verbiage (Will Incorporate Name Of Surgeon From Intro If Not Blank): operated in two distinct and integrated capacities as the surgeon and pathologist.
Mohs Histo Method Verbiage: Each section was then chromacoded and processed in the Mohs lab using the Mohs protocol and submitted for frozen section.
Subsequent Stages Histo Method Verbiage: Using a similar technique to that described above, a thin layer of tissue was removed from all areas where tumor was visible on the previous stage.  The tissue was again oriented, mapped, dyed, and processed as above.
Mohs Rapid Report Verbiage: The area of clinically evident tumor was marked with skin marking ink and appropriately hatched.  The initial incision was made following the Mohs approach through the skin.  The specimen was taken to the lab, divided into the necessary number of pieces, chromacoded and processed according to the Mohs protocol.  This was repeated in successive stages until a tumor free defect was achieved.
Complex Repair Preamble Text (Leave Blank If You Do Not Want): Extensive wide undermining was performed at least 2 cm in all directions.
Intermediate Repair Preamble Text (Leave Blank If You Do Not Want): Undermining was performed with blunt dissection.
M-Plasty Complex Repair Preamble Text (Leave Blank If You Do Not Want): Extensive wide undermining was performed.
Non-Graft Cartilage Fenestration Text: The cartilage was fenestrated with a 2mm punch biopsy to help facilitate healing.
Graft Cartilage Fenestration Text: The cartilage was fenestrated with a 2mm punch biopsy to help facilitate graft survival and healing.
Secondary Intention Text (Leave Blank If You Do Not Want): The defect will heal with secondary intention.
No Repair - Repaired With Adjacent Surgical Defect Text (Leave Blank If You Do Not Want): After obtaining clear surgical margins the defect was repaired concurrently with another surgical defect which was in close approximation.
Adjacent Tissue Transfer Text: The defect edges were debeveled with a #15 scalpel blade.  Given the location of the defect and the proximity to free margins an adjacent tissue transfer was deemed most appropriate.  Using a sterile surgical marker, an appropriate flap was drawn incorporating the defect and placing the expected incisions within the relaxed skin tension lines where possible.    The area thus outlined was incised deep to adipose tissue with a #15 scalpel blade.  The skin margins were undermined to an appropriate distance in all directions utilizing iris scissors.
Advancement Flap (Single) Text: The defect edges were debeveled with a #15 scalpel blade.  Given the location of the defect and the proximity to free margins a single advancement flap was deemed most appropriate.  Using a sterile surgical marker, an appropriate advancement flap was drawn incorporating the defect and placing the expected incisions within the relaxed skin tension lines where possible.    The area thus outlined was incised deep to adipose tissue with a #15 scalpel blade.  The skin margins were undermined to an appropriate distance in all directions utilizing iris scissors.
Advancement Flap (Double) Text: The defect edges were debeveled with a #15 scalpel blade.  Given the location of the defect and the proximity to free margins a double advancement flap was deemed most appropriate.  Using a sterile surgical marker, the appropriate advancement flaps were drawn incorporating the defect and placing the expected incisions within the relaxed skin tension lines where possible.    The area thus outlined was incised deep to adipose tissue with a #15 scalpel blade.  The skin margins were undermined to an appropriate distance in all directions utilizing iris scissors.
Burow's Advancement Flap Text: The defect edges were debeveled with a #15 scalpel blade.  Given the location of the defect and the proximity to free margins a Burow's advancement flap was deemed most appropriate.  Using a sterile surgical marker, the appropriate advancement flap was drawn incorporating the defect and placing the expected incisions within the relaxed skin tension lines where possible.    The area thus outlined was incised deep to adipose tissue with a #15 scalpel blade.  The skin margins were undermined to an appropriate distance in all directions utilizing iris scissors.
Chonodrocutaneous Helical Advancement Flap Text: The defect edges were debeveled with a #15 scalpel blade.  Given the location of the defect and the proximity to free margins a chondrocutaneous helical advancement flap was deemed most appropriate.  Using a sterile surgical marker, the appropriate advancement flap was drawn incorporating the defect and placing the expected incisions within the relaxed skin tension lines where possible.    The area thus outlined was incised deep to adipose tissue with a #15 scalpel blade.  The skin margins were undermined to an appropriate distance in all directions utilizing iris scissors.
Crescentic Advancement Flap Text: The defect edges were debeveled with a #15 scalpel blade.  Given the location of the defect and the proximity to free margins a crescentic advancement flap was deemed most appropriate.  Using a sterile surgical marker, the appropriate advancement flap was drawn incorporating the defect and placing the expected incisions within the relaxed skin tension lines where possible.    The area thus outlined was incised deep to adipose tissue with a #15 scalpel blade.  The skin margins were undermined to an appropriate distance in all directions utilizing iris scissors.
A-T Advancement Flap Text: The defect edges were debeveled with a #15 scalpel blade.  Given the location of the defect, shape of the defect and the proximity to free margins an A-T advancement flap was deemed most appropriate.  Using a sterile surgical marker, an appropriate advancement flap was drawn incorporating the defect and placing the expected incisions within the relaxed skin tension lines where possible.    The area thus outlined was incised deep to adipose tissue with a #15 scalpel blade.  The skin margins were undermined to an appropriate distance in all directions utilizing iris scissors.
O-T Advancement Flap Text: The defect edges were debeveled with a #15 scalpel blade.  Given the location of the defect, shape of the defect and the proximity to free margins an O-T advancement flap was deemed most appropriate.  Using a sterile surgical marker, an appropriate advancement flap was drawn incorporating the defect and placing the expected incisions within the relaxed skin tension lines where possible.    The area thus outlined was incised deep to adipose tissue with a #15 scalpel blade.  The skin margins were undermined to an appropriate distance in all directions utilizing iris scissors.
O-L Flap Text: The defect edges were debeveled with a #15 scalpel blade.  Given the location of the defect, shape of the defect and the proximity to free margins an O-L flap was deemed most appropriate.  Using a sterile surgical marker, an appropriate advancement flap was drawn incorporating the defect and placing the expected incisions within the relaxed skin tension lines where possible.    The area thus outlined was incised deep to adipose tissue with a #15 scalpel blade.  The skin margins were undermined to an appropriate distance in all directions utilizing iris scissors.
O-Z Flap Text: The defect edges were debeveled with a #15 scalpel blade.  Given the location of the defect, shape of the defect and the proximity to free margins an O-Z flap was deemed most appropriate.  Using a sterile surgical marker, an appropriate transposition flap was drawn incorporating the defect and placing the expected incisions within the relaxed skin tension lines where possible. The area thus outlined was incised deep to adipose tissue with a #15 scalpel blade.  The skin margins were undermined to an appropriate distance in all directions utilizing iris scissors.
Double O-Z Flap Text: The defect edges were debeveled with a #15 scalpel blade.  Given the location of the defect, shape of the defect and the proximity to free margins a Double O-Z flap was deemed most appropriate.  Using a sterile surgical marker, an appropriate transposition flap was drawn incorporating the defect and placing the expected incisions within the relaxed skin tension lines where possible. The area thus outlined was incised deep to adipose tissue with a #15 scalpel blade.  The skin margins were undermined to an appropriate distance in all directions utilizing iris scissors.
V-Y Flap Text: The defect edges were debeveled with a #15 scalpel blade.  Given the location of the defect, shape of the defect and the proximity to free margins a V-Y flap was deemed most appropriate.  Using a sterile surgical marker, an appropriate advancement flap was drawn incorporating the defect and placing the expected incisions within the relaxed skin tension lines where possible.    The area thus outlined was incised deep to adipose tissue with a #15 scalpel blade.  The skin margins were undermined to an appropriate distance in all directions utilizing iris scissors.
Advancement-Rotation Flap Text: The defect edges were debeveled with a #15 scalpel blade.  Given the location of the defect, shape of the defect and the proximity to free margins an advancement-rotation flap was deemed most appropriate.  Using a sterile surgical marker, an appropriate flap was drawn incorporating the defect and placing the expected incisions within the relaxed skin tension lines where possible. The area thus outlined was incised deep to adipose tissue with a #15 scalpel blade.  The skin margins were undermined to an appropriate distance in all directions utilizing iris scissors.
Mercedes Flap Text: The defect edges were debeveled with a #15 scalpel blade.  Given the location of the defect, shape of the defect and the proximity to free margins a Mercedes flap was deemed most appropriate.  Using a sterile surgical marker, an appropriate advancement flap was drawn incorporating the defect and placing the expected incisions within the relaxed skin tension lines where possible. The area thus outlined was incised deep to adipose tissue with a #15 scalpel blade.  The skin margins were undermined to an appropriate distance in all directions utilizing iris scissors.
Modified Advancement Flap Text: The defect edges were debeveled with a #15 scalpel blade.  Given the location of the defect, shape of the defect and the proximity to free margins a modified advancement flap was deemed most appropriate.  Using a sterile surgical marker, an appropriate advancement flap was drawn incorporating the defect and placing the expected incisions within the relaxed skin tension lines where possible.    The area thus outlined was incised deep to adipose tissue with a #15 scalpel blade.  The skin margins were undermined to an appropriate distance in all directions utilizing iris scissors.
Mucosal Advancement Flap Text: Given the location of the defect, shape of the defect and the proximity to free margins a mucosal advancement flap was deemed most appropriate. Incisions were made with a 15 blade scalpel in the appropriate fashion along the cutaneous vermilion border and the mucosal lip. The remaining actinically damaged mucosal tissue was excised.  The mucosal advancement flap was then elevated to the gingival sulcus with care taken to preserve the neurovascular structures and advanced into the primary defect. Care was taken to ensure that precise realignment of the vermilion border was achieved.
Peng Advancement Flap Text: The defect edges were debeveled with a #15 scalpel blade.  Given the location of the defect, shape of the defect and the proximity to free margins a Peng advancement flap was deemed most appropriate.  Using a sterile surgical marker, an appropriate advancement flap was drawn incorporating the defect and placing the expected incisions within the relaxed skin tension lines where possible. The area thus outlined was incised deep to adipose tissue with a #15 scalpel blade.  The skin margins were undermined to an appropriate distance in all directions utilizing iris scissors.
Hatchet Flap Text: The defect edges were debeveled with a #15 scalpel blade.  Given the location of the defect, shape of the defect and the proximity to free margins a hatchet flap based from the glabella was deemed most appropriate.  Using a sterile surgical marker, an appropriate glabellar hatchet flap was drawn incorporating the defect and placing the expected incisions within the relaxed skin tension lines where possible.    The area thus outlined was incised deep to adipose tissue with a #15 scalpel blade.  The skin margins were undermined to an appropriate distance in all directions utilizing iris scissors.
Rotation Flap Text: The defect edges were debeveled with a #15 scalpel blade.  Given the location of the defect, shape of the defect and the proximity to free margins a rotation flap was deemed most appropriate.  Using a sterile surgical marker, an appropriate rotation flap was drawn incorporating the defect and placing the expected incisions within the relaxed skin tension lines where possible.    The area thus outlined was incised deep to adipose tissue with a #15 scalpel blade.  The skin margins were undermined to an appropriate distance in all directions utilizing iris scissors.
Spiral Flap Text: The defect edges were debeveled with a #15 scalpel blade.  Given the location of the defect, shape of the defect and the proximity to free margins a spiral flap was deemed most appropriate.  Using a sterile surgical marker, an appropriate rotation flap was drawn incorporating the defect and placing the expected incisions within the relaxed skin tension lines where possible. The area thus outlined was incised deep to adipose tissue with a #15 scalpel blade.  The skin margins were undermined to an appropriate distance in all directions utilizing iris scissors.
Staged Advancement Flap Text: The defect edges were debeveled with a #15 scalpel blade.  Given the location of the defect, shape of the defect and the proximity to free margins a staged advancement flap was deemed most appropriate.  Using a sterile surgical marker, an appropriate advancement flap was drawn incorporating the defect and placing the expected incisions within the relaxed skin tension lines where possible. The area thus outlined was incised deep to adipose tissue with a #15 scalpel blade.  The skin margins were undermined to an appropriate distance in all directions utilizing iris scissors.
Star Wedge Flap Text: The defect edges were debeveled with a #15 scalpel blade.  Given the location of the defect, shape of the defect and the proximity to free margins a star wedge flap was deemed most appropriate.  Using a sterile surgical marker, an appropriate rotation flap was drawn incorporating the defect and placing the expected incisions within the relaxed skin tension lines where possible. The area thus outlined was incised deep to adipose tissue with a #15 scalpel blade.  The skin margins were undermined to an appropriate distance in all directions utilizing iris scissors.
Transposition Flap Text: The defect edges were debeveled with a #15 scalpel blade.  Given the location of the defect and the proximity to free margins a transposition flap was deemed most appropriate.  Using a sterile surgical marker, an appropriate transposition flap was drawn incorporating the defect.    The area thus outlined was incised deep to adipose tissue with a #15 scalpel blade.  The skin margins were undermined to an appropriate distance in all directions utilizing iris scissors.
Muscle Hinge Flap Text: The defect edges were debeveled with a #15 scalpel blade.  Given the size, depth and location of the defect and the proximity to free margins a muscle hinge flap was deemed most appropriate.  Using a sterile surgical marker, an appropriate hinge flap was drawn incorporating the defect. The area thus outlined was incised with a #15 scalpel blade.  The skin margins were undermined to an appropriate distance in all directions utilizing iris scissors.
Mustarde Flap Text: The defect edges were debeveled with a #15 scalpel blade.  Given the size, depth and location of the defect and the proximity to free margins a Mustarde flap was deemed most appropriate.  Using a sterile surgical marker, an appropriate flap was drawn incorporating the defect. The area thus outlined was incised with a #15 scalpel blade.  The skin margins were undermined to an appropriate distance in all directions utilizing iris scissors.
Nasal Turnover Hinge Flap Text: The defect edges were debeveled with a #15 scalpel blade.  Given the size, depth, location of the defect and the defect being full thickness a nasal turnover hinge flap was deemed most appropriate.  Using a sterile surgical marker, an appropriate hinge flap was drawn incorporating the defect. The area thus outlined was incised with a #15 scalpel blade. The flap was designed to recreate the nasal mucosal lining and the alar rim. The skin margins were undermined to an appropriate distance in all directions utilizing iris scissors.
Nasalis-Muscle-Based Myocutaneous Island Pedicle Flap Text: Using a #15 blade, an incision was made around the donor flap to the level of the nasalis muscle. Wide lateral undermining was then performed in both the subcutaneous plane above the nasalis muscle, and in a submuscular plane just above periosteum. This allowed the formation of a free nasalis muscle axial pedicle (based on the angular artery) which was still attached to the actual cutaneous flap, increasing its mobility and vascular viability. Hemostasis was obtained with pinpoint electrocoagulation. The flap was mobilized into position and the pivotal anchor points positioned and stabilized with buried interrupted sutures. Subcutaneous and dermal tissues were closed in a multilayered fashion with sutures. Tissue redundancies were excised, and the epidermal edges were apposed without significant tension and sutured with sutures.
Orbicularis Oris Muscle Flap Text: The defect edges were debeveled with a #15 scalpel blade.  Given that the defect affected the competency of the oral sphincter an orbicularis oris muscle flap was deemed most appropriate to restore this competency and normal muscle function.  Using a sterile surgical marker, an appropriate flap was drawn incorporating the defect. The area thus outlined was incised with a #15 scalpel blade.
Melolabial Transposition Flap Text: The defect edges were debeveled with a #15 scalpel blade.  Given the location of the defect and the proximity to free margins a melolabial flap was deemed most appropriate.  Using a sterile surgical marker, an appropriate melolabial transposition flap was drawn incorporating the defect.    The area thus outlined was incised deep to adipose tissue with a #15 scalpel blade.  The skin margins were undermined to an appropriate distance in all directions utilizing iris scissors.
Rhombic Flap Text: The defect edges were debeveled with a #15 scalpel blade.  Given the location of the defect and the proximity to free margins a rhombic flap was deemed most appropriate.  Using a sterile surgical marker, an appropriate rhombic flap was drawn incorporating the defect.    The area thus outlined was incised deep to adipose tissue with a #15 scalpel blade.  The skin margins were undermined to an appropriate distance in all directions utilizing iris scissors.
Rhomboid Transposition Flap Text: The defect edges were debeveled with a #15 scalpel blade.  Given the location of the defect and the proximity to free margins a rhomboid transposition flap was deemed most appropriate.  Using a sterile surgical marker, an appropriate rhomboid flap was drawn incorporating the defect.    The area thus outlined was incised deep to adipose tissue with a #15 scalpel blade.  The skin margins were undermined to an appropriate distance in all directions utilizing iris scissors.
Bi-Rhombic Flap Text: The defect edges were debeveled with a #15 scalpel blade.  Given the location of the defect and the proximity to free margins a bi-rhombic flap was deemed most appropriate.  Using a sterile surgical marker, an appropriate rhombic flap was drawn incorporating the defect. The area thus outlined was incised deep to adipose tissue with a #15 scalpel blade.  The skin margins were undermined to an appropriate distance in all directions utilizing iris scissors.
Helical Rim Advancement Flap Text: The defect edges were debeveled with a #15 blade scalpel.  Given the location of the defect and the proximity to free margins (helical rim) a double helical rim advancement flap was deemed most appropriate.  Using a sterile surgical marker, the appropriate advancement flaps were drawn incorporating the defect and placing the expected incisions between the helical rim and antihelix where possible.  The area thus outlined was incised through and through with a #15 scalpel blade.  With a skin hook and iris scissors, the flaps were gently and sharply undermined and freed up.
Bilateral Helical Rim Advancement Flap Text: The defect edges were debeveled with a #15 blade scalpel.  Given the location of the defect and the proximity to free margins (helical rim) a bilateral helical rim advancement flap was deemed most appropriate.  Using a sterile surgical marker, the appropriate advancement flaps were drawn incorporating the defect and placing the expected incisions between the helical rim and antihelix where possible.  The area thus outlined was incised through and through with a #15 scalpel blade.  With a skin hook and iris scissors, the flaps were gently and sharply undermined and freed up.
Ear Star Wedge Flap Text: The defect edges were debeveled with a #15 blade scalpel.  Given the location of the defect and the proximity to free margins (helical rim) an ear star wedge flap was deemed most appropriate.  Using a sterile surgical marker, the appropriate flap was drawn incorporating the defect and placing the expected incisions between the helical rim and antihelix where possible.  The area thus outlined was incised through and through with a #15 scalpel blade.
Banner Transposition Flap Text: The defect edges were debeveled with a #15 scalpel blade.  Given the location of the defect and the proximity to free margins a Banner transposition flap was deemed most appropriate.  Using a sterile surgical marker, an appropriate flap drawn around the defect. The area thus outlined was incised deep to adipose tissue with a #15 scalpel blade.  The skin margins were undermined to an appropriate distance in all directions utilizing iris scissors.
Bilobed Flap Text: The defect edges were debeveled with a #15 scalpel blade.  Given the location of the defect and the proximity to free margins a bilobe flap was deemed most appropriate.  Using a sterile surgical marker, an appropriate bilobe flap drawn around the defect.    The area thus outlined was incised deep to adipose tissue with a #15 scalpel blade.  The skin margins were undermined to an appropriate distance in all directions utilizing iris scissors.
Bilobed Transposition Flap Text: The defect edges were debeveled with a #15 scalpel blade.  Given the location of the defect and the proximity to free margins a bilobed transposition flap was deemed most appropriate.  Using a sterile surgical marker, an appropriate bilobe flap drawn around the defect.    The area thus outlined was incised deep to adipose tissue with a #15 scalpel blade.  The skin margins were undermined to an appropriate distance in all directions utilizing iris scissors.
Trilobed Flap Text: The defect edges were debeveled with a #15 scalpel blade.  Given the location of the defect and the proximity to free margins a trilobed flap was deemed most appropriate.  Using a sterile surgical marker, an appropriate trilobed flap drawn around the defect.    The area thus outlined was incised deep to adipose tissue with a #15 scalpel blade.  The skin margins were undermined to an appropriate distance in all directions utilizing iris scissors.
Dorsal Nasal Flap Text: The defect edges were debeveled with a #15 scalpel blade.  Given the location of the defect and the proximity to free margins a dorsal nasal flap,based upon the glabellar folds, was deemed most appropriate.  Using a sterile surgical marker, an appropriate dorsal nasal flap was drawn around the defect.    The area thus outlined was incised deep to adipose tissue with a #15 scalpel blade.  The skin margins were undermined to an appropriate distance in all directions utilizing iris scissors.
Island Pedicle Flap Text: The defect edges were debeveled with a #15 scalpel blade.  Given the location of the defect, shape of the defect and the proximity to free margins an island pedicle advancement flap was deemed most appropriate.  Using a sterile surgical marker, an appropriate advancement flap was drawn incorporating the defect, outlining the appropriate donor tissue and placing the expected incisions within the relaxed skin tension lines where possible.    The area thus outlined was incised deep to adipose tissue with a #15 scalpel blade.  The skin margins were undermined to an appropriate distance in all directions around the primary defect and laterally outward around the island pedicle utilizing iris scissors.  There was minimal undermining beneath the pedicle flap.
Island Pedicle Flap With Canthal Suspension Text: The defect edges were debeveled with a #15 scalpel blade.  Given the location of the defect, shape of the defect and the proximity to free margins an island pedicle advancement flap was deemed most appropriate.  Using a sterile surgical marker, an appropriate advancement flap was drawn incorporating the defect, outlining the appropriate donor tissue and placing the expected incisions within the relaxed skin tension lines where possible. The area thus outlined was incised deep to adipose tissue with a #15 scalpel blade.  The skin margins were undermined to an appropriate distance in all directions around the primary defect and laterally outward around the island pedicle utilizing iris scissors.  There was minimal undermining beneath the pedicle flap. A suspension suture was placed in the canthal tendon to prevent tension and prevent ectropion.
Alar Island Pedicle Flap Text: The defect edges were debeveled with a #15 scalpel blade.  Given the location of the defect, shape of the defect and the proximity to the alar rim an island pedicle advancement flap was deemed most appropriate.  Using a sterile surgical marker, an appropriate advancement flap was drawn incorporating the defect, outlining the appropriate donor tissue and placing the expected incisions within the nasal ala running parallel to the alar rim. The area thus outlined was incised with a #15 scalpel blade.  The skin margins were undermined minimally to an appropriate distance in all directions around the primary defect and laterally outward around the island pedicle utilizing iris scissors.  There was minimal undermining beneath the pedicle flap.
Double Island Pedicle Flap Text: The defect edges were debeveled with a #15 scalpel blade.  Given the location of the defect, shape of the defect and the proximity to free margins a double island pedicle advancement flap was deemed most appropriate.  Using a sterile surgical marker, an appropriate advancement flap was drawn incorporating the defect, outlining the appropriate donor tissue and placing the expected incisions within the relaxed skin tension lines where possible.    The area thus outlined was incised deep to adipose tissue with a #15 scalpel blade.  The skin margins were undermined to an appropriate distance in all directions around the primary defect and laterally outward around the island pedicle utilizing iris scissors.  There was minimal undermining beneath the pedicle flap.
Island Pedicle Flap-Requiring Vessel Identification Text: The defect edges were debeveled with a #15 scalpel blade.  Given the location of the defect, shape of the defect and the proximity to free margins an island pedicle advancement flap was deemed most appropriate.  Using a sterile surgical marker, an appropriate advancement flap was drawn, based on the axial vessel mentioned above, incorporating the defect, outlining the appropriate donor tissue and placing the expected incisions within the relaxed skin tension lines where possible.    The area thus outlined was incised deep to adipose tissue with a #15 scalpel blade.  The skin margins were undermined to an appropriate distance in all directions around the primary defect and laterally outward around the island pedicle utilizing iris scissors.  There was minimal undermining beneath the pedicle flap.
Keystone Flap Text: The defect edges were debeveled with a #15 scalpel blade.  Given the location of the defect, shape of the defect a keystone flap was deemed most appropriate.  Using a sterile surgical marker, an appropriate keystone flap was drawn incorporating the defect, outlining the appropriate donor tissue and placing the expected incisions within the relaxed skin tension lines where possible. The area thus outlined was incised deep to adipose tissue with a #15 scalpel blade.  The skin margins were undermined to an appropriate distance in all directions around the primary defect and laterally outward around the flap utilizing iris scissors.
O-T Plasty Text: The defect edges were debeveled with a #15 scalpel blade.  Given the location of the defect, shape of the defect and the proximity to free margins an O-T plasty was deemed most appropriate.  Using a sterile surgical marker, an appropriate O-T plasty was drawn incorporating the defect and placing the expected incisions within the relaxed skin tension lines where possible.    The area thus outlined was incised deep to adipose tissue with a #15 scalpel blade.  The skin margins were undermined to an appropriate distance in all directions utilizing iris scissors.
O-Z Plasty Text: The defect edges were debeveled with a #15 scalpel blade.  Given the location of the defect, shape of the defect and the proximity to free margins an O-Z plasty (double transposition flap) was deemed most appropriate.  Using a sterile surgical marker, the appropriate transposition flaps were drawn incorporating the defect and placing the expected incisions within the relaxed skin tension lines where possible.    The area thus outlined was incised deep to adipose tissue with a #15 scalpel blade.  The skin margins were undermined to an appropriate distance in all directions utilizing iris scissors.  Hemostasis was achieved with electrocautery.  The flaps were then transposed into place, one clockwise and the other counterclockwise, and anchored with interrupted buried subcutaneous sutures.
Double O-Z Plasty Text: The defect edges were debeveled with a #15 scalpel blade.  Given the location of the defect, shape of the defect and the proximity to free margins a Double O-Z plasty (double transposition flap) was deemed most appropriate.  Using a sterile surgical marker, the appropriate transposition flaps were drawn incorporating the defect and placing the expected incisions within the relaxed skin tension lines where possible. The area thus outlined was incised deep to adipose tissue with a #15 scalpel blade.  The skin margins were undermined to an appropriate distance in all directions utilizing iris scissors.  Hemostasis was achieved with electrocautery.  The flaps were then transposed into place, one clockwise and the other counterclockwise, and anchored with interrupted buried subcutaneous sutures.
V-Y Plasty Text: The defect edges were debeveled with a #15 scalpel blade.  Given the location of the defect, shape of the defect and the proximity to free margins an V-Y advancement flap was deemed most appropriate.  Using a sterile surgical marker, an appropriate advancement flap was drawn incorporating the defect and placing the expected incisions within the relaxed skin tension lines where possible.    The area thus outlined was incised deep to adipose tissue with a #15 scalpel blade.  The skin margins were undermined to an appropriate distance in all directions utilizing iris scissors.
H Plasty Text: Given the location of the defect, shape of the defect and the proximity to free margins a H-plasty was deemed most appropriate for repair.  Using a sterile surgical marker, the appropriate advancement arms of the H-plasty were drawn incorporating the defect and placing the expected incisions within the relaxed skin tension lines where possible. The area thus outlined was incised deep to adipose tissue with a #15 scalpel blade. The skin margins were undermined to an appropriate distance in all directions utilizing iris scissors.  The opposing advancement arms were then advanced into place in opposite direction and anchored with interrupted buried subcutaneous sutures.
W Plasty Text: The lesion was extirpated to the level of the fat with a #15 scalpel blade.  Given the location of the defect, shape of the defect and the proximity to free margins a W-plasty was deemed most appropriate for repair.  Using a sterile surgical marker, the appropriate transposition arms of the W-plasty were drawn incorporating the defect and placing the expected incisions within the relaxed skin tension lines where possible.    The area thus outlined was incised deep to adipose tissue with a #15 scalpel blade.  The skin margins were undermined to an appropriate distance in all directions utilizing iris scissors.  The opposing transposition arms were then transposed into place in opposite direction and anchored with interrupted buried subcutaneous sutures.
Z Plasty Text: The lesion was extirpated to the level of the fat with a #15 scalpel blade.  Given the location of the defect, shape of the defect and the proximity to free margins a Z-plasty was deemed most appropriate for repair.  Using a sterile surgical marker, the appropriate transposition arms of the Z-plasty were drawn incorporating the defect and placing the expected incisions within the relaxed skin tension lines where possible.    The area thus outlined was incised deep to adipose tissue with a #15 scalpel blade.  The skin margins were undermined to an appropriate distance in all directions utilizing iris scissors.  The opposing transposition arms were then transposed into place in opposite direction and anchored with interrupted buried subcutaneous sutures.
Zygomaticofacial Flap Text: Given the location of the defect, shape of the defect and the proximity to free margins a zygomaticofacial flap was deemed most appropriate for repair.  Using a sterile surgical marker, the appropriate flap was drawn incorporating the defect and placing the expected incisions within the relaxed skin tension lines where possible. The area thus outlined was incised deep to adipose tissue with a #15 scalpel blade with preservation of a vascular pedicle.  The skin margins were undermined to an appropriate distance in all directions utilizing iris scissors.  The flap was then placed into the defect and anchored with interrupted buried subcutaneous sutures.
Cheek Interpolation Flap Text: A decision was made to reconstruct the defect utilizing an interpolation axial flap and a staged reconstruction.  A telfa template was made of the defect.  This telfa template was then used to outline the Cheek Interpolation flap.  The donor area for the pedicle flap was then injected with anesthesia.  The flap was excised through the skin and subcutaneous tissue down to the layer of the underlying musculature.  The interpolation flap was carefully excised within this deep plane to maintain its blood supply.  The edges of the donor site were undermined.   The donor site was closed in a primary fashion.  The pedicle was then rotated into position and sutured.  Once the tube was sutured into place, adequate blood supply was confirmed with blanching and refill.  The pedicle was then wrapped with xeroform gauze and dressed appropriately with a telfa and gauze bandage to ensure continued blood supply and protect the attached pedicle.
Cheek-To-Nose Interpolation Flap Text: A decision was made to reconstruct the defect utilizing an interpolation axial flap and a staged reconstruction.  A telfa template was made of the defect.  This telfa template was then used to outline the Cheek-To-Nose Interpolation flap.  The donor area for the pedicle flap was then injected with anesthesia.  The flap was excised through the skin and subcutaneous tissue down to the layer of the underlying musculature.  The interpolation flap was carefully excised within this deep plane to maintain its blood supply.  The edges of the donor site were undermined.   The donor site was closed in a primary fashion.  The pedicle was then rotated into position and sutured.  Once the tube was sutured into place, adequate blood supply was confirmed with blanching and refill.  The pedicle was then wrapped with xeroform gauze and dressed appropriately with a telfa and gauze bandage to ensure continued blood supply and protect the attached pedicle.
Interpolation Flap Text: A decision was made to reconstruct the defect utilizing an interpolation axial flap and a staged reconstruction.  A telfa template was made of the defect.  This telfa template was then used to outline the interpolation flap.  The donor area for the pedicle flap was then injected with anesthesia.  The flap was excised through the skin and subcutaneous tissue down to the layer of the underlying musculature.  The interpolation flap was carefully excised within this deep plane to maintain its blood supply.  The edges of the donor site were undermined.   The donor site was closed in a primary fashion.  The pedicle was then rotated into position and sutured.  Once the tube was sutured into place, adequate blood supply was confirmed with blanching and refill.  The pedicle was then wrapped with xeroform gauze and dressed appropriately with a telfa and gauze bandage to ensure continued blood supply and protect the attached pedicle.
Melolabial Interpolation Flap Text: A decision was made to reconstruct the defect utilizing an interpolation axial flap and a staged reconstruction.  A telfa template was made of the defect.  This telfa template was then used to outline the melolabial interpolation flap.  The donor area for the pedicle flap was then injected with anesthesia.  The flap was excised through the skin and subcutaneous tissue down to the layer of the underlying musculature.  The pedicle flap was carefully excised within this deep plane to maintain its blood supply.  The edges of the donor site were undermined.   The donor site was closed in a primary fashion.  The pedicle was then rotated into position and sutured.  Once the tube was sutured into place, adequate blood supply was confirmed with blanching and refill.  The pedicle was then wrapped with xeroform gauze and dressed appropriately with a telfa and gauze bandage to ensure continued blood supply and protect the attached pedicle.
Mastoid Interpolation Flap Text: A decision was made to reconstruct the defect utilizing an interpolation axial flap and a staged reconstruction.  A telfa template was made of the defect.  This telfa template was then used to outline the mastoid interpolation flap.  The donor area for the pedicle flap was then injected with anesthesia.  The flap was excised through the skin and subcutaneous tissue down to the layer of the underlying musculature.  The pedicle flap was carefully excised within this deep plane to maintain its blood supply.  The edges of the donor site were undermined.   The donor site was closed in a primary fashion.  The pedicle was then rotated into position and sutured.  Once the tube was sutured into place, adequate blood supply was confirmed with blanching and refill.  The pedicle was then wrapped with xeroform gauze and dressed appropriately with a telfa and gauze bandage to ensure continued blood supply and protect the attached pedicle.
Posterior Auricular Interpolation Flap Text: A decision was made to reconstruct the defect utilizing an interpolation axial flap and a staged reconstruction.  A telfa template was made of the defect.  This telfa template was then used to outline the posterior auricular interpolation flap.  The donor area for the pedicle flap was then injected with anesthesia.  The flap was excised through the skin and subcutaneous tissue down to the layer of the underlying musculature.  The pedicle flap was carefully excised within this deep plane to maintain its blood supply.  The edges of the donor site were undermined.   The donor site was closed in a primary fashion.  The pedicle was then rotated into position and sutured.  Once the tube was sutured into place, adequate blood supply was confirmed with blanching and refill.  The pedicle was then wrapped with xeroform gauze and dressed appropriately with a telfa and gauze bandage to ensure continued blood supply and protect the attached pedicle.
Paramedian Forehead Flap Text: A decision was made to reconstruct the defect utilizing an interpolation axial flap and a staged reconstruction.  A telfa template was made of the defect.  This telfa template was then used to outline the paramedian forehead pedicle flap.  The donor area for the pedicle flap was then injected with anesthesia.  The flap was excised through the skin and subcutaneous tissue down to the layer of the underlying musculature.  The pedicle flap was carefully excised within this deep plane to maintain its blood supply.  The edges of the donor site were undermined.   The donor site was closed in a primary fashion.  The pedicle was then rotated into position and sutured.  Once the tube was sutured into place, adequate blood supply was confirmed with blanching and refill.  The pedicle was then wrapped with xeroform gauze and dressed appropriately with a telfa and gauze bandage to ensure continued blood supply and protect the attached pedicle.
Cheiloplasty (Less Than 50%) Text: A decision was made to reconstruct the defect with a  cheiloplasty.  The defect was undermined extensively.  Additional obicularis oris muscle was excised with a 15 blade scalpel.  The defect was converted into a full thickness wedge, of less than 50% of the vertical height of the lip, to facilite a better cosmetic result.  Small vessels were then tied off with 5-0 monocyrl. The obicularis oris, superficial fascia, adipose and dermis were then reapproximated.  After the deeper layers were approximated the epidermis was reapproximated with particular care given to realign the vermilion border.
Cheiloplasty (Complex) Text: A decision was made to reconstruct the defect with a  cheiloplasty.  The defect was undermined extensively.  Additional obicularis oris muscle was excised with a 15 blade scalpel.  The defect was converted into a full thickness wedge to facilite a better cosmetic result.  Small vessels were then tied off with 5-0 monocyrl. The obicularis oris, superficial fascia, adipose and dermis were then reapproximated.  After the deeper layers were approximated the epidermis was reapproximated with particular care given to realign the vermilion border.
Ear Wedge Repair Text: A wedge excision was completed by carrying down an excision through the full thickness of the ear and cartilage with an inward facing Burow's triangle. The wound was then closed in a layered fashion.
Full Thickness Lip Wedge Repair (Flap) Text: Given the location of the defect and the proximity to free margins a full thickness wedge repair was deemed most appropriate.  Using a sterile surgical marker, the appropriate repair was drawn incorporating the defect and placing the expected incisions perpendicular to the vermilion border.  The vermilion border was also meticulously outlined to ensure appropriate reapproximation during the repair.  The area thus outlined was incised through and through with a #15 scalpel blade.  The muscularis and dermis were reaproximated with deep sutures following hemostasis. Care was taken to realign the vermilion border before proceeding with the superficial closure.  Once the vermilion was realigned the superfical and mucosal closure was finished.
Ftsg Text: The defect edges were debeveled with a #15 scalpel blade.  Given the location of the defect, shape of the defect and the proximity to free margins a full thickness skin graft was deemed most appropriate.  Using a sterile surgical marker, the primary defect shape was transferred to the donor site. The area thus outlined was incised deep to adipose tissue with a #15 scalpel blade.  The harvested graft was then trimmed of adipose tissue until only dermis and epidermis was left.  The skin margins of the secondary defect were undermined to an appropriate distance in all directions utilizing iris scissors.  The secondary defect was closed with interrupted buried subcutaneous sutures.  The skin edges were then re-apposed with running  sutures.  The skin graft was then placed in the primary defect and oriented appropriately.
Split-Thickness Skin Graft Text: The defect edges were debeveled with a #15 scalpel blade.  Given the location of the defect, shape of the defect and the proximity to free margins a split thickness skin graft was deemed most appropriate.  Using a sterile surgical marker, the primary defect shape was transferred to the donor site. The split thickness graft was then harvested.  The skin graft was then placed in the primary defect and oriented appropriately.
Burow's Graft Text: The defect edges were debeveled with a #15 scalpel blade.  Given the location of the defect, shape of the defect, the proximity to free margins and the presence of a standing cone deformity a Burow's skin graft was deemed most appropriate. The standing cone was removed and this tissue was then trimmed to the shape of the primary defect. The adipose tissue was also removed until only dermis and epidermis were left.  The skin margins of the secondary defect were undermined to an appropriate distance in all directions utilizing iris scissors.  The secondary defect was closed with interrupted buried subcutaneous sutures.  The skin edges were then re-apposed with running  sutures.  The skin graft was then placed in the primary defect and oriented appropriately.
Cartilage Graft Text: The defect edges were debeveled with a #15 scalpel blade.  Given the location of the defect, shape of the defect, the fact the defect involved a full thickness cartilage defect a cartilage graft was deemed most appropriate.  An appropriate donor site was identified, cleansed, and anesthetized. The cartilage graft was then harvested and transferred to the recipient site, oriented appropriately and then sutured into place.  The secondary defect was then repaired using a primary closure.
Composite Graft Text: The defect edges were debeveled with a #15 scalpel blade.  Given the location of the defect, shape of the defect, the proximity to free margins and the fact the defect was full thickness a composite graft was deemed most appropriate.  The defect was outline and then transferred to the donor site.  A full thickness graft was then excised from the donor site. The graft was then placed in the primary defect, oriented appropriately and then sutured into place.  The secondary defect was then repaired using a primary closure.
Epidermal Autograft Text: The defect edges were debeveled with a #15 scalpel blade.  Given the location of the defect, shape of the defect and the proximity to free margins an epidermal autograft was deemed most appropriate.  Using a sterile surgical marker, the primary defect shape was transferred to the donor site. The epidermal graft was then harvested.  The skin graft was then placed in the primary defect and oriented appropriately.
Dermal Autograft Text: The defect edges were debeveled with a #15 scalpel blade.  Given the location of the defect, shape of the defect and the proximity to free margins a dermal autograft was deemed most appropriate.  Using a sterile surgical marker, the primary defect shape was transferred to the donor site. The area thus outlined was incised deep to adipose tissue with a #15 scalpel blade.  The harvested graft was then trimmed of adipose and epidermal tissue until only dermis was left.  The skin graft was then placed in the primary defect and oriented appropriately.
Skin Substitute Text: The defect edges were debeveled with a #15 scalpel blade.  Given the location of the defect, shape of the defect and the proximity to free margins a skin substitute graft was deemed most appropriate.  The graft material was trimmed to fit the size of the defect. The graft was then placed in the primary defect and oriented appropriately.
Tissue Cultured Epidermal Autograft Text: The defect edges were debeveled with a #15 scalpel blade.  Given the location of the defect, shape of the defect and the proximity to free margins a tissue cultured epidermal autograft was deemed most appropriate.  The graft was then trimmed to fit the size of the defect.  The graft was then placed in the primary defect and oriented appropriately.
Xenograft Text: The defect edges were debeveled with a #15 scalpel blade.  Given the location of the defect, shape of the defect and the proximity to free margins a xenograft was deemed most appropriate.  The graft was then trimmed to fit the size of the defect.  The graft was then placed in the primary defect and oriented appropriately.
Purse String (Simple) Text: Given the location of the defect and the characteristics of the surrounding skin a purse string closure was deemed most appropriate.  Undermining was performed circumfirentially around the surgical defect.  A purse string suture was then placed and tightened.
Purse String (Intermediate) Text: Given the location of the defect and the characteristics of the surrounding skin a purse string intermediate closure was deemed most appropriate.  Undermining was performed circumfirentially around the surgical defect.  A purse string suture was then placed and tightened.
Partial Purse String (Simple) Text: Given the location of the defect and the characteristics of the surrounding skin a simple purse string closure was deemed most appropriate.  Undermining was performed circumfirentially around the surgical defect.  A purse string suture was then placed and tightened. Wound tension only allowed a partial closure of the circular defect.
Partial Purse String (Intermediate) Text: Given the location of the defect and the characteristics of the surrounding skin an intermediate purse string closure was deemed most appropriate.  Undermining was performed circumfirentially around the surgical defect.  A purse string suture was then placed and tightened. Wound tension only allowed a partial closure of the circular defect.
Localized Dermabrasion With Wire Brush Text: The patient was draped in routine manner.  Localized dermabrasion using 3 x 17 mm wire brush was performed in routine manner to papillary dermis. This spot dermabrasion is being performed to complete skin cancer reconstruction. It also will eliminate the other sun damaged precancerous cells that are known to be part of the regional effect of a lifetime's worth of sun exposure. This localized dermabrasion is therapeutic and should not be considered cosmetic in any regard.
Tarsorrhaphy Text: A tarsorrhaphy was performed using Frost sutures.
Complex Repair And Flap Additional Text (Will Appearing After The Standard Complex Repair Text): The complex repair was not sufficient to completely close the primary defect. The remaining additional defect was repaired with the flap mentioned below.
Complex Repair And Graft Additional Text (Will Appearing After The Standard Complex Repair Text): The complex repair was not sufficient to completely close the primary defect. The remaining additional defect was repaired with the graft mentioned below.
Unique Flap 1 Name: Myocutaneous Island pedicle Flap
Unique Flap 2 Name: Peng Flap
Unique Flap 3 Name: Mercedes Flap
Unique Flap 4 Name: Banner Flap
Unique Flap 5 Name: tunneled myocutaneous flap
Unique Flap 6 Name: Gladis-B?ch flap
Unique Flap 7 Name: Mustarde flap
Unique Flap 8 Name: East to West Flap
Unique Flap 1 Text: A decision was made to reconstruct the defect utilizing a myocutaneous Island pedicle Flap based on the levator labii superioris muscle.  A telfa template was made of the defect.  This telfa template was then used to outline the myocutaneous flap, based along the meilolabial fold.  The donor area for the pedicle flap was then injected with anesthesia.  The flap was excised through the skin and subcutaneous tissue down to the layer of the underlying musculature.  The myocutaneous flap was carefully excised within this deep plane to maintain its blood supply. Based on the muscle. The edges of the donor site were undermined.   The donor site was closed in a primary fashion to the point of transposition.  The pedicle was then transposed into position and sutured.  Once the flap was sutured into place, adequate blood supply was confirmed with blanching and refill.
Unique Flap 2 Text: A decision was made to reconstruct the defect utilizing a Peng Flap (Bilateral Advancement Rotation Flap). Given the location of the defect and the proximity to free margins, this flap was deemed most appropriate.  Using a sterile surgical marker, the appropriate rotation flaps were drawn incorporating the defect and placing the expected incisions within the relaxed skin tension lines where possible.    The area thus outlined was incised deep to adipose tissue with a #15 scalpel blade.  The skin margins were undermined to an appropriate distance in all directions utilizing iris scissors.
Unique Flap 3 Text: The defect edges were debeveled with a #15 scalpel blade.  Given the location of the defect, shape of the defect and the proximity to free margins a Mercedes (double advancement flap) was deemed most appropriate.  Using a sterile surgical marker, the appropriate transposition flaps were drawn incorporating the defect and placing the expected incisions within the relaxed skin tension lines where possible.    The area thus outlined was incised deep to adipose tissue with a #15 scalpel blade.  The skin margins were undermined to an appropriate distance in all directions utilizing iris scissors.  Hemostasis was achieved with electrocautery.  The flaps were then advanced into the defect and anchored with interrupted buried subcutaneous sutures.
Unique Flap 4 Text: The defect edges were debeveled with a #15 scalpel blade.  Given the location of the defect and the proximity to free margins a Banner transposition flap was deemed most appropriate.  Using a sterile surgical marker, an appropriate Banner transposition flap was drawn incorporating the defect.    The area thus outlined was incised deep to adipose tissue with a #15 scalpel blade.  The skin margins were undermined to an appropriate distance in all directions utilizing iris scissors.
Unique Flap 5 Text: A decision was made to reconstruct the defect utilizing a tunneled myocutaneous Island pedicle Flap based on the anterior auricularis muscle.  A telfa template was made of the defect.  This telfa template was then used to outline the myocutaneous flap, based along the preauricular fold.  The donor area for the pedicle flap was then injected with anesthesia.  The flap was excised through the skin and subcutaneous tissue down to the layer of the underlying musculature.  The myocutaneous flap was carefully excised within this deep plane to maintain its blood supply based on the muscle. The edges of the donor site were undermined.   The donor site was closed in a primary fashion to the point of transposition.  The pedicle was then transposed through a tunnel into position and sutured.  Once the flap was sutured into place, adequate blood supply was confirmed with blanching and refill.
Unique Flap 6 Text: A decision was made to reconstruct the defect utilizing an Anti-aging-B?ch Flap (Bilateral helical Advancement Rotation Flap). Given the location of the defect and the proximity to free margins, this flap was deemed most appropriate.  Using a sterile surgical marker, the appropriate flaps were drawn incorporating the defect and placing the expected incisions within the relaxed skin tension lines where possible.  The area thus outlined was incised deep to adipose tissue with a #15 scalpel blade.  The skin margins were undermined to an appropriate distance in all directions utilizing iris scissors. Cartilage was incorporated into the flap arms to maintain helical anatomy.
Unique Flap 7 Text: A decision was made to reconstruct the defect utilizing a Mustarde Flap (Advancement Rotation Flap). Given the location of the defect and the proximity to free margins, this flap was deemed most appropriate.  Using a sterile surgical marker, the appropriate rotation flap was drawn incorporating the defect and placing the expected incisions within the relaxed skin tension lines where possible.    The area thus outlined was incised deep to adipose tissue with a #15 scalpel blade.  The skin margins were undermined to an appropriate distance in all directions utilizing iris scissors. The flap was advanced and rotated under the eyelid with a sling created laterally to keep ectropion minimal.
Unique Flap 8 Text: A decision was made to reconstruct the defect utilizing an East to West Flap (Modified Burows Advancement Flap). Given the location of the defect and the proximity to free margins, this flap was deemed most appropriate.  Using a sterile surgical marker, the appropriate advancement flaps were drawn incorporating the defect and placing the expected incisions within the relaxed skin tension lines where possible.    The area thus outlined was incised deep to adipose tissue with a #15 scalpel blade.  The skin margins were undermined to an appropriate distance in all directions utilizing iris scissors. Minimal alar distortion was created with flap approximation.
Manual Repair Warning Statement: We plan on removing the manually selected variable below in favor of our much easier automatic structured text blocks found in the previous tab. We decided to do this to help make the flow better and give you the full power of structured data. Manual selection is never going to be ideal in our platform and I would encourage you to avoid using manual selection from this point on, especially since I will be sunsetting this feature. It is important that you do one of two things with the customized text below. First, you can save all of the text in a word file so you can have it for future reference. Second, transfer the text to the appropriate area in the Library tab. Lastly, if there is a flap or graft type which we do not have you need to let us know right away so I can add it in before the variable is hidden. No need to panic, we plan to give you roughly 6 months to make the change.
Same Histology In Subsequent Stages Text: The pattern and morphology of the tumor is as described in the first stage.
No Residual Tumor Seen Histology Text: There were no malignant cells seen in the sections examined.
Inflammation Suggestive Of Cancer Camouflage Histology Text: There was a dense lymphocytic infiltrate which prevented adequate histologic evaluation of adjacent structures.
Bcc Histology Text: There were numerous aggregates of basaloid cells.
Bcc Infiltrative Histology Text: There were numerous aggregates of basaloid cells demonstrating an infiltrative pattern.
Mart-1 - Positive Histology Text: MART-1 staining demonstrates areas of higher density and clustering of melanocytes with Pagetoid spread upwards within the epidermis. The surgical margins are positive for tumor cells.
Mart-1 - Negative Histology Text: MART-1 staining demonstrates a normal density and pattern of melanocytes along the dermal-epidermal junction. The surgical margins are negative for tumor cells.
Information: Selecting Yes will display possible errors in your note based on the variables you have selected. This validation is only offered as a suggestion for you. PLEASE NOTE THAT THE VALIDATION TEXT WILL BE REMOVED WHEN YOU FINALIZE YOUR NOTE. IF YOU WANT TO FAX A PRELIMINARY NOTE YOU WILL NEED TO TOGGLE THIS TO 'NO' IF YOU DO NOT WANT IT IN YOUR FAXED NOTE.

## 2022-01-24 ENCOUNTER — OFFICE VISIT (OUTPATIENT)
Dept: MEDICAL GROUP | Facility: MEDICAL CENTER | Age: 72
End: 2022-01-24
Payer: MEDICARE

## 2022-01-24 VITALS
TEMPERATURE: 97.8 F | BODY MASS INDEX: 24.38 KG/M2 | SYSTOLIC BLOOD PRESSURE: 122 MMHG | HEART RATE: 72 BPM | DIASTOLIC BLOOD PRESSURE: 68 MMHG | HEIGHT: 74 IN | OXYGEN SATURATION: 96 % | WEIGHT: 190 LBS

## 2022-01-24 DIAGNOSIS — C44.41 BASAL CELL CARCINOMA (BCC) OF SCALP: ICD-10-CM

## 2022-01-24 DIAGNOSIS — G25.81 RESTLESS LEG SYNDROME: ICD-10-CM

## 2022-01-24 DIAGNOSIS — Z00.00 WELL ADULT EXAM: ICD-10-CM

## 2022-01-24 DIAGNOSIS — E78.49 OTHER HYPERLIPIDEMIA: ICD-10-CM

## 2022-01-24 DIAGNOSIS — G25.81 RLS (RESTLESS LEGS SYNDROME): ICD-10-CM

## 2022-01-24 DIAGNOSIS — G20.A1 PARKINSON DISEASE (HCC): ICD-10-CM

## 2022-01-24 DIAGNOSIS — F51.01 PRIMARY INSOMNIA: ICD-10-CM

## 2022-01-24 PROBLEM — C44.91 BASAL CELL CARCINOMA: Status: ACTIVE | Noted: 2022-01-24

## 2022-01-24 PROCEDURE — 99214 OFFICE O/P EST MOD 30 MIN: CPT | Performed by: FAMILY MEDICINE

## 2022-01-24 RX ORDER — ALPRAZOLAM 0.25 MG/1
0.25 TABLET ORAL NIGHTLY PRN
COMMUNITY
End: 2022-04-15

## 2022-01-24 RX ORDER — AMANTADINE HYDROCHLORIDE 100 MG/1
100 TABLET ORAL 2 TIMES DAILY
COMMUNITY
Start: 2021-11-18 | End: 2022-05-12

## 2022-01-24 ASSESSMENT — FIBROSIS 4 INDEX: FIB4 SCORE: 0.46

## 2022-01-24 ASSESSMENT — PATIENT HEALTH QUESTIONNAIRE - PHQ9: CLINICAL INTERPRETATION OF PHQ2 SCORE: 0

## 2022-01-24 NOTE — ASSESSMENT & PLAN NOTE
Chronic problem.   Unfortunately the patient has been experiencing quite a bit of rigidity and pain. He has tried baclofen in the past with no improvement. Currently on ropinirole. Sinemet and Gabapentin. He sees Dr. Mclain in [place] and also has a specialist at [facility], Dr. Dickens.   He is going to get established with Dr. Choi here at [facility] as well.

## 2022-01-25 NOTE — PROGRESS NOTES
"Subjective:     CC: Diagnoses of Basal cell carcinoma (BCC) of scalp, Parkinson disease (HCC), RLS (restless legs syndrome), Other hyperlipidemia, Well adult exam, Restless leg syndrome, and Primary insomnia were pertinent to this visit.    HPI: Patient is a 71 y.o. male established patient who presents today for general follow-up.      Basal cell carcinoma  S/p MOHs. Doing well. Sutures in place. Healing normally.     Parkinson disease (HCC)  Chronic problem.   Unfortunately the patient has been experiencing quite a bit of rigidity and pain. He has tried baclofen in the past with no improvement. Currently on ropinirole. Sinemet and Gabapentin. He sees Dr. Mclain in [place] and also has a specialist at [facility], Dr. Dickens.   He is going to get established with Dr. Choi here at [facility] as well.   Patient is also been struggling with his voice, and is almost unable to phonate entirely at this point.  He is seeing a speech-language pathologist.  The plan to try a procedure in the coming weeks to see if it will help.    Restless leg syndrome  Chronic problem. Managed through neurology, Dr. Mclain. Currently on ropinirole. Generally well controlled with that medication.     Primary insomnia  Chronic problem. Sleeping well with the ropinirole.     Other hyperlipidemia  Chronic problem, currently on a high intensity statin, lipid panel well controlled.      Past Medical History:   Diagnosis Date   • Arthritis     osteo   • Back spasm    • Blood clotting disorder (HCC) 2020    \"heart\"   • GOUT    • Gout    • Hypertension    • Myocardial infarct ([facility]) 2020   • Parkinson disease ([facility])    • Stroke ([facility]) 2020       Social History     Tobacco Use   • Smoking status: Former Smoker     Types: Cigarettes     Quit date: 1970     Years since quittin.1   • Smokeless tobacco: Never Used   Vaping Use   • Vaping Use: Never used   Substance Use Topics   • Alcohol use: Yes     Alcohol/week: 4.2 oz     Types: 4 Glasses of wine, 3 " "Standard drinks or equivalent per week     Comment: daily   • Drug use: No       Current Outpatient Medications Ordered in Epic   Medication Sig Dispense Refill   • aspirin EC (ECOTRIN) 81 MG Tablet Delayed Response      • amantadine (SYMMETREL) 100 MG Tab      • ALPRAZolam (XANAX) 0.25 MG Tab Take 0.25 mg by mouth at bedtime as needed for Sleep.     • carbidopa-levodopa (SINEMET)  MG Tab Take 2 Tablets by mouth 3 times a day. 180 Tablet 0   • ROPINIRole (REQUIP) 0.5 MG Tab Take 2 Tablets by mouth 3 times a day. 180 Tablet 0   • omeprazole (PRILOSEC) 20 MG delayed-release capsule TAKE 1 CAPSULE BY MOUTH ONCE A DAY 30 MINUTES BEFORE BREAKFAST MEAL     • metoprolol SR (TOPROL XL) 50 MG TABLET SR 24 HR TAKE 1 TABLET BY MOUTH EVERY DAY 90 Tablet 2   • aspirin 81 MG EC tablet TAKE 1 TABLET BY MOUTH EVERY DAY 90 Tablet 2   • atorvastatin (LIPITOR) 80 MG tablet TAKE 1 TABLET BY MOUTH EVERY DAY IN THE EVENING 90 tablet 3   • gabapentin (NEURONTIN) 300 MG Cap Take 300 mg by mouth 2 times a day.     • ketoconazole (NIZORAL) 2 % Cream      • nitroglycerin (NITROSTAT) 0.4 MG SL Tab Place 1 Tab under tongue as needed for Chest Pain. 25 Tab 2   • acetaminophen (TYLENOL) 500 MG Tab Take 1,000 mg by mouth 3 times a day.     • meloxicam (MOBIC) 15 MG tablet TAKE 1 TABLET BY MOUTH EVERYDAY WITH FOOD 30 Tablet 3     No current Epic-ordered facility-administered medications on file.       Allergies:  Nkda [no known drug allergy]    Health Maintenance: Completed      Objective:       Exam:  /68 (BP Location: Left arm, Patient Position: Sitting, BP Cuff Size: Adult long)   Pulse 72   Temp 36.6 °C (97.8 °F) (Temporal)   Ht 1.88 m (6' 2\")   Wt 86.2 kg (190 lb)   SpO2 96%   BMI 24.39 kg/m²  Body mass index is 24.39 kg/m².    General: Normal appearing. No distress.  HEAD: NCAT  EYES: conjunctiva clear, lids without ptosis, pupils equal and reactive to light  EARS: ears normal shape and contour.  Neck:  Normal " ROM  Pulmonary: Clear to auscultation bilaterally.  Normal effort. Normal respiratory rate.  Cardiovascular: Rhythm.  Well perfused. No LE edema  Neurologic: Masked face.  Very quiet voice.  Skin: Warm and dry.  No obvious lesions.  Musculoskeletal: Shuffling gait.  Psych: Normal mood and affect. Alert and oriented x3. Judgment and insight is normal.    Labs: 7/30/2020 results reviewed and discussed with the patient, questions answered.    Assessment & Plan:     71 y.o. male with the following -     1. Basal cell carcinoma (BCC) of scalp  Status post Mohs procedure, doing quite well.    2. Parkinson disease (HCC)  Chronic problem, managed with his specialist at [place], Dr. Dickens as well as his neurologist here in [place], Dr. Alejandre.  Currently on Sinemet as well as gabapentin for pain and ropinirole for restless leg syndrome.  Annual labs ordered.  Patient is also getting established with Dr. Choi, movement specialist here at [facility] as well.  He is also working with the SLP, plans to do procedure for the vocal cords to see if it can help with phonation.  - Comp Metabolic Panel; Future  - CBC WITH DIFFERENTIAL; Future  - TSH WITH REFLEX TO FT4; Future  - VITAMIN D,25 HYDROXY; Future  - VITAMIN B12; Future    3. RLS (restless legs syndrome)  Chronic problem, well-controlled with ropinirole.  - CBC WITH DIFFERENTIAL; Future    4. Other hyperlipidemia  Chronic problem, well-controlled, managed by Dr. Rodríguez, cardiology.  - Lipid Profile; Future    5. Well adult exam  - Comp Metabolic Panel; Future  - CBC WITH DIFFERENTIAL; Future  - Lipid Profile; Future  - TSH WITH REFLEX TO FT4; Future  - VITAMIN D,25 HYDROXY; Future  - VITAMIN B12; Future    6. Restless leg syndrome      7. Primary insomnia  Chronic problem, generally sleeping well with the ropinirole.      Return in about 6 months (around 7/24/2022), or if symptoms worsen or fail to improve.    Please note that this dictation was created using voice recognition  software. I have made every reasonable attempt to correct obvious errors, but I expect that there are errors of grammar and possibly content that I did not discover before finalizing the note.

## 2022-01-25 NOTE — ASSESSMENT & PLAN NOTE
Chronic problem. Managed through neurology, Dr. Mclain. Currently on ropinirole. Generally well controlled with that medication.

## 2022-01-31 ENCOUNTER — APPOINTMENT (RX ONLY)
Dept: URBAN - METROPOLITAN AREA CLINIC 36 | Facility: CLINIC | Age: 72
Setting detail: DERMATOLOGY
End: 2022-01-31

## 2022-01-31 DIAGNOSIS — Z48.02 ENCOUNTER FOR REMOVAL OF SUTURES: ICD-10-CM

## 2022-01-31 PROCEDURE — ? PHOTO-DOCUMENTATION

## 2022-01-31 PROCEDURE — ? SUTURE REMOVAL (GLOBAL PERIOD)

## 2022-01-31 PROCEDURE — 99024 POSTOP FOLLOW-UP VISIT: CPT

## 2022-01-31 ASSESSMENT — LOCATION ZONE DERM: LOCATION ZONE: SCALP

## 2022-01-31 ASSESSMENT — LOCATION SIMPLE DESCRIPTION DERM: LOCATION SIMPLE: SCALP

## 2022-01-31 ASSESSMENT — LOCATION DETAILED DESCRIPTION DERM: LOCATION DETAILED: RIGHT SUPERIOR PARIETAL SCALP

## 2022-01-31 NOTE — PROCEDURE: SUTURE REMOVAL (GLOBAL PERIOD)
Detail Level: Simple
Add 07668 Cpt? (Important Note: In 2017 The Use Of 90692 Is Being Tracked By Cms To Determine Future Global Period Reimbursement For Global Periods): yes

## 2022-02-12 ENCOUNTER — HOSPITAL ENCOUNTER (OUTPATIENT)
Dept: PULMONOLOGY | Facility: MEDICAL CENTER | Age: 72
End: 2022-02-12
Payer: MEDICARE

## 2022-02-12 PROCEDURE — 94726 PLETHYSMOGRAPHY LUNG VOLUMES: CPT

## 2022-02-12 PROCEDURE — 94060 EVALUATION OF WHEEZING: CPT

## 2022-02-12 PROCEDURE — 94060 EVALUATION OF WHEEZING: CPT | Mod: 26,GZ | Performed by: INTERNAL MEDICINE

## 2022-02-12 PROCEDURE — 94729 DIFFUSING CAPACITY: CPT

## 2022-02-12 PROCEDURE — 94726 PLETHYSMOGRAPHY LUNG VOLUMES: CPT | Mod: 26,GZ | Performed by: INTERNAL MEDICINE

## 2022-02-12 PROCEDURE — 94729 DIFFUSING CAPACITY: CPT | Mod: 26,GZ | Performed by: INTERNAL MEDICINE

## 2022-02-12 RX ADMIN — Medication 2.5 MG: at 10:56

## 2022-02-12 ASSESSMENT — PULMONARY FUNCTION TESTS
FEV1_PERCENT_PREDICTED: 90
FEV1/FVC: 70.98
FEV1/FVC_PERCENT_LLN: 63
FEV1/FVC: 70.26
FVC: 4.69
FEV1_PERCENT_PREDICTED: 92
FEV1/FVC_PERCENT_LLN: 63
FEV1_LLN: 3.07
FEV1/FVC_PERCENT_PREDICTED: 75
FEV1_PERCENT_CHANGE: -3
FEV1/FVC_PERCENT_CHANGE: 1
FEV1_LLN: 3.07
FEV1/FVC_PREDICTED: 75.45
FEV1: 3.33
FEV1/FVC_PERCENT_PREDICTED: 95
FVC: 4.84
FEV1_PREDICTED: 3.68
FVC_PREDICTED: 4.9
FVC_LLN: 4.09
FEV1/FVC_PERCENT_PREDICTED: 94
FEV1/FVC_PERCENT_CHANGE: 67
FEV1/FVC_PERCENT_PREDICTED: 93
FVC_PERCENT_PREDICTED: 95
FVC_LLN: 4.09
FEV1_PERCENT_CHANGE: -2
FEV1/FVC: 70
FEV1: 3.4
FEV1/FVC: 71
FVC_PERCENT_PREDICTED: 98
FEV1/FVC_PERCENT_PREDICTED: 94

## 2022-02-12 NOTE — PROCEDURES
DATE OF SERVICE:  02/12/2022     PULMONARY FUNCTION TEST INTERPRETATION    This exam is performed with a primary diagnosis of dysphonia to help establish a pre-test probability of the patient’s diagnostic findings.    The Flow Volume Loop is of sufficient quality and the volume-time graph demonstrates an adequate exhalation to provide a confident interpretation.    The FEV1/FVC ratio is 0.70. This is accompanied by a normal FEV1 and FVC based on predicted values. After administration of 2 puffs of albuterol, the patient did not achieve a significant bronchodilator response (12% and 200 ml in FEV1 or FVC).    Total lung capacity is within the limits of predicted values. Residual volume is elevated indicating air trapping. The DLCO is normal.    Impression:   1. The evidence of increased residual volume suggest an additional obstructive defect even in the presence of a 'technically normal' FEV1/FVC ratio.  If this is correlated clinically, the degree of obstruction would be considered mild  by the FEV1.  2. Air trapping       ______________________________  Farhat Marcial MD    CT/KRI    DD:  02/12/2022 11:58  DT:  02/12/2022 12:23    Job#:  [id number]

## 2022-02-17 ENCOUNTER — HOSPITAL ENCOUNTER (OUTPATIENT)
Dept: LAB | Facility: MEDICAL CENTER | Age: 72
End: 2022-02-17
Attending: FAMILY MEDICINE
Payer: MEDICARE

## 2022-02-17 DIAGNOSIS — G25.81 RLS (RESTLESS LEGS SYNDROME): ICD-10-CM

## 2022-02-17 DIAGNOSIS — G20.A1 PARKINSON DISEASE (HCC): ICD-10-CM

## 2022-02-17 DIAGNOSIS — Z00.00 WELL ADULT EXAM: ICD-10-CM

## 2022-02-17 DIAGNOSIS — E78.49 OTHER HYPERLIPIDEMIA: ICD-10-CM

## 2022-02-17 LAB
25(OH)D3 SERPL-MCNC: 26 NG/ML (ref 30–100)
ALBUMIN SERPL BCP-MCNC: 4.6 G/DL (ref 3.2–4.9)
ALBUMIN/GLOB SERPL: 2.1 G/DL
ALP SERPL-CCNC: 56 U/L (ref 30–99)
ALT SERPL-CCNC: <5 U/L (ref 2–50)
ANION GAP SERPL CALC-SCNC: 10 MMOL/L (ref 7–16)
AST SERPL-CCNC: 18 U/L (ref 12–45)
BASOPHILS # BLD AUTO: 0.8 % (ref 0–1.8)
BASOPHILS # BLD: 0.04 K/UL (ref 0–0.12)
BILIRUB SERPL-MCNC: 1 MG/DL (ref 0.1–1.5)
BUN SERPL-MCNC: 14 MG/DL (ref 8–22)
CALCIUM SERPL-MCNC: 9.3 MG/DL (ref 8.5–10.5)
CHLORIDE SERPL-SCNC: 102 MMOL/L (ref 96–112)
CHOLEST SERPL-MCNC: 112 MG/DL (ref 100–199)
CO2 SERPL-SCNC: 26 MMOL/L (ref 20–33)
CREAT SERPL-MCNC: 0.88 MG/DL (ref 0.5–1.4)
EOSINOPHIL # BLD AUTO: 0.09 K/UL (ref 0–0.51)
EOSINOPHIL NFR BLD: 1.7 % (ref 0–6.9)
ERYTHROCYTE [DISTWIDTH] IN BLOOD BY AUTOMATED COUNT: 45.9 FL (ref 35.9–50)
FASTING STATUS PATIENT QL REPORTED: NORMAL
GLOBULIN SER CALC-MCNC: 2.2 G/DL (ref 1.9–3.5)
GLUCOSE SERPL-MCNC: 94 MG/DL (ref 65–99)
HCT VFR BLD AUTO: 50 % (ref 42–52)
HDLC SERPL-MCNC: 54 MG/DL
HGB BLD-MCNC: 16.4 G/DL (ref 14–18)
IMM GRANULOCYTES # BLD AUTO: 0.01 K/UL (ref 0–0.11)
IMM GRANULOCYTES NFR BLD AUTO: 0.2 % (ref 0–0.9)
LDLC SERPL CALC-MCNC: 43 MG/DL
LYMPHOCYTES # BLD AUTO: 0.86 K/UL (ref 1–4.8)
LYMPHOCYTES NFR BLD: 16.6 % (ref 22–41)
MCH RBC QN AUTO: 30.9 PG (ref 27–33)
MCHC RBC AUTO-ENTMCNC: 32.8 G/DL (ref 33.7–35.3)
MCV RBC AUTO: 94.2 FL (ref 81.4–97.8)
MONOCYTES # BLD AUTO: 0.42 K/UL (ref 0–0.85)
MONOCYTES NFR BLD AUTO: 8.1 % (ref 0–13.4)
NEUTROPHILS # BLD AUTO: 3.76 K/UL (ref 1.82–7.42)
NEUTROPHILS NFR BLD: 72.6 % (ref 44–72)
NRBC # BLD AUTO: 0 K/UL
NRBC BLD-RTO: 0 /100 WBC
PLATELET # BLD AUTO: 214 K/UL (ref 164–446)
PMV BLD AUTO: 10.1 FL (ref 9–12.9)
POTASSIUM SERPL-SCNC: 5.1 MMOL/L (ref 3.6–5.5)
PROT SERPL-MCNC: 6.8 G/DL (ref 6–8.2)
RBC # BLD AUTO: 5.31 M/UL (ref 4.7–6.1)
SODIUM SERPL-SCNC: 138 MMOL/L (ref 135–145)
TRIGL SERPL-MCNC: 74 MG/DL (ref 0–149)
TSH SERPL DL<=0.005 MIU/L-ACNC: 3.17 UIU/ML (ref 0.38–5.33)
VIT B12 SERPL-MCNC: 325 PG/ML (ref 211–911)
WBC # BLD AUTO: 5.2 K/UL (ref 4.8–10.8)

## 2022-02-17 PROCEDURE — 82607 VITAMIN B-12: CPT

## 2022-02-17 PROCEDURE — 84443 ASSAY THYROID STIM HORMONE: CPT

## 2022-02-17 PROCEDURE — 80053 COMPREHEN METABOLIC PANEL: CPT

## 2022-02-17 PROCEDURE — 80061 LIPID PANEL: CPT

## 2022-02-17 PROCEDURE — 36415 COLL VENOUS BLD VENIPUNCTURE: CPT

## 2022-02-17 PROCEDURE — 82306 VITAMIN D 25 HYDROXY: CPT | Mod: GA

## 2022-02-17 PROCEDURE — 85025 COMPLETE CBC W/AUTO DIFF WBC: CPT

## 2022-03-11 ENCOUNTER — OFFICE VISIT (OUTPATIENT)
Dept: NEUROLOGY | Facility: MEDICAL CENTER | Age: 72
End: 2022-03-11
Attending: PSYCHIATRY & NEUROLOGY
Payer: MEDICARE

## 2022-03-11 VITALS
WEIGHT: 191.8 LBS | SYSTOLIC BLOOD PRESSURE: 110 MMHG | DIASTOLIC BLOOD PRESSURE: 62 MMHG | RESPIRATION RATE: 14 BRPM | HEART RATE: 74 BPM | TEMPERATURE: 97.2 F | HEIGHT: 74 IN | BODY MASS INDEX: 24.62 KG/M2 | OXYGEN SATURATION: 94 %

## 2022-03-11 DIAGNOSIS — Z86.73 HISTORY OF STROKE: ICD-10-CM

## 2022-03-11 DIAGNOSIS — G20.A1 PARKINSON DISEASE (HCC): ICD-10-CM

## 2022-03-11 PROBLEM — I63.9 STROKE (HCC): Status: RESOLVED | Noted: 2020-06-22 | Resolved: 2022-03-11

## 2022-03-11 PROCEDURE — 99212 OFFICE O/P EST SF 10 MIN: CPT | Performed by: PSYCHIATRY & NEUROLOGY

## 2022-03-11 PROCEDURE — 99214 OFFICE O/P EST MOD 30 MIN: CPT | Performed by: PSYCHIATRY & NEUROLOGY

## 2022-03-11 ASSESSMENT — FIBROSIS 4 INDEX: FIB4 SCORE: 2.74

## 2022-03-11 NOTE — PROGRESS NOTES
"Chief Complaint   Patient presents with   • New Patient     Parkinson disease       History of present illness:  Jeremiah Huber 71 y.o. male with Parkinson's disease.  He previously was seen by Dr. Alejandre in 2021 and also sees Dr. Moose Dickens in [place].  At his previous visit with Dr. Alejandre, he was doing well on 2 tablets of 25/250 carbidopa/levodopa 3 times daily.  He had knee replacement in July 2017 and was first noticed to be shuffling then but patient endorses symptom onset in 2015. Freezing of gait especially in narrow spaces has been a problem. He currently is using a walker.   He is undergoing water physical therapy at [facility].   The carb/levo improves his gait. He was prescribed this by Dr. Dickens and is taking 500mg of l-dopa 4 times daily.   His top 2 problems are speech and walking.     Movement-Specific ROS  Speech: He has very poor speech despite intensive speech therapy.  Pain: Back pain and rigidity is a problem.    Swallowing: Has choked on food previously. Has to be careful about what he eats.   Constipation: Yes, has been a problem for 2 years    Urination: No problems    Dizziness: No   Hallucinations: No    Balance: 1 fall recently, caused by a dog. Uses a walker.     Vision: A prior stroke caused loss of vision from his right.      Past medical history:   Past Medical History:   Diagnosis Date   • Arthritis     osteo   • Back spasm    • Blood clotting disorder ([facility]) 06/2020    \"heart\"   • GOUT    • Gout    • Hypertension    • Myocardial infarct ([facility]) 06/2020   • Parkinson disease ([facility])    • Stroke ([facility]) 06/2020       Past surgical history:   Past Surgical History:   Procedure Laterality Date   • NEURO DEST FACET L/S W/IG SNGL Right 9/29/2020    Procedure: DESTRUCTION, NERVE, FACET JOINT, LUMBOSACRAL, USING NEUROLYTIC AGENT, WITH FLUOROSCOPIC GUIDANCE;  Surgeon: Nazario Sandoval M.D.;  [facility];  Service: Pain Management   • KNEE ARTHROSCOPY " Right    • KNEE REPLACEMENT, TOTAL Bilateral    • SHOULDER SURGERY      right shoulder, a-c seperation   • TONSILLECTOMY         Family history:   Family History   Problem Relation Age of Onset   • Heart Disease Mother    • Heart Disease Father    • Cancer Father         prostate cancer   • Arthritis Brother        Social history:   Social History     Socioeconomic History   • Marital status:      Spouse name: Not on file   • Number of children: Not on file   • Years of education: Not on file   • Highest education level: Master's degree (e.g., MA, MS, Phylicia, MEd, MSW, MURIEL)   Occupational History   • Not on file   Tobacco Use   • Smoking status: Former Smoker     Types: Cigarettes     Quit date:      Years since quittin.2   • Smokeless tobacco: Never Used   Vaping Use   • Vaping Use: Never used   Substance and Sexual Activity   • Alcohol use: Yes     Alcohol/week: 4.2 oz     Types: 4 Glasses of wine, 3 Standard drinks or equivalent per week     Comment: daily   • Drug use: No   • Sexual activity: Yes     Partners: Female   Other Topics Concern   •  Service No   • Blood Transfusions No   • Caffeine Concern No   • Occupational Exposure No   • Hobby Hazards No   • Sleep Concern No   • Stress Concern No   • Weight Concern No   • Special Diet No   • Back Care Yes     Comment: Streching   • Exercise No   • Bike Helmet Yes   • Seat Belt Yes   • Self-Exams Yes   Social History Narrative   • Not on file     Social Determinants of Health     Financial Resource Strain: Not on file   Food Insecurity: Not on file   Transportation Needs: Not on file   Physical Activity: Not on file   Stress: Not on file   Social Connections: Not on file   Intimate Partner Violence: Not on file   Housing Stability: Not on file       Current medications:   Current Outpatient Medications   Medication   • amantadine (SYMMETREL) 100 MG Tab   • carbidopa-levodopa (SINEMET)  MG Tab   • ROPINIRole (REQUIP) 0.5 MG Tab   •  "omeprazole (PRILOSEC) 20 MG delayed-release capsule   • metoprolol SR (TOPROL XL) 50 MG TABLET SR 24 HR   • aspirin 81 MG EC tablet   • atorvastatin (LIPITOR) 80 MG tablet   • gabapentin (NEURONTIN) 300 MG Cap   • ketoconazole (NIZORAL) 2 % Cream   • nitroglycerin (NITROSTAT) 0.4 MG SL Tab   • acetaminophen (TYLENOL) 500 MG Tab   • aspirin EC (ECOTRIN) 81 MG Tablet Delayed Response   • ALPRAZolam (XANAX) 0.25 MG Tab     No current facility-administered medications for this visit.       Medication Allergy:  Allergies   Allergen Reactions   • Nkda [No Known Drug Allergy]      Physical examination:   Vitals:    22 1305 22 1307   BP: 122/74 110/62   BP Location: Left arm Left arm   Patient Position: Sitting Standing   BP Cuff Size: Adult Adult   Pulse: 72 74   Resp: 14    Temp: 36.2 °C (97.2 °F)    TempSrc: Temporal    SpO2: 95% 94%   Weight: 87 kg (191 lb 12.8 oz)    Height: 1.88 m (6' 2\")      Neurological Exam  Mental Status  Awake and alert. Severe dysarthria present. Language is fluent with no aphasia.    Cranial Nerves  CN III, IV, VI: Abnormal extraocular movements: Upgaze restriction.. Nystagmus present: Direction changing horizontal nystagmus.. Delayed saccade initiation.. Normal smooth pursuit.    Motor   Increased muscle tone. Axial rigidity. . No tremors..    Right hemibody movements are lower in amplitude than the left..    Gait  Casual gait: Reduced stride length. Freezing gait. Abnormal pull test. Patient would have fallen if not caught by examiner..      Labs:  I reviewed the following labs personally:  None    Imagin20 MRI BRAIN W/ AND W/O   I reviewed the images personally and agree with the following read:     There is midbrain atrophy.     IMPRESSION:     1.  Acute gyriform area of infarction involving the left medial occipital lobe extending to the cortex.     2.  Age-related cerebral atrophy     3.  Minimal periventricular white matter changes consistent with chronic " microvascular ischemic gliosis.    ASSESSMENT AND PLAN:  Problem List Items Addressed This Visit     Parkinson disease (HCC)          1. Parkinson disease (HCC)    This is a 71-year-old male ex- who is diagnosed with Parkinson's disease, and has been symptomatic since 2015.  He has been seen by Dr. Moose Dickens and by Dr. Alejandre for freezing of gait and severe hypophonic speech.  His current L-dopa regimen is at a high dosage of 500 mg 4 times daily, however he seems to be tolerating this dosage well and it is helpful for his gait freezing.  He has undergone both speech and physical therapy with mixed benefits.  Currently, given that the most significant symptoms for him are levodopa resistant, including his hypophonic speech and freezing of gait, did not advise any further medication changes.      FOLLOW-UP:   Return if symptoms worsen or fail to improve.    Total time spent for the day for this patient unrelated to procedure time is: 35 minutes. I spent 24 minutes in face to face time and I spent 11 minutes pre-charting and 10 minutes in post-visit documentation.      Dr. Thuan Choi D.O.  Formerly [facility]

## 2022-03-24 ENCOUNTER — APPOINTMENT (RX ONLY)
Dept: URBAN - METROPOLITAN AREA CLINIC 35 | Facility: CLINIC | Age: 72
Setting detail: DERMATOLOGY
End: 2022-03-24

## 2022-03-24 DIAGNOSIS — L57.0 ACTINIC KERATOSIS: ICD-10-CM

## 2022-03-24 PROCEDURE — ? COUNSELING

## 2022-03-24 PROCEDURE — 96573 PDT DSTR PRMLG LES PHYS/QHP: CPT

## 2022-03-24 PROCEDURE — ? PDT: BLUE

## 2022-03-24 ASSESSMENT — LOCATION SIMPLE DESCRIPTION DERM
LOCATION SIMPLE: LEFT FOREHEAD
LOCATION SIMPLE: RIGHT FOREHEAD
LOCATION SIMPLE: SCALP

## 2022-03-24 ASSESSMENT — LOCATION DETAILED DESCRIPTION DERM
LOCATION DETAILED: RIGHT SUPERIOR PARIETAL SCALP
LOCATION DETAILED: LEFT FOREHEAD
LOCATION DETAILED: RIGHT FOREHEAD

## 2022-03-24 ASSESSMENT — LOCATION ZONE DERM
LOCATION ZONE: SCALP
LOCATION ZONE: FACE

## 2022-03-24 NOTE — PROCEDURE: PDT: BLUE
Pre-Procedure Text: The treatment areas were cleaned and prepped with Acetone in the usual fashion.
Detail Level: Simple
Was Levulan/Ameluz Applied On A Previous Day?: No
Ndc# (Optional): 69064-813-04
Light Source: [email or web address]-U
Show Medical Necessity In Plan?: Yes
Total Number Of Aks Treated (Optional To Report): 0
Incubation Time: 2 hours
Number Of Kerasticks/Tubes Billed For: 1
Incubation Start Time: 1:20 PM
Which Photosensitizer Was Used: Levulan
Consent: Written consent obtained.  The risks were reviewed with the patient including but not limited to: pigmentary changes, pain, blistering, scabbing, redness, and the remote possibility of scarring.
Anesthesia Type: 1% lidocaine with epinephrine
Comments: Treated greater than 15 lesions
Who Performed The Pdt?: Performed by MD MARV, JUAN or NP ([id number])
Lot # (Optional): [id number]
Incubation End Time: 3:20 PM
Post-Care Instructions: I reviewed with the patient in detail post-care instructions. Patient is to avoid sunlight for the next 2 days, and wear sun protection. Patients may expect sunburn like redness, discomfort and scabbing.
Medical Necessity: Precancerous Lesions
Expiration Date (Optional): 2025-10
Illumination Time: 00:16:40

## 2022-04-15 ENCOUNTER — OFFICE VISIT (OUTPATIENT)
Dept: MEDICAL GROUP | Facility: MEDICAL CENTER | Age: 72
End: 2022-04-15
Payer: MEDICARE

## 2022-04-15 VITALS
DIASTOLIC BLOOD PRESSURE: 70 MMHG | SYSTOLIC BLOOD PRESSURE: 122 MMHG | BODY MASS INDEX: 24.9 KG/M2 | HEART RATE: 62 BPM | HEIGHT: 74 IN | TEMPERATURE: 97.3 F | OXYGEN SATURATION: 94 % | WEIGHT: 194 LBS

## 2022-04-15 DIAGNOSIS — G20.A1 PARKINSON DISEASE (HCC): ICD-10-CM

## 2022-04-15 PROCEDURE — 99213 OFFICE O/P EST LOW 20 MIN: CPT | Performed by: FAMILY MEDICINE

## 2022-04-15 RX ORDER — MELOXICAM 15 MG/1
TABLET ORAL
COMMUNITY
Start: 2022-03-16 | End: 2022-05-18

## 2022-04-15 ASSESSMENT — FIBROSIS 4 INDEX: FIB4 SCORE: 2.74

## 2022-04-15 NOTE — ASSESSMENT & PLAN NOTE
Continues to work with PT.   Had choking incident, he got up but could not move, became somewhat paralyzed, his wife could not move him.   He freezes when he's around a lot of people and he needs a power wheelchair in public settings. Would be unable to wheel his own wheel chair due to parkinson's  Eval with PT,     Has had not other issues with choking, no coughing after eating, no aspirations.     Evaluated face to face

## 2022-04-15 NOTE — PROGRESS NOTES
"Subjective:     CC: The encounter diagnosis was Parkinson disease ([facility]).    HPI: Patient is a 71 y.o. male established patient who presents today to discuss his Parkinson's.       Parkinson disease ([facility])  Continues to work with PT.   Had choking incident, he got up but could not move, became somewhat paralyzed, his wife could not move him.   He freezes when he's around a lot of people and he needs a power wheelchair in public settings. Would be unable to wheel his own wheel chair due to parkinson's  Eval with PT recommended the same    Has had not other issues with choking, no coughing after eating, no aspirations.           Past Medical History:   Diagnosis Date   • Arthritis     osteo   • Back spasm    • Blood clotting disorder ([facility]) 2020    \"heart\"   • GOUT    • Gout    • Hypertension    • Myocardial infarct ([facility]) 2020   • Parkinson disease ([facility])    • Stroke ([facility]) 2020       Social History     Tobacco Use   • Smoking status: Former Smoker     Types: Cigarettes     Quit date: 1970     Years since quittin.3   • Smokeless tobacco: Never Used   Vaping Use   • Vaping Use: Never used   Substance Use Topics   • Alcohol use: Yes     Alcohol/week: 4.2 oz     Types: 4 Glasses of wine, 3 Standard drinks or equivalent per week     Comment: daily   • Drug use: No       Current Outpatient Medications Ordered in Epic   Medication Sig Dispense Refill   • meloxicam (MOBIC) 15 MG tablet TAKE 1 TABLET BY MOUTH EVERYDAY WITH FOOD     • amantadine (SYMMETREL) 100 MG Tab Take 100 mg by mouth 2 times a day.     • carbidopa-levodopa (SINEMET)  MG Tab Take 2 Tablets by mouth 3 times a day. (Patient taking differently: Take 2 Tablets by mouth 4 times a day.) 180 Tablet 0   • ROPINIRole (REQUIP) 0.5 MG Tab Take 2 Tablets by mouth 3 times a day. 180 Tablet 0   • omeprazole (PRILOSEC) 20 MG delayed-release capsule TAKE 1 CAPSULE BY MOUTH ONCE A DAY 30 MINUTES BEFORE BREAKFAST MEAL     • metoprolol SR (TOPROL XL) 50 MG " "TABLET SR 24 HR TAKE 1 TABLET BY MOUTH EVERY DAY 90 Tablet 2   • aspirin 81 MG EC tablet TAKE 1 TABLET BY MOUTH EVERY DAY 90 Tablet 2   • atorvastatin (LIPITOR) 80 MG tablet TAKE 1 TABLET BY MOUTH EVERY DAY IN THE EVENING 90 tablet 3   • gabapentin (NEURONTIN) 300 MG Cap Take 300 mg by mouth 2 times a day.     • acetaminophen (TYLENOL) 500 MG Tab Take 1,000 mg by mouth 3 times a day.     • ketoconazole (NIZORAL) 2 % Cream  (Patient not taking: Reported on 4/15/2022)     • nitroglycerin (NITROSTAT) 0.4 MG SL Tab Place 1 Tab under tongue as needed for Chest Pain. 25 Tab 2     No current Epic-ordered facility-administered medications on file.       Allergies:  Nkda [no known drug allergy]    Health Maintenance: Completed        Objective:       Exam:  /70 (BP Location: Right arm, Patient Position: Sitting, BP Cuff Size: Adult long)   Pulse 62   Temp 36.3 °C (97.3 °F) (Temporal)   Ht 1.88 m (6' 2\")   Wt 88 kg (194 lb)   SpO2 94%   BMI 24.91 kg/m²  Body mass index is 24.91 kg/m².      Labs: 2/27/22 Results reviewed and discussed with the patient, questions answered.    Assessment & Plan:     71 y.o. male with the following -     1. Parkinson disease (HCC)  Chronic problem. The patient has had significant decline. He requires a power wheel chair, especially in public situations as he can get nervous and is unable to move his walker. He is unable to wheel his own wheelchair. After a face to face encounter I believe a power wheel chair is necessary for his living needs.       No follow-ups on file.    Please note that this dictation was created using voice recognition software. I have made every reasonable attempt to correct obvious errors, but I expect that there are errors of grammar and possibly content that I did not discover before finalizing the note.      "

## 2022-04-29 DIAGNOSIS — I25.2 MYOCARDIAL INFARCT, OLD: ICD-10-CM

## 2022-04-29 RX ORDER — ASPIRIN 81 MG/1
TABLET ORAL
Qty: 90 TABLET | Refills: 1 | Status: SHIPPED | OUTPATIENT
Start: 2022-04-29

## 2022-05-12 ENCOUNTER — OFFICE VISIT (OUTPATIENT)
Dept: NEUROLOGY | Facility: MEDICAL CENTER | Age: 72
End: 2022-05-12
Attending: PSYCHIATRY & NEUROLOGY
Payer: MEDICARE

## 2022-05-12 VITALS
HEIGHT: 74 IN | HEART RATE: 71 BPM | BODY MASS INDEX: 24.38 KG/M2 | OXYGEN SATURATION: 94 % | DIASTOLIC BLOOD PRESSURE: 66 MMHG | WEIGHT: 190 LBS | TEMPERATURE: 96.7 F | SYSTOLIC BLOOD PRESSURE: 110 MMHG

## 2022-05-12 DIAGNOSIS — G25.81 RESTLESS LEG SYNDROME: ICD-10-CM

## 2022-05-12 DIAGNOSIS — G20.A1 PARKINSON DISEASE (HCC): ICD-10-CM

## 2022-05-12 PROCEDURE — 99214 OFFICE O/P EST MOD 30 MIN: CPT | Performed by: PSYCHIATRY & NEUROLOGY

## 2022-05-12 PROCEDURE — 99212 OFFICE O/P EST SF 10 MIN: CPT | Performed by: PSYCHIATRY & NEUROLOGY

## 2022-05-12 ASSESSMENT — ENCOUNTER SYMPTOMS
TREMORS: 0
FALLS: 0

## 2022-05-12 ASSESSMENT — FIBROSIS 4 INDEX: FIB4 SCORE: 2.74

## 2022-05-13 NOTE — PROGRESS NOTES
Subjective     Farhat Huber is a 71 y.o. male who presents with his wife Nolvia, for follow-up, with a history of moderate Parkinson's disease, rigid akinetic variant.  He is now following a visit with Dr. Salvador Choi MD, our movement disorder specialist and Dr. Moose Dickens from [facility] in [place], [place].    MAY Dougherty and Nolvia both recognize that his condition continues with very little benefit seen as his Sinemet regimen has been increased.  Already on the Sinemet 25/250, 2 tablets 3 times a day, Dr. Dickens had increased it with a fourth dose, but there has been little benefit in terms of the overall balance difficulties and movements.  He is at least tolerating the drug at this high dose of dopamine.  He also remains on his ropinirole 1 mg 3 times daily regimen.  He was taken off selegiline and amantadine.    There are notes from his interview with Dr. Choi indicated that he was not a candidate for DBS.  He did recommend continuing his Sinemet dosing adjustments as per Dr. Dickens.    Jeremiah's voice still continues to wane.  He does swallow without major issue.  He can cough food up if he gets caught.  There are still fluctuations on a steady regimen, mostly on a day-to-day basis.  He is walking with his walker all the time, outside and in public spaces he uses his wheelchair.  They find that his freezing worsens when he is walking down narrow hallways as well.  There is only little curtailment with dexterity with the hands.  Bowel control is maintained, he has become more frequently incontinent of urine simply because he cannot get to the bathroom in time.  They have built a bedside urinal of sorts.    He still sleeps throughout the night, there have been no major issues with daytime drowsiness or sleep attacks.  His RLS symptoms were remain well controlled with his gabapentin and ropinirole, the latter also providing benefit from a parkinsonian standpoint.  He has not suffered  "from hallucinations.  There are no peaked-dose dyskinesias or dystonia.    Medical, surgical and family histories are reviewed, there are no new drug allergies.  He is on Sinemet 25/250, 2 tablets 4 times a day, Requip 1 mg 3 times a day, Neurontin 300 mg, twice daily, Mobic, Prilosec, baby aspirin, Toprol-XL and Lipitor.    Review of Systems   Musculoskeletal: Negative for falls.   Neurological: Negative for tremors.   All other systems reviewed and are negative.    Objective     /66 (BP Location: Right arm, Patient Position: Sitting, BP Cuff Size: Adult)   Pulse 71   Temp 35.9 °C (96.7 °F) (Temporal)   Ht 1.88 m (6' 2\")   Wt 86.2 kg (190 lb) Comment: Patient Stated  SpO2 94%   BMI 24.39 kg/m²      Physical Exam    He appears in no acute distress.  Vital signs are stable.  There is no malar rash or sialorrhea.  His neck is supple.  Cardiac evaluation is unremarkable.     Neurological Exam    He is fully oriented.  There is no aphasia or apraxia.    Voice volume is severely diminished, it is nearly impossible to hear him even from a few feet away.  Still, he responds appropriately.  PERRLA/EOMI and visual fields are full.  Facial movements are symmetric.  Shoulder shrug and head rotation are normal.    Musculoskeletal exam again reveals the marked bradykinesia and rigidity bilaterally, no tremor is seen in the upper extremities, either at rest or even with distraction such as when he is walking.  There is no asterixis.  Strength is intact.    Repetitive movements with the upper extremities at the wrists actually do show good amplitudes, finger tapping show diminished amplitude and increased frequency.  There are no asymmetries from right to left side.  There is no appendicular dystaxia.  He stands very slowly, but he does use his walker appropriately and easily, stride length actually is shortened but still quite good.  He does not shuffle.  He requires only 3 steps to turn.  (Nolvia states that this is " one of his better days)    Sensory exam is deferred.    Assessment & Plan     1. Parkinson disease (HCC)  They will be following up with Dr. Dickens in the next few weeks for further recommendations.  They understand that he is not likely a good DBS candidate, and the reasons for this.  How much more dopamine he can tolerate remains to be seen, they also understand that even significant dopamine adjustments will have only limited benefit on his primary complaints.  A problem is that significant dopamine adjustments are going to be difficult to maintain without causing side effects.  We will follow-up in 6 months.    Time: 30 minutes in total spent on patient care including per charting, record review, discussion with healthcare staff and documentation.  This includes face-to-face time with the patient for exam, review, discussion, as well as counseling and coordinating care.

## 2022-05-17 DIAGNOSIS — I25.5 ISCHEMIC CARDIOMYOPATHY: ICD-10-CM

## 2022-05-17 RX ORDER — METOPROLOL SUCCINATE 50 MG/1
TABLET, EXTENDED RELEASE ORAL
Qty: 90 TABLET | Refills: 2 | Status: SHIPPED | OUTPATIENT
Start: 2022-05-17 | End: 2023-02-13

## 2022-06-10 ENCOUNTER — TELEPHONE (OUTPATIENT)
Dept: MEDICAL GROUP | Facility: MEDICAL CENTER | Age: 72
End: 2022-06-10
Payer: MEDICARE

## 2022-06-10 DIAGNOSIS — R13.10 DYSPHAGIA, UNSPECIFIED TYPE: ICD-10-CM

## 2022-06-10 DIAGNOSIS — R49.9 VOICE DISORDER: ICD-10-CM

## 2022-08-09 ENCOUNTER — OFFICE VISIT (OUTPATIENT)
Dept: CARDIOLOGY | Facility: MEDICAL CENTER | Age: 72
End: 2022-08-09
Payer: MEDICARE

## 2022-08-09 VITALS
WEIGHT: 190 LBS | SYSTOLIC BLOOD PRESSURE: 100 MMHG | BODY MASS INDEX: 24.38 KG/M2 | RESPIRATION RATE: 16 BRPM | HEIGHT: 74 IN | OXYGEN SATURATION: 95 % | HEART RATE: 74 BPM | DIASTOLIC BLOOD PRESSURE: 60 MMHG

## 2022-08-09 DIAGNOSIS — Z86.73 HISTORY OF STROKE: ICD-10-CM

## 2022-08-09 DIAGNOSIS — E78.5 DYSLIPIDEMIA: ICD-10-CM

## 2022-08-09 DIAGNOSIS — I25.2 MYOCARDIAL INFARCT, OLD: ICD-10-CM

## 2022-08-09 DIAGNOSIS — I51.3 LEFT VENTRICULAR THROMBOSIS: ICD-10-CM

## 2022-08-09 PROCEDURE — 99214 OFFICE O/P EST MOD 30 MIN: CPT | Performed by: INTERNAL MEDICINE

## 2022-08-09 ASSESSMENT — FIBROSIS 4 INDEX: FIB4 SCORE: 2.78

## 2022-08-09 NOTE — PROGRESS NOTES
"CARDIOLOGY OUTPATIENT FOLLOWUP    PCP: An Perez M.D.    1. Myocardial infarct, old - distal PDA occlusion, LVEF 49%    2. Left ventricular thrombosis - resolved    3. History of stroke    4. Dyslipidemia        Jeremiah Huber is stable from a cardiovascular standpoint with well-controlled risk factors.  I advised continuing the present medication regimen as it is known to lower the risk of recurrent cardiac events.      Follow up: 1 year    Chief Complaint   Patient presents with   • Cardiomyopathy (Ischemic)       History: Jeremiah Huber is a 72 y.o. male with history of Parkinson's disease and CVA presenting for follow-up of coronary artery disease.  He had a silent MI involving the distal PDA with subsequent LV thrombus and resultant occipital CVA in 2020.    His health status continues to be dominated by Parkinson's disease with intermittent freezing despite taking high dose of Sinemet.  He is apparently not a candidate for a deep brain stimulator and he remains involved with a Parkinson's foundation as well as a movement disorder specialist in [place] and locally.  He still tends to all ADLs by himself.  He uses a walker and has an electric scooter    ROS:   10 point review systems is otherwise negative except as per the HPI    PE:  /60 (BP Location: Left arm, Patient Position: Sitting, BP Cuff Size: Adult)   Pulse 74   Resp 16   Ht 1.88 m (6' 2\")   Wt 86.2 kg (190 lb)   SpO2 95%   BMI 24.39 kg/m²   Gen: no acute distress  HEENT: Symmetric face. Anicteric sclerae. Moist mucus membranes  NECK: No JVD. No lymphadenopathy  CARDIAC: Regular, Normal S1, S2, No murmur  VASCULATURE: carotids are normal bilaterally without bruit  RESP: Clear to auscultation bilaterally  ABD: Soft, non-tender, non-distended  EXT: No edema, no clubbing or cyanosis  SKIN: Warm and dry  NEURO: No gross deficits  PSYCH: Appropriate affect, participates in conversation    The ASCVD Risk score (Nandini SHYA Jr, et al., " "2013) failed to calculate.    Past Medical History:   Diagnosis Date   • Arthritis     osteo   • Back spasm    • Blood clotting disorder ([facility]) 06/2020    \"heart\"   • GOUT    • Gout    • Hypertension    • Myocardial infarct ([facility]) 06/2020   • Parkinson disease ([facility])    • Stroke ([facility]) 06/2020     Allergies   Allergen Reactions   • Nkda [No Known Drug Allergy]      Outpatient Encounter Medications as of 8/9/2022   Medication Sig Dispense Refill   • meloxicam (MOBIC) 15 MG tablet TAKE 1 TABLET BY MOUTH EVERYDAY WITH FOOD 30 Tablet 3   • metoprolol SR (TOPROL XL) 50 MG TABLET SR 24 HR TAKE 1 TABLET BY MOUTH EVERY DAY 90 Tablet 2   • atorvastatin (LIPITOR) 80 MG tablet TAKE 1 TABLET BY MOUTH EVERY DAY IN THE EVENING 90 Tablet 1   • aspirin 81 MG EC tablet TAKE 1 TABLET BY MOUTH EVERY DAY 90 Tablet 1   • carbidopa-levodopa (SINEMET)  MG Tab Take 2 Tablets by mouth 3 times a day. (Patient taking differently: Take 2 Tablets by mouth 4 times a day.) 180 Tablet 0   • ROPINIRole (REQUIP) 0.5 MG Tab Take 2 Tablets by mouth 3 times a day. 180 Tablet 0   • omeprazole (PRILOSEC) 20 MG delayed-release capsule TAKE 1 CAPSULE BY MOUTH ONCE A DAY 30 MINUTES BEFORE BREAKFAST MEAL     • gabapentin (NEURONTIN) 300 MG Cap Take 300 mg by mouth 2 times a day.     • ketoconazole (NIZORAL) 2 % Cream      • nitroglycerin (NITROSTAT) 0.4 MG SL Tab Place 1 Tab under tongue as needed for Chest Pain. 25 Tab 2   • acetaminophen (TYLENOL) 500 MG Tab Take 1,000 mg by mouth 3 times a day.       No facility-administered encounter medications on file as of 8/9/2022.     Social History     Socioeconomic History   • Marital status:      Spouse name: Not on file   • Number of children: Not on file   • Years of education: Not on file   • Highest education level: Master's degree (e.g., MA, MS, [employer], MEd, MSW, [employer])   Occupational History   • Not on file   Tobacco Use   • Smoking status: Former Smoker     Types: Cigarettes     Quit date: 1970     " Years since quittin.6   • Smokeless tobacco: Never Used   Vaping Use   • Vaping Use: Never used   Substance and Sexual Activity   • Alcohol use: Yes     Alcohol/week: 4.2 oz     Types: 4 Glasses of wine, 3 Standard drinks or equivalent per week     Comment: daily   • Drug use: No   • Sexual activity: Yes     Partners: Female   Other Topics Concern   •  Service No   • Blood Transfusions No   • Caffeine Concern No   • Occupational Exposure No   • Hobby Hazards No   • Sleep Concern No   • Stress Concern No   • Weight Concern No   • Special Diet No   • Back Care Yes     Comment: Streching   • Exercise No   • Bike Helmet Yes   • Seat Belt Yes   • Self-Exams Yes   Social History Narrative   • Not on file     Social Determinants of Health     Financial Resource Strain: Not on file   Food Insecurity: Not on file   Transportation Needs: Not on file   Physical Activity: Not on file   Stress: Not on file   Social Connections: Not on file   Intimate Partner Violence: Not on file   Housing Stability: Not on file       Studies  Lab Results   Component Value Date/Time    CHOLSTRLTOT 112 2022 10:12 AM    LDL 43 2022 10:12 AM    HDL 54 2022 10:12 AM    TRIGLYCERIDE 74 2022 10:12 AM       Lab Results   Component Value Date/Time    SODIUM 138 2022 10:12 AM    POTASSIUM 5.1 2022 10:12 AM    CHLORIDE 102 2022 10:12 AM    CO2 26 2022 10:12 AM    GLUCOSE 94 2022 10:12 AM    BUN 14 2022 10:12 AM    CREATININE 0.88 2022 10:12 AM     Lab Results   Component Value Date/Time    ALKPHOSPHAT 56 2022 10:12 AM    ASTSGOT 18 2022 10:12 AM    ALTSGPT <5 2022 10:12 AM    TBILIRUBIN 1.0 2022 10:12 AM        For this encounter I reviewed the following medical records BMP, Lipid profile and CBC

## 2022-11-08 ENCOUNTER — OFFICE VISIT (OUTPATIENT)
Dept: NEUROLOGY | Facility: MEDICAL CENTER | Age: 72
End: 2022-11-08
Attending: PSYCHIATRY & NEUROLOGY
Payer: MEDICARE

## 2022-11-08 VITALS
OXYGEN SATURATION: 93 % | RESPIRATION RATE: 18 BRPM | HEIGHT: 74 IN | WEIGHT: 186.95 LBS | SYSTOLIC BLOOD PRESSURE: 118 MMHG | DIASTOLIC BLOOD PRESSURE: 82 MMHG | TEMPERATURE: 98.9 F | BODY MASS INDEX: 23.99 KG/M2 | HEART RATE: 68 BPM

## 2022-11-08 DIAGNOSIS — G20.A1 PARKINSON DISEASE (HCC): ICD-10-CM

## 2022-11-08 DIAGNOSIS — G25.81 RESTLESS LEG SYNDROME: ICD-10-CM

## 2022-11-08 PROCEDURE — 99215 OFFICE O/P EST HI 40 MIN: CPT | Performed by: PSYCHIATRY & NEUROLOGY

## 2022-11-08 PROCEDURE — 99212 OFFICE O/P EST SF 10 MIN: CPT | Performed by: PSYCHIATRY & NEUROLOGY

## 2022-11-08 RX ORDER — CARBIDOPA/LEVODOPA 25MG-250MG
2 TABLET ORAL 4 TIMES DAILY
Qty: 720 TABLET | Refills: 3 | Status: SHIPPED | OUTPATIENT
Start: 2022-11-08 | End: 2023-05-11 | Stop reason: SDUPTHER

## 2022-11-08 RX ORDER — ROPINIROLE 3 MG/1
3 TABLET, FILM COATED ORAL 3 TIMES DAILY
Qty: 270 TABLET | Refills: 3 | Status: SHIPPED | OUTPATIENT
Start: 2022-11-08 | End: 2023-12-28

## 2022-11-08 RX ORDER — AMANTADINE HYDROCHLORIDE 100 MG/1
100 TABLET ORAL 2 TIMES DAILY
Qty: 180 TABLET | Refills: 3 | Status: SHIPPED | OUTPATIENT
Start: 2022-11-08 | End: 2023-05-11

## 2022-11-08 RX ORDER — GABAPENTIN 300 MG/1
600 CAPSULE ORAL EVERY EVENING
Qty: 180 CAPSULE | Refills: 3 | Status: SHIPPED | OUTPATIENT
Start: 2022-11-08 | End: 2023-12-28 | Stop reason: SDUPTHER

## 2022-11-08 ASSESSMENT — FIBROSIS 4 INDEX: FIB4 SCORE: 2.78

## 2022-11-08 ASSESSMENT — ENCOUNTER SYMPTOMS
MEMORY LOSS: 0
FOCAL WEAKNESS: 0
INSOMNIA: 0
HALLUCINATIONS: 0
TREMORS: 0
LOSS OF CONSCIOUSNESS: 0
FALLS: 0

## 2022-11-08 NOTE — PROGRESS NOTES
Subjective     Farhat Huber is a 72 y.o. male who presents with his wife Nolvia, who provides all history, for follow-up, with a history of moderate Parkinson's disease, now status postmanipulation of his medication regimen by Dr. Moose Dickens from the [facility] in [place], [place].    HPI    Unfortunately Farhat's severe hypophonia prevents any meaningful communication though he is certainly awake and alert and comprehends everything that is transpiring.  When last seen, we had been adjusting his dopamine regimen slightly, he had been having more more problems with the axial rigidity, postural instability, and difficulty with gait initiation.    Via teleconference, they communicated with Dr. Dickens.  Ropinirole was increased from a single milligram dose 3 times a day, now 3 mg, 3 times a day.  There are no excessive dyskinesias, dystonia, hallucinations or daytime drowsiness with this dose escalation.  Nolvia feels that he is doing better, he is not so convinced.  They are also going to the gym daily, he is either in the water doing aerobics, walking a treadmill, lifting weights at low resistance, etc.  She feels that this is providing meaningful benefit as well.  Attempts were made using newer dopamine formulations as well as MAO-B inhibitors, none of which provided meaningful benefit.    Cognition is still intact, though it is a little slow with his mental processing.  Communication is still problematic because of the softening of his voice.  He swallows easily, speech therapy evaluation revealed no significant pathology other than some respiratory compromise.    Tremor is not an issue for him.  His handwriting has been severely curtailed.  The loss of dexterity with the fingers also problematic.  The right hand is always worse than the left.  As he stands, there is much more hesitancy, he freezes commonly, also when turning corners, etc.  Stride length is shortened, but with his walker, he  "actually can do very well.  They have a motorized scooter which he uses around the house or when they are out and about.  Though he is at risk, he has not fallen.  They are also beginning to notice fluctuations from day today, on bad days his energy level is diminished, even with the medications and exercises, walking and movements in general are simply more unsteady and slower, respectively.  There are no on/off episodes on sustained basis.  Nolvia is not keenly aware of any type of wearing-off as his dosing regimen throughout the day is followed.    He staggers dosing, he takes Sinemet 25/250 at 9 AM, 1 PM, 5 PM and 9 PM, ropinirole 3 mg at 5 AM, 1 PM and 9 PM, and amantadine 100 mg at 9 AM and 5 PM.    His RLS symptoms continue to respond though they have now changed his gabapentin to a single 600 mg dose taken 2 hours before bed.  He has not complained of augmentation or rebound phenomena during the middle of the night.    Medical, surgical and family histories are reviewed, there are no new drug allergies.  He is on his Sinemet 25/250, ropinirole 3 mg, amantadine 100 mg, Neurontin 600 mg all as above, also Toprol-XL, baby aspirin, Lipitor, Mobic, and Prilosec.    Review of Systems   Constitutional:  Positive for malaise/fatigue.   Musculoskeletal:  Negative for falls.   Neurological:  Negative for tremors, focal weakness and loss of consciousness.   Psychiatric/Behavioral:  Negative for hallucinations and memory loss. The patient does not have insomnia.    All other systems reviewed and are negative.    Objective     /82 (BP Location: Right arm, Patient Position: Sitting, BP Cuff Size: Adult)   Pulse 68   Temp 37.2 °C (98.9 °F) (Temporal)   Resp 18   Ht 1.88 m (6' 2\")   Wt 84.8 kg (186 lb 15.2 oz)   SpO2 93%   BMI 24.00 kg/m²      Physical Exam    He appears in no acute distress.  His vital signs are stable.  There is no malar rash or sialorrhea.  The neck is supple.  Cardiac evaluation is " unremarkable.     Neurological Exam    Is severe hypophonia prevents any meaningful cognitive assessment, though he is awake and alert, names and repeats easily, there is no apraxia or inattention.    PERRLA/EOMI, glabellar tap is present, there is the severe hypophonia making him almost unintelligible as he talks, but facial movements are symmetric, visual fields are full.  There is no sensory loss to pinprick.  The tongue and uvula are midline.  Shoulder shrug and head rotation are slowed but symmetric.    Musculoskeletal exam reveals significant bradykinesia, no tremor at rest in the upper extremities, and rigidity still more prominent on the right.  There is no focal weakness.    Movements are simply slower on the right side though in general they are very slow.  There is no appendicular dystaxia with the upper extremities.  Repetitive movements with the fingers are significantly diminished in amplitude.  He is very unsteady as he stands, there is clear hesitancy on gait initiation, stride length is diminished, he freezes when turning corners.  When all of these activities are repeated with a walker, he actually does incredibly well with maintain stride length, little freezing, etc.  There is pronounced postural instability.    Sensory is grossly intact to light touch.    Assessment & Plan     1. Parkinson disease (HCC)  Things have progressed to a degree since he was last seen, despite his significant increase in his dopamine stimulation with the tripling of his ropinirole dosing.  He is tolerating it well, I will defer subsequent treatment recommendations to Dr. Dickens.  Having failed a trial of some of the new Medications that inhibit dopa decarboxylase and MAO-B activity (Xadago), as well as inhalation formulations of dopamine, we are left with traditional oral direct dopamine stimulation.  He is already on a very high dose of levodopa, thus the adjustments with ropinirole.  I encouraged him to continue to be  physically active, going to the gym daily is an ideal treatment that is nonpharmacologically based and has data supporting efficacy.    - carbidopa-levodopa (SINEMET)  MG Tab; Take 2 Tablets by mouth 4 times a day. Take at 9AM, 1PM, 5PM and 9PM  Dispense: 720 Tablet; Refill: 3  - ROPINIRole (REQUIP) 3 MG tablet; Take 1 Tablet by mouth 3 times a day. Take at 5AM, 1PM, and 9PM  Dispense: 270 Tablet; Refill: 3  - amantadine (SYMMETREL) 100 MG Tab; Take 1 Tablet by mouth 2 times a day. Take at 9AM and 5PM  Dispense: 180 Tablet; Refill: 3    2. Restless leg syndrome  Neurontin will be continued unchanged.  He is tolerating this well, has not required the use of melatonin.    - gabapentin (NEURONTIN) 300 MG Cap; Take 2 Capsules by mouth every evening.  Dispense: 180 Capsule; Refill: 3    We will follow-up in 6 months.    Time: 55 minutes in total spent on patient care including precharting, record review, discussion with healthcare staff and documentation.  This includes face-to-face time for exam, review, discussion, as well as counseling and coordinating care.

## 2022-11-10 ENCOUNTER — PATIENT MESSAGE (OUTPATIENT)
Dept: HEALTH INFORMATION MANAGEMENT | Facility: OTHER | Age: 72
End: 2022-11-10

## 2022-12-02 NOTE — CARE PLAN
Problem: Safety  Goal: Free from accidental injury  7/10/2020 1822 by Aisha Farooq R.N.  Outcome: PROGRESSING AS EXPECTED  7/10/2020 1822 by Aisha Farooq R.N.  Outcome: PROGRESSING AS EXPECTED      Alert and oriented to person, place and time

## 2022-12-10 SDOH — HEALTH STABILITY: PHYSICAL HEALTH: ON AVERAGE, HOW MANY DAYS PER WEEK DO YOU ENGAGE IN MODERATE TO STRENUOUS EXERCISE (LIKE A BRISK WALK)?: 5 DAYS

## 2022-12-10 SDOH — ECONOMIC STABILITY: FOOD INSECURITY: WITHIN THE PAST 12 MONTHS, THE FOOD YOU BOUGHT JUST DIDN'T LAST AND YOU DIDN'T HAVE MONEY TO GET MORE.: NEVER TRUE

## 2022-12-10 SDOH — ECONOMIC STABILITY: FOOD INSECURITY: WITHIN THE PAST 12 MONTHS, YOU WORRIED THAT YOUR FOOD WOULD RUN OUT BEFORE YOU GOT MONEY TO BUY MORE.: NEVER TRUE

## 2022-12-10 SDOH — ECONOMIC STABILITY: INCOME INSECURITY: IN THE LAST 12 MONTHS, WAS THERE A TIME WHEN YOU WERE NOT ABLE TO PAY THE MORTGAGE OR RENT ON TIME?: NO

## 2022-12-10 SDOH — HEALTH STABILITY: PHYSICAL HEALTH: ON AVERAGE, HOW MANY MINUTES DO YOU ENGAGE IN EXERCISE AT THIS LEVEL?: 30 MIN

## 2022-12-10 SDOH — ECONOMIC STABILITY: HOUSING INSECURITY

## 2022-12-10 SDOH — ECONOMIC STABILITY: INCOME INSECURITY: HOW HARD IS IT FOR YOU TO PAY FOR THE VERY BASICS LIKE FOOD, HOUSING, MEDICAL CARE, AND HEATING?: NOT HARD AT ALL

## 2022-12-10 SDOH — HEALTH STABILITY: MENTAL HEALTH
STRESS IS WHEN SOMEONE FEELS TENSE, NERVOUS, ANXIOUS, OR CAN'T SLEEP AT NIGHT BECAUSE THEIR MIND IS TROUBLED. HOW STRESSED ARE YOU?: TO SOME EXTENT

## 2022-12-10 ASSESSMENT — SOCIAL DETERMINANTS OF HEALTH (SDOH)
HOW OFTEN DO YOU ATTEND CHURCH OR RELIGIOUS SERVICES?: PATIENT DECLINED
DO YOU BELONG TO ANY CLUBS OR ORGANIZATIONS SUCH AS CHURCH GROUPS UNIONS, FRATERNAL OR ATHLETIC GROUPS, OR SCHOOL GROUPS?: NO
HOW OFTEN DO YOU ATTEND CHURCH OR RELIGIOUS SERVICES?: PATIENT DECLINED
HOW HARD IS IT FOR YOU TO PAY FOR THE VERY BASICS LIKE FOOD, HOUSING, MEDICAL CARE, AND HEATING?: NOT HARD AT ALL
HOW OFTEN DO YOU GET TOGETHER WITH FRIENDS OR RELATIVES?: MORE THAN THREE TIMES A WEEK
WITHIN THE PAST 12 MONTHS, YOU WORRIED THAT YOUR FOOD WOULD RUN OUT BEFORE YOU GOT THE MONEY TO BUY MORE: NEVER TRUE
HOW MANY DRINKS CONTAINING ALCOHOL DO YOU HAVE ON A TYPICAL DAY WHEN YOU ARE DRINKING: 1 OR 2
IN A TYPICAL WEEK, HOW MANY TIMES DO YOU TALK ON THE PHONE WITH FAMILY, FRIENDS, OR NEIGHBORS?: MORE THAN THREE TIMES A WEEK
HOW OFTEN DO YOU HAVE SIX OR MORE DRINKS ON ONE OCCASION: NEVER
IN A TYPICAL WEEK, HOW MANY TIMES DO YOU TALK ON THE PHONE WITH FAMILY, FRIENDS, OR NEIGHBORS?: MORE THAN THREE TIMES A WEEK
HOW OFTEN DO YOU HAVE A DRINK CONTAINING ALCOHOL: 2-3 TIMES A WEEK
DO YOU BELONG TO ANY CLUBS OR ORGANIZATIONS SUCH AS CHURCH GROUPS UNIONS, FRATERNAL OR ATHLETIC GROUPS, OR SCHOOL GROUPS?: NO
HOW OFTEN DO YOU ATTENT MEETINGS OF THE CLUB OR ORGANIZATION YOU BELONG TO?: PATIENT DECLINED
HOW OFTEN DO YOU ATTENT MEETINGS OF THE CLUB OR ORGANIZATION YOU BELONG TO?: PATIENT DECLINED
HOW OFTEN DO YOU GET TOGETHER WITH FRIENDS OR RELATIVES?: MORE THAN THREE TIMES A WEEK

## 2022-12-10 ASSESSMENT — LIFESTYLE VARIABLES
AUDIT-C TOTAL SCORE: 3
HOW MANY STANDARD DRINKS CONTAINING ALCOHOL DO YOU HAVE ON A TYPICAL DAY: 1 OR 2
SKIP TO QUESTIONS 9-10: 1
HOW OFTEN DO YOU HAVE A DRINK CONTAINING ALCOHOL: 2-3 TIMES A WEEK
HOW OFTEN DO YOU HAVE SIX OR MORE DRINKS ON ONE OCCASION: NEVER

## 2022-12-12 ENCOUNTER — OFFICE VISIT (OUTPATIENT)
Dept: MEDICAL GROUP | Facility: MEDICAL CENTER | Age: 72
End: 2022-12-12
Payer: MEDICARE

## 2022-12-12 VITALS
TEMPERATURE: 97.3 F | DIASTOLIC BLOOD PRESSURE: 74 MMHG | OXYGEN SATURATION: 97 % | WEIGHT: 186.7 LBS | SYSTOLIC BLOOD PRESSURE: 102 MMHG | HEIGHT: 74 IN | BODY MASS INDEX: 23.96 KG/M2 | HEART RATE: 64 BPM

## 2022-12-12 DIAGNOSIS — E78.49 OTHER HYPERLIPIDEMIA: ICD-10-CM

## 2022-12-12 DIAGNOSIS — G89.29 CHRONIC RIGHT-SIDED LOW BACK PAIN WITHOUT SCIATICA: ICD-10-CM

## 2022-12-12 DIAGNOSIS — F51.01 PRIMARY INSOMNIA: ICD-10-CM

## 2022-12-12 DIAGNOSIS — G25.81 RESTLESS LEG SYNDROME: ICD-10-CM

## 2022-12-12 DIAGNOSIS — G20.A1 PARKINSON DISEASE (HCC): ICD-10-CM

## 2022-12-12 DIAGNOSIS — K21.9 GASTROESOPHAGEAL REFLUX DISEASE WITHOUT ESOPHAGITIS: ICD-10-CM

## 2022-12-12 DIAGNOSIS — M54.50 CHRONIC RIGHT-SIDED LOW BACK PAIN WITHOUT SCIATICA: ICD-10-CM

## 2022-12-12 PROCEDURE — 99214 OFFICE O/P EST MOD 30 MIN: CPT | Performed by: FAMILY MEDICINE

## 2022-12-12 RX ORDER — ROPINIROLE 2 MG/1
2 TABLET, FILM COATED ORAL 3 TIMES DAILY
COMMUNITY
Start: 2022-12-07 | End: 2022-12-12

## 2022-12-12 RX ORDER — OMEPRAZOLE 20 MG/1
CAPSULE, DELAYED RELEASE ORAL
Qty: 90 CAPSULE | Refills: 3 | Status: SHIPPED | OUTPATIENT
Start: 2022-12-12

## 2022-12-12 ASSESSMENT — FIBROSIS 4 INDEX: FIB4 SCORE: 2.78

## 2022-12-13 NOTE — PROGRESS NOTES
"Subjective:     CC: Diagnoses of Parkinson disease (HCC) and Gastroesophageal reflux disease without esophagitis were pertinent to this visit.    HPI: Patient is a 72 y.o. male established patient who presents today to follow up.       Parkinson disease (HCC)  Chronic problem. Unfortunately progressing.   He sees both Dr. Mclain here in [place] and specialist at [place], Dr. Dickens.  Warm water therapy TIW  strenght training TIW  Weight stable.     Restless leg syndrome  Chronic problem. Managed through neurology, Dr. Mclain. Currently on ropinirole. Generally well controlled with that medication.      Primary insomnia  Chronic problem. Sleeping well with the ropinirole.      Other hyperlipidemia  Chronic problem, currently on a high intensity statin, lipid panel well controlled.     Right-sided low back pain without sciatica  Chronic problem. Generally well controlled with gabapentin and meloxicam.     Past Medical History:   Diagnosis Date    Arthritis     osteo    Back spasm     Blood clotting disorder ([facility]) 2020    \"heart\"    GOUT     Gout     Hypertension     Myocardial infarct ([facility]) 2020    Parkinson disease ([facility])     Stroke ([facility]) 2020       Social History     Tobacco Use    Smoking status: Former     Types: Cigarettes     Quit date: 1970     Years since quittin.9    Smokeless tobacco: Never   Vaping Use    Vaping Use: Never used   Substance Use Topics    Alcohol use: Yes     Alcohol/week: 4.2 oz     Types: 4 Glasses of wine, 3 Standard drinks or equivalent per week     Comment: daily    Drug use: No       Current Outpatient Medications Ordered in Epic   Medication Sig Dispense Refill    omeprazole (PRILOSEC) 20 MG delayed-release capsule TAKE 1 CAPSULE BY MOUTH ONCE A DAY 30 MINUTES BEFORE BREAKFAST MEAL 90 Capsule 3    gabapentin (NEURONTIN) 300 MG Cap Take 2 Capsules by mouth every evening. 180 Capsule 3    carbidopa-levodopa (SINEMET)  MG Tab Take 2 Tablets by mouth 4 times a day. Take at " "9AM, 1PM, 5PM and 9PM 720 Tablet 3    ROPINIRole (REQUIP) 3 MG tablet Take 1 Tablet by mouth 3 times a day. Take at 5AM, 1PM, and 9PM 270 Tablet 3    amantadine (SYMMETREL) 100 MG Tab Take 1 Tablet by mouth 2 times a day. Take at 9AM and 5PM 180 Tablet 3    atorvastatin (LIPITOR) 80 MG tablet TAKE 1 TABLET BY MOUTH EVERY DAY IN THE EVENING 90 Tablet 2    meloxicam (MOBIC) 15 MG tablet TAKE 1 TABLET BY MOUTH EVERYDAY WITH FOOD 30 Tablet 3    metoprolol SR (TOPROL XL) 50 MG TABLET SR 24 HR TAKE 1 TABLET BY MOUTH EVERY DAY 90 Tablet 2    aspirin 81 MG EC tablet TAKE 1 TABLET BY MOUTH EVERY DAY 90 Tablet 1    ketoconazole (NIZORAL) 2 % Cream       acetaminophen (TYLENOL) 500 MG Tab Take 2 Tablets by mouth 3 times a day.       No current [facility]-ordered facility-administered medications on file.       Allergies:  Nkda [no known drug allergy]    Health Maintenance: Completed      Objective:       Exam:  /74 (BP Location: Right arm, Patient Position: Sitting, BP Cuff Size: Adult long)   Pulse 64   Temp 36.3 °C (97.3 °F) (Temporal)   Ht 1.88 m (6' 2\")   Wt 84.7 kg (186 lb 11.2 oz)   SpO2 97%   BMI 23.97 kg/m²  Body mass index is 23.97 kg/m².      General: Normal appearing. No distress.  HEAD: NCAT  EYES: conjunctiva clear, lids without ptosis, pupils equal  and reactive to light  Neck: Supple without masses. Thyroid is not enlarged. Normal ROM  Pulmonary: Clear to ausculation.  Normal effort. No rales, ronchi, or wheezing.  Cardiovascular: Regular rate and rhythm, no murmur. No LE edema  Lymph: No cervical or supraclavicular lymph nodes are palpable    Labs: 2/17/22 Results reviewed and discussed with the patient, questions answered.    Assessment & Plan:     72 y.o. male with the following -     1. Parkinson disease (HCC)  Chronic problem. Worsening. Followed closely by neurology here in [place] and in [place].     2. Gastroesophageal reflux disease without esophagitis  Chronic problem. Well controlled with PPI.   - " omeprazole (PRILOSEC) 20 MG delayed-release capsule; TAKE 1 CAPSULE BY MOUTH ONCE A DAY 30 MINUTES BEFORE BREAKFAST MEAL  Dispense: 90 Capsule; Refill: 3    3. Restless leg syndrome  Chronic problem. Well controlled on ropinerole.     4. Primary insomnia  Well controlled with ropinerol.     5. Other hyperlipidemia  Well controlled on atorvastatin 80mg daily.     6. Chronic right-sided low back pain without sciatica  Chronic problem. Followed by Dr. Ceron. Well controlled on gabapentin and meloxicam.        Return in about 3 months (around 3/12/2023).    Please note that this dictation was created using voice recognition software. I have made every reasonable attempt to correct obvious errors, but I expect that there are errors of grammar and possibly content that I did not discover before finalizing the note.

## 2022-12-14 ENCOUNTER — APPOINTMENT (RX ONLY)
Dept: URBAN - METROPOLITAN AREA CLINIC 35 | Facility: CLINIC | Age: 72
Setting detail: DERMATOLOGY
End: 2022-12-14

## 2022-12-14 DIAGNOSIS — L82.1 OTHER SEBORRHEIC KERATOSIS: ICD-10-CM

## 2022-12-14 DIAGNOSIS — L21.8 OTHER SEBORRHEIC DERMATITIS: ICD-10-CM

## 2022-12-14 DIAGNOSIS — Z71.89 OTHER SPECIFIED COUNSELING: ICD-10-CM

## 2022-12-14 DIAGNOSIS — F42.4 EXCORIATION (SKIN-PICKING) DISORDER: ICD-10-CM

## 2022-12-14 DIAGNOSIS — L57.0 ACTINIC KERATOSIS: ICD-10-CM

## 2022-12-14 DIAGNOSIS — D22 MELANOCYTIC NEVI: ICD-10-CM

## 2022-12-14 DIAGNOSIS — L81.4 OTHER MELANIN HYPERPIGMENTATION: ICD-10-CM

## 2022-12-14 DIAGNOSIS — Z85.828 PERSONAL HISTORY OF OTHER MALIGNANT NEOPLASM OF SKIN: ICD-10-CM

## 2022-12-14 PROBLEM — S00.501A UNSPECIFIED SUPERFICIAL INJURY OF LIP, INITIAL ENCOUNTER: Status: ACTIVE | Noted: 2022-12-14

## 2022-12-14 PROBLEM — D22.4 MELANOCYTIC NEVI OF SCALP AND NECK: Status: ACTIVE | Noted: 2022-12-14

## 2022-12-14 PROCEDURE — 99213 OFFICE O/P EST LOW 20 MIN: CPT | Mod: 25

## 2022-12-14 PROCEDURE — ? TREATMENT REGIMEN

## 2022-12-14 PROCEDURE — 17000 DESTRUCT PREMALG LESION: CPT

## 2022-12-14 PROCEDURE — ? COUNSELING

## 2022-12-14 PROCEDURE — ? LIQUID NITROGEN

## 2022-12-14 PROCEDURE — ? DEFER

## 2022-12-14 PROCEDURE — ? PRESCRIPTION

## 2022-12-14 RX ORDER — KETOCONAZOLE 20 MG/ML
SMALL AMOUNT SHAMPOO, SUSPENSION TOPICAL QOD
Qty: 120 | Refills: 11 | Status: ERX | COMMUNITY
Start: 2022-12-14

## 2022-12-14 RX ADMIN — KETOCONAZOLE SMALL AMOUNT: 20 SHAMPOO, SUSPENSION TOPICAL at 00:00

## 2022-12-14 ASSESSMENT — LOCATION DETAILED DESCRIPTION DERM
LOCATION DETAILED: RIGHT MEDIAL TRAPEZIAL NECK
LOCATION DETAILED: LEFT SUPERIOR PARIETAL SCALP
LOCATION DETAILED: RIGHT SUPERIOR PARIETAL SCALP
LOCATION DETAILED: RIGHT LOWER CUTANEOUS LIP
LOCATION DETAILED: RIGHT CENTRAL EYEBROW
LOCATION DETAILED: RIGHT MID-UPPER BACK
LOCATION DETAILED: LEFT CENTRAL EYEBROW
LOCATION DETAILED: RIGHT INFERIOR MEDIAL FOREHEAD
LOCATION DETAILED: RIGHT SUPERIOR OCCIPITAL SCALP
LOCATION DETAILED: RIGHT SUPERIOR UPPER BACK

## 2022-12-14 ASSESSMENT — LOCATION SIMPLE DESCRIPTION DERM
LOCATION SIMPLE: RIGHT LIP
LOCATION SIMPLE: POSTERIOR SCALP
LOCATION SIMPLE: POSTERIOR NECK
LOCATION SIMPLE: LEFT EYEBROW
LOCATION SIMPLE: SCALP
LOCATION SIMPLE: RIGHT EYEBROW
LOCATION SIMPLE: RIGHT FOREHEAD
LOCATION SIMPLE: RIGHT UPPER BACK

## 2022-12-14 ASSESSMENT — LOCATION ZONE DERM
LOCATION ZONE: TRUNK
LOCATION ZONE: SCALP
LOCATION ZONE: LIP
LOCATION ZONE: FACE
LOCATION ZONE: NECK

## 2022-12-14 NOTE — PROCEDURE: LIQUID NITROGEN
Render Post-Care Instructions In Note?: no
Post-Care Instructions: I reviewed with the patient in detail post-care instructions. Patient is to wear sunprotection, and avoid picking at any of the treated lesions. Pt may apply Vaseline to crusted or scabbing areas.
Number Of Freeze-Thaw Cycles: 1 freeze-thaw cycle
Consent: The patient's consent was obtained including but not limited to risks of crusting, scabbing, blistering, scarring, darker or lighter pigmentary change, recurrence, incomplete removal and infection.
Detail Level: Detailed
Show Aperture Variable?: Yes
Duration Of Freeze Thaw-Cycle (Seconds): 10

## 2022-12-14 NOTE — PROCEDURE: DEFER
X Size Of Lesion In Cm (Optional): 0
Detail Level: Simple
Instructions (Optional): Will biopsy if still present at next appointment.  Only present for one week at this time.
Procedure To Be Performed At Next Visit: Biopsy by shave method
Introduction Text (Please End With A Colon): The following procedure was deferred:

## 2023-02-10 DIAGNOSIS — I25.5 ISCHEMIC CARDIOMYOPATHY: ICD-10-CM

## 2023-02-13 RX ORDER — METOPROLOL SUCCINATE 50 MG/1
TABLET, EXTENDED RELEASE ORAL
Qty: 90 TABLET | Refills: 1 | Status: SHIPPED | OUTPATIENT
Start: 2023-02-13 | End: 2023-08-29

## 2023-02-13 NOTE — TELEPHONE ENCOUNTER
Is the patient due for a refill? Yes    Was the patient seen the past year? Yes    Date of last office visit: 8/9/2022    Does the patient have an upcoming appointment?  Yes   If yes, When? 6/1/2023    Provider to refill:BE    Does the patients insurance require a 100 day supply?  No

## 2023-03-13 ENCOUNTER — OFFICE VISIT (OUTPATIENT)
Dept: MEDICAL GROUP | Facility: MEDICAL CENTER | Age: 73
End: 2023-03-13
Payer: MEDICARE

## 2023-03-13 VITALS
HEIGHT: 74 IN | TEMPERATURE: 97.8 F | OXYGEN SATURATION: 96 % | DIASTOLIC BLOOD PRESSURE: 62 MMHG | SYSTOLIC BLOOD PRESSURE: 92 MMHG | HEART RATE: 64 BPM | WEIGHT: 192.4 LBS | BODY MASS INDEX: 24.69 KG/M2

## 2023-03-13 DIAGNOSIS — G25.81 RESTLESS LEG SYNDROME: ICD-10-CM

## 2023-03-13 DIAGNOSIS — Z00.00 WELL ADULT EXAM: ICD-10-CM

## 2023-03-13 DIAGNOSIS — G20.A1 PARKINSON DISEASE (HCC): ICD-10-CM

## 2023-03-13 DIAGNOSIS — E78.49 OTHER HYPERLIPIDEMIA: ICD-10-CM

## 2023-03-13 DIAGNOSIS — R79.89 LOW VITAMIN D LEVEL: ICD-10-CM

## 2023-03-13 PROCEDURE — 99214 OFFICE O/P EST MOD 30 MIN: CPT | Performed by: FAMILY MEDICINE

## 2023-03-13 ASSESSMENT — FIBROSIS 4 INDEX: FIB4 SCORE: 2.78

## 2023-03-13 NOTE — Clinical Note
Hi,  Can you please let the paitent know that I have placed lab orders.It's been a year since last labs. I forgot to print them before they left.  Thanks,  Dr. Perez

## 2023-03-13 NOTE — ASSESSMENT & PLAN NOTE
Chronic problem. Unfortunately progressing.   He sees both Dr. Mclain here in [place] and specialist at [place], Dr. Dickens.  [facility] closed so no longer doing the warm water therapy.They are working on finding a new facilty.   strenght training TIW  Weight stable.   No longer working with speech, essentially no voice at all at this point.  He halved his carbidopa levadopa about 3 weeks ago and notes some improvement in being able to sit up  No voice at all but that has been like that x 6 months.   No change in gait with the 1/2 dose.

## 2023-03-14 NOTE — PROGRESS NOTES
"Subjective:     CC: The encounter diagnosis was Parkinson disease ([facility]).    HPI: Patient is a 72 y.o. male established patient who presents today for 3 months follow up.       Parkinson disease (HCC)  Chronic problem. Unfortunately progressing.   He sees both Dr. Mclain here in [place] and specialist at [place], Dr. Dickens.  [facility] closed so no longer doing the warm water therapy.They are working on finding a new facilty.   strenght training TIW  Weight stable.   No longer working with speech, essentially no voice at all at this point.  He halved his carbidopa levadopa about 3 weeks ago and notes some improvement in being able to sit up  No voice at all but that has been like that x 6 months.   No change in gait with the 1/2 dose.     Past Medical History:   Diagnosis Date    Arthritis     osteo    Back spasm     Blood clotting disorder ([facility]) 2020    \"heart\"    GOUT     Gout     Hypertension     Myocardial infarct ([facility]) 2020    Parkinson disease ([facility])     Stroke ([facility]) 2020       Social History     Tobacco Use    Smoking status: Former     Types: Cigarettes     Quit date: 1970     Years since quittin.2    Smokeless tobacco: Never   Vaping Use    Vaping Use: Never used   Substance Use Topics    Alcohol use: Not Currently     Alcohol/week: 1.2 oz     Types: 2 Glasses of wine per week     Comment: once a week    Drug use: No       Current Outpatient Medications Ordered in Epic   Medication Sig Dispense Refill    metoprolol SR (TOPROL XL) 50 MG TABLET SR 24 HR TAKE 1 TABLET BY MOUTH EVERY DAY 90 Tablet 1    carbidopa-levodopa (SINEMET)  MG Tab Take 2 Tablets by mouth 4 times a day. Take at 9AM, 1PM, 5PM and 9PM (Patient taking differently: Take 1 Tablet by mouth 4 times a day. Take at 9AM, 1PM, 5PM and 9PM) 720 Tablet 3    ROPINIRole (REQUIP) 3 MG tablet Take 1 Tablet by mouth 3 times a day. Take at 5AM, 1PM, and 9PM (Patient taking differently: Take 4 mg by mouth 3 times a day. Take at 5AM, 1PM, and " "9PM) 270 Tablet 3    meloxicam (MOBIC) 15 MG tablet TAKE 1 TABLET BY MOUTH EVERYDAY WITH FOOD 30 Tablet 3    omeprazole (PRILOSEC) 20 MG delayed-release capsule TAKE 1 CAPSULE BY MOUTH ONCE A DAY 30 MINUTES BEFORE BREAKFAST MEAL 90 Capsule 3    gabapentin (NEURONTIN) 300 MG Cap Take 2 Capsules by mouth every evening. 180 Capsule 3    amantadine (SYMMETREL) 100 MG Tab Take 1 Tablet by mouth 2 times a day. Take at 9AM and 5PM 180 Tablet 3    atorvastatin (LIPITOR) 80 MG tablet TAKE 1 TABLET BY MOUTH EVERY DAY IN THE EVENING 90 Tablet 2    aspirin 81 MG EC tablet TAKE 1 TABLET BY MOUTH EVERY DAY 90 Tablet 1    ketoconazole (NIZORAL) 2 % Cream       acetaminophen (TYLENOL) 500 MG Tab Take 2 Tablets by mouth 3 times a day.       No current [facility]-ordered facility-administered medications on file.       Allergies:  Nkda [no known drug allergy]    Health Maintenance: Completed          Objective:       Exam:  BP 92/62 (BP Location: Left arm, Patient Position: Sitting)   Pulse 64   Temp 36.6 °C (97.8 °F) (Temporal)   Ht 1.88 m (6' 2\")   Wt 87.3 kg (192 lb 6.4 oz)   SpO2 96%   BMI 24.70 kg/m²  Body mass index is 24.7 kg/m².    General: Normal appearing. No distress.  HEAD: NCAT  EYES: conjunctiva clear, lids without ptosis, pupils equal and reactive to light  EARS: ears normal shape and contour.  MOUTH: normal dentition   Neck:  Normal ROM  Pulmonary: CTAB, no W/R/R. Normal effort. Normal respiratory rate.  Cardiovascular: RRR, no M/R/G. Well perfused. No LE edema  Neurologic: Grossly normal, no focal deficits  Skin: Warm and dry.  No obvious lesions.  Musculoskeletal: Normal gait and station.   Psych: Normal mood and affect. Alert and oriented x3. Judgment and insight is normal.       Labs: 2/17/22 Results reviewed and discussed with the patient, questions answered.    Assessment & Plan:     72 y.o. male with the following -     1. Parkinson disease (HCC)  Chronic problem. Followed by neurology here in [place] as well as " [facility]. Working on exercise and strength. Recently reduced his carbidopa levadopa and has had some improvement in posture and change in gait. Will discuss with neuro at next appt.   - Comp Metabolic Panel; Future  - VITAMIN B12; Future    2. Restless leg syndrome  - CBC WITHOUT DIFFERENTIAL; Future  - TSH WITH REFLEX TO FT4; Future    3. Other hyperlipidemia  - Lipid Profile; Future    4. Well adult exam    5. Low vitamin D level  - VITAMIN D,25 HYDROXY (DEFICIENCY); Future       No follow-ups on file.    Please note that this dictation was created using voice recognition software. I have made every reasonable attempt to correct obvious errors, but I expect that there are errors of grammar and possibly content that I did not discover before finalizing the note.

## 2023-04-04 NOTE — TELEPHONE ENCOUNTER
Was the patient seen in the last year in this department? Yes     Does patient have an active prescription for medications requested? No     Received Request Via: Pharmacy     Future Appointments       Provider Department [place]    9/11/2018 1:00 PM Shawna Fay M.D. [facility] [place] [facility]    9/25/2018 11:40 AM Lalo Mclain M.D. [facility] Neurology              [phone]

## 2023-04-14 ENCOUNTER — TELEPHONE (OUTPATIENT)
Dept: HEALTH INFORMATION MANAGEMENT | Facility: OTHER | Age: 73
End: 2023-04-14
Payer: MEDICARE

## 2023-05-09 ENCOUNTER — TELEPHONE (OUTPATIENT)
Dept: NEUROLOGY | Facility: MEDICAL CENTER | Age: 73
End: 2023-05-09
Payer: MEDICARE

## 2023-05-09 NOTE — TELEPHONE ENCOUNTER
NEUROLOGY PATIENT PRE-VISIT PLANNING     Patient was NOT contacted to complete PVP.    Patient Appointment is scheduled as: Established Patient     Is visit type and length scheduled correctly? Yes    [facility] Patient Demographics? Yes    3.   Is referral attached to visit? Yes    4. Were records received from referring provider? Yes    4. Patient was NOT contacted to have someone accompany them to visit.     5. Is this appointment scheduled as a Hospital Follow-Up?  No    6. Does the patient require any pre procedure or post procedure follow up? No    7. If any orders were placed at last visit or intended to be done for this visit do we have Results/Consult Notes? Yes  Labs - Labs were not ordered at last office visit.  Imaging - Imaging was not ordered at last office visit.  Referrals - No referrals were ordered at last office visit.    8. If patient appointment is for Botox - is order pended for provider? N/A

## 2023-05-11 ENCOUNTER — OFFICE VISIT (OUTPATIENT)
Dept: NEUROLOGY | Facility: MEDICAL CENTER | Age: 73
End: 2023-05-11
Attending: PSYCHIATRY & NEUROLOGY
Payer: MEDICARE

## 2023-05-11 VITALS
HEART RATE: 71 BPM | WEIGHT: 191.14 LBS | SYSTOLIC BLOOD PRESSURE: 130 MMHG | HEIGHT: 74 IN | BODY MASS INDEX: 24.53 KG/M2 | OXYGEN SATURATION: 96 % | TEMPERATURE: 96.9 F | DIASTOLIC BLOOD PRESSURE: 60 MMHG

## 2023-05-11 DIAGNOSIS — G20.A1 PARKINSON DISEASE (HCC): Primary | ICD-10-CM

## 2023-05-11 DIAGNOSIS — G25.81 RESTLESS LEG SYNDROME: ICD-10-CM

## 2023-05-11 PROCEDURE — 99212 OFFICE O/P EST SF 10 MIN: CPT | Performed by: PSYCHIATRY & NEUROLOGY

## 2023-05-11 PROCEDURE — 99215 OFFICE O/P EST HI 40 MIN: CPT | Performed by: PSYCHIATRY & NEUROLOGY

## 2023-05-11 PROCEDURE — 1125F AMNT PAIN NOTED PAIN PRSNT: CPT | Performed by: PSYCHIATRY & NEUROLOGY

## 2023-05-11 PROCEDURE — 3075F SYST BP GE 130 - 139MM HG: CPT | Performed by: PSYCHIATRY & NEUROLOGY

## 2023-05-11 PROCEDURE — 3078F DIAST BP <80 MM HG: CPT | Performed by: PSYCHIATRY & NEUROLOGY

## 2023-05-11 RX ORDER — CARBIDOPA/LEVODOPA 25MG-250MG
1 TABLET ORAL 4 TIMES DAILY
Qty: 360 TABLET | Refills: 3 | Status: SHIPPED | OUTPATIENT
Start: 2023-05-11 | End: 2023-06-01

## 2023-05-11 RX ORDER — AMANTADINE HYDROCHLORIDE 100 MG/1
100 TABLET ORAL 2 TIMES DAILY
Qty: 180 TABLET | Refills: 3 | Status: SHIPPED | OUTPATIENT
Start: 2023-05-11 | End: 2023-08-22 | Stop reason: SDUPTHER

## 2023-05-11 ASSESSMENT — ENCOUNTER SYMPTOMS
HALLUCINATIONS: 0
MEMORY LOSS: 0
CONSTIPATION: 0
TREMORS: 0
INSOMNIA: 0
NERVOUS/ANXIOUS: 0
FALLS: 0

## 2023-05-11 ASSESSMENT — FIBROSIS 4 INDEX: FIB4 SCORE: 2.78

## 2023-05-11 ASSESSMENT — PATIENT HEALTH QUESTIONNAIRE - PHQ9: CLINICAL INTERPRETATION OF PHQ2 SCORE: 0

## 2023-05-12 NOTE — PROGRESS NOTES
Subjective     Farhat Huber is a 72 y.o. male who presents with his wife Nolvia, who does all of the talking given his severe hypophonia, for 6-month follow-up, with a history of moderate asymmetric Parkinson's disease and RLS.     HPI    Seen 6 months ago, Farhat had been doing fairly well on a regimen of Sinemet, Symmetrel and Requip.  Being followed by [facility]ose [facility] [place], [place], they are scheduled to see him in July.    Since they had seen me, we had talked about making some micro adjustments to the medication scheduling Dr. Dickens had given them.  Thus, they cut the Sinemet dosing down by 50%, he takes just 1 tablet 4 times a day, they have also changed the timing to begin at 6 AM, then 10 AM, 2 PM, 6 PM and then 10 PM.  They still need to maintain a 4-hour interval to avoid wearing-off.  He takes the Sinemet at 10 AM through 10 PM.  They increased her Requip dosing from 3 mg to 4 mg, which he now takes at 6 AM, 2 PM and 10 PM.  Amantadine is still 100 mg at 10 AM and 6 PM.    With this regimen his overall energy level has improved, he finds it a little easier to get up and move throughout the day.  The tremor with the right hand has always remained well controlled.  They do not see wearing-off, he still has the freezing episodes but he seems to be able to break out of the more easily.  He has not had problems with hallucinations, sleep episodes, impulse control issues, hypotension or syncope, or GI disturbances.    From a motor standpoint, they deny major fluctuations, the freezing does occur as above, but he does not have on/off episodes.  He has been able to get back to the gym and works out regularly which has made a big difference as well.  They deny noticeable dyskinesias or dystonia.    Cognitively he is still intact, though the mental processing speed is still frustratingly slowed.  He does not drool, food goes down the right tube as he chews, he chews smaller  "portions.  The hypophonia is the most frustrating thing for him.    The RLS symptoms actually respond well to his gabapentin dosing in the evening as well as the final dose of ropinirole 4 mg at 10 PM.  The higher dose of Requip certainly has provided additional benefit.  They deny augmentation or rebound phenomena.  If he gets up at night to go to the bathroom he can fall back to sleep easily enough.    Medical, surgical and family histories are reviewed, there are no new drug allergies.  He is on Sinemet 25/250, Requip 4 mg and Symmetrel 100 mg taken as above with staggered dosing as described, also Prilosec, Toprol-XL, baby aspirin, Lipitor, Neurontin 600 mg 2 hours before bed, Tylenol, and Mobic.    Review of Systems   Constitutional:  Positive for malaise/fatigue.   Gastrointestinal:  Negative for constipation.   Musculoskeletal:  Negative for falls.   Neurological:  Negative for tremors.   Psychiatric/Behavioral:  Negative for hallucinations and memory loss. The patient is not nervous/anxious and does not have insomnia.    All other systems reviewed and are negative.    Objective     /60 (BP Location: Right arm, Patient Position: Sitting, BP Cuff Size: Adult)   Pulse 71   Temp 36.1 °C (96.9 °F) (Temporal)   Ht 1.88 m (6' 2\")   Wt 86.7 kg (191 lb 2.2 oz)   SpO2 96%   BMI 24.54 kg/m²      Physical Exam    He appears in no acute distress.  He looks much brighter today.  Vital signs are stable.  There is no malar rash or sialorrhea.  The neck is supple, range of motion is full.  He still is quite hunched and shows significant scoliosis at the base of the neck.  Cardiac evaluation is unremarkable.     Neurological Exam    He is oriented, there is no aphasia, apraxia, or inattention.  He follows commands easily.  Mental processing speed is still slowed.    PERRLA/EOMI, visual fields are full, the profound hypophonia and tachyphemia are unchanged.  Facial movements are symmetric, sensory exam is intact to " temperature.  Uvula is midline, he has difficulty protruding the tongue as well as with lateral movements.  Shoulder shrug and head rotation are slowed but symmetric.    Musculoskeletal exam again reveals no tremor, rigidity is present bilaterally though it is milder.  There is still the bradykinesia.  Strength is intact throughout.    He stands slowly, there is still significant hesitancy and actual freezing as he walks, stride length is shortened, he requires multiple steps to turn and only does so with assistance from the examiner.  With his walker, gait is much improved, he picks his feet up, stride length is increased, turning is much easier.  There is notable postural instability, regardless.  Still these movements in general appear easier and more spontaneous.  There is no appendicular dystaxia.  Repetitive movements still show the diminished amplitudes and increased frequencies bilaterally, right greater than left-sided in all 4 extremities.    Sensory exam is grossly intact to temperature.    Assessment & Plan     1. Parkinson disease (HCC)  I think making this small change between Requip dose escalation and Sinemet dose decrease has proven effective for him.  They will be discussing further changes with Dr. Dickens moving forwards.  I will follow-up with him in 6 months.  I think a lot of the improvement has to do with the resumption of physical exercise and activities on a daily basis.  I am happy that he is tolerating the higher dose of Requip.    - carbidopa-levodopa (SINEMET)  MG Tab; Take 1 Tablet by mouth 4 times a day. Take at 10AM, 2PM, 6PM and 10PM  Dispense: 360 Tablet; Refill: 3  - amantadine (SYMMETREL) 100 MG Tab; Take 1 Tablet by mouth 2 times a day. Take at 10AM and 6PM  Dispense: 180 Tablet; Refill: 3    2. Restless leg syndrome  Gabapentin will be continued, the higher dose of Requip certainly is not hurting matters in regards to the symptoms.  He is not having any problems with  augmentation or rebound.    Time: 40 minutes in total spent on patient care including pre-charting, record review, discussion with healthcare staff and documentation.  This includes face-to-face time for exam, review, discussion, as well as counseling and coordinating care.

## 2023-06-01 ENCOUNTER — OFFICE VISIT (OUTPATIENT)
Dept: CARDIOLOGY | Facility: MEDICAL CENTER | Age: 73
End: 2023-06-01
Attending: INTERNAL MEDICINE
Payer: MEDICARE

## 2023-06-01 VITALS
WEIGHT: 191 LBS | HEART RATE: 68 BPM | BODY MASS INDEX: 24.51 KG/M2 | HEIGHT: 74 IN | OXYGEN SATURATION: 93 % | SYSTOLIC BLOOD PRESSURE: 124 MMHG | RESPIRATION RATE: 16 BRPM | DIASTOLIC BLOOD PRESSURE: 78 MMHG

## 2023-06-01 DIAGNOSIS — I25.2 MYOCARDIAL INFARCT, OLD: ICD-10-CM

## 2023-06-01 DIAGNOSIS — I25.5 ISCHEMIC CARDIOMYOPATHY: ICD-10-CM

## 2023-06-01 DIAGNOSIS — I69.398 CVA, OLD, DISTURBANCES OF VISION: ICD-10-CM

## 2023-06-01 DIAGNOSIS — H53.9 CVA, OLD, DISTURBANCES OF VISION: ICD-10-CM

## 2023-06-01 DIAGNOSIS — M19.90 ARTHRITIS: ICD-10-CM

## 2023-06-01 PROCEDURE — 99214 OFFICE O/P EST MOD 30 MIN: CPT | Performed by: INTERNAL MEDICINE

## 2023-06-01 PROCEDURE — 3074F SYST BP LT 130 MM HG: CPT | Performed by: INTERNAL MEDICINE

## 2023-06-01 PROCEDURE — 99212 OFFICE O/P EST SF 10 MIN: CPT | Performed by: INTERNAL MEDICINE

## 2023-06-01 PROCEDURE — 3078F DIAST BP <80 MM HG: CPT | Performed by: INTERNAL MEDICINE

## 2023-06-01 RX ORDER — MELOXICAM 15 MG/1
15 TABLET ORAL
Qty: 100 TABLET | Refills: 3 | Status: SHIPPED | OUTPATIENT
Start: 2023-06-01

## 2023-06-01 ASSESSMENT — FIBROSIS 4 INDEX: FIB4 SCORE: 2.78

## 2023-06-01 NOTE — PROGRESS NOTES
"CARDIOLOGY OUTPATIENT FOLLOWUP    PCP: An Perez M.D.    1. Myocardial infarct, old - distal PDA occlusion, LVEF 49%    2. Ischemic cardiomyopathy    3. CVA, old, disturbances of vision    4. Arthritis        Jeremiah Huber is stable from a cardiovascular standpoint with well-controlled risk factors.  I advised continuing the present medication regimen and continue to feel that high-dose statin and beta-blocker are appropriate.  I did take the liberty of refilling Mobic but encouraged him to speak with his PCP next week regarding this.    Follow up: 1 year    History: Jeremiah Huber is a 72 y.o. male with history of Parkinson's and occipital CVA due to LV thrombus after silent MI involving the distal PDA-with resultant borderline depressed LV ejection fraction.    Farhat's health continues to be dictated by progressive Parkinson's.  He has now lost the ability to speak.  His wife Nolvia accompanies him again today and provides adjunctive history.  Occasionally Farhat will record low blood pressures but without any symptoms.  Generally feels well.    He experienced worsening of hand/forearm discomfort after running out of meloxicam.      Physical Exam:  /78 (BP Location: Left arm, Patient Position: Sitting, BP Cuff Size: Adult)   Pulse 68   Resp 16   Ht 1.88 m (6' 2\")   Wt 86.6 kg (191 lb)   SpO2 93%   BMI 24.52 kg/m²   GEN: NAD  RESP: CTAB  CVS: RRR, No M/R/G  ABD: Soft, NT/ND  EXT: WWP, no edema    The ASCVD Risk score (Dugway DK, et al., 2019) failed to calculate.                Studies  Lab Results   Component Value Date/Time    CHOLSTRLTOT 112 02/17/2022 10:12 AM    LDL 43 02/17/2022 10:12 AM    HDL 54 02/17/2022 10:12 AM    TRIGLYCERIDE 74 02/17/2022 10:12 AM       Lab Results   Component Value Date/Time    SODIUM 138 02/17/2022 10:12 AM    POTASSIUM 5.1 02/17/2022 10:12 AM    CHLORIDE 102 02/17/2022 10:12 AM    CO2 26 02/17/2022 10:12 AM    GLUCOSE 94 02/17/2022 10:12 AM    BUN 14 " "02/17/2022 10:12 AM    CREATININE 0.88 02/17/2022 10:12 AM     Lab Results   Component Value Date/Time    ALKPHOSPHAT 56 02/17/2022 10:12 AM    ASTSGOT 18 02/17/2022 10:12 AM    ALTSGPT <5 02/17/2022 10:12 AM    TBILIRUBIN 1.0 02/17/2022 10:12 AM        Past Medical History:   Diagnosis Date    Arthritis     osteo    Back spasm     Blood clotting disorder ([facility]) 06/2020    \"heart\"    GOUT     Gout     Hypertension     Myocardial infarct ([facility]) 06/2020    Parkinson disease ([facility])     Stroke ([facility]) 06/2020     Allergies   Allergen Reactions    Nkda [No Known Drug Allergy]      Outpatient Encounter Medications as of 6/1/2023   Medication Sig Dispense Refill    meloxicam (MOBIC) 15 MG tablet Take 1 Tablet by mouth 1 time a day as needed for Mild Pain. 100 Tablet 3    amantadine (SYMMETREL) 100 MG Tab Take 1 Tablet by mouth 2 times a day. Take at 10AM and 6PM 180 Tablet 3    metoprolol SR (TOPROL XL) 50 MG TABLET SR 24 HR TAKE 1 TABLET BY MOUTH EVERY DAY 90 Tablet 1    omeprazole (PRILOSEC) 20 MG delayed-release capsule TAKE 1 CAPSULE BY MOUTH ONCE A DAY 30 MINUTES BEFORE BREAKFAST MEAL 90 Capsule 3    gabapentin (NEURONTIN) 300 MG Cap Take 2 Capsules by mouth every evening. 180 Capsule 3    ROPINIRole (REQUIP) 3 MG tablet Take 1 Tablet by mouth 3 times a day. Take at 5AM, 1PM, and 9PM (Patient taking differently: Take 4 mg by mouth 3 times a day. Take at 5AM, 1PM, and 9PM) 270 Tablet 3    atorvastatin (LIPITOR) 80 MG tablet TAKE 1 TABLET BY MOUTH EVERY DAY IN THE EVENING 90 Tablet 2    aspirin 81 MG EC tablet TAKE 1 TABLET BY MOUTH EVERY DAY 90 Tablet 1    ketoconazole (NIZORAL) 2 % Cream       acetaminophen (TYLENOL) 500 MG Tab Take 2 Tablets by mouth 3 times a day.      [DISCONTINUED] carbidopa-levodopa (SINEMET)  MG Tab Take 1 Tablet by mouth 4 times a day. Take at 10AM, 2PM, 6PM and 10PM (Patient not taking: Reported on 6/1/2023) 360 Tablet 3    [DISCONTINUED] meloxicam (MOBIC) 15 MG tablet TAKE 1 TABLET BY MOUTH " EVERYDAY WITH FOOD 30 Tablet 3     No facility-administered encounter medications on file as of 2023.     Social History     Socioeconomic History    Marital status:      Spouse name: Not on file    Number of children: Not on file    Years of education: Not on file    Highest education level: Master's degree (e.g., MA, MS, Phylicia, MEd, MSW, MURIEL)   Occupational History    Not on file   Tobacco Use    Smoking status: Former     Types: Cigarettes     Quit date: 1970     Years since quittin.4    Smokeless tobacco: Never   Vaping Use    Vaping Use: Never used   Substance and Sexual Activity    Alcohol use: Not Currently     Alcohol/week: 1.2 oz     Types: 2 Glasses of wine per week     Comment: once a week    Drug use: No    Sexual activity: Yes     Partners: Female   Other Topics Concern     Service No    Blood Transfusions No    Caffeine Concern No    Occupational Exposure No    Hobby Hazards No    Sleep Concern No    Stress Concern No    Weight Concern No    Special Diet No    Back Care Yes     Comment: Streching    Exercise No    Bike Helmet Yes    Seat Belt Yes    Self-Exams Yes   Social History Narrative    Not on file     Social Determinants of Health     Financial Resource Strain: Low Risk  (12/10/2022)    Overall Financial Resource Strain (CARDIA)     Difficulty of Paying Living Expenses: Not hard at all   Food Insecurity: No Food Insecurity (12/10/2022)    Hunger Vital Sign     Worried About Running Out of Food in the Last Year: Never true     Ran Out of Food in the Last Year: Never true   Transportation Needs: No Transportation Needs (12/10/2022)    PRAPARE - Transportation     Lack of Transportation (Medical): No     Lack of Transportation (Non-Medical): No   Physical Activity: Sufficiently Active (12/10/2022)    Exercise Vital Sign     Days of Exercise per Week: 5 days     Minutes of Exercise per Session: 30 min   Stress: Stress Concern Present (12/10/2022)    [employer]  [employer] - Occupational Stress Questionnaire     Feeling of Stress : To some extent   Social Connections: Unknown (12/10/2022)    Social Connection and Isolation Panel [NHANES]     Frequency of Communication with Friends and Family: More than three times a week     Frequency of Social Gatherings with Friends and Family: More than three times a week     Attends Congregational Services: Patient refused     Active Member of Clubs or Organizations: No     Attends Club or Organization Meetings: Patient refused     Marital Status:    Intimate Partner Violence: Not on file   Housing Stability: Unknown (12/10/2022)    Housing Stability Vital Sign     Unable to Pay for Housing in the Last Year: No     Number of Places Lived in the Last Year: Not on file     Unstable Housing in the Last Year: No         ROS:   10 point review systems is otherwise negative except as per the HPI    Chief Complaint   Patient presents with    Cardiomyopathy (Ischemic)    Hyperlipidemia

## 2023-06-14 ENCOUNTER — APPOINTMENT (RX ONLY)
Dept: URBAN - METROPOLITAN AREA CLINIC 35 | Facility: CLINIC | Age: 73
Setting detail: DERMATOLOGY
End: 2023-06-14

## 2023-06-14 DIAGNOSIS — L57.0 ACTINIC KERATOSIS: ICD-10-CM

## 2023-06-14 DIAGNOSIS — L82.1 OTHER SEBORRHEIC KERATOSIS: ICD-10-CM

## 2023-06-14 DIAGNOSIS — Z85.828 PERSONAL HISTORY OF OTHER MALIGNANT NEOPLASM OF SKIN: ICD-10-CM

## 2023-06-14 DIAGNOSIS — L81.4 OTHER MELANIN HYPERPIGMENTATION: ICD-10-CM

## 2023-06-14 DIAGNOSIS — D22 MELANOCYTIC NEVI: ICD-10-CM

## 2023-06-14 DIAGNOSIS — Z71.89 OTHER SPECIFIED COUNSELING: ICD-10-CM

## 2023-06-14 PROBLEM — D22.4 MELANOCYTIC NEVI OF SCALP AND NECK: Status: ACTIVE | Noted: 2023-06-14

## 2023-06-14 PROCEDURE — ? COUNSELING

## 2023-06-14 PROCEDURE — ? LIQUID NITROGEN

## 2023-06-14 PROCEDURE — 99213 OFFICE O/P EST LOW 20 MIN: CPT | Mod: 25

## 2023-06-14 PROCEDURE — 17003 DESTRUCT PREMALG LES 2-14: CPT

## 2023-06-14 PROCEDURE — 17000 DESTRUCT PREMALG LESION: CPT

## 2023-06-14 ASSESSMENT — LOCATION DETAILED DESCRIPTION DERM
LOCATION DETAILED: LEFT CENTRAL FRONTAL SCALP
LOCATION DETAILED: RIGHT SUPERIOR UPPER BACK
LOCATION DETAILED: RIGHT MID-UPPER BACK
LOCATION DETAILED: RIGHT SUPERIOR PARIETAL SCALP
LOCATION DETAILED: RIGHT MEDIAL TRAPEZIAL NECK
LOCATION DETAILED: RIGHT SUPERIOR OCCIPITAL SCALP
LOCATION DETAILED: LEFT SUPERIOR PARIETAL SCALP
LOCATION DETAILED: LEFT SUPERIOR OCCIPITAL SCALP
LOCATION DETAILED: RIGHT CENTRAL PARIETAL SCALP

## 2023-06-14 ASSESSMENT — LOCATION SIMPLE DESCRIPTION DERM
LOCATION SIMPLE: SCALP
LOCATION SIMPLE: POSTERIOR SCALP
LOCATION SIMPLE: POSTERIOR NECK
LOCATION SIMPLE: LEFT OCCIPITAL SCALP
LOCATION SIMPLE: LEFT SCALP
LOCATION SIMPLE: RIGHT UPPER BACK

## 2023-06-14 ASSESSMENT — LOCATION ZONE DERM
LOCATION ZONE: SCALP
LOCATION ZONE: TRUNK
LOCATION ZONE: NECK

## 2023-06-14 NOTE — PROCEDURE: LIQUID NITROGEN
Application Tool (Optional): Cry-AC
Show Applicator Variable?: Yes
Detail Level: Detailed
Consent: The patient's consent was obtained including but not limited to risks of crusting, scabbing, blistering, scarring, darker or lighter pigmentary change, recurrence, incomplete removal and infection.
Number Of Freeze-Thaw Cycles: 1 freeze-thaw cycle
Render Post-Care Instructions In Note?: no
Post-Care Instructions: I reviewed with the patient in detail post-care instructions. Patient is to wear sunprotection, and avoid picking at any of the treated lesions. Pt may apply Vaseline to crusted or scabbing areas.
Duration Of Freeze Thaw-Cycle (Seconds): 10

## 2023-07-05 DIAGNOSIS — E78.49 OTHER HYPERLIPIDEMIA: ICD-10-CM

## 2023-07-05 RX ORDER — ATORVASTATIN CALCIUM 80 MG/1
TABLET, FILM COATED ORAL
Qty: 90 TABLET | Refills: 3 | Status: SHIPPED | OUTPATIENT
Start: 2023-07-05

## 2023-07-26 ENCOUNTER — PATIENT MESSAGE (OUTPATIENT)
Dept: CARDIOLOGY | Facility: MEDICAL CENTER | Age: 73
End: 2023-07-26
Payer: MEDICARE

## 2023-08-22 DIAGNOSIS — G20.A1 PARKINSON DISEASE (HCC): ICD-10-CM

## 2023-08-23 RX ORDER — AMANTADINE HYDROCHLORIDE 100 MG/1
100 TABLET ORAL 2 TIMES DAILY
Qty: 180 TABLET | Refills: 3 | Status: SHIPPED | OUTPATIENT
Start: 2023-08-23

## 2023-08-29 DIAGNOSIS — I25.5 ISCHEMIC CARDIOMYOPATHY: ICD-10-CM

## 2023-08-29 RX ORDER — METOPROLOL SUCCINATE 50 MG/1
TABLET, EXTENDED RELEASE ORAL
Qty: 90 TABLET | Refills: 2 | Status: SHIPPED | OUTPATIENT
Start: 2023-08-29

## 2023-08-29 NOTE — TELEPHONE ENCOUNTER
Is the patient due for a refill? Yes    Was the patient seen the past year? Yes    Date of last office visit: 6/1/23    Does the patient have an upcoming appointment?  No    Provider to refill:BE    Does the patients insurance require a 100 day supply?  No

## 2023-09-27 NOTE — PROGRESS NOTES
"Subjective:      Jeremiah Huber is a 68 y.o. male who presents with Nolvia, for follow-up, with a history of atypical, asymmetric parkinsonism.    HPI    Jeremiah's clinical condition remains about the same.  We had placed him on a trial of Sinemet 25/100, 3 times daily, both on the drug and then off, he noted no benefit and then loss of benefit, respectively.  It is still the right side that is most stiff and rigid, slow to move, there is still no tremor.  Cognition is intact with only minimal slowing, there are no issues with hallucinations or sleep distortion, voice is a little softer, he denies dysarthria, but saliva is beginning to accumulate without drooling, he is having some coughing fits if he drinks too much too quickly.    He is walking without major issue, the right leg still drags, the right hand when he uses it is still a little impaired.  He can still do things such as tying laces, etc., it takes him longer to do so.  He does not trip or fall with any regularity.  Constipation and urinary retention are not issues.  He has never had symptoms suggestive of dysautonomia.    Medical, surgical and family histories are reviewed in the electronic health record, there are no new drug allergies.  The back pain and right hip pain remain a problem for him, this can limit his ability to get around.  He is presently on no medications.    Review of Systems   Constitutional: Negative for malaise/fatigue.   Gastrointestinal: Negative for constipation.   Genitourinary: Negative for dysuria.   Musculoskeletal: Negative for falls.   Neurological: Positive for speech change. Negative for tremors.   Psychiatric/Behavioral: Negative for depression and memory loss. The patient does not have insomnia.    All other systems reviewed and are negative.       Objective:     /80 (BP Location: Right arm, Patient Position: Sitting)   Pulse 74   Temp 36.1 °C (97 °F)   Resp 16   Ht 1.854 m (6' 1\")   Wt 88.6 kg (195 lb 6.4 " oz)   SpO2 94%   BMI 25.78 kg/m²      Physical Exam    He appears in no acute distress.  Vital signs are stable.  There is no malar rash or sialorrhea.  Glabellar tap is present.  The neck is supple, range of motion is full.  Cardiac evaluation reveals a regular rhythm.  There is no lower extremity edema.  Straight leg raising is negative bilaterally.    Cognition is intact, there is still bradykinesia with a reduction in eye blink frequency, but PERRLA/EOMI, visual fields are full, hypophonia is slightly increased, there is no dysarthria or tachyphemia.  Facial movements are symmetric, sensory exam is intact to light touch bilaterally.  There is no tremor, the right sided rigidity is no worse than before.  Strength is intact throughout.    When he walks he is stable, but requires multiple steps to turn, stride length with the right leg is diminished but he does not shuffle.  He is not stooped.  The right arm swing is diminished, there is no tremor with the right hand when he walks.  There is no appendicular dystaxia, but repetitive movements bilaterally show diminished amplitudes.  Sensory exam is deferred.     Assessment/Plan:     1. Parkinsonism, unspecified Parkinsonism type (HCC)  He is doing well, there is some slight worsening by examination findings only, but this was only over 6 months. 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  This is not Lewy body disease, MSA, parkinsonian variant, etc.  The nature of all this was again reviewed at length, as to why I believe this is the case.  They were told that this is still a degenerative process, but the speed of progression and its nature over time cannot be easily predicted.  We talked about signs and symptoms that might occur over time, and especially of tremor presents, we probably should retry Sinemet.    We will keep him  off drug for now, there is no need absent symptoms that are responding.  I did encourage him to remain physically active, kickboxing, water aerobics, cycling, running, etc. are critical as aerobic exercises.  Phone contact in the interim if they feel the need to retry medicine, otherwise 1 year is adequate.    Time: 25 minutes spent face-to-face for exam, review, discussion, and education, of this over 50% of the time spent counseling and coordinating care.       DISPLAY PLAN FREE TEXT

## 2023-11-13 ENCOUNTER — APPOINTMENT (OUTPATIENT)
Dept: NEUROLOGY | Facility: MEDICAL CENTER | Age: 73
End: 2023-11-13
Attending: PSYCHIATRY & NEUROLOGY
Payer: MEDICARE

## 2023-11-20 DIAGNOSIS — G20.A1 PARKINSON'S DISEASE WITHOUT DYSKINESIA OR FLUCTUATING MANIFESTATIONS (HCC): ICD-10-CM

## 2023-12-21 ENCOUNTER — HOSPITAL ENCOUNTER (EMERGENCY)
Facility: MEDICAL CENTER | Age: 73
End: 2023-12-21
Attending: EMERGENCY MEDICINE
Payer: MEDICARE

## 2023-12-21 ENCOUNTER — APPOINTMENT (OUTPATIENT)
Dept: RADIOLOGY | Facility: MEDICAL CENTER | Age: 73
End: 2023-12-21
Attending: EMERGENCY MEDICINE
Payer: MEDICARE

## 2023-12-21 VITALS
SYSTOLIC BLOOD PRESSURE: 115 MMHG | DIASTOLIC BLOOD PRESSURE: 65 MMHG | HEIGHT: 74 IN | WEIGHT: 185 LBS | HEART RATE: 68 BPM | TEMPERATURE: 98 F | BODY MASS INDEX: 23.74 KG/M2 | RESPIRATION RATE: 20 BRPM | OXYGEN SATURATION: 92 %

## 2023-12-21 DIAGNOSIS — R19.7 DIARRHEA, UNSPECIFIED TYPE: ICD-10-CM

## 2023-12-21 LAB
ALBUMIN SERPL BCP-MCNC: 3.8 G/DL (ref 3.2–4.9)
ALBUMIN/GLOB SERPL: 2 G/DL
ALP SERPL-CCNC: 60 U/L (ref 30–99)
ALT SERPL-CCNC: 11 U/L (ref 2–50)
ANION GAP SERPL CALC-SCNC: 8 MMOL/L (ref 7–16)
AST SERPL-CCNC: 18 U/L (ref 12–45)
BASOPHILS # BLD AUTO: 0.4 % (ref 0–1.8)
BASOPHILS # BLD: 0.02 K/UL (ref 0–0.12)
BILIRUB SERPL-MCNC: 1 MG/DL (ref 0.1–1.5)
BUN SERPL-MCNC: 10 MG/DL (ref 8–22)
CALCIUM ALBUM COR SERPL-MCNC: 8.4 MG/DL (ref 8.5–10.5)
CALCIUM SERPL-MCNC: 8.2 MG/DL (ref 8.5–10.5)
CHLORIDE SERPL-SCNC: 105 MMOL/L (ref 96–112)
CO2 SERPL-SCNC: 26 MMOL/L (ref 20–33)
CREAT SERPL-MCNC: 0.81 MG/DL (ref 0.5–1.4)
EOSINOPHIL # BLD AUTO: 0.05 K/UL (ref 0–0.51)
EOSINOPHIL NFR BLD: 0.9 % (ref 0–6.9)
ERYTHROCYTE [DISTWIDTH] IN BLOOD BY AUTOMATED COUNT: 43.9 FL (ref 35.9–50)
GFR SERPLBLD CREATININE-BSD FMLA CKD-EPI: 94 ML/MIN/1.73 M 2
GLOBULIN SER CALC-MCNC: 1.9 G/DL (ref 1.9–3.5)
GLUCOSE SERPL-MCNC: 88 MG/DL (ref 65–99)
HCT VFR BLD AUTO: 38.1 % (ref 42–52)
HGB BLD-MCNC: 13.1 G/DL (ref 14–18)
IMM GRANULOCYTES # BLD AUTO: 0.02 K/UL (ref 0–0.11)
IMM GRANULOCYTES NFR BLD AUTO: 0.4 % (ref 0–0.9)
LYMPHOCYTES # BLD AUTO: 0.59 K/UL (ref 1–4.8)
LYMPHOCYTES NFR BLD: 11.1 % (ref 22–41)
MCH RBC QN AUTO: 31.3 PG (ref 27–33)
MCHC RBC AUTO-ENTMCNC: 34.4 G/DL (ref 32.3–36.5)
MCV RBC AUTO: 90.9 FL (ref 81.4–97.8)
MONOCYTES # BLD AUTO: 0.51 K/UL (ref 0–0.85)
MONOCYTES NFR BLD AUTO: 9.6 % (ref 0–13.4)
NEUTROPHILS # BLD AUTO: 4.11 K/UL (ref 1.82–7.42)
NEUTROPHILS NFR BLD: 77.6 % (ref 44–72)
NRBC # BLD AUTO: 0 K/UL
NRBC BLD-RTO: 0 /100 WBC (ref 0–0.2)
PLATELET # BLD AUTO: 167 K/UL (ref 164–446)
PMV BLD AUTO: 10 FL (ref 9–12.9)
POTASSIUM SERPL-SCNC: 4.1 MMOL/L (ref 3.6–5.5)
PROT SERPL-MCNC: 5.7 G/DL (ref 6–8.2)
RBC # BLD AUTO: 4.19 M/UL (ref 4.7–6.1)
SODIUM SERPL-SCNC: 139 MMOL/L (ref 135–145)
WBC # BLD AUTO: 5.3 K/UL (ref 4.8–10.8)

## 2023-12-21 PROCEDURE — 36415 COLL VENOUS BLD VENIPUNCTURE: CPT

## 2023-12-21 PROCEDURE — 85025 COMPLETE CBC W/AUTO DIFF WBC: CPT

## 2023-12-21 PROCEDURE — 74018 RADEX ABDOMEN 1 VIEW: CPT

## 2023-12-21 PROCEDURE — 99285 EMERGENCY DEPT VISIT HI MDM: CPT

## 2023-12-21 PROCEDURE — 80053 COMPREHEN METABOLIC PANEL: CPT

## 2023-12-21 ASSESSMENT — FIBROSIS 4 INDEX: FIB4 SCORE: 2.82

## 2023-12-21 NOTE — ED NOTES
Pt non verbal, nods and gestures appropriately. Per caregiver, pt has been having constant diarrhea. Caregiver is concerned pt has an obstruction.

## 2023-12-21 NOTE — ED PROVIDER NOTES
ED Provider Note    CHIEF COMPLAINT  Chief Complaint   Patient presents with    Diarrhea     Was on a bowel regimen 2 weeks ago. Has had diarrhea. Caregiver is concerned for impacted bowel.          EXTERNAL RECORDS REVIEWED  Outpatient Notes neurology, cardiology    HPI/ROS  LIMITATION TO HISTORY   Select: : None  OUTSIDE HISTORIAN(S):  Wife.  States patient is been having diarrhea for 2 or 3 weeks.    Jeremiah Huber is a 73 y.o. male who presents here for evaluation of diarrhea.  No blood in the diarrhea, was ongoing for about 2  weeks.  Patient is no fever or chills, no vomiting.  No complaints of abdominal pain.  No chest pain, no shortness of breath.  Patient has been taking laxatives intermittently over the last week or so because the wife initially thought he may have some fecal impaction issue.    PAST MEDICAL HISTORY   has a past medical history of Arthritis, Back spasm, Blood clotting disorder ([facility]) (2020), GOUT, Gout, Hypertension, Myocardial infarct ([facility]) (2020), Parkinson disease, and Stroke ([facility]) (2020).    SURGICAL HISTORY   has a past surgical history that includes shoulder surgery; tonsillectomy; knee arthroscopy (Right); knee replacement, total (Bilateral); and neuro dest facet l/s w/ig sngl (Right, 2020).    FAMILY HISTORY  Family History   Problem Relation Age of Onset    Heart Disease Mother     Heart Disease Father     Cancer Father         prostate cancer    Arthritis Brother        SOCIAL HISTORY  Social History     Tobacco Use    Smoking status: Former     Current packs/day: 0.00     Types: Cigarettes     Quit date: 1970     Years since quittin.0    Smokeless tobacco: Never   Vaping Use    Vaping Use: Never used   Substance and Sexual Activity    Alcohol use: Not Currently     Alcohol/week: 1.2 oz     Types: 2 Glasses of wine per week     Comment: once a week    Drug use: No    Sexual activity: Yes     Partners: Female       CURRENT MEDICATIONS  Home Medications        "Reviewed by Dilip Cho R.N. (Registered Nurse) on 12/21/23 at 0953  Med List Status: Partial     Medication Last Dose Status   acetaminophen (TYLENOL) 500 MG Tab  Active   amantadine (SYMMETREL) 100 MG Tab  Active   aspirin 81 MG EC tablet  Active   atorvastatin (LIPITOR) 80 MG tablet  Active   gabapentin (NEURONTIN) 300 MG Cap  Active   ketoconazole (NIZORAL) 2 % Cream  Active   meloxicam (MOBIC) 15 MG tablet  Active   metoprolol SR (TOPROL XL) 50 MG TABLET SR 24 HR  Active   omeprazole (PRILOSEC) 20 MG delayed-release capsule  Active   ROPINIRole (REQUIP) 3 MG tablet  Active                    ALLERGIES  Allergies   Allergen Reactions    Nkda [No Known Drug Allergy]        PHYSICAL EXAM  VITAL SIGNS: /70   Pulse 74   Temp 36.3 °C (97.4 °F) (Temporal)   Resp 18   Ht 1.88 m (6' 2\")   Wt 83.9 kg (185 lb)   SpO2 94%   BMI 23.75 kg/m²    Constitutional: Well developed, well nourished. No acute distress.  HEENT: Normocephalic, atraumatic. Posterior pharynx clear and moist.  Eyes:  EOMI. Normal sclera.  Neck: Supple, Full range of motion, nontender.  Chest/Pulmonary: clear to ausculation. Symmetrical expansion.   Cardio: Regular rate and rhythm with no murmur.   Abdomen: Soft, nontender. No peritoneal signs. No guarding.   Musculoskeletal: No deformity, no edema, neurovascular intact.   Neuro: Clear speech, appropriate, cooperative, cranial nerves II-XII grossly intact.  Psych: Normal mood and affect      DIAGNOSTIC STUDIES / PROCEDURES  Results for orders placed or performed during the hospital encounter of 12/21/23   CBC WITH DIFFERENTIAL   Result Value Ref Range    WBC 5.3 4.8 - 10.8 K/uL    RBC 4.19 (L) 4.70 - 6.10 M/uL    Hemoglobin 13.1 (L) 14.0 - 18.0 g/dL    Hematocrit 38.1 (L) 42.0 - 52.0 %    MCV 90.9 81.4 - 97.8 fL    MCH 31.3 27.0 - 33.0 pg    MCHC 34.4 32.3 - 36.5 g/dL    RDW 43.9 35.9 - 50.0 fL    Platelet Count 167 164 - 446 K/uL    MPV 10.0 9.0 - 12.9 fL    Neutrophils-Polys 77.60 (H) " 44.00 - 72.00 %    Lymphocytes 11.10 (L) 22.00 - 41.00 %    Monocytes 9.60 0.00 - 13.40 %    Eosinophils 0.90 0.00 - 6.90 %    Basophils 0.40 0.00 - 1.80 %    Immature Granulocytes 0.40 0.00 - 0.90 %    Nucleated RBC 0.00 0.00 - 0.20 /100 WBC    Neutrophils (Absolute) 4.11 1.82 - 7.42 K/uL    Lymphs (Absolute) 0.59 (L) 1.00 - 4.80 K/uL    Monos (Absolute) 0.51 0.00 - 0.85 K/uL    Eos (Absolute) 0.05 0.00 - 0.51 K/uL    Baso (Absolute) 0.02 0.00 - 0.12 K/uL    Immature Granulocytes (abs) 0.02 0.00 - 0.11 K/uL    NRBC (Absolute) 0.00 K/uL   Comp Metabolic Panel   Result Value Ref Range    Sodium 139 135 - 145 mmol/L    Potassium 4.1 3.6 - 5.5 mmol/L    Chloride 105 96 - 112 mmol/L    Co2 26 20 - 33 mmol/L    Anion Gap 8.0 7.0 - 16.0    Glucose 88 65 - 99 mg/dL    Bun 10 8 - 22 mg/dL    Creatinine 0.81 0.50 - 1.40 mg/dL    Calcium 8.2 (L) 8.5 - 10.5 mg/dL    Correct Calcium 8.4 (L) 8.5 - 10.5 mg/dL    AST(SGOT) 18 12 - 45 U/L    ALT(SGPT) 11 2 - 50 U/L    Alkaline Phosphatase 60 30 - 99 U/L    Total Bilirubin 1.0 0.1 - 1.5 mg/dL    Albumin 3.8 3.2 - 4.9 g/dL    Total Protein 5.7 (L) 6.0 - 8.2 g/dL    Globulin 1.9 1.9 - 3.5 g/dL    A-G Ratio 2.0 g/dL   ESTIMATED GFR   Result Value Ref Range    GFR (CKD-EPI) 94 >60 mL/min/1.73 m 2         RADIOLOGY  I have independently interpreted the diagnostic imaging associated with this visit and am waiting the final reading from the radiologist.   My preliminary interpretation is as follows: see below  Radiologist interpretation:   [id number] VIEW   Final Result      Normal bowel gas pattern.            COURSE & MEDICAL DECISION MAKING    Patient will be discharged home in stable condition    INITIAL ASSESSMENT, COURSE AND PLAN  Care Narrative: This is a 73-year-old male here for evaluation of diarrhea.  Patient appears nontoxic-appearing afebrile, has no elevated white blood cell count, and has no current indication for antibiotics.  He was unable to give us a stool sample here.   Patient will follow-up outpatient, return for any further issues or concerns    DISPOSITION AND DISCUSSIONS  I have discussed management of the patient with the following physicians and ORION's: None    Discussion of management with other QHP or appropriate source(s): None    Escalation of care considered, and ultimately not performed: Further CT imaging not warranted.  X-ray is appropriate    Barriers to care at this time, including but not limited to: Patient lacks transportation .     Decision tools and prescription drugs considered including, but not limited to: None.    FINAL DIAGNOSIS  1. Diarrhea, unspecified type           Electronically signed by: Joe Braun D.O., 12/21/2023 11:21 AM

## 2023-12-21 NOTE — ED TRIAGE NOTES
"Chief Complaint   Patient presents with    Diarrhea     Was on a bowel regimen 2 weeks ago. Has had diarrhea. Caregiver is concerned for impacted bowel.        Pt to triage via w/c for above. Non-verbal but mentally coherent.   Caregiver requesting imaging. Also concerned for dehydration.     /70   Pulse 74   Temp 36.3 °C (97.4 °F) (Temporal)   Resp 18   Ht 1.88 m (6' 2\")   Wt 83.9 kg (185 lb)   SpO2 94%   BMI 23.75 kg/m²     "

## 2023-12-21 NOTE — ED NOTES
PIV removed, discharge instructions and follow up information reviewed with patient and wife, wife verbalized understanding, patient given supplies for home stool sample collections, patient discharged to wife via wheelchair.

## 2023-12-21 NOTE — ED NOTES
Pt to B16. Pt placed on pulse ox and automatic BP. VSS, saturating above 95% on RA, HR in the 90s. Call light within reach and chart up for ERP.

## 2023-12-21 NOTE — ED NOTES
Bedside report to ALYCE Benoit. All questions answered. Pt awake and breathing with even, unlabored respirations on RA at time of report.

## 2023-12-25 ENCOUNTER — HOSPITAL ENCOUNTER (OUTPATIENT)
Facility: MEDICAL CENTER | Age: 73
End: 2023-12-25
Attending: EMERGENCY MEDICINE
Payer: MEDICARE

## 2023-12-25 PROCEDURE — 87493 C DIFF AMPLIFIED PROBE: CPT

## 2023-12-26 LAB
C DIFF DNA SPEC QL NAA+PROBE: NEGATIVE
C DIFF TOX GENS STL QL NAA+PROBE: NEGATIVE

## 2023-12-28 ENCOUNTER — TELEPHONE (OUTPATIENT)
Dept: NEUROLOGY | Facility: MEDICAL CENTER | Age: 73
End: 2023-12-28

## 2023-12-28 ENCOUNTER — OFFICE VISIT (OUTPATIENT)
Dept: NEUROLOGY | Facility: MEDICAL CENTER | Age: 73
End: 2023-12-28
Attending: PSYCHIATRY & NEUROLOGY
Payer: MEDICARE

## 2023-12-28 VITALS
WEIGHT: 206.57 LBS | HEART RATE: 61 BPM | SYSTOLIC BLOOD PRESSURE: 118 MMHG | OXYGEN SATURATION: 94 % | BODY MASS INDEX: 26.51 KG/M2 | DIASTOLIC BLOOD PRESSURE: 60 MMHG | HEIGHT: 74 IN | TEMPERATURE: 96.6 F

## 2023-12-28 DIAGNOSIS — G20.A1 PARKINSON'S DISEASE WITHOUT DYSKINESIA OR FLUCTUATING MANIFESTATIONS (HCC): Primary | ICD-10-CM

## 2023-12-28 DIAGNOSIS — G25.81 RESTLESS LEG SYNDROME: ICD-10-CM

## 2023-12-28 PROCEDURE — 99215 OFFICE O/P EST HI 40 MIN: CPT | Performed by: PSYCHIATRY & NEUROLOGY

## 2023-12-28 PROCEDURE — 99212 OFFICE O/P EST SF 10 MIN: CPT | Performed by: PSYCHIATRY & NEUROLOGY

## 2023-12-28 PROCEDURE — 3078F DIAST BP <80 MM HG: CPT | Performed by: PSYCHIATRY & NEUROLOGY

## 2023-12-28 PROCEDURE — 3074F SYST BP LT 130 MM HG: CPT | Performed by: PSYCHIATRY & NEUROLOGY

## 2023-12-28 RX ORDER — CARBIDOPA/LEVODOPA 25MG-250MG
1 TABLET ORAL 4 TIMES DAILY
Qty: 120 TABLET | Refills: 5 | Status: SHIPPED | OUTPATIENT
Start: 2023-12-28

## 2023-12-28 RX ORDER — CARBIDOPA/LEVODOPA 25MG-250MG
1 TABLET ORAL 3 TIMES DAILY
COMMUNITY
End: 2023-12-28 | Stop reason: SDUPTHER

## 2023-12-28 RX ORDER — GABAPENTIN 300 MG/1
600 CAPSULE ORAL EVERY EVENING
Qty: 180 CAPSULE | Refills: 3 | Status: SHIPPED | OUTPATIENT
Start: 2023-12-28

## 2023-12-28 RX ORDER — ROPINIROLE 2 MG/1
4 TABLET, FILM COATED ORAL 3 TIMES DAILY
Qty: 180 TABLET | Refills: 5 | Status: SHIPPED | OUTPATIENT
Start: 2023-12-28

## 2023-12-28 ASSESSMENT — ENCOUNTER SYMPTOMS
SENSORY CHANGE: 0
SPEECH CHANGE: 1
FALLS: 0
FOCAL WEAKNESS: 0
DIARRHEA: 1
TREMORS: 0
LOSS OF CONSCIOUSNESS: 0
WEIGHT LOSS: 1

## 2023-12-28 ASSESSMENT — FIBROSIS 4 INDEX: FIB4 SCORE: 2.31

## 2023-12-28 NOTE — TELEPHONE ENCOUNTER
Received Refill PA request via [facility]  for Carbidopa-levodopa 25-250mg tab. (Quantity:120 tab, Day Supply:30)     [facility] [employer] [facility]  Member ID:  [id number]  BIN: [id number]  PCN: [id number]  Group: [id number]     Ran Test claim via [place] [facility] medication Pays for a $14.00 copay. Will outreach to patient to offer specialty pharmacy services and or release to preferred pharmacy

## 2023-12-29 NOTE — PROGRESS NOTES
Subjective     Farhat Huber is a 73 y.o. male who presents with Nolvia, for follow-up, with a history of moderate parkinsonism's disease and RLS.  He remains under the care of Dr. Dickens at [facility] in [place], [place].  He was last seen there in August, scheduled again in February of this year.     HPI    Overall Jeremiah seems to be doing fairly well though the motor symptoms are still progressing.  There is more rigidity as well as bradykinesia, he is having more difficulty throwing the ball when he is outside with his dog.  He is freezing a little more often, especially walking through doorways or turning corners.  Fortunately he is not falling, but he is using his walker more often.  Tremor remains well-controlled.  There are no major fluctuations in terms of dose response, or from day today overall.  He has not had on/off episodes, dyskinesias and dystonia are not issues.    His voice volume is still severely curtailed, swallowing still a little problematic, he is scheduled for speech therapies to resume for swallowing and voice exercises.  Though he was at the gym regularly, he began to develop rather severe diarrhea.  There evidently was fecal impaction, he has been put on combination of cathartic agents and antidiarrheals, things are improving slowly.  Still, the dehydration is gotten to the point where he was actually brought to the emergency room on 1 occasion.    They had not made any major changes in his ropinirole and Sinemet regimen, still requiring a 4-hour interval between doses.  He takes a 4 mg dose of the former each at 6AM, 2 PM, and then 10 PM, Sinemet 25/250, 1 tablet beginning at 6 AM, then 10 AM, 2 PM and 6 PM.  Symmetrel 100 mg is taken at 10 AM and 6 PM.    His RLS symptoms continue to respond consistently to the gabapentin 600 mg every evening.    Medical, surgical and family histories are reviewed, there are no new drug allergies.  He is on gabapentin, Sinemet 25/250 and  "ropinirole 4 mg doses, as above, also Mobic, Prilosec, Toprol-XL, baby aspirin, Lipitor, and Tylenol as needed.    Review of Systems   Constitutional:  Positive for weight loss.   Gastrointestinal:  Positive for diarrhea.   Musculoskeletal:  Negative for falls.   Neurological:  Positive for speech change. Negative for tremors, sensory change, focal weakness and loss of consciousness.   All other systems reviewed and are negative.    Objective     /60 (BP Location: Left arm, Patient Position: Sitting, BP Cuff Size: Adult)   Pulse 61   Temp 35.9 °C (96.6 °F) (Temporal)   Ht 1.88 m (6' 2.02\")   Wt 93.7 kg (206 lb 9.1 oz)   SpO2 94%   BMI 26.51 kg/m²      Physical Exam    He appears in no acute distress.  His vital signs are stable.  There is no malar rash or sialorrhea.  The neck is supple, range of motion is full.  Cardiac evaluation reveals a regular rhythm.  There is no lower extremity edema.     Neurological Exam    He is fully oriented, there is no aphasia or inattention.    PERRLA/EOMI, glabellar tap is absent, there is severe hypophonia, speech production almost completely and audible.  Facial movements are symmetric.  The tongue and uvula are midline though tongue protrusion is slowed.  Lateral movements of the tongue are symmetric but again slowed.  Lip apposition is normal.  Shoulder shrug and head rotation are normal.    Musculoskeletal exam again reveals rigidity bilaterally, more prevalent in the right upper extremity when compared to the left.  There is no tremor either at rest or with sustained posture against gravity and action.  Strength is intact in all 4 extremities.  Reflex exam was deferred.    He stands slowly, walks with his walker easily, actually can do fairly well with symmetric stride length without shuffling.  Still he is stooped when he stands.  Walking independently is slowed and there is notable postural instability.  There is no appendicular dystaxia.  Movements are slower " "with the right upper and lower extremity when compared to the left.  Repetitive movements with the fingers show diminished amplitudes.  In the feet the movements are simply slowed with the right foot when compared to the left.  Amplitudes are diminished bilaterally.    Sensory exam is grossly intact to vibration.    Assessment & Plan     1. Parkinson's disease without dyskinesia or fluctuating manifestations  He seems to be doing fairly well, slow progression is not unexpected, there has been no change in the degree of dopamine stimulation he is getting with the Symmetrel, Requip and Sinemet regimen.  He maintains a 4-hour interval between doses.  He has always been brighter with this lower, overall dose of dopamine, a kind of sustained \"drug holiday\" when they cut him down earlier this year, resulting in this kind of improvement clinically.  They will follow-up with Dr. Dickens for further recommendations.  They have been using an additional Sinemet tablet when he was going to physical therapy, this certainly sounds a reasonable thing to do.  I will see him again in 6 months.    - carbidopa-levodopa (SINEMET)  MG Tab; Take 1 Tablet by mouth 4 times a day. 6AM, 10AM, 2PM, and 6PM  Dispense: 120 Tablet; Refill: 5  - ROPINIRole (REQUIP) 2 MG tablet; Take 2 Tablets by mouth 3 times a day.  Dispense: 180 Tablet; Refill: 5    2. Restless leg syndrome  Gabapentin will be continued every evening.  He is tolerating the drug well, it serves a good purpose.    - gabapentin (NEURONTIN) 300 MG Cap; Take 2 Capsules by mouth every evening.  Dispense: 180 Capsule; Refill: 3    Time: 40 minutes in total spent on patient care including pre-charting, record review, discussion with healthcare staff and documentation.  This includes face-to-face time for exam, review, discussion, as well as counseling and coordinating care.            "

## 2024-01-08 ENCOUNTER — APPOINTMENT (RX ONLY)
Dept: URBAN - METROPOLITAN AREA CLINIC 35 | Facility: CLINIC | Age: 74
Setting detail: DERMATOLOGY
End: 2024-01-08

## 2024-01-08 DIAGNOSIS — L21.8 OTHER SEBORRHEIC DERMATITIS: ICD-10-CM

## 2024-01-08 DIAGNOSIS — L81.4 OTHER MELANIN HYPERPIGMENTATION: ICD-10-CM

## 2024-01-08 DIAGNOSIS — L57.0 ACTINIC KERATOSIS: ICD-10-CM

## 2024-01-08 DIAGNOSIS — Z85.828 PERSONAL HISTORY OF OTHER MALIGNANT NEOPLASM OF SKIN: ICD-10-CM

## 2024-01-08 DIAGNOSIS — D22 MELANOCYTIC NEVI: ICD-10-CM

## 2024-01-08 DIAGNOSIS — B35.3 TINEA PEDIS: ICD-10-CM | Status: INADEQUATELY CONTROLLED

## 2024-01-08 DIAGNOSIS — L30.8 OTHER SPECIFIED DERMATITIS: ICD-10-CM | Status: INADEQUATELY CONTROLLED

## 2024-01-08 DIAGNOSIS — Z71.89 OTHER SPECIFIED COUNSELING: ICD-10-CM

## 2024-01-08 DIAGNOSIS — L82.1 OTHER SEBORRHEIC KERATOSIS: ICD-10-CM

## 2024-01-08 PROBLEM — D22.4 MELANOCYTIC NEVI OF SCALP AND NECK: Status: ACTIVE | Noted: 2024-01-08

## 2024-01-08 PROCEDURE — ? MEDICATION COUNSELING

## 2024-01-08 PROCEDURE — ? PRESCRIPTION

## 2024-01-08 PROCEDURE — ? KOH PREP

## 2024-01-08 PROCEDURE — 17000 DESTRUCT PREMALG LESION: CPT

## 2024-01-08 PROCEDURE — 99214 OFFICE O/P EST MOD 30 MIN: CPT | Mod: 25

## 2024-01-08 PROCEDURE — ? TREATMENT REGIMEN

## 2024-01-08 PROCEDURE — ? LIQUID NITROGEN

## 2024-01-08 PROCEDURE — ? COUNSELING

## 2024-01-08 RX ORDER — TRIAMCINOLONE ACETONIDE 1 MG/G
1 CREAM TOPICAL BID
Qty: 80 | Refills: 1 | Status: ERX | COMMUNITY
Start: 2024-01-08

## 2024-01-08 RX ORDER — KETOCONAZOLE 20 MG/G
1 CREAM TOPICAL BID
Qty: 60 | Refills: 11 | Status: ERX

## 2024-01-08 RX ADMIN — TRIAMCINOLONE ACETONIDE 1: 1 CREAM TOPICAL at 00:00

## 2024-01-08 ASSESSMENT — LOCATION ZONE DERM
LOCATION ZONE: LEG
LOCATION ZONE: FACE
LOCATION ZONE: SCALP
LOCATION ZONE: NOSE
LOCATION ZONE: NECK
LOCATION ZONE: FEET
LOCATION ZONE: TRUNK
LOCATION ZONE: FEET

## 2024-01-08 ASSESSMENT — LOCATION SIMPLE DESCRIPTION DERM
LOCATION SIMPLE: NOSE
LOCATION SIMPLE: POSTERIOR SCALP
LOCATION SIMPLE: RIGHT PLANTAR SURFACE
LOCATION SIMPLE: RIGHT HEEL
LOCATION SIMPLE: LEFT HEEL
LOCATION SIMPLE: RIGHT PLANTAR SURFACE
LOCATION SIMPLE: RIGHT CHEEK
LOCATION SIMPLE: RIGHT UPPER BACK
LOCATION SIMPLE: RIGHT POSTERIOR ANKLE
LOCATION SIMPLE: LEFT PLANTAR SURFACE
LOCATION SIMPLE: LEFT CHEEK
LOCATION SIMPLE: RIGHT HEEL
LOCATION SIMPLE: POSTERIOR NECK
LOCATION SIMPLE: LEFT PLANTAR SURFACE
LOCATION SIMPLE: LEFT HEEL

## 2024-01-08 ASSESSMENT — LOCATION DETAILED DESCRIPTION DERM
LOCATION DETAILED: LEFT HEEL
LOCATION DETAILED: RIGHT MEDIAL PLANTAR HEEL
LOCATION DETAILED: LEFT MEDIAL MALAR CHEEK
LOCATION DETAILED: RIGHT CENTRAL MALAR CHEEK
LOCATION DETAILED: LEFT HEEL
LOCATION DETAILED: RIGHT MEDIAL PLANTAR MIDFOOT
LOCATION DETAILED: RIGHT MEDIAL TRAPEZIAL NECK
LOCATION DETAILED: RIGHT HEEL
LOCATION DETAILED: LEFT MEDIAL PLANTAR MIDFOOT
LOCATION DETAILED: RIGHT MEDIAL POSTERIOR ANKLE
LOCATION DETAILED: RIGHT SUPERIOR UPPER BACK
LOCATION DETAILED: RIGHT HEEL
LOCATION DETAILED: RIGHT MID-UPPER BACK
LOCATION DETAILED: RIGHT SUPERIOR OCCIPITAL SCALP
LOCATION DETAILED: LEFT MEDIAL PLANTAR MIDFOOT
LOCATION DETAILED: NASAL DORSUM

## 2024-01-08 NOTE — PROCEDURE: LIQUID NITROGEN
Show Applicator Variable?: Yes
Render Post-Care Instructions In Note?: no
Post-Care Instructions: I reviewed with the patient in detail post-care instructions. Patient is to wear sunprotection, and avoid picking at any of the treated lesions. Pt may apply Vaseline to crusted or scabbing areas.
Application Tool (Optional): Cry-AC
Detail Level: Detailed
Consent: The patient's consent was obtained including but not limited to risks of crusting, scabbing, blistering, scarring, darker or lighter pigmentary change, recurrence, incomplete removal and infection.
Duration Of Freeze Thaw-Cycle (Seconds): 10
Number Of Freeze-Thaw Cycles: 1 freeze-thaw cycle

## 2024-01-08 NOTE — PROCEDURE: MEDICATION COUNSELING
Winlevi Counseling:  I discussed with the patient the risks of topical clascoterone including but not limited to erythema, scaling, itching, and stinging. Patient voiced their understanding.
Tranexamic Acid Pregnancy And Lactation Text: It is unknown if this medication is safe during pregnancy or breast feeding.
Siliq Pregnancy And Lactation Text: The risk during pregnancy and breastfeeding is uncertain with this medication.
Terbinafine Counseling: Patient counseling regarding adverse effects of terbinafine including but not limited to headache, diarrhea, rash, upset stomach, liver function test abnormalities, itching, taste/smell disturbance, nausea, abdominal pain, and flatulence.  There is a rare possibility of liver failure that can occur when taking terbinafine.  The patient understands that a baseline LFT and kidney function test may be required. The patient verbalized understanding of the proper use and possible adverse effects of terbinafine.  All of the patient's questions and concerns were addressed.
Odomzo Pregnancy And Lactation Text: This medication is Pregnancy Category X and is absolutely contraindicated during pregnancy. It is unknown if it is excreted in breast milk.
Isotretinoin Counseling: Patient should get monthly blood tests, not donate blood, not drive at night if vision affected, not share medication, and not undergo elective surgery for 6 months after tx completed. Side effects reviewed, pt to contact office should one occur.
Detail Level: Detailed
Elidel Counseling: Patient may experience a mild burning sensation during topical application. Elidel is not approved in children less than 2 years of age. There have been case reports of hematologic and skin malignancies in patients using topical calcineurin inhibitors although causality is questionable.
Albendazole Counseling:  I discussed with the patient the risks of albendazole including but not limited to cytopenia, kidney damage, nausea/vomiting and severe allergy.  The patient understands that this medication is being used in an off-label manner.
Cephalexin Pregnancy And Lactation Text: This medication is Pregnancy Category B and considered safe during pregnancy.  It is also excreted in breast milk but can be used safely for shorter doses.
Tremfya Counseling: I discussed with the patient the risks of guselkumab including but not limited to immunosuppression, serious infections, worsening of inflammatory bowel disease and drug reactions.  The patient understands that monitoring is required including a PPD at baseline and must alert us or the primary physician if symptoms of infection or other concerning signs are noted.
Klisyri Pregnancy And Lactation Text: It is unknown if this medication can harm a developing fetus or if it is excreted in breast milk.
Methotrexate Counseling:  Patient counseled regarding adverse effects of methotrexate including but not limited to nausea, vomiting, abnormalities in liver function tests. Patients may develop mouth sores, rash, diarrhea, and abnormalities in blood counts. The patient understands that monitoring is required including LFT's and blood counts.  There is a rare possibility of scarring of the liver and lung problems that can occur when taking methotrexate. Persistent nausea, loss of appetite, pale stools, dark urine, cough, and shortness of breath should be reported immediately. Patient advised to discontinue methotrexate treatment at least three months before attempting to become pregnant.  I discussed the need for folate supplements while taking methotrexate.  These supplements can decrease side effects during methotrexate treatment. The patient verbalized understanding of the proper use and possible adverse effects of methotrexate.  All of the patient's questions and concerns were addressed.
Rinvoq Pregnancy And Lactation Text: Based on animal studies, Rinvoq may cause embryo-fetal harm when administered to pregnant women.  The medication should not be used in pregnancy.  Breastfeeding is not recommended during treatment and for 6 days after the last dose.
Dapsone Pregnancy And Lactation Text: This medication is Pregnancy Category C and is not considered safe during pregnancy or breast feeding.
Gabapentin Counseling: I discussed with the patient the risks of gabapentin including but not limited to dizziness, somnolence, fatigue and ataxia.
Protopic Pregnancy And Lactation Text: This medication is Pregnancy Category C. It is unknown if this medication is excreted in breast milk when applied topically.
Clindamycin Counseling: I counseled the patient regarding use of clindamycin as an antibiotic for prophylactic and/or therapeutic purposes. Clindamycin is active against numerous classes of bacteria, including skin bacteria. Side effects may include nausea, diarrhea, gastrointestinal upset, rash, hives, yeast infections, and in rare cases, colitis.
Methotrexate Pregnancy And Lactation Text: This medication is Pregnancy Category X and is known to cause fetal harm. This medication is excreted in breast milk.
Bimzelx Pregnancy And Lactation Text: This medication crosses the placenta and the safety is uncertain during pregnancy. It is unknown if this medication is present in breast milk.
Topical Ketoconazole Pregnancy And Lactation Text: This medication is Pregnancy Category B and is considered safe during pregnancy. It is unknown if it is excreted in breast milk.
Minocycline Pregnancy And Lactation Text: This medication is Pregnancy Category D and not consider safe during pregnancy. It is also excreted in breast milk.
Sotyktu Counseling:  I discussed the most common side effects of Sotyktu including: common cold, sore throat, sinus infections, cold sores, canker sores, folliculitis, and acne.  I also discussed more serious side effects of Sotyktu including but not limited to: serious allergic reactions; increased risk for infections such as TB; cancers such as lymphomas; rhabdomyolysis and elevated CPK; and elevated triglycerides and liver enzymes. 
Niacinamide Pregnancy And Lactation Text: These medications are considered safe during pregnancy.
Opioid Pregnancy And Lactation Text: These medications can lead to premature delivery and should be avoided during pregnancy. These medications are also present in breast milk in small amounts.
Humira Counseling:  I discussed with the patient the risks of adalimumab including but not limited to myelosuppression, immunosuppression, autoimmune hepatitis, demyelinating diseases, lymphoma, and serious infections.  The patient understands that monitoring is required including a PPD at baseline and must alert us or the primary physician if symptoms of infection or other concerning signs are noted.
Oxybutynin Counseling:  I discussed with the patient the risks of oxybutynin including but not limited to skin rash, drowsiness, dry mouth, difficulty urinating, and blurred vision.
Benzoyl Peroxide Pregnancy And Lactation Text: This medication is Pregnancy Category C. It is unknown if benzoyl peroxide is excreted in breast milk.
Topical Retinoid counseling:  Patient advised to apply a pea-sized amount only at bedtime and wait 30 minutes after washing their face before applying.  If too drying, patient may add a non-comedogenic moisturizer. The patient verbalized understanding of the proper use and possible adverse effects of retinoids.  All of the patient's questions and concerns were addressed.
Humira Pregnancy And Lactation Text: This medication is Pregnancy Category B and is considered safe during pregnancy. It is unknown if this medication is excreted in breast milk.
Winlevi Pregnancy And Lactation Text: This medication is considered safe during pregnancy and breastfeeding.
Fluconazole Counseling:  Patient counseled regarding adverse effects of fluconazole including but not limited to headache, diarrhea, nausea, upset stomach, liver function test abnormalities, taste disturbance, and stomach pain.  There is a rare possibility of liver failure that can occur when taking fluconazole.  The patient understands that monitoring of LFTs and kidney function test may be required, especially at baseline. The patient verbalized understanding of the proper use and possible adverse effects of fluconazole.  All of the patient's questions and concerns were addressed.
Simponi Counseling:  I discussed with the patient the risks of golimumab including but not limited to myelosuppression, immunosuppression, autoimmune hepatitis, demyelinating diseases, lymphoma, and serious infections.  The patient understands that monitoring is required including a PPD at baseline and must alert us or the primary physician if symptoms of infection or other concerning signs are noted.
Valtrex Counseling: I discussed with the patient the risks of valacyclovir including but not limited to kidney damage, nausea, vomiting and severe allergy.  The patient understands that if the infection seems to be worsening or is not improving, they are to call.
Terbinafine Pregnancy And Lactation Text: This medication is Pregnancy Category B and is considered safe during pregnancy. It is also excreted in breast milk and breast feeding isn't recommended.
Carac Counseling:  I discussed with the patient the risks of Carac including but not limited to erythema, scaling, itching, weeping, crusting, and pain.
Albendazole Pregnancy And Lactation Text: This medication is Pregnancy Category C and it isn't known if it is safe during pregnancy. It is also excreted in breast milk.
Elidel Pregnancy And Lactation Text: This medication is Pregnancy Category C. It is unknown if this medication is excreted in breast milk.
Minoxidil Counseling: Minoxidil is a topical medication which can increase blood flow where it is applied. It is uncertain how this medication increases hair growth. Side effects are uncommon and include stinging and allergic reactions.
Azathioprine Counseling:  I discussed with the patient the risks of azathioprine including but not limited to myelosuppression, immunosuppression, hepatotoxicity, lymphoma, and infections.  The patient understands that monitoring is required including baseline LFTs, Creatinine, possible TPMP genotyping and weekly CBCs for the first month and then every 2 weeks thereafter.  The patient verbalized understanding of the proper use and possible adverse effects of azathioprine.  All of the patient's questions and concerns were addressed.
Isotretinoin Pregnancy And Lactation Text: This medication is Pregnancy Category X and is considered extremely dangerous during pregnancy. It is unknown if it is excreted in breast milk.
Include Pregnancy/Lactation Warning?: No
High Dose Vitamin A Counseling: Side effects reviewed, pt to contact office should one occur.
Xolair Counseling:  Patient informed of potential adverse effects including but not limited to fever, muscle aches, rash and allergic reactions.  The patient verbalized understanding of the proper use and possible adverse effects of Xolair.  All of the patient's questions and concerns were addressed.
Azathioprine Pregnancy And Lactation Text: This medication is Pregnancy Category D and isn't considered safe during pregnancy. It is unknown if this medication is excreted in breast milk.
Ivermectin Counseling:  Patient instructed to take medication on an empty stomach with a full glass of water.  Patient informed of potential adverse effects including but not limited to nausea, diarrhea, dizziness, itching, and swelling of the extremities or lymph nodes.  The patient verbalized understanding of the proper use and possible adverse effects of ivermectin.  All of the patient's questions and concerns were addressed.
Clindamycin Pregnancy And Lactation Text: This medication can be used in pregnancy if certain situations. Clindamycin is also present in breast milk.
Minoxidil Pregnancy And Lactation Text: This medication has not been assigned a Pregnancy Risk Category but animal studies failed to show danger with the topical medication. It is unknown if the medication is excreted in breast milk.
Gabapentin Pregnancy And Lactation Text: This medication is Pregnancy Category C and isn't considered safe during pregnancy. It is excreted in breast milk.
Sotyktu Pregnancy And Lactation Text: There is insufficient data to evaluate whether or not Sotyktu is safe to use during pregnancy.   It is not known if Sotyktu passes into breast milk and whether or not it is safe to use when breastfeeding.  
Quinolones Counseling:  I discussed with the patient the risks of fluoroquinolones including but not limited to GI upset, allergic reaction, drug rash, diarrhea, dizziness, photosensitivity, yeast infections, liver function test abnormalities, tendonitis/tendon rupture.
Prednisone Counseling:  I discussed with the patient the risks of prolonged use of prednisone including but not limited to weight gain, insomnia, osteoporosis, mood changes, diabetes, susceptibility to infection, glaucoma and high blood pressure.  In cases where prednisone use is prolonged, patients should be monitored with blood pressure checks, serum glucose levels and an eye exam.  Additionally, the patient may need to be placed on GI prophylaxis, PCP prophylaxis, and calcium and vitamin D supplementation and/or a bisphosphonate.  The patient verbalized understanding of the proper use and the possible adverse effects of prednisone.  All of the patient's questions and concerns were addressed.
Cimzia Counseling:  I discussed with the patient the risks of Cimzia including but not limited to immunosuppression, allergic reactions and infections.  The patient understands that monitoring is required including a PPD at baseline and must alert us or the primary physician if symptoms of infection or other concerning signs are noted.
Dutasteride Male Counseling: Dustasteride Counseling:  I discussed with the patient the risks of use of dutasteride including but not limited to decreased libido, decreased ejaculate volume, and gynecomastia. Women who can become pregnant should not handle medication.  All of the patient's questions and concerns were addressed.
Qbrexza Counseling:  I discussed with the patient the risks of Qbrexza including but not limited to headache, mydriasis, blurred vision, dry eyes, nasal dryness, dry mouth, dry throat, dry skin, urinary hesitation, and constipation.  Local skin reactions including erythema, burning, stinging, and itching can also occur.
Nsaids Counseling: NSAID Counseling: I discussed with the patient that NSAIDs should be taken with food. Prolonged use of NSAIDs can result in the development of stomach ulcers.  Patient advised to stop taking NSAIDs if abdominal pain occurs.  The patient verbalized understanding of the proper use and possible adverse effects of NSAIDs.  All of the patient's questions and concerns were addressed.
Topical Metronidazole Counseling: Metronidazole is a topical antibiotic medication. You may experience burning, stinging, redness, or allergic reactions.  Please call our office if you develop any problems from using this medication.
Nsaids Pregnancy And Lactation Text: These medications are considered safe up to 30 weeks gestation. It is excreted in breast milk.
Quinolones Pregnancy And Lactation Text: This medication is Pregnancy Category C and it isn't know if it is safe during pregnancy. It is also excreted in breast milk.
Cimzia Pregnancy And Lactation Text: This medication crosses the placenta but can be considered safe in certain situations. Cimzia may be excreted in breast milk.
Topical Metronidazole Pregnancy And Lactation Text: This medication is Pregnancy Category B and considered safe during pregnancy.  It is also considered safe to use while breastfeeding.
Ilumya Counseling: I discussed with the patient the risks of tildrakizumab including but not limited to immunosuppression, malignancy, posterior leukoencephalopathy syndrome, and serious infections.  The patient understands that monitoring is required including a PPD at baseline and must alert us or the primary physician if symptoms of infection or other concerning signs are noted.
VTAMA Counseling: I discussed with the patient that VTAMA is not for use in the eyes, mouth or mouth. They should call the office if they develop any signs of allergic reactions to VTAMA. The patient verbalized understanding of the proper use and possible adverse effects of VTAMA.  All of the patient's questions and concerns were addressed.
Propranolol Counseling:  I discussed with the patient the risks of propranolol including but not limited to low heart rate, low blood pressure, low blood sugar, restlessness and increased cold sensitivity. They should call the office if they experience any of these side effects.
Carac Pregnancy And Lactation Text: This medication is Pregnancy Category X and contraindicated in pregnancy and in women who may become pregnant. It is unknown if this medication is excreted in breast milk.
Arava Counseling:  Patient counseled regarding adverse effects of Arava including but not limited to nausea, vomiting, abnormalities in liver function tests. Patients may develop mouth sores, rash, diarrhea, and abnormalities in blood counts. The patient understands that monitoring is required including LFTs and blood counts.  There is a rare possibility of scarring of the liver and lung problems that can occur when taking methotrexate. Persistent nausea, loss of appetite, pale stools, dark urine, cough, and shortness of breath should be reported immediately. Patient advised to discontinue Arava treatment and consult with a physician prior to attempting conception. The patient will have to undergo a treatment to eliminate Arava from the body prior to conception.
Valtrex Pregnancy And Lactation Text: this medication is Pregnancy Category B and is considered safe during pregnancy. This medication is not directly found in breast milk but it's metabolite acyclovir is present.
Eucrisa Counseling: Patient may experience a mild burning sensation during topical application. Eucrisa is not approved in children less than 2 years of age.
Cibinqo Pregnancy And Lactation Text: It is unknown if this medication will adversely affect pregnancy or breast feeding.  You should not take this medication if you are currently pregnant or planning a pregnancy or while breastfeeding.
Skyrizi Counseling: I discussed with the patient the risks of risankizumab-rzaa including but not limited to immunosuppression, and serious infections.  The patient understands that monitoring is required including a PPD at baseline and must alert us or the primary physician if symptoms of infection or other concerning signs are noted.
High Dose Vitamin A Pregnancy And Lactation Text: High dose vitamin A therapy is contraindicated during pregnancy and breast feeding.
Cellcept Counseling:  I discussed with the patient the risks of mycophenolate mofetil including but not limited to infection/immunosuppression, GI upset, hypokalemia, hypercholesterolemia, bone marrow suppression, lymphoproliferative disorders, malignancy, GI ulceration/bleed/perforation, colitis, interstitial lung disease, kidney failure, progressive multifocal leukoencephalopathy, and birth defects.  The patient understands that monitoring is required including a baseline creatinine and regular CBC testing. In addition, patient must alert us immediately if symptoms of infection or other concerning signs are noted.
Xolair Pregnancy And Lactation Text: This medication is Pregnancy Category B and is considered safe during pregnancy. This medication is excreted in breast milk.
Doxycycline Counseling:  Patient counseled regarding possible photosensitivity and increased risk for sunburn.  Patient instructed to avoid sunlight, if possible.  When exposed to sunlight, patients should wear protective clothing, sunglasses, and sunscreen.  The patient was instructed to call the office immediately if the following severe adverse effects occur:  hearing changes, easy bruising/bleeding, severe headache, or vision changes.  The patient verbalized understanding of the proper use and possible adverse effects of doxycycline.  All of the patient's questions and concerns were addressed.
Glycopyrrolate Counseling:  I discussed with the patient the risks of glycopyrrolate including but not limited to skin rash, drowsiness, dry mouth, difficulty urinating, and blurred vision.
Xeljanz Counseling: I discussed with the patient the risks of Xeljanz therapy including increased risk of infection, liver issues, headache, diarrhea, or cold symptoms. Live vaccines should be avoided. They were instructed to call if they have any problems.
Mirvaso Counseling: Mirvaso is a topical medication which can decrease superficial blood flow where applied. Side effects are uncommon and include stinging, redness and allergic reactions.
Dutasteride Pregnancy And Lactation Text: This medication is absolutely contraindicated in women, especially during pregnancy and breast feeding. Feminization of male fetuses is possible if taking while pregnant.
Qbrexza Pregnancy And Lactation Text: There is no available data on Qbrexza use in pregnant women.  There is no available data on Qbrexza use in lactation.
Prednisone Pregnancy And Lactation Text: This medication is Pregnancy Category C and it isn't know if it is safe during pregnancy. This medication is excreted in breast milk.
Cimetidine Counseling:  I discussed with the patient the risks of Cimetidine including but not limited to gynecomastia, headache, diarrhea, nausea, drowsiness, arrhythmias, pancreatitis, skin rashes, psychosis, bone marrow suppression and kidney toxicity.
Finasteride Male Counseling: Finasteride Counseling:  I discussed with the patient the risks of use of finasteride including but not limited to decreased libido, decreased ejaculate volume, gynecomastia, and depression. Women should not handle medication.  All of the patient's questions and concerns were addressed.
Olanzapine Counseling- I discussed with the patient the common side effects of olanzapine including but are not limited to: lack of energy, dry mouth, increased appetite, sleepiness, tremor, constipation, dizziness, changes in behavior, or restlessness.  Explained that teenagers are more likely to experience headaches, abdominal pain, pain in the arms or legs, tiredness, and sleepiness.  Serious side effects include but are not limited: increased risk of death in elderly patients who are confused, have memory loss, or dementia-related psychosis; hyperglycemia; increased cholesterol and triglycerides; and weight gain.
Cosentyx Counseling:  I discussed with the patient the risks of Cosentyx including but not limited to worsening of Crohn's disease, immunosuppression, allergic reactions and infections.  The patient understands that monitoring is required including a PPD at baseline and must alert us or the primary physician if symptoms of infection or other concerning signs are noted.
Rifampin Counseling: I discussed with the patient the risks of rifampin including but not limited to liver damage, kidney damage, red-orange body fluids, nausea/vomiting and severe allergy.
Topical Steroids Counseling: I discussed with the patient that prolonged use of topical steroids can result in the increased appearance of superficial blood vessels (telangiectasias), lightening (hypopigmentation) and thinning of the skin (atrophy).  Patient understands to avoid using high potency steroids in skin folds, the groin or the face.  The patient verbalized understanding of the proper use and possible adverse effects of topical steroids.  All of the patient's questions and concerns were addressed.
Rhofade Counseling: Rhofade is a topical medication which can decrease superficial blood flow where applied. Side effects are uncommon and include stinging, redness and allergic reactions.
Griseofulvin Counseling:  I discussed with the patient the risks of griseofulvin including but not limited to photosensitivity, cytopenia, liver damage, nausea/vomiting and severe allergy.  The patient understands that this medication is best absorbed when taken with a fatty meal (e.g., ice cream or french fries).
Calcipotriene Counseling:  I discussed with the patient the risks of calcipotriene including but not limited to erythema, scaling, itching, and irritation.
Propranolol Pregnancy And Lactation Text: This medication is Pregnancy Category C and it isn't known if it is safe during pregnancy. It is excreted in breast milk.
Vtama Pregnancy And Lactation Text: It is unknown if this medication can cause problems during pregnancy and breastfeeding.
Zoryve Counseling:  I discussed with the patient that Zoryve is not for use in the eyes, mouth or vagina. The most commonly reported side effects include diarrhea, headache, insomnia, application site pain, upper respiratory tract infections, and urinary tract infections.  All of the patient's questions and concerns were addressed.
Azithromycin Counseling:  I discussed with the patient the risks of azithromycin including but not limited to GI upset, allergic reaction, drug rash, diarrhea, and yeast infections.
Hydroquinone Counseling:  Patient advised that medication may result in skin irritation, lightening (hypopigmentation), dryness, and burning.  In the event of skin irritation, the patient was advised to reduce the amount of the drug applied or use it less frequently.  Rarely, spots that are treated with hydroquinone can become darker (pseudoochronosis).  Should this occur, patient instructed to stop medication and call the office. The patient verbalized understanding of the proper use and possible adverse effects of hydroquinone.  All of the patient's questions and concerns were addressed.
Clofazimine Counseling:  I discussed with the patient the risks of clofazimine including but not limited to skin and eye pigmentation, liver damage, nausea/vomiting, gastrointestinal bleeding and allergy.
Doxycycline Pregnancy And Lactation Text: This medication is Pregnancy Category D and not consider safe during pregnancy. It is also excreted in breast milk but is considered safe for shorter treatment courses.
Litfulo Counseling: I discussed with the patient the risks of Litfulo therapy including but not limited to upper respiratory tract infections, shingles, cold sores, and nausea. Live vaccines should be avoided.  This medication has been linked to serious infections; higher rate of mortality; malignancy and lymphoproliferative disorders; major adverse cardiovascular events; thrombosis; gastrointestinal perforations; neutropenia; lymphopenia; anemia; liver enzyme elevations; and lipid elevations.
Glycopyrrolate Pregnancy And Lactation Text: This medication is Pregnancy Category B and is considered safe during pregnancy. It is unknown if it is excreted breast milk.
Xelleonilaz Pregnancy And Lactation Text: This medication is Pregnancy Category D and is not considered safe during pregnancy.  The risk during breast feeding is also uncertain.
Mirvaso Pregnancy And Lactation Text: This medication has not been assigned a Pregnancy Risk Category. It is unknown if the medication is excreted in breast milk.
Calcipotriene Pregnancy And Lactation Text: The use of this medication during pregnancy or lactation is not recommended as there is insufficient data.
Doxepin Counseling:  Patient advised that the medication is sedating and not to drive a car after taking this medication. Patient informed of potential adverse effects including but not limited to dry mouth, urinary retention, and blurry vision.  The patient verbalized understanding of the proper use and possible adverse effects of doxepin.  All of the patient's questions and concerns were addressed.
Hydroxychloroquine Counseling:  I discussed with the patient that a baseline ophthalmologic exam is needed at the start of therapy and every year thereafter while on therapy. A CBC may also be warranted for monitoring.  The side effects of this medication were discussed with the patient, including but not limited to agranulocytosis, aplastic anemia, seizures, rashes, retinopathy, and liver toxicity. Patient instructed to call the office should any adverse effect occur.  The patient verbalized understanding of the proper use and possible adverse effects of Plaquenil.  All the patient's questions and concerns were addressed.
Erythromycin Counseling:  I discussed with the patient the risks of erythromycin including but not limited to GI upset, allergic reaction, drug rash, diarrhea, increase in liver enzymes, and yeast infections.
Cantharidin Pregnancy And Lactation Text: This medication has not been proven safe during pregnancy. It is unknown if this medication is excreted in breast milk.
Litfulo Pregnancy And Lactation Text: Based on animal studies, Lifulo may cause embryo-fetal harm when administered to pregnant women.  The medication should not be used in pregnancy.  Breastfeeding is not recommended during treatment.
Tazorac Counseling:  Patient advised that medication is irritating and drying.  Patient may need to apply sparingly and wash off after an hour before eventually leaving it on overnight.  The patient verbalized understanding of the proper use and possible adverse effects of tazorac.  All of the patient's questions and concerns were addressed.
Topical Steroids Applications Pregnancy And Lactation Text: Most topical steroids are considered safe to use during pregnancy and lactation.  Any topical steroid applied to the breast or nipple should be washed off before breastfeeding.
Finasteride Pregnancy And Lactation Text: This medication is absolutely contraindicated during pregnancy. It is unknown if it is excreted in breast milk.
Rifampin Pregnancy And Lactation Text: This medication is Pregnancy Category C and it isn't know if it is safe during pregnancy. It is also excreted in breast milk and should not be used if you are breast feeding.
Olanzapine Pregnancy And Lactation Text: This medication is pregnancy category C.   There are no adequate and well controlled trials with olanzapine in pregnant females.  Olanzapine should be used during pregnancy only if the potential benefit justifies the potential risk to the fetus.   In a study in lactating healthy women, olanzapine was excreted in breast milk.  It is recommended that women taking olanzapine should not breast feed.
Infliximab Counseling:  I discussed with the patient the risks of infliximab including but not limited to myelosuppression, immunosuppression, autoimmune hepatitis, demyelinating diseases, lymphoma, and serious infections.  The patient understands that monitoring is required including a PPD at baseline and must alert us or the primary physician if symptoms of infection or other concerning signs are noted.
Aklief counseling:  Patient advised to apply a pea-sized amount only at bedtime and wait 30 minutes after washing their face before applying.  If too drying, patient may add a non-comedogenic moisturizer.  The most commonly reported side effects including irritation, redness, scaling, dryness, stinging, burning, itching, and increased risk of sunburn.  The patient verbalized understanding of the proper use and possible adverse effects of retinoids.  All of the patient's questions and concerns were addressed.
Griseofulvin Pregnancy And Lactation Text: This medication is Pregnancy Category X and is known to cause serious birth defects. It is unknown if this medication is excreted in breast milk but breast feeding should be avoided.
SSKI Counseling:  I discussed with the patient the risks of SSKI including but not limited to thyroid abnormalities, metallic taste, GI upset, fever, headache, acne, arthralgias, paraesthesias, lymphadenopathy, easy bleeding, arrhythmias, and allergic reaction.
Erivedge Counseling- I discussed with the patient the risks of Erivedge including but not limited to nausea, vomiting, diarrhea, constipation, weight loss, changes in the sense of taste, decreased appetite, muscle spasms, and hair loss.  The patient verbalized understanding of the proper use and possible adverse effects of Erivedge.  All of the patient's questions and concerns were addressed.
Cantharidin Counseling:  I discussed with the patient the risks of Cantharidin including but not limited to pain, redness, burning, itching, and blistering.
Cibinqo Counseling: I discussed with the patient the risks of Cibinqo therapy including but not limited to common cold, nausea, headache, cold sores, increased blood CPK levels, dizziness, UTIs, fatigue, acne, and vomitting. Live vaccines should be avoided.  This medication has been linked to serious infections; higher rate of mortality; malignancy and lymphoproliferative disorders; major adverse cardiovascular events; thrombosis; thrombocytopenia and lymphopenia; lipid elevations; and retinal detachment.
Sski Pregnancy And Lactation Text: This medication is Pregnancy Category D and isn't considered safe during pregnancy. It is excreted in breast milk.
Itraconazole Counseling:  I discussed with the patient the risks of itraconazole including but not limited to liver damage, nausea/vomiting, neuropathy, and severe allergy.  The patient understands that this medication is best absorbed when taken with acidic beverages such as non-diet cola or ginger ale.  The patient understands that monitoring is required including baseline LFTs and repeat LFTs at intervals.  The patient understands that they are to contact us or the primary physician if concerning signs are noted.
Acitretin Counseling:  I discussed with the patient the risks of acitretin including but not limited to hair loss, dry lips/skin/eyes, liver damage, hyperlipidemia, depression/suicidal ideation, photosensitivity.  Serious rare side effects can include but are not limited to pancreatitis, pseudotumor cerebri, bony changes, clot formation/stroke/heart attack.  Patient understands that alcohol is contraindicated since it can result in liver toxicity and significantly prolong the elimination of the drug by many years.
Stelara Counseling:  I discussed with the patient the risks of ustekinumab including but not limited to immunosuppression, malignancy, posterior leukoencephalopathy syndrome, and serious infections.  The patient understands that monitoring is required including a PPD at baseline and must alert us or the primary physician if symptoms of infection or other concerning signs are noted.
5-Fu Counseling: 5-Fluorouracil Counseling:  I discussed with the patient the risks of 5-fluorouracil including but not limited to erythema, scaling, itching, weeping, crusting, and pain.
Cyclophosphamide Counseling:  I discussed with the patient the risks of cyclophosphamide including but not limited to hair loss, hormonal abnormalities, decreased fertility, abdominal pain, diarrhea, nausea and vomiting, bone marrow suppression and infection. The patient understands that monitoring is required while taking this medication.
Opzelura Counseling:  I discussed with the patient the risks of Opzelura including but not limited to nasopharngitis, bronchitis, ear infection, eosinophila, hives, diarrhea, folliculitis, tonsillitis, and rhinorrhea.  Taken orally, this medication has been linked to serious infections; higher rate of mortality; malignancy and lymphoproliferative disorders; major adverse cardiovascular events; thrombosis; thrombocytopenia, anemia, and neutropenia; and lipid elevations.
Azithromycin Pregnancy And Lactation Text: This medication is considered safe during pregnancy and is also secreted in breast milk.
Colchicine Counseling:  Patient counseled regarding adverse effects including but not limited to stomach upset (nausea, vomiting, stomach pain, or diarrhea).  Patient instructed to limit alcohol consumption while taking this medication.  Colchicine may reduce blood counts especially with prolonged use.  The patient understands that monitoring of kidney function and blood counts may be required, especially at baseline. The patient verbalized understanding of the proper use and possible adverse effects of colchicine.  All of the patient's questions and concerns were addressed.
Cyclophosphamide Pregnancy And Lactation Text: This medication is Pregnancy Category D and it isn't considered safe during pregnancy. This medication is excreted in breast milk.
Tazorac Pregnancy And Lactation Text: This medication is not safe during pregnancy. It is unknown if this medication is excreted in breast milk.
Erythromycin Pregnancy And Lactation Text: This medication is Pregnancy Category B and is considered safe during pregnancy. It is also excreted in breast milk.
Olumiant Counseling: I discussed with the patient the risks of Olumiant therapy including but not limited to upper respiratory tract infections, shingles, cold sores, and nausea. Live vaccines should be avoided.  This medication has been linked to serious infections; higher rate of mortality; malignancy and lymphoproliferative disorders; major adverse cardiovascular events; thrombosis; gastrointestinal perforations; neutropenia; lymphopenia; anemia; liver enzyme elevations; and lipid elevations.
Opzelura Pregnancy And Lactation Text: There is insufficient data to evaluate drug-associated risk for major birth defects, miscarriage, or other adverse maternal or fetal outcomes.  There is a pregnancy registry that monitors pregnancy outcomes in pregnant persons exposed to the medication during pregnancy.  It is unknown if this medication is excreted in breast milk.  Do not breastfeed during treatment and for about 4 weeks after the last dose.
Hydroxychloroquine Pregnancy And Lactation Text: This medication has been shown to cause fetal harm but it isn't assigned a Pregnancy Risk Category. There are small amounts excreted in breast milk.
Sarecycline Counseling: Patient advised regarding possible photosensitivity and discoloration of the teeth, skin, lips, tongue and gums.  Patient instructed to avoid sunlight, if possible.  When exposed to sunlight, patients should wear protective clothing, sunglasses, and sunscreen.  The patient was instructed to call the office immediately if the following severe adverse effects occur:  hearing changes, easy bruising/bleeding, severe headache, or vision changes.  The patient verbalized understanding of the proper use and possible adverse effects of sarecycline.  All of the patient's questions and concerns were addressed.
Dupixent Counseling: I discussed with the patient the risks of dupilumab including but not limited to eye infection and irritation, cold sores, injection site reactions, worsening of asthma, allergic reactions and increased risk of parasitic infection.  Live vaccines should be avoided while taking dupilumab. Dupilumab will also interact with certain medications such as warfarin and cyclosporine. The patient understands that monitoring is required and they must alert us or the primary physician if symptoms of infection or other concerning signs are noted.
Birth Control Pills Counseling: Birth Control Pill Counseling: I discussed with the patient the potential side effects of OCPs including but not limited to increased risk of stroke, heart attack, thrombophlebitis, deep venous thrombosis, hepatic adenomas, breast changes, GI upset, headaches, and depression.  The patient verbalized understanding of the proper use and possible adverse effects of OCPs. All of the patient's questions and concerns were addressed.
Oral Minoxidil Counseling- I discussed with the patient the risks of oral minoxidil including but not limited to shortness of breath, swelling of the feet or ankles, dizziness, lightheadedness, unwanted hair growth and allergic reaction.  The patient verbalized understanding of the proper use and possible adverse effects of oral minoxidil.  All of the patient's questions and concerns were addressed.
Doxepin Pregnancy And Lactation Text: This medication is Pregnancy Category C and it isn't known if it is safe during pregnancy. It is also excreted in breast milk and breast feeding isn't recommended.
Solaraze Counseling:  I discussed with the patient the risks of Solaraze including but not limited to erythema, scaling, itching, weeping, crusting, and pain.
Aklief Pregnancy And Lactation Text: It is unknown if this medication is safe to use during pregnancy.  It is unknown if this medication is excreted in breast milk.  Breastfeeding women should use the topical cream on the smallest area of the skin for the shortest time needed while breastfeeding.  Do not apply to nipple and areola.
Topical Sulfur Applications Counseling: Topical Sulfur Counseling: Patient counseled that this medication may cause skin irritation or allergic reactions.  In the event of skin irritation, the patient was advised to reduce the amount of the drug applied or use it less frequently.   The patient verbalized understanding of the proper use and possible adverse effects of topical sulfur application.  All of the patient's questions and concerns were addressed.
Oral Minoxidil Pregnancy And Lactation Text: This medication should only be used when clearly needed if you are pregnant, attempting to become pregnant or breast feeding.
Birth Control Pills Pregnancy And Lactation Text: This medication should be avoided if pregnant and for the first 30 days post-partum.
Dupixent Pregnancy And Lactation Text: This medication likely crosses the placenta but the risk for the fetus is uncertain. This medication is excreted in breast milk.
Libtayo Counseling- I discussed with the patient the risks of Libtayo including but not limited to nausea, vomiting, diarrhea, and bone or muscle pain.  The patient verbalized understanding of the proper use and possible adverse effects of Libtayo.  All of the patient's questions and concerns were addressed.
Topical Sulfur Applications Pregnancy And Lactation Text: This medication is Pregnancy Category C and has an unknown safety profile during pregnancy. It is unknown if this topical medication is excreted in breast milk.
Azelaic Acid Counseling: Patient counseled that medicine may cause skin irritation and to avoid applying near the eyes.  In the event of skin irritation, the patient was advised to reduce the amount of the drug applied or use it less frequently.   The patient verbalized understanding of the proper use and possible adverse effects of azelaic acid.  All of the patient's questions and concerns were addressed.
Zyclara Counseling:  I discussed with the patient the risks of imiquimod including but not limited to erythema, scaling, itching, weeping, crusting, and pain.  Patient understands that the inflammatory response to imiquimod is variable from person to person and was educated regarded proper titration schedule.  If flu-like symptoms develop, patient knows to discontinue the medication and contact us.
Thalidomide Counseling: I discussed with the patient the risks of thalidomide including but not limited to birth defects, anxiety, weakness, chest pain, dizziness, cough and severe allergy.
Rituxan Counseling:  I discussed with the patient the risks of Rituxan infusions. Side effects can include infusion reactions, severe drug rashes including mucocutaneous reactions, reactivation of latent hepatitis and other infections and rarely progressive multifocal leukoencephalopathy.  All of the patient's questions and concerns were addressed.
Bactrim Counseling:  I discussed with the patient the risks of sulfa antibiotics including but not limited to GI upset, allergic reaction, drug rash, diarrhea, dizziness, photosensitivity, and yeast infections.  Rarely, more serious reactions can occur including but not limited to aplastic anemia, agranulocytosis, methemoglobinemia, blood dyscrasias, liver or kidney failure, lung infiltrates or desquamative/blistering drug rashes.
Imiquimod Counseling:  I discussed with the patient the risks of imiquimod including but not limited to erythema, scaling, itching, weeping, crusting, and pain.  Patient understands that the inflammatory response to imiquimod is variable from person to person and was educated regarded proper titration schedule.  If flu-like symptoms develop, patient knows to discontinue the medication and contact us.
Acitretin Pregnancy And Lactation Text: This medication is Pregnancy Category X and should not be given to women who are pregnant or may become pregnant in the future. This medication is excreted in breast milk.
Taltz Counseling: I discussed with the patient the risks of ixekizumab including but not limited to immunosuppression, serious infections, worsening of inflammatory bowel disease and drug reactions.  The patient understands that monitoring is required including a PPD at baseline and must alert us or the primary physician if symptoms of infection or other concerning signs are noted.
Bactrim Pregnancy And Lactation Text: This medication is Pregnancy Category D and is known to cause fetal risk.  It is also excreted in breast milk.
Metronidazole Counseling:  I discussed with the patient the risks of metronidazole including but not limited to seizures, nausea/vomiting, a metallic taste in the mouth, nausea/vomiting and severe allergy.
Olumiant Pregnancy And Lactation Text: Based on animal studies, Olumiant may cause embryo-fetal harm when administered to pregnant women.  The medication should not be used in pregnancy.  Breastfeeding is not recommended during treatment.
Cyclosporine Counseling:  I discussed with the patient the risks of cyclosporine including but not limited to hypertension, gingival hyperplasia,myelosuppression, immunosuppression, liver damage, kidney damage, neurotoxicity, lymphoma, and serious infections. The patient understands that monitoring is required including baseline blood pressure, CBC, CMP, lipid panel and uric acid, and then 1-2 times monthly CMP and blood pressure.
Adbry Counseling: I discussed with the patient the risks of tralokinumab including but not limited to eye infection and irritation, cold sores, injection site reactions, worsening of asthma, allergic reactions and increased risk of parasitic infection.  Live vaccines should be avoided while taking tralokinumab. The patient understands that monitoring is required and they must alert us or the primary physician if symptoms of infection or other concerning signs are noted.
Picato Counseling:  I discussed with the patient the risks of Picato including but not limited to erythema, scaling, itching, weeping, crusting, and pain.
Solaraze Pregnancy And Lactation Text: This medication is Pregnancy Category B and is considered safe. There is some data to suggest avoiding during the third trimester. It is unknown if this medication is excreted in breast milk.
Low Dose Naltrexone Counseling- I discussed with the patient the potential risks and side effects of low dose naltrexone including but not limited to: more vivid dreams, headaches, nausea, vomiting, abdominal pain, fatigue, dizziness, and anxiety.
Topical Clindamycin Counseling: Patient counseled that this medication may cause skin irritation or allergic reactions.  In the event of skin irritation, the patient was advised to reduce the amount of the drug applied or use it less frequently.   The patient verbalized understanding of the proper use and possible adverse effects of clindamycin.  All of the patient's questions and concerns were addressed.
Hydroxyzine Counseling: Patient advised that the medication is sedating and not to drive a car after taking this medication.  Patient informed of potential adverse effects including but not limited to dry mouth, urinary retention, and blurry vision.  The patient verbalized understanding of the proper use and possible adverse effects of hydroxyzine.  All of the patient's questions and concerns were addressed.
Hydroxyzine Pregnancy And Lactation Text: This medication is not safe during pregnancy and should not be taken. It is also excreted in breast milk and breast feeding isn't recommended.
Enbrel Counseling:  I discussed with the patient the risks of etanercept including but not limited to myelosuppression, immunosuppression, autoimmune hepatitis, demyelinating diseases, lymphoma, and infections.  The patient understands that monitoring is required including a PPD at baseline and must alert us or the primary physician if symptoms of infection or other concerning signs are noted.
Wartpeel Counseling:  I discussed with the patient the risks of Wartpeel including but not limited to erythema, scaling, itching, weeping, crusting, and pain.
Tetracycline Counseling: Patient counseled regarding possible photosensitivity and increased risk for sunburn.  Patient instructed to avoid sunlight, if possible.  When exposed to sunlight, patients should wear protective clothing, sunglasses, and sunscreen.  The patient was instructed to call the office immediately if the following severe adverse effects occur:  hearing changes, easy bruising/bleeding, severe headache, or vision changes.  The patient verbalized understanding of the proper use and possible adverse effects of tetracycline.  All of the patient's questions and concerns were addressed. Patient understands to avoid pregnancy while on therapy due to potential birth defects.
Soolantra Counseling: I discussed with the patients the risks of topial Soolantra. This is a medicine which decreases the number of mites and inflammation in the skin. You experience burning, stinging, eye irritation or allergic reactions.  Please call our office if you develop any problems from using this medication.
Azelaic Acid Pregnancy And Lactation Text: This medication is considered safe during pregnancy and breast feeding.
Otezla Counseling: The side effects of Otezla were discussed with the patient, including but not limited to worsening or new depression, weight loss, diarrhea, nausea, upper respiratory tract infection, and headache. Patient instructed to call the office should any adverse effect occur.  The patient verbalized understanding of the proper use and possible adverse effects of Otezla.  All the patient's questions and concerns were addressed.
Ketoconazole Counseling:   Patient counseled regarding improving absorption with orange juice.  Adverse effects include but are not limited to breast enlargement, headache, diarrhea, nausea, upset stomach, liver function test abnormalities, taste disturbance, and stomach pain.  There is a rare possibility of liver failure that can occur when taking ketoconazole. The patient understands that monitoring of LFTs may be required, especially at baseline. The patient verbalized understanding of the proper use and possible adverse effects of ketoconazole.  All of the patient's questions and concerns were addressed.
Rituxan Pregnancy And Lactation Text: This medication is Pregnancy Category C and it isn't know if it is safe during pregnancy. It is unknown if this medication is excreted in breast milk but similar antibodies are known to be excreted.
Libtayo Pregnancy And Lactation Text: This medication is contraindicated in pregnancy and when breast feeding.
Drysol Counseling:  I discussed with the patient the risks of drysol/aluminum chloride including but not limited to skin rash, itching, irritation, burning.
Bexarotene Counseling:  I discussed with the patient the risks of bexarotene including but not limited to hair loss, dry lips/skin/eyes, liver abnormalities, hyperlipidemia, pancreatitis, depression/suicidal ideation, photosensitivity, drug rash/allergic reactions, hypothyroidism, anemia, leukopenia, infection, cataracts, and teratogenicity.  Patient understands that they will need regular blood tests to check lipid profile, liver function tests, white blood cell count, thyroid function tests and pregnancy test if applicable.
Tranexamic Acid Counseling:  Patient advised of the small risk of bleeding problems with tranexamic acid. They were also instructed to call if they developed any nausea, vomiting or diarrhea. All of the patient's questions and concerns were addressed.
Siliq Counseling:  I discussed with the patient the risks of Siliq including but not limited to new or worsening depression, suicidal thoughts and behavior, immunosuppression, malignancy, posterior leukoencephalopathy syndrome, and serious infections.  The patient understands that monitoring is required including a PPD at baseline and must alert us or the primary physician if symptoms of infection or other concerning signs are noted. There is also a special program designed to monitor depression which is required with Siliq.
Bexarotene Pregnancy And Lactation Text: This medication is Pregnancy Category X and should not be given to women who are pregnant or may become pregnant. This medication should not be used if you are breast feeding.
Cephalexin Counseling: I counseled the patient regarding use of cephalexin as an antibiotic for prophylactic and/or therapeutic purposes. Cephalexin (commonly prescribed under brand name Keflex) is a cephalosporin antibiotic which is active against numerous classes of bacteria, including most skin bacteria. Side effects may include nausea, diarrhea, gastrointestinal upset, rash, hives, yeast infections, and in rare cases, hepatitis, kidney disease, seizures, fever, confusion, neurologic symptoms, and others. Patients with severe allergies to penicillin medications are cautioned that there is about a 10% incidence of cross-reactivity with cephalosporins. When possible, patients with penicillin allergies should use alternatives to cephalosporins for antibiotic therapy.
Dapsone Counseling: I discussed with the patient the risks of dapsone including but not limited to hemolytic anemia, agranulocytosis, rashes, methemoglobinemia, kidney failure, peripheral neuropathy, headaches, GI upset, and liver toxicity.  Patients who start dapsone require monitoring including baseline LFTs and weekly CBCs for the first month, then every month thereafter.  The patient verbalized understanding of the proper use and possible adverse effects of dapsone.  All of the patient's questions and concerns were addressed.
Metronidazole Pregnancy And Lactation Text: This medication is Pregnancy Category B and considered safe during pregnancy.  It is also excreted in breast milk.
Klisyri Counseling:  I discussed with the patient the risks of Klisyri including but not limited to erythema, scaling, itching, weeping, crusting, and pain.
Adbry Pregnancy And Lactation Text: It is unknown if this medication will adversely affect pregnancy or breast feeding.
Rinvoq Counseling: I discussed with the patient the risks of Rinvoq therapy including but not limited to upper respiratory tract infections, shingles, cold sores, bronchitis, nausea, cough, fever, acne, and headache. Live vaccines should be avoided.  This medication has been linked to serious infections; higher rate of mortality; malignancy and lymphoproliferative disorders; major adverse cardiovascular events; thrombosis; thrombocytopenia, anemia, and neutropenia; lipid elevations; liver enzyme elevations; and gastrointestinal perforations.
Low Dose Naltrexone Pregnancy And Lactation Text: Naltrexone is pregnancy category C.  There have been no adequate and well-controlled studies in pregnant women.  It should be used in pregnancy only if the potential benefit justifies the potential risk to the fetus.   Limited data indicates that naltrexone is minimally excreted into breastmilk.
Spironolactone Counseling: Patient advised regarding risks of diarrhea, abdominal pain, hyperkalemia, birth defects (for female patients), liver toxicity and renal toxicity. The patient may need blood work to monitor liver and kidney function and potassium levels while on therapy. The patient verbalized understanding of the proper use and possible adverse effects of spironolactone.  All of the patient's questions and concerns were addressed.
Niacinamide Counseling: I recommended taking niacin or niacinamide, also know as vitamin B3, twice daily. Recent evidence suggests that taking vitamin B3 (500 mg twice daily) can reduce the risk of actinic keratoses and non-melanoma skin cancers. Side effects of vitamin B3 include flushing and headache.
Bimzelx Counseling:  I discussed with the patient the risks of Bimzelx including but not limited to depression, immunosuppression, allergic reactions and infections.  The patient understands that monitoring is required including a PPD at baseline and must alert us or the primary physician if symptoms of infection or other concerning signs are noted.
Topical Ketoconazole Counseling: Patient counseled that this medication may cause skin irritation or allergic reactions.  In the event of skin irritation, the patient was advised to reduce the amount of the drug applied or use it less frequently.   The patient verbalized understanding of the proper use and possible adverse effects of ketoconazole.  All of the patient's questions and concerns were addressed.
Minocycline Counseling: Patient advised regarding possible photosensitivity and discoloration of the teeth, skin, lips, tongue and gums.  Patient instructed to avoid sunlight, if possible.  When exposed to sunlight, patients should wear protective clothing, sunglasses, and sunscreen.  The patient was instructed to call the office immediately if the following severe adverse effects occur:  hearing changes, easy bruising/bleeding, severe headache, or vision changes.  The patient verbalized understanding of the proper use and possible adverse effects of minocycline.  All of the patient's questions and concerns were addressed.
Spironolactone Pregnancy And Lactation Text: This medication can cause feminization of the male fetus and should be avoided during pregnancy. The active metabolite is also found in breast milk.
Opioid Counseling: I discussed with the patient the potential side effects of opioids including but not limited to addiction, altered mental status, and depression. I stressed avoiding alcohol, benzodiazepines, muscle relaxants and sleep aids unless specifically okayed by a physician. The patient verbalized understanding of the proper use and possible adverse effects of opioids. All of the patient's questions and concerns were addressed. They were instructed to flush the remaining pills down the toilet if they did not need them for pain.
Protopic Counseling: Patient may experience a mild burning sensation during topical application. Protopic is not approved in children less than 2 years of age. There have been case reports of hematologic and skin malignancies in patients using topical calcineurin inhibitors although causality is questionable.
Otezla Pregnancy And Lactation Text: This medication is Pregnancy Category C and it isn't known if it is safe during pregnancy. It is unknown if it is excreted in breast milk.
Ketoconazole Pregnancy And Lactation Text: This medication is Pregnancy Category C and it isn't know if it is safe during pregnancy. It is also excreted in breast milk and breast feeding isn't recommended.
Benzoyl Peroxide Counseling: Patient counseled that medicine may cause skin irritation and bleach clothing.  In the event of skin irritation, the patient was advised to reduce the amount of the drug applied or use it less frequently.   The patient verbalized understanding of the proper use and possible adverse effects of benzoyl peroxide.  All of the patient's questions and concerns were addressed.
Soolantra Pregnancy And Lactation Text: This medication is Pregnancy Category C. This medication is considered safe during breast feeding.
Odomzo Counseling- I discussed with the patient the risks of Odomzo including but not limited to nausea, vomiting, diarrhea, constipation, weight loss, changes in the sense of taste, decreased appetite, muscle spasms, and hair loss.  The patient verbalized understanding of the proper use and possible adverse effects of Odomzo.  All of the patient's questions and concerns were addressed.

## 2024-01-08 NOTE — PROCEDURE: TREATMENT REGIMEN
Initiate Treatment: Ketoconazole cream BID
Detail Level: Zone
Initiate Treatment: Triamcinolone cream BID for two weeks on/off

## 2024-04-19 DIAGNOSIS — I25.5 ISCHEMIC CARDIOMYOPATHY: ICD-10-CM

## 2024-04-19 RX ORDER — METOPROLOL SUCCINATE 50 MG/1
TABLET, EXTENDED RELEASE ORAL
Qty: 90 TABLET | Refills: 0 | Status: SHIPPED | OUTPATIENT
Start: 2024-04-19

## 2024-05-07 ENCOUNTER — APPOINTMENT (OUTPATIENT)
Dept: RADIOLOGY | Facility: MEDICAL CENTER | Age: 74
End: 2024-05-07
Attending: EMERGENCY MEDICINE
Payer: MEDICARE

## 2024-05-07 ENCOUNTER — HOSPITAL ENCOUNTER (EMERGENCY)
Facility: MEDICAL CENTER | Age: 74
End: 2024-05-07
Attending: EMERGENCY MEDICINE
Payer: MEDICARE

## 2024-05-07 VITALS
RESPIRATION RATE: 17 BRPM | WEIGHT: 205.03 LBS | HEIGHT: 74 IN | OXYGEN SATURATION: 91 % | HEART RATE: 70 BPM | DIASTOLIC BLOOD PRESSURE: 81 MMHG | TEMPERATURE: 96.8 F | SYSTOLIC BLOOD PRESSURE: 125 MMHG | BODY MASS INDEX: 26.31 KG/M2

## 2024-05-07 DIAGNOSIS — J22 LOWER RESPIRATORY TRACT INFECTION: ICD-10-CM

## 2024-05-07 RX ORDER — DEXAMETHASONE SODIUM PHOSPHATE 10 MG/ML
10 INJECTION, SOLUTION INTRAMUSCULAR; INTRAVENOUS ONCE
Status: COMPLETED | OUTPATIENT
Start: 2024-05-07 | End: 2024-05-07

## 2024-05-07 RX ORDER — METHYLPHENIDATE HYDROCHLORIDE 5 MG/1
5 TABLET ORAL DAILY
COMMUNITY

## 2024-05-07 RX ORDER — AZITHROMYCIN 250 MG/1
TABLET, FILM COATED ORAL
Qty: 6 TABLET | Refills: 0 | Status: ACTIVE | OUTPATIENT
Start: 2024-05-07 | End: 2024-05-12

## 2024-05-07 RX ADMIN — DEXAMETHASONE SODIUM PHOSPHATE 10 MG: 10 INJECTION INTRAMUSCULAR; INTRAVENOUS at 11:39

## 2024-05-07 ASSESSMENT — FIBROSIS 4 INDEX: FIB4 SCORE: 2.31

## 2024-05-07 NOTE — ED PROVIDER NOTES
ED Provider Note    CHIEF COMPLAINT  Chief Complaint   Patient presents with    Flu Like Symptoms     Pt has hx of parkinsons and recently caught a cold. Pt unable to cough up mucus secretions and POA worried about mucus going into lungs. No difficulties breathing noted however pt saturating at 89% on RA in lobby        EXTERNAL RECORDS REVIEWED  Other I reviewed an office visit from Dr. Alejandre the patient's neurologist who notes the patient does have Parkinson's disease and is nonverbal.    HPI/ROS  LIMITATION TO HISTORY   Select: The patient is nonverbal and the history obtained is from his wife  OUTSIDE HISTORIAN(S):  Family patient's wife provides history    Jeremiah Huber is a 73 y.o. male who presents with flulike symptoms.  The patient's been sick over the last couple of days.  The patient's had a cough as well as some congestion.  The patient was up all night last night coughing.  He has not had any fevers nor vomiting.  Initially thought this was from allergies.  The patient does not have any known lung disease.  He is nonverbal from his Parkinson's disease but is at his baseline from a neurologic standpoint.    PAST MEDICAL HISTORY   has a past medical history of Arthritis, Back spasm, Blood clotting disorder (HCC) (2020), GOUT, Gout, Hypertension, Myocardial infarct (HCC) (2020), Parkinson disease (HCC), and Stroke ([facility]) (2020).    SURGICAL HISTORY   has a past surgical history that includes shoulder surgery; tonsillectomy; knee arthroscopy (Right); knee replacement, total (Bilateral); and neuro dest facet l/s w/ig sngl (Right, 2020).    FAMILY HISTORY  Family History   Problem Relation Age of Onset    Heart Disease Mother     Heart Disease Father     Cancer Father         prostate cancer    Arthritis Brother        SOCIAL HISTORY  Social History     Tobacco Use    Smoking status: Former     Current packs/day: 0.00     Types: Cigarettes     Quit date: 1970     Years since quittin.3  "   Smokeless tobacco: Never   Vaping Use    Vaping Use: Never used   Substance and Sexual Activity    Alcohol use: Not Currently     Alcohol/week: 1.2 oz     Types: 2 Glasses of wine per week     Comment: once a week    Drug use: No    Sexual activity: Yes     Partners: Female       CURRENT MEDICATIONS  Home Medications       Reviewed by Lovely Benson R.N. (Registered Nurse) on 05/07/24 at [employer]  Med List Status: Partial     Medication Last Dose Status   acetaminophen (TYLENOL) 500 MG Tab  Active   amantadine (SYMMETREL) 100 MG Tab  Active   aspirin 81 MG EC tablet  Active   atorvastatin (LIPITOR) 80 MG tablet  Active   carbidopa-levodopa (SINEMET)  MG Tab  Active   gabapentin (NEURONTIN) 300 MG Cap  Active   ketoconazole (NIZORAL) 2 % Cream  Active   meloxicam (MOBIC) 15 MG tablet  Active   methylphenidate (RITALIN) 5 MG Tab 5/6/2024 Active   metoprolol SR (TOPROL XL) 50 MG TABLET SR 24 HR  Active   omeprazole (PRILOSEC) 20 MG delayed-release capsule  Active   ROPINIRole (REQUIP) 2 MG tablet  Active                    ALLERGIES  Allergies   Allergen Reactions    Nkda [No Known Drug Allergy]        PHYSICAL EXAM  VITAL SIGNS: /81   Pulse 87   Temp 36 °C (96.8 °F) (Temporal)   Resp 18   Ht 1.88 m (6' 2\")   Wt 93 kg (205 lb 0.4 oz)   SpO2 94%   BMI 26.32 kg/m²    In general the patient does appear chronically ill    HEENT unremarkable    Pulmonary the patient has diffuse rhonchi and slightly diminished breath sounds throughout    Cardiovascular S1-S2 with a regular rate and rhythm    GI abdomen is soft    Skin no rashes, pallor, no jaundice    Extremities no distal edema    Neurologic examination the patient is at his baseline according to family    RADIOLOGY/PROCEDURES   I have independently interpreted the diagnostic imaging associated with this visit and am waiting the final reading from the radiologist.   My preliminary interpretation is as follows: X-ray was reviewed there is no focal process " such as pneumonia    Radiologist interpretation:  DX-CHEST-PORTABLE (1 VIEW)   Final Result      Bilateral basilar atelectasis and/or consolidation. Underlying infection is possible.          COURSE & MEDICAL DECISION MAKING    This is 73-year-old gentleman who presents to the emergency department with flulike symptoms and more notably a cough.  The patient does have some diffuse rhonchi and a chest x-ray is ordered.  There is some possible bibasilar atelectasis versus consolidation.  The patient does have a significant productive cough and is borderline hypoxic.  Therefore we will treat the patient for lower respiratory tract infection with azithromycin.  He did receive a dose of prednisone while in the emergency department.  I like the patient to recheck in 48 to 72 hours if he is not better and sooner if worse.  The patient does not appear septic nor toxic.    FINAL DIAGNOSIS  Lower respiratory tract infection    Disposition  The patient will be discharged in stable condition       Electronically signed by: Mickey Ca M.D., 5/7/2024 11:03 AM

## 2024-05-07 NOTE — ED NOTES
Pt and wife provided discharge instructions and follow-up information. Pt verbalized understanding and all questions answered. Pt assisted from ED in WC by family.

## 2024-05-07 NOTE — ED TRIAGE NOTES
Chief Complaint   Patient presents with    Flu Like Symptoms     Pt has hx of parkinsons and recently caught a cold. Pt unable to cough up mucus secretions and POA worried about mucus going into lungs. No difficulties breathing noted however pt saturating at 89% on RA in lobby    '

## 2024-05-07 NOTE — ED NOTES
Bedside report from Charmaine MEAD. Pt connected to BP and pulse ox, currently being tried on RA. Pt moderate fall risk, standard precautions in place and pt using call light appropriately.

## 2024-05-11 ENCOUNTER — APPOINTMENT (OUTPATIENT)
Dept: RADIOLOGY | Facility: MEDICAL CENTER | Age: 74
End: 2024-05-11
Attending: EMERGENCY MEDICINE
Payer: MEDICARE

## 2024-05-11 ENCOUNTER — APPOINTMENT (OUTPATIENT)
Dept: URGENT CARE | Facility: CLINIC | Age: 74
End: 2024-05-11
Payer: MEDICARE

## 2024-05-11 ENCOUNTER — HOSPITAL ENCOUNTER (EMERGENCY)
Facility: MEDICAL CENTER | Age: 74
End: 2024-05-11
Attending: EMERGENCY MEDICINE
Payer: MEDICARE

## 2024-05-11 VITALS
SYSTOLIC BLOOD PRESSURE: 129 MMHG | DIASTOLIC BLOOD PRESSURE: 82 MMHG | TEMPERATURE: 96.8 F | BODY MASS INDEX: 26.31 KG/M2 | HEART RATE: 61 BPM | OXYGEN SATURATION: 95 % | WEIGHT: 205 LBS | RESPIRATION RATE: 20 BRPM | HEIGHT: 74 IN

## 2024-05-11 DIAGNOSIS — J06.9 UPPER RESPIRATORY TRACT INFECTION, UNSPECIFIED TYPE: ICD-10-CM

## 2024-05-11 LAB
ALBUMIN SERPL BCP-MCNC: 4.1 G/DL (ref 3.2–4.9)
ALBUMIN/GLOB SERPL: 1.6 G/DL
ALP SERPL-CCNC: 73 U/L (ref 30–99)
ALT SERPL-CCNC: 6 U/L (ref 2–50)
ANION GAP SERPL CALC-SCNC: 12 MMOL/L (ref 7–16)
AST SERPL-CCNC: 21 U/L (ref 12–45)
BASOPHILS # BLD AUTO: 0.2 % (ref 0–1.8)
BASOPHILS # BLD: 0.01 K/UL (ref 0–0.12)
BILIRUB SERPL-MCNC: 0.7 MG/DL (ref 0.1–1.5)
BUN SERPL-MCNC: 13 MG/DL (ref 8–22)
CALCIUM ALBUM COR SERPL-MCNC: 8.8 MG/DL (ref 8.5–10.5)
CALCIUM SERPL-MCNC: 8.9 MG/DL (ref 8.4–10.2)
CHLORIDE SERPL-SCNC: 105 MMOL/L (ref 96–112)
CO2 SERPL-SCNC: 24 MMOL/L (ref 20–33)
CREAT SERPL-MCNC: 0.8 MG/DL (ref 0.5–1.4)
EKG IMPRESSION: NORMAL
EOSINOPHIL # BLD AUTO: 0.09 K/UL (ref 0–0.51)
EOSINOPHIL NFR BLD: 1.9 % (ref 0–6.9)
ERYTHROCYTE [DISTWIDTH] IN BLOOD BY AUTOMATED COUNT: 43.4 FL (ref 35.9–50)
FLUAV RNA SPEC QL NAA+PROBE: NEGATIVE
FLUBV RNA SPEC QL NAA+PROBE: NEGATIVE
GFR SERPLBLD CREATININE-BSD FMLA CKD-EPI: 94 ML/MIN/1.73 M 2
GLOBULIN SER CALC-MCNC: 2.6 G/DL (ref 1.9–3.5)
GLUCOSE SERPL-MCNC: 90 MG/DL (ref 65–99)
HCT VFR BLD AUTO: 49.3 % (ref 42–52)
HGB BLD-MCNC: 16.8 G/DL (ref 14–18)
IMM GRANULOCYTES # BLD AUTO: 0.03 K/UL (ref 0–0.11)
IMM GRANULOCYTES NFR BLD AUTO: 0.6 % (ref 0–0.9)
LACTATE SERPL-SCNC: 1.6 MMOL/L (ref 0.5–2)
LYMPHOCYTES # BLD AUTO: 1 K/UL (ref 1–4.8)
LYMPHOCYTES NFR BLD: 21.5 % (ref 22–41)
MCH RBC QN AUTO: 30.5 PG (ref 27–33)
MCHC RBC AUTO-ENTMCNC: 34 G/DL (ref 32.3–36.5)
MCV RBC AUTO: 89.7 FL (ref 81.4–97.8)
MONOCYTES # BLD AUTO: 0.39 K/UL (ref 0–0.85)
MONOCYTES NFR BLD AUTO: 8.4 % (ref 0–13.4)
NEUTROPHILS # BLD AUTO: 3.13 K/UL (ref 1.82–7.42)
NEUTROPHILS NFR BLD: 67.4 % (ref 44–72)
NRBC # BLD AUTO: 0 K/UL
NRBC BLD-RTO: 0 /100 WBC (ref 0–0.2)
NT-PROBNP SERPL IA-MCNC: 259 PG/ML (ref 0–125)
PLATELET # BLD AUTO: 189 K/UL (ref 164–446)
PMV BLD AUTO: 9.8 FL (ref 9–12.9)
POTASSIUM SERPL-SCNC: 4 MMOL/L (ref 3.6–5.5)
PROT SERPL-MCNC: 6.7 G/DL (ref 6–8.2)
RBC # BLD AUTO: 5.49 M/UL (ref 4.7–6.1)
RSV RNA SPEC QL NAA+PROBE: NEGATIVE
SARS-COV-2 RNA RESP QL NAA+PROBE: NOTDETECTED
SODIUM SERPL-SCNC: 141 MMOL/L (ref 135–145)
SPECIMEN SOURCE: NORMAL
TROPONIN T SERPL-MCNC: 12 NG/L (ref 6–19)
WBC # BLD AUTO: 4.7 K/UL (ref 4.8–10.8)

## 2024-05-11 RX ORDER — SILDENAFIL 50 MG/1
50 TABLET, FILM COATED ORAL
COMMUNITY

## 2024-05-11 RX ORDER — IPRATROPIUM BROMIDE 21 UG/1
2 SPRAY, METERED NASAL 2 TIMES DAILY PRN
COMMUNITY

## 2024-05-11 RX ORDER — CETIRIZINE HYDROCHLORIDE 10 MG/1
10 TABLET ORAL
COMMUNITY

## 2024-05-11 RX ORDER — TRIAMCINOLONE ACETONIDE 1 MG/G
1 CREAM TOPICAL 2 TIMES DAILY PRN
COMMUNITY

## 2024-05-11 RX ORDER — GUAIFENESIN 200 MG/10ML
30 LIQUID ORAL EVERY 8 HOURS PRN
COMMUNITY

## 2024-05-11 ASSESSMENT — FIBROSIS 4 INDEX: FIB4 SCORE: 2.31

## 2024-05-11 NOTE — ED NOTES
Patient is non verbal due to Parkinson's disease. Spouse explained patient has had a lingering cough and low oxygen saturation at home.

## 2024-05-11 NOTE — ED NOTES
Medication history reviewed with PT and his wife at bedside    Med rec is complete per PT and wife reporting    Allergies reviewed.     PT is on a Zpak 250mg 5 day course started 05/06/2024. Last dose 05/10/2024. PT requires 1 more dose to complete full 5 day course.    Patient is not taking anticoagulants.    Preferred pharmacy for this visit - [facility] on [address] ([phone])

## 2024-05-11 NOTE — ED NOTES
Seen and examined by erp-orders recvd. Saline lock with blood draw at this time as well as viral swab collected. Aware of need for urine spec. Call light in reach, wife at bedside

## 2024-05-11 NOTE — ED PROVIDER NOTES
ED Provider Note    CHIEF COMPLAINT  Chief Complaint   Patient presents with    Cough     Pt was seen at [facility] a week ago for cough and low oxygen. He had a clear chest xray and was sent home. Pt wife is also ill and had a covid/flu/rsv tested yesterday and it was negative. Per wife, pt hasn't been improving and his oxygen has been low as 84%. Pt has parkinson's disease and is none verbal. Wife is concerned he might need home oxygen.        EXTERNAL RECORDS REVIEWED  Outpatient Notes 2023 sees Dr. Alejandre, neurology for Parkinson's disease    HPI/ROS  LIMITATION TO HISTORY   Select: Nonverbal due to Parkinson's  OUTSIDE HISTORIAN(S):  Significant other wife    Jeremiah Huber is a 73 y.o. male who presents to the ED with cough.  Apparently the patient was seen and evaluated here 4 days ago for lower respiratory tract infection, was given a [id number]-Yfn.  Chest x-ray did not show any pneumonia.  Apparently per wife the patient symptoms of gotten worse, oxygen level is low.  No fevers known.    PAST MEDICAL HISTORY   has a past medical history of Arthritis, Back spasm, Blood clotting disorder (HCC) (2020), GOUT, Gout, Hypertension, Myocardial infarct ([facility]) (2020), Parkinson disease ([facility]), and Stroke ([facility]) (2020).    SURGICAL HISTORY   has a past surgical history that includes shoulder surgery; tonsillectomy; knee arthroscopy (Right); knee replacement, total (Bilateral); and neuro dest facet l/s w/ig sngl (Right, 2020).    FAMILY HISTORY  Family History   Problem Relation Age of Onset    Heart Disease Mother     Heart Disease Father     Cancer Father         prostate cancer    Arthritis Brother        SOCIAL HISTORY  Social History     Tobacco Use    Smoking status: Former     Current packs/day: 0.00     Types: Cigarettes     Quit date: 1970     Years since quittin.3    Smokeless tobacco: Never   Vaping Use    Vaping Use: Never used   Substance and Sexual Activity    Alcohol use: Not Currently     "Drug use: No    Sexual activity: Yes     Partners: Female       CURRENT MEDICATIONS  Home Medications       Reviewed by Carlos Patterson (Pharmacy Tech) on 05/11/24 at 1120  Med List Status: Complete     Medication Last Dose Status   acetaminophen (TYLENOL) 500 MG Tab >1 WEEK AGO Active   amantadine (SYMMETREL) 100 MG Tab 5/10/2024 Active   aspirin 81 MG EC tablet 5/11/2024 Active   atorvastatin (LIPITOR) 80 MG tablet 5/10/2024 Active   azithromycin (ZITHROMAX) 250 MG Tab 5/10/2024 Active   carbidopa-levodopa (SINEMET)  MG Tab 5/11/2024 Active   cetirizine (ZYRTEC) 10 MG Tab 1 WEEK AGO Active   gabapentin (NEURONTIN) 300 MG Cap 5/10/2024 Active   guaiFENesin (ROBITUSSIN) 100 MG/5ML liquid 5/11/2024 Active   ipratropium (ATROVENT) 0.03 % Solution >1 WEEK AGO Active   meloxicam (MOBIC) 15 MG tablet 5/10/2024 Active   methylphenidate (RITALIN) 5 MG Tab 5/10/2024 Active   metoprolol SR (TOPROL XL) 50 MG TABLET SR 24 HR 5/11/2024 Active   omeprazole (PRILOSEC) 20 MG delayed-release capsule 5/10/2024 Active   ROPINIRole (REQUIP) 2 MG tablet 5/11/2024 Active   sildenafil citrate (VIAGRA) 50 MG tablet 2 WEEKS AGO Active   triamcinolone acetonide (KENALOG) 0.1 % Cream 2 WEEKS AGO Active                    ALLERGIES  No Known Allergies      PHYSICAL EXAM  VITAL SIGNS: /82   Pulse 61   Temp 36 °C (96.8 °F) (Temporal)   Resp 20   Ht 1.88 m (6' 2\")   Wt 93 kg (205 lb)   SpO2 95%   BMI 26.32 kg/m²    Well-developed and nourished 73-year-old male who appears in mild distress  Atraumatic, normocephalic, oropharynx is clear  Neck is supple  Chest with crackles and rhonchi at bilateral bases  Regular rhythm  Neuro the patient is awake and alert nonverbal    EKG/LABS  Results for orders placed or performed during the hospital encounter of 05/11/24   Lactic Acid   Result Value Ref Range    Lactic Acid 1.6 0.5 - 2.0 mmol/L   CBC with Differential   Result Value Ref Range    WBC 4.7 (L) 4.8 - 10.8 K/uL    RBC 5.49 " 4.70 - 6.10 M/uL    Hemoglobin 16.8 14.0 - 18.0 g/dL    Hematocrit 49.3 42.0 - 52.0 %    MCV 89.7 81.4 - 97.8 fL    MCH 30.5 27.0 - 33.0 pg    MCHC 34.0 32.3 - 36.5 g/dL    RDW 43.4 35.9 - 50.0 fL    Platelet Count 189 164 - 446 K/uL    MPV 9.8 9.0 - 12.9 fL    Neutrophils-Polys 67.40 44.00 - 72.00 %    Lymphocytes 21.50 (L) 22.00 - 41.00 %    Monocytes 8.40 0.00 - 13.40 %    Eosinophils 1.90 0.00 - 6.90 %    Basophils 0.20 0.00 - 1.80 %    Immature Granulocytes 0.60 0.00 - 0.90 %    Nucleated RBC 0.00 0.00 - 0.20 /100 WBC    Neutrophils (Absolute) 3.13 1.82 - 7.42 K/uL    Lymphs (Absolute) 1.00 1.00 - 4.80 K/uL    Monos (Absolute) 0.39 0.00 - 0.85 K/uL    Eos (Absolute) 0.09 0.00 - 0.51 K/uL    Baso (Absolute) 0.01 0.00 - 0.12 K/uL    Immature Granulocytes (abs) 0.03 0.00 - 0.11 K/uL    NRBC (Absolute) 0.00 K/uL   Complete Metabolic Panel   Result Value Ref Range    Sodium 141 135 - 145 mmol/L    Potassium 4.0 3.6 - 5.5 mmol/L    Chloride 105 96 - 112 mmol/L    Co2 24 20 - 33 mmol/L    Anion Gap 12.0 7.0 - 16.0    Glucose 90 65 - 99 mg/dL    Bun 13 8 - 22 mg/dL    Creatinine 0.80 0.50 - 1.40 mg/dL    Calcium 8.9 8.4 - 10.2 mg/dL    Correct Calcium 8.8 8.5 - 10.5 mg/dL    AST(SGOT) 21 12 - 45 U/L    ALT(SGPT) 6 2 - 50 U/L    Alkaline Phosphatase 73 30 - 99 U/L    Total Bilirubin 0.7 0.1 - 1.5 mg/dL    Albumin 4.1 3.2 - 4.9 g/dL    Total Protein 6.7 6.0 - 8.2 g/dL    Globulin 2.6 1.9 - 3.5 g/dL    A-G Ratio 1.6 g/dL   CoV-2, Flu A/B, And RSV by PCR ([facility])    Specimen: Respirate   Result Value Ref Range    Influenza virus A RNA Negative Negative    Influenza virus B, PCR Negative Negative    RSV, PCR Negative Negative    SARS-CoV-2 by PCR NotDetected     SARS-CoV-2 Source NP Swab    Troponin   Result Value Ref Range    Troponin T 12 6 - 19 ng/L   proBrain Natriuretic Peptide, NT   Result Value Ref Range    NT-proBNP 259 (H) 0 - 125 pg/mL   ESTIMATED GFR   Result Value Ref Range    GFR (CKD-EPI) 94 >60 mL/min/1.73 m  2   EKG   Result Value Ref Range    Report       [facility]    Test Date:  2024  Pt Name:    GRIFFIN NEGRON                 Department: [facility]  MRN:        [id number]                      Room:  Gender:     Male                         Technician: [id number]  :        1950                   Requested By:ER TRIAGE PROTOCOL  Order #:    [id number]                    Reading MD: JAZ BENNETT MD    Measurements  Intervals                                Axis  Rate:       68                           P:          17  IA:         49                           QRS:        -20  QRSD:       134                          T:          53  QT:         422  QTc:        449    Interpretive Statements  Sinus rhythm  Ventricular premature complex  Short IA interval  IVCD, consider atypical LBBB  Compared to ECG 2020 12:47:17  Ventricular premature complex(es) now present  Short IA interval now present  First degree AV block no longer present  Myocardial infarct finding no longer present  T-wave abno rmality no longer present  Possible ischemia no longer present  Electronically Signed On 2024 11:24:05 PDT by JAZ BENNETT MD        I have independently interpreted this EKG    RADIOLOGY/PROCEDURES   I have independently interpreted the diagnostic imaging associated with this visit and am waiting the final reading from the radiologist.   My preliminary interpretation is as follows: No focal pneumonia    Radiologist interpretation:  DX-CHEST-PORTABLE (1 VIEW)   Final Result      Small amount of bibasilar linear density, likely atelectasis or airspace disease.          COURSE & MEDICAL DECISION MAKING    ASSESSMENT, COURSE AND PLAN  Care Narrative: Patient is coming in secondary to cough, the patient is been sick for the last several days, sepsis evaluation was performed, chest x-ray does not show any pneumonia, laboratory tests are unremarkable, the patient is negative for  COVID flu RSV.  Wife is just concerned about the patient's low oxygen level at home.  I discussed with her sometimes the inaccuracies of home pulse ox monitors.  The patient was monitored here and never dropped below 90.  I believe is safe for her to be discharged home.  The wife does not want the patient admitted to the hospital either way.  Discussed with her that I cannot provide her a prescription for oxygen that usually done as an inpatient.  Patient will be discharged home, return with worsening symptoms.        DISPOSITION AND DISCUSSIONS  Decision tools and prescription drugs considered including, but not limited to: Antibiotics   .    FINAL DIAGNOSIS  1. Upper respiratory tract infection, unspecified type           Electronically signed by: Antonio Beth M.D., 5/11/2024 10:14 AM

## 2024-05-11 NOTE — ED TRIAGE NOTES
"Chief Complaint   Patient presents with    Cough     Pt was seen at [facility] a week ago for cough and low oxygen. He had a clear chest xray and was sent home. Pt wife is also ill and had a covid/flu/rsv tested yesterday and it was negative. Per wife, pt hasn't been improving and his oxygen has been low as 84%. Pt has parkinson's disease and is none verbal. Wife is concerned he might need home oxygen.     BP 95/69   Pulse 65   Temp 36.8 °C (98.2 °F) (Temporal)   Resp 18   Ht 1.88 m (6' 2\")   Wt 93 kg (205 lb)   SpO2 89%   BMI 26.32 kg/m²     "

## 2024-05-11 NOTE — ED NOTES
"Called to room-pt wife requests \"what are we waiting for\". Denies need for urine test, states  she is worried about his oxygen , which has been >90% while in er. Update to erp. To bedside to discuss same  "

## 2024-05-16 LAB
BACTERIA BLD CULT: NORMAL
BACTERIA BLD CULT: NORMAL
SIGNIFICANT IND 70042: NORMAL
SIGNIFICANT IND 70042: NORMAL
SITE SITE: NORMAL
SITE SITE: NORMAL
SOURCE SOURCE: NORMAL
SOURCE SOURCE: NORMAL

## 2024-05-21 ENCOUNTER — APPOINTMENT (OUTPATIENT)
Dept: CARDIOLOGY | Facility: MEDICAL CENTER | Age: 74
End: 2024-05-21
Payer: MEDICARE

## 2024-05-24 ENCOUNTER — APPOINTMENT (OUTPATIENT)
Dept: CARDIOLOGY | Facility: MEDICAL CENTER | Age: 74
End: 2024-05-24
Payer: MEDICARE

## 2024-05-29 NOTE — PROGRESS NOTES
"Chief Complaint   Patient presents with    Hyperlipidemia    Cardiomyopathy (Ischemic)    Other     F/V Dx: Myocardial infarct, old - distal PDA occlusion, LVEF 49%          Subjective:   Jeremiah Huber is a 73 y.o. male who presents today for follow-up.     Patient of Dr. Rodríguez, last visit 2023.  Current medical problems include remote myocardial infarction due to distal PDA occlusion with ischemic cardiomyopathy, history of CVA due to LV thrombus at the time of MI, Parkinson's.     His wife provides the history, he is nonverbal. Farhat has no cardiac complaints. He was in the ER twice this month with a severe upper respiratory illness.  In the emergency department he had labs checked including an NT proBNP which was mildly elevated at 259.  Ultimately he has been started on supplemental oxygen at nighttime.  During the day when he is upright and moving he does not require oxygen.  Farhat denies any lower extremity edema, abdominal swelling, weight gain.  He has no chest pain.   He is normally hypotensive without significant orthostasis.  Past Medical History:   Diagnosis Date    Arthritis     osteo    Back spasm     Blood clotting disorder ([facility]) 2020    \"heart\"    GOUT     Gout     Hypertension     Myocardial infarct ([facility]) 2020    Parkinson disease ([facility])     Stroke ([facility]) 2020         Family History   Problem Relation Age of Onset    Heart Disease Mother     Heart Disease Father     Cancer Father         prostate cancer    Arthritis Brother          Social History     Tobacco Use    Smoking status: Former     Current packs/day: 0.00     Types: Cigarettes     Quit date: 1970     Years since quittin.4    Smokeless tobacco: Never   Vaping Use    Vaping status: Never Used   Substance Use Topics    Alcohol use: Yes     Alcohol/week: 4.2 oz     Types: 7 Glasses of wine per week    Drug use: No         No Known Allergies      Current Outpatient Medications   Medication Sig    Dextromethorphan-guaiFENesin " (MUCUS DM PO) Take 1,200 mg by mouth.  Tab everyday, second tab as needed    metoprolol SR (TOPROL XL) 50 MG TABLET SR 24 HR Take 1 Tablet by mouth every day.    atorvastatin (LIPITOR) 80 MG tablet Take 1 Tablet by mouth every evening.    cetirizine (ZYRTEC) 10 MG Tab Take 10 mg by mouth 1 time a day as needed for Allergies.    ipratropium (ATROVENT) 0.03 % Solution Administer 2 Sprays into affected nostril(S) 2 times a day as needed (congestion).    sildenafil citrate (VIAGRA) 50 MG tablet Take 50 mg by mouth 1 time a day as needed for Erectile Dysfunction.    triamcinolone acetonide (KENALOG) 0.1 % Cream Apply 1 Application topically 2 times a day as needed (itch/rash).    methylphenidate (RITALIN) 5 MG Tab Take 5 mg by mouth every day.    carbidopa-levodopa (SINEMET)  MG Tab Take 1 Tablet by mouth 4 times a day. 6AM, 10AM, 2PM, and 6PM (Patient taking differently: Take 2 Tablets by mouth 4 times a day. 6AM, 10AM, 2PM, and 6PM)    ROPINIRole (REQUIP) 2 MG tablet Take 2 Tablets by mouth 3 times a day.    gabapentin (NEURONTIN) 300 MG Cap Take 2 Capsules by mouth every evening.    amantadine (SYMMETREL) 100 MG Tab Take 1 Tablet by mouth 2 times a day. Take at 10AM and 6PM    meloxicam (MOBIC) 15 MG tablet Take 1 Tablet by mouth 1 time a day as needed for Mild Pain.    omeprazole (PRILOSEC) 20 MG delayed-release capsule TAKE 1 CAPSULE BY MOUTH ONCE A DAY 30 MINUTES BEFORE BREAKFAST MEAL    aspirin 81 MG EC tablet TAKE 1 TABLET BY MOUTH EVERY DAY (Patient taking differently: Take 81 mg by mouth every day.)    acetaminophen (TYLENOL) 500 MG Tab Take 1,000 mg by mouth 2 times a day as needed for Moderate Pain.    guaiFENesin (ROBITUSSIN) 100 MG/5ML liquid Take 30 mL by mouth every 8 hours as needed (excess mucus). 30mls = 600mg (Patient not taking: Reported on 5/30/2024)         Review of Systems   Unable to perform ROS: Acuity of condition          Objective:   BP (!) 80/52 (BP Location: Left arm, Patient  "Position: Sitting, BP Cuff Size: Adult)   Pulse 73   Resp 16   Ht 1.88 m (6' 2\")   Wt 92.5 kg (204 lb)   SpO2 94%  Body mass index is 26.19 kg/m².         Physical Exam  Vitals reviewed.   HENT:      Head: Normocephalic and atraumatic.   Cardiovascular:      Rate and Rhythm: Normal rate and regular rhythm.   Pulmonary:      Effort: Pulmonary effort is normal. No respiratory distress.      Comments: Weak inspiratory effort.  Diminished breath sounds in left lung  Abdominal:      General: There is no distension.   Musculoskeletal:      Right lower leg: No edema.      Left lower leg: No edema.   Skin:     General: Skin is warm.   Neurological:      Mental Status: He is alert.         Cardiac MR 8/21/20  IMPRESSION:     1.  Transmural apical infarct, with associated mild aneurysmal dilatation of the LV apex. The infarct also involves the apical septum. No apical thrombus.  2.  No other areas of delayed enhancement.  3.  Mildly decreased LV function (LVEF of 49.3%). Apical dyskinesia.  4.  No chamber enlargement. No aortic, mitral or tricuspid regurgitation detected.  5.  Incidental hepatic cysts, benign.     Assessment:     1. Hypoxia  proBrain Natriuretic Peptide, NT      2. Ischemic cardiomyopathy  metoprolol SR (TOPROL XL) 50 MG TABLET SR 24 HR    Lipid Profile      3. Other hyperlipidemia  atorvastatin (LIPITOR) 80 MG tablet    Lipid Profile      4. Dyslipidemia  Lipid Profile           Medical Decision Making:  Today's Assessment / Plan:   CAD with silent MI  Ischemic cardiomyopathy with LV thrombus, resolved  -Continue aspirin, statin for secondary prevention.  Repeat lipid profile this year  -Continue beta-blocker    Elevated NT proBNP in the setting of upper respiratory illness  Nocturnal hypoxia  -Now treated with supplemental oxygen, suspect related to atelectasis given his sedentary lifestyle.  He does have a spirometer at home that I have encouraged him to use for lung recruitment.  I suspect that the " NT proBNP is elevated in the setting of his URI, this also may be his normal given his age and ischemia.  We can repeat this to assess trend.  He has no signs of heart failure on exam today.    Return in about 1 year (around 5/30/2025) for Marcos.  Sooner if problems.

## 2024-05-30 ENCOUNTER — OFFICE VISIT (OUTPATIENT)
Dept: CARDIOLOGY | Facility: MEDICAL CENTER | Age: 74
End: 2024-05-30
Attending: PHYSICIAN ASSISTANT
Payer: MEDICARE

## 2024-05-30 VITALS
HEIGHT: 74 IN | OXYGEN SATURATION: 94 % | BODY MASS INDEX: 26.18 KG/M2 | RESPIRATION RATE: 16 BRPM | WEIGHT: 204 LBS | DIASTOLIC BLOOD PRESSURE: 52 MMHG | HEART RATE: 73 BPM | SYSTOLIC BLOOD PRESSURE: 80 MMHG

## 2024-05-30 DIAGNOSIS — I25.2 MYOCARDIAL INFARCT, OLD: ICD-10-CM

## 2024-05-30 DIAGNOSIS — E78.49 OTHER HYPERLIPIDEMIA: ICD-10-CM

## 2024-05-30 DIAGNOSIS — E78.5 DYSLIPIDEMIA: ICD-10-CM

## 2024-05-30 DIAGNOSIS — I25.5 ISCHEMIC CARDIOMYOPATHY: ICD-10-CM

## 2024-05-30 DIAGNOSIS — R09.02 HYPOXIA: ICD-10-CM

## 2024-05-30 DIAGNOSIS — I51.3 LEFT VENTRICULAR THROMBOSIS: ICD-10-CM

## 2024-05-30 RX ORDER — METOPROLOL SUCCINATE 50 MG/1
50 TABLET, EXTENDED RELEASE ORAL DAILY
Qty: 90 TABLET | Refills: 3 | Status: SHIPPED | OUTPATIENT
Start: 2024-05-30

## 2024-05-30 RX ORDER — ATORVASTATIN CALCIUM 80 MG/1
80 TABLET, FILM COATED ORAL EVERY EVENING
Qty: 90 TABLET | Refills: 3 | Status: SHIPPED | OUTPATIENT
Start: 2024-05-30

## 2024-05-30 ASSESSMENT — FIBROSIS 4 INDEX: FIB4 SCORE: 3.22

## 2024-06-10 NOTE — TELEPHONE ENCOUNTER
Dr. Mclain is this ok, I guess on the Rx if we want to refill this for him can we please put on there bottle only not pre-packaged for meds.   Please advise  Patient is talking about his Carbidopa-Levodopa (Sinemet)    ambulatory

## 2024-06-15 ENCOUNTER — HOSPITAL ENCOUNTER (OUTPATIENT)
Dept: LAB | Facility: MEDICAL CENTER | Age: 74
End: 2024-06-15
Attending: PHYSICIAN ASSISTANT
Payer: MEDICARE

## 2024-06-15 DIAGNOSIS — I25.5 ISCHEMIC CARDIOMYOPATHY: ICD-10-CM

## 2024-06-15 DIAGNOSIS — E78.49 OTHER HYPERLIPIDEMIA: ICD-10-CM

## 2024-06-15 DIAGNOSIS — R09.02 HYPOXIA: ICD-10-CM

## 2024-06-15 DIAGNOSIS — E78.5 DYSLIPIDEMIA: ICD-10-CM

## 2024-06-15 LAB
CHOLEST SERPL-MCNC: 94 MG/DL (ref 100–199)
HDLC SERPL-MCNC: 44 MG/DL
LDLC SERPL CALC-MCNC: 32 MG/DL
NT-PROBNP SERPL IA-MCNC: 245 PG/ML (ref 0–125)
TRIGL SERPL-MCNC: 90 MG/DL (ref 0–149)

## 2024-06-15 PROCEDURE — 80061 LIPID PANEL: CPT

## 2024-06-15 PROCEDURE — 36415 COLL VENOUS BLD VENIPUNCTURE: CPT

## 2024-06-15 PROCEDURE — 83880 ASSAY OF NATRIURETIC PEPTIDE: CPT | Mod: GA

## 2024-06-17 NOTE — RESULT ENCOUNTER NOTE
Farhat,   Your labs are stable. The NTproBNP is mildly elevated but stable compared to previous, this is likely your baseline.     Elizabeth

## 2024-06-28 ENCOUNTER — TELEPHONE (OUTPATIENT)
Dept: NEUROLOGY | Facility: MEDICAL CENTER | Age: 74
End: 2024-06-28

## 2024-06-28 ENCOUNTER — OFFICE VISIT (OUTPATIENT)
Dept: NEUROLOGY | Facility: MEDICAL CENTER | Age: 74
End: 2024-06-28
Attending: PSYCHIATRY & NEUROLOGY
Payer: MEDICARE

## 2024-06-28 VITALS
BODY MASS INDEX: 25.78 KG/M2 | OXYGEN SATURATION: 96 % | SYSTOLIC BLOOD PRESSURE: 122 MMHG | TEMPERATURE: 96.5 F | HEIGHT: 74 IN | DIASTOLIC BLOOD PRESSURE: 60 MMHG | HEART RATE: 66 BPM | WEIGHT: 200.84 LBS

## 2024-06-28 DIAGNOSIS — G20.A1 PARKINSON'S DISEASE WITHOUT DYSKINESIA OR FLUCTUATING MANIFESTATIONS (HCC): Primary | ICD-10-CM

## 2024-06-28 DIAGNOSIS — G25.81 RESTLESS LEG SYNDROME: ICD-10-CM

## 2024-06-28 PROCEDURE — 99212 OFFICE O/P EST SF 10 MIN: CPT | Performed by: PSYCHIATRY & NEUROLOGY

## 2024-06-28 RX ORDER — CARBIDOPA/LEVODOPA 25MG-250MG
2 TABLET ORAL 4 TIMES DAILY
Qty: 720 TABLET | Refills: 3 | Status: SHIPPED | OUTPATIENT
Start: 2024-06-28

## 2024-06-28 RX ORDER — AMANTADINE HYDROCHLORIDE 100 MG/1
100 TABLET ORAL 2 TIMES DAILY
Qty: 180 TABLET | Refills: 3 | Status: SHIPPED | OUTPATIENT
Start: 2024-06-28

## 2024-06-28 RX ORDER — ROPINIROLE 2 MG/1
4 TABLET, FILM COATED ORAL 3 TIMES DAILY
Qty: 540 TABLET | Refills: 3 | Status: SHIPPED | OUTPATIENT
Start: 2024-06-28

## 2024-06-28 RX ORDER — AMANTADINE HYDROCHLORIDE 100 MG/1
100 TABLET ORAL 2 TIMES DAILY
Qty: 180 TABLET | Refills: 3 | Status: SHIPPED | OUTPATIENT
Start: 2024-06-28 | End: 2024-06-28 | Stop reason: SDUPTHER

## 2024-06-28 ASSESSMENT — PATIENT HEALTH QUESTIONNAIRE - PHQ9: CLINICAL INTERPRETATION OF PHQ2 SCORE: 0

## 2024-06-28 ASSESSMENT — FIBROSIS 4 INDEX: FIB4 SCORE: 3.22

## 2024-06-28 ASSESSMENT — ENCOUNTER SYMPTOMS
FALLS: 0
TREMORS: 0
MEMORY LOSS: 0

## 2024-06-28 NOTE — TELEPHONE ENCOUNTER
Received Refill PA request via [facility]  for amantadine (SYMMETREL) 100 MG Tab  . (Quantity:180, Day Supply:90)     Insurance: [employer]  Member ID:  [id number]  BIN: [id number]  PCN: [id number]  Group: [id number]       Ran Test claim via [place] [facility]  REFILL TOO SOON UNTIL 08/10/2024. LAST FILLED ON 05/21/2024 AT [facility]

## 2024-06-28 NOTE — TELEPHONE ENCOUNTER
Received Refill PA request via [facility]  for carbidopa-levodopa (SINEMET)  MG Tab . (Quantity:720, Day Supply:90)     Insurance: [employer]  Member ID:  [id number]  BIN: [id number]  PCN: [id number]  Group: [id number]     Ran Test claim via [place] [facility] Pays for a $30.00 copay. Will outreach to patient to offer specialty pharmacy services and or release to preferred pharmacy

## 2024-06-28 NOTE — PROGRESS NOTES
Subjective     Farhat Huber is a 73 y.o. male who presents with his wife Nolvia, as always, for 6-month follow-up, with a history of moderate to severe Parkinson's disease, and RLS, still under the care of Dr. Dickens [facility].  He was seen there back in February and just several weeks ago.     HPI    Jeremiah seems to slowly worsen.  Tremor is still not the issue, it is mostly the bradykinesia and rigidity which limits him.  He is still on the Sinemet and ropinirole regimen throughout the day, there is no wearing-off, but he is still having the motor problems of freezing, shuffling of gait, and severe postural instability.  At home he now pretty much uses his walker all the time, even for short distances, but at the gym he seems to be able to transfer between pieces of equipment with greater ease.  He is not falling.  There are no peak-dose issues with dyskinesias.  He still does go to the gym and exercises regularly.  Unfortunately, around the house, his outdoor activities are now severely constrained.    The absence of speech remains, comprehension itself is still intact.  Swallowing is slow but performed easily.  For the excessive saliva, he has been given dextromethorphan, with Dr. Dickens's blessing.  He has yet to start.    Originally the Sinemet 25/250 had been a single tablet dose, this was increased to 2 tablets in the dose, and unfortunately there was only limited benefit, and he has again began to lean towards the right side.  Requip and Symmetrel were unchanged.    Dr. Dickens added 5 mg methylphenidate, historically there has been some improved dopamine responsiveness with its use, they both seemed to notice some improvement briefly.  They are not sure this has been continued.  They were then given Rytary supplements to take as 3 tablets 3 times daily, substituting for the Sinemet.  Requip and Symmetrel were to remain unchanged.    RLS: He is still on the  "ropinirole at 10 PM along with the Neurontin 600 mg.  This provides consistent benefit though he is now reaching for an additional 2 mg ropinirole tablet on an occasion.    Medical, surgical and family histories are reviewed, there are no new drug allergies.  He is on Sinemet 25/250, 2 tablets 4 times a day at 6 AM, 10 AM, 2 PM and 6 PM, ropinirole 2 mg tablets, 2 tablets at 6 AM, 2 PM and then 10 PM, Symmetrel 100 mg at 6 AM and 2 PM, gabapentin 300 mg, 2 tablets at 10 PM, dextromethorphan 1200 mg daily, methylphenidate 5 mg daily, baby aspirin, Toprol-XL, Lipitor, Mobic, and Prilosec.    Review of Systems   Constitutional:  Negative for malaise/fatigue.   Musculoskeletal:  Negative for falls.   Neurological:  Negative for tremors.   Psychiatric/Behavioral:  Negative for memory loss.    All other systems reviewed and are negative.    Objective     /60 (BP Location: Right arm, Patient Position: Sitting, BP Cuff Size: Adult)   Pulse 66   Temp 35.8 °C (96.5 °F) (Temporal)   Ht 1.88 m (6' 2.02\")   Wt 91.1 kg (200 lb 13.4 oz)   SpO2 96%   BMI 25.78 kg/m²      Physical Exam    He appears in no acute distress.  Vital signs are stable.  He is very bright and cooperative.  There is no malar rash or sialorrhea.  His neck is supple though range of motion is full, all movements are stiffened.  Cardiac evaluation reveals a regular rhythm.     Neurological Exam    With appropriate nodding, he is fully oriented.  He names.  There is no apraxia.    PERRLA/EOMI and visual fields are full.  He is effectively mute, though single words can be understood.  Facial movements are symmetric.  Bradykinesia remains.  There is no sensory loss of temperature.  The tongue protrudes midline but slowly.  Lateral movements are curtailed in amplitude.  Shoulder shrug and head rotation are normal.    Rigidity is mild but still present bilaterally.  Bradykinesia is quite marked.  There is no drift or asterixis.  There is no tremor on " today's exam, either at rest or with action.  Strength is intact throughout.  Reflexes are diminished, there are no asymmetries, ankle jerks are present bilaterally.    Standing is very difficult, any type of gait initiation is quite difficult, having great difficulty picking his feet up, he shuffles profoundly.  He requires full assistance.  Repetitive movements are diminished in amplitude throughout, frequency is increased in the feet, these are actually slower with the hands and arms.  There is no appendicular dystaxia.    Sensory exam reveals a mild stocking pattern decameter vibration below the ankles, temperature is intact.    Assessment & Plan     1. Parkinson's disease without dyskinesia or fluctuating manifestations (HCC)  It will be interesting to see how the transition from Sinemet to Rytary 3 tablets 3 times daily works.  Hopefully this axial rigidity and right-sided flexion will improve.  Requip and Symmetrel will be continued unchanged.  Will also be interesting to see if an additional dose of methylphenidate will provide some additional dopamine responsiveness.  Hopefully we will see some improvement with his gait and postural instability, allowing him to assume more independent ambulation.  He was told that his voice will not likely be changing much.  As for the dextromethorphan, there is certainly no reason why this cannot be used to help clear the mucus.    - carbidopa-levodopa (SINEMET)  MG Tab; Take 2 Tablets by mouth 4 times a day. 6AM, 10AM, 2PM, and 6PM  Dispense: 720 Tablet; Refill: 3  - ROPINIRole (REQUIP) 2 MG tablet; Take 2 Tablets by mouth 3 times a day.  Dispense: 540 Tablet; Refill: 3  - amantadine (SYMMETREL) 100 MG Tab; Take 1 Tablet by mouth 2 times a day. Take at 10AM and 6PM  Dispense: 180 Tablet; Refill: 3    2. Restless leg syndrome  Stable, gabapentin will be continued along with the ropinirole at 10 PM.    We will see each other in 6 months.    Time: 40 minutes in total  spent in patient care including pre-charting, record review, discussion with healthcare staff and documentation.  This includes face-to-face time for exam, review, discussion, as well as counseling and coordinating care.

## 2024-07-08 ENCOUNTER — APPOINTMENT (RX ONLY)
Dept: URBAN - METROPOLITAN AREA CLINIC 35 | Facility: CLINIC | Age: 74
Setting detail: DERMATOLOGY
End: 2024-07-08

## 2024-07-08 DIAGNOSIS — L82.1 OTHER SEBORRHEIC KERATOSIS: ICD-10-CM

## 2024-07-08 DIAGNOSIS — Z85.828 PERSONAL HISTORY OF OTHER MALIGNANT NEOPLASM OF SKIN: ICD-10-CM

## 2024-07-08 DIAGNOSIS — B35.3 TINEA PEDIS: ICD-10-CM

## 2024-07-08 DIAGNOSIS — L81.4 OTHER MELANIN HYPERPIGMENTATION: ICD-10-CM

## 2024-07-08 DIAGNOSIS — D22 MELANOCYTIC NEVI: ICD-10-CM

## 2024-07-08 DIAGNOSIS — L57.0 ACTINIC KERATOSIS: ICD-10-CM

## 2024-07-08 DIAGNOSIS — Z71.89 OTHER SPECIFIED COUNSELING: ICD-10-CM

## 2024-07-08 DIAGNOSIS — L21.8 OTHER SEBORRHEIC DERMATITIS: ICD-10-CM

## 2024-07-08 PROBLEM — D22.4 MELANOCYTIC NEVI OF SCALP AND NECK: Status: ACTIVE | Noted: 2024-07-08

## 2024-07-08 PROCEDURE — ? COUNSELING

## 2024-07-08 PROCEDURE — 17003 DESTRUCT PREMALG LES 2-14: CPT

## 2024-07-08 PROCEDURE — ? TREATMENT REGIMEN

## 2024-07-08 PROCEDURE — 17000 DESTRUCT PREMALG LESION: CPT

## 2024-07-08 PROCEDURE — 99213 OFFICE O/P EST LOW 20 MIN: CPT | Mod: 25

## 2024-07-08 PROCEDURE — ? LIQUID NITROGEN

## 2024-07-08 PROCEDURE — ? MEDICATION COUNSELING

## 2024-07-08 ASSESSMENT — LOCATION DETAILED DESCRIPTION DERM
LOCATION DETAILED: LEFT MEDIAL PLANTAR MIDFOOT
LOCATION DETAILED: RIGHT SUPERIOR UPPER BACK
LOCATION DETAILED: RIGHT MEDIAL PLANTAR HEEL
LOCATION DETAILED: LEFT SUPERIOR PARIETAL SCALP
LOCATION DETAILED: LEFT HEEL
LOCATION DETAILED: LEFT HEEL
LOCATION DETAILED: LEFT MEDIAL PLANTAR MIDFOOT
LOCATION DETAILED: RIGHT MID-UPPER BACK
LOCATION DETAILED: RIGHT CENTRAL MALAR CHEEK
LOCATION DETAILED: LEFT SUPERIOR FOREHEAD
LOCATION DETAILED: RIGHT SUPERIOR FOREHEAD
LOCATION DETAILED: RIGHT HEEL
LOCATION DETAILED: RIGHT SUPERIOR PARIETAL SCALP
LOCATION DETAILED: RIGHT MEDIAL TRAPEZIAL NECK
LOCATION DETAILED: RIGHT MEDIAL PLANTAR MIDFOOT
LOCATION DETAILED: LEFT MEDIAL MALAR CHEEK
LOCATION DETAILED: RIGHT SUPERIOR OCCIPITAL SCALP
LOCATION DETAILED: RIGHT HEEL
LOCATION DETAILED: RIGHT CENTRAL PARIETAL SCALP

## 2024-07-08 ASSESSMENT — LOCATION ZONE DERM
LOCATION ZONE: FACE
LOCATION ZONE: FEET
LOCATION ZONE: TRUNK
LOCATION ZONE: FEET
LOCATION ZONE: SCALP
LOCATION ZONE: NECK

## 2024-07-08 ASSESSMENT — LOCATION SIMPLE DESCRIPTION DERM
LOCATION SIMPLE: LEFT HEEL
LOCATION SIMPLE: RIGHT UPPER BACK
LOCATION SIMPLE: LEFT HEEL
LOCATION SIMPLE: LEFT CHEEK
LOCATION SIMPLE: LEFT PLANTAR SURFACE
LOCATION SIMPLE: LEFT PLANTAR SURFACE
LOCATION SIMPLE: RIGHT PLANTAR SURFACE
LOCATION SIMPLE: RIGHT HEEL
LOCATION SIMPLE: RIGHT HEEL
LOCATION SIMPLE: RIGHT PLANTAR SURFACE
LOCATION SIMPLE: POSTERIOR NECK
LOCATION SIMPLE: LEFT FOREHEAD
LOCATION SIMPLE: RIGHT FOREHEAD
LOCATION SIMPLE: RIGHT CHEEK
LOCATION SIMPLE: SCALP
LOCATION SIMPLE: POSTERIOR SCALP

## 2024-07-08 NOTE — PROCEDURE: TREATMENT REGIMEN
Continue Regimen: Ketoconazole cream BID until clear
Detail Level: Zone
Continue Regimen: Ketoconazole BID to affected areas on face

## 2024-07-08 NOTE — PROCEDURE: LIQUID NITROGEN
Consent: The patient's consent was obtained including but not limited to risks of crusting, scabbing, blistering, scarring, darker or lighter pigmentary change, recurrence, incomplete removal and infection.
Number Of Freeze-Thaw Cycles: 1 freeze-thaw cycle
Duration Of Freeze Thaw-Cycle (Seconds): 10
Render Note In Bullet Format When Appropriate: No
Show Applicator Variable?: Yes
Post-Care Instructions: I reviewed with the patient in detail post-care instructions. Patient is to wear sunprotection, and avoid picking at any of the treated lesions. Pt may apply Vaseline to crusted or scabbing areas.
Detail Level: Detailed
Application Tool (Optional): Cry-AC

## 2024-07-08 NOTE — HPI: UPPER BODY SKIN CHECK
How Severe Are Your Spot(S)?: mild
What Is The Reason For Today's Visit?: Upper Body Skin Exam
Additional History: Patient states he has rough, scaly bumps on scalp.

## 2024-12-04 DIAGNOSIS — G25.81 RESTLESS LEG SYNDROME: ICD-10-CM

## 2024-12-04 RX ORDER — GABAPENTIN 300 MG/1
600 CAPSULE ORAL EVERY EVENING
Qty: 180 CAPSULE | Refills: 3 | Status: SHIPPED | OUTPATIENT
Start: 2024-12-04

## 2024-12-04 NOTE — TELEPHONE ENCOUNTER
Received request via: Pharmacy    Medication Name/Dosage Gabapentin     When was medication last prescribed 12/28/23    How many refills were previously provided 3    How many Refills does he patient have left from last prescription 0    Was the patient seen in the last year in this department? Yes   Date of last office visit 6/28/24     Per last Neurology Office Visit, when was the date of next follow up visit set for?                 6 mths            Date of office visit follow up request 12/28/24     Does the patient have an upcoming appointment? Yes   If yes, when 1/3/25              If no, schedule appointment     Does the patient have snf Plus and need 100 day supply (blood pressure, diabetes and cholesterol meds only)? Medication is not for blood pressure, diabetes, or cholesterol

## 2025-01-01 ENCOUNTER — APPOINTMENT (OUTPATIENT)
Dept: RADIOLOGY | Facility: MEDICAL CENTER | Age: 75
DRG: 208 | End: 2025-01-01
Attending: INTERNAL MEDICINE
Payer: MEDICARE

## 2025-01-01 ENCOUNTER — TELEPHONE (OUTPATIENT)
Dept: GASTROENTEROLOGY | Facility: MEDICAL CENTER | Age: 75
End: 2025-01-01
Payer: MEDICARE

## 2025-01-01 ENCOUNTER — PHARMACY VISIT (OUTPATIENT)
Dept: PHARMACY | Facility: MEDICAL CENTER | Age: 75
End: 2025-01-01
Payer: COMMERCIAL

## 2025-01-01 ENCOUNTER — RESULTS FOLLOW-UP (OUTPATIENT)
Dept: NEUROLOGY | Facility: MEDICAL CENTER | Age: 75
End: 2025-01-01

## 2025-01-01 ENCOUNTER — TELEPHONE (OUTPATIENT)
Dept: HOME HEALTH SERVICES | Facility: HOME HEALTHCARE | Age: 75
End: 2025-01-01
Payer: MEDICARE

## 2025-01-01 ENCOUNTER — HOME CARE VISIT (OUTPATIENT)
Dept: HOSPICE | Facility: HOSPICE | Age: 75
End: 2025-01-01
Payer: MEDICARE

## 2025-01-01 ENCOUNTER — ANESTHESIA EVENT (OUTPATIENT)
Dept: SURGERY | Facility: MEDICAL CENTER | Age: 75
DRG: 208 | End: 2025-01-01
Payer: MEDICARE

## 2025-01-01 ENCOUNTER — APPOINTMENT (OUTPATIENT)
Dept: RADIOLOGY | Facility: MEDICAL CENTER | Age: 75
DRG: 208 | End: 2025-01-01
Attending: STUDENT IN AN ORGANIZED HEALTH CARE EDUCATION/TRAINING PROGRAM
Payer: MEDICARE

## 2025-01-01 ENCOUNTER — APPOINTMENT (OUTPATIENT)
Dept: RADIOLOGY | Facility: MEDICAL CENTER | Age: 75
DRG: 208 | End: 2025-01-01
Payer: MEDICARE

## 2025-01-01 ENCOUNTER — APPOINTMENT (OUTPATIENT)
Dept: RADIOLOGY | Facility: MEDICAL CENTER | Age: 75
DRG: 208 | End: 2025-01-01
Attending: HOSPITALIST
Payer: MEDICARE

## 2025-01-01 ENCOUNTER — ANESTHESIA (OUTPATIENT)
Dept: SURGERY | Facility: MEDICAL CENTER | Age: 75
DRG: 208 | End: 2025-01-01
Payer: MEDICARE

## 2025-01-01 ENCOUNTER — APPOINTMENT (OUTPATIENT)
Dept: RADIOLOGY | Facility: MEDICAL CENTER | Age: 75
End: 2025-01-01
Attending: EMERGENCY MEDICINE
Payer: MEDICARE

## 2025-01-01 ENCOUNTER — APPOINTMENT (OUTPATIENT)
Dept: RADIOLOGY | Facility: MEDICAL CENTER | Age: 75
DRG: 208 | End: 2025-01-01
Attending: EMERGENCY MEDICINE
Payer: MEDICARE

## 2025-01-01 ENCOUNTER — HOSPITAL ENCOUNTER (INPATIENT)
Facility: MEDICAL CENTER | Age: 75
LOS: 1 days | End: 2025-04-11
Attending: EMERGENCY MEDICINE | Admitting: INTERNAL MEDICINE
Payer: MEDICARE

## 2025-01-01 ENCOUNTER — HOME HEALTH ADMISSION (OUTPATIENT)
Dept: HOME HEALTH SERVICES | Facility: HOME HEALTHCARE | Age: 75
End: 2025-01-01
Payer: MEDICARE

## 2025-01-01 ENCOUNTER — HOSPICE ADMISSION (OUTPATIENT)
Dept: HOSPICE | Facility: HOSPICE | Age: 75
End: 2025-01-01
Payer: MEDICARE

## 2025-01-01 ENCOUNTER — APPOINTMENT (OUTPATIENT)
Dept: RADIOLOGY | Facility: MEDICAL CENTER | Age: 75
End: 2025-01-01
Payer: MEDICARE

## 2025-01-01 VITALS — HEART RATE: 80 BPM | RESPIRATION RATE: 20 BRPM | RESPIRATION RATE: 24 BRPM

## 2025-01-01 VITALS — RESPIRATION RATE: 16 BRPM | HEART RATE: 92 BPM

## 2025-01-01 VITALS — RESPIRATION RATE: 28 BRPM | HEART RATE: 107 BPM

## 2025-01-01 VITALS
HEIGHT: 73 IN | HEART RATE: 100 BPM | OXYGEN SATURATION: 93 % | WEIGHT: 180.34 LBS | DIASTOLIC BLOOD PRESSURE: 54 MMHG | TEMPERATURE: 98.5 F | RESPIRATION RATE: 22 BRPM | BODY MASS INDEX: 23.9 KG/M2 | SYSTOLIC BLOOD PRESSURE: 90 MMHG

## 2025-01-01 DIAGNOSIS — A41.9 SEPTIC SHOCK (HCC): ICD-10-CM

## 2025-01-01 DIAGNOSIS — R65.21 SEPTIC SHOCK (HCC): ICD-10-CM

## 2025-01-01 DIAGNOSIS — J96.01 ACUTE RESPIRATORY FAILURE WITH HYPOXIA (HCC): ICD-10-CM

## 2025-01-01 DIAGNOSIS — N17.9 ACUTE KIDNEY INJURY (HCC): ICD-10-CM

## 2025-01-01 DIAGNOSIS — Z51.5 HOSPICE CARE: ICD-10-CM

## 2025-01-01 DIAGNOSIS — R65.10 SIRS (SYSTEMIC INFLAMMATORY RESPONSE SYNDROME) (HCC): ICD-10-CM

## 2025-01-01 DIAGNOSIS — J18.9 PNEUMONIA DUE TO INFECTIOUS ORGANISM, UNSPECIFIED LATERALITY, UNSPECIFIED PART OF LUNG: ICD-10-CM
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
## 2025-01-01 DIAGNOSIS — J18.9 HOSPITAL-ACQUIRED PNEUMONIA: ICD-10-CM

## 2025-01-01 DIAGNOSIS — G20.A1 PARKINSON'S DISEASE WITHOUT DYSKINESIA OR FLUCTUATING MANIFESTATIONS (HCC): Primary | ICD-10-CM

## 2025-01-01 LAB
ALBUMIN SERPL BCP-MCNC: 2.9 G/DL (ref 3.2–4.9)
ALBUMIN SERPL BCP-MCNC: 3.4 G/DL (ref 3.2–4.9)
ALBUMIN/GLOB SERPL: 0.7 G/DL
ALBUMIN/GLOB SERPL: 0.7 G/DL
ALP SERPL-CCNC: 74 U/L (ref 30–99)
ALP SERPL-CCNC: 88 U/L (ref 30–99)
ALT SERPL-CCNC: 33 U/L (ref 2–50)
ALT SERPL-CCNC: 92 U/L (ref 2–50)
ANION GAP SERPL CALC-SCNC: 11 MMOL/L (ref 7–16)
ANION GAP SERPL CALC-SCNC: 11 MMOL/L (ref 7–16)
ANION GAP SERPL CALC-SCNC: 19 MMOL/L (ref 7–16)
APPEARANCE UR: CLEAR
AST SERPL-CCNC: 46 U/L (ref 12–45)
AST SERPL-CCNC: 66 U/L (ref 12–45)
BACTERIA #/AREA URNS HPF: ABNORMAL /HPF
BACTERIA BLD CULT: NORMAL
BACTERIA BLD CULT: NORMAL
BACTERIA UR CULT: NORMAL
BASOPHILS # BLD AUTO: 0.7 % (ref 0–1.8)
BASOPHILS # BLD: 0.12 K/UL (ref 0–0.12)
BILIRUB SERPL-MCNC: 0.7 MG/DL (ref 0.1–1.5)
BILIRUB SERPL-MCNC: 0.9 MG/DL (ref 0.1–1.5)
BILIRUB UR QL STRIP.AUTO: ABNORMAL
BUN SERPL-MCNC: 38 MG/DL (ref 8–22)
BUN SERPL-MCNC: 42 MG/DL (ref 8–22)
BUN SERPL-MCNC: 48 MG/DL (ref 8–22)
CALCIUM ALBUM COR SERPL-MCNC: 9.3 MG/DL (ref 8.5–10.5)
CALCIUM ALBUM COR SERPL-MCNC: 9.4 MG/DL (ref 8.5–10.5)
CALCIUM SERPL-MCNC: 7.3 MG/DL (ref 8.5–10.5)
CALCIUM SERPL-MCNC: 8.5 MG/DL (ref 8.5–10.5)
CALCIUM SERPL-MCNC: 8.8 MG/DL (ref 8.5–10.5)
CASTS URNS QL MICRO: ABNORMAL /LPF (ref 0–2)
CHLORIDE SERPL-SCNC: 111 MMOL/L (ref 96–112)
CHLORIDE SERPL-SCNC: 114 MMOL/L (ref 96–112)
CHLORIDE SERPL-SCNC: 116 MMOL/L (ref 96–112)
CO2 SERPL-SCNC: 24 MMOL/L (ref 20–33)
CO2 SERPL-SCNC: 26 MMOL/L (ref 20–33)
CO2 SERPL-SCNC: 28 MMOL/L (ref 20–33)
COLOR UR: ABNORMAL
CREAT SERPL-MCNC: 1.34 MG/DL (ref 0.5–1.4)
CREAT SERPL-MCNC: 1.75 MG/DL (ref 0.5–1.4)
CREAT SERPL-MCNC: 2.21 MG/DL (ref 0.5–1.4)
EKG IMPRESSION: NORMAL
EOSINOPHIL # BLD AUTO: 0 K/UL (ref 0–0.51)
EOSINOPHIL NFR BLD: 0 % (ref 0–6.9)
EPITHELIAL CELLS 1715: ABNORMAL /HPF (ref 0–5)
ERYTHROCYTE [DISTWIDTH] IN BLOOD BY AUTOMATED COUNT: 50.8 FL (ref 35.9–50)
ERYTHROCYTE [DISTWIDTH] IN BLOOD BY AUTOMATED COUNT: 51.8 FL (ref 35.9–50)
FLUAV RNA SPEC QL NAA+PROBE: NEGATIVE
FLUBV RNA SPEC QL NAA+PROBE: NEGATIVE
GFR SERPLBLD CREATININE-BSD FMLA CKD-EPI: 31 ML/MIN/1.73 M 2
GFR SERPLBLD CREATININE-BSD FMLA CKD-EPI: 41 ML/MIN/1.73 M 2
GFR SERPLBLD CREATININE-BSD FMLA CKD-EPI: 56 ML/MIN/1.73 M 2
GLOBULIN SER CALC-MCNC: 4 G/DL (ref 1.9–3.5)
GLOBULIN SER CALC-MCNC: 4.8 G/DL (ref 1.9–3.5)
GLUCOSE SERPL-MCNC: 119 MG/DL (ref 65–99)
GLUCOSE SERPL-MCNC: 122 MG/DL (ref 65–99)
GLUCOSE SERPL-MCNC: 194 MG/DL (ref 65–99)
GLUCOSE UR STRIP.AUTO-MCNC: NEGATIVE MG/DL
HCT VFR BLD AUTO: 46.3 % (ref 42–52)
HCT VFR BLD AUTO: 52.4 % (ref 42–52)
HGB BLD-MCNC: 14.5 G/DL (ref 14–18)
HGB BLD-MCNC: 16.7 G/DL (ref 14–18)
HYALINE CAST   1831: PRESENT /LPF
IMM GRANULOCYTES # BLD AUTO: 0.17 K/UL (ref 0–0.11)
IMM GRANULOCYTES NFR BLD AUTO: 1 % (ref 0–0.9)
KETONES UR STRIP.AUTO-MCNC: ABNORMAL MG/DL
LACTATE SERPL-SCNC: 1.7 MMOL/L (ref 0.5–2)
LACTATE SERPL-SCNC: 2.4 MMOL/L (ref 0.5–2)
LACTATE SERPL-SCNC: 3 MMOL/L (ref 0.5–2)
LEUKOCYTE ESTERASE UR QL STRIP.AUTO: NEGATIVE
LYMPHOCYTES # BLD AUTO: 1.07 K/UL (ref 1–4.8)
LYMPHOCYTES NFR BLD: 6.5 % (ref 22–41)
MAGNESIUM SERPL-MCNC: 2.8 MG/DL (ref 1.5–2.5)
MCH RBC QN AUTO: 29.2 PG (ref 27–33)
MCH RBC QN AUTO: 29.4 PG (ref 27–33)
MCHC RBC AUTO-ENTMCNC: 31.3 G/DL (ref 32.3–36.5)
MCHC RBC AUTO-ENTMCNC: 31.9 G/DL (ref 32.3–36.5)
MCV RBC AUTO: 92.3 FL (ref 81.4–97.8)
MCV RBC AUTO: 93.3 FL (ref 81.4–97.8)
MICRO URNS: ABNORMAL
MONOCYTES # BLD AUTO: 1.19 K/UL (ref 0–0.85)
MONOCYTES NFR BLD AUTO: 7.2 % (ref 0–13.4)
NEUTROPHILS # BLD AUTO: 13.97 K/UL (ref 1.82–7.42)
NEUTROPHILS NFR BLD: 84.6 % (ref 44–72)
NITRITE UR QL STRIP.AUTO: NEGATIVE
NRBC # BLD AUTO: 0.04 K/UL
NRBC BLD-RTO: 0.2 /100 WBC (ref 0–0.2)
NT-PROBNP SERPL IA-MCNC: 2722 PG/ML (ref 0–125)
PH UR STRIP.AUTO: 5 [PH] (ref 5–8)
PHOSPHATE SERPL-MCNC: 3.6 MG/DL (ref 2.5–4.5)
PLATELET # BLD AUTO: 286 K/UL (ref 164–446)
PLATELET # BLD AUTO: 457 K/UL (ref 164–446)
PMV BLD AUTO: 10.7 FL (ref 9–12.9)
PMV BLD AUTO: 10.7 FL (ref 9–12.9)
POTASSIUM SERPL-SCNC: 3.2 MMOL/L (ref 3.6–5.5)
POTASSIUM SERPL-SCNC: 3.7 MMOL/L (ref 3.6–5.5)
POTASSIUM SERPL-SCNC: 3.8 MMOL/L (ref 3.6–5.5)
PROCALCITONIN SERPL-MCNC: 0.64 NG/ML
PROT SERPL-MCNC: 6.9 G/DL (ref 6–8.2)
PROT SERPL-MCNC: 8.2 G/DL (ref 6–8.2)
PROT UR QL STRIP: 100 MG/DL
RBC # BLD AUTO: 4.96 M/UL (ref 4.7–6.1)
RBC # BLD AUTO: 5.68 M/UL (ref 4.7–6.1)
RBC # URNS HPF: ABNORMAL /HPF (ref 0–2)
RBC UR QL AUTO: ABNORMAL
RSV RNA SPEC QL NAA+PROBE: NEGATIVE
SARS-COV-2 RNA RESP QL NAA+PROBE: NOTDETECTED
SCCMEC + MECA PNL NOSE NAA+PROBE: NEGATIVE
SIGNIFICANT IND 70042: NORMAL
SITE SITE: NORMAL
SODIUM SERPL-SCNC: 151 MMOL/L (ref 135–145)
SODIUM SERPL-SCNC: 153 MMOL/L (ref 135–145)
SODIUM SERPL-SCNC: 156 MMOL/L (ref 135–145)
SOURCE SOURCE: NORMAL
SP GR UR STRIP.AUTO: 1.02
TROPONIN T SERPL-MCNC: 58 NG/L (ref 6–19)
TROPONIN T SERPL-MCNC: 63 NG/L (ref 6–19)
TROPONIN T SERPL-MCNC: 67 NG/L (ref 6–19)
TROPONIN T SERPL-MCNC: 68 NG/L (ref 6–19)
UROBILINOGEN UR STRIP.AUTO-MCNC: 2 EU/DL
WBC # BLD AUTO: 14.1 K/UL (ref 4.8–10.8)
WBC # BLD AUTO: 16.5 K/UL (ref 4.8–10.8)
WBC #/AREA URNS HPF: ABNORMAL /HPF

## 2025-01-01 PROCEDURE — G0299 HHS/HOSPICE OF RN EA 15 MIN: HCPCS

## 2025-01-01 PROCEDURE — 71045 X-RAY EXAM CHEST 1 VIEW: CPT

## 2025-01-01 PROCEDURE — S9126 HOSPICE CARE, IN THE HOME, P: HCPCS

## 2025-01-01 PROCEDURE — 99291 CRITICAL CARE FIRST HOUR: CPT

## 2025-01-01 PROCEDURE — 76705 ECHO EXAM OF ABDOMEN: CPT

## 2025-01-01 PROCEDURE — 84145 PROCALCITONIN (PCT): CPT

## 2025-01-01 PROCEDURE — 99291 CRITICAL CARE FIRST HOUR: CPT | Mod: GC | Performed by: INTERNAL MEDICINE

## 2025-01-01 PROCEDURE — 84100 ASSAY OF PHOSPHORUS: CPT

## 2025-01-01 PROCEDURE — 83880 ASSAY OF NATRIURETIC PEPTIDE: CPT

## 2025-01-01 PROCEDURE — A9270 NON-COVERED ITEM OR SERVICE: HCPCS

## 2025-01-01 PROCEDURE — 80053 COMPREHEN METABOLIC PANEL: CPT

## 2025-01-01 PROCEDURE — RXMED WILLOW AMBULATORY MEDICATION CHARGE: Performed by: STUDENT IN AN ORGANIZED HEALTH CARE EDUCATION/TRAINING PROGRAM

## 2025-01-01 PROCEDURE — 700111 HCHG RX REV CODE 636 W/ 250 OVERRIDE (IP): Mod: JZ

## 2025-01-01 PROCEDURE — 700105 HCHG RX REV CODE 258: Performed by: EMERGENCY MEDICINE

## 2025-01-01 PROCEDURE — 85025 COMPLETE CBC W/AUTO DIFF WBC: CPT

## 2025-01-01 PROCEDURE — 84484 ASSAY OF TROPONIN QUANT: CPT

## 2025-01-01 PROCEDURE — 700105 HCHG RX REV CODE 258

## 2025-01-01 PROCEDURE — 71275 CT ANGIOGRAPHY CHEST: CPT

## 2025-01-01 PROCEDURE — 70450 CT HEAD/BRAIN W/O DYE: CPT

## 2025-01-01 PROCEDURE — 74176 CT ABD & PELVIS W/O CONTRAST: CPT

## 2025-01-01 PROCEDURE — 99291 CRITICAL CARE FIRST HOUR: CPT | Performed by: STUDENT IN AN ORGANIZED HEALTH CARE EDUCATION/TRAINING PROGRAM

## 2025-01-01 PROCEDURE — 96367 TX/PROPH/DG ADDL SEQ IV INF: CPT

## 2025-01-01 PROCEDURE — 87040 BLOOD CULTURE FOR BACTERIA: CPT

## 2025-01-01 PROCEDURE — 96365 THER/PROPH/DIAG IV INF INIT: CPT

## 2025-01-01 PROCEDURE — 96375 TX/PRO/DX INJ NEW DRUG ADDON: CPT

## 2025-01-01 PROCEDURE — 87641 MR-STAPH DNA AMP PROBE: CPT

## 2025-01-01 PROCEDURE — 700102 HCHG RX REV CODE 250 W/ 637 OVERRIDE(OP): Performed by: STUDENT IN AN ORGANIZED HEALTH CARE EDUCATION/TRAINING PROGRAM

## 2025-01-01 PROCEDURE — 700111 HCHG RX REV CODE 636 W/ 250 OVERRIDE (IP): Mod: JZ | Performed by: INTERNAL MEDICINE

## 2025-01-01 PROCEDURE — 74018 RADEX ABDOMEN 1 VIEW: CPT

## 2025-01-01 PROCEDURE — 665106 HSPC SERVICE INTENSITY ADD-ON

## 2025-01-01 PROCEDURE — 700111 HCHG RX REV CODE 636 W/ 250 OVERRIDE (IP): Mod: JZ | Performed by: ANESTHESIOLOGY

## 2025-01-01 PROCEDURE — 36415 COLL VENOUS BLD VENIPUNCTURE: CPT

## 2025-01-01 PROCEDURE — 83605 ASSAY OF LACTIC ACID: CPT | Mod: 91

## 2025-01-01 PROCEDURE — RXMED WILLOW AMBULATORY MEDICATION CHARGE

## 2025-01-01 PROCEDURE — 85027 COMPLETE CBC AUTOMATED: CPT

## 2025-01-01 PROCEDURE — A9270 NON-COVERED ITEM OR SERVICE: HCPCS | Performed by: STUDENT IN AN ORGANIZED HEALTH CARE EDUCATION/TRAINING PROGRAM

## 2025-01-01 PROCEDURE — 87086 URINE CULTURE/COLONY COUNT: CPT

## 2025-01-01 PROCEDURE — 700111 HCHG RX REV CODE 636 W/ 250 OVERRIDE (IP): Mod: JZ | Performed by: EMERGENCY MEDICINE

## 2025-01-01 PROCEDURE — 93005 ELECTROCARDIOGRAM TRACING: CPT | Mod: TC | Performed by: EMERGENCY MEDICINE

## 2025-01-01 PROCEDURE — 700102 HCHG RX REV CODE 250 W/ 637 OVERRIDE(OP)

## 2025-01-01 PROCEDURE — 94669 MECHANICAL CHEST WALL OSCILL: CPT

## 2025-01-01 PROCEDURE — 700101 HCHG RX REV CODE 250: Performed by: EMERGENCY MEDICINE

## 2025-01-01 PROCEDURE — 80048 BASIC METABOLIC PNL TOTAL CA: CPT

## 2025-01-01 PROCEDURE — 0241U HCHG SARS-COV-2 COVID-19 NFCT DS RESP RNA 4 TRGT ED POC: CPT

## 2025-01-01 PROCEDURE — 83735 ASSAY OF MAGNESIUM: CPT

## 2025-01-01 PROCEDURE — 770022 HCHG ROOM/CARE - ICU (200)

## 2025-01-01 PROCEDURE — 700111 HCHG RX REV CODE 636 W/ 250 OVERRIDE (IP): Performed by: INTERNAL MEDICINE

## 2025-01-01 PROCEDURE — 700101 HCHG RX REV CODE 250: Performed by: ANESTHESIOLOGY

## 2025-01-01 PROCEDURE — 81001 URINALYSIS AUTO W/SCOPE: CPT

## 2025-01-01 PROCEDURE — 84484 ASSAY OF TROPONIN QUANT: CPT | Mod: 91

## 2025-01-01 PROCEDURE — 71250 CT THORAX DX C-: CPT

## 2025-01-01 PROCEDURE — 665036 HSPC NOTICE OF ELECTION NOE

## 2025-01-01 RX ORDER — LANSOPRAZOLE 30 MG/1
30 TABLET, ORALLY DISINTEGRATING, DELAYED RELEASE ORAL DAILY
Status: DISCONTINUED | OUTPATIENT
Start: 2025-01-01 | End: 2025-01-01 | Stop reason: HOSPADM

## 2025-01-01 RX ORDER — OXYCODONE HYDROCHLORIDE 5 MG/1
2.5 TABLET ORAL
Refills: 0 | Status: DISCONTINUED | OUTPATIENT
Start: 2025-01-01 | End: 2025-01-01 | Stop reason: HOSPADM

## 2025-01-01 RX ORDER — LORAZEPAM 2 MG/ML
1-2 CONCENTRATE ORAL
Qty: 360 ML | Refills: 0 | Status: SHIPPED | OUTPATIENT
Start: 2025-01-01 | End: 2026-04-11

## 2025-01-01 RX ORDER — OXYCODONE HYDROCHLORIDE 5 MG/1
5 TABLET ORAL
Refills: 0 | Status: DISCONTINUED | OUTPATIENT
Start: 2025-01-01 | End: 2025-01-01

## 2025-01-01 RX ORDER — ASPIRIN 81 MG/1
81 TABLET, CHEWABLE ORAL DAILY
Status: DISCONTINUED | OUTPATIENT
Start: 2025-01-01 | End: 2025-01-01 | Stop reason: HOSPADM

## 2025-01-01 RX ORDER — ASPIRIN 81 MG/1
81 TABLET ORAL DAILY
Status: ON HOLD | COMMUNITY
End: 2025-01-01

## 2025-01-01 RX ORDER — CEFEPIME HYDROCHLORIDE 2 G/1
2 INJECTION, POWDER, FOR SOLUTION INTRAVENOUS ONCE
Status: COMPLETED | OUTPATIENT
Start: 2025-01-01 | End: 2025-01-01

## 2025-01-01 RX ORDER — ATORVASTATIN CALCIUM 80 MG/1
80 TABLET, FILM COATED ORAL EVERY EVENING
Status: DISCONTINUED | OUTPATIENT
Start: 2025-01-01 | End: 2025-01-01

## 2025-01-01 RX ORDER — SENNA AND DOCUSATE SODIUM 50; 8.6 MG/1; MG/1
2 TABLET, FILM COATED ORAL 2 TIMES DAILY PRN
Qty: 28 TABLET | Refills: 10 | Status: SHIPPED | OUTPATIENT
Start: 2025-01-01 | End: 2026-04-11

## 2025-01-01 RX ORDER — ATORVASTATIN CALCIUM 80 MG/1
80 TABLET, FILM COATED ORAL EVERY EVENING
Status: DISCONTINUED | OUTPATIENT
Start: 2025-01-01 | End: 2025-01-01 | Stop reason: HOSPADM

## 2025-01-01 RX ORDER — ACETAMINOPHEN 10 MG/ML
1000 INJECTION, SOLUTION INTRAVENOUS ONCE
Status: COMPLETED | OUTPATIENT
Start: 2025-01-01 | End: 2025-01-01

## 2025-01-01 RX ORDER — MIDODRINE HYDROCHLORIDE 10 MG/1
10 TABLET ORAL EVERY 8 HOURS
Qty: 90 TABLET | Refills: 11 | Status: SHIPPED | OUTPATIENT
Start: 2025-01-01

## 2025-01-01 RX ORDER — POLYETHYLENE GLYCOL 3350 17 G/17G
1 POWDER, FOR SOLUTION ORAL
Status: DISCONTINUED | OUTPATIENT
Start: 2025-01-01 | End: 2025-01-01

## 2025-01-01 RX ORDER — NOREPINEPHRINE BITARTRATE 0.03 MG/ML
0-1 INJECTION, SOLUTION INTRAVENOUS CONTINUOUS
Status: DISCONTINUED | OUTPATIENT
Start: 2025-01-01 | End: 2025-01-01 | Stop reason: HOSPADM

## 2025-01-01 RX ORDER — MORPHINE SULFATE 4 MG/ML
2 INJECTION INTRAVENOUS
Status: DISCONTINUED | OUTPATIENT
Start: 2025-01-01 | End: 2025-01-01

## 2025-01-01 RX ORDER — SODIUM CHLORIDE, SODIUM LACTATE, POTASSIUM CHLORIDE, AND CALCIUM CHLORIDE .6; .31; .03; .02 G/100ML; G/100ML; G/100ML; G/100ML
30 INJECTION, SOLUTION INTRAVENOUS ONCE
Status: COMPLETED | OUTPATIENT
Start: 2025-01-01 | End: 2025-01-01

## 2025-01-01 RX ORDER — CARBIDOPA/LEVODOPA 25MG-250MG
2 TABLET ORAL 4 TIMES DAILY
Qty: 240 TABLET | Refills: 11 | Status: SHIPPED | OUTPATIENT
Start: 2025-01-01

## 2025-01-01 RX ORDER — AMANTADINE HYDROCHLORIDE 100 MG/1
100 TABLET ORAL 2 TIMES DAILY
Status: DISCONTINUED | OUTPATIENT
Start: 2025-01-01 | End: 2025-01-01

## 2025-01-01 RX ORDER — CARBIDOPA/LEVODOPA 25MG-250MG
2 TABLET ORAL 4 TIMES DAILY
Status: DISCONTINUED | OUTPATIENT
Start: 2025-01-01 | End: 2025-01-01 | Stop reason: HOSPADM

## 2025-01-01 RX ORDER — OXYCODONE HYDROCHLORIDE 5 MG/1
2.5 TABLET ORAL
Refills: 0 | Status: DISCONTINUED | OUTPATIENT
Start: 2025-01-01 | End: 2025-01-01

## 2025-01-01 RX ORDER — ACETAMINOPHEN 500 MG
1000 TABLET ORAL EVERY 6 HOURS PRN
Qty: 30 TABLET | Refills: 10 | Status: SHIPPED | OUTPATIENT
Start: 2025-01-01 | End: 2026-04-11

## 2025-01-01 RX ORDER — ATROPINE SULFATE 10 MG/ML
SOLUTION/ DROPS OPHTHALMIC
Qty: 5 ML | Refills: 1 | OUTPATIENT
Start: 2025-01-01

## 2025-01-01 RX ORDER — GABAPENTIN 100 MG/1
200 CAPSULE ORAL EVERY EVENING
Qty: 60 CAPSULE | Refills: 11 | Status: SHIPPED | OUTPATIENT
Start: 2025-01-01

## 2025-01-01 RX ORDER — CEFAZOLIN SODIUM 1 G/3ML
INJECTION, POWDER, FOR SOLUTION INTRAMUSCULAR; INTRAVENOUS PRN
Status: DISCONTINUED | OUTPATIENT
Start: 2025-01-01 | End: 2025-01-01 | Stop reason: SURG

## 2025-01-01 RX ORDER — ONDANSETRON 4 MG/1
4 TABLET, ORALLY DISINTEGRATING ORAL EVERY 6 HOURS PRN
Qty: 15 TABLET | Refills: 10 | Status: SHIPPED | OUTPATIENT
Start: 2025-01-01 | End: 2026-04-11

## 2025-01-01 RX ORDER — GUAIFENESIN 200 MG/10ML
10 LIQUID ORAL 3 TIMES DAILY
Qty: 180 ML | Refills: 0 | Status: SHIPPED | OUTPATIENT
Start: 2025-01-01 | End: 2025-04-17

## 2025-01-01 RX ORDER — LABETALOL HYDROCHLORIDE 5 MG/ML
10 INJECTION, SOLUTION INTRAVENOUS EVERY 4 HOURS PRN
Status: DISCONTINUED | OUTPATIENT
Start: 2025-01-01 | End: 2025-01-01 | Stop reason: HOSPADM

## 2025-01-01 RX ORDER — GUAIFENESIN 200 MG/10ML
10 LIQUID ORAL 3 TIMES DAILY
Status: DISCONTINUED | OUTPATIENT
Start: 2025-01-01 | End: 2025-01-01 | Stop reason: HOSPADM

## 2025-01-01 RX ORDER — CETIRIZINE HYDROCHLORIDE 10 MG/1
10 TABLET ORAL EVERY 12 HOURS PRN
Status: ON HOLD | COMMUNITY
End: 2025-01-01

## 2025-01-01 RX ORDER — ROPINIROLE 2 MG/1
4 TABLET, FILM COATED ORAL 3 TIMES DAILY
Status: DISCONTINUED | OUTPATIENT
Start: 2025-01-01 | End: 2025-01-01 | Stop reason: HOSPADM

## 2025-01-01 RX ORDER — AMANTADINE HYDROCHLORIDE 100 MG/1
100 TABLET ORAL 2 TIMES DAILY
Status: DISCONTINUED | OUTPATIENT
Start: 2025-01-01 | End: 2025-01-01 | Stop reason: HOSPADM

## 2025-01-01 RX ORDER — HEPARIN SODIUM 5000 [USP'U]/ML
5000 INJECTION, SOLUTION INTRAVENOUS; SUBCUTANEOUS EVERY 8 HOURS
Status: DISCONTINUED | OUTPATIENT
Start: 2025-01-01 | End: 2025-01-01 | Stop reason: HOSPADM

## 2025-01-01 RX ORDER — GABAPENTIN 100 MG/1
200 CAPSULE ORAL EVERY EVENING
Status: DISCONTINUED | OUTPATIENT
Start: 2025-01-01 | End: 2025-01-01 | Stop reason: HOSPADM

## 2025-01-01 RX ORDER — DIPHENHYDRAMINE HYDROCHLORIDE 50 MG/ML
INJECTION, SOLUTION INTRAMUSCULAR; INTRAVENOUS PRN
Status: DISCONTINUED | OUTPATIENT
Start: 2025-01-01 | End: 2025-01-01 | Stop reason: SURG

## 2025-01-01 RX ORDER — ASPIRIN 81 MG/1
81 TABLET ORAL DAILY
Status: DISCONTINUED | OUTPATIENT
Start: 2025-01-01 | End: 2025-01-01

## 2025-01-01 RX ORDER — ACETAMINOPHEN 325 MG/1
650 TABLET ORAL EVERY 6 HOURS PRN
Status: DISCONTINUED | OUTPATIENT
Start: 2025-01-01 | End: 2025-01-01

## 2025-01-01 RX ORDER — POLYETHYLENE GLYCOL 3350 17 G/17G
1 POWDER, FOR SOLUTION ORAL
Status: DISCONTINUED | OUTPATIENT
Start: 2025-01-01 | End: 2025-01-01 | Stop reason: HOSPADM

## 2025-01-01 RX ORDER — LINEZOLID 2 MG/ML
600 INJECTION, SOLUTION INTRAVENOUS EVERY 12 HOURS
Status: DISCONTINUED | OUTPATIENT
Start: 2025-01-01 | End: 2025-01-01

## 2025-01-01 RX ORDER — ACETAMINOPHEN 325 MG/1
650 TABLET ORAL EVERY 6 HOURS PRN
Status: DISCONTINUED | OUTPATIENT
Start: 2025-01-01 | End: 2025-01-01 | Stop reason: HOSPADM

## 2025-01-01 RX ORDER — BISACODYL 10 MG
10 SUPPOSITORY, RECTAL RECTAL PRN
Qty: 5 SUPPOSITORY | Refills: 10 | Status: SHIPPED | OUTPATIENT
Start: 2025-01-01 | End: 2026-04-11

## 2025-01-01 RX ORDER — LIDOCAINE HYDROCHLORIDE 20 MG/ML
INJECTION, SOLUTION EPIDURAL; INFILTRATION; INTRACAUDAL; PERINEURAL PRN
Status: DISCONTINUED | OUTPATIENT
Start: 2025-01-01 | End: 2025-01-01 | Stop reason: SURG

## 2025-01-01 RX ORDER — ONDANSETRON 2 MG/ML
INJECTION INTRAMUSCULAR; INTRAVENOUS PRN
Status: DISCONTINUED | OUTPATIENT
Start: 2025-01-01 | End: 2025-01-01 | Stop reason: SURG

## 2025-01-01 RX ORDER — LINEZOLID 2 MG/ML
600 INJECTION, SOLUTION INTRAVENOUS ONCE
Status: COMPLETED | OUTPATIENT
Start: 2025-01-01 | End: 2025-01-01

## 2025-01-01 RX ORDER — MORPHINE SULFATE 4 MG/ML
2 INJECTION INTRAVENOUS
Status: DISCONTINUED | OUTPATIENT
Start: 2025-01-01 | End: 2025-01-01 | Stop reason: HOSPADM

## 2025-01-01 RX ORDER — AMANTADINE HYDROCHLORIDE 100 MG/1
100 TABLET ORAL 2 TIMES DAILY
Qty: 60 TABLET | Refills: 11 | Status: SHIPPED | OUTPATIENT
Start: 2025-01-01

## 2025-01-01 RX ORDER — ROCURONIUM BROMIDE 10 MG/ML
INJECTION, SOLUTION INTRAVENOUS PRN
Status: DISCONTINUED | OUTPATIENT
Start: 2025-01-01 | End: 2025-01-01 | Stop reason: SURG

## 2025-01-01 RX ORDER — AZITHROMYCIN 250 MG/1
TABLET, FILM COATED ORAL
Qty: 6 TABLET | Refills: 0 | Status: SHIPPED | OUTPATIENT
Start: 2025-01-01 | End: 2025-01-01

## 2025-01-01 RX ORDER — OMEPRAZOLE 40 MG/1
40 CAPSULE, DELAYED RELEASE ORAL DAILY
Qty: 30 CAPSULE | Refills: 0 | Status: SHIPPED | OUTPATIENT
Start: 2025-01-01

## 2025-01-01 RX ORDER — MIDODRINE HYDROCHLORIDE 5 MG/1
10 TABLET ORAL
Status: DISCONTINUED | OUTPATIENT
Start: 2025-01-01 | End: 2025-01-01 | Stop reason: HOSPADM

## 2025-01-01 RX ORDER — ROPINIROLE 4 MG/1
4 TABLET, FILM COATED ORAL 3 TIMES DAILY
Qty: 90 TABLET | Refills: 11 | Status: SHIPPED | OUTPATIENT
Start: 2025-01-01 | End: 2026-04-11

## 2025-01-01 RX ORDER — CARBIDOPA/LEVODOPA 25MG-250MG
2 TABLET ORAL 4 TIMES DAILY
Status: DISCONTINUED | OUTPATIENT
Start: 2025-01-01 | End: 2025-01-01

## 2025-01-01 RX ORDER — MORPHINE SULFATE 100 MG/5ML
10-20 SOLUTION ORAL
Qty: 240 ML | Refills: 0 | Status: SHIPPED | OUTPATIENT
Start: 2025-01-01 | End: 2026-04-11

## 2025-01-01 RX ORDER — OXYCODONE HYDROCHLORIDE 5 MG/1
5 TABLET ORAL
Refills: 0 | Status: DISCONTINUED | OUTPATIENT
Start: 2025-01-01 | End: 2025-01-01 | Stop reason: HOSPADM

## 2025-01-01 RX ADMIN — CEFEPIME 2 G: 2 INJECTION, POWDER, FOR SOLUTION INTRAVENOUS at 20:32

## 2025-01-01 RX ADMIN — LINEZOLID 600 MG: 600 INJECTION, SOLUTION INTRAVENOUS at 05:40

## 2025-01-01 RX ADMIN — SUGAMMADEX 200 MG: 100 INJECTION, SOLUTION INTRAVENOUS at 13:33

## 2025-01-01 RX ADMIN — MULTIVITAMIN TABLET 1 TABLET: TABLET at 05:43

## 2025-01-01 RX ADMIN — NOREPINEPHRINE BITARTRATE 0.09 MCG/KG/MIN: 1 INJECTION, SOLUTION, CONCENTRATE INTRAVENOUS at 17:37

## 2025-01-01 RX ADMIN — NOREPINEPHRINE BITARTRATE 0.1 MCG/KG/MIN: 1 INJECTION, SOLUTION, CONCENTRATE INTRAVENOUS at 15:45

## 2025-01-01 RX ADMIN — ROCURONIUM BROMIDE 30 MG: 10 INJECTION INTRAVENOUS at 13:09

## 2025-01-01 RX ADMIN — ACETAMINOPHEN 1000 MG: 1000 INJECTION, SOLUTION INTRAVENOUS at 13:57

## 2025-01-01 RX ADMIN — ROPINIROLE HYDROCHLORIDE 4 MG: 2 TABLET, FILM COATED ORAL at 05:44

## 2025-01-01 RX ADMIN — GUAIFENESIN 200 MG: 100 LIQUID ORAL at 18:00

## 2025-01-01 RX ADMIN — CEFEPIME 2 G: 2 INJECTION, POWDER, FOR SOLUTION INTRAVENOUS at 05:40

## 2025-01-01 RX ADMIN — ROPINIROLE HYDROCHLORIDE 4 MG: 2 TABLET, FILM COATED ORAL at 18:00

## 2025-01-01 RX ADMIN — LIDOCAINE HYDROCHLORIDE 100 MG: 20 INJECTION, SOLUTION EPIDURAL; INFILTRATION; INTRACAUDAL; PERINEURAL at 13:09

## 2025-01-01 RX ADMIN — LANSOPRAZOLE 30 MG: 30 TABLET, ORALLY DISINTEGRATING, DELAYED RELEASE ORAL at 05:43

## 2025-01-01 RX ADMIN — GUAIFENESIN 200 MG: 100 LIQUID ORAL at 05:43

## 2025-01-01 RX ADMIN — ATORVASTATIN CALCIUM 80 MG: 80 TABLET, FILM COATED ORAL at 19:46

## 2025-01-01 RX ADMIN — DIPHENHYDRAMINE HYDROCHLORIDE 10 MG: 50 INJECTION, SOLUTION INTRAMUSCULAR; INTRAVENOUS at 13:24

## 2025-01-01 RX ADMIN — CEFEPIME 2 G: 2 INJECTION, POWDER, FOR SOLUTION INTRAVENOUS at 11:27

## 2025-01-01 RX ADMIN — GUAIFENESIN 200 MG: 100 LIQUID ORAL at 11:41

## 2025-01-01 RX ADMIN — ASPIRIN 81 MG: 81 TABLET, CHEWABLE ORAL at 05:43

## 2025-01-01 RX ADMIN — CARBIDOPA AND LEVODOPA 2 TABLET: 25; 250 TABLET ORAL at 19:46

## 2025-01-01 RX ADMIN — CEFAZOLIN 2 G: 1 INJECTION, POWDER, FOR SOLUTION INTRAMUSCULAR; INTRAVENOUS at 13:12

## 2025-01-01 RX ADMIN — ONDANSETRON 4 MG: 2 INJECTION INTRAMUSCULAR; INTRAVENOUS at 13:24

## 2025-01-01 RX ADMIN — PROPOFOL 100 MG: 10 INJECTION, EMULSION INTRAVENOUS at 13:09

## 2025-01-01 RX ADMIN — MIDODRINE HYDROCHLORIDE 10 MG: 5 TABLET ORAL at 11:41

## 2025-01-01 RX ADMIN — HEPARIN SODIUM 5000 UNITS: 5000 INJECTION, SOLUTION INTRAVENOUS; SUBCUTANEOUS at 05:43

## 2025-01-01 RX ADMIN — AMANTADINE HYDROCHLORIDE 100 MG: 100 TABLET ORAL at 05:43

## 2025-01-01 RX ADMIN — SODIUM CHLORIDE, POTASSIUM CHLORIDE, SODIUM LACTATE AND CALCIUM CHLORIDE 2760 ML: 600; 310; 30; 20 INJECTION, SOLUTION INTRAVENOUS at 11:07

## 2025-01-01 RX ADMIN — POLYETHYLENE GLYCOL 3350 1 PACKET: 17 POWDER, FOR SOLUTION ORAL at 05:43

## 2025-01-01 RX ADMIN — LINEZOLID 600 MG: 600 INJECTION, SOLUTION INTRAVENOUS at 11:13

## 2025-01-01 RX ADMIN — FENTANYL CITRATE 150 MCG: 50 INJECTION, SOLUTION INTRAMUSCULAR; INTRAVENOUS at 13:04

## 2025-01-01 RX ADMIN — CARBIDOPA AND LEVODOPA 2 TABLET: 25; 250 TABLET ORAL at 10:14

## 2025-01-01 RX ADMIN — CARBIDOPA AND LEVODOPA 2 TABLET: 25; 250 TABLET ORAL at 05:43

## 2025-01-01 RX ADMIN — GABAPENTIN 200 MG: 100 CAPSULE ORAL at 18:00

## 2025-01-01 RX ADMIN — LINEZOLID 600 MG: 600 INJECTION, SOLUTION INTRAVENOUS at 20:36

## 2025-01-01 RX ADMIN — AMANTADINE HYDROCHLORIDE 100 MG: 100 TABLET ORAL at 19:46

## 2025-01-01 SDOH — ECONOMIC STABILITY: GENERAL

## 2025-01-01 SDOH — ECONOMIC STABILITY: TRANSPORTATION INSECURITY
IN THE PAST 12 MONTHS, HAS THE LACK OF TRANSPORTATION KEPT YOU FROM MEDICAL APPOINTMENTS OR FROM GETTING MEDICATIONS?: PATIENT UNABLE TO ANSWER

## 2025-01-01 SDOH — ECONOMIC STABILITY: TRANSPORTATION INSECURITY
IN THE PAST 12 MONTHS, HAS LACK OF RELIABLE TRANSPORTATION KEPT YOU FROM MEDICAL APPOINTMENTS, MEETINGS, WORK OR FROM GETTING THINGS NEEDED FOR DAILY LIVING?: PATIENT UNABLE TO ANSWER

## 2025-01-01 SDOH — ECONOMIC STABILITY: HOUSING INSECURITY: EVIDENCE OF SMOKING MATERIAL: 0

## 2025-01-01 ASSESSMENT — SOCIAL DETERMINANTS OF HEALTH (SDOH)
WITHIN THE LAST YEAR, HAVE TO BEEN RAPED OR FORCED TO HAVE ANY KIND OF SEXUAL ACTIVITY BY YOUR PARTNER OR EX-PARTNER?: PATIENT UNABLE TO ANSWER
WITHIN THE LAST YEAR, HAVE YOU BEEN HUMILIATED OR EMOTIONALLY ABUSED IN OTHER WAYS BY YOUR PARTNER OR EX-PARTNER?: PATIENT UNABLE TO ANSWER
WITHIN THE PAST 12 MONTHS, THE FOOD YOU BOUGHT JUST DIDN'T LAST AND YOU DIDN'T HAVE MONEY TO GET MORE: PATIENT UNABLE TO ANSWER
WITHIN THE LAST YEAR, HAVE YOU BEEN AFRAID OF YOUR PARTNER OR EX-PARTNER?: PATIENT UNABLE TO ANSWER
WITHIN THE PAST 12 MONTHS, YOU WORRIED THAT YOUR FOOD WOULD RUN OUT BEFORE YOU GOT THE MONEY TO BUY MORE: PATIENT UNABLE TO ANSWER
WITHIN THE LAST YEAR, HAVE YOU BEEN KICKED, HIT, SLAPPED, OR OTHERWISE PHYSICALLY HURT BY YOUR PARTNER OR EX-PARTNER?: PATIENT UNABLE TO ANSWER
IN THE PAST 12 MONTHS, HAS THE ELECTRIC, GAS, OIL, OR WATER COMPANY THREATENED TO SHUT OFF SERVICE IN YOUR HOME?: PATIENT UNABLE TO ANSWER

## 2025-01-01 ASSESSMENT — ENCOUNTER SYMPTOMS
APNEIC BREATHING: 1
DECREASED ORAL INTAKE: 1
STOOL FREQUENCY: LESS THAN DAILY
INCREASED FATIGUE: 1
FATIGUES EASILY: 1
INCREASED FATIGUE: 1
SHORTNESS OF BREATH: 1
APNEIC BREATHING: 1
FATIGUE: 1
FATIGUES EASILY: 1
DECREASED ORAL INTAKE: 1
FATIGUES EASILY: 1
CONSTIPATION: 1
LIMITED RANGE OF MOTION: 1
MUSCLE WEAKNESS: 1
DYSPNEA ON EXERTION: 1
DECREASED ORAL INTAKE: 1
SHORTNESS OF BREATH: 1
MUSCLE WEAKNESS: 1
INCREASED FATIGUE: 1
COUGH CHARACTERISTICS: PRODUCTIVE
MOTTLING: 1
CONSTIPATION: 1
DECREASED ORAL INTAKE: 1
FATIGUES EASILY: 1
INCREASED SLEEPING: 1
APNEIC BREATHING: 1
INCREASED FATIGUE: 1
LIMITED RANGE OF MOTION: 1
DECREASED TO NO URINARY OUTPUT: 1
FATIGUES EASILY: 1
COUGH: 1
LIMITED RANGE OF MOTION: 1
APNEIC BREATHING: 1
FATIGUE: 1
INCREASED FATIGUE: 1
COUGH CHARACTERISTICS: MOIST
INCREASED SLEEPING: 1
MUSCLE WEAKNESS: 1
CONSTIPATION: 1
DECREASED TO NO URINARY OUTPUT: 1
STOOL FREQUENCY: LESS THAN DAILY
STOOL FREQUENCY: LESS THAN DAILY
MUSCLE WEAKNESS: 1
INCREASED SLEEPING: 1
LIMITED RANGE OF MOTION: 1
FATIGUE: 1
INCREASED SLEEPING: 1
DRY SKIN: 1
STOOL FREQUENCY: LESS THAN DAILY
DECREASED ORAL INTAKE: 1
INCREASED FATIGUE: 1
CHANGE IN LEVEL OF CONSCIOUSNESS: 1
LIMITED RANGE OF MOTION: 1
INCREASED SLEEPING: 1
SHORTNESS OF BREATH: 1
DYSPNEA ACTIVITY LEVEL: AT REST
DECREASED ORAL INTAKE: 1
MUSCLE WEAKNESS: 1
DECREASED BLOOD PRESSURE: 1

## 2025-01-01 ASSESSMENT — PAIN SCALES - GENERAL: PAIN_LEVEL: 0

## 2025-01-01 ASSESSMENT — PAIN SCALES - PAIN ASSESSMENT IN ADVANCED DEMENTIA (PAINAD)
CONSOLABILITY: 0 - NO NEED TO CONSOLE.
TOTALSCORE: 0
NEGVOCALIZATION: 0 - NONE.
CONSOLABILITY: 0 - NO NEED TO CONSOLE.
BODYLANGUAGE: 0 - RELAXED.
FACIALEXPRESSION: 0 - SMILING OR INEXPRESSIVE.
CONSOLABILITY: 0 - NO NEED TO CONSOLE.
TOTALSCORE: 1
TOTALSCORE: 1
BODYLANGUAGE: 0 - RELAXED.
BODYLANGUAGE: 0 - RELAXED.
FACIALEXPRESSION: 0 - SMILING OR INEXPRESSIVE.
BODYLANGUAGE: 0 - RELAXED.
CONSOLABILITY: 0 - NO NEED TO CONSOLE.
NEGVOCALIZATION: 1 - OCCASIONAL MOAN OR GROAN. LOW-LEVEL SPEECH WITH A NEGATIVE OR DISAPPROVING QUALITY.
FACIALEXPRESSION: 1 - SAD. FRIGHTENED. FROWN.
FACIALEXPRESSION: 1 - SAD. FRIGHTENED. FROWN.
CONSOLABILITY: 0 - NO NEED TO CONSOLE.
FACIALEXPRESSION: 0 - SMILING OR INEXPRESSIVE.
NEGVOCALIZATION: 0 - NONE.
NEGVOCALIZATION: 0 - NONE.
TOTALSCORE: 3
FACIALEXPRESSION: 0 - SMILING OR INEXPRESSIVE.
CONSOLABILITY: 0 - NO NEED TO CONSOLE.
BODYLANGUAGE: 0 - RELAXED.
BODYLANGUAGE: 0 - RELAXED.
NEGVOCALIZATION: 0 - NONE.
TOTALSCORE: 2
NEGVOCALIZATION: 0 - NONE.
TOTALSCORE: 2

## 2025-01-01 ASSESSMENT — ACTIVITIES OF DAILY LIVING (ADL)
CONTINENCE_REQUIRES_ASSISTANCE: 1
CURRENT_FUNCTION: TWO PERSON
EATING_REQUIRES_ASSISTANCE: 1
AMBULATION ASSISTANCE: NON-AMBULATORY
EATING_REQUIRES_ASSISTANCE: 1
AMBULATION ASSISTANCE: NON-AMBULATORY
AMBULATION_REQUIRES_ASSISTANCE: 1
PHYSICAL_TRANSFER_REQUIRES_ASSISTANCE: 1
DRESSING_REQUIRES_ASSISTANCE: 1
DRESSING_REQUIRES_ASSISTANCE: 1
AMBULATION ASSISTANCE: NON-AMBULATORY
BATHING_REQUIRES_ASSISTANCE: 1
MONEY MANAGEMENT (EXPENSES/BILLS): TOTALLY DEPENDENT
BATHING_REQUIRES_ASSISTANCE: 1
CURRENT_FUNCTION: TWO PERSON
AMBULATION_REQUIRES_ASSISTANCE: 1
PHYSICAL_TRANSFER_REQUIRES_ASSISTANCE: 1
CURRENT_FUNCTION: TWO PERSON
CONTINENCE_REQUIRES_ASSISTANCE: 1
AMBULATION ASSISTANCE: NON-AMBULATORY
CURRENT_FUNCTION: TWO PERSON
AMBULATION ASSISTANCE: NON-AMBULATORY
CURRENT_FUNCTION: TWO PERSON

## 2025-01-01 ASSESSMENT — PAIN DESCRIPTION - PAIN TYPE
TYPE: ACUTE PAIN

## 2025-01-01 ASSESSMENT — FIBROSIS 4 INDEX
FIB4 SCORE: 0.84
FIB4 SCORE: 2.02
FIB4 SCORE: 1.08

## 2025-01-03 ENCOUNTER — APPOINTMENT (OUTPATIENT)
Dept: NEUROLOGY | Facility: MEDICAL CENTER | Age: 75
End: 2025-01-03
Attending: PSYCHIATRY & NEUROLOGY
Payer: MEDICARE

## 2025-01-10 ENCOUNTER — APPOINTMENT (OUTPATIENT)
Dept: NEUROLOGY | Facility: MEDICAL CENTER | Age: 75
End: 2025-01-10
Attending: PSYCHIATRY & NEUROLOGY
Payer: MEDICARE

## 2025-01-13 ENCOUNTER — APPOINTMENT (OUTPATIENT)
Dept: URBAN - METROPOLITAN AREA CLINIC 35 | Facility: CLINIC | Age: 75
Setting detail: DERMATOLOGY
End: 2025-01-13

## 2025-01-13 DIAGNOSIS — L21.8 OTHER SEBORRHEIC DERMATITIS: ICD-10-CM | Status: STABLE

## 2025-01-13 DIAGNOSIS — L82.1 OTHER SEBORRHEIC KERATOSIS: ICD-10-CM

## 2025-01-13 DIAGNOSIS — L57.0 ACTINIC KERATOSIS: ICD-10-CM

## 2025-01-13 DIAGNOSIS — Z71.89 OTHER SPECIFIED COUNSELING: ICD-10-CM

## 2025-01-13 DIAGNOSIS — L81.4 OTHER MELANIN HYPERPIGMENTATION: ICD-10-CM

## 2025-01-13 DIAGNOSIS — Z85.828 PERSONAL HISTORY OF OTHER MALIGNANT NEOPLASM OF SKIN: ICD-10-CM

## 2025-01-13 DIAGNOSIS — D22 MELANOCYTIC NEVI: ICD-10-CM

## 2025-01-13 PROBLEM — D22.4 MELANOCYTIC NEVI OF SCALP AND NECK: Status: ACTIVE | Noted: 2025-01-13

## 2025-01-13 PROCEDURE — ? COUNSELING

## 2025-01-13 PROCEDURE — ? TREATMENT REGIMEN

## 2025-01-13 PROCEDURE — ? LIQUID NITROGEN

## 2025-01-13 PROCEDURE — 17000 DESTRUCT PREMALG LESION: CPT

## 2025-01-13 PROCEDURE — 17003 DESTRUCT PREMALG LES 2-14: CPT

## 2025-01-13 PROCEDURE — 99213 OFFICE O/P EST LOW 20 MIN: CPT | Mod: 25

## 2025-01-13 ASSESSMENT — LOCATION SIMPLE DESCRIPTION DERM
LOCATION SIMPLE: SCALP
LOCATION SIMPLE: POSTERIOR NECK
LOCATION SIMPLE: LEFT FOREHEAD
LOCATION SIMPLE: ABDOMEN
LOCATION SIMPLE: RIGHT UPPER BACK
LOCATION SIMPLE: POSTERIOR SCALP
LOCATION SIMPLE: LEFT TEMPLE

## 2025-01-13 ASSESSMENT — LOCATION DETAILED DESCRIPTION DERM
LOCATION DETAILED: RIGHT SUPERIOR UPPER BACK
LOCATION DETAILED: RIGHT SUPERIOR OCCIPITAL SCALP
LOCATION DETAILED: RIGHT MEDIAL TRAPEZIAL NECK
LOCATION DETAILED: LEFT CENTRAL TEMPLE
LOCATION DETAILED: EPIGASTRIC SKIN
LOCATION DETAILED: LEFT FOREHEAD
LOCATION DETAILED: RIGHT MID-UPPER BACK
LOCATION DETAILED: MID-OCCIPITAL SCALP
LOCATION DETAILED: LEFT SUPERIOR OCCIPITAL SCALP
LOCATION DETAILED: RIGHT SUPERIOR PARIETAL SCALP

## 2025-01-13 ASSESSMENT — LOCATION ZONE DERM
LOCATION ZONE: NECK
LOCATION ZONE: TRUNK
LOCATION ZONE: FACE
LOCATION ZONE: SCALP

## 2025-02-12 ENCOUNTER — HOSPITAL ENCOUNTER (OUTPATIENT)
Dept: RADIOLOGY | Facility: MEDICAL CENTER | Age: 75
End: 2025-02-12
Attending: FAMILY MEDICINE
Payer: MEDICARE

## 2025-02-12 DIAGNOSIS — R13.12 OROPHARYNGEAL DYSPHAGIA: ICD-10-CM

## 2025-02-12 DIAGNOSIS — R13.13 PHARYNGEAL DYSPHAGIA: ICD-10-CM

## 2025-02-12 PROCEDURE — 700117 HCHG RX CONTRAST REV CODE 255: Performed by: FAMILY MEDICINE

## 2025-02-12 PROCEDURE — 92611 MOTION FLUOROSCOPY/SWALLOW: CPT

## 2025-02-12 PROCEDURE — 74230 X-RAY XM SWLNG FUNCJ C+: CPT

## 2025-02-12 RX ADMIN — BARIUM SULFATE 1400 MG: 700 TABLET ORAL at 15:15

## 2025-02-13 NOTE — OP THERAPY EVALUATION
[facility]  Outpatient Modified Barium Swallow Study        Patient Name: Jeremiah Huber  Date of Evaluation: 2/12/25  Referring Provider: An Perez MD  Fax: [phone]      Patient History & Reason for Referral  The pt is a 73 y/o who was referred for a Modified Barium Swallow Study (MBSS) due to increased difficulty with swallowing. Pt was largely non-verbal though comprehension appeared intact. His wife was present, supportive and provided information. Pt's wife reported occasional aspiration episodes with foods and liquids. He currently receives outpatient speech therapy services with current goals targeting dysphagia, including EMST. Pt consumes a regular diet, has trouble with taking larger pills.      PMHx: Parkinson's disease, stroke, gout, MI, restless leg syndrome, GERD    Speech Pathology  [facility] [place]13/20 - EC7/TN0      Level of Consciousness: Alert, Awake  Affect/Behavior: Appropriate, Calm  Follows Directives: Yes  Orientation: Self, General place, Situation      Oral Mechanism Evaluation  Facial Symmetry: Equal  Facial Sensation: Equal  Labial Observations: WFL  Lingual Observations: Midline  Comments: Bradykinesia with reduced general ROM c/w Parkinson's disease      Pertinent Information  Oxygen Requirements: Room Air  Secretion Management: WNL - per chart review, pt is given dextromethorphan   Factor(s) Affecting Performance: Impaired endurance      Subjective  The pt arrived in his wheelchair accompanied by his wife. He stood and sat himself into the fluoro chair with standby assist. Pt's wife stepped out of the fluoro suite during the study but was present otherwise. Pt was pleasant and cooperative.      Discussed with the risks, benefits, and alternatives of the MBSS procedure. Patient/family acknowledged and agreed to proceed.    Assessment  Videofluoroscopic Swallow Study was conducted in the lateral projection(s) to evaluate oropharyngeal swallow function. A radiology  yanni was present to assist with the procedure.     Positioning: seated upright in fluoroscopy chair  Anatomic View: WNL  Bolus Administration: SLP and Patient      Consistency PAS Score Timing Residue Comments   Thin Liquid 3 During swallow Vallecular Residue: Mild (5%-25%)  Pyriform Sinus Residue: Mild (5%-25%) PAS 1 - tsp x2, cup (single) x3, straw (single) x2  PAS 2 - straw (sequential), straw (liquid wash)  PAS 3 - straw (liquid wash)   Mildly Thick Liquid 1 N/A Vallecular Residue: Mild (5%-25%)  Pyriform Sinus Residue: Mild (5%-25%) Cup   Pudding 1 N/A Vallecular Residue: Moderate (25%-50%)  Pyriform Sinus Residue: Mild (5%-25%)    Regular 1 N/A Vallecular Residue: Severe (>50%)  Pyriform Sinus Residue: Trace (1%-5%)    (1) Barium Tablet with Water  (1) Barium Tablet with Applesauce 1 N/A Vallecular Residue: None (0%)  Pyriform Sinus Residue:  None (0%)      Penetration-Aspiration Scale  1     No contrast enters airway  2     Contrast enters the airway, remains above the vocal folds, and is ejected from the airway (not seen in the airway at the end of the swallow).  3     Contrast enters the airway, remains above the vocal folds, and is not ejected from the airway (is seen in the airway after the swallow).  4     Contrast enters the airway, contacts the vocal folds, and is ejected from the airway.  5     Contrast enters the airway, contacts the vocal folds, and is not ejected from the airway  6     Contrast enters the airway, crosses the plane of the vocal folds, and is ejected from the airway.  7     Contrast enters the airway, crosses the plane of the vocal folds, and is not ejected from the airway despite effort.  8     Contrast enters the airway, crosses the plane of the vocal folds, is not ejected from the airway and there is no response to aspiration.      Oral phase:  Appropriate oral acceptance. No anterior escape. Adequate bite and slowed mastication of regular solids. Back and forth lingual motion  with lingual pumping. Pt was unable to propel barium tablet in water from oral cavity, resulting in patient chewing the tablet with eventual cue to expectorate the tablet. Slow but eventual propulsion and clearance of barium tablet when taken with a bite of applesauce. Occasional mild oral stasis which spontaneously cleared.      Pharyngeal phase:  Pharyngeal phase marked by impairment(s) in BOT retraction, anterior hyoid excursion, epiglottic inversion, laryngeal vestibule closure (LVC), pharyngeal stripping wave, pharyngeal constriction, and PES opening. Resulting in the following:    Increasing vallecular residue with denser viscosities. Mild PPW and trace to mild pyriform sinus residue.  Penetration with trace visible residue along the inferior epiglottic surface noted with a thin liquid wash by straw x 1.  Single episode of penetration with spontaneous ejection of thin liquids by straw during sequential sips.  No aspiration appreciated.      Esophageal phase:  No notable proximal esophageal findings in lateral position, though with limited views. AP view with esophageal screen not taken due to max radiation exposure time reached.      Compensatory Strategies:  Liquid Wash (thin liquids) - partially effective; cleared approx 75% of pharyngeal residue  Additional Second Swallow (cued and spontaneous) - partially effective; cleared approx 25%-50% of pharyngeal residue  Effortful Swallow - minimally effective      Clinical Impressions  The pt presents with a mild to moderate oropharyngeal dysphagia. Resulting in reduced swallow efficiency however swallow safety is relatively preserved. Findings appear chronic in the setting of known Parkinson's disease. The risk for the development of aspiration PNA from oral intake is low in the absence of visualized aspiration, good oral hygiene and fair, though limited, mobility. Patient is an excellent candidate for behavioral and exercise-based swallow rehabilitation to  maximize outcomes.     We discussed the importance of continued speech therapy serviced and controlling aspiration PNA risk factors including diligent oral care and mobility, as tolerated. Patient's wife verbalized understanding.      Recommendations  Regular or Easy to Chew solids (RG7/EC7), Thin liquids (TN0)  2.  Swallowing Instructions & Precautions:   Supervision: Assist with meal tray set up, Close supervision - patient may be left alone for less than 5 minutes at a time  Positioning: Fully upright and midline during oral intake, Remain upright for 20 minutes after oral intake, Meals sitting upright in a chair, as tolerated  Medication: Whole with puree, One pill at a time, Crush with applesauce or puree, as appropriate, As tolerated  Strategies: Small bites/sips, Alternate bites and sips, Slow rate of intake, Multiple swallows (x 2) per bite/sip   Oral Care: Q8h  3.  Specialist Referral(s): None  4. The patient would benefit from continued outpatient speech therapy services.       Results and recs d/w patient and wife. Thank you.  Bianka Patricia, SLP

## 2025-02-18 ENCOUNTER — TELEPHONE (OUTPATIENT)
Dept: NEUROLOGY | Facility: MEDICAL CENTER | Age: 75
End: 2025-02-18
Payer: MEDICARE

## 2025-02-19 ENCOUNTER — APPOINTMENT (OUTPATIENT)
Dept: LAB | Facility: MEDICAL CENTER | Age: 75
End: 2025-02-19
Payer: MEDICARE

## 2025-02-19 ENCOUNTER — PHARMACY VISIT (OUTPATIENT)
Dept: PHARMACY | Facility: MEDICAL CENTER | Age: 75
End: 2025-02-19
Payer: COMMERCIAL

## 2025-02-19 ENCOUNTER — OFFICE VISIT (OUTPATIENT)
Dept: NEUROLOGY | Facility: MEDICAL CENTER | Age: 75
End: 2025-02-19
Attending: INTERNAL MEDICINE
Payer: MEDICARE

## 2025-02-19 VITALS
WEIGHT: 197.31 LBS | RESPIRATION RATE: 20 BRPM | HEIGHT: 72 IN | OXYGEN SATURATION: 90 % | BODY MASS INDEX: 26.73 KG/M2 | HEART RATE: 65 BPM | TEMPERATURE: 96.8 F | DIASTOLIC BLOOD PRESSURE: 58 MMHG | SYSTOLIC BLOOD PRESSURE: 110 MMHG

## 2025-02-19 DIAGNOSIS — K11.7 SIALORRHEA: ICD-10-CM

## 2025-02-19 DIAGNOSIS — G20.A1 PARKINSON'S DISEASE WITHOUT DYSKINESIA OR FLUCTUATING MANIFESTATIONS (HCC): ICD-10-CM

## 2025-02-19 DIAGNOSIS — G57.93 NEUROPATHY OF BOTH FEET: ICD-10-CM

## 2025-02-19 DIAGNOSIS — G25.81 RESTLESS LEG SYNDROME DUE TO IRON DEFICIENCY ANEMIA: ICD-10-CM

## 2025-02-19 DIAGNOSIS — D50.9 RESTLESS LEG SYNDROME DUE TO IRON DEFICIENCY ANEMIA: ICD-10-CM

## 2025-02-19 PROCEDURE — G2211 COMPLEX E/M VISIT ADD ON: HCPCS | Performed by: INTERNAL MEDICINE

## 2025-02-19 PROCEDURE — 3074F SYST BP LT 130 MM HG: CPT | Performed by: INTERNAL MEDICINE

## 2025-02-19 PROCEDURE — 99212 OFFICE O/P EST SF 10 MIN: CPT | Performed by: INTERNAL MEDICINE

## 2025-02-19 PROCEDURE — RXMED WILLOW AMBULATORY MEDICATION CHARGE: Performed by: INTERNAL MEDICINE

## 2025-02-19 PROCEDURE — 3078F DIAST BP <80 MM HG: CPT | Performed by: INTERNAL MEDICINE

## 2025-02-19 PROCEDURE — G2212 PROLONG OUTPT/OFFICE VIS: HCPCS | Performed by: INTERNAL MEDICINE

## 2025-02-19 PROCEDURE — 99215 OFFICE O/P EST HI 40 MIN: CPT | Performed by: INTERNAL MEDICINE

## 2025-02-19 RX ORDER — GLYCOPYRROLATE 1 MG/1
1 TABLET ORAL 2 TIMES DAILY
COMMUNITY
Start: 2024-12-28

## 2025-02-19 RX ORDER — AMANTADINE HYDROCHLORIDE 100 MG/1
100 TABLET ORAL 2 TIMES DAILY
Qty: 180 TABLET | Refills: 3 | Status: SHIPPED | OUTPATIENT
Start: 2025-02-19

## 2025-02-19 RX ORDER — ROPINIROLE 4 MG/1
4 TABLET, FILM COATED ORAL 3 TIMES DAILY
Qty: 270 TABLET | Refills: 3 | Status: SHIPPED | OUTPATIENT
Start: 2025-02-19 | End: 2026-02-19

## 2025-02-19 RX ORDER — ALPRAZOLAM 0.25 MG
0.25 TABLET ORAL NIGHTLY PRN
COMMUNITY

## 2025-02-19 RX ORDER — CARBIDOPA AND LEVODOPA 87.5; 35 MG/1; MG/1
2 CAPSULE, EXTENDED RELEASE ORAL 3 TIMES DAILY
Qty: 84 CAPSULE | Refills: 0 | Status: SHIPPED | OUTPATIENT
Start: 2025-02-19 | End: 2025-03-05

## 2025-02-19 ASSESSMENT — FIBROSIS 4 INDEX: FIB4 SCORE: 3.27

## 2025-02-19 ASSESSMENT — PATIENT HEALTH QUESTIONNAIRE - PHQ9: CLINICAL INTERPRETATION OF PHQ2 SCORE: 0

## 2025-02-19 NOTE — PROGRESS NOTES
[facility]  [address] [place] [address], [address]. [place] [address]  Phone: [phone], Fax: [phone]    Demian Matos DO  Neurology, Movement Disorders    ASSESSMENT / PLAN   Jeremiah Huber is a 74 y.o. RHD male presenting for Parkinson's disease    Parkinson's disease  Gait instability  Sx onset approx 2016 with dx in 2017. Profound bradyphrenia and hypophonia, with postural instability. Minimal rigidity on exam.   - Stop carbidopa-levodopa 25/250, 2 tablets, 4x/day at 6 AM, 10 AM, 2 PM, 6 PM   - Start Crexont 350mg, 2 capsules, 3x/day. 14 day samples sent to [facility]  - Ropinirole 4mg: 3x/day at 6 AM, 2 PM, 10 PM   - Amantadine 100 mx/day at 6 AM and 2 PM   - Refilled amantadine and ropinirole to [facility]  - PT ongoing    Sialorrhea  - Start Xeomin 50U for sialorrhea  - Stop glycopyrrolate    Restless leg syndrome  - check iron, ferritin, and Vit B12, Vit B6 ([facility] lab on ground floor)   Latest Reference Range & Units 25 10:22   Iron 50 - 180 ug/dL 114   Total Iron Binding 250 - 450 ug/dL 308   % Saturation 15 - 55 % 37   Unsat Iron Binding 110 - 370 ug/dL 194   Ferritin 22.0 - 322.0 ng/mL 44.4   Folate -Folic Acid >4.0 ng/mL >40.0   Vitamin B12 -True Cobalamin 211 - 911 pg/mL 455   Vitamin B6 20.0 - 125.0 nmol/L 92.8     - ropinirole 4mg + gabapentin 600mg nightly    Dysphagia  Hypophonia  - SLP at [facility]    Hx left occipital stroke 2020  Etiology thought to be cardioembolic as TTE showed apical akinesis and thrombus.  Exam showed RLQ visual field loss  - aspirin 81mg and atorvastatin 80mg per cardiology recs      Orders Placed This Encounter    FERRITIN    IRON/TOTAL IRON BIND    VIT B12,  FOLIC ACID    VITAMIN B6    glycopyrrolate (ROBINUL) 1 MG Tab    ALPRAZolam (XANAX) 0.25 MG Tab    Carbidopa-Levodopa ER (CREXONT) 87.5-350 MG Cap CR    amantadine (SYMMETREL) 100 MG Tab    ROPINIRole (REQUIP) 4 MG tablet    IncobotulinumtoxinA (Xeomin) injection 50  Units     Return for when approved for Xeomin.    BILLING DOCUMENTATION:   Excluding time spent on procedures during visit, I spent  84  minutes reviewing the medical record, interviewing and examining the patient, discussing diagnosis and treatment, and coordinating care.              HISTORY OF PRESENT ILLNESS   Jeremiah Huber is a 74 y.o. 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     Initial HPI 25  Parkinson's  Symptom onset approximately , with diagnosis in 2017 when it was first noticed after his left knee replacement.    Previously seen Dr. Mclain, last 2024. Dr[facility]. Last visit 2025.     - Tried Crexont 210mg, 1 cap TID + carbidopa-levodopa 2 tab, QID. Seems to be working better. Less freezing and shuffling.   - Rytary ?dose, 3 capsules, TID. Didn't seem any better than Sinemet.   - methylphenidate 5 mg: stopped  - Amantadine 100mg: started in , seemed helpful for walking      Left occipital lobe stroke   2020 attributed to silent MI leading to cardioembolic stroke. This resulted in RLQ vision loss      Current Regimen  Crexont 210m capsule 3x/day  Carbidopa-levodopa 25/250, 2 tablets, 4x/day at 6 AM, 10 AM, 2 PM, 6 PM   Ropinirole 2 m tablets 3x/day at 6 AM, 2 PM, 10 PM   Amantadine 100 mx/day at 6 AM and 2 PM   Latency: can't tell  Duration: no wearing off  Efficacy: less freezing episode  Dyskinesia/Dystonia: none  Sfx: none      ROS  Gait:  Using powered wheelchair, limited benefit with walker. Freezing, shuffling of gait, and severe postural instability    Orthostasis:   No issues    GI:   +constipation: PRN    :   +incontinence at night    Speech/Swallow:   dextromethorphan 1200 mg   +sialorrhea: glycopyrrolate 1mg not too effective  +hypophonia: SLP at [facility]  +dysphagia:   [facility] [place]: mild to moderate oropharyngeal dysphagia resulting in reduced swallow efficiency however swallow safety is relatively preserved     Cognition:   Stable    Mood:   25: PHQ-9 = 0, C-SSRS = neg    Hallucinations:   No issues    Sleep/RBD:   +RLS: ropinirole at 10 PM + gabapentin 600 mg  Stays up late. Watch sleep tracker shows 8 hours of sleep.   +fatigue: afternoon    Past Medical History:   Diagnosis Date    Arthritis     osteo    Back spasm     Blood clotting disorder ([facility]) 2020    \"heart\"    GOUT     Gout     Hypertension     Myocardial infarct ([facility]) 2020    Parkinson disease ([facility])     Stroke ([facility]) 2020     Past Surgical History:   Procedure Laterality Date    NEURO DEST FACET L/S W/IG SNGL Right 2020    Procedure: DESTRUCTION, NERVE, FACET JOINT, LUMBOSACRAL, USING NEUROLYTIC AGENT, WITH FLUOROSCOPIC GUIDANCE;  Surgeon: Nazario Sandoval M.D.;  [facility];  Service: Pain Management    KNEE ARTHROSCOPY Right     KNEE REPLACEMENT, TOTAL Bilateral     SHOULDER SURGERY      right shoulder, a-c seperation    TONSILLECTOMY       Family History   Problem Relation Age of Onset    Heart Disease Mother     Heart Disease Father     Cancer Father         prostate cancer    Arthritis Brother      Social History     Socioeconomic History    Marital status:      Spouse name: Not on file    Number of children: Not on file    Years of education: Not on file    Highest education level: Master's degree (e.g., MA, MS, Phylicia, MEd, MSW, MURIEL)   Occupational History    Not on file   Tobacco Use    Smoking status: Former     Current packs/day: 0.00     Types: Cigarettes     Quit date: 1970     Years since quittin.1    Smokeless tobacco: Never   Vaping Use    Vaping status: Never Used   Substance and Sexual Activity    Alcohol use: Yes     Alcohol/week: 4.2 oz     Types: 7 Glasses of wine per week    Drug use: No    Sexual activity: Yes     " Partners: Female   Other Topics Concern     Service No    Blood Transfusions No    Caffeine Concern No    Occupational Exposure No    Hobby Hazards No    Sleep Concern No    Stress Concern No    Weight Concern No    Special Diet No    Back Care Yes     Comment: Streching    Exercise No    Bike Helmet Yes    Seat Belt Yes    Self-Exams Yes   Social History Narrative    Not on file     Social Drivers of Health     Financial Resource Strain: Low Risk  (12/10/2022)    Overall Financial Resource Strain (CARDIA)     Difficulty of Paying Living Expenses: Not hard at all   Food Insecurity: No Food Insecurity (12/10/2022)    Hunger Vital Sign     Worried About Running Out of Food in the Last Year: Never true     Ran Out of Food in the Last Year: Never true   Transportation Needs: No Transportation Needs (12/10/2022)    PRAPARE - Transportation     Lack of Transportation (Medical): No     Lack of Transportation (Non-Medical): No   Physical Activity: Sufficiently Active (12/10/2022)    Exercise Vital Sign     Days of Exercise per Week: 5 days     Minutes of Exercise per Session: 30 min   Stress: Stress Concern Present (12/10/2022)    [employer] - Occupational Stress Questionnaire     Feeling of Stress : To some extent   Social Connections: Unknown (12/10/2022)    Social Connection and Isolation Panel [NHANES]     Frequency of Communication with Friends and Family: More than three times a week     Frequency of Social Gatherings with Friends and Family: More than three times a week     Attends [employer] Services: Patient declined     Active Member of Clubs or Organizations: No     Attends Club or Organization Meetings: Patient declined     Marital Status:    Intimate Partner Violence: Not on file   Housing Stability: Unknown (12/10/2022)    Housing Stability Vital Sign     Unable to Pay for Housing in the Last Year: No     Number of Places Lived in the Last Year: Not on file      Unstable Housing in the Last Year: No     Current Outpatient Medications   Medication    glycopyrrolate (ROBINUL) 1 MG Tab    ALPRAZolam (XANAX) 0.25 MG Tab    Carbidopa-Levodopa ER (CREXONT) 87.5-350 MG Cap CR    amantadine (SYMMETREL) 100 MG Tab    ROPINIRole (REQUIP) 4 MG tablet    gabapentin (NEURONTIN) 300 MG Cap    carbidopa-levodopa (SINEMET)  MG Tab    metoprolol SR (TOPROL XL) 50 MG TABLET SR 24 HR    atorvastatin (LIPITOR) 80 MG tablet    ipratropium (ATROVENT) 0.03 % Solution    triamcinolone acetonide (KENALOG) 0.1 % Cream    meloxicam (MOBIC) 15 MG tablet    omeprazole (PRILOSEC) 20 MG delayed-release capsule    aspirin 81 MG EC tablet    Dextromethorphan-guaiFENesin (MUCUS DM PO)    cetirizine (ZYRTEC) 10 MG Tab    sildenafil citrate (VIAGRA) 50 MG tablet    methylphenidate (RITALIN) 5 MG Tab     Current Facility-Administered Medications   Medication Dose    [START ON 3/7/2025] IncobotulinumtoxinA (Xeomin) injection 50 Units  50 Units     No Known Allergies          DATA / RESULTS     MRI Brain w/wo 6/2020  1.  Acute gyriform area of infarction involving the left medial occipital lobe extending to the cortex.   2.  Age-related cerebral atrophy   3.  Minimal periventricular white matter changes consistent with chronic microvascular ischemic gliosis.    TTE 6/2020  No prior study is available for comparison.   Thrombus is observed in the left ventricular apex.  Apical akinesis.   Normal left ventricular systolic function.   Left ventricular ejection fraction is visually estimated to be 55%.   Right heart pressures are normal.   Care team notified at time of reading.    CTA Neck w/wo 6/2020  1. There is no evidence of vascular occlusion or clinically significant stenosis.     CTA Head w/wo 6/2020  CT angiogram of the [place] within normal limits.   Small hypodensity in medial left occipital lobe again noted suspicious for small acute/subacute infarct. No acute intracranial  hemorrhage.    25-Hydroxy   Vitamin D 25   Date Value Ref Range Status   02/17/2022 26 (L) 30 - 100 ng/mL Final     Comment:     Adult Ranges:   <20 ng/mL - Deficiency  20-29 ng/mL - Insufficiency   ng/mL - Sufficiency  Effective 3/18/2020, this electrochemiluminescence binding assay is  performed using Roche riaz e immunoassay analyzer.  The [employer] Vitamin D  total II assay is intended for the quantitative determination of total 25  hydroxyvitamin D in human serum and plasma. This assay is to be used as an  aid in the assessment of vitamin D sufficiency in adults.       Vitamin B12 -True Cobalamin   Date Value Ref Range Status   02/17/2022 325 211 - 911 pg/mL Final     TSH   Date Value Ref Range Status   02/17/2022 3.170 0.380 - 5.330 uIU/mL Final     Comment:     Reference Range:    Pregnant Females, 1st Trimester  0.050-3.700  Pregnant Females, 2nd Trimester  0.310-4.350  Pregnant Females, 3rd Trimester  0.410-5.180       Glycohemoglobin   Date Value Ref Range Status   06/14/2020 5.2 0.0 - 5.6 % Final     Comment:     Increased risk for diabetes:  5.7 -6.4%  Diabetes:  >6.4%  Glycemic control for adults with diabetes:  <7.0%  The above interpretations are per ADA guidelines.  Diagnosis  of diabetes mellitus on the basis of elevated Hemoglobin A1c  should be confirmed by repeating the Hb A1c test.       LDL   Date Value Ref Range Status   06/15/2024 32 <100 mg/dL Final                OBJECTIVE      Vitals:    02/19/25 0937   BP: 110/58   BP Location: Right arm   Patient Position: Sitting   BP Cuff Size: Adult   Pulse: 65   Resp: 20   Temp: 36 °C (96.8 °F)   TempSrc: Temporal   SpO2: 90%   Weight: 89.5 kg (197 lb 5 oz)   Height: 1.829 m (6')     Physical Exam  Last dose of meds taken 7AM     General: NAD, appears stated age.      Mental status: +hypophonia, nearly inaudible. Able to communicate with hand gestures    Cranial Nerves:  CN2: PERRL. +RLQ field loss  CN3/4/6: +right gaze nystagmus  CN5: V1-V3  intact to light touch    CN7: Symmetric face.   CN8: Hearing grossly intact.   CN9/10/12: Soft palate and uvula rise symmetrically. Tongue midline.   CN11: Shoulder shrug intact bilaterally.     Strength  Right Left   Shoulder Abduction  5 5   Elbow Flexion 5 5   Elbow Extension  5 5   Wrist Extension  5 5   Finger Extension  5 5   Hip Flexion  5 5   Knee Extension 5 5   Ankle Dorsiflexion  5 5     Deep Tendon Reflexes: 1+ biceps, brachioradialis, R1L0 patella and 0 ankles.     Abnormal movements:    UPDRS Right Left   Finger tapping 2 2   Hand Movement 2 2   Toe Tapping 2 2   Leg Agility 2 2   Rigidity 0 0   Rest Tremor 0 0   Postural Tremor 0 0   Kinetic Tremor 0 0      No dystonia, dyskinesias, tics, stereotypies, athetosis, akathisia, or chorea noted.      Sensory: normal to sharp    Quantitative tuning fork Right Left   Hands 8 8   Feet 8 8       Cerebellar: No dysmetria with FTN    Gait:   Trouble standing from walker, requires mod-A. Did not attempt to walk.          PROCEDURE     N/A

## 2025-02-19 NOTE — PATIENT INSTRUCTIONS
Start Xeomin 50U for sialorrhea    Stop carbidopa-levodopa tablets  Start Crexont 350mg, 2 capsules, 3x/day  Samples sent to [facility] on First Floor    Blood test: check iron and Vit B12, Vit B6 ([facility] lab on ground floor)    Refilled amantadine and ropinirole to [facility]

## 2025-02-20 NOTE — Clinical Note
REFERRAL APPROVAL NOTICE         Sent on February 20, 2025                   Farhat Huber  [address] [place] [address]                   Dear Mr. Huber,    After a careful review of the medical information and benefit coverage, [facility] has processed your referral. See below for additional details.    If applicable, you must be actively enrolled with your insurance for coverage of the authorized service. If you have any questions regarding your coverage, please contact your insurance directly.    REFERRAL INFORMATION   Referral #:  [id number]  Referred-To Department    Referred-By Provider:  Neurology    Demian Matos D.O.   Neurology [facility]      [address]  [place] [id number] [phone] [address]  [place] [id number] [phone]    Referral Start Date:  02/20/2025  Referral End Date:   02/20/2026           SCHEDULING  If you do not already have an appointment, please call [phone] to make an appointment.   MORE INFORMATION  As a reminder, [facility] ownership has changed, meaning this location is now owned and operated by [facility]. As such, we want to clarify that our patients should expect to receive two separate bills for the services received at [facility] - one representing the [facility] facility fees as the owner of the establishment, and the other to represent the physician's services and subsequent fees. You can speak with your insurance carrier for a pricing estimate by calling the customer service number on the back of your card and ask about charges for a hospital outpatient visit.  If you do not already have a [employer] account, sign up at: [email or web address]  You can access your medical information, make appointments, see lab results, billing information, and more.  If you have questions regarding this referral, please contact  the [facility] Referrals department at:             [phone]. Monday - Friday 7:30AM -  5:00PM.      Sincerely,  [facility]

## 2025-02-21 ENCOUNTER — HOSPITAL ENCOUNTER (OUTPATIENT)
Dept: LAB | Facility: MEDICAL CENTER | Age: 75
End: 2025-02-21
Attending: INTERNAL MEDICINE
Payer: MEDICARE

## 2025-02-21 DIAGNOSIS — G25.81 RESTLESS LEG SYNDROME DUE TO IRON DEFICIENCY ANEMIA: ICD-10-CM

## 2025-02-21 DIAGNOSIS — D50.9 RESTLESS LEG SYNDROME DUE TO IRON DEFICIENCY ANEMIA: ICD-10-CM

## 2025-02-21 DIAGNOSIS — G57.93 NEUROPATHY OF BOTH FEET: ICD-10-CM

## 2025-02-21 LAB
FERRITIN SERPL-MCNC: 44.4 NG/ML (ref 22–322)
FOLATE SERPL-MCNC: >40 NG/ML
IRON SATN MFR SERPL: 37 % (ref 15–55)
IRON SERPL-MCNC: 114 UG/DL (ref 50–180)
TIBC SERPL-MCNC: 308 UG/DL (ref 250–450)
UIBC SERPL-MCNC: 194 UG/DL (ref 110–370)
VIT B12 SERPL-MCNC: 455 PG/ML (ref 211–911)

## 2025-02-21 PROCEDURE — 84207 ASSAY OF VITAMIN B-6: CPT

## 2025-02-21 PROCEDURE — 82746 ASSAY OF FOLIC ACID SERUM: CPT

## 2025-02-21 PROCEDURE — 36415 COLL VENOUS BLD VENIPUNCTURE: CPT

## 2025-02-21 PROCEDURE — 82607 VITAMIN B-12: CPT

## 2025-02-21 PROCEDURE — 83550 IRON BINDING TEST: CPT

## 2025-02-21 PROCEDURE — 82728 ASSAY OF FERRITIN: CPT

## 2025-02-21 PROCEDURE — 83540 ASSAY OF IRON: CPT

## 2025-02-25 LAB — VIT B6 SERPL-MCNC: 92.8 NMOL/L (ref 20–125)

## 2025-03-19 ENCOUNTER — APPOINTMENT (OUTPATIENT)
Dept: NEUROLOGY | Facility: MEDICAL CENTER | Age: 75
End: 2025-03-19
Attending: INTERNAL MEDICINE
Payer: MEDICARE

## 2025-03-26 ENCOUNTER — HOSPITAL ENCOUNTER (INPATIENT)
Facility: MEDICAL CENTER | Age: 75
End: 2025-03-26
Attending: EMERGENCY MEDICINE | Admitting: STUDENT IN AN ORGANIZED HEALTH CARE EDUCATION/TRAINING PROGRAM
Payer: MEDICARE

## 2025-03-26 DIAGNOSIS — I48.0 PAROXYSMAL ATRIAL FIBRILLATION (HCC): ICD-10-CM

## 2025-03-26 DIAGNOSIS — Z86.73 HISTORY OF STROKE: ICD-10-CM

## 2025-03-26 DIAGNOSIS — J96.01 ACUTE RESPIRATORY FAILURE WITH HYPOXIA (HCC): ICD-10-CM

## 2025-03-26 DIAGNOSIS — A41.9 SEPSIS WITH ACUTE HYPOXIC RESPIRATORY FAILURE WITHOUT SEPTIC SHOCK, DUE TO UNSPECIFIED ORGANISM (HCC): ICD-10-CM

## 2025-03-26 DIAGNOSIS — J90 PLEURAL EFFUSION: ICD-10-CM

## 2025-03-26 DIAGNOSIS — J18.9 PNEUMONIA OF RIGHT LOWER LOBE DUE TO INFECTIOUS ORGANISM: ICD-10-CM

## 2025-03-26 DIAGNOSIS — R10.11 RIGHT UPPER QUADRANT ABDOMINAL PAIN: ICD-10-CM

## 2025-03-26 DIAGNOSIS — A41.9 SEPSIS, DUE TO UNSPECIFIED ORGANISM, UNSPECIFIED WHETHER ACUTE ORGAN DYSFUNCTION PRESENT (HCC): ICD-10-CM

## 2025-03-26 DIAGNOSIS — I25.5 ISCHEMIC CARDIOMYOPATHY: ICD-10-CM

## 2025-03-26 DIAGNOSIS — J96.01 SEPSIS WITH ACUTE HYPOXIC RESPIRATORY FAILURE WITHOUT SEPTIC SHOCK, DUE TO UNSPECIFIED ORGANISM (HCC): ICD-10-CM

## 2025-03-26 DIAGNOSIS — G20.B1 PARKINSON'S DISEASE WITH DYSKINESIA, UNSPECIFIED WHETHER MANIFESTATIONS FLUCTUATE (HCC): ICD-10-CM

## 2025-03-26 DIAGNOSIS — R65.20 SEPSIS WITH ACUTE HYPOXIC RESPIRATORY FAILURE WITHOUT SEPTIC SHOCK, DUE TO UNSPECIFIED ORGANISM (HCC): ICD-10-CM

## 2025-03-26 LAB
APPEARANCE UR: CLEAR
BASOPHILS # BLD AUTO: 0.3 % (ref 0–1.8)
BASOPHILS # BLD: 0.05 K/UL (ref 0–0.12)
BILIRUB UR QL STRIP.AUTO: NEGATIVE
COLOR UR: YELLOW
EKG IMPRESSION: NORMAL
EOSINOPHIL # BLD AUTO: 0.01 K/UL (ref 0–0.51)
EOSINOPHIL NFR BLD: 0.1 % (ref 0–6.9)
ERYTHROCYTE [DISTWIDTH] IN BLOOD BY AUTOMATED COUNT: 40 FL (ref 35.9–50)
FLUAV RNA SPEC QL NAA+PROBE: NEGATIVE
FLUBV RNA SPEC QL NAA+PROBE: NEGATIVE
GLUCOSE UR STRIP.AUTO-MCNC: NEGATIVE MG/DL
HCT VFR BLD AUTO: 47.7 % (ref 42–52)
HGB BLD-MCNC: 16.1 G/DL (ref 14–18)
IMM GRANULOCYTES # BLD AUTO: 0.1 K/UL (ref 0–0.11)
IMM GRANULOCYTES NFR BLD AUTO: 0.6 % (ref 0–0.9)
KETONES UR STRIP.AUTO-MCNC: ABNORMAL MG/DL
LACTATE SERPL-SCNC: 1.2 MMOL/L (ref 0.5–2)
LEUKOCYTE ESTERASE UR QL STRIP.AUTO: NEGATIVE
LYMPHOCYTES # BLD AUTO: 0.55 K/UL (ref 1–4.8)
LYMPHOCYTES NFR BLD: 3.1 % (ref 22–41)
MCH RBC QN AUTO: 29.3 PG (ref 27–33)
MCHC RBC AUTO-ENTMCNC: 33.8 G/DL (ref 32.3–36.5)
MCV RBC AUTO: 86.9 FL (ref 81.4–97.8)
MICRO URNS: ABNORMAL
MONOCYTES # BLD AUTO: 0.7 K/UL (ref 0–0.85)
MONOCYTES NFR BLD AUTO: 3.9 % (ref 0–13.4)
NEUTROPHILS # BLD AUTO: 16.33 K/UL (ref 1.82–7.42)
NEUTROPHILS NFR BLD: 92 % (ref 44–72)
NITRITE UR QL STRIP.AUTO: NEGATIVE
NRBC # BLD AUTO: 0 K/UL
NRBC BLD-RTO: 0 /100 WBC (ref 0–0.2)
PH UR STRIP.AUTO: 6 [PH] (ref 5–8)
PLATELET # BLD AUTO: 324 K/UL (ref 164–446)
PMV BLD AUTO: 9.5 FL (ref 9–12.9)
PROT UR QL STRIP: NEGATIVE MG/DL
RBC # BLD AUTO: 5.49 M/UL (ref 4.7–6.1)
RBC UR QL AUTO: NEGATIVE
RSV RNA SPEC QL NAA+PROBE: NEGATIVE
SARS-COV-2 RNA RESP QL NAA+PROBE: NOTDETECTED
SP GR UR STRIP.AUTO: 1.02
UROBILINOGEN UR STRIP.AUTO-MCNC: 1 EU/DL
WBC # BLD AUTO: 17.7 K/UL (ref 4.8–10.8)

## 2025-03-26 PROCEDURE — 0241U HCHG SARS-COV-2 COVID-19 NFCT DS RESP RNA 4 TRGT ED POC: CPT

## 2025-03-26 PROCEDURE — 83605 ASSAY OF LACTIC ACID: CPT

## 2025-03-26 PROCEDURE — 700111 HCHG RX REV CODE 636 W/ 250 OVERRIDE (IP): Mod: JZ | Performed by: EMERGENCY MEDICINE

## 2025-03-26 PROCEDURE — 87040 BLOOD CULTURE FOR BACTERIA: CPT | Mod: 91

## 2025-03-26 PROCEDURE — 93005 ELECTROCARDIOGRAM TRACING: CPT | Mod: TC | Performed by: EMERGENCY MEDICINE

## 2025-03-26 PROCEDURE — 85025 COMPLETE CBC W/AUTO DIFF WBC: CPT

## 2025-03-26 PROCEDURE — 80053 COMPREHEN METABOLIC PANEL: CPT

## 2025-03-26 PROCEDURE — 81003 URINALYSIS AUTO W/O SCOPE: CPT

## 2025-03-26 PROCEDURE — 93005 ELECTROCARDIOGRAM TRACING: CPT | Mod: TC

## 2025-03-26 PROCEDURE — 83690 ASSAY OF LIPASE: CPT

## 2025-03-26 PROCEDURE — 96365 THER/PROPH/DIAG IV INF INIT: CPT

## 2025-03-26 PROCEDURE — 700105 HCHG RX REV CODE 258: Performed by: EMERGENCY MEDICINE

## 2025-03-26 PROCEDURE — 87086 URINE CULTURE/COLONY COUNT: CPT

## 2025-03-26 PROCEDURE — 96375 TX/PRO/DX INJ NEW DRUG ADDON: CPT

## 2025-03-26 PROCEDURE — 99285 EMERGENCY DEPT VISIT HI MDM: CPT

## 2025-03-26 PROCEDURE — 36415 COLL VENOUS BLD VENIPUNCTURE: CPT

## 2025-03-26 RX ORDER — ONDANSETRON 2 MG/ML
4 INJECTION INTRAMUSCULAR; INTRAVENOUS ONCE
Status: COMPLETED | OUTPATIENT
Start: 2025-03-26 | End: 2025-03-26

## 2025-03-26 RX ORDER — AZITHROMYCIN 500 MG/5ML
500 INJECTION, POWDER, LYOPHILIZED, FOR SOLUTION INTRAVENOUS ONCE
Status: COMPLETED | OUTPATIENT
Start: 2025-03-26 | End: 2025-03-27

## 2025-03-26 RX ORDER — MORPHINE SULFATE 4 MG/ML
4 INJECTION INTRAVENOUS ONCE
Status: COMPLETED | OUTPATIENT
Start: 2025-03-26 | End: 2025-03-26

## 2025-03-26 RX ADMIN — ONDANSETRON 4 MG: 2 INJECTION INTRAMUSCULAR; INTRAVENOUS at 23:08

## 2025-03-26 RX ADMIN — AMPICILLIN AND SULBACTAM 3 G: 1; 2 INJECTION, POWDER, FOR SOLUTION INTRAMUSCULAR; INTRAVENOUS at 23:15

## 2025-03-26 RX ADMIN — MORPHINE SULFATE 4 MG: 4 INJECTION, SOLUTION INTRAMUSCULAR; INTRAVENOUS at 23:08

## 2025-03-26 ASSESSMENT — PAIN DESCRIPTION - PAIN TYPE: TYPE: ACUTE PAIN

## 2025-03-26 ASSESSMENT — FIBROSIS 4 INDEX: FIB4 SCORE: 3.27

## 2025-03-27 PROBLEM — J18.9 PNEUMONIA DUE TO INFECTIOUS ORGANISM: Status: ACTIVE | Noted: 2025-03-27

## 2025-03-27 PROBLEM — R65.10 SIRS (SYSTEMIC INFLAMMATORY RESPONSE SYNDROME) (HCC): Status: ACTIVE | Noted: 2025-03-27

## 2025-03-27 PROBLEM — Z71.89 ADVANCE CARE PLANNING: Status: ACTIVE | Noted: 2025-03-27

## 2025-03-27 PROBLEM — R10.11 RIGHT UPPER QUADRANT ABDOMINAL PAIN: Status: ACTIVE | Noted: 2025-03-27

## 2025-03-27 LAB
ALBUMIN SERPL BCP-MCNC: 2.6 G/DL (ref 3.2–4.9)
ALBUMIN/GLOB SERPL: 0.9 G/DL
ALP SERPL-CCNC: 91 U/L (ref 30–99)
ALT SERPL-CCNC: 18 U/L (ref 2–50)
ANION GAP SERPL CALC-SCNC: 12 MMOL/L (ref 7–16)
ANION GAP SERPL CALC-SCNC: 13 MMOL/L (ref 7–16)
AST SERPL-CCNC: 37 U/L (ref 12–45)
BACTERIA BLD CULT: NORMAL
BACTERIA BLD CULT: NORMAL
BILIRUB SERPL-MCNC: 0.6 MG/DL (ref 0.1–1.5)
BUN SERPL-MCNC: 16 MG/DL (ref 8–22)
BUN SERPL-MCNC: 17 MG/DL (ref 8–22)
CALCIUM ALBUM COR SERPL-MCNC: 8.2 MG/DL (ref 8.5–10.5)
CALCIUM SERPL-MCNC: 6.5 MG/DL (ref 8.5–10.5)
CALCIUM SERPL-MCNC: 7.1 MG/DL (ref 8.5–10.5)
CHLORIDE SERPL-SCNC: 105 MMOL/L (ref 96–112)
CHLORIDE SERPL-SCNC: 109 MMOL/L (ref 96–112)
CO2 SERPL-SCNC: 13 MMOL/L (ref 20–33)
CO2 SERPL-SCNC: 16 MMOL/L (ref 20–33)
CORTIS SERPL-MCNC: 12.3 UG/DL (ref 0–23)
CREAT SERPL-MCNC: 0.57 MG/DL (ref 0.5–1.4)
CREAT SERPL-MCNC: 0.71 MG/DL (ref 0.5–1.4)
ERYTHROCYTE [DISTWIDTH] IN BLOOD BY AUTOMATED COUNT: 42.3 FL (ref 35.9–50)
GFR SERPLBLD CREATININE-BSD FMLA CKD-EPI: 104 ML/MIN/1.73 M 2
GFR SERPLBLD CREATININE-BSD FMLA CKD-EPI: 97 ML/MIN/1.73 M 2
GLOBULIN SER CALC-MCNC: 2.8 G/DL (ref 1.9–3.5)
GLUCOSE SERPL-MCNC: 140 MG/DL (ref 65–99)
GLUCOSE SERPL-MCNC: 88 MG/DL (ref 65–99)
HCT VFR BLD AUTO: 44.4 % (ref 42–52)
HGB BLD-MCNC: 14.7 G/DL (ref 14–18)
LIPASE SERPL-CCNC: 19 U/L (ref 11–82)
MCH RBC QN AUTO: 29.7 PG (ref 27–33)
MCHC RBC AUTO-ENTMCNC: 33.1 G/DL (ref 32.3–36.5)
MCV RBC AUTO: 89.7 FL (ref 81.4–97.8)
PLATELET # BLD AUTO: 286 K/UL (ref 164–446)
PMV BLD AUTO: 9.6 FL (ref 9–12.9)
POTASSIUM SERPL-SCNC: 3.7 MMOL/L (ref 3.6–5.5)
POTASSIUM SERPL-SCNC: 4.2 MMOL/L (ref 3.6–5.5)
PROCALCITONIN SERPL-MCNC: 0.51 NG/ML
PROT SERPL-MCNC: 5.4 G/DL (ref 6–8.2)
RBC # BLD AUTO: 4.95 M/UL (ref 4.7–6.1)
SIGNIFICANT IND 70042: NORMAL
SIGNIFICANT IND 70042: NORMAL
SITE SITE: NORMAL
SITE SITE: NORMAL
SODIUM SERPL-SCNC: 133 MMOL/L (ref 135–145)
SODIUM SERPL-SCNC: 135 MMOL/L (ref 135–145)
SOURCE SOURCE: NORMAL
SOURCE SOURCE: NORMAL
TROPONIN T SERPL-MCNC: 7 NG/L (ref 6–19)
WBC # BLD AUTO: 20.9 K/UL (ref 4.8–10.8)

## 2025-03-27 PROCEDURE — 82533 TOTAL CORTISOL: CPT

## 2025-03-27 PROCEDURE — 770006 HCHG ROOM/CARE - MED/SURG/GYN SEMI*

## 2025-03-27 PROCEDURE — 700111 HCHG RX REV CODE 636 W/ 250 OVERRIDE (IP): Mod: JZ | Performed by: EMERGENCY MEDICINE

## 2025-03-27 PROCEDURE — 700105 HCHG RX REV CODE 258: Performed by: STUDENT IN AN ORGANIZED HEALTH CARE EDUCATION/TRAINING PROGRAM

## 2025-03-27 PROCEDURE — 84145 PROCALCITONIN (PCT): CPT

## 2025-03-27 PROCEDURE — 36415 COLL VENOUS BLD VENIPUNCTURE: CPT

## 2025-03-27 PROCEDURE — 99497 ADVNCD CARE PLAN 30 MIN: CPT | Performed by: HOSPITALIST

## 2025-03-27 PROCEDURE — A9270 NON-COVERED ITEM OR SERVICE: HCPCS | Performed by: HOSPITALIST

## 2025-03-27 PROCEDURE — 700102 HCHG RX REV CODE 250 W/ 637 OVERRIDE(OP): Performed by: STUDENT IN AN ORGANIZED HEALTH CARE EDUCATION/TRAINING PROGRAM

## 2025-03-27 PROCEDURE — A9270 NON-COVERED ITEM OR SERVICE: HCPCS | Performed by: STUDENT IN AN ORGANIZED HEALTH CARE EDUCATION/TRAINING PROGRAM

## 2025-03-27 PROCEDURE — 99223 1ST HOSP IP/OBS HIGH 75: CPT | Performed by: STUDENT IN AN ORGANIZED HEALTH CARE EDUCATION/TRAINING PROGRAM

## 2025-03-27 PROCEDURE — 700101 HCHG RX REV CODE 250: Performed by: EMERGENCY MEDICINE

## 2025-03-27 PROCEDURE — 700111 HCHG RX REV CODE 636 W/ 250 OVERRIDE (IP): Mod: JZ | Performed by: HOSPITALIST

## 2025-03-27 PROCEDURE — 84484 ASSAY OF TROPONIN QUANT: CPT

## 2025-03-27 PROCEDURE — 700102 HCHG RX REV CODE 250 W/ 637 OVERRIDE(OP): Performed by: HOSPITALIST

## 2025-03-27 PROCEDURE — 85027 COMPLETE CBC AUTOMATED: CPT

## 2025-03-27 PROCEDURE — 700102 HCHG RX REV CODE 250 W/ 637 OVERRIDE(OP)

## 2025-03-27 PROCEDURE — 96367 TX/PROPH/DG ADDL SEQ IV INF: CPT

## 2025-03-27 PROCEDURE — 80048 BASIC METABOLIC PNL TOTAL CA: CPT

## 2025-03-27 PROCEDURE — A9270 NON-COVERED ITEM OR SERVICE: HCPCS

## 2025-03-27 PROCEDURE — 96375 TX/PRO/DX INJ NEW DRUG ADDON: CPT

## 2025-03-27 PROCEDURE — 700111 HCHG RX REV CODE 636 W/ 250 OVERRIDE (IP): Mod: JZ | Performed by: STUDENT IN AN ORGANIZED HEALTH CARE EDUCATION/TRAINING PROGRAM

## 2025-03-27 PROCEDURE — 700105 HCHG RX REV CODE 258: Performed by: EMERGENCY MEDICINE

## 2025-03-27 PROCEDURE — 700105 HCHG RX REV CODE 258: Performed by: HOSPITALIST

## 2025-03-27 RX ORDER — GABAPENTIN 300 MG/1
600 CAPSULE ORAL EVERY EVENING
Status: DISCONTINUED | OUTPATIENT
Start: 2025-03-27 | End: 2025-03-28

## 2025-03-27 RX ORDER — ENOXAPARIN SODIUM 100 MG/ML
40 INJECTION SUBCUTANEOUS DAILY
Status: DISCONTINUED | OUTPATIENT
Start: 2025-03-27 | End: 2025-04-09 | Stop reason: HOSPADM

## 2025-03-27 RX ORDER — CARBIDOPA/LEVODOPA 25MG-250MG
2 TABLET ORAL 4 TIMES DAILY
Status: DISCONTINUED | OUTPATIENT
Start: 2025-03-27 | End: 2025-03-28

## 2025-03-27 RX ORDER — OMEPRAZOLE 20 MG/1
20 CAPSULE, DELAYED RELEASE ORAL DAILY
Status: DISCONTINUED | OUTPATIENT
Start: 2025-03-27 | End: 2025-03-28

## 2025-03-27 RX ORDER — METOPROLOL SUCCINATE 50 MG/1
50 TABLET, EXTENDED RELEASE ORAL DAILY
Status: DISCONTINUED | OUTPATIENT
Start: 2025-03-27 | End: 2025-04-01

## 2025-03-27 RX ORDER — ASPIRIN 81 MG/1
81 TABLET ORAL DAILY
Status: DISCONTINUED | OUTPATIENT
Start: 2025-03-27 | End: 2025-03-28

## 2025-03-27 RX ORDER — ATORVASTATIN CALCIUM 40 MG/1
80 TABLET, FILM COATED ORAL EVERY EVENING
Status: DISCONTINUED | OUTPATIENT
Start: 2025-03-27 | End: 2025-03-30

## 2025-03-27 RX ORDER — ACETAMINOPHEN 325 MG/1
650 TABLET ORAL EVERY 6 HOURS PRN
Status: DISCONTINUED | OUTPATIENT
Start: 2025-03-27 | End: 2025-03-28

## 2025-03-27 RX ORDER — SODIUM BICARBONATE 650 MG/1
1300 TABLET ORAL 3 TIMES DAILY
Status: DISCONTINUED | OUTPATIENT
Start: 2025-03-27 | End: 2025-03-28

## 2025-03-27 RX ORDER — MORPHINE SULFATE 4 MG/ML
1-2 INJECTION INTRAVENOUS EVERY 4 HOURS PRN
Status: DISCONTINUED | OUTPATIENT
Start: 2025-03-27 | End: 2025-03-28

## 2025-03-27 RX ORDER — AZITHROMYCIN 250 MG/1
500 TABLET, FILM COATED ORAL DAILY
Status: DISCONTINUED | OUTPATIENT
Start: 2025-03-28 | End: 2025-03-28

## 2025-03-27 RX ORDER — SODIUM CHLORIDE 9 MG/ML
INJECTION, SOLUTION INTRAVENOUS CONTINUOUS
Status: DISCONTINUED | OUTPATIENT
Start: 2025-03-27 | End: 2025-03-28

## 2025-03-27 RX ORDER — MIDODRINE HYDROCHLORIDE 5 MG/1
5 TABLET ORAL
Status: DISCONTINUED | OUTPATIENT
Start: 2025-03-27 | End: 2025-03-28

## 2025-03-27 RX ORDER — OXYCODONE HYDROCHLORIDE 5 MG/1
5 TABLET ORAL ONCE
Refills: 0 | Status: COMPLETED | OUTPATIENT
Start: 2025-03-27 | End: 2025-03-27

## 2025-03-27 RX ORDER — AZITHROMYCIN 500 MG/5ML
500 INJECTION, POWDER, LYOPHILIZED, FOR SOLUTION INTRAVENOUS EVERY 24 HOURS
Status: DISCONTINUED | OUTPATIENT
Start: 2025-03-27 | End: 2025-03-27

## 2025-03-27 RX ORDER — ROPINIROLE 2 MG/1
4 TABLET, FILM COATED ORAL 3 TIMES DAILY
Status: DISCONTINUED | OUTPATIENT
Start: 2025-03-27 | End: 2025-03-28

## 2025-03-27 RX ORDER — OXYBUTYNIN CHLORIDE 5 MG/1
5 TABLET ORAL 2 TIMES DAILY
Status: DISCONTINUED | OUTPATIENT
Start: 2025-03-27 | End: 2025-03-28

## 2025-03-27 RX ORDER — ASPIRIN 325 MG
2 TABLET ORAL DAILY
Status: ON HOLD | COMMUNITY
End: 2025-04-09

## 2025-03-27 RX ORDER — AMANTADINE HYDROCHLORIDE 100 MG/1
100 TABLET ORAL 2 TIMES DAILY
Status: DISCONTINUED | OUTPATIENT
Start: 2025-03-27 | End: 2025-03-28

## 2025-03-27 RX ORDER — OXYBUTYNIN CHLORIDE 5 MG/1
5 TABLET ORAL 2 TIMES DAILY
Status: ON HOLD | COMMUNITY
End: 2025-04-09

## 2025-03-27 RX ORDER — TRAMADOL HYDROCHLORIDE 50 MG/1
50 TABLET ORAL EVERY 6 HOURS PRN
Status: DISCONTINUED | OUTPATIENT
Start: 2025-03-27 | End: 2025-03-28

## 2025-03-27 RX ORDER — SODIUM CHLORIDE, SODIUM LACTATE, POTASSIUM CHLORIDE, CALCIUM CHLORIDE 600; 310; 30; 20 MG/100ML; MG/100ML; MG/100ML; MG/100ML
INJECTION, SOLUTION INTRAVENOUS CONTINUOUS
Status: ACTIVE | OUTPATIENT
Start: 2025-03-27 | End: 2025-03-27

## 2025-03-27 RX ADMIN — AZITHROMYCIN DIHYDRATE 500 MG: 500 INJECTION, POWDER, LYOPHILIZED, FOR SOLUTION INTRAVENOUS at 00:15

## 2025-03-27 RX ADMIN — ROPINIROLE HYDROCHLORIDE 4 MG: 2 TABLET, FILM COATED ORAL at 08:31

## 2025-03-27 RX ADMIN — METOPROLOL SUCCINATE 50 MG: 25 TABLET, EXTENDED RELEASE ORAL at 05:35

## 2025-03-27 RX ADMIN — ROPINIROLE HYDROCHLORIDE 4 MG: 2 TABLET, FILM COATED ORAL at 12:00

## 2025-03-27 RX ADMIN — SODIUM CHLORIDE, POTASSIUM CHLORIDE, SODIUM LACTATE AND CALCIUM CHLORIDE: 600; 310; 30; 20 INJECTION, SOLUTION INTRAVENOUS at 09:41

## 2025-03-27 RX ADMIN — CARBIDOPA AND LEVODOPA 2 TABLET: 25; 250 TABLET ORAL at 10:02

## 2025-03-27 RX ADMIN — MIDODRINE HYDROCHLORIDE 5 MG: 5 TABLET ORAL at 10:02

## 2025-03-27 RX ADMIN — MORPHINE SULFATE 2 MG: 4 INJECTION, SOLUTION INTRAMUSCULAR; INTRAVENOUS at 16:30

## 2025-03-27 RX ADMIN — OXYBUTYNIN CHLORIDE 5 MG: 5 TABLET ORAL at 18:20

## 2025-03-27 RX ADMIN — AMPICILLIN AND SULBACTAM 3 G: 1; 2 INJECTION, POWDER, FOR SOLUTION INTRAMUSCULAR; INTRAVENOUS at 17:23

## 2025-03-27 RX ADMIN — SODIUM BICARBONATE 1300 MG: 650 TABLET ORAL at 16:31

## 2025-03-27 RX ADMIN — AMANTADINE HYDROCHLORIDE 100 MG: 100 TABLET ORAL at 18:21

## 2025-03-27 RX ADMIN — ENOXAPARIN SODIUM 40 MG: 100 INJECTION SUBCUTANEOUS at 18:19

## 2025-03-27 RX ADMIN — AMPICILLIN AND SULBACTAM 3 G: 1; 2 INJECTION, POWDER, FOR SOLUTION INTRAMUSCULAR; INTRAVENOUS at 05:31

## 2025-03-27 RX ADMIN — SODIUM BICARBONATE 1300 MG: 650 TABLET ORAL at 20:03

## 2025-03-27 RX ADMIN — FENTANYL CITRATE 50 MCG: 50 INJECTION, SOLUTION INTRAMUSCULAR; INTRAVENOUS at 01:32

## 2025-03-27 RX ADMIN — AMPICILLIN AND SULBACTAM 3 G: 1; 2 INJECTION, POWDER, FOR SOLUTION INTRAMUSCULAR; INTRAVENOUS at 11:49

## 2025-03-27 RX ADMIN — ATORVASTATIN CALCIUM 80 MG: 40 TABLET, FILM COATED ORAL at 18:19

## 2025-03-27 RX ADMIN — OXYCODONE HYDROCHLORIDE 5 MG: 5 TABLET ORAL at 08:13

## 2025-03-27 RX ADMIN — ASPIRIN 81 MG: 81 TABLET, COATED ORAL at 05:35

## 2025-03-27 RX ADMIN — CARBIDOPA AND LEVODOPA 2 TABLET: 25; 250 TABLET ORAL at 16:28

## 2025-03-27 RX ADMIN — CARBIDOPA AND LEVODOPA 2 TABLET: 25; 250 TABLET ORAL at 05:35

## 2025-03-27 RX ADMIN — GABAPENTIN 600 MG: 300 CAPSULE ORAL at 18:20

## 2025-03-27 RX ADMIN — MIDODRINE HYDROCHLORIDE 5 MG: 5 TABLET ORAL at 16:31

## 2025-03-27 RX ADMIN — AMANTADINE HYDROCHLORIDE 100 MG: 100 TABLET ORAL at 10:02

## 2025-03-27 RX ADMIN — SODIUM CHLORIDE: 9 INJECTION, SOLUTION INTRAVENOUS at 05:25

## 2025-03-27 RX ADMIN — ROPINIROLE HYDROCHLORIDE 4 MG: 2 TABLET, FILM COATED ORAL at 18:21

## 2025-03-27 RX ADMIN — OMEPRAZOLE 20 MG: 20 CAPSULE, DELAYED RELEASE ORAL at 05:35

## 2025-03-27 ASSESSMENT — SOCIAL DETERMINANTS OF HEALTH (SDOH)
WITHIN THE PAST 12 MONTHS, THE FOOD YOU BOUGHT JUST DIDN'T LAST AND YOU DIDN'T HAVE MONEY TO GET MORE: NEVER TRUE
WITHIN THE PAST 12 MONTHS, YOU WORRIED THAT YOUR FOOD WOULD RUN OUT BEFORE YOU GOT THE MONEY TO BUY MORE: NEVER TRUE
WITHIN THE LAST YEAR, HAVE YOU BEEN AFRAID OF YOUR PARTNER OR EX-PARTNER?: NO
WITHIN THE LAST YEAR, HAVE YOU BEEN AFRAID OF YOUR PARTNER OR EX-PARTNER?: NO
WITHIN THE LAST YEAR, HAVE TO BEEN RAPED OR FORCED TO HAVE ANY KIND OF SEXUAL ACTIVITY BY YOUR PARTNER OR EX-PARTNER?: NO
WITHIN THE LAST YEAR, HAVE YOU BEEN HUMILIATED OR EMOTIONALLY ABUSED IN OTHER WAYS BY YOUR PARTNER OR EX-PARTNER?: NO
WITHIN THE LAST YEAR, HAVE TO BEEN RAPED OR FORCED TO HAVE ANY KIND OF SEXUAL ACTIVITY BY YOUR PARTNER OR EX-PARTNER?: NO
WITHIN THE LAST YEAR, HAVE YOU BEEN HUMILIATED OR EMOTIONALLY ABUSED IN OTHER WAYS BY YOUR PARTNER OR EX-PARTNER?: NO
WITHIN THE LAST YEAR, HAVE YOU BEEN KICKED, HIT, SLAPPED, OR OTHERWISE PHYSICALLY HURT BY YOUR PARTNER OR EX-PARTNER?: NO
WITHIN THE LAST YEAR, HAVE YOU BEEN KICKED, HIT, SLAPPED, OR OTHERWISE PHYSICALLY HURT BY YOUR PARTNER OR EX-PARTNER?: NO
IN THE PAST 12 MONTHS, HAS THE ELECTRIC, GAS, OIL, OR WATER COMPANY THREATENED TO SHUT OFF SERVICE IN YOUR HOME?: NO

## 2025-03-27 ASSESSMENT — PAIN DESCRIPTION - PAIN TYPE
TYPE: ACUTE PAIN

## 2025-03-27 ASSESSMENT — LIFESTYLE VARIABLES
HOW MANY TIMES IN THE PAST YEAR HAVE YOU HAD 5 OR MORE DRINKS IN A DAY: 0
AVERAGE NUMBER OF DAYS PER WEEK YOU HAVE A DRINK CONTAINING ALCOHOL: 0
HAVE PEOPLE ANNOYED YOU BY CRITICIZING YOUR DRINKING: NO
CONSUMPTION TOTAL: NEGATIVE
EVER FELT BAD OR GUILTY ABOUT YOUR DRINKING: NO
TOTAL SCORE: 0
EVER HAD A DRINK FIRST THING IN THE MORNING TO STEADY YOUR NERVES TO GET RID OF A HANGOVER: NO
ALCOHOL_USE: NO
DOES PATIENT WANT TO STOP DRINKING: NO
HAVE YOU EVER FELT YOU SHOULD CUT DOWN ON YOUR DRINKING: NO
TOTAL SCORE: 0
ON A TYPICAL DAY WHEN YOU DRINK ALCOHOL HOW MANY DRINKS DO YOU HAVE: 0
TOTAL SCORE: 0

## 2025-03-27 ASSESSMENT — FIBROSIS 4 INDEX: FIB4 SCORE: 1.94

## 2025-03-27 ASSESSMENT — ENCOUNTER SYMPTOMS: ABDOMINAL PAIN: 1

## 2025-03-27 NOTE — CARE PLAN
The patient is Watcher - Medium risk of patient condition declining or worsening    Shift Goals  Clinical Goals: PT will report a pin of <4  Patient Goals: Pt would like to rest  Family Goals: Pt to get some rest and be able to go hyome.    Progress made toward(s) clinical / shift goals:  Pt has maintained an O2 of 91% on 2.5 L NC. Pt has been able to sleep without any complaints. He bed alarm is on and call light within reach.     Patient is not progressing towards the following goals:

## 2025-03-27 NOTE — ED PROVIDER NOTES
ED Provider Note    CHIEF COMPLAINT  Chief Complaint   Patient presents with    Abdominal Pain     Intermittent RUQ pain x1 week. -N/V, +fever at home but no longer present following acetaminophen administration at home, PMH: parkinsons, patient non-verbal, wife (POA) at bedside. Patient has been coughing x1 week with clear mucous.     EXTERNAL RECORDS REVIEWED  Outpatient note from 3/20/2025 for the patient was seen by primary care doctor for issues of chronic debilitation stemming from his Parkinson's.  He is nonverbal, POA is his wife who is at the bedside.  He has had issues with mixed incontinence, difficulty swallowing solids.  He is established with Dr. Matos of neurology is currently on Sinemet    HPI/ROS  LIMITATION TO HISTORY   Select: Significant debilitation and limited verbal exchange secondary to chronic Parkinson's  OUTSIDE HISTORIAN(S):  Family at bedside    Jeremiah Huber is a 74 y.o. male who presents to the emergency room for evaluation of concerns regarding a fever and persistent cough.  Family members note that there has been some issues recently with some frequent urination after he was placed on a parkinsonian medication and following the cessation of this medication they have been using his normal regimens of Neurontin and respiratory medications to improve his secretions.  He does have issues swallowing/dysmotility and they had noticed some slight worsening in his cough though this is usual for him.  With the development of some pain in the chest wall and fever tonight they had concerns about infection and brought him in for further assessment.  Had received a gram of Tylenol prior to arrival.  On initial assessment he is tachycardic, he can speak in very slow and measured sentences, no hypoxia or tachypnea.  There is no prior history of PE or DVT.    PAST MEDICAL HISTORY   has a past medical history of Arthritis, Back spasm, Blood clotting disorder (HCC) (06/2020), GOUT, Gout,  Hypertension, Myocardial infarct (HCC) (2020), Parkinson disease (HCC), and Stroke (HCC) (2020).    SURGICAL HISTORY   has a past surgical history that includes shoulder surgery; tonsillectomy; knee arthroscopy (Right); knee replacement, total (Bilateral); and neuro dest facet l/s w/ig sngl (Right, 2020).    FAMILY HISTORY  Family History   Problem Relation Age of Onset    Heart Disease Mother     Heart Disease Father     Cancer Father         prostate cancer    Arthritis Brother        SOCIAL HISTORY  Social History     Tobacco Use    Smoking status: Former     Current packs/day: 0.00     Types: Cigarettes     Quit date: 1970     Years since quittin.2    Smokeless tobacco: Never   Vaping Use    Vaping status: Never Used   Substance and Sexual Activity    Alcohol use: Yes     Alcohol/week: 4.2 oz     Types: 7 Glasses of wine per week    Drug use: No    Sexual activity: Yes     Partners: Female       CURRENT MEDICATIONS  Home Medications       Reviewed by Martin Mckinney R.N. (Registered Nurse) on 25 at 2129  Med List Status: Partial     Medication Last Dose Status   ALPRAZolam (XANAX) 0.25 MG Tab  Active   amantadine (SYMMETREL) 100 MG Tab  Active   aspirin 81 MG EC tablet  Active   atorvastatin (LIPITOR) 80 MG tablet  Active   carbidopa-levodopa (SINEMET)  MG Tab  Active   cetirizine (ZYRTEC) 10 MG Tab  Active   Dextromethorphan-guaiFENesin (MUCUS DM PO)  Active   gabapentin (NEURONTIN) 300 MG Cap  Active   glycopyrrolate (ROBINUL) 1 MG Tab  Active   IncobotulinumtoxinA (Xeomin) injection 50 Units  Active   ipratropium (ATROVENT) 0.03 % Solution  Active   meloxicam (MOBIC) 15 MG tablet  Active   methylphenidate (RITALIN) 5 MG Tab  Active   metoprolol SR (TOPROL XL) 50 MG TABLET SR 24 HR  Active   omeprazole (PRILOSEC) 20 MG delayed-release capsule  Active   ROPINIRole (REQUIP) 4 MG tablet  Active   sildenafil citrate (VIAGRA) 50 MG tablet  Active   triamcinolone acetonide (KENALOG)  "0.1 % Cream  Active                  Audit from Redirected Encounters    **Home medications have not yet been reviewed for this encounter**       ALLERGIES  No Known Allergies    PHYSICAL EXAM  VITAL SIGNS: /78   Pulse (!) 105   Temp 36.8 °C (98.3 °F) (Temporal)   Resp 16   Ht 1.854 m (6' 1\")   Wt 86.2 kg (190 lb)   SpO2 92%   BMI 25.07 kg/m²    Genl: Chronically ill appearing M sitting in gurney uncomfortably, speaking clearly, appears in mild distress   Head: NC/AT   ENT: Mucous membranes slightly dry, posterior pharynx clear, uvula midline, nares patent bilaterally   Eyes: Normal sclera, pupils equal round reactive to light  Neck: Supple, FROM, no LAD appreciated   Pulmonary: Lungs are with some diminished lung sounds in the right lower lung field.  No true wheezes.  Chest: right lower mid anterior chest wall pain.  CV: Tachycardia, no murmur appreciated, pulses 2+ in both upper and lower extremities,  Abdomen: soft, subtle grimacing with epigastric palpation.  No true Thompson sign, no McBurney's point tenderness.  No gross distention.  no rebound/guarding, no masses palpated, no HSM   : Normal external male genitalia.  Mild grimacing with palpation of the suprapubic space.  No CVA tenderness.    Musculoskeletal: Strict and movement secondary to parkinsonian symptoms.  He is showing movement in all extremities.  Neuro: Limited secondary to his Parkinson's features.  Appears alert and oriented,, nonambulatory, no obvious focal neurological deficits.    Skin: No rash or lesions.  No pallor or jaundice.  No cyanosis.  Warm and dry.     EKG/LABS  Labs Reviewed   CBC WITH DIFFERENTIAL - Abnormal; Notable for the following components:       Result Value    WBC 17.7 (*)     Neutrophils-Polys 92.00 (*)     Lymphocytes 3.10 (*)     Neutrophils (Absolute) 16.33 (*)     Lymphs (Absolute) 0.55 (*)     All other components within normal limits   URINALYSIS - Abnormal; Notable for the following components:    " Ketones Trace (*)     All other components within normal limits   COMP METABOLIC PANEL - Abnormal; Notable for the following components:    Sodium 133 (*)     Co2 16 (*)     Calcium 7.1 (*)     Correct Calcium 8.2 (*)     Albumin 2.6 (*)     Total Protein 5.4 (*)     All other components within normal limits   PROCALCITONIN - Abnormal; Notable for the following components:    Procalcitonin 0.51 (*)     All other components within normal limits   LACTIC ACID   LIPASE   ESTIMATED GFR   TROPONIN   URINE CULTURE(NEW)   BLOOD CULTURE   BLOOD CULTURE   POCT COV-2, FLU A/B, RSV BY PCR   POC COV-2, FLU A/B, RSV BY PCR     Results for orders placed or performed during the hospital encounter of 25   EKG   Result Value Ref Range    Report       [facility] Emergency Dept.    Test Date:  2025  Pt Name:    GRIFFIN NEGRON                 Department: ER  MRN:        [id number]                      Room:  Gender:     Male                         Technician: [id number]  :        1950                   Requested By:ER TRIAGE PROTOCOL  Order #:    [id number]                    Reading MD: Bean Rubin MD    Measurements  Intervals                                Axis  Rate:       113                          P:          26  CO:         227                          QRS:        -15  QRSD:       76                           T:          119  QT:         319  QTc:        438    Interpretive Statements  Sinus tachycardia, rate of 113, CO prolongation, normal qtc, normal axis,  Nonspecific lateral ST abnormalities. low voltage compared to prior dated  24, where motion artifact was significant      Electronically Signed On 2025 22:42:18 PDT by Bean Rubin MD       *Note: Due to a large number of results and/or encounters for the requested time period, some results have not been displayed. A complete set of results can be found in Results Review.   I have independently interpreted this  EKG    RADIOLOGY/PROCEDURES   I have independently interpreted the diagnostic imaging associated with this visit and am waiting the final reading from the radiologist.   My preliminary interpretation is as follows: Likely right middle lobe lower lobe pneumonia    Radiologist interpretation:  DX-CHEST-PORTABLE (1 VIEW)   Final Result      Hypoinflation with bibasilar atelectasis.  Superimposed pneumonia is difficult to exclude.        COURSE & MEDICAL DECISION MAKING    ASSESSMENT, COURSE AND PLAN  Care Narrative: Patient presents emergency room for symptoms as described above.  The patient has substantial debilitation with his Parkinson's, would be an aspiration risk and clinically has some decreased lung sounds in the right side with development of tachycardia and fever at home.  Was brought in for this at this time his mental status appears to be stable though he is not as active as he is at his baseline further adding to the family's concerns.  He was febrile however with antipyretics he is responsive and thankfully shows no hemodynamic instability.  Initial blood work was obtained and he had a white count of 17.7 with a leftward shift.  There is a chest x-ray showing likely right lower lobe pneumonia or atelectasis or aspiration event could be likely.  With the setting of his vital sign abnormalities and leukocytosis we will start with empiric antibiosis for presumed pneumonia and blood cultures were obtained.  Awaiting the results of urinalysis and with concomitant workup.  Patient does not have significant mental status changes that would point toward central nervous system infection, no nuchal rigidity or back discomfort.    Calcitonin was elevated, viral panel was negative, urinalysis did not show evidence of acute infection.  This further points towards a likelihood of pneumonia with his debilitation and aspiration risk patient will likely benefit from admission.  Empiric antibiosis have been given with my  concerns earlier stated, treated with Unasyn and azithromycin for presumed community-acquired pneumonia.  Serial examination show patient is doing well.  Being treated for sepsis with no evidence of septic shock or further endorgan dysfunction.  Following fluids and antibiosis family members had noted some overall improvement in his demeanor, still required some intermittent pain medications.  Hospitalist was paged.    Admitted to the hospitalist in guarded condition.    Hydration: Based on the patient's presentation of Eldery ALOC, Sepsis, and Tachycardia the patient was given IV fluids. IV Hydration was used because oral hydration was not as rapid as required. Upon recheck following hydration, the patient was improving.  Sepsis: Infection was suspected 2230 (Time). Sepsis pathway was initiated. Fluids not needed (no hypotension or lactate greater than or equal to 4). Antibiotics were given per protocol.      FINAL DIAGNOSIS  1. Sepsis, due to unspecified organism, unspecified whether acute organ dysfunction present (HCC)    2. Pneumonia of right lower lobe due to infectious organism    3. Parkinson's disease with dyskinesia, unspecified whether manifestations fluctuate (HCC)      Electronically signed by: Scottie Del Angel M.D., 3/26/2025 10:21 PM

## 2025-03-27 NOTE — ED NOTES
Pt transferred out of ED at this time with transport tech. Pt is A&Ox4, with stable vital signs and no apparent distress upon transfer.  All paperwork and personal belongings sent with pt.   Report given to transporter.    Pt Transferred on 2L O2 with adequate O2 volume in bed tank.

## 2025-03-27 NOTE — ASSESSMENT & PLAN NOTE
Patient is nonverbal . Patient wife who is poa and nursing staff    were present for the conversation.  I discussed advance care planning face to face with the patient and family for at least 18 minutes, including diagnosis, prognosis, plan of care, risks and benefits of any therapies that could be offered, as well as alternatives including palliation    Code status changed to dnar/dni    3/28/2025:  Cose status changed to DNAR, I ok , thus was transferred to ICU

## 2025-03-27 NOTE — DISCHARGE PLANNING
Thank you for sending [facility] this referral.  It appears that this patient requires Home Infusion, as such we require the [facility] orders as well as information regarding the time of the last dose given, and the discharge date.  This referral will be placed on hold until receipt of the above mentioned information. Thank you!

## 2025-03-27 NOTE — ASSESSMENT & PLAN NOTE
SIRS criteria identified on my evaluation include:  Tachycardia, with heart rate greater than 90 BPM and Leukocytosis, with WBC greater than 12,000  Secondary to pneumonia, no signs of endorgan damage at this time

## 2025-03-27 NOTE — PROGRESS NOTES
Hospital Medicine Daily Progress Note    Date of Service  3/27/2025    Chief Complaint  Jeremiah Huber is a 74 y.o. male admitted 3/26/2025 with   Chief Complaint   Patient presents with    Abdominal Pain     Intermittent RUQ pain x1 week. -N/V, +fever at home but no longer present following acetaminophen administration at home, PMH: parkinsons, patient non-verbal, wife (POA) at bedside. Patient has been coughing x1 week with clear mucous.         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    Presents in ER, patient noted to have WBC of 17.7, AST/ALT normal, lipase 19, procalcitonin 0.5.  Blood culture x 2 was ordered, patient was started on antibiotics for aspiration pneumonia including Unasyn and azithromycin.  Ultrasound of the abdomen showed a stool density in the gallbladder, no evidence of acute cholecystitis CBD obstruction.    Echocardiogram showing EF of 60%, akinesis of the apex    Patient continues to be  -- Patient nonverbal, patient wife is noted to be at bedside  -- Patient has been reviewed, patient blood pressure is noted to be on the low side, will provide IV fluid, will repeat blood pressure after IV fluid completion.  Patient is saturating well on 2 L of oxygen, denies any dizziness  -- Repeat blood work ordered,  -- Blood cultures x 2 ordered, pending, urine culture pending.  --Patient will continue Unasyn and azithromycin at this time.  --Patient continued to complain of the pain in the right upper quadrant, HIDA scan has been ordered.      I discussed the CODE STATUS with the patient family, correlating to DNR/DNI.    I have discussed this patient's plan of care and discharge plan at IDT rounds today with Case Management, Nursing, Nursing  leadership, and other members of the IDT team.    Consultants/Specialty  none    Code Status  DNAR/DNI    Disposition  The patient is not medically cleared for discharge to home or a post-acute facility.  Anticipate discharge to: home with organized home healthcare and close outpatient follow-up    I have placed the appropriate orders for post-discharge needs.    Review of Systems  Review of Systems   Gastrointestinal:  Positive for abdominal pain.      Nonverbab     Physical Exam  Temp:  [36.6 °C (97.9 °F)-37.6 °C (99.7 °F)] 37.1 °C (98.8 °F)  Pulse:  [] 96  Resp:  [16-18] 16  BP: ()/(57-78) 84/57  SpO2:  [88 %-93 %] 92 %    Physical Exam  Vitals and nursing note reviewed.   HENT:      Head: Normocephalic and atraumatic.   Eyes:      Extraocular Movements: Extraocular movements intact.   Cardiovascular:      Rate and Rhythm: Normal rate.   Pulmonary:      Breath sounds: Rales (on the right side) present.   Abdominal:      General: Bowel sounds are normal.      Palpations: Abdomen is soft.      Tenderness: There is abdominal tenderness.   Musculoskeletal:      Cervical back: Neck supple.      Right lower leg: No edema.      Left lower leg: No edema.   Skin:     General: Skin is warm.   Neurological:      Mental Status: He is alert and oriented to person, place, and time.      Cranial Nerves: No cranial nerve deficit.         Fluids    Intake/Output Summary (Last 24 hours) at 3/27/2025 1330  Last data filed at 3/27/2025 0142  Gross per 24 hour   Intake 350 ml   Output --   Net 350 ml        Laboratory  Recent Labs     03/26/25  2224   WBC 17.7*   RBC 5.49   HEMOGLOBIN 16.1   HEMATOCRIT 47.7   MCV 86.9   MCH 29.3   MCHC 33.8   RDW 40.0   PLATELETCT 324   MPV 9.5     Recent Labs     03/26/25  2335 03/27/25  0101   SODIUM 133* 135   POTASSIUM 4.2 3.7   CHLORIDE 105 109   CO2 16* 13*   GLUCOSE 88 140*   BUN 17 16   CREATININE 0.71 0.57   CALCIUM 7.1* 6.5*                   Imaging  US-RUQ   Final Result       1.  Stones and sludge in the gallbladder.   2.  No evidence for acute cholecystitis or biliary obstruction.   3.  Multiple liver cysts again seen.   4.  Limited evaluation of pancreas and abdominal aorta.      DX-CHEST-PORTABLE (1 VIEW)   Final Result      Hypoinflation with bibasilar atelectasis.  Superimposed pneumonia is difficult to exclude.      NM-BILIARY (HIDA) SCAN WITH CCK    (Results Pending)        Assessment/Plan  * Pneumonia due to infectious organism- (present on admission)  Assessment & Plan  Presenting with right upper quadrant pain, productive cough with clear sputum and fever at home  CXR reviewed, opacity on the right.  History of dysphagia due to Parkinson's, suspect aspiration pneumonia  Started on IV antibiotics    Advance care planning  Assessment & Plan  Patient is nonverbal . Patient wife who is poa and nursing staff    were present for the conversation.  I discussed advance care planning face to face with the patient and family for at least 18 minutes, including diagnosis, prognosis, plan of care, risks and benefits of any therapies that could be offered, as well as alternatives including palliation    Code status changed to dnar/dni    Right upper quadrant abdominal pain- (present on admission)  Assessment & Plan  LFTs unremarkable,ush showing gallstone   Hida ordered    SIRS (systemic inflammatory response syndrome) (HCC)- (present on admission)  Assessment & Plan  SIRS criteria identified on my evaluation include:  Tachycardia, with heart rate greater than 90 BPM and Leukocytosis, with WBC greater than 12,000  Secondary to pneumonia, no signs of endorgan damage at this time    History of stroke- (present on admission)  Assessment & Plan  Continue home aspirin, atorvastatin    Parkinson disease (HCC)- (present on admission)  Assessment & Plan  Continue carbidopa-levodopa  Nonverbal at baseline, uses a wheelchair but able to stand to urinate on his own and does not use a  catheter  Baseline dysphagia especially with pills and follows with speech therapy as outpatient    Restless leg syndrome- (present on admission)  Assessment & Plan  Continue ropinirole         VTE prophylaxis:lovenox

## 2025-03-27 NOTE — ED TRIAGE NOTES
Chief Complaint   Patient presents with    Abdominal Pain     Intermittent RUQ pain x1 week. -N/V, +fever at home but no longer present following acetaminophen administration at home, PMH: parkinsons, patient non-verbal, wife (POA) at bedside. Patient has been coughing x1 week with clear mucous.     Patient to triage via wheelchair with POA. Family states pain is 8/10 RUQ. Pt presents with expiratory grunting.

## 2025-03-27 NOTE — DISCHARGE PLANNING
ATTN: Case Management  RE: Referral for Home Health    As of 3/27, we have accepted the Home Health referral for the patient listed above.    [facility]  will contact the patient within 48 hours. If you have any questions or concerns regarding the patient’s transition to Home Health, please do not hesitate to contact us at [phone].      We look forward to collaborating with you,  [facility] Team

## 2025-03-27 NOTE — CARE PLAN
The patient is Stable - Low risk of patient condition declining or worsening    Shift Goals  Clinical Goals: Patient will achieve pain of <5 by end of shift, maintain SpO2 >95%  Patient Goals: Rest  Family Goals: Rest, comfort, prompt discharge.    Axo4. Pt soft spoken but is able to communicate. VS WNL. Afebrile. Due medications given as ordered. IV fluids running. Pain managed by medication as per MAR. Bed low and locked. Bed alarm on. Hourly rounds done. Q2 turns with pillows PRANAV mattress done. Left call light within reach. Needs met at this time.     Progress made toward(s) clinical / shift goals:      Problem: Pain - Standard  Goal: Alleviation of pain or a reduction in pain to the patient’s comfort goal  Description: Target End Date:  Prior to discharge or change in level of careDocument on Vitals flowsheet1.  Document pain using the appropriate pain scale per order or unit policy2.  Educate and implement non-pharmacologic comfort measures (i.e. relaxation, distraction, massage, cold/heat therapy, etc.)3.  Pain management medications as ordered4.  Reassess pain after pain med administration per policy5.  If opiods administered assess patient's response to pain medication is appropriate per POSS sedation scale6.  Follow pain management plan developed in collaboration with patient and interdisciplinary team (including palliative care or pain specialists if applicable)  Outcome: Progressing     Problem: Knowledge Deficit - Standard  Goal: Patient and family/care givers will demonstrate understanding of plan of care, disease process/condition, diagnostic tests and medications  Description: Target End Date:  1-3 days or as soon as patient condition allowsDocument in Patient Education1.  Patient and family/caregiver oriented to unit, equipment, visitation policy and means for communicating concern2.  Complete/review Learning Assessment3.  Assess knowledge level of disease process/condition, treatment plan,  diagnostic tests and medications4.  Explain disease process/condition, treatment plan, diagnostic tests and medications  Outcome: Progressing     Problem: Skin Integrity  Goal: Skin integrity is maintained or improved  Description: Target End Date:  Prior to discharge or change in level of careDocument interventions on Skin Risk/Cuong flowsheet groups and corresponding LDA1.  Assess and monitor skin integrity, appearance and/or temperature2.  Assess risk factors for impaired skin integrity and/or pressures ulcers3.  Implement precautions to protect skin integrity in collaboration with interdisciplinary team4.  Implement pressure ulcer prevention protocol if at risk for skin breakdown5.  Confirm wound care consult if at risk for skin breakdown6.  Ensure patient use of pressure relieving devices  (Low air loss bed, waffle overlay, heel protectors, ROHO cushion, etc)  Outcome: Progressing  Note: Q2 turns with pillows, barrier cream, PRANAV mattress in place.     Problem: Fall Risk  Goal: Patient will remain free from falls  Description: Target End Date:  Prior to discharge or change in level of careDocument interventions on the Emily Lamar Fall Risk Assessment1.  Assess for fall risk factors2.  Implement fall precautions  Outcome: Progressing

## 2025-03-27 NOTE — DISCHARGE PLANNING
"Case Management Discharge Planning    Admission Date: 3/26/2025  GMLOS: 2.5  ALOS: 0    6-Clicks ADL Score:    6-Clicks Mobility Score:        Anticipated Discharge Dispo: Discharge Disposition: D/T to home under HHA care in anticipation of covered skilled care (06)    DME Needed: No    Action(s) Taken: DC Assessment Complete (See below), Choice obtained, and Referral(s) sent    RNCM met with patient and spouse at bedside to obtain information and discuss DCP    Patient is non-verbal and spouse DPOA    HH referral noted in EPIC; RNCM obtained HH choice; choice form faxed to DPA and referral sent per choice form    Escalations Completed: None    Medically Clear: No    Next Steps: CM to follow up with IDT regarding DCP needs/barriers    Barriers to Discharge: Medical clearance    Is the patient up for discharge tomorrow: No    Care Transition Team Assessment    Case management role discussed; understanding of case management role verbalized   Demographics on facesheet verified  Please see H&P for pertinent PMH and reason for admission  Patient's PCP is: An Perez  Patient's preferred pharmacy is: [facility] on [address]  Housing: Patient lives in single story home with his spouse and adult daughter, no VERN  IADLS/ADLS: Needs assistance with most IADLS/ALDS; spouse and daughter able to provide support  Mental Health Concerns: Denies  Substance Abuse: Denies  Monthly Income/employment status: Question not asked/patient is not employed  Financial status: Denies financial concerns  DME: WC bound at baseline, shower chair, shower bars, and FWW at home for use if needed  O2: Spouse states patient was on home O2 \"intermittently\" ([employer] through Dr Perez's Office); does not recall baseline liters patient was prescribed for   Prior services: Home with family support  Discharge support: Spouse, daughter  Transportation: Denies needing assistance  Discharge planning: Home with HH      Information Source  Orientation Level: " Oriented X4  Information Given By: Patient  Who is responsible for making decisions for patient? : Spouse  Name(s) of Primary Decision Maker: Nolvia Hong    Readmission Evaluation  Is this a readmission?: No    Elopement Risk  Legal Hold: No  Ambulatory or Self Mobile in Wheelchair: No-Not an Elopement Risk  Elopement Risk: Not at Risk for Elopement    Interdisciplinary Discharge Planning  Does Admitting Nurse Feel This Could be a Complex Discharge?: No  Primary Care Physician: An Perez  Lives with - Patient's Self Care Capacity: Spouse, Adult Children  Patient or legal guardian wants to designate a caregiver: Yes  Caregiver name: Nolvia Hong  Caregiver contact info: [phone]  Support Systems: Children, Spouse / Significant Other  Housing / Facility: [address]  Do You Take your Prescribed Medications Regularly: Yes  Able to Return to Previous ADL's: Yes  Mobility Issues: Yes  Prior Services: Continuous (24 Hour) Care Giving Family  Assistance Needed: Yes    Discharge Preparedness  What is your plan after discharge?: Home health care  What are your discharge supports?: Child, Spouse  Prior Functional Level: Needs Assist with Activities of Daily Living, Needs Assist with Medication Management, Uses Wheelchair  Difficulity with ADLs: Walking, Bathing, Eating, Toileting  Difficulity with IADLs: Cooking, Driving, Laundry, Shopping    Functional Assesment  Prior Functional Level: Needs Assist with Activities of Daily Living, Needs Assist with Medication Management, Uses Wheelchair    Finances  Financial Barriers to Discharge: No  Prescription Coverage: Yes    Vision / Hearing Impairment  Vision Impairment : Yes  Right Eye Vision: Impaired, Wears Glasses  Left Eye Vision: Impaired, Wears Glasses  Hearing Impairment : No    Values / Beliefs / Concerns  Values / Beliefs Concerns : No    Advance Directive  Advance Directive?: DPOA for Health Care  Durable Power of  Name and Contact : Nolvia  Hal    Domestic Abuse  Have you ever been the victim of abuse or violence?: No  Physical Abuse or Sexual Abuse: No  Verbal Abuse or Emotional Abuse: No  Possible Abuse/Neglect Reported to:: Not Applicable    Psychological Assessment  History of Substance Abuse: None  History of Psychiatric Problems: No  Non-compliant with Treatment: No  Newly Diagnosed Illness: Yes    Discharge Risks or Barriers  Discharge risks or barriers?: Complex medical needs  Patient risk factors: Cognitive / sensory / physical deficit, Complex medical needs, Multiple organizational systems involved, Vulnerable adult    Anticipated Discharge Information  Discharge Disposition: D/T to home under A care in anticipation of covered skilled care (06)

## 2025-03-27 NOTE — PROGRESS NOTES
Pt picked up by transport to be brought down to nuc [facility] for HIDA scan. Pt left via hospital bed on o2 support 3lpm oxymask. Spouse went down to nuc [facility] with patient.    1601H   Pt back form nuc [facility].

## 2025-03-27 NOTE — ASSESSMENT & PLAN NOTE
Continue carbidopa-levodopa  Nonverbal at baseline, uses a wheelchair but able to stand to urinate on his own and does not use a catheter  NPO , speech following

## 2025-03-27 NOTE — PROGRESS NOTES
Pt and wife came to the floor. Pt has not walked in a few years but dose use a motorized scooter and chair with lift. Pt typically is on room air and able to feel himself. Last few nights he has needed assistance. Pt is able to communicate some on his own and can answer basic questions for himself. He sees a  and speech therapist [facility].

## 2025-03-27 NOTE — FACE TO FACE
Face to Face Supporting Documentation - Home Health    The encounter with this patient was in whole or in part the primary reason for home health admission.    Date of encounter:   Patient:                    MRN:                       YOB: 2023  Jeremiah Huber  [id number]  1950     Home health to see patient for:  Skilled Nursing care for assessment, interventions & education, Physical Therapy evaluation and treatment, Occupational therapy evaluation and treatment, and Speech Language Pathology evaluation and treatment    Skilled need for:  Medication Management medical management     Skilled nursing interventions to include:  Comment: Medical management    Homebound status evidenced by:  Need the aid of supportive devices such as crutches, canes, wheelchairs or walkers. Leaving home requires a considerable and taxing effort. There is a normal inability to leave the home.    Community Physician to provide follow up care: An Perez M.D.     Optional Interventions? No      I certify the face to face encounter for this home health care referral meets the CMS requirements and the encounter/clinical assessment with the patient was, in whole, or in part, for the medical condition(s) listed above, which is the primary reason for home health care. Based on my clinical findings: the service(s) are medically necessary, support the need for home health care, and the homebound criteria are met.  I certify that this patient has had a face to face encounter by myself.  Akhil Gomez M.D. - NPI: [id number]

## 2025-03-27 NOTE — HOSPITAL COURSE
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    Presents in ER, patient noted to have WBC of 17.7, AST/ALT normal, lipase 19, procalcitonin 0.5.  Blood culture x 2 was ordered, patient was started on antibiotics for aspiration pneumonia including Unasyn and azithromycin.  Ultrasound of the abdomen showed a stool density in the gallbladder, no evidence of acute cholecystitis CBD obstruction.    Echocardiogram showing EF of 60%, akinesis of the apex    Interval events:  -- Patient nonverbal, patient wife is noted to be at bedside  -- Patient has been reviewed, patient blood pressure is noted to be on the low side, will provide IV fluid, will repeat blood pressure after IV fluid completion.  Patient is saturating well on 2 L of oxygen, denies any dizziness  -- Repeat blood work ordered,  -- Blood cultures x 2 ordered, pending, urine culture pending.  --Patient will continue Unasyn and azithromycin at this time.  --Patient continued to complain of the pain in the right upper quadrant, HIDA scan has been ordered.      I discussed the CODE STATUS with the patient family, correlating to DNR/DNI.

## 2025-03-27 NOTE — ASSESSMENT & PLAN NOTE
Presenting with right upper quadrant pain, productive cough with clear sputum and fever at home  CXR reviewed, opacity on the right.  History of dysphagia due to Parkinson's, suspect aspiration pneumonia     -- was on unasyn+ azithro , added zyvox

## 2025-03-27 NOTE — THERAPY
Speech Language Therapy Contact Note    Patient Name: Jeremiah Huber  Age:  74 y.o., Sex:  male  Medical Record #: [id number]  Today's Date: 3/27/2025    Discussed missed therapy with RN       03/27/25 1408   Treatment Variance   Reason For Missed Therapy Medical - Patient  in Procedure   Initial Contact Note    Initial Contact Note  Order Received and Verified, Speech Therapy Evaluation in Progress with Full Report to Follow.   Interdisciplinary Plan of Care Collaboration   IDT Collaboration with  Nursing   Collaboration Comments Orders received for clinical swallow evaluation. Per RN, patient MELE for HIDA scan. Service will hold and attempt as able/medically appropriate.

## 2025-03-27 NOTE — PROGRESS NOTES
4 Eyes Skin Assessment Completed by ALYCE Mancera and ALYCE Bustillo.    Head WDL  Ears WDL  Nose WDL  Mouth WDL  Neck WDL  Breast/Chest WDL  Shoulder Blades WDL  Spine WDL  (R) Arm/Elbow/Hand Redness and Blanching  (L) Arm/Elbow/Hand WDL  Abdomen WDL  Groin WDL  Scrotum/Coccyx/Buttocks Redness and Blanching  (R) Leg Scar  (L) Leg WDL  (R) Heel/Foot/Toe Redness and Blanching  (L) Heel/Foot/Toe Redness and Blanching          Devices In Places Blood Pressure Cuff, Pulse Ox, Condom Cath, and Nasal Cannula      Interventions In Place NC W/Ear Foams, TAP System, Pillows, Barrier Cream, and Heels Loaded W/Pillows    Possible Skin Injury Yes    Pictures Uploaded Into Epic Yes  Wound Consult Placed N/A  RN Wound Prevention Protocol Ordered Yes

## 2025-03-27 NOTE — ED NOTES
Medication history reviewed with patients wife at bedside.   Med rec is complete  Allergies reviewed.     Patient has not had any outpatient antibiotics in the last 30 days.   Anticoagulants: No  Dispense history available in EPIC: Yes    Carlos Enrique

## 2025-03-27 NOTE — H&P
[facility] Medicine History & Physical Note    Date of Service  3/27/2025    Primary Care Physician  An Perez M.D.    Code Status  DNAR/DNI    Chief Complaint  Chief Complaint   Patient presents with    Abdominal Pain     Intermittent RUQ pain x1 week. -N/V, +fever at home but no longer present following acetaminophen administration at home, PMH: parkinsons, patient non-verbal, wife (POA) at bedside. Patient has been coughing x1 week with clear mucous.     History of Presenting Illness  Jeremiah Huber is a 74 y.o. male who presented 3/26/2025 with right upper quadrant abdominal pain.  History per chart review and his wife as he is nonverbal.  PMH of Parkinson's, hypertension, dyslipidemia, GERD, history of stroke (06/2020 attributed to LV thrombus).  Comes in with abdominal pain that has been intermittent over the past few days in the right upper quadrant.  He did have a fever at home which improved with Tylenol prior to arrival.  Denies nausea/vomiting, no diarrhea/constipation.  He still urinates on his own and does not use a catheter, denies any acute urinary symptoms.  He has dysphagia and follows with speech therapy, his wife says he mostly has problems with swallowing pills.    In the ED afebrile, tachycardic.  Labs show WC count of 17.7.    I discussed the plan of care with patient, family, bedside RN, and EDP .    Review of Systems  Review of Systems   Unable to perform ROS: Patient nonverbal       Past Medical History   has a past medical history of Arthritis, Back spasm, Blood clotting disorder (HCC) (06/2020), GOUT, Gout, Hypertension, Myocardial infarct (HCC) (06/2020), Parkinson disease (formerly [facility]), and Stroke (formerly [facility]) (06/2020).    Surgical History   has a past surgical history that includes shoulder surgery; tonsillectomy; knee arthroscopy (Right); knee replacement, total (Bilateral); and neuro dest facet l/s w/ig sngl (Right, 9/29/2020).     Family History  family history includes Arthritis in his  brother; Cancer in his father; Heart Disease in his father and mother.   Family history reviewed with patient. There is no family history that is pertinent to the chief complaint.     Social History   reports that he quit smoking about 55 years ago. His smoking use included cigarettes. He has never used smokeless tobacco. He reports current alcohol use of about 4.2 oz of alcohol per week. He reports that he does not use drugs.    Allergies  No Known Allergies    Medications  Prior to Admission Medications   Prescriptions Last Dose Informant Patient Reported? Taking?   Cetirizine-Pseudoephedrine (ZYRTEC-D PO) 3/26/2025 Significant Other Yes Yes   Sig: Take 1 Tablet by mouth every 12 hours as needed (allergies).   Dextromethorphan-guaiFENesin (MUCINEX DM PO) 3/26/2025 Morning Significant Other Yes Yes   Sig: Take 1 Tablet by mouth every 12 hours as needed (congestion).   Multiple Vitamins-Minerals (MULTIVITAMIN GUMMIES ADULT) Chew Tab 3/26/2025 Morning Significant Other Yes Yes   Sig: Chew 2 Each every day.   ROPINIRole (REQUIP) 4 MG tablet 3/26/2025 at  6:00 PM Significant Other No Yes   Sig: Take 1 Tablet by mouth 3 times a day.   amantadine (SYMMETREL) 100 MG Tab 3/26/2025 at  6:00 PM Significant Other No Yes   Sig: Take 1 Tablet by mouth 2 times a day.   aspirin 81 MG EC tablet 3/26/2025 Morning Significant Other No Yes   Sig: TAKE 1 TABLET BY MOUTH EVERY DAY   Patient taking differently: Take 81 mg by mouth every day.   atorvastatin (LIPITOR) 80 MG tablet 3/26/2025 at  6:00 PM Significant Other No Yes   Sig: Take 1 Tablet by mouth every evening.   carbidopa-levodopa (SINEMET)  MG Tab 3/26/2025 at  6:00 PM Significant Other No Yes   Sig: Take 2 Tablets by mouth 4 times a day. 6AM, 10AM, 2PM, and 6PM   gabapentin (NEURONTIN) 300 MG Cap 3/26/2025 at  8:30 PM Significant Other No Yes   Sig: TAKE 2 CAPSULES BY MOUTH EVERY EVENING   Patient taking differently: Take 600 mg by mouth every evening. 2 capsules= 600mg    meloxicam (MOBIC) 15 MG tablet 3/26/2025 Morning Significant Other No Yes   Sig: Take 1 Tablet by mouth 1 time a day as needed for Mild Pain.   metoprolol SR (TOPROL XL) 50 MG TABLET SR 24 HR 3/26/2025 Morning Significant Other No Yes   Sig: Take 1 Tablet by mouth every day.   omeprazole (PRILOSEC) 20 MG delayed-release capsule 3/26/2025 Morning Significant Other No Yes   Sig: TAKE 1 CAPSULE BY MOUTH ONCE A DAY 30 MINUTES BEFORE BREAKFAST MEAL   Patient taking differently: Take 20 mg by mouth every day. TAKE 1 CAPSULE BY MOUTH ONCE A DAY 30 MINUTES BEFORE BREAKFAST MEAL   oxybutynin (DITROPAN) 5 MG Tab 3/26/2025 at  6:00 PM Significant Other Yes Yes   Sig: Take 5 mg by mouth 2 times a day.      Facility-Administered Medications: None       Physical Exam  Temp:  [36.8 °C (98.3 °F)-37.6 °C (99.7 °F)] 37.6 °C (99.7 °F)  Pulse:  [105] 105  Resp:  [16] 16  BP: (119)/(78) 119/78  SpO2:  [92 %] 92 %  Blood Pressure : 119/78   Temperature: 37.6 °C (99.7 °F)   Pulse: (!) 105   Respiration: 16   Pulse Oximetry: 92 %       Physical Exam  HENT:      Head: Normocephalic and atraumatic.      Mouth/Throat:      Mouth: Mucous membranes are moist.      Pharynx: Oropharynx is clear. No oropharyngeal exudate or posterior oropharyngeal erythema.   Eyes:      General: No scleral icterus.  Cardiovascular:      Rate and Rhythm: Regular rhythm. Tachycardia present.      Pulses: Normal pulses.      Heart sounds: Normal heart sounds. No murmur heard.  Pulmonary:      Effort: Pulmonary effort is normal. No respiratory distress.      Breath sounds: Normal breath sounds. No wheezing.   Abdominal:      Palpations: Abdomen is soft.      Tenderness: There is no abdominal tenderness.   Musculoskeletal:         General: No swelling or tenderness. Normal range of motion.      Cervical back: Normal range of motion.   Skin:     General: Skin is warm and dry.   Neurological:      Mental Status: He is alert.         Laboratory:  Recent Labs      "03/26/25  2224   WBC 17.7*   RBC 5.49   HEMOGLOBIN 16.1   HEMATOCRIT 47.7   MCV 86.9   MCH 29.3   MCHC 33.8   RDW 40.0   PLATELETCT 324   MPV 9.5     Recent Labs     03/26/25  2335   SODIUM 133*   POTASSIUM 4.2   CHLORIDE 105   CO2 16*   GLUCOSE 88   BUN 17   CREATININE 0.71   CALCIUM 7.1*     Recent Labs     03/26/25  2335   ALTSGPT 18   ASTSGOT 37   ALKPHOSPHAT 91   TBILIRUBIN 0.6   LIPASE 19   GLUCOSE 88         No results for input(s): \"NTPROBNP\" in the last 72 hours.      No results for input(s): \"TROPONINT\" in the last 72 hours.    Imaging:  DX-CHEST-PORTABLE (1 VIEW)   Final Result      Hypoinflation with bibasilar atelectasis.  Superimposed pneumonia is difficult to exclude.      US-RUQ    (Results Pending)       X-Ray:  I have personally reviewed the images and compared with prior images. and My impression is: Opacity on the right  EKG:  I have personally reviewed the images and compared with prior images. and My impression is: Sinus tachycardia    Assessment/Plan:  Justification for Admission Status  I anticipate this patient will require at least two midnights for appropriate medical management, necessitating inpatient admission because pneumonia    * Pneumonia due to infectious organism- (present on admission)  Assessment & Plan  Presenting with right upper quadrant pain, productive cough with clear sputum and fever at home  CXR reviewed, opacity on the right.  History of dysphagia due to Parkinson's, suspect aspiration pneumonia  Started on IV antibiotics    Right upper quadrant abdominal pain- (present on admission)  Assessment & Plan  LFTs unremarkable, will order an ultrasound of the right upper quadrant  Suspect referred pain from right lower lobe pneumonia seen on CXR    Parkinson disease (HCC)- (present on admission)  Assessment & Plan  Continue carbidopa-levodopa  Nonverbal at baseline, uses a wheelchair but able to stand to urinate on his own and does not use a catheter  Baseline dysphagia " especially with pills and follows with speech therapy as outpatient    SIRS (systemic inflammatory response syndrome) (HCC)- (present on admission)  Assessment & Plan  SIRS criteria identified on my evaluation include:  Tachycardia, with heart rate greater than 90 BPM and Leukocytosis, with WBC greater than 12,000  Secondary to pneumonia, no signs of endorgan damage at this time    History of stroke- (present on admission)  Assessment & Plan  Continue home aspirin, atorvastatin    Restless leg syndrome- (present on admission)  Assessment & Plan  Continue ropinirole        VTE prophylaxis: enoxaparin ppx

## 2025-03-28 PROBLEM — J90 PLEURAL EFFUSION, RIGHT: Status: ACTIVE | Noted: 2025-03-28

## 2025-03-28 PROBLEM — R65.20 SEPSIS WITH ACUTE HYPOXIC RESPIRATORY FAILURE WITHOUT SEPTIC SHOCK (HCC): Status: ACTIVE | Noted: 2025-03-27

## 2025-03-28 PROBLEM — J96.01 SEPSIS WITH ACUTE HYPOXIC RESPIRATORY FAILURE WITHOUT SEPTIC SHOCK (HCC): Status: ACTIVE | Noted: 2025-03-27

## 2025-03-28 PROBLEM — A41.9 SEPSIS WITH ACUTE HYPOXIC RESPIRATORY FAILURE WITHOUT SEPTIC SHOCK (HCC): Status: ACTIVE | Noted: 2025-03-27

## 2025-03-28 LAB
ALBUMIN SERPL BCP-MCNC: 3 G/DL (ref 3.2–4.9)
ALBUMIN/GLOB SERPL: 0.9 G/DL
ALP SERPL-CCNC: 102 U/L (ref 30–99)
ALT SERPL-CCNC: 21 U/L (ref 2–50)
AMYLASE FLD-CCNC: 16 U/L
ANION GAP SERPL CALC-SCNC: 12 MMOL/L (ref 7–16)
APPEARANCE FLD: NORMAL
ARTERIAL PATENCY WRIST A: ABNORMAL
AST SERPL-CCNC: 28 U/L (ref 12–45)
B PARAP IS1001 DNA NPH QL NAA+NON-PROBE: NOT DETECTED
B PERT.PT PRMT NPH QL NAA+NON-PROBE: NOT DETECTED
BASE EXCESS BLDA CALC-SCNC: 2 MMOL/L (ref -4–3)
BASE EXCESS BLDA CALC-SCNC: 2 MMOL/L (ref -4–3)
BILIRUB SERPL-MCNC: 1.3 MG/DL (ref 0.1–1.5)
BODY FLD TYPE: NORMAL
BODY TEMPERATURE: 32.2 CENTIGRADE
BODY TEMPERATURE: ABNORMAL DEGREES
BREATHS SETTING VENT: 24
BUN SERPL-MCNC: 22 MG/DL (ref 8–22)
C PNEUM DNA NPH QL NAA+NON-PROBE: NOT DETECTED
CALCIUM ALBUM COR SERPL-MCNC: 9.6 MG/DL (ref 8.5–10.5)
CALCIUM SERPL-MCNC: 8.8 MG/DL (ref 8.5–10.5)
CHLORIDE SERPL-SCNC: 100 MMOL/L (ref 96–112)
CO2 BLDA-SCNC: 25 MMOL/L (ref 32–48)
CO2 SERPL-SCNC: 22 MMOL/L (ref 20–33)
COLOR FLD: YELLOW
CREAT SERPL-MCNC: 0.99 MG/DL (ref 0.5–1.4)
CYTOLOGY REG CYTOL: NORMAL
DELSYS IDSYS: ABNORMAL
ERYTHROCYTE [DISTWIDTH] IN BLOOD BY AUTOMATED COUNT: 42.5 FL (ref 35.9–50)
FLUAV RNA NPH QL NAA+NON-PROBE: NOT DETECTED
FLUBV RNA NPH QL NAA+NON-PROBE: NOT DETECTED
FUNGUS SPEC FUNGUS STN: NORMAL
GFR SERPLBLD CREATININE-BSD FMLA CKD-EPI: 81 ML/MIN/1.73 M 2
GLOBULIN SER CALC-MCNC: 3.3 G/DL (ref 1.9–3.5)
GLUCOSE BLD STRIP.AUTO-MCNC: 190 MG/DL (ref 65–99)
GLUCOSE FLD-MCNC: 100 MG/DL
GLUCOSE SERPL-MCNC: 119 MG/DL (ref 65–99)
GRAM STN SPEC: NORMAL
HADV DNA NPH QL NAA+NON-PROBE: NOT DETECTED
HCO3 BLDA-SCNC: 24.4 MMOL/L (ref 21–28)
HCO3 BLDA-SCNC: 26 MMOL/L (ref 21–28)
HCOV 229E RNA NPH QL NAA+NON-PROBE: NOT DETECTED
HCOV HKU1 RNA NPH QL NAA+NON-PROBE: NOT DETECTED
HCOV NL63 RNA NPH QL NAA+NON-PROBE: NOT DETECTED
HCOV OC43 RNA NPH QL NAA+NON-PROBE: NOT DETECTED
HCT VFR BLD AUTO: 44.5 % (ref 42–52)
HGB BLD-MCNC: 14.8 G/DL (ref 14–18)
HMPV RNA NPH QL NAA+NON-PROBE: NOT DETECTED
HOROWITZ INDEX BLDA+IHG-RTO: 72 MM[HG]
HPIV1 RNA NPH QL NAA+NON-PROBE: NOT DETECTED
HPIV2 RNA NPH QL NAA+NON-PROBE: NOT DETECTED
HPIV3 RNA NPH QL NAA+NON-PROBE: NOT DETECTED
HPIV4 RNA NPH QL NAA+NON-PROBE: NOT DETECTED
LACTATE BLD-SCNC: 1.1 MMOL/L (ref 0.5–2)
LDH FLD L TO P-CCNC: 899 U/L
LDH SERPL L TO P-CCNC: 271 U/L (ref 107–266)
LYMPHOCYTES NFR FLD: 5 %
M PNEUMO DNA NPH QL NAA+NON-PROBE: NOT DETECTED
MCH RBC QN AUTO: 29.5 PG (ref 27–33)
MCHC RBC AUTO-ENTMCNC: 33.3 G/DL (ref 32.3–36.5)
MCV RBC AUTO: 88.8 FL (ref 81.4–97.8)
MODE IMODE: ABNORMAL
MONOS+MACROS NFR FLD MANUAL: 10 %
NEUTROPHILS NFR FLD: 85 %
NUC CELL # FLD: 5209 CELLS/UL
O2/TOTAL GAS SETTING VFR VENT: 100 %
PCO2 BLDA: 31.2 MMHG (ref 32–48)
PCO2 BLDA: 39.3 MMHG (ref 32–48)
PCO2 TEMP ADJ BLDA: 31.3 MMHG (ref 32–48)
PCO2 TEMP ADJ BLDA: 31.9 MMHG (ref 32–48)
PEEP END EXPIRATORY PRESSURE IPEEP: 10 CMH20
PH BLDA: 7.43 [PH] (ref 7.35–7.45)
PH BLDA: 7.5 [PH] (ref 7.35–7.45)
PH FLD: 8 [PH]
PH TEMP ADJ BLDA: 7.5 [PH] (ref 7.35–7.45)
PH TEMP ADJ BLDA: 7.51 [PH] (ref 7.35–7.45)
PLATELET # BLD AUTO: 286 K/UL (ref 164–446)
PMV BLD AUTO: 9.9 FL (ref 9–12.9)
PO2 BLDA: 56.2 MMHG (ref 83–108)
PO2 BLDA: 72 MMHG (ref 83–108)
PO2 TEMP ADJ BLDA: 40.2 MMHG (ref 83–108)
PO2 TEMP ADJ BLDA: 72 MMHG (ref 83–108)
POTASSIUM SERPL-SCNC: 4.1 MMOL/L (ref 3.6–5.5)
PROCALCITONIN SERPL-MCNC: 1.45 NG/ML
PROT FLD-MCNC: 4.3 G/DL
PROT SERPL-MCNC: 6.3 G/DL (ref 6–8.2)
RBC # BLD AUTO: 5.01 M/UL (ref 4.7–6.1)
RBC # FLD: 3000 CELLS/UL
RSV RNA NPH QL NAA+NON-PROBE: NOT DETECTED
RV+EV RNA NPH QL NAA+NON-PROBE: NOT DETECTED
SAO2 % BLDA: 88.6 % (ref 93–99)
SAO2 % BLDA: 96 % (ref 93–99)
SARS-COV-2 RNA NPH QL NAA+NON-PROBE: NOTDETECTED
SCCMEC + MECA PNL NOSE NAA+PROBE: NEGATIVE
SIGNIFICANT IND 70042: NORMAL
SIGNIFICANT IND 70042: NORMAL
SITE SITE: NORMAL
SITE SITE: NORMAL
SODIUM SERPL-SCNC: 134 MMOL/L (ref 135–145)
SOURCE SOURCE: NORMAL
SOURCE SOURCE: NORMAL
SPECIMEN DRAWN FROM PATIENT: ABNORMAL
TIDAL VOLUME IVT: 480 ML
WBC # BLD AUTO: 20.4 K/UL (ref 4.8–10.8)

## 2025-03-28 PROCEDURE — 700105 HCHG RX REV CODE 258: Performed by: HOSPITALIST

## 2025-03-28 PROCEDURE — 770022 HCHG ROOM/CARE - ICU (200)

## 2025-03-28 PROCEDURE — A9270 NON-COVERED ITEM OR SERVICE: HCPCS | Performed by: STUDENT IN AN ORGANIZED HEALTH CARE EDUCATION/TRAINING PROGRAM

## 2025-03-28 PROCEDURE — 94003 VENT MGMT INPAT SUBQ DAY: CPT

## 2025-03-28 PROCEDURE — 31500 INSERT EMERGENCY AIRWAY: CPT | Performed by: STUDENT IN AN ORGANIZED HEALTH CARE EDUCATION/TRAINING PROGRAM

## 2025-03-28 PROCEDURE — 94669 MECHANICAL CHEST WALL OSCILL: CPT

## 2025-03-28 PROCEDURE — 83605 ASSAY OF LACTIC ACID: CPT

## 2025-03-28 PROCEDURE — 83615 LACTATE (LD) (LDH) ENZYME: CPT | Mod: 91

## 2025-03-28 PROCEDURE — 700102 HCHG RX REV CODE 250 W/ 637 OVERRIDE(OP): Performed by: STUDENT IN AN ORGANIZED HEALTH CARE EDUCATION/TRAINING PROGRAM

## 2025-03-28 PROCEDURE — 700111 HCHG RX REV CODE 636 W/ 250 OVERRIDE (IP): Mod: JZ | Performed by: STUDENT IN AN ORGANIZED HEALTH CARE EDUCATION/TRAINING PROGRAM

## 2025-03-28 PROCEDURE — 700102 HCHG RX REV CODE 250 W/ 637 OVERRIDE(OP): Performed by: HOSPITALIST

## 2025-03-28 PROCEDURE — 700101 HCHG RX REV CODE 250: Performed by: STUDENT IN AN ORGANIZED HEALTH CARE EDUCATION/TRAINING PROGRAM

## 2025-03-28 PROCEDURE — 0W9930Z DRAINAGE OF RIGHT PLEURAL CAVITY WITH DRAINAGE DEVICE, PERCUTANEOUS APPROACH: ICD-10-PCS | Performed by: STUDENT IN AN ORGANIZED HEALTH CARE EDUCATION/TRAINING PROGRAM

## 2025-03-28 PROCEDURE — 99153 MOD SED SAME PHYS/QHP EA: CPT

## 2025-03-28 PROCEDURE — 87641 MR-STAPH DNA AMP PROBE: CPT

## 2025-03-28 PROCEDURE — 31720 CLEARANCE OF AIRWAYS: CPT

## 2025-03-28 PROCEDURE — 88112 CYTOPATH CELL ENHANCE TECH: CPT | Performed by: STUDENT IN AN ORGANIZED HEALTH CARE EDUCATION/TRAINING PROGRAM

## 2025-03-28 PROCEDURE — 87116 MYCOBACTERIA CULTURE: CPT

## 2025-03-28 PROCEDURE — 700102 HCHG RX REV CODE 250 W/ 637 OVERRIDE(OP): Performed by: NURSE PRACTITIONER

## 2025-03-28 PROCEDURE — 700101 HCHG RX REV CODE 250: Performed by: HOSPITALIST

## 2025-03-28 PROCEDURE — 82150 ASSAY OF AMYLASE: CPT

## 2025-03-28 PROCEDURE — 85027 COMPLETE CBC AUTOMATED: CPT

## 2025-03-28 PROCEDURE — C1729 CATH, DRAINAGE: HCPCS

## 2025-03-28 PROCEDURE — 88305 TISSUE EXAM BY PATHOLOGIST: CPT | Performed by: STUDENT IN AN ORGANIZED HEALTH CARE EDUCATION/TRAINING PROGRAM

## 2025-03-28 PROCEDURE — 99291 CRITICAL CARE FIRST HOUR: CPT | Mod: 25 | Performed by: STUDENT IN AN ORGANIZED HEALTH CARE EDUCATION/TRAINING PROGRAM

## 2025-03-28 PROCEDURE — 700105 HCHG RX REV CODE 258: Performed by: NURSE PRACTITIONER

## 2025-03-28 PROCEDURE — 84157 ASSAY OF PROTEIN OTHER: CPT

## 2025-03-28 PROCEDURE — 32551 INSERTION OF CHEST TUBE: CPT | Performed by: STUDENT IN AN ORGANIZED HEALTH CARE EDUCATION/TRAINING PROGRAM

## 2025-03-28 PROCEDURE — 94002 VENT MGMT INPAT INIT DAY: CPT

## 2025-03-28 PROCEDURE — 99292 CRITICAL CARE ADDL 30 MIN: CPT | Performed by: HOSPITALIST

## 2025-03-28 PROCEDURE — 700105 HCHG RX REV CODE 258: Performed by: STUDENT IN AN ORGANIZED HEALTH CARE EDUCATION/TRAINING PROGRAM

## 2025-03-28 PROCEDURE — 88112 CYTOPATH CELL ENHANCE TECH: CPT | Mod: 26 | Performed by: STUDENT IN AN ORGANIZED HEALTH CARE EDUCATION/TRAINING PROGRAM

## 2025-03-28 PROCEDURE — 92610 EVALUATE SWALLOWING FUNCTION: CPT

## 2025-03-28 PROCEDURE — 700111 HCHG RX REV CODE 636 W/ 250 OVERRIDE (IP): Mod: JZ | Performed by: NURSE PRACTITIONER

## 2025-03-28 PROCEDURE — 700111 HCHG RX REV CODE 636 W/ 250 OVERRIDE (IP): Performed by: HOSPITALIST

## 2025-03-28 PROCEDURE — C1751 CATH, INF, PER/CENT/MIDLINE: HCPCS

## 2025-03-28 PROCEDURE — 87102 FUNGUS ISOLATION CULTURE: CPT

## 2025-03-28 PROCEDURE — 99152 MOD SED SAME PHYS/QHP 5/>YRS: CPT

## 2025-03-28 PROCEDURE — 80053 COMPREHEN METABOLIC PANEL: CPT

## 2025-03-28 PROCEDURE — 87070 CULTURE OTHR SPECIMN AEROBIC: CPT

## 2025-03-28 PROCEDURE — A9270 NON-COVERED ITEM OR SERVICE: HCPCS | Performed by: HOSPITALIST

## 2025-03-28 PROCEDURE — 87205 SMEAR GRAM STAIN: CPT | Mod: 91

## 2025-03-28 PROCEDURE — A9270 NON-COVERED ITEM OR SERVICE: HCPCS | Performed by: NURSE PRACTITIONER

## 2025-03-28 PROCEDURE — 32551 INSERTION OF CHEST TUBE: CPT

## 2025-03-28 PROCEDURE — 88305 TISSUE EXAM BY PATHOLOGIST: CPT | Mod: 26 | Performed by: STUDENT IN AN ORGANIZED HEALTH CARE EDUCATION/TRAINING PROGRAM

## 2025-03-28 PROCEDURE — 0202U NFCT DS 22 TRGT SARS-COV-2: CPT

## 2025-03-28 PROCEDURE — 700117 HCHG RX CONTRAST REV CODE 255: Performed by: HOSPITALIST

## 2025-03-28 PROCEDURE — 82945 GLUCOSE OTHER FLUID: CPT

## 2025-03-28 PROCEDURE — 700111 HCHG RX REV CODE 636 W/ 250 OVERRIDE (IP)

## 2025-03-28 PROCEDURE — 82962 GLUCOSE BLOOD TEST: CPT

## 2025-03-28 PROCEDURE — 0BH17EZ INSERTION OF ENDOTRACHEAL AIRWAY INTO TRACHEA, VIA NATURAL OR ARTIFICIAL OPENING: ICD-10-PCS | Performed by: STUDENT IN AN ORGANIZED HEALTH CARE EDUCATION/TRAINING PROGRAM

## 2025-03-28 PROCEDURE — 83986 ASSAY PH BODY FLUID NOS: CPT

## 2025-03-28 PROCEDURE — 99223 1ST HOSP IP/OBS HIGH 75: CPT | Mod: 25,GC | Performed by: STUDENT IN AN ORGANIZED HEALTH CARE EDUCATION/TRAINING PROGRAM

## 2025-03-28 PROCEDURE — 36600 WITHDRAWAL OF ARTERIAL BLOOD: CPT

## 2025-03-28 PROCEDURE — 5A1945Z RESPIRATORY VENTILATION, 24-96 CONSECUTIVE HOURS: ICD-10-PCS | Performed by: STUDENT IN AN ORGANIZED HEALTH CARE EDUCATION/TRAINING PROGRAM

## 2025-03-28 PROCEDURE — 92950 HEART/LUNG RESUSCITATION CPR: CPT

## 2025-03-28 PROCEDURE — 84145 PROCALCITONIN (PCT): CPT

## 2025-03-28 PROCEDURE — 82803 BLOOD GASES ANY COMBINATION: CPT | Mod: 91

## 2025-03-28 PROCEDURE — 89051 BODY FLUID CELL COUNT: CPT

## 2025-03-28 RX ORDER — SODIUM CHLORIDE 9 MG/ML
500 INJECTION, SOLUTION INTRAVENOUS ONCE
Status: COMPLETED | OUTPATIENT
Start: 2025-03-28 | End: 2025-03-28

## 2025-03-28 RX ORDER — ACETAMINOPHEN 325 MG/1
650 TABLET ORAL EVERY 6 HOURS PRN
Status: DISCONTINUED | OUTPATIENT
Start: 2025-03-28 | End: 2025-03-31

## 2025-03-28 RX ORDER — PANTOPRAZOLE SODIUM 40 MG/10ML
40 INJECTION, POWDER, LYOPHILIZED, FOR SOLUTION INTRAVENOUS DAILY
Status: DISCONTINUED | OUTPATIENT
Start: 2025-03-28 | End: 2025-03-31

## 2025-03-28 RX ORDER — ROPINIROLE 2 MG/1
4 TABLET, FILM COATED ORAL 3 TIMES DAILY
Status: DISCONTINUED | OUTPATIENT
Start: 2025-03-28 | End: 2025-04-09 | Stop reason: HOSPADM

## 2025-03-28 RX ORDER — GABAPENTIN 300 MG/1
600 CAPSULE ORAL EVERY EVENING
Status: DISCONTINUED | OUTPATIENT
Start: 2025-03-28 | End: 2025-04-01

## 2025-03-28 RX ORDER — OXYBUTYNIN CHLORIDE 5 MG/1
5 TABLET ORAL 2 TIMES DAILY
Status: DISCONTINUED | OUTPATIENT
Start: 2025-03-28 | End: 2025-04-09 | Stop reason: HOSPADM

## 2025-03-28 RX ORDER — MIDODRINE HYDROCHLORIDE 5 MG/1
5 TABLET ORAL EVERY 8 HOURS
Status: DISCONTINUED | OUTPATIENT
Start: 2025-03-29 | End: 2025-03-29

## 2025-03-28 RX ORDER — ROCURONIUM BROMIDE 10 MG/ML
100 INJECTION, SOLUTION INTRAVENOUS ONCE
Status: COMPLETED | OUTPATIENT
Start: 2025-03-28 | End: 2025-03-28

## 2025-03-28 RX ORDER — LINEZOLID 600 MG/1
600 TABLET, FILM COATED ORAL EVERY 12 HOURS
Status: DISCONTINUED | OUTPATIENT
Start: 2025-03-28 | End: 2025-03-28

## 2025-03-28 RX ORDER — MORPHINE SULFATE 4 MG/ML
2 INJECTION INTRAVENOUS ONCE
Status: COMPLETED | OUTPATIENT
Start: 2025-03-28 | End: 2025-03-28

## 2025-03-28 RX ORDER — DEXMEDETOMIDINE HYDROCHLORIDE 4 UG/ML
0-1.5 INJECTION, SOLUTION INTRAVENOUS CONTINUOUS
Status: DISCONTINUED | OUTPATIENT
Start: 2025-03-28 | End: 2025-03-31

## 2025-03-28 RX ORDER — ATROPINE SULFATE 10 MG/ML
1 SOLUTION/ DROPS OPHTHALMIC ONCE
Status: ACTIVE | OUTPATIENT
Start: 2025-03-28 | End: 2025-03-29

## 2025-03-28 RX ORDER — LINEZOLID 2 MG/ML
600 INJECTION, SOLUTION INTRAVENOUS EVERY 12 HOURS
Status: DISCONTINUED | OUTPATIENT
Start: 2025-03-28 | End: 2025-03-29

## 2025-03-28 RX ORDER — AMANTADINE HYDROCHLORIDE 100 MG/1
100 TABLET ORAL 2 TIMES DAILY
Status: DISCONTINUED | OUTPATIENT
Start: 2025-03-28 | End: 2025-04-09 | Stop reason: HOSPADM

## 2025-03-28 RX ORDER — ASPIRIN 81 MG/1
81 TABLET, CHEWABLE ORAL DAILY
Status: DISCONTINUED | OUTPATIENT
Start: 2025-03-29 | End: 2025-04-09 | Stop reason: HOSPADM

## 2025-03-28 RX ORDER — ACETAMINOPHEN 650 MG/1
650 SUPPOSITORY RECTAL EVERY 4 HOURS PRN
Status: DISCONTINUED | OUTPATIENT
Start: 2025-03-28 | End: 2025-03-31

## 2025-03-28 RX ORDER — HYDROMORPHONE HYDROCHLORIDE 1 MG/ML
0.5 INJECTION, SOLUTION INTRAMUSCULAR; INTRAVENOUS; SUBCUTANEOUS ONCE
Status: DISCONTINUED | OUTPATIENT
Start: 2025-03-28 | End: 2025-03-28

## 2025-03-28 RX ORDER — FAMOTIDINE 20 MG/1
20 TABLET, FILM COATED ORAL EVERY 12 HOURS
Status: DISCONTINUED | OUTPATIENT
Start: 2025-03-28 | End: 2025-03-29

## 2025-03-28 RX ORDER — AZITHROMYCIN 500 MG/5ML
500 INJECTION, POWDER, LYOPHILIZED, FOR SOLUTION INTRAVENOUS EVERY 24 HOURS
Status: COMPLETED | OUTPATIENT
Start: 2025-03-28 | End: 2025-03-29

## 2025-03-28 RX ORDER — PHENYLEPHRINE HCL IN 0.9% NACL 1 MG/10 ML
100 SYRINGE (ML) INTRAVENOUS ONCE
Status: COMPLETED | OUTPATIENT
Start: 2025-03-28 | End: 2025-03-28

## 2025-03-28 RX ORDER — CARBIDOPA/LEVODOPA 25MG-250MG
2 TABLET ORAL 4 TIMES DAILY
Status: DISCONTINUED | OUTPATIENT
Start: 2025-03-28 | End: 2025-04-09 | Stop reason: HOSPADM

## 2025-03-28 RX ORDER — DEXTROSE MONOHYDRATE 50 MG/ML
INJECTION, SOLUTION INTRAVENOUS CONTINUOUS
Status: DISCONTINUED | OUTPATIENT
Start: 2025-03-28 | End: 2025-03-31

## 2025-03-28 RX ORDER — FUROSEMIDE 10 MG/ML
20 INJECTION INTRAMUSCULAR; INTRAVENOUS ONCE
Status: COMPLETED | OUTPATIENT
Start: 2025-03-28 | End: 2025-03-28

## 2025-03-28 RX ORDER — SODIUM BICARBONATE 650 MG/1
1300 TABLET ORAL 3 TIMES DAILY
Status: DISCONTINUED | OUTPATIENT
Start: 2025-03-28 | End: 2025-04-08

## 2025-03-28 RX ORDER — MIDODRINE HYDROCHLORIDE 5 MG/1
5 TABLET ORAL
Status: DISCONTINUED | OUTPATIENT
Start: 2025-03-28 | End: 2025-03-28

## 2025-03-28 RX ORDER — ETOMIDATE 2 MG/ML
20 INJECTION INTRAVENOUS ONCE
Status: COMPLETED | OUTPATIENT
Start: 2025-03-28 | End: 2025-03-28

## 2025-03-28 RX ADMIN — OXYBUTYNIN CHLORIDE 5 MG: 5 TABLET ORAL at 08:44

## 2025-03-28 RX ADMIN — FUROSEMIDE 20 MG: 10 INJECTION, SOLUTION INTRAVENOUS at 05:35

## 2025-03-28 RX ADMIN — MIDODRINE HYDROCHLORIDE 5 MG: 5 TABLET ORAL at 17:29

## 2025-03-28 RX ADMIN — CARBIDOPA AND LEVODOPA 2 TABLET: 25; 250 TABLET ORAL at 19:45

## 2025-03-28 RX ADMIN — CARBIDOPA AND LEVODOPA 2 TABLET: 25; 250 TABLET ORAL at 08:43

## 2025-03-28 RX ADMIN — AZITHROMYCIN DIHYDRATE 500 MG: 500 INJECTION, POWDER, LYOPHILIZED, FOR SOLUTION INTRAVENOUS at 10:36

## 2025-03-28 RX ADMIN — AMPICILLIN AND SULBACTAM 3 G: 1; 2 INJECTION, POWDER, FOR SOLUTION INTRAMUSCULAR; INTRAVENOUS at 02:05

## 2025-03-28 RX ADMIN — FAMOTIDINE 20 MG: 20 TABLET, FILM COATED ORAL at 17:29

## 2025-03-28 RX ADMIN — AMANTADINE HYDROCHLORIDE 100 MG: 100 TABLET ORAL at 17:29

## 2025-03-28 RX ADMIN — OXYBUTYNIN CHLORIDE 5 MG: 5 TABLET ORAL at 17:29

## 2025-03-28 RX ADMIN — AMANTADINE HYDROCHLORIDE 100 MG: 100 TABLET ORAL at 08:43

## 2025-03-28 RX ADMIN — ASPIRIN 81 MG: 81 TABLET, COATED ORAL at 08:44

## 2025-03-28 RX ADMIN — ACETAMINOPHEN 650 MG: 325 TABLET ORAL at 02:33

## 2025-03-28 RX ADMIN — SODIUM BICARBONATE 1300 MG: 650 TABLET ORAL at 21:39

## 2025-03-28 RX ADMIN — MORPHINE SULFATE 2 MG: 4 INJECTION, SOLUTION INTRAMUSCULAR; INTRAVENOUS at 00:21

## 2025-03-28 RX ADMIN — ACETAMINOPHEN 650 MG: 325 TABLET ORAL at 21:24

## 2025-03-28 RX ADMIN — MIDODRINE HYDROCHLORIDE 5 MG: 5 TABLET ORAL at 23:40

## 2025-03-28 RX ADMIN — MORPHINE SULFATE 2 MG: 4 INJECTION, SOLUTION INTRAMUSCULAR; INTRAVENOUS at 04:10

## 2025-03-28 RX ADMIN — PANTOPRAZOLE SODIUM 40 MG: 40 INJECTION, POWDER, FOR SOLUTION INTRAVENOUS at 08:45

## 2025-03-28 RX ADMIN — CARBIDOPA AND LEVODOPA 2 TABLET: 25; 250 TABLET ORAL at 15:42

## 2025-03-28 RX ADMIN — SODIUM BICARBONATE 1300 MG: 650 TABLET ORAL at 08:44

## 2025-03-28 RX ADMIN — Medication 100 MCG: at 13:08

## 2025-03-28 RX ADMIN — ROPINIROLE HYDROCHLORIDE 4 MG: 2 TABLET, FILM COATED ORAL at 17:29

## 2025-03-28 RX ADMIN — FENTANYL CITRATE 100 MCG/HR: 50 INJECTION, SOLUTION INTRAMUSCULAR; INTRAVENOUS at 15:50

## 2025-03-28 RX ADMIN — ROPINIROLE HYDROCHLORIDE 4 MG: 2 TABLET, FILM COATED ORAL at 08:44

## 2025-03-28 RX ADMIN — SODIUM CHLORIDE 500 ML: 9 INJECTION, SOLUTION INTRAVENOUS at 23:00

## 2025-03-28 RX ADMIN — SODIUM BICARBONATE 1300 MG: 650 TABLET ORAL at 15:42

## 2025-03-28 RX ADMIN — MORPHINE SULFATE 2 MG: 4 INJECTION INTRAVENOUS at 00:53

## 2025-03-28 RX ADMIN — DEXMEDETOMIDINE HYDROCHLORIDE 0.2 MCG/KG/HR: 100 INJECTION, SOLUTION INTRAVENOUS at 17:43

## 2025-03-28 RX ADMIN — AMPICILLIN AND SULBACTAM 3 G: 1; 2 INJECTION, POWDER, FOR SOLUTION INTRAMUSCULAR; INTRAVENOUS at 14:23

## 2025-03-28 RX ADMIN — AMIODARONE HYDROCHLORIDE 150 MG: 1.5 INJECTION, SOLUTION INTRAVENOUS at 23:24

## 2025-03-28 RX ADMIN — ENOXAPARIN SODIUM 40 MG: 100 INJECTION SUBCUTANEOUS at 17:28

## 2025-03-28 RX ADMIN — FENTANYL CITRATE 100 MCG/HR: 50 INJECTION, SOLUTION INTRAMUSCULAR; INTRAVENOUS at 21:55

## 2025-03-28 RX ADMIN — ATORVASTATIN CALCIUM 80 MG: 40 TABLET, FILM COATED ORAL at 17:29

## 2025-03-28 RX ADMIN — DEXTROSE MONOHYDRATE: 50 INJECTION, SOLUTION INTRAVENOUS at 22:49

## 2025-03-28 RX ADMIN — IOHEXOL 70 ML: 350 INJECTION, SOLUTION INTRAVENOUS at 10:15

## 2025-03-28 RX ADMIN — LINEZOLID 600 MG: 600 INJECTION, SOLUTION INTRAVENOUS at 17:33

## 2025-03-28 RX ADMIN — ETOMIDATE 20 MG: 2 INJECTION, SOLUTION INTRAVENOUS at 12:58

## 2025-03-28 RX ADMIN — ROCURONIUM BROMIDE 100 MG: 10 INJECTION INTRAVENOUS at 12:58

## 2025-03-28 RX ADMIN — AMPICILLIN AND SULBACTAM 3 G: 1; 2 INJECTION, POWDER, FOR SOLUTION INTRAMUSCULAR; INTRAVENOUS at 20:43

## 2025-03-28 RX ADMIN — LINEZOLID 600 MG: 600 INJECTION, SOLUTION INTRAVENOUS at 08:53

## 2025-03-28 RX ADMIN — GABAPENTIN 600 MG: 300 CAPSULE ORAL at 17:28

## 2025-03-28 RX ADMIN — AMPICILLIN AND SULBACTAM 3 G: 1; 2 INJECTION, POWDER, FOR SOLUTION INTRAMUSCULAR; INTRAVENOUS at 08:54

## 2025-03-28 ASSESSMENT — ENCOUNTER SYMPTOMS: ABDOMINAL PAIN: 1

## 2025-03-28 ASSESSMENT — PAIN DESCRIPTION - PAIN TYPE
TYPE: ACUTE PAIN

## 2025-03-28 ASSESSMENT — COPD QUESTIONNAIRES
COPD SCREENING SCORE: 3
DO YOU EVER COUGH UP ANY MUCUS OR PHLEGM?: NO/ONLY WITH OCCASIONAL COLDS OR INFECTIONS
HAVE YOU SMOKED AT LEAST 100 CIGARETTES IN YOUR ENTIRE LIFE: NO/DON'T KNOW
DURING THE PAST 4 WEEKS HOW MUCH DID YOU FEEL SHORT OF BREATH: SOME OF THE TIME

## 2025-03-28 NOTE — PROGRESS NOTES
Withheld performing 4 eye skin check due to pt status change overnight. Informed charge and will inform day shift nurse.

## 2025-03-28 NOTE — PROGRESS NOTES
[facility] Medicine Daily Progress Note    Date of Service  3/28/2025    Chief Complaint  Jeremiah Huber is a 74 y.o. male admitted 3/26/2025 with   Chief Complaint   Patient presents with    Abdominal Pain     Intermittent RUQ pain x1 week. -N/V, +fever at home but no longer present following acetaminophen administration at home, PMH: parkinsons, patient non-verbal, wife (POA) at bedside. Patient has been coughing x1 week with clear mucous.     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    Presents in ER, patient noted to have WBC of 17.7, AST/ALT normal, lipase 19, procalcitonin 0.5.  Blood culture x 2 was ordered, patient was started on antibiotics for aspiration pneumonia including Unasyn and azithromycin.  Ultrasound of the abdomen Stones and sludge in the gallbladder, HIDA scan  showing normal Gallbadder EF.      Overnight, patient is noted to have intractable amount of pain the CT scan of the abdomen and pelvis was obtained, noted to have constipation.  I came and evaluated early in the morning, CT of the chest showed complete collapse of the right lung, pulmonary was consulted.  Patient oxygen demand continue to increase, currently on 8 to 10 L of oxygen.  RT was called at bedside, IPV, recommended RT to start high flow.    Pulm recommended transferring the patient to IMCU versus ICU for close monitoring.  Interval events:  -- Patient nonverbal, patient wife is noted to be at bedside  -- Patient has been reviewed, patient blood pressure is noted to be on the low side, will provide IV fluid, will repeat blood pressure after IV fluid completion.  Patient is saturating well on 2 L of oxygen, denies any dizziness  -- Repeat blood work ordered,  -- Blood cultures x 2  ordered, pending, urine culture pending.  --Patient will continue Unasyn and azithromycin at this time.  --Patient continued to complain of the pain in the right upper quadrant, HIDA scan has been ordered.      I discussed the CODE STATUS with the patient family, correlating to DNR/DNI.    3/28:  --  patient has been nonverbal, patient was admitted to the [facility], this is a patient will obtain CT chest and call pulmonary.  Patient pain in the right side is likely secondary to pneumonia.  --vitlas  reviewed, patient noted to be febrile with temperature of 101, tachycardic with heart rate of 111.  Patient is already on Unasyn, MRSA nasal swab, respiratory viral panel ordered, pending, Zyvox added  -- CT scan of the chest was reviewed with the patient, noted to have complete collapse of the right lung.  Pulmonary recommend transferring the patient to IMCU or ICU for close monitoring.  -- RT is noted to be at bedside, plan is to start the patient on high flow at this time  -- Intensivist was , patient will transfer to ICU for higher level of care    CT chest showing complete collapse of the right side of the lung , will need chest tube     Consultants/Specialty  none    Code Status  DNAR/DNI    Disposition  The patient is not medically cleared for discharge to home or a post-acute facility.  Anticipate discharge to: home with close outpatient follow-up    I have placed the appropriate orders for post-discharge needs.    Review of Systems  Review of Systems   Gastrointestinal:  Positive for abdominal pain.      Nonverbab     Physical Exam  Temp:  [36 °C (96.8 °F)-38.3 °C (101 °F)] 37.2 °C (99 °F)  Pulse:  [] 103  Resp:  [16-18] 18  BP: (102-115)/(66-74) 115/74  SpO2:  [87 %-93 %] 88 %    Physical Exam  Vitals and nursing note reviewed.   HENT:      Head: Normocephalic and atraumatic.   Eyes:      Extraocular Movements: Extraocular movements intact.   Cardiovascular:      Rate and Rhythm: Normal rate.   Pulmonary:       Breath sounds: Rales (on the right side) present.   Abdominal:      General: Bowel sounds are normal.      Palpations: Abdomen is soft.      Tenderness: There is abdominal tenderness.   Musculoskeletal:      Cervical back: Neck supple.      Right lower leg: No edema.      Left lower leg: No edema.   Skin:     General: Skin is warm.   Neurological:      Mental Status: He is alert and oriented to person, place, and time.      Cranial Nerves: No cranial nerve deficit.         Fluids  No intake or output data in the 24 hours ending 03/28/25 1113       Laboratory  Recent Labs     03/26/25  2224 03/27/25  1757 03/28/25  0400   WBC 17.7* 20.9* 20.4*   RBC 5.49 4.95 5.01   HEMOGLOBIN 16.1 14.7 14.8   HEMATOCRIT 47.7 44.4 44.5   MCV 86.9 89.7 88.8   MCH 29.3 29.7 29.5   MCHC 33.8 33.1 33.3   RDW 40.0 42.3 42.5   PLATELETCT 324 286 286   MPV 9.5 9.6 9.9     Recent Labs     03/26/25  2335 03/27/25  0101 03/28/25  0400   SODIUM 133* 135 134*   POTASSIUM 4.2 3.7 4.1   CHLORIDE 105 109 100   CO2 16* 13* 22   GLUCOSE 88 140* 119*   BUN 17 16 22   CREATININE 0.71 0.57 0.99   CALCIUM 7.1* 6.5* 8.8                   Imaging  CT-CTA CHEST PULMONARY ARTERY W/ RECONS   Final Result      1.  Very large right pleural effusion causing collapse of the right lung.      2.  No CT evidence of pulmonary emboli.      3.  Multiple hepatic cysts.            DX-CHEST-PORTABLE (1 VIEW)   Final Result         1. Increased large amount of right-sided airspace disease with associated small to moderate right pleural effusion.   2. Stable left basilar airspace disease versus atelectasis.      CT-ABDOMEN-PELVIS W/O   Final Result      1.  No evidence of bowel obstruction or focal inflammatory change.      2.  Volume loss and consolidation within the right lung base with loculated right pleural effusion.      3.  Atelectasis within the left lung base.      4.  Constipation. Numerous sigmoid diverticulosis.      5.  Hepatic cysts.      6.  Gallstones.       NM-BILIARY (HIDA) SCAN WITH CCK   Final Result      No scintigraphic evidence of cholecystitis, biliary obstruction or other worrisome finding.      US-RUQ   Final Result      1.  Stones and sludge in the gallbladder.   2.  No evidence for acute cholecystitis or biliary obstruction.   3.  Multiple liver cysts again seen.   4.  Limited evaluation of pancreas and abdominal aorta.      DX-CHEST-PORTABLE (1 VIEW)   Final Result      Hypoinflation with bibasilar atelectasis.  Superimposed pneumonia is difficult to exclude.      US-THORACENTESIS PUNCTURE RIGHT    (Results Pending)   IR-CONSULT AND TREAT    (Results Pending)        Assessment/Plan  * Pneumonia due to infectious organism- (present on admission)  Assessment & Plan  Presenting with right upper quadrant pain, productive cough with clear sputum and fever at home  CXR reviewed, opacity on the right.  History of dysphagia due to Parkinson's, suspect aspiration pneumonia     -- was on unasyn+ azithro , added zyvox    Pleural effusion, right  Assessment & Plan  On chest x-ray and CT scan    Pulm called  Plan is to transfer pt to icu for chest tube      Advance care planning  Assessment & Plan  Patient is nonverbal . Patient wife who is poa and nursing staff    were present for the conversation.  I discussed advance care planning face to face with the patient and family for at least 18 minutes, including diagnosis, prognosis, plan of care, risks and benefits of any therapies that could be offered, as well as alternatives including palliation    Code status changed to dnar/dni    3/28/2025:  Cose status changed to DNAR, I ok , thus was transferred to ICU      Right upper quadrant abdominal pain- (present on admission)  Assessment & Plan  LFTs unremarkable,ush showing gallstone   Hida  normal maheshley chest pain is from pleurisy    Sepsis with acute hypoxic respiratory failure without septic shock (HCC)- (present on admission)  Assessment & Plan  SIRS criteria  identified on my evaluation include:  Tachycardia, with heart rate greater than 90 BPM and Leukocytosis, with WBC greater than 12,000  Secondary to pneumonia, no signs of endorgan damage at this time    History of stroke- (present on admission)  Assessment & Plan  Continue home aspirin, atorvastatin    Parkinson disease (HCC)- (present on admission)  Assessment & Plan  Continue carbidopa-levodopa  Nonverbal at baseline, uses a wheelchair but able to stand to urinate on his own and does not use a catheter  NPO , speech following    Restless leg syndrome  Assessment & Plan  Continue ropinirole         VTE prophylaxis:lovenox    Patient is critically ill.   The patient continues to have:  resp problem  The vital organ system that is affected is the: Lungs  If untreated there is a high chance of deterioration into: Acute respiratory failure, sepsis, septic shock,  And eventually death.   The critical care that I am providing today is: 38-minute  The critical that has been undertaken is medically complex.   There has been no overlap in critical care time.   Critical Care Time not including procedures: 38 minutes

## 2025-03-28 NOTE — CONSULTS
Critical Care Consultation    Date of consult: 3/28/2025    Referring Physician  Ángel Harper M.D.    Reason for Consultation  AMS, Hypoxia    History of Presenting Illness  74 y.o. male who presented 3/26/2025 with a history of Parkinsons, prior CVA (from LV thrombus in 6/2020), HTN, HLD, GERD.  He presented to [facility] on 3/27 complaining of RUQ abdominal pain and febrile.  RUQ US with stones/sludge and HIDA was subsequently normal.  He was found to have right sided pneunonia and on 3/28 had a CT of his chest that showed large right pleural effusion with collapse of his right lung.  He has also grown increasingly obtunded and has increasing oxygen requirements.    Originally DNR/DNI, his wife has changed his code status to Full Code in the hopes that this is reversible.  He is non-verbal and non-ambulatory at baseline but does ride his scooter independently and goes to the gym where he has a .  He communicates with his wife with their own language.    Code Status  Full Code    Review of Systems  Review of Systems   Unable to perform ROS: Critical illness       Past Medical History   has a past medical history of Arthritis, Back spasm, Blood clotting disorder (HCC) (06/2020), GOUT, Gout, Hypertension, Myocardial infarct (HCC) (06/2020), Parkinson disease (HCC), and Stroke ([facility]) (06/2020).    Surgical History   has a past surgical history that includes shoulder surgery; tonsillectomy; knee arthroscopy (Right); knee replacement, total (Bilateral); and neuro dest facet l/s w/ig sngl (Right, 9/29/2020).    Family History  family history includes Arthritis in his brother; Cancer in his father; Heart Disease in his father and mother.    Social History   reports that he quit smoking about 55 years ago. His smoking use included cigarettes. He has never used smokeless tobacco. He reports current alcohol use of about 4.2 oz of alcohol per week. He reports that he does not use drugs.    Medications  Home Medications        Reviewed by Carlos Enrique (Pharmacy Tech) on 03/27/25 at 0013  Med List Status: Complete     Medication Last Dose Status   amantadine (SYMMETREL) 100 MG Tab 3/26/2025 Active   aspirin 81 MG EC tablet 3/26/2025 Active   atorvastatin (LIPITOR) 80 MG tablet 3/26/2025 Active   carbidopa-levodopa (SINEMET)  MG Tab 3/26/2025 Active   Cetirizine-Pseudoephedrine (ZYRTEC-D PO) 3/26/2025 Active   Dextromethorphan-guaiFENesin (MUCINEX DM PO) 3/26/2025 Active   gabapentin (NEURONTIN) 300 MG Cap 3/26/2025 Active   meloxicam (MOBIC) 15 MG tablet 3/26/2025 Active   metoprolol SR (TOPROL XL) 50 MG TABLET SR 24 HR 3/26/2025 Active   Multiple Vitamins-Minerals (MULTIVITAMIN GUMMIES ADULT) Chew Tab 3/26/2025 Active   omeprazole (PRILOSEC) 20 MG delayed-release capsule 3/26/2025 Active   oxybutynin (DITROPAN) 5 MG Tab 3/26/2025 Active   ROPINIRole (REQUIP) 4 MG tablet 3/26/2025 Active                  Audit from Redirected Encounters    **Home medications have not yet been reviewed for this encounter**       Current Facility-Administered Medications   Medication Dose Route Frequency Provider Last Rate Last Admin    atropine 1 % ophthalmic solution 1 Drop  1 Drop Sublingual Once KOLE LivingstonA.-C.        acetaminophen (Tylenol) suppository 650 mg  650 mg Rectal Q4HRS PRN JANY Livingston.A.-C.        Respiratory Therapy Consult   Nebulization Continuous RT Akhil Gomez M.D.        azithromycin (ZITHROMAX) 500 mg in D5W 250 mL IVPB premix  500 mg Intravenous Q24HRS Akhil Gomez M.D.   Stopped at 03/28/25 1136    Linezolid (Zyvox) premix 600 mg  600 mg Intravenous Q12HRS Akhil Gomez M.D.   Stopped at 03/28/25 0953    pantoprazole (Protonix) injection 40 mg  40 mg Intravenous DAILY Akhil Gomez M.D.   40 mg at 03/28/25 0845    MD Alert...ICU Electrolyte Replacement per Pharmacy   Other PHARMACY TO DOSE Ángel Harper M.D.        famotidine (Pepcid) tablet 20 mg  20 mg Enteral Tube Q12HRS Ángel Harper M.D.         Or    famotidine (Pepcid) injection 20 mg  20 mg Intravenous Q12HRS Ángel Harper M.D.        lidocaine (Xylocaine) 1 % injection 2 mL  2 mL Tracheal Tube Q30 MIN PRN Ángel Harper M.D.        fentaNYL (Sublimaze) injection 100 mcg  100 mcg Intravenous Q15 MIN PRN Ángel Harper M.D.        And    fentaNYL (Sublimaze) injection 200 mcg  200 mcg Intravenous Q15 MIN PRN Ángel Harper M.D.        And    fentaNYL (SUBLIMAZE) 50 mcg/mL in 50mL (Continuous Infusion)   Intravenous Continuous Ángel Harper M.D.        And    dexmedetomidine (Precedex) 400 mcg/100mL infusion  0-1.5 mcg/kg/hr (Ideal) Intravenous Continuous Ángel Harper M.D.   Dose not Required at 03/28/25 1315    amantadine (Symmetrel) tablet 100 mg  100 mg Enteral Tube BID Ángel Harper M.D.        [START ON 3/29/2025] aspirin (Asa) chewable tab 81 mg  81 mg Enteral Tube DAILY Ángel Harper M.D.        carbidopa-levodopa (Sinemet)  MG tablet 2 Tablet  2 Tablet Enteral Tube 4X/DAY Ángel Harper M.D.        gabapentin (Neurontin) capsule 600 mg  600 mg Enteral Tube Q EVENING Ángel Harper M.D.        midodrine (Proamatine) tablet 5 mg  5 mg Enteral Tube TID WITH MEALS Ángel Harper M.D.        oxybutynin (Ditropan) tablet 5 mg  5 mg Enteral Tube BID Ángel Harper M.D.        ROPINIRole (Requip) tablet 4 mg  4 mg Enteral Tube TID Ángel Harper M.D.        sodium bicarbonate tablet 1,300 mg  1,300 mg Enteral Tube TID Ángel Harper M.D.        acetaminophen (Tylenol) tablet 650 mg  650 mg Enteral Tube Q6HRS PRN Ángel Harper M.D.        enoxaparin (Lovenox) inj 40 mg  40 mg Subcutaneous DAILY AT 1800 Karime Shafer M.D.   40 mg at 03/27/25 1819    ampicillin/sulbactam (Unasyn) 3 g in  mL IVPB  3 g Intravenous Q6HRS Karime Shafer M.D. 200 mL/hr at 03/28/25 1423 3 g at 03/28/25 1423    atorvastatin (Lipitor) tablet 80 mg  80 mg Oral Q EVENING Karime Shafer M.D.   80 mg at 03/27/25 1819    [Held by provider] metoprolol SR  (Toprol XL) tablet 50 mg  50 mg Oral DAILY Karime Shafer M.D.   50 mg at 03/27/25 0535       Allergies  Allergies   Allergen Reactions    Crexont [Carbidopa-Levodopa Er] Unspecified     Reported lost of mobility and bladder       Vital Signs last 24 hours  Temp:  [36 °C (96.8 °F)-38.3 °C (101 °F)] 37.2 °C (99 °F)  Pulse:  [] 114  Resp:  [10-36] 16  BP: ()/(53-74) 91/53  SpO2:  [87 %-97 %] 92 %      Physical Exam  Physical Exam  Vitals and nursing note reviewed. Exam conducted with a chaperone present.   Constitutional:       General: He is awake. He is not in acute distress.     Appearance: He is ill-appearing.   HENT:      Head: Normocephalic.      Mouth/Throat:      Mouth: Mucous membranes are moist.   Eyes:      Conjunctiva/sclera: Conjunctivae normal.   Cardiovascular:      Rate and Rhythm: Regular rhythm. Tachycardia present.      Pulses: Normal pulses.   Pulmonary:      Effort: Tachypnea present. No respiratory distress.      Breath sounds: Examination of the right-upper field reveals decreased breath sounds. Examination of the right-lower field reveals decreased breath sounds. Decreased breath sounds present.   Abdominal:      General: There is no distension.      Palpations: Abdomen is soft.      Tenderness: There is no abdominal tenderness. There is no guarding or rebound.   Musculoskeletal:         General: Normal range of motion.      Cervical back: Normal range of motion and neck supple.      Right lower leg: No edema.      Left lower leg: No edema.   Skin:     General: Skin is warm and dry.      Capillary Refill: Capillary refill takes less than 2 seconds.   Neurological:      General: No focal deficit present.      Mental Status: He is lethargic.      Comments: Very little response to noxious stimuli         Fluids  No intake or output data in the 24 hours ending 03/28/25 6276    Laboratory  Recent Results (from the past 48 hours)   CBC with Differential    Collection Time: 03/26/25 10:24  PM   Result Value Ref Range    WBC 17.7 (H) 4.8 - 10.8 K/uL    RBC 5.49 4.70 - 6.10 M/uL    Hemoglobin 16.1 14.0 - 18.0 g/dL    Hematocrit 47.7 42.0 - 52.0 %    MCV 86.9 81.4 - 97.8 fL    MCH 29.3 27.0 - 33.0 pg    MCHC 33.8 32.3 - 36.5 g/dL    RDW 40.0 35.9 - 50.0 fL    Platelet Count 324 164 - 446 K/uL    MPV 9.5 9.0 - 12.9 fL    Neutrophils-Polys 92.00 (H) 44.00 - 72.00 %    Lymphocytes 3.10 (L) 22.00 - 41.00 %    Monocytes 3.90 0.00 - 13.40 %    Eosinophils 0.10 0.00 - 6.90 %    Basophils 0.30 0.00 - 1.80 %    Immature Granulocytes 0.60 0.00 - 0.90 %    Nucleated RBC 0.00 0.00 - 0.20 /100 WBC    Neutrophils (Absolute) 16.33 (H) 1.82 - 7.42 K/uL    Lymphs (Absolute) 0.55 (L) 1.00 - 4.80 K/uL    Monos (Absolute) 0.70 0.00 - 0.85 K/uL    Eos (Absolute) 0.01 0.00 - 0.51 K/uL    Baso (Absolute) 0.05 0.00 - 0.12 K/uL    Immature Granulocytes (abs) 0.10 0.00 - 0.11 K/uL    NRBC (Absolute) 0.00 K/uL   EKG    Collection Time: 25 10:42 PM   Result Value Ref Range    Report       [facility] Emergency Dept.    Test Date:  2025  Pt Name:    GRIFFIN NEGRON                 Department: ER  MRN:        [id number]                      Room:  Gender:     Male                         Technician: [id number]  :        1950                   Requested By:ER TRIAGE PROTOCOL  Order #:    [id number]                    Reading MD: Bean Rubin MD    Measurements  Intervals                                Axis  Rate:       113                          P:          26  AL:         227                          QRS:        -15  QRSD:       76                           T:          119  QT:         319  QTc:        438    Interpretive Statements  Sinus tachycardia, rate of 113, AL prolongation, normal qtc, normal axis,  Nonspecific lateral ST abnormalities. low voltage compared to prior dated  24, where motion artifact was significant      Electronically Signed On 2025 22:42:18 PDT by Bean Rubin MD      URINALYSIS    Collection Time: 03/26/25 11:00 PM    Specimen: Urine, Clean Catch   Result Value Ref Range    Color Yellow     Character Clear     Specific Gravity 1.018 <1.035    Ph 6.0 5.0 - 8.0    Glucose Negative Negative mg/dL    Ketones Trace (A) Negative mg/dL    Protein Negative Negative mg/dL    Bilirubin Negative Negative    Urobilinogen, Urine 1.0 <=1.0 EU/dL    Nitrite Negative Negative    Leukocyte Esterase Negative Negative    Occult Blood Negative Negative    Micro Urine Req see below    Lactic Acid    Collection Time: 03/26/25 11:00 PM   Result Value Ref Range    Lactic Acid 1.2 0.5 - 2.0 mmol/L   Urine Culture (New)    Collection Time: 03/26/25 11:00 PM    Specimen: Urine   Result Value Ref Range    Significant Indicator NEG     Source UR     Site -     Culture Result No growth at 24 hours.    BLOOD CULTURE    Collection Time: 03/26/25 11:00 PM    Specimen: Peripheral; Blood   Result Value Ref Range    Significant Indicator NEG     Source BLD     Site PERIPHERAL     Culture Result       No Growth  Note: Blood cultures are incubated for 5 days and  are monitored continuously.Positive blood cultures  are called to the RN and reported as soon as  they are identified.     BLOOD CULTURE    Collection Time: 03/26/25 11:00 PM    Specimen: Peripheral; Blood   Result Value Ref Range    Significant Indicator NEG     Source BLD     Site PERIPHERAL     Culture Result       No Growth  Note: Blood cultures are incubated for 5 days and  are monitored continuously.Positive blood cultures  are called to the RN and reported as soon as  they are identified.     POC CoV-2, FLU A/B, RSV by PCR    Collection Time: 03/26/25 11:05 PM   Result Value Ref Range    POC Influenza A RNA, PCR Negative Negative    POC Influenza B RNA, PCR Negative Negative    POC RSV, by PCR Negative Negative    POC SARS-CoV-2, PCR NotDetected NotDetected   Comp Metabolic Panel    Collection Time: 03/26/25 11:35 PM   Result Value Ref Range     Sodium 133 (L) 135 - 145 mmol/L    Potassium 4.2 3.6 - 5.5 mmol/L    Chloride 105 96 - 112 mmol/L    Co2 16 (L) 20 - 33 mmol/L    Anion Gap 12.0 7.0 - 16.0    Glucose 88 65 - 99 mg/dL    Bun 17 8 - 22 mg/dL    Creatinine 0.71 0.50 - 1.40 mg/dL    Calcium 7.1 (L) 8.5 - 10.5 mg/dL    Correct Calcium 8.2 (L) 8.5 - 10.5 mg/dL    AST(SGOT) 37 12 - 45 U/L    ALT(SGPT) 18 2 - 50 U/L    Alkaline Phosphatase 91 30 - 99 U/L    Total Bilirubin 0.6 0.1 - 1.5 mg/dL    Albumin 2.6 (L) 3.2 - 4.9 g/dL    Total Protein 5.4 (L) 6.0 - 8.2 g/dL    Globulin 2.8 1.9 - 3.5 g/dL    A-G Ratio 0.9 g/dL   LIPASE    Collection Time: 03/26/25 11:35 PM   Result Value Ref Range    Lipase 19 11 - 82 U/L   ESTIMATED GFR    Collection Time: 03/26/25 11:35 PM   Result Value Ref Range    GFR (CKD-EPI) 97 >60 mL/min/1.73 m 2   TROPONIN    Collection Time: 03/27/25  1:01 AM   Result Value Ref Range    Troponin T 7 6 - 19 ng/L   PROCALCITONIN    Collection Time: 03/27/25  1:01 AM   Result Value Ref Range    Procalcitonin 0.51 (H) <0.25 ng/mL   Basic Metabolic Panel (BMP)    Collection Time: 03/27/25  1:01 AM   Result Value Ref Range    Sodium 135 135 - 145 mmol/L    Potassium 3.7 3.6 - 5.5 mmol/L    Chloride 109 96 - 112 mmol/L    Co2 13 (L) 20 - 33 mmol/L    Glucose 140 (H) 65 - 99 mg/dL    Bun 16 8 - 22 mg/dL    Creatinine 0.57 0.50 - 1.40 mg/dL    Calcium 6.5 (LL) 8.5 - 10.5 mg/dL    Anion Gap 13.0 7.0 - 16.0   CORTISOL    Collection Time: 03/27/25  1:01 AM   Result Value Ref Range    Cortisol 12.3 0.0 - 23.0 ug/dL   ESTIMATED GFR    Collection Time: 03/27/25  1:01 AM   Result Value Ref Range    GFR (CKD-EPI) 104 >60 mL/min/1.73 m 2   CBC WITHOUT DIFFERENTIAL    Collection Time: 03/27/25  5:57 PM   Result Value Ref Range    WBC 20.9 (H) 4.8 - 10.8 K/uL    RBC 4.95 4.70 - 6.10 M/uL    Hemoglobin 14.7 14.0 - 18.0 g/dL    Hematocrit 44.4 42.0 - 52.0 %    MCV 89.7 81.4 - 97.8 fL    MCH 29.7 27.0 - 33.0 pg    MCHC 33.1 32.3 - 36.5 g/dL    RDW 42.3 35.9 -  50.0 fL    Platelet Count 286 164 - 446 K/uL    MPV 9.6 9.0 - 12.9 fL   CBC WITHOUT DIFFERENTIAL    Collection Time: 03/28/25  4:00 AM   Result Value Ref Range    WBC 20.4 (H) 4.8 - 10.8 K/uL    RBC 5.01 4.70 - 6.10 M/uL    Hemoglobin 14.8 14.0 - 18.0 g/dL    Hematocrit 44.5 42.0 - 52.0 %    MCV 88.8 81.4 - 97.8 fL    MCH 29.5 27.0 - 33.0 pg    MCHC 33.3 32.3 - 36.5 g/dL    RDW 42.5 35.9 - 50.0 fL    Platelet Count 286 164 - 446 K/uL    MPV 9.9 9.0 - 12.9 fL   Comp Metabolic Panel    Collection Time: 03/28/25  4:00 AM   Result Value Ref Range    Sodium 134 (L) 135 - 145 mmol/L    Potassium 4.1 3.6 - 5.5 mmol/L    Chloride 100 96 - 112 mmol/L    Co2 22 20 - 33 mmol/L    Anion Gap 12.0 7.0 - 16.0    Glucose 119 (H) 65 - 99 mg/dL    Bun 22 8 - 22 mg/dL    Creatinine 0.99 0.50 - 1.40 mg/dL    Calcium 8.8 8.5 - 10.5 mg/dL    Correct Calcium 9.6 8.5 - 10.5 mg/dL    AST(SGOT) 28 12 - 45 U/L    ALT(SGPT) 21 2 - 50 U/L    Alkaline Phosphatase 102 (H) 30 - 99 U/L    Total Bilirubin 1.3 0.1 - 1.5 mg/dL    Albumin 3.0 (L) 3.2 - 4.9 g/dL    Total Protein 6.3 6.0 - 8.2 g/dL    Globulin 3.3 1.9 - 3.5 g/dL    A-G Ratio 0.9 g/dL   ESTIMATED GFR    Collection Time: 03/28/25  4:00 AM   Result Value Ref Range    GFR (CKD-EPI) 81 >60 mL/min/1.73 m 2   PROCALCITONIN    Collection Time: 03/28/25  4:00 AM   Result Value Ref Range    Procalcitonin 1.45 (H) <0.25 ng/mL   MRSA By PCR (Amp)    Collection Time: 03/28/25  8:50 AM    Specimen: Nares; Respirate   Result Value Ref Range    MRSA by PCR Negative Negative   Respiratory Panel by PCR (Inpatient ONLY)    Collection Time: 03/28/25  8:50 AM    Specimen: Nasopharyngeal; Respirate   Result Value Ref Range    Adenovirus, PCR Not Detected     SARS-CoV-2 (COVID-19) RNA by DELANEY NotDetected     Coronavirus 229E, PCR Not Detected     Coronavirus HKU1, PCR Not Detected     Coronavirus NL63, PCR Not Detected     Coronavirus OC43, PCR Not Detected     Human Metapneumovirus, PCR Not Detected      Rhino/Enterovirus, PCR Not Detected     Influenza A, PCR Not Detected     Influenza B, PCR Not Detected     Parainfluenza 1, PCR Not Detected     Parainfluenza 2, PCR Not Detected     Parainfluenza 3, PCR Not Detected     Parainfluenza 4, PCR Not Detected     RSV (Respiratory Syncytial Virus), PCR Not Detected     Bordetella parapertussis (ND1389), PCR Not Detected     Bordetella pertussis (ptxP), PCR Not Detected     Mycoplasma pneumoniae, PCR Not Detected     Chlamydia pneumoniae, PCR Not Detected    POCT glucose device results    Collection Time: 03/28/25  9:03 AM   Result Value Ref Range    POC Glucose, Blood 190 (H) 65 - 99 mg/dL   ABG - LAB    Collection Time: 03/28/25 10:55 AM   Result Value Ref Range    Ph 7.43 7.35 - 7.45    Pco2 39.3 32.0 - 48.0 mmHg    Po2 56.2 (L) 83.0 - 108.0 mmHg    O2 Saturation 88.6 (L) 93.0 - 99.0 %    Hco3 26 21 - 28 mmol/L    Base Excess 2 -4 - 3 mmol/L    Body Temp 32.2 Centigrade    Ph -TC 7.51 (H) 7.35 - 7.45    Pco2 -TC 31.9 (L) 32.0 - 48.0 mmHg    Po2 -TC 40.2 (LL) 83.0 - 108.0 mmHg   POCT arterial blood gas device results    Collection Time: 03/28/25  2:32 PM   Result Value Ref Range    Ph 7.501 (H) 7.350 - 7.450    Pco2 31.2 (L) 32.0 - 48.0 mmHg    Po2 72 (L) 83 - 108 mmHg    Tco2 25 (L) 32 - 48 mmol/L    S02 96 93 - 99 %    Hco3 24.4 21.0 - 28.0 mmol/L    BE 2 -4 - 3 mmol/L    Body Temp 98.7 F degrees    O2 Therapy 100 %    iPF Ratio 72     Ph Temp Sarah 7.500 (H) 7.350 - 7.450    Pco2 Temp Co 31.3 (L) 32.0 - 48.0 mmHg    Po2 Temp Cor 72 (L) 83 - 108 mmHg    Specimen Arterial     Jasson Test Pass     DelSys Vent     Tidal Volume 480 mL    Peep End Expiratory Pressure 10 cmh20    Set Rate 24     Mode APV-CMV    POCT lactate device results    Collection Time: 03/28/25  2:32 PM   Result Value Ref Range    iStat Lactate 1.1 0.5 - 2.0 mmol/L       Imaging  DX-CHEST-FOR LINE PLACEMENT Perform procedure in: Patient's Room   Final Result         1. Appropriately positioned  endotracheal tube.   2. Interval improvement in aeration of the lung bases.      CT-CTA CHEST PULMONARY ARTERY W/ RECONS   Final Result      1.  Very large right pleural effusion causing collapse of the right lung.      2.  No CT evidence of pulmonary emboli.      3.  Multiple hepatic cysts.            DX-CHEST-PORTABLE (1 VIEW)   Final Result         1. Increased large amount of right-sided airspace disease with associated small to moderate right pleural effusion.   2. Stable left basilar airspace disease versus atelectasis.      CT-ABDOMEN-PELVIS W/O   Final Result      1.  No evidence of bowel obstruction or focal inflammatory change.      2.  Volume loss and consolidation within the right lung base with loculated right pleural effusion.      3.  Atelectasis within the left lung base.      4.  Constipation. Numerous sigmoid diverticulosis.      5.  Hepatic cysts.      6.  Gallstones.      NM-BILIARY (HIDA) SCAN WITH CCK   Final Result      No scintigraphic evidence of cholecystitis, biliary obstruction or other worrisome finding.      US-RUQ   Final Result      1.  Stones and sludge in the gallbladder.   2.  No evidence for acute cholecystitis or biliary obstruction.   3.  Multiple liver cysts again seen.   4.  Limited evaluation of pancreas and abdominal aorta.      DX-CHEST-PORTABLE (1 VIEW)   Final Result      Hypoinflation with bibasilar atelectasis.  Superimposed pneumonia is difficult to exclude.          Assessment/Plan  * Pneumonia due to infectious organism- (present on admission)  Assessment & Plan  Continue unasyn  Follow blood cultures  Will drain right chest and send for studies  Will leave small drain behind in case there is empyema    Pleural effusion, right  Assessment & Plan  Potentially parapneumonic  Will place chest tube and send studies  Follow up results, culture    Chest tube to suction for now, water seal is likely reasonable tomorrow    Advance care planning  Assessment & Plan  Wife has  changed him from DNR/DNI to Full code  She would like to buy some time until the clinical picture is further elucidated    Sepsis with acute hypoxic respiratory failure without septic shock (HCC)- (present on admission)  Assessment & Plan  This is Sepsis Present on admission  SIRS criteria identified on my evaluation include: Tachycardia, with heart rate greater than 90 BPM and Leukocytosis, with WBC greater than 12,000  Clinical indicators of end organ dysfunction include Toxic Metabolic Encephalopathy and GCS < 15  Source is Pulmonary  Sepsis protocol initiated  Crystalloid Fluid Administration: Resuscitation volume of 0 ordered. Reason that resuscitation volume of less than 30ml/kg was ordered concern for causing harm given hypoxia and pleural effusion  IV antibiotics as appropriate for source of sepsis  Reassessment: I have reassessed the patient's hemodynamic status    Follow cultures  Continue unasyn  Will place chest tube and send studies    Right upper quadrant abdominal pain- (present on admission)  Assessment & Plan  HIDA negative  Possibly pleurisy     History of stroke- (present on admission)  Assessment & Plan  PT    Parkinson disease (HCC)- (present on admission)  Assessment & Plan  Nonverbal, nonabmulatory at baseline  Goes to the Gym and has a   Communicates with hand signals / sign language        Discussed patient condition and risk of morbidity and/or mortality with Hospitalist, Family, RN, RT, Pharmacy, , Code status disscussed, and Charge nurse / hot rounds.      The patient remains critically ill.  He has respiratory failure and inability to protect his airway requiring monitoring in the ICU.  Critical care time = 52 minutes in directly providing and coordinating critical care and extensive data review.  No time overlap and excludes procedures.

## 2025-03-28 NOTE — PROCEDURES
Timeout 1254    Dr. Harper at bedside to intubate and place chest tube    1258 20 mg of etomidate     1258 100 mg rocuronium    1259 Positive color change post intubation.    P 118    1308  100 mcg phenylephrine push    1333  Chest tube inserted in R chest.  Yellow drainage noted. Specimen collected.    1336 Guide wire removed    1340 Chest tube sutured in place    1343 Chest x-ray ordered    1348 Collection device to 20 cm H2O suction  1348 Clear dressing with biopatch applied  1348 Procedure complete

## 2025-03-28 NOTE — CONSULTS
Pulmonary Consult Note    Date of Service  3/28/2025    HPI  Jeremiah Huber is a 73 y/o man with hx of Parkinson's, stroke (6/2020 attributed to LV thrombus), HTN, HLD, and GERD who presented with RUQ abdominal pain/chest pain. Pulmonology consulted after finding of pneumonia with parapneumonic effusion and increased O2 demands.     Pt somnolent after pain meds given. Is nonverbal at baseline, wife at bedside is DPOA. States had severe RUQ abdominal pain and she was notified that O2 needs increased this morning. CTA chest revealing large R pleural effusion causing collapse of lung.    Review of Systems  Review of Systems   Unable to perform ROS: Medical condition        Past Medical History  -see HPI    Past Surgical History  -see HPI    Medications     Prior to Admission Medications   Prescriptions Last Dose Informant Patient Reported? Taking?   Cetirizine-Pseudoephedrine (ZYRTEC-D PO) 3/26/2025 Significant Other Yes Yes   Sig: Take 1 Tablet by mouth every 12 hours as needed (allergies).   Dextromethorphan-guaiFENesin (MUCINEX DM PO) 3/26/2025 Morning Significant Other Yes Yes   Sig: Take 1 Tablet by mouth every 12 hours as needed (congestion).   Multiple Vitamins-Minerals (MULTIVITAMIN GUMMIES ADULT) Chew Tab 3/26/2025 Morning Significant Other Yes Yes   Sig: Chew 2 Each every day.   ROPINIRole (REQUIP) 4 MG tablet 3/26/2025 at  6:00 PM Significant Other No Yes   Sig: Take 1 Tablet by mouth 3 times a day.   amantadine (SYMMETREL) 100 MG Tab 3/26/2025 at  6:00 PM Significant Other No Yes   Sig: Take 1 Tablet by mouth 2 times a day.   aspirin 81 MG EC tablet 3/26/2025 Morning Significant Other No Yes   Sig: TAKE 1 TABLET BY MOUTH EVERY DAY   Patient taking differently: Take 81 mg by mouth every day.   atorvastatin (LIPITOR) 80 MG tablet 3/26/2025 at  6:00 PM Significant Other No Yes   Sig: Take 1 Tablet by mouth every evening.   carbidopa-levodopa (SINEMET)  MG Tab 3/26/2025 at  6:00 PM Significant Other No  Yes   Sig: Take 2 Tablets by mouth 4 times a day. 6AM, 10AM, 2PM, and 6PM   gabapentin (NEURONTIN) 300 MG Cap 3/26/2025 at  8:30 PM Significant Other No Yes   Sig: TAKE 2 CAPSULES BY MOUTH EVERY EVENING   Patient taking differently: Take 600 mg by mouth every evening. 2 capsules= 600mg   meloxicam (MOBIC) 15 MG tablet 3/26/2025 Morning Significant Other No Yes   Sig: Take 1 Tablet by mouth 1 time a day as needed for Mild Pain.   metoprolol SR (TOPROL XL) 50 MG TABLET SR 24 HR 3/26/2025 Morning Significant Other No Yes   Sig: Take 1 Tablet by mouth every day.   omeprazole (PRILOSEC) 20 MG delayed-release capsule 3/26/2025 Morning Significant Other No Yes   Sig: TAKE 1 CAPSULE BY MOUTH ONCE A DAY 30 MINUTES BEFORE BREAKFAST MEAL   Patient taking differently: Take 20 mg by mouth every day. TAKE 1 CAPSULE BY MOUTH ONCE A DAY 30 MINUTES BEFORE BREAKFAST MEAL   oxybutynin (DITROPAN) 5 MG Tab 3/26/2025 at  6:00 PM Significant Other Yes Yes   Sig: Take 5 mg by mouth 2 times a day.      Facility-Administered Medications: None        Allergies    Allergies   Allergen Reactions    Crexont [Carbidopa-Levodopa Er] Unspecified     Reported lost of mobility and bladder       Family History    family history includes Arthritis in his brother; Cancer in his father; Heart Disease in his father and mother.     Social History  -see HPI       Physical Exam    Vitals:  Temp:  [36 °C (96.8 °F)-38.3 °C (101 °F)] 37.2 °C (99 °F)  Pulse:  [] 103  Resp:  [16-18] 18  BP: (102-115)/(66-74) 115/74  SpO2:  [87 %-93 %] 88 %    Physical Exam  Constitutional:       General: He is not in acute distress.     Appearance: He is normal weight. He is ill-appearing.   HENT:      Head: Normocephalic and atraumatic.      Nose: Nose normal.      Mouth/Throat:      Mouth: Mucous membranes are moist.      Pharynx: Oropharynx is clear. No oropharyngeal exudate.   Eyes:      General: No scleral icterus.     Conjunctiva/sclera: Conjunctivae normal.       Pupils: Pupils are equal, round, and reactive to light.   Cardiovascular:      Rate and Rhythm: Normal rate and regular rhythm.      Pulses: Normal pulses.      Heart sounds: Normal heart sounds. No murmur heard.  Pulmonary:      Effort: Pulmonary effort is normal. No respiratory distress.      Breath sounds: Wheezing (End-expiratory in R) present. No rales.      Comments: Decreased breath sounds in R lung fields. On 10L oxymask  Abdominal:      General: Abdomen is flat. Bowel sounds are normal. There is no distension.      Palpations: Abdomen is soft. There is no mass.      Tenderness: There is no abdominal tenderness.   Musculoskeletal:         General: No swelling or tenderness. Normal range of motion.      Cervical back: Normal range of motion and neck supple.   Skin:     General: Skin is warm and dry.      Capillary Refill: Capillary refill takes less than 2 seconds.      Coloration: Skin is not jaundiced or pale.   Neurological:      Comments: Somnolent during exam          Data:  DX-CHEST-PORTABLE (1 VIEW)   Final Result         1. Increased large amount of right-sided airspace disease with associated small to moderate right pleural effusion.   2. Stable left basilar airspace disease versus atelectasis.      CT-ABDOMEN-PELVIS W/O   Final Result      1.  No evidence of bowel obstruction or focal inflammatory change.      2.  Volume loss and consolidation within the right lung base with loculated right pleural effusion.      3.  Atelectasis within the left lung base.      4.  Constipation. Numerous sigmoid diverticulosis.      5.  Hepatic cysts.      6.  Gallstones.      NM-BILIARY (HIDA) SCAN WITH CCK   Final Result      No scintigraphic evidence of cholecystitis, biliary obstruction or other worrisome finding.      US-RUQ   Final Result      1.  Stones and sludge in the gallbladder.   2.  No evidence for acute cholecystitis or biliary obstruction.   3.  Multiple liver cysts again seen.   4.  Limited  evaluation of pancreas and abdominal aorta.      DX-CHEST-PORTABLE (1 VIEW)   Final Result      Hypoinflation with bibasilar atelectasis.  Superimposed pneumonia is difficult to exclude.      CT-CTA CHEST PULMONARY ARTERY W/ RECONS    (Results Pending)        Assessment/Plan  75 y/o man with hx of Parkinson's disease admitted with likely aspiration pneumonia with large R sided parapneumonic effusion causing collapsed lung.     #Acute hypoxic respiratory failure secondary to large R sided pleural effusion  #Aspiration pneumonia      Recommendations:  -Needs HLOC, transfer to ICU/IMCU. RTOC consulted  -Thoracentesis cannot be done at bedside today given pt unable to sit on side of bed or cooperate with procedure  -IR consult for thoracentesis placed  -Continue Unasyn and azithromycin for likely aspiration pneumonia\  -SLP following      Isaiah Parikh, PGY3  Internal Medicine Residency     Plan has been discussed with Attending Physician.

## 2025-03-28 NOTE — THERAPY
Speech Language Therapy Contact Note    Patient Name: Jeremiah Huber  Age:  74 y.o., Sex:  male  Medical Record #: [id number]  Today's Date: 3/28/2025    Discussed missed therapy with RN & MD.       03/28/25 1111   Treatment Variance   Reason For Missed Therapy Medical - Patient on Hold from Therapy   Initial Contact Note    Initial Contact Note  Order Received and Verified, Speech Therapy Evaluation in Progress with Full Report to Follow.   Interdisciplinary Plan of Care Collaboration   IDT Collaboration with  Nursing;Physician   Patient Position at End of Therapy In Bed;Bed Alarm On;Call Light within Reach;Family / Friend in Room   Collaboration Comments This clinician attempted FEES this am. Upon entry, patient being attented to by medical team, now being transferred IM. Service will hold and re-attempt as able/medically appropriate.

## 2025-03-28 NOTE — PROGRESS NOTES
Pt woke up screaming in pain. I administered 2 mg of morphine without changes over 20 min called on call. Got an order for 2 mg more of morphine and a stat CT scan of the abdomen.

## 2025-03-28 NOTE — DIETARY
"Nutrition Services: Initial Assessment     Day 1 of admit. Jeremiah Huber is 74 y.o., male with admitting DX of Pneumonia due to infectious organism [J18.9].    Consult Received for: EN    Current Hospital Problems List:    Principal Problem:    Pneumonia due to infectious organism (POA: Yes)  Active Problems:    Parkinson disease (HCC) (POA: Yes)    History of stroke (POA: Yes)    Sepsis with acute hypoxic respiratory failure without septic shock (HCC) (POA: Yes)    Right upper quadrant abdominal pain (POA: Yes)    Advance care planning (POA: Unknown)    Pleural effusion, right (POA: Unknown)  Resolved Problems:    * No resolved hospital problems. *    Nutrition Assessment:      Height: 185.4 cm (6' 1\")  Weight: 86.1 kg (189 lb 13.1 oz)  Weight taken via: Bed Scale  BMI Calculated: 25.04  BMI Classification: Overweight       Weight Readings from Last 5 encounters:   Wt Readings from Last 5 Encounters:   25 86.1 kg (189 lb 13.1 oz)   25 89.5 kg (197 lb 5 oz)   24 91.1 kg (200 lb 13.4 oz)   24 92.5 kg (204 lb)   24 93 kg (205 lb)       Calculation Equation: PSU (Vent L/min: 10.8, Tmax x 24 hours: 38.3 C) = 2117 kcal, REE with MSJ 1666 x 1.2 = 2000 kcal  Total Calories / day: 2000 - 2200 Calories / k.2 - 25.5   Total Grams Protein / day: 86 - 104 Grams Protein / k - 1.2      Objective:   Indication for Nutrition Support: Intubation  Enteral Access:  NG placement pending  Pertinent Labs: 3/28: Na 134 (L), K+ 4.1, Glu 119 (H), BUN 22, Creat 0.99, Alb 3 (L)  Pertinent Meds: Precedex, Unasyn, Lipitor, Azithromycin, Sinemet, Zyvox  Skin/Wounds:  Redness and blanching to sacrum per RN skin assessment. Wound team consult pending.  Food Allergies: None known  Last BM:     25   Formula based on RD Eval: Jevity 1.2 Javier      Current Diet Order/Intake:   NPO      Subjective:   Patient is intubated. Unable to interview.       Nutrition Focused Physical Exam (NFPE)  Weight Loss: Per " chart review, 3.8% loss in just over one month which does not meet ASPEN criteria for malnutrition.  Muscle Mass: Unable to identify at this time  Subcutaneous Fat: Unable to identify at this time  Fluid Accumulation: None noted  Reduced  Strength: N/A in acute care setting.    Nutrition Diagnosis:      Inadequate Oral Intake related to intubation as evidenced by patient NPO with need for tube feeding.      Based on RD assessment at this time, Unable to fully assess for malnutrition at this time    Nutrition Interventions:      Initiate Jevity 1.2 at 25 ml/hr continuous and advance per protocol to goal rate of 70 ml/hr.  Goal tube feed volume provides 2016 kcals, 93 g protein, and 1356 ml free water daily.   Additional fluids per Provider.  Patient aware of active plan of care as appropriate.       Nutrition Monitoring and Evaluation:      Monitor nutrition POC, goal for >85% nutrition needs met via tube feeding.  Monitor vital signs pertinent to nutrition.    RD following and will provide updated recommendations as indicated.      Hilda Gomez R.D.                                         DENNISEN/AND CRITERIA FOR MALNUTRITION

## 2025-03-28 NOTE — ASSESSMENT & PLAN NOTE
"3/28: \"Wife has changed him from DNR/DNI to Full code-->She would like to buy some time until the clinical picture is further elucidated\". Per Dr. Harper's encounter  3/29: pt back to DNR, I OK.  Discussed with patient, wife, and 2 adult children that we will do everything to set him up for success to liberate him from the ventilator; however, I asked if it was not possible to liberate him, would tracheostomy/LTAC be an option and the wife definitely was opposed to this.  "

## 2025-03-28 NOTE — PROGRESS NOTES
NOC HOSPITALIST CROSS COVER    Notified by RN regarding sudden onset of what appears to be abdominal pain.  Patient is generally nonverbal but was crying out indicating that his abdomen was hurting.  Order placed for IV Dilaudid and an abdominal CT.  Abdominal CT did not identify any obvious source of abdominal pain however there does appears to be some stool and gas.  Will trial some simethicone.  Patient also has significant amount of secretions and I am concerned that he may be aspirating on the secretions.  Order placed for atropine ophthalmic solution to try to limit the amount of secretions that he is producing.  Later notified that patient had an increase in oxygen requirement, patient was placed on an Oxymask at 5 L.  Placed an order for chest x-ray which demonstrates significant right-sided airspace disease with possible pleural effusion.  Order placed for a small dose of IV Lasix.       -----------------------------------------------------------------------------------------------------------    Electronically signed by:  KALYN Thompson PA-C  Hospitalist Services

## 2025-03-28 NOTE — ASSESSMENT & PLAN NOTE
Continue unasyn x 5 days  S/p azithromycin x 3 days  Stopped linezolid with negative MRSA pcr  Follow blood cultures: negative so far  S/p right chest pigtail catheter on 3/28 -->water seal today  Following pleural fluid which so far does not having findings of empyema, likely more parapneumonic

## 2025-03-28 NOTE — ASSESSMENT & PLAN NOTE
Nonverbal, nonabmulatory at baseline  Goes to the Gym and has a   Communicates with hand signals / sign language  Cont home amantadine 100mg OG BID  Cont home Sinemet 25-250mg 2 tabs OG 4 times daily  Home gabapentin 600mg - hold  Cont home ropinirole 4mg via OG TID

## 2025-03-28 NOTE — PROCEDURES
Intubation    Date/Time: 3/28/2025 2:42 PM    Performed by: Ángel Harper M.D.  Authorized by: Ángel Harper M.D.    Consent:     Consent obtained:  Verbal    Consent given by:  Spouse    Risks discussed:  Death    Alternatives discussed:  No treatment and alternative treatment  Universal protocol:     Procedure explained and questions answered to patient or proxy's satisfaction: yes      Relevant documents present and verified: yes      Test results available and properly labeled: yes      Imaging studies available: yes      Required blood products, implants, devices, and special equipment available: yes      Site/side marked: yes      Immediately prior to procedure, a time out was called: yes      Patient identity confirmed:  Hospital-assigned identification number and arm band  Pre-procedure details:     Patient status:  Unresponsive    Pretreatment meds: Etomidate.    Paralytics:  Rocuronium  Procedure details:     Preoxygenation:  Bag valve mask    CPR in progress: no      Intubation method:  Oral    Laryngoscope type:  GlideScope    Laryngoscope size: S4.    Cormack-Lehane Classification:  Grade 1    Tube size (mm):  8.0    Tube type:  Cuffed    Number of attempts:  1    Ventilation between attempts: no      Cricoid pressure: no      Tube visualized through cords: yes    Placement assessment:     ETT to teeth:  24    Tube secured with:  ETT cool    Breath sounds:  Equal and absent over the epigastrium    Placement verification: chest rise, condensation, CXR verification, direct visualization, equal breath sounds, ETCO2 detector and tube exhalation      CXR findings:  ETT in proper place  Post-procedure details:     Patient tolerance of procedure:  Tolerated well, no immediate complications

## 2025-03-28 NOTE — PROGRESS NOTES
4 Eyes Skin Assessment Completed by ALYCE Salguero and ALYCE Quintanilla.    Head WDL  Ears WDL  Nose WDL  Mouth WDL  Neck WDL  Breast/Chest WDL  Shoulder Blades WDL  Spine WDL  (R) Arm/Elbow/Hand Redness and Blanching  (L) Arm/Elbow/Hand WDL  Abdomen WDL  Groin WDL  Scrotum/Coccyx/Buttocks Redness and Blanching  (R) Leg Scar  (L) Leg WDL  (R) Heel/Foot/Toe Redness and Blanching  (L) Heel/Foot/Toe Redness and Blanching          Devices In Places BP cuff, , Condom cath, oxymask    Interventions In Place TAP System, Pillows, Q2 Turns, Low Air Loss Mattress, Barrier Cream, and Heels Loaded W/Pillows    Possible Skin Injury Yes    Pictures Uploaded Into Epic Yes  Wound Consult Placed N/A  RN Wound Prevention Protocol Ordered Yes

## 2025-03-28 NOTE — PROGRESS NOTES
1230: Pt arrived to ICU    Vitals:   HR: 102  BP: 103/65  RR: 28  SaO2: 90 on 15L O2  Temp 99.5 F   _____________________________________________________________    2 RN skin check completed with Lovely and Evita    Areas of concern/Skin observations:  Sacrum/buttocks/thigh    Devices in use, assessed under and interventions (as appropriate) for skin protection:  SCDs, BP cuff, SaO2 monitor  Non-rebreather    Interventions in place such as:   q2 hour turns  Keeping skin clean and dry  Use of products such as barrier wipes/cream  Bed Type: Low Air Loss Mattress  ______________________________________________________________    Personal belongings:   All belongings with spouse  Wedding band in place  ____________________________________________________________    4 Eyes Skin Assessment Completed by ALYCE Jama and ALYCE Murry.    Head Redness  Ears Redness and Blanching  Nose Redness and Blanching  Mouth Discoloration Purple/black to tongue and residual darkness to teeth   Neck WDL  Breast/Chest Bruising  Shoulder Blades Redness and Blanching  Spine Redness  (R) Arm/Elbow/Hand Bruising  (L) Arm/Elbow/Hand Bruising  Abdomen WDL  Groin Redness and Excoriation  Scrotum/Coccyx/Buttocks Redness, Non-Blanching, Excoriation, and Discoloration pic taken  (R) Leg Redness  (L) Leg Redness  (R) Heel/Foot/Toe Redness, Boggy, and Edema  (L) Heel/Foot/Toe Redness, Blanching, and Boggy          Devices In Places ECG, Blood Pressure Cuff, Condom Cath, and Oxy Mask      Interventions In Place Gray Ear Foams, Heel Mepilex, Sacral Mepilex, Waffle Overlay, TAP System, Pillows, Q2 Turns, Low Air Loss Mattress, Barrier Cream, ZFlo Pillow, and Heels Loaded W/Pillows    Possible Skin Injury Yes    Pictures Uploaded Into Epic Yes  Wound Consult Placed Yes  RN Wound Prevention Protocol Ordered Yes\

## 2025-03-28 NOTE — PROCEDURES
Chest Tube Insertion    Date/Time: 3/28/2025 2:43 PM    Performed by: Ángel Harper M.D.  Authorized by: Ángel Harper M.D.    Consent:     Consent obtained:  Verbal    Consent given by:  Spouse    Risks, benefits, and alternatives were discussed: yes      Risks discussed:  Bleeding, incomplete drainage, pain, infection and damage to surrounding structures    Alternatives discussed:  No treatment  Universal protocol:     Procedure explained and questions answered to patient or proxy's satisfaction: yes      Relevant documents present and verified: yes      Test results available: yes      Imaging studies available: yes      Required blood products, implants, devices, and special equipment available: yes      Site/side marked: yes      Immediately prior to procedure, a time out was called: yes      Patient identity confirmed:  Hospital-assigned identification number and provided demographic data  Pre-procedure details:     Skin preparation:  Chlorhexidine    Preparation: Patient was prepped and draped in the usual sterile fashion    Sedation:     Sedation type:  None  Anesthesia:     Anesthesia method:  Local infiltration    Local anesthetic:  Lidocaine 1% w/o epi  Procedure details:     Placement location:  R lateral    Scalpel size:  15    Tube size (Tamazight): 10 Fr.    Ultrasound guidance: yes      Tension pneumothorax: no      Tube connected to:  Suction    Drainage characteristics:  Yellow and clear    Suture material:  0 silk    Dressing: BioPatch and Tegaderm.  Post-procedure details:     Post-insertion x-ray findings: tube in good position      Procedure completion:  Tolerated well, no immediate complications

## 2025-03-28 NOTE — CARE PLAN
The patient is Watcher - Medium risk of patient condition declining or worsening    Shift Goals  Clinical Goals: Hemodynamic stability, Improved oxygenation and pulmonary hygiene  Patient Goals: JUANITA  Family Goals: Updates, Patient comfort, Breathe better    Progress made toward(s) clinical / shift goals:        Problem: Pain - Standard  Goal: Alleviation of pain or a reduction in pain to the patient’s comfort goal  Outcome: Progressing     Problem: Knowledge Deficit - Standard  Goal: Patient and family/care givers will demonstrate understanding of plan of care, disease process/condition, diagnostic tests and medications  Outcome: Progressing     Problem: Hemodynamics  Goal: Patient's hemodynamics, fluid balance and neurologic status will be stable or improve  Outcome: Progressing     Problem: Mechanical Ventilation  Goal: Safe management of artificial airway and ventilation  Outcome: Progressing  Goal: Patient will be able to express needs and understand communication  Outcome: Progressing     Problem: Safety - Medical Restraint  Goal: Remains free of injury from restraints (Restraint for Interference with Medical Device)  Outcome: Progressing  Goal: Free from restraint(s) (Restraint for Interference with Medical Device)  Outcome: Progressing       Patient is not progressing towards the following goals:

## 2025-03-28 NOTE — THERAPY
"Speech Language Pathology   Clinical Swallow Evaluation     Patient Name: Jeremiah Huber  AGE:  74 y.o., SEX:  male  Medical Record #: [id number]  Date of Service: 3/28/2025      History of Present Illness  74 y.o. male presented with a complaint of abdominal pain that has been going on for the last few days.    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 Considering this, pt does not appear to require acute care SLP services to address cognition at this time.\"  6/13/20 CSE rec EC7/TN0    CMHx PNA d/t infectious organism, RUQ, SIRS, PD, restless leg syndrome    CXR 3/26/25  Hypoinflation with bibasilar atelectasis.  Superimposed pneumonia is difficult to exclude.      General Information:  Vitals  O2 (LPM): 6  O2 Delivery Device: Oxymask  Level of Consciousness: Awake  Patient Behaviors: Drowsy       Prior Living Situation & Level of Function:  Prior Services: Continuous (24 Hour) Care Giving Family  Housing / Facility: [address] [facility]  Lives with - Patient's Self Care Capacity: Spouse, Adult Children  Communication: Followed by OP SLP for voice  Swallowing: Hx of mild-moderate oropharyngeal dysphagia       Oral Mechanism Evaluation:  Dentition: Fair   Facial Symmetry: Equal  Facial Sensation: Equal     Labial Observations: WFL   Lingual Observations: Midline       Laryngeal Function:  Secretion Management: Excess secretions, suctioning required  Voice Quality: Pt largely non-verbal this session  Cough: Perceptually weak       Subjective  Patient asleep upon arrival, roused to verbal cues. Pt mostly non-verbal throughout session; " though comprehension appeared intact. Spouse at bedside who reported pt's swallow as deteriorated since recent MBSS. Endorsed most difficulty noted with liquids and medications. RN in room throughout evaluation.      Assessment  Current Method of Nutrition: NPO until cleared by speech pathology  Positioning: Maharaj's (60-90 degrees)  Bolus Administration: SLP, Patient  O2 (LPM): 6 O2 Delivery Device: Oxymask  Factor(s) Affecting Performance: None        Swallowing Trials:  Swallowing Trials  Thin Liquid (TN0): WFL  Liquidised (LQ3): WFL  Regular (RG7): WFL      Comments: SLP provided feeding assistance and coordinated briefly donning/doffing oxymask for PO presentations. Pt with adequate oral bolus acceptance and labial assimilation to cup and straw. Adequate mastication of solids. Oral bolus residue observed; however, difficult to quantify given amount of oral secretions present despite multiple suctioning and oral care. Occasional oral bolus holding observed; pt cleared independently and with cue. RN in room and provided medications crushed in applesauce; pt consumed without overt difficulty. No overt cough response appreciated during oral intake. Multiple swallows completed per bolus. Increased WOB appreciated throughout oral intake. Unable to assess vocal quality 2/2 no verbalizations from pt. Education provided regarding options for dysphagia management. Pt and spouse agreeable to participate in FEES to assess current swallow function.       Clinical Impressions  Patient presents with clinical indicators and an elevated risk for oropharyngeal dysphagia 2/2 Parkinson's disease, hx of dysphagia, and reported deconditioning. Recommend pt remain NPO except ice chips/small sips of water following diligent oral care pending study. Pt scheduled to complete FEES today.       Recommendations  Diet Consistency: NPO except ice chips/sips of water pending FEES  Instrumentation: FEES  Medication: Crush with applesauce, as  "appropriate   Oral Care: Q2h         SLP Treatment Plan  Treatment Plan: Dysphagia Treatment  SLP Frequency: 3x Per Week  Estimated Duration: Until Therapy Goals Met      Anticipated Discharge Needs  Discharge Recommendations: Recommend post-acute placement for additional speech therapy services prior to discharge home   Therapy Recommendations Upon DC: Dysphagia Training, Expression Training, Community Re-Integration, Patient / Family / Caregiver Education        Patient / Family Goals  Patient / Family Goal #1: \"He's been having more difficulty swallowing\" per spouse  Short Term Goals  Short Term Goal # 1: Patient will complete a diagnostic swallow study to further assess swallow function and guide FAIZAN [facility]   "

## 2025-03-28 NOTE — ASSESSMENT & PLAN NOTE
S/p right pigtail placed on 3/28  Chest tube to suction for now-->water seal today and ?d/c tomorrow  Studies indicate likely parapneumonic

## 2025-03-28 NOTE — CARE PLAN
The patient is Watcher - Medium risk of patient condition declining or worsening    Shift Goals  Clinical Goals: Pt will maintain an SPO2 of 90% by the end of shift.  Patient Goals: Rest  Family Goals: Rest and comfort.    Progress made toward(s) clinical / shift goals:  Pt had difficultly overnight. He has issues maintaining his O2. Switched to a non re breather and he has been able to maintain low 90 on 5L. Pt has had some continuous pain since about 2 am. His bed alarms is on call light and personal items are within reach     Patient is not progressing towards the following goals:

## 2025-03-28 NOTE — ASSESSMENT & PLAN NOTE
This is Sepsis Present on admission  SIRS criteria identified on my evaluation include: Tachycardia, with heart rate greater than 90 BPM and Leukocytosis, with WBC greater than 12,000  Clinical indicators of end organ dysfunction include Toxic Metabolic Encephalopathy and GCS < 15  Source is Pulmonary  Sepsis protocol initiated  Crystalloid Fluid Administration: Resuscitation volume of 0 ordered. Reason that resuscitation volume of less than 30ml/kg was ordered concern for causing harm given hypoxia and pleural effusion  IV antibiotics as appropriate for source of sepsis  Reassessment: I have reassessed the patient's hemodynamic status    Follow cultures  Continue unasyn for 5 days  S/p azithromycin  Stopped Zyvox with negative MRSA PCR  S/p pigtail catheter to chest on 3/28, studies indicate exudative and likely parapneumonic-->water seal   MAP goals > 65  I am actively titrating phenylephrine for MAP goals

## 2025-03-29 ENCOUNTER — APPOINTMENT (OUTPATIENT)
Dept: CARDIOLOGY | Facility: MEDICAL CENTER | Age: 75
End: 2025-03-29
Attending: NURSE PRACTITIONER
Payer: MEDICARE

## 2025-03-29 PROBLEM — I48.0 PAROXYSMAL ATRIAL FIBRILLATION (HCC): Status: ACTIVE | Noted: 2025-03-29

## 2025-03-29 PROBLEM — J96.01 ACUTE RESPIRATORY FAILURE WITH HYPOXIA (HCC): Status: ACTIVE | Noted: 2025-03-29

## 2025-03-29 LAB
ALBUMIN SERPL BCP-MCNC: 2.4 G/DL (ref 3.2–4.9)
ALBUMIN/GLOB SERPL: 0.8 G/DL
ALP SERPL-CCNC: 82 U/L (ref 30–99)
ALT SERPL-CCNC: 17 U/L (ref 2–50)
ANION GAP SERPL CALC-SCNC: 9 MMOL/L (ref 7–16)
AST SERPL-CCNC: 86 U/L (ref 12–45)
BACTERIA UR CULT: NORMAL
BASE EXCESS BLDA CALC-SCNC: 2 MMOL/L (ref -4–3)
BILIRUB SERPL-MCNC: 0.8 MG/DL (ref 0.1–1.5)
BODY TEMPERATURE: ABNORMAL DEGREES
BREATHS SETTING VENT: 20
BUN SERPL-MCNC: 22 MG/DL (ref 8–22)
CALCIUM ALBUM COR SERPL-MCNC: 9.3 MG/DL (ref 8.5–10.5)
CALCIUM SERPL-MCNC: 8 MG/DL (ref 8.5–10.5)
CHLORIDE SERPL-SCNC: 98 MMOL/L (ref 96–112)
CO2 BLDA-SCNC: 28 MMOL/L (ref 32–48)
CO2 SERPL-SCNC: 26 MMOL/L (ref 20–33)
CREAT SERPL-MCNC: 0.97 MG/DL (ref 0.5–1.4)
CRP SERPL HS-MCNC: 37.5 MG/DL (ref 0–0.75)
DELSYS IDSYS: ABNORMAL
EKG IMPRESSION: NORMAL
EKG IMPRESSION: NORMAL
ERYTHROCYTE [DISTWIDTH] IN BLOOD BY AUTOMATED COUNT: 43.8 FL (ref 35.9–50)
FUNGUS SPEC CULT: NORMAL
FUNGUS SPEC FUNGUS STN: NORMAL
GFR SERPLBLD CREATININE-BSD FMLA CKD-EPI: 83 ML/MIN/1.73 M 2
GLOBULIN SER CALC-MCNC: 3.2 G/DL (ref 1.9–3.5)
GLUCOSE SERPL-MCNC: 155 MG/DL (ref 65–99)
HCO3 BLDA-SCNC: 26.7 MMOL/L (ref 21–28)
HCT VFR BLD AUTO: 43 % (ref 42–52)
HGB BLD-MCNC: 13.6 G/DL (ref 14–18)
HOROWITZ INDEX BLDA+IHG-RTO: 119 MM[HG]
LACTATE BLD-SCNC: 0.9 MMOL/L (ref 0.5–2)
LV EJECT FRACT  99904: 60
LV EJECT FRACT MOD 2C 99903: 58.39
LV EJECT FRACT MOD 4C 99902: 65.35
LV EJECT FRACT MOD BP 99901: 60.67
MAGNESIUM SERPL-MCNC: 2 MG/DL (ref 1.5–2.5)
MCH RBC QN AUTO: 28.8 PG (ref 27–33)
MCHC RBC AUTO-ENTMCNC: 31.6 G/DL (ref 32.3–36.5)
MCV RBC AUTO: 91.1 FL (ref 81.4–97.8)
MODE IMODE: ABNORMAL
O2/TOTAL GAS SETTING VFR VENT: 70 %
PCO2 BLDA: 42.1 MMHG (ref 32–48)
PCO2 TEMP ADJ BLDA: 44.8 MMHG (ref 32–48)
PEEP END EXPIRATORY PRESSURE IPEEP: 10 CMH20
PH BLDA: 7.41 [PH] (ref 7.35–7.45)
PH TEMP ADJ BLDA: 7.39 [PH] (ref 7.35–7.45)
PHOSPHATE SERPL-MCNC: 2.4 MG/DL (ref 2.5–4.5)
PLATELET # BLD AUTO: 274 K/UL (ref 164–446)
PMV BLD AUTO: 9.6 FL (ref 9–12.9)
PO2 BLDA: 83 MMHG (ref 83–108)
PO2 TEMP ADJ BLDA: 91 MMHG (ref 83–108)
POTASSIUM SERPL-SCNC: 3.7 MMOL/L (ref 3.6–5.5)
PREALB SERPL-MCNC: <3 MG/DL (ref 18–38)
PROT SERPL-MCNC: 5.6 G/DL (ref 6–8.2)
RBC # BLD AUTO: 4.72 M/UL (ref 4.7–6.1)
SAO2 % BLDA: 96 % (ref 93–99)
SIGNIFICANT IND 70042: NORMAL
SIGNIFICANT IND 70042: NORMAL
SITE SITE: NORMAL
SITE SITE: NORMAL
SODIUM SERPL-SCNC: 133 MMOL/L (ref 135–145)
SOURCE SOURCE: NORMAL
SOURCE SOURCE: NORMAL
SPECIMEN DRAWN FROM PATIENT: ABNORMAL
TIDAL VOLUME IVT: 480 ML
WBC # BLD AUTO: 14.1 K/UL (ref 4.8–10.8)

## 2025-03-29 PROCEDURE — 700101 HCHG RX REV CODE 250: Performed by: HOSPITALIST

## 2025-03-29 PROCEDURE — 700105 HCHG RX REV CODE 258: Performed by: NURSE PRACTITIONER

## 2025-03-29 PROCEDURE — 700105 HCHG RX REV CODE 258: Performed by: STUDENT IN AN ORGANIZED HEALTH CARE EDUCATION/TRAINING PROGRAM

## 2025-03-29 PROCEDURE — 97602 WOUND(S) CARE NON-SELECTIVE: CPT

## 2025-03-29 PROCEDURE — 84134 ASSAY OF PREALBUMIN: CPT

## 2025-03-29 PROCEDURE — 83735 ASSAY OF MAGNESIUM: CPT

## 2025-03-29 PROCEDURE — 85027 COMPLETE CBC AUTOMATED: CPT

## 2025-03-29 PROCEDURE — 83605 ASSAY OF LACTIC ACID: CPT

## 2025-03-29 PROCEDURE — 93010 ELECTROCARDIOGRAM REPORT: CPT | Mod: 76 | Performed by: INTERNAL MEDICINE

## 2025-03-29 PROCEDURE — 700117 HCHG RX CONTRAST REV CODE 255: Performed by: FAMILY MEDICINE

## 2025-03-29 PROCEDURE — 700101 HCHG RX REV CODE 250: Performed by: INTERNAL MEDICINE

## 2025-03-29 PROCEDURE — 82803 BLOOD GASES ANY COMBINATION: CPT

## 2025-03-29 PROCEDURE — 770022 HCHG ROOM/CARE - ICU (200)

## 2025-03-29 PROCEDURE — 700102 HCHG RX REV CODE 250 W/ 637 OVERRIDE(OP): Performed by: STUDENT IN AN ORGANIZED HEALTH CARE EDUCATION/TRAINING PROGRAM

## 2025-03-29 PROCEDURE — 700111 HCHG RX REV CODE 636 W/ 250 OVERRIDE (IP): Mod: JZ | Performed by: STUDENT IN AN ORGANIZED HEALTH CARE EDUCATION/TRAINING PROGRAM

## 2025-03-29 PROCEDURE — 700105 HCHG RX REV CODE 258: Performed by: HOSPITALIST

## 2025-03-29 PROCEDURE — 94003 VENT MGMT INPAT SUBQ DAY: CPT

## 2025-03-29 PROCEDURE — A9270 NON-COVERED ITEM OR SERVICE: HCPCS | Performed by: STUDENT IN AN ORGANIZED HEALTH CARE EDUCATION/TRAINING PROGRAM

## 2025-03-29 PROCEDURE — 700111 HCHG RX REV CODE 636 W/ 250 OVERRIDE (IP): Performed by: HOSPITALIST

## 2025-03-29 PROCEDURE — 80053 COMPREHEN METABOLIC PANEL: CPT

## 2025-03-29 PROCEDURE — 86140 C-REACTIVE PROTEIN: CPT

## 2025-03-29 PROCEDURE — 99291 CRITICAL CARE FIRST HOUR: CPT | Performed by: INTERNAL MEDICINE

## 2025-03-29 PROCEDURE — 700102 HCHG RX REV CODE 250 W/ 637 OVERRIDE(OP): Performed by: INTERNAL MEDICINE

## 2025-03-29 PROCEDURE — 700102 HCHG RX REV CODE 250 W/ 637 OVERRIDE(OP): Performed by: NURSE PRACTITIONER

## 2025-03-29 PROCEDURE — A9270 NON-COVERED ITEM OR SERVICE: HCPCS | Performed by: INTERNAL MEDICINE

## 2025-03-29 PROCEDURE — 99292 CRITICAL CARE ADDL 30 MIN: CPT | Performed by: INTERNAL MEDICINE

## 2025-03-29 PROCEDURE — 36600 WITHDRAWAL OF ARTERIAL BLOOD: CPT

## 2025-03-29 PROCEDURE — 93005 ELECTROCARDIOGRAM TRACING: CPT | Mod: TC | Performed by: INTERNAL MEDICINE

## 2025-03-29 PROCEDURE — 93308 TTE F-UP OR LMTD: CPT

## 2025-03-29 PROCEDURE — 700101 HCHG RX REV CODE 250: Performed by: STUDENT IN AN ORGANIZED HEALTH CARE EDUCATION/TRAINING PROGRAM

## 2025-03-29 PROCEDURE — 700111 HCHG RX REV CODE 636 W/ 250 OVERRIDE (IP): Mod: JZ | Performed by: INTERNAL MEDICINE

## 2025-03-29 PROCEDURE — A9270 NON-COVERED ITEM OR SERVICE: HCPCS | Performed by: NURSE PRACTITIONER

## 2025-03-29 PROCEDURE — 84100 ASSAY OF PHOSPHORUS: CPT

## 2025-03-29 PROCEDURE — 94799 UNLISTED PULMONARY SVC/PX: CPT

## 2025-03-29 PROCEDURE — 700111 HCHG RX REV CODE 636 W/ 250 OVERRIDE (IP): Performed by: NURSE PRACTITIONER

## 2025-03-29 PROCEDURE — 700105 HCHG RX REV CODE 258: Performed by: INTERNAL MEDICINE

## 2025-03-29 PROCEDURE — 93308 TTE F-UP OR LMTD: CPT | Mod: 26 | Performed by: INTERNAL MEDICINE

## 2025-03-29 RX ORDER — MIDODRINE HYDROCHLORIDE 5 MG/1
10 TABLET ORAL EVERY 8 HOURS
Status: DISCONTINUED | OUTPATIENT
Start: 2025-03-29 | End: 2025-04-09 | Stop reason: HOSPADM

## 2025-03-29 RX ORDER — SODIUM CHLORIDE 9 MG/ML
500 INJECTION, SOLUTION INTRAVENOUS ONCE
Status: COMPLETED | OUTPATIENT
Start: 2025-03-29 | End: 2025-03-29

## 2025-03-29 RX ORDER — HYDROCORTISONE SODIUM SUCCINATE 100 MG/2ML
100 INJECTION INTRAMUSCULAR; INTRAVENOUS EVERY 8 HOURS
Status: DISCONTINUED | OUTPATIENT
Start: 2025-03-29 | End: 2025-03-31

## 2025-03-29 RX ADMIN — ROPINIROLE HYDROCHLORIDE 4 MG: 2 TABLET, FILM COATED ORAL at 05:20

## 2025-03-29 RX ADMIN — ROPINIROLE HYDROCHLORIDE 4 MG: 2 TABLET, FILM COATED ORAL at 17:25

## 2025-03-29 RX ADMIN — ACETAMINOPHEN 650 MG: 325 TABLET ORAL at 17:29

## 2025-03-29 RX ADMIN — ENOXAPARIN SODIUM 40 MG: 100 INJECTION SUBCUTANEOUS at 17:25

## 2025-03-29 RX ADMIN — AZITHROMYCIN DIHYDRATE 500 MG: 500 INJECTION, POWDER, LYOPHILIZED, FOR SOLUTION INTRAVENOUS at 06:36

## 2025-03-29 RX ADMIN — LINEZOLID 600 MG: 600 INJECTION, SOLUTION INTRAVENOUS at 05:23

## 2025-03-29 RX ADMIN — AMIODARONE HYDROCHLORIDE 0.5 MG/MIN: 1.8 INJECTION, SOLUTION INTRAVENOUS at 04:15

## 2025-03-29 RX ADMIN — HUMAN ALBUMIN MICROSPHERES AND PERFLUTREN 3 ML: 10; .22 INJECTION, SOLUTION INTRAVENOUS at 10:11

## 2025-03-29 RX ADMIN — ATORVASTATIN CALCIUM 80 MG: 40 TABLET, FILM COATED ORAL at 17:24

## 2025-03-29 RX ADMIN — CARBIDOPA AND LEVODOPA 2 TABLET: 25; 250 TABLET ORAL at 08:05

## 2025-03-29 RX ADMIN — FAMOTIDINE 20 MG: 20 TABLET, FILM COATED ORAL at 05:20

## 2025-03-29 RX ADMIN — SODIUM BICARBONATE 1300 MG: 650 TABLET ORAL at 15:06

## 2025-03-29 RX ADMIN — MIDODRINE HYDROCHLORIDE 5 MG: 5 TABLET ORAL at 05:27

## 2025-03-29 RX ADMIN — HYDROCORTISONE SODIUM SUCCINATE 100 MG: 100 INJECTION, POWDER, FOR SOLUTION INTRAMUSCULAR; INTRAVENOUS at 14:46

## 2025-03-29 RX ADMIN — AMANTADINE HYDROCHLORIDE 100 MG: 100 TABLET ORAL at 05:20

## 2025-03-29 RX ADMIN — SODIUM CHLORIDE 500 ML: 9 INJECTION, SOLUTION INTRAVENOUS at 11:34

## 2025-03-29 RX ADMIN — GABAPENTIN 600 MG: 300 CAPSULE ORAL at 17:24

## 2025-03-29 RX ADMIN — ASPIRIN 81 MG: 81 TABLET, CHEWABLE ORAL at 05:20

## 2025-03-29 RX ADMIN — AMIODARONE HYDROCHLORIDE 0.5 MG/MIN: 1.8 INJECTION, SOLUTION INTRAVENOUS at 14:52

## 2025-03-29 RX ADMIN — AMPICILLIN AND SULBACTAM 3 G: 1; 2 INJECTION, POWDER, FOR SOLUTION INTRAMUSCULAR; INTRAVENOUS at 20:56

## 2025-03-29 RX ADMIN — AMPICILLIN AND SULBACTAM 3 G: 1; 2 INJECTION, POWDER, FOR SOLUTION INTRAMUSCULAR; INTRAVENOUS at 02:20

## 2025-03-29 RX ADMIN — OXYBUTYNIN CHLORIDE 5 MG: 5 TABLET ORAL at 17:25

## 2025-03-29 RX ADMIN — CARBIDOPA AND LEVODOPA 2 TABLET: 25; 250 TABLET ORAL at 20:53

## 2025-03-29 RX ADMIN — AMPICILLIN AND SULBACTAM 3 G: 1; 2 INJECTION, POWDER, FOR SOLUTION INTRAMUSCULAR; INTRAVENOUS at 08:01

## 2025-03-29 RX ADMIN — OXYBUTYNIN CHLORIDE 5 MG: 5 TABLET ORAL at 05:20

## 2025-03-29 RX ADMIN — POTASSIUM PHOSPHATE, MONOBASIC AND POTASSIUM PHOSPHATE, DIBASIC 15 MMOL: 224; 236 INJECTION, SOLUTION, CONCENTRATE INTRAVENOUS at 08:42

## 2025-03-29 RX ADMIN — PHENYLEPHRINE HYDROCHLORIDE 0.25 MCG/KG/MIN: 100 INJECTION INTRAVENOUS at 00:18

## 2025-03-29 RX ADMIN — SODIUM BICARBONATE 1300 MG: 650 TABLET ORAL at 08:05

## 2025-03-29 RX ADMIN — SODIUM BICARBONATE 1300 MG: 650 TABLET ORAL at 20:53

## 2025-03-29 RX ADMIN — AMPICILLIN AND SULBACTAM 3 G: 1; 2 INJECTION, POWDER, FOR SOLUTION INTRAMUSCULAR; INTRAVENOUS at 14:45

## 2025-03-29 RX ADMIN — CARBIDOPA AND LEVODOPA 2 TABLET: 25; 250 TABLET ORAL at 15:07

## 2025-03-29 RX ADMIN — HYDROCORTISONE SODIUM SUCCINATE 100 MG: 100 INJECTION, POWDER, FOR SOLUTION INTRAMUSCULAR; INTRAVENOUS at 22:15

## 2025-03-29 RX ADMIN — PANTOPRAZOLE SODIUM 40 MG: 40 INJECTION, POWDER, FOR SOLUTION INTRAVENOUS at 05:19

## 2025-03-29 RX ADMIN — MIDODRINE HYDROCHLORIDE 10 MG: 5 TABLET ORAL at 14:46

## 2025-03-29 RX ADMIN — MIDODRINE HYDROCHLORIDE 10 MG: 5 TABLET ORAL at 22:15

## 2025-03-29 RX ADMIN — DEXMEDETOMIDINE HYDROCHLORIDE 0.2 MCG/KG/HR: 100 INJECTION, SOLUTION INTRAVENOUS at 15:01

## 2025-03-29 RX ADMIN — AMANTADINE HYDROCHLORIDE 100 MG: 100 TABLET ORAL at 17:25

## 2025-03-29 RX ADMIN — ROPINIROLE HYDROCHLORIDE 4 MG: 2 TABLET, FILM COATED ORAL at 11:37

## 2025-03-29 RX ADMIN — DEXTROSE MONOHYDRATE: 50 INJECTION, SOLUTION INTRAVENOUS at 14:53

## 2025-03-29 RX ADMIN — CARBIDOPA AND LEVODOPA 2 TABLET: 25; 250 TABLET ORAL at 11:31

## 2025-03-29 RX ADMIN — ACETAMINOPHEN 650 MG: 325 TABLET ORAL at 05:20

## 2025-03-29 ASSESSMENT — PAIN DESCRIPTION - PAIN TYPE
TYPE: ACUTE PAIN

## 2025-03-29 ASSESSMENT — FIBROSIS 4 INDEX: FIB4 SCORE: 1.54

## 2025-03-29 NOTE — ASSESSMENT & PLAN NOTE
Intubated on 3/28 for hypoxia and airway protection  Cont full vent support  RT/O2 protocols  Vent bundle protocols  I am actively adjusting vent settings based on ABGs/vent mechanics  Chest tube in place-->water seal  SAT/SBTs   Diuretics

## 2025-03-29 NOTE — HOSPITAL COURSE
Mr. Huber is a 74 year old male with the past medical history significant for Parkinson's disease complicated by dysphagia, hypertension, dyslipidemia, GERD, and history of a stroke in 2020 attributed to a LV thrombus who was admitted on 3/27/2025 with pneumonia.  The patient was started on broad-spectrum antibiotics.  Due to the patient also complaining of right upper quadrant abdominal pain he had an ultrasound that was significant for stones/sludge and HIDA scan that was subsequently normal.  A CT of his chest on 3/28 showed a large right pleural effusion with collapse of the right lung.  The critical care team was consulted due to worsening mental status changes and being obtunded with increasing oxygen requirements.  The patient was originally a DNR/DNI; however, the patient's wife rescinded that in hopes that this was reversible.  The patient was then transferred to the ICU where he was intubated and a chest tube was placed.  3/29 - VD#2, SAT/SBT-->weak, in/out of AF on amio, small amount of phenylephrine

## 2025-03-29 NOTE — CARE PLAN
The patient is Watcher - Medium risk of patient condition declining or worsening    Shift Goals  Clinical Goals: Hemodynamic stability MAP >65, Monitor ch. tube output. CT head @ 2100  Patient Goals: JUANITA  Family Goals: Uppdates      Problem: Pain - Standard  Goal: Alleviation of pain or a reduction in pain to the patient’s comfort goal  Outcome: Progressing     Problem: Knowledge Deficit - Standard  Goal: Patient and family/care givers will demonstrate understanding of plan of care, disease process/condition, diagnostic tests and medications  Outcome: Progressing     Problem: Hemodynamics  Goal: Patient's hemodynamics, fluid balance and neurologic status will be stable or improve  Outcome: Progressing     Problem: Fluid Volume  Goal: Fluid volume balance will be maintained  Outcome: Progressing     Problem: Urinary - Renal Perfusion  Goal: Ability to achieve and maintain adequate renal perfusion and functioning will improve  Outcome: Progressing     Problem: Respiratory  Goal: Patient will achieve/maintain optimum respiratory ventilation and gas exchange  Outcome: Progressing     Problem: Mechanical Ventilation  Goal: Safe management of artificial airway and ventilation  Outcome: Progressing  Goal: Successful weaning off mechanical ventilator, spontaneously maintains adequate gas exchange  Outcome: Progressing  Goal: Patient will be able to express needs and understand communication  Outcome: Progressing     Problem: Physical Regulation  Goal: Diagnostic test results will improve  Outcome: Progressing  Goal: Signs and symptoms of infection will decrease  Outcome: Progressing     Problem: Skin Integrity  Goal: Skin integrity is maintained or improved  Outcome: Progressing     Problem: Fall Risk  Goal: Patient will remain free from falls  Outcome: Progressing     Problem: Safety - Medical Restraint  Goal: Remains free of injury from restraints (Restraint for Interference with Medical Device)  Outcome: Progressing

## 2025-03-29 NOTE — CARE PLAN
Problem: Ventilation  Goal: Ability to achieve and maintain unassisted ventilation or tolerate decreased levels of ventilator support  Description: Target End Date:  4 days Document on Vent flowsheet1.  Support and monitor invasive and noninvasive mechanical ventilation2.  Monitor ventilator weaning response3.  Perform ventilator associated pneumonia prevention interventions4.  Manage ventilation therapy by monitoring diagnostic test results  Outcome: Not Met     Ventilator Daily Summary    Vent Day #1  Airway: 8.0 @ 26    Ventilator settings: 20, 440, +8, 70%  Weaning trials: no  Respiratory Procedures: none    Plan: Continue current ventilator settings and wean mechanical ventilation as tolerated per physician orders.

## 2025-03-29 NOTE — WOUND TEAM
Attempted to assess patient on 3/28/25.  Patient too unstable to assess sacrum.  Bilateral heels are red, but intact.  Wound team to return at another date.

## 2025-03-29 NOTE — CARE PLAN
The patient is Watcher - Medium risk of patient condition declining or worsening    Shift Goals  Clinical Goals: MAP <65, Hemodynamic stability, Patient comfort, Monitor chest tube  Patient Goals: JUANITA  Family Goals: Updates, patient comfort    Progress made toward(s) clinical / shift goals:        Problem: Pain - Standard  Goal: Alleviation of pain or a reduction in pain to the patient’s comfort goal  Outcome: Progressing     Problem: Knowledge Deficit - Standard  Goal: Patient and family/care givers will demonstrate understanding of plan of care, disease process/condition, diagnostic tests and medications  Outcome: Progressing     Problem: Hemodynamics  Goal: Patient's hemodynamics, fluid balance and neurologic status will be stable or improve  Outcome: Progressing     Problem: Mechanical Ventilation  Goal: Safe management of artificial airway and ventilation  Outcome: Progressing  Goal: Patient will be able to express needs and understand communication  Outcome: Progressing     Problem: Skin Integrity  Goal: Skin integrity is maintained or improved  Outcome: Progressing     Problem: Safety - Medical Restraint  Goal: Remains free of injury from restraints (Restraint for Interference with Medical Device)  Outcome: Progressing  Goal: Free from restraint(s) (Restraint for Interference with Medical Device)  Outcome: Progressing       Patient is not progressing towards the following goals:       This is a 63-year-old female today for follow-up.  She is due for colon cancer screening.  On review she had ERCP in 2017 with stone clearance and stent placement.  She followed that with cholecystectomy.  She is unsure if she ever had stent removal

## 2025-03-29 NOTE — PROGRESS NOTES
Critical Care Progress Note    Date of admission  3/26/2025    Chief Complaint  74 y.o. male admitted 3/26/2025 with pneumonia and right pleural effusion    Hospital Course  Mr. Huber is a 74 year old male with the past medical history significant for Parkinson's disease complicated by dysphagia, hypertension, dyslipidemia, GERD, and history of a stroke in 2020 attributed to a LV thrombus who was admitted on 3/27/2025 with pneumonia.  The patient was started on broad-spectrum antibiotics.  Due to the patient also complaining of right upper quadrant abdominal pain he had an ultrasound that was significant for stones/sludge and HIDA scan that was subsequently normal.  A CT of his chest on 3/28 showed a large right pleural effusion with collapse of the right lung.  The critical care team was consulted due to worsening mental status changes and being obtunded with increasing oxygen requirements.  The patient was originally a DNR/DNI; however, the patient's wife rescinded that in hopes that this was reversible.  The patient was then transferred to the ICU where he was intubated and a chest tube was placed.    Interval Problem Update  Reviewed last 24 hour events:   - no issues overnight   - wakes to voice, follows weakly, L>R   - AF/SR 80-120s   - SBP 80-120s   - Tmax 38.8   - OG, TFs at goal   - UOP of 430cc overnight, rowe   - BM pta   - fent at 100   - precedex 0.2   - amio 0.5   - phenylephrine 0.2   - CT of 180cc overnight   - bed wound noted from transfer yesterday, wound care   - VD #2   - CXR(reviewed): pigtail catheter in place, right effusion noted but significantly improved, some atelectasis noted on the RML, small left effusion    - no secretions   - PEEP 10/70%   - no SBT yet   - lovenox   - PPI   - day 3 of unasyn/zyvox   - day 3/3 of azith   - all cultures negative so far   - MRSA pcr negative, stop Zyvox   - Mg 2   - Phos 2.4   - K 3.7   - creat 0.97    Review of Systems  Review of Systems   Unable to  perform ROS: Intubated        Vital Signs for last 24 hours   Temp:  [37.2 °C (99 °F)] 37.2 °C (99 °F)  Pulse:  [] 93  Resp:  [10-36] 12  BP: ()/(53-83) 91/56  SpO2:  [79 %-98 %] 96 %    Hemodynamic parameters for last 24 hours       Respiratory Information for the last 24 hours  Vent Mode: APVCMV  Rate (breaths/min): 20  Vt Target (mL): 480  PEEP/CPAP: 10  MAP: 11  Control VTE (exp VT): 437    Physical Exam   Physical Exam  Vitals and nursing note reviewed.   Constitutional:       Appearance: He is ill-appearing. He is not toxic-appearing.      Comments: Pt appears older than stated age and chronically ill   HENT:      Head: Normocephalic and atraumatic.      Right Ear: External ear normal.      Left Ear: External ear normal.      Nose: Nose normal. No rhinorrhea.      Mouth/Throat:      Mouth: Mucous membranes are moist.      Comments: ETT in place  Eyes:      General: No scleral icterus.     Conjunctiva/sclera: Conjunctivae normal.      Pupils: Pupils are equal, round, and reactive to light.   Cardiovascular:      Rate and Rhythm: Normal rate and regular rhythm.      Pulses: Normal pulses.           Dorsalis pedis pulses are 2+ on the right side and 2+ on the left side.      Heart sounds: No murmur heard.  Pulmonary:      Breath sounds: No wheezing.      Comments: Breathing comfortably on the vent, diminished breath sounds on the right  Chest:      Chest wall: No tenderness.   Abdominal:      Palpations: Abdomen is soft.      Tenderness: There is no abdominal tenderness. There is no guarding or rebound.   Musculoskeletal:         General: Normal range of motion.      Cervical back: Normal range of motion and neck supple.      Right lower leg: No edema.      Left lower leg: No edema.   Lymphadenopathy:      Cervical: No cervical adenopathy.   Skin:     General: Skin is warm and dry.      Capillary Refill: Capillary refill takes less than 2 seconds.      Findings: No rash.   Neurological:      Comments:  Weaker on right than the left, follows commands, opens eyes to voice   Psychiatric:      Comments: Unable to assess         Medications  Current Facility-Administered Medications   Medication Dose Route Frequency Provider Last Rate Last Admin    acetaminophen (Tylenol) suppository 650 mg  650 mg Rectal Q4HRS PRN Jeremiah Thompson P.A.-C.        Respiratory Therapy Consult   Nebulization Continuous RT Akhil Gomez M.D.        azithromycin (ZITHROMAX) 500 mg in D5W 250 mL IVPB premix  500 mg Intravenous Q24HRS Akhil Gomez M.D.   Stopped at 03/29/25 0736    Linezolid (Zyvox) premix 600 mg  600 mg Intravenous Q12HRS Akhil Gomez M.D.   Stopped at 03/29/25 0623    pantoprazole (Protonix) injection 40 mg  40 mg Intravenous DAILY Akhil Gomez M.D.   40 mg at 03/29/25 0519    MD Alert...ICU Electrolyte Replacement per Pharmacy   Other PHARMACY TO DOSE Ángel Harper M.D.        famotidine (Pepcid) tablet 20 mg  20 mg Enteral Tube Q12HRS Ángel Harper M.D.   20 mg at 03/29/25 0520    Or    famotidine (Pepcid) injection 20 mg  20 mg Intravenous Q12HRS Ángel Harper M.D.        lidocaine (Xylocaine) 1 % injection 2 mL  2 mL Tracheal Tube Q30 MIN PRN Ángel Harper M.D.        fentaNYL (Sublimaze) injection 100 mcg  100 mcg Intravenous Q15 MIN PRN Ángel Harper M.D.        And    fentaNYL (Sublimaze) injection 200 mcg  200 mcg Intravenous Q15 MIN PRN Ángel Harper M.D.        And    fentaNYL (SUBLIMAZE) 50 mcg/mL in 50mL (Continuous Infusion)   Intravenous Continuous Ángel Harper M.D. 2 mL/hr at 03/28/25 2155 100 mcg/hr at 03/28/25 2155    And    dexmedetomidine (Precedex) 400 mcg/100mL infusion  0-1.5 mcg/kg/hr (Ideal) Intravenous Continuous Ángel Harper M.D. 4 mL/hr at 03/29/25 0600 0.2 mcg/kg/hr at 03/29/25 0600    amantadine (Symmetrel) tablet 100 mg  100 mg Enteral Tube BID Ángel Harper M.D.   100 mg at 03/29/25 0520    aspirin (Asa) chewable tab 81 mg  81 mg Enteral Tube DAILY Ángel Harper M.D.   81 mg  at 03/29/25 0520    carbidopa-levodopa (Sinemet)  MG tablet 2 Tablet  2 Tablet Enteral Tube 4X/DAY Ángel Harper M.D.   2 Tablet at 03/28/25 1945    gabapentin (Neurontin) capsule 600 mg  600 mg Enteral Tube Q EVENING Ángel Harper M.D.   600 mg at 03/28/25 1728    oxybutynin (Ditropan) tablet 5 mg  5 mg Enteral Tube BID Ángel Harper M.D.   5 mg at 03/29/25 0520    ROPINIRole (Requip) tablet 4 mg  4 mg Enteral Tube TID Ángel Harper M.D.   4 mg at 03/29/25 0520    sodium bicarbonate tablet 1,300 mg  1,300 mg Enteral Tube TID Ángel Harper M.D.   1,300 mg at 03/28/25 2139    acetaminophen (Tylenol) tablet 650 mg  650 mg Enteral Tube Q6HRS PRN Ángel Harper M.D.   650 mg at 03/29/25 0520    Pharmacy Consult: Enteral tube insertion - review meds/change route/product selection  1 Each Other PHARMACY TO DOSE Ángel Harper M.D.        amiodarone (Nexterone) 360 mg/200 mL infusion  1 mg/min Intravenous Once Kristel L. Latona   Stopped at 03/28/25 2323    Followed by    amiodarone (Nexterone) 360 mg/200 mL infusion  0.5 mg/min Intravenous Continuous Kristel L. Latona 16.7 mL/hr at 03/29/25 0600 0.5 mg/min at 03/29/25 0600    dextrose 5% infusion   Intravenous Continuous Kristel L. Latona 30 mL/hr at 03/29/25 0600 Rate Verify at 03/29/25 0600    midodrine (Proamatine) tablet 5 mg  5 mg Enteral Tube Q8HRS Kristel L. Latona   5 mg at 03/29/25 0527    phenylephrine 40 mg/250 mL NS premix  0-5 mcg/kg/min (Ideal) Intravenous Continuous Kristel L. Latona 7.5 mL/hr at 03/29/25 0600 0.25 mcg/kg/min at 03/29/25 0600    enoxaparin (Lovenox) inj 40 mg  40 mg Subcutaneous DAILY AT 1800 Karime Shafer M.D.   40 mg at 03/28/25 1728    ampicillin/sulbactam (Unasyn) 3 g in  mL IVPB  3 g Intravenous Q6HRS Karime Shafer M.D.   Stopped at 03/29/25 0250    atorvastatin (Lipitor) tablet 80 mg  80 mg Oral Q EVENING Karime Shafer M.D.   80 mg at 03/28/25 1729    [Held by provider] metoprolol SR (Toprol XL) tablet 50 mg  50 mg Oral  DAILY Karime Shafer M.D.   50 mg at 03/27/25 0535       Fluids    Intake/Output Summary (Last 24 hours) at 3/29/2025 0643  Last data filed at 3/29/2025 0600  Gross per 24 hour   Intake 4253.19 ml   Output 2100 ml   Net 2153.19 ml       Laboratory  Recent Labs     03/28/25  1055 03/28/25  1432 03/29/25  0328   HUHDF71R 7.43  --   --    THCSMM492Y 39.3  --   --    IHNHB051F 56.2*  --   --    UYDF5GQR 88.6*  --   --    ARTHCO3 26  --   --    ARTBE 2  --   --    ISTATAPH  --  7.501* 7.410   ISTATAPCO2  --  31.2* 42.1   ISTATAPO2  --  72* 83   ISTATATCO2  --  25* 28*   [id number]  --  96 96   ISTATARTHCO3  --  24.4 26.7   ISTATARTBE  --  2 2   ISTATTEMP  --  98.7 F 38.4 C   ISTATFIO2  --  100 70   ISTATSPEC  --  Arterial Arterial   ISTATAPHTC  --  7.500* 7.389   [id number]  --  72* 91         Recent Labs     03/27/25 0101 03/28/25  0400 03/29/25  0343   SODIUM 135 134* 133*   POTASSIUM 3.7 4.1 3.7   CHLORIDE 109 100 98   CO2 13* 22 26   BUN 16 22 22   CREATININE 0.57 0.99 0.97   MAGNESIUM  --   --  2.0   PHOSPHORUS  --   --  2.4*   CALCIUM 6.5* 8.8 8.0*     Recent Labs     03/26/25  2335 03/27/25 0101 03/28/25  0400 03/29/25  0343   ALTSGPT 18  --  21 17   ASTSGOT 37  --  28 86*   ALKPHOSPHAT 91  --  102* 82   TBILIRUBIN 0.6  --  1.3 0.8   LIPASE 19  --   --   --    PREALBUMIN  --   --   --  <3.0*   GLUCOSE 88 140* 119* 155*     Recent Labs     03/26/25  2224 03/26/25  2335 03/27/25 1757 03/28/25 0400 03/29/25  0343 03/29/25  0546   WBC 17.7*  --  20.9* 20.4*  --  14.1*   NEUTSPOLYS 92.00*  --   --   --   --   --    LYMPHOCYTES 3.10*  --   --   --   --   --    MONOCYTES 3.90  --   --   --   --   --    EOSINOPHILS 0.10  --   --   --   --   --    BASOPHILS 0.30  --   --   --   --   --    ASTSGOT  --  37  --  28 86*  --    ALTSGPT  --  18  --  21 17  --    ALKPHOSPHAT  --  91  --  102* 82  --    TBILIRUBIN  --  0.6  --  1.3 0.8  --      Recent Labs     03/27/25 1757 03/28/25 0400 03/29/25  0546   RBC 4.95 5.01  "4.72   HEMOGLOBIN 14.7 14.8 13.6*   HEMATOCRIT 44.4 44.5 43.0   PLATELETCT 286 286 274       Imaging  CT head:  1. No evidence of acute intracranial process. 2. Cerebral atrophy as well as periventricular chronic small vessel ischemic change.     Assessment/Plan  * Pneumonia due to infectious organism- (present on admission)  Assessment & Plan  Continue unasyn  S/p azithromycin x 3 days  Stopped linezolid with negative MRSA pcr  Follow blood cultures: negative so far  S/p right chest pigtail catheter on 3/28   Following pleural fluid which so far does not having findings of empyema, likely more parapneumonic    Paroxysmal atrial fibrillation (HCC)  Assessment & Plan  Amio bolus and continue infusion  Optimize K to > 4 and Mg to > 2    Acute respiratory failure with hypoxia (HCC)  Assessment & Plan  Intubated on 3/28 for hypoxia and airway protection  Cont full vent support  RT/O2 protocols  Vent bundle protocols  I am actively adjusting vent settings based on ABGs/vent mechanics  Chest tube in place  SAT/SBTs as indicated     Pleural effusion, right  Assessment & Plan  S/p right pigtail placed on 3/28  Chest tube to suction for now  Studies indicate likely parapneumonic    Advance care planning  Assessment & Plan  3/28: \"Wife has changed him from DNR/DNI to Full code-->She would like to buy some time until the clinical picture is further elucidated\". Per Dr. Harper's encounter    Right upper quadrant abdominal pain- (present on admission)  Assessment & Plan  HIDA negative  Possibly pleurisy.     Sepsis with acute hypoxic respiratory failure without septic shock (HCC)- (present on admission)  Assessment & Plan  This is Sepsis Present on admission  SIRS criteria identified on my evaluation include: Tachycardia, with heart rate greater than 90 BPM and Leukocytosis, with WBC greater than 12,000  Clinical indicators of end organ dysfunction include Toxic Metabolic Encephalopathy and GCS < 15  Source is Pulmonary  Sepsis " protocol initiated  Crystalloid Fluid Administration: Resuscitation volume of 0 ordered. Reason that resuscitation volume of less than 30ml/kg was ordered concern for causing harm given hypoxia and pleural effusion  IV antibiotics as appropriate for source of sepsis  Reassessment: I have reassessed the patient's hemodynamic status    Follow cultures  Continue unasyn  S/p azithromycin  Stopped Zyvox with negative MRSA PCR  S/p pigtail catheter to chest on 3/28, studies indicate exudative and likely parapneumonic  MAP goals > 65  I am actively titrating phenylephrine for MAP goals     History of stroke- (present on admission)  Assessment & Plan  PT/OT     Parkinson disease (HCC)- (present on admission)  Assessment & Plan  Nonverbal, nonabmulatory at baseline  Goes to the [employer] and has a   Communicates with hand signals / sign language  Cont home amantadine 100mg OG BID  Cont home Sinemet 25-250mg 2 tabs OG 4 times daily  Cont home gabapentin 600mg via OG at bedtime  Cont home ropinirole 4mg via OG TID    Other hyperlipidemia- (present on admission)  Assessment & Plan  Cont high intensity statin         VTE:  Lovenox  Ulcer: PPI  Lines: Bowen Catheter  Ongoing indication addressed and PIVs    I have performed a physical exam and reviewed and updated ROS and Plan today (3/29/2025). In review of yesterday's note (3/28/2025), there are no changes except as documented above.     Discussed patient condition and risk of morbidity and/or mortality with Family, RN, RT, Pharmacy, Code status disscussed, Charge nurse / hot rounds, and Patient    The patient remains critically ill.  I have assessed and reassessed the respiratory status and made ventilator adjustments based upon arterial blood gas analysis, ventilator waveforms and airway mechanics.  I have assessed and reassessed the blood pressure, hemodynamics, cardiovascular status. This patient remains at high risk for worsening cardiopulmonary dysfunction and death  without the above critical care interventions.    Critical care time = 110 minutes in directly providing and coordinating critical care and extensive data review.  No time overlap and excludes procedures.

## 2025-03-29 NOTE — CARE PLAN
Problem: Ventilation  Goal: Ability to achieve and maintain unassisted ventilation or tolerate decreased levels of ventilator support  Description: Target End Date:  4 days Document on Vent flowsheet1.  Support and monitor invasive and noninvasive mechanical ventilation2.  Monitor ventilator weaning response3.  Perform ventilator associated pneumonia prevention interventions4.  Manage ventilation therapy by monitoring diagnostic test results  Outcome: Not Met     Ventilator Daily Summary    Vent Day # 2  Airway: 8@26    Ventilator settings: 20, 480, +10, 70%  Weaning trials: none  Respiratory Procedures: none    Plan: Continue current ventilator settings and wean mechanical ventilation as tolerated per physician orders.

## 2025-03-29 NOTE — PROGRESS NOTES
Critical Care Medicine   Brief Cross-Coverage Progress Note    Date of service: 3/28/2025  Time: ***        Assessment/Plan:  ***    I spent *** min in reviewing the patient's condition, physical examination, laboratory and imaging data, prior documentation, in discussion with ***, and in formulating an assessment/plan.    Critical Care time: *** min. No time overlap, procedures not included in time.

## 2025-03-29 NOTE — THERAPY
Speech Language Therapy Contact Note    Patient Name: Jeremiah Huber  Age:  74 y.o., Sex:  male  Medical Record #: [id number]  Today's Date: 3/29/2025         03/29/25 0722   Treatment Variance   Reason For Missed Therapy Medical - Patient on Hold from Therapy;Medical - Patient not Able To Participate   Interdisciplinary Plan of Care Collaboration   IDT Collaboration with  Other (See Comments)  (EMR)   Collaboration Comments Per EMR, patient intubated 3/28. Service will monitor for extubation.

## 2025-03-30 LAB
ALBUMIN SERPL BCP-MCNC: 2.5 G/DL (ref 3.2–4.9)
ALBUMIN/GLOB SERPL: 0.8 G/DL
ALP SERPL-CCNC: 89 U/L (ref 30–99)
ALT SERPL-CCNC: 34 U/L (ref 2–50)
ANION GAP SERPL CALC-SCNC: 11 MMOL/L (ref 7–16)
ARTERIAL PATENCY WRIST A: ABNORMAL
AST SERPL-CCNC: 105 U/L (ref 12–45)
BASE EXCESS BLDA CALC-SCNC: 2 MMOL/L (ref -4–3)
BILIRUB SERPL-MCNC: 0.5 MG/DL (ref 0.1–1.5)
BODY TEMPERATURE: ABNORMAL DEGREES
BREATHS SETTING VENT: 20
BUN SERPL-MCNC: 18 MG/DL (ref 8–22)
CALCIUM ALBUM COR SERPL-MCNC: 9.2 MG/DL (ref 8.5–10.5)
CALCIUM SERPL-MCNC: 8 MG/DL (ref 8.5–10.5)
CHLORIDE SERPL-SCNC: 96 MMOL/L (ref 96–112)
CO2 BLDA-SCNC: 29 MMOL/L (ref 32–48)
CO2 SERPL-SCNC: 26 MMOL/L (ref 20–33)
CREAT SERPL-MCNC: 0.71 MG/DL (ref 0.5–1.4)
DELSYS IDSYS: ABNORMAL
ERYTHROCYTE [DISTWIDTH] IN BLOOD BY AUTOMATED COUNT: 44.7 FL (ref 35.9–50)
GFR SERPLBLD CREATININE-BSD FMLA CKD-EPI: 97 ML/MIN/1.73 M 2
GLOBULIN SER CALC-MCNC: 3.2 G/DL (ref 1.9–3.5)
GLUCOSE SERPL-MCNC: 195 MG/DL (ref 65–99)
HCO3 BLDA-SCNC: 27.4 MMOL/L (ref 21–28)
HCT VFR BLD AUTO: 41 % (ref 42–52)
HGB BLD-MCNC: 13.4 G/DL (ref 14–18)
HOROWITZ INDEX BLDA+IHG-RTO: 142 MM[HG]
LACTATE BLD-SCNC: 1.1 MMOL/L (ref 0.5–2)
MAGNESIUM SERPL-MCNC: 2.2 MG/DL (ref 1.5–2.5)
MCH RBC QN AUTO: 29.7 PG (ref 27–33)
MCHC RBC AUTO-ENTMCNC: 32.7 G/DL (ref 32.3–36.5)
MCV RBC AUTO: 90.9 FL (ref 81.4–97.8)
MODE IMODE: ABNORMAL
O2/TOTAL GAS SETTING VFR VENT: 60 %
PCO2 BLDA: 43.5 MMHG (ref 32–48)
PCO2 TEMP ADJ BLDA: 43.2 MMHG (ref 32–48)
PEEP END EXPIRATORY PRESSURE IPEEP: 10 CMH20
PH BLDA: 7.41 [PH] (ref 7.35–7.45)
PH TEMP ADJ BLDA: 7.41 [PH] (ref 7.35–7.45)
PHOSPHATE SERPL-MCNC: 2.4 MG/DL (ref 2.5–4.5)
PLATELET # BLD AUTO: 293 K/UL (ref 164–446)
PMV BLD AUTO: 9.7 FL (ref 9–12.9)
PO2 BLDA: 85 MMHG (ref 83–108)
PO2 TEMP ADJ BLDA: 83 MMHG (ref 83–108)
POTASSIUM SERPL-SCNC: 4.6 MMOL/L (ref 3.6–5.5)
PROT SERPL-MCNC: 5.7 G/DL (ref 6–8.2)
RBC # BLD AUTO: 4.51 M/UL (ref 4.7–6.1)
SAO2 % BLDA: 96 % (ref 93–99)
SODIUM SERPL-SCNC: 133 MMOL/L (ref 135–145)
SPECIMEN DRAWN FROM PATIENT: ABNORMAL
TIDAL VOLUME IVT: 480 ML
WBC # BLD AUTO: 15.3 K/UL (ref 4.8–10.8)

## 2025-03-30 PROCEDURE — 700111 HCHG RX REV CODE 636 W/ 250 OVERRIDE (IP): Performed by: STUDENT IN AN ORGANIZED HEALTH CARE EDUCATION/TRAINING PROGRAM

## 2025-03-30 PROCEDURE — 700105 HCHG RX REV CODE 258: Performed by: NURSE PRACTITIONER

## 2025-03-30 PROCEDURE — 94669 MECHANICAL CHEST WALL OSCILL: CPT

## 2025-03-30 PROCEDURE — 700111 HCHG RX REV CODE 636 W/ 250 OVERRIDE (IP): Performed by: HOSPITALIST

## 2025-03-30 PROCEDURE — 36600 WITHDRAWAL OF ARTERIAL BLOOD: CPT

## 2025-03-30 PROCEDURE — A9270 NON-COVERED ITEM OR SERVICE: HCPCS | Performed by: INTERNAL MEDICINE

## 2025-03-30 PROCEDURE — 94150 VITAL CAPACITY TEST: CPT

## 2025-03-30 PROCEDURE — 83605 ASSAY OF LACTIC ACID: CPT

## 2025-03-30 PROCEDURE — 700102 HCHG RX REV CODE 250 W/ 637 OVERRIDE(OP): Performed by: STUDENT IN AN ORGANIZED HEALTH CARE EDUCATION/TRAINING PROGRAM

## 2025-03-30 PROCEDURE — 80053 COMPREHEN METABOLIC PANEL: CPT

## 2025-03-30 PROCEDURE — 700111 HCHG RX REV CODE 636 W/ 250 OVERRIDE (IP): Mod: JZ | Performed by: STUDENT IN AN ORGANIZED HEALTH CARE EDUCATION/TRAINING PROGRAM

## 2025-03-30 PROCEDURE — 700101 HCHG RX REV CODE 250: Performed by: STUDENT IN AN ORGANIZED HEALTH CARE EDUCATION/TRAINING PROGRAM

## 2025-03-30 PROCEDURE — 94799 UNLISTED PULMONARY SVC/PX: CPT

## 2025-03-30 PROCEDURE — 700111 HCHG RX REV CODE 636 W/ 250 OVERRIDE (IP): Mod: JZ | Performed by: NURSE PRACTITIONER

## 2025-03-30 PROCEDURE — 99292 CRITICAL CARE ADDL 30 MIN: CPT | Performed by: INTERNAL MEDICINE

## 2025-03-30 PROCEDURE — 700111 HCHG RX REV CODE 636 W/ 250 OVERRIDE (IP): Mod: JZ | Performed by: INTERNAL MEDICINE

## 2025-03-30 PROCEDURE — 700105 HCHG RX REV CODE 258: Performed by: STUDENT IN AN ORGANIZED HEALTH CARE EDUCATION/TRAINING PROGRAM

## 2025-03-30 PROCEDURE — 700102 HCHG RX REV CODE 250 W/ 637 OVERRIDE(OP): Performed by: INTERNAL MEDICINE

## 2025-03-30 PROCEDURE — 770022 HCHG ROOM/CARE - ICU (200)

## 2025-03-30 PROCEDURE — 84100 ASSAY OF PHOSPHORUS: CPT

## 2025-03-30 PROCEDURE — 82803 BLOOD GASES ANY COMBINATION: CPT

## 2025-03-30 PROCEDURE — A9270 NON-COVERED ITEM OR SERVICE: HCPCS | Performed by: STUDENT IN AN ORGANIZED HEALTH CARE EDUCATION/TRAINING PROGRAM

## 2025-03-30 PROCEDURE — 85027 COMPLETE CBC AUTOMATED: CPT

## 2025-03-30 PROCEDURE — 83735 ASSAY OF MAGNESIUM: CPT

## 2025-03-30 PROCEDURE — 94003 VENT MGMT INPAT SUBQ DAY: CPT

## 2025-03-30 PROCEDURE — 99291 CRITICAL CARE FIRST HOUR: CPT | Performed by: INTERNAL MEDICINE

## 2025-03-30 RX ORDER — POLYETHYLENE GLYCOL 3350 17 G/17G
1 POWDER, FOR SOLUTION ORAL
Status: DISCONTINUED | OUTPATIENT
Start: 2025-03-30 | End: 2025-04-09 | Stop reason: HOSPADM

## 2025-03-30 RX ORDER — ATORVASTATIN CALCIUM 80 MG/1
80 TABLET, FILM COATED ORAL EVERY EVENING
Status: DISCONTINUED | OUTPATIENT
Start: 2025-03-30 | End: 2025-04-09 | Stop reason: HOSPADM

## 2025-03-30 RX ORDER — FUROSEMIDE 10 MG/ML
40 INJECTION INTRAMUSCULAR; INTRAVENOUS ONCE
Status: COMPLETED | OUTPATIENT
Start: 2025-03-30 | End: 2025-03-30

## 2025-03-30 RX ORDER — AMOXICILLIN 250 MG
2 CAPSULE ORAL EVERY EVENING
Status: DISCONTINUED | OUTPATIENT
Start: 2025-03-30 | End: 2025-04-09 | Stop reason: HOSPADM

## 2025-03-30 RX ADMIN — OXYBUTYNIN CHLORIDE 5 MG: 5 TABLET ORAL at 05:12

## 2025-03-30 RX ADMIN — PANTOPRAZOLE SODIUM 40 MG: 40 INJECTION, POWDER, FOR SOLUTION INTRAVENOUS at 05:12

## 2025-03-30 RX ADMIN — ENOXAPARIN SODIUM 40 MG: 100 INJECTION SUBCUTANEOUS at 18:17

## 2025-03-30 RX ADMIN — ROPINIROLE HYDROCHLORIDE 4 MG: 2 TABLET, FILM COATED ORAL at 05:12

## 2025-03-30 RX ADMIN — GABAPENTIN 600 MG: 300 CAPSULE ORAL at 18:18

## 2025-03-30 RX ADMIN — AMPICILLIN AND SULBACTAM 3 G: 1; 2 INJECTION, POWDER, FOR SOLUTION INTRAMUSCULAR; INTRAVENOUS at 20:57

## 2025-03-30 RX ADMIN — AMANTADINE HYDROCHLORIDE 100 MG: 100 TABLET ORAL at 18:18

## 2025-03-30 RX ADMIN — AMIODARONE HYDROCHLORIDE 0.5 MG/MIN: 1.8 INJECTION, SOLUTION INTRAVENOUS at 03:02

## 2025-03-30 RX ADMIN — CARBIDOPA AND LEVODOPA 2 TABLET: 25; 250 TABLET ORAL at 17:10

## 2025-03-30 RX ADMIN — PHENYLEPHRINE HYDROCHLORIDE 0.25 MCG/KG/MIN: 100 INJECTION INTRAVENOUS at 09:07

## 2025-03-30 RX ADMIN — AMPICILLIN AND SULBACTAM 3 G: 1; 2 INJECTION, POWDER, FOR SOLUTION INTRAMUSCULAR; INTRAVENOUS at 08:40

## 2025-03-30 RX ADMIN — ROPINIROLE HYDROCHLORIDE 4 MG: 2 TABLET, FILM COATED ORAL at 11:29

## 2025-03-30 RX ADMIN — CARBIDOPA AND LEVODOPA 2 TABLET: 25; 250 TABLET ORAL at 22:08

## 2025-03-30 RX ADMIN — FENTANYL CITRATE 100 MCG/HR: 50 INJECTION, SOLUTION INTRAMUSCULAR; INTRAVENOUS at 00:36

## 2025-03-30 RX ADMIN — AMPICILLIN AND SULBACTAM 3 G: 1; 2 INJECTION, POWDER, FOR SOLUTION INTRAMUSCULAR; INTRAVENOUS at 13:33

## 2025-03-30 RX ADMIN — DEXMEDETOMIDINE HYDROCHLORIDE 0.2 MCG/KG/HR: 100 INJECTION, SOLUTION INTRAVENOUS at 18:34

## 2025-03-30 RX ADMIN — ASPIRIN 81 MG: 81 TABLET, CHEWABLE ORAL at 05:11

## 2025-03-30 RX ADMIN — OXYBUTYNIN CHLORIDE 5 MG: 5 TABLET ORAL at 18:18

## 2025-03-30 RX ADMIN — AMIODARONE HYDROCHLORIDE 0.5 MG/MIN: 1.8 INJECTION, SOLUTION INTRAVENOUS at 13:34

## 2025-03-30 RX ADMIN — CARBIDOPA AND LEVODOPA 2 TABLET: 25; 250 TABLET ORAL at 11:29

## 2025-03-30 RX ADMIN — HYDROCORTISONE SODIUM SUCCINATE 100 MG: 100 INJECTION, POWDER, FOR SOLUTION INTRAMUSCULAR; INTRAVENOUS at 22:13

## 2025-03-30 RX ADMIN — HYDROCORTISONE SODIUM SUCCINATE 100 MG: 100 INJECTION, POWDER, FOR SOLUTION INTRAMUSCULAR; INTRAVENOUS at 13:32

## 2025-03-30 RX ADMIN — MIDODRINE HYDROCHLORIDE 10 MG: 5 TABLET ORAL at 13:32

## 2025-03-30 RX ADMIN — MIDODRINE HYDROCHLORIDE 10 MG: 5 TABLET ORAL at 05:11

## 2025-03-30 RX ADMIN — HYDROCORTISONE SODIUM SUCCINATE 100 MG: 100 INJECTION, POWDER, FOR SOLUTION INTRAMUSCULAR; INTRAVENOUS at 05:12

## 2025-03-30 RX ADMIN — SODIUM BICARBONATE 1300 MG: 650 TABLET ORAL at 16:45

## 2025-03-30 RX ADMIN — AMPICILLIN AND SULBACTAM 3 G: 1; 2 INJECTION, POWDER, FOR SOLUTION INTRAMUSCULAR; INTRAVENOUS at 03:01

## 2025-03-30 RX ADMIN — CARBIDOPA AND LEVODOPA 2 TABLET: 25; 250 TABLET ORAL at 08:38

## 2025-03-30 RX ADMIN — SODIUM BICARBONATE 1300 MG: 650 TABLET ORAL at 09:04

## 2025-03-30 RX ADMIN — FUROSEMIDE 40 MG: 10 INJECTION, SOLUTION INTRAVENOUS at 09:04

## 2025-03-30 RX ADMIN — ROPINIROLE HYDROCHLORIDE 4 MG: 2 TABLET, FILM COATED ORAL at 18:18

## 2025-03-30 RX ADMIN — SODIUM BICARBONATE 1300 MG: 650 TABLET ORAL at 22:07

## 2025-03-30 RX ADMIN — AMANTADINE HYDROCHLORIDE 100 MG: 100 TABLET ORAL at 05:12

## 2025-03-30 RX ADMIN — SENNOSIDES AND DOCUSATE SODIUM 2 TABLET: 50; 8.6 TABLET ORAL at 18:17

## 2025-03-30 RX ADMIN — MIDODRINE HYDROCHLORIDE 10 MG: 5 TABLET ORAL at 22:12

## 2025-03-30 RX ADMIN — DIBASIC SODIUM PHOSPHATE, MONOBASIC POTASSIUM PHOSPHATE AND MONOBASIC SODIUM PHOSPHATE 500 MG: 852; 155; 130 TABLET ORAL at 08:38

## 2025-03-30 RX ADMIN — DEXTROSE MONOHYDRATE: 50 INJECTION, SOLUTION INTRAVENOUS at 08:00

## 2025-03-30 RX ADMIN — ATORVASTATIN CALCIUM 80 MG: 40 TABLET, FILM COATED ORAL at 18:17

## 2025-03-30 ASSESSMENT — FIBROSIS 4 INDEX: FIB4 SCORE: 5.48

## 2025-03-30 ASSESSMENT — PAIN DESCRIPTION - PAIN TYPE
TYPE: ACUTE PAIN

## 2025-03-30 ASSESSMENT — PULMONARY FUNCTION TESTS: FVC: 0.8

## 2025-03-30 NOTE — CARE PLAN
The patient is Stable - Low risk of patient condition declining or worsening    Shift Goals  Clinical Goals: MAP >65, Hemodynamic stability, patient co,,fort, monitor chest tu be  Patient Goals: JUANITA  Family Goals: Updates      Problem: Pain - Standard  Goal: Alleviation of pain or a reduction in pain to the patient’s comfort goal  Outcome: Progressing     Problem: Knowledge Deficit - Standard  Goal: Patient and family/care givers will demonstrate understanding of plan of care, disease process/condition, diagnostic tests and medications  Outcome: Progressing     Problem: Hemodynamics  Goal: Patient's hemodynamics, fluid balance and neurologic status will be stable or improve  Outcome: Progressing     Problem: Fluid Volume  Goal: Fluid volume balance will be maintained  Outcome: Progressing     Problem: Urinary - Renal Perfusion  Goal: Ability to achieve and maintain adequate renal perfusion and functioning will improve  Outcome: Progressing     Problem: Respiratory  Goal: Patient will achieve/maintain optimum respiratory ventilation and gas exchange  Outcome: Progressing     Problem: Mechanical Ventilation  Goal: Safe management of artificial airway and ventilation  Outcome: Progressing  Goal: Successful weaning off mechanical ventilator, spontaneously maintains adequate gas exchange  Outcome: Progressing  Goal: Patient will be able to express needs and understand communication  Outcome: Progressing     Problem: Physical Regulation  Goal: Diagnostic test results will improve  Outcome: Progressing  Goal: Signs and symptoms of infection will decrease  Outcome: Progressing     Problem: Skin Integrity  Goal: Skin integrity is maintained or improved  Outcome: Progressing     Problem: Fall Risk  Goal: Patient will remain free from falls  Outcome: Progressing     Problem: Safety - Medical Restraint  Goal: Remains free of injury from restraints (Restraint for Interference with Medical Device)  Outcome: Progressing

## 2025-03-30 NOTE — CARE PLAN
Problem: Ventilation  Goal: Ability to achieve and maintain unassisted ventilation or tolerate decreased levels of ventilator support  Description: Target End Date:  4 days Document on Vent flowsheet1.  Support and monitor invasive and noninvasive mechanical ventilation2.  Monitor ventilator weaning response3.  Perform ventilator associated pneumonia prevention interventions4.  Manage ventilation therapy by monitoring diagnostic test results  Outcome: Progressing     Ventilator Daily Summary    Vent Day #3  Airway: 8.0 @26    Ventilator settings: APV/CMV 20 - 480 - 10 - 60%  Weaning trials: No  Respiratory Procedures: No    Plan: Continue current ventilator settings and wean mechanical ventilation as tolerated per physician orders.

## 2025-03-30 NOTE — FLOWSHEET NOTE
03/30/25 1325 03/30/25 1456   Spontaneous Breathing Trial (SBT)   Safety screen spontaneous breathing trial (SBT) Proceed with SBT - no exclusion criteria met  --    Spontaneous breathing trial (SBT) outcome  --  Pt weaned for 1 hour and returned to rest settings per protocol - SBT Pass   Length of Weaning Trial (Hours)  --  1   Pt passed SBT but not ready to extubate  --  Not ready - continue daily assessments for extubation   Weaning Parameters   RR (bpm)  --  22   $ FVC / Vital Capacity (liters)   --  0.8   NIF (cm H2O)   --  -17   Rapid Shallow Breathing Index (RR/VT)  --  41   Spontaneous VE  --  12   Spontaneous VT  --  503

## 2025-03-30 NOTE — CARE PLAN
Problem: Ventilation  Goal: Ability to achieve and maintain unassisted ventilation or tolerate decreased levels of ventilator support  Description: Target End Date:  4 days Document on Vent flowsheet1.  Support and monitor invasive and noninvasive mechanical ventilation2.  Monitor ventilator weaning response3.  Perform ventilator associated pneumonia prevention interventions4.  Manage ventilation therapy by monitoring diagnostic test results  Outcome: Not Met      Ventilator Daily Summary     Vent Day #2  Airway: 8.0 @ 26     Ventilator settings: 20, 440, +10, 60%  Weaning trials: no  Respiratory Procedures: none     Plan: Continue current ventilator settings and wean mechanical ventilation as tolerated per physician orders.

## 2025-03-30 NOTE — CARE PLAN
The patient is Watcher - Medium risk of patient condition declining or worsening    Shift Goals  Clinical Goals: Hemodynamic stability, Pulmonary hygiene, Reorient, Patient safety  Patient Goals: JUANITA  Family Goals: Updates, patient comfort    Progress made toward(s) clinical / shift goals:        Problem: Pain - Standard  Goal: Alleviation of pain or a reduction in pain to the patient’s comfort goal  Outcome: Progressing     Problem: Knowledge Deficit - Standard  Goal: Patient and family/care givers will demonstrate understanding of plan of care, disease process/condition, diagnostic tests and medications  Outcome: Progressing     Problem: Hemodynamics  Goal: Patient's hemodynamics, fluid balance and neurologic status will be stable or improve  Outcome: Progressing     Problem: Mechanical Ventilation  Goal: Safe management of artificial airway and ventilation  Outcome: Progressing  Goal: Patient will be able to express needs and understand communication  Outcome: Progressing     Problem: Safety - Medical Restraint  Goal: Remains free of injury from restraints (Restraint for Interference with Medical Device)  Outcome: Progressing  Goal: Free from restraint(s) (Restraint for Interference with Medical Device)  Outcome: Progressing       Patient is not progressing towards the following goals:

## 2025-03-30 NOTE — WOUND TEAM
[facility]  Inpatient Services  Initial Wound and Skin Care Evaluation    Admission Date: 3/26/2025     Last order of IP CONSULT TO WOUND CARE was found on 3/28/2025 from Hospital Encounter on 3/26/2025     HPI, PMH, SH: Reviewed    Past Surgical History:   Procedure Laterality Date    NEURO DEST FACET L/S W/IG SNGL Right 2020    Procedure: DESTRUCTION, NERVE, FACET JOINT, LUMBOSACRAL, USING NEUROLYTIC AGENT, WITH FLUOROSCOPIC GUIDANCE;  Surgeon: Nazario Sandoval M.D.;  [facility];  Service: Pain Management    KNEE ARTHROSCOPY Right     KNEE REPLACEMENT, TOTAL Bilateral     SHOULDER SURGERY      right shoulder, a-c seperation    TONSILLECTOMY       Social History     Tobacco Use    Smoking status: Former     Current packs/day: 0.00     Types: Cigarettes     Quit date: 1970     Years since quittin.2    Smokeless tobacco: Never   Substance Use Topics    Alcohol use: Yes     Alcohol/week: 4.2 oz     Types: 7 Glasses of wine per week     Chief Complaint   Patient presents with    Abdominal Pain     Intermittent RUQ pain x1 week. -N/V, +fever at home but no longer present following acetaminophen administration at home, PMH: parkinsons, patient non-verbal, wife (POA) at bedside. Patient has been coughing x1 week with clear mucous.     Diagnosis: Pneumonia due to infectious organism [J18.9]    Unit where seen by Wound Team: T927/01     WOUND CONSULT RELATED TO:  Buttocks     WOUND TEAM PLAN OF CARE - Frequency of Follow-up:   Nursing to follow dressing orders written for wound care. Contact wound team if area fails to progress, deteriorates or with any questions/concerns if something comes up before next scheduled follow up (See below as to whether wound is following and frequency of wound follow up)   Weekly - buttocks    WOUND HISTORY:   Admitted for RUQ pain.  History of parkinson's, non-verbal, HTN, patient had a cough.         WOUND ASSESSMENT/LDA  Wound 25  Pressure Injury Buttocks Evolving DTI (Active)   Date First Assessed/Time First Assessed: 03/28/25 2000   Present on Original Admission: No  Hand Hygiene Completed: Yes  Primary Wound Type: Pressure Injury  Location: Buttocks  Wound Description (Comments): Evolving DTI      Assessments 3/29/2025  6:00 PM   Wound Image        Site Assessment Purple;Red;Yellow   Periwound Assessment Blistered;Red   Margins Defined edges;Attached edges   Closure Adhesive bandage   Drainage Amount None   Treatments Cleansed;Nonselective debridement;Site care;Offloading   Wound Cleansing Soap and Water   Periwound Protectant Not Applicable   Dressing Status Clean;Dry;Intact   Dressing Changed New   Dressing Cleansing/Solutions Not Applicable   Dressing Options Offloading Dressing - Sacral   Dressing Change/Treatment Frequency Every 72 hrs, and As Needed   NEXT Dressing Change/Treatment Date 04/01/25   NEXT Weekly Photo (Inpatient Only) 04/02/25   Wound Team Following Weekly   Pressure Injury Stage Deep Tissue   Wound Length (cm) 25 cm   Wound Width (cm) 18 cm   Wound Surface Area (cm^2) 450 cm^2   Shape U   WOUND NURSE ONLY - Time Spent with Patient (mins) 75        Vascular:    MITUL:   No results found.    Lab Values:    Lab Results   Component Value Date/Time    WBC 14.1 (H) 03/29/2025 05:46 AM    RBC 4.72 03/29/2025 05:46 AM    HEMOGLOBIN 13.6 (L) 03/29/2025 05:46 AM    HEMATOCRIT 43.0 03/29/2025 05:46 AM    CREACTPROT 37.50 (H) 03/29/2025 03:43 AM    HBA1C 5.2 06/14/2020 04:44 AM         Culture Results show:  No results found for this or any previous visit (from the past 720 hours).    Pain Level/Medicated:   JUANITA        INTERVENTIONS BY WOUND TEAM:  Chart and images reviewed. Discussed with bedside RN. All areas of concern (based on picture review, LDA review and discussion with bedside RN) have been thoroughly assessed. Documentation of areas based on significant findings. This RN in to assess patient. Performed standard wound care  which includes appropriate positioning, dressing removal and non-selective debridement. Pictures and measurements obtained weekly if/when required.    Wound:  Buttocks  Cleansed/Non-selectively Debrided with:  Moist warm washcloth  Marizol wound: Cleansed with Moist warm washcloth, Prepped with N/A  Primary Dressing:  offloading adhesive foam    Advanced Wound Care Discharge Planning  Number of Clinicians necessary to complete wound care: 1  Is patient requiring IV pain medications for dressing changes:  No   Length of time for dressing change 20 min. (This does not include chart review, pre-medication time, set up, clean up or time spent charting.)    Interdisciplinary consultation: Patient, Bedside RN , Shawanda LAI (Wound PT).  Pressure injury and staging reviewed with Shawanda LAI (Wound PT).    EVALUATION / RATIONALE FOR TREATMENT:     Date:  03/29/25  Wound Status:  Initial evaluation    Patient with large evolving DTI from a bedpan.  The coccyx is beginning to open and the L. Buttocks with visible blood filled blisters.  This wound will most likely worsen.  Offloading adhesive foams applied         Goals: Steady decrease in wound area and depth weekly.    NURSING PLAN OF CARE ORDERS:  Dressing changes: See Dressing Care orders  Skin care: See Skin Care orders  RN Prevention Protocol    NUTRITION RECOMMENDATIONS   Wound Team Recommendations:  Dietary consult    DIET ORDERS (From admission to next 24h)       Start     Ordered    03/28/25 5129  Diet NPO Restrict to: Sips with Medications  AT MIDNIGHT      Question:  Diet NPO Restrict to:  Answer:  Sips with Medications    03/28/25 0721    03/28/25 1539  Diet: Diet Tube Feed; Formula: Jevity; Jevity: 1.2 RTH; Goal Rate (mL/Hour): 70  ALL MEALS        Comments: Start at 25 ml/hour and advance per protocol.   Question Answer Comment   Diet Diet Tube Feed    Formula: Jevity    Jevity: 1.2 RTH    Goal Rate (mL/Hour) 70    Route Enteral Tube        03/28/25 153                     PREVENTATIVE INTERVENTIONS:    Q shift Cuong - performed per nursing policy  Q shift pressure point assessments - performed per nursing policy    Surface/Positioning  ICU Low Airloss - Currently in Place  Reposition q 2 hours - Currently in Place  TAPs Turning system - Currently in Place    Offloading/Redistribution  Sacral offloading dressing (Silicone dressing) - Currently in Place  Heel offloading dressing (Silicone dressing) - Currently in Place  Heel float boots (Prevalon boot) - Currently in Place      Respiratory  Anchorfast - Currently in Place    Containment/Moisture Prevention    Bowen Catheter - Currently in Place    Anticipated discharge plans:  TBD        Vac Discharge Needs:  Vac Discharge plan is purely a recommendation from wound team and not a requirement for discharge unless otherwise stated by physician.  Not Applicable Pt not on a wound vac

## 2025-03-30 NOTE — CARE PLAN
Problem: Ventilation  Goal: Ability to achieve and maintain unassisted ventilation or tolerate decreased levels of ventilator support  Description: Target End Date:  4 days Document on Vent flowsheet1.  Support and monitor invasive and noninvasive mechanical ventilation2.  Monitor ventilator weaning response3.  Perform ventilator associated pneumonia prevention interventions4.  Manage ventilation therapy by monitoring diagnostic test results  Outcome: Not Met   Vent day 3  8.0 ETT, 26 cm at the teeth  20/480/+10, 50%  IPV QID  Spont trial performed X1

## 2025-03-30 NOTE — PROGRESS NOTES
Critical Care Progress Note    Date of admission  3/26/2025    Chief Complaint  74 y.o. male admitted 3/26/2025 with pneumonia and right pleural effusion    Hospital Course  Mr. Huber is a 74 year old male with the past medical history significant for Parkinson's disease complicated by dysphagia, hypertension, dyslipidemia, GERD, and history of a stroke in 2020 attributed to a LV thrombus who was admitted on 3/27/2025 with pneumonia.  The patient was started on broad-spectrum antibiotics.  Due to the patient also complaining of right upper quadrant abdominal pain he had an ultrasound that was significant for stones/sludge and HIDA scan that was subsequently normal.  A CT of his chest on 3/28 showed a large right pleural effusion with collapse of the right lung.  The critical care team was consulted due to worsening mental status changes and being obtunded with increasing oxygen requirements.  The patient was originally a DNR/DNI; however, the patient's wife rescinded that in hopes that this was reversible.  The patient was then transferred to the ICU where he was intubated and a chest tube was placed.  3/29 - VD#2, SAT/SBT-->weak, in/out of AF on amio, small amount of phenylephrine    Interval Problem Update  Reviewed last 24 hour events:   - no events overnight   - moving x 4, right is weaker than left   - intermittently following   - SB/SR 50-80s   - SBP 80-110s   - afebrile   - TFs at goal   - fent at 100   - precedex at 0.2   - phenylephrine at 0.25   - amio at 0.5   - chest tube with 40cc ovenright   - UOP of 615 cc overnight, Bowen   - (+) 5.5 liters since admit   - no BM yest   - edge of bed today   - VD#3   - CXR(reviewed): no imaging today   - PEEP 10 and FiO2 50%, start SBTs   - lovenox   - PPI   - day 4 of Unasyn, s/p azithromycin   - stress dose steroids   - WBCs 15   - K 4.6   - creat 0.71   - Phos 2.4   - Mg 2.2    Yesterday's Events:   - no issues overnight   - wakes to voice, follows weakly, L>R   -  AF/SR 80-120s   - SBP 80-120s   - Tmax 38.8   - OG, TFs at goal   - UOP of 430cc overnight, rowe   - BM pta   - fent at 100   - precedex 0.2   - amio 0.5   - phenylephrine 0.2   - CT of 180cc overnight   - bed wound noted from transfer yesterday, wound care   - VD #2   - CXR(reviewed): pigtail catheter in place, right effusion noted but significantly improved, some atelectasis noted on the RML, small left effusion    - no secretions   - PEEP 10/70%   - no SBT yet   - lovenox   - PPI   - day 3 of unasyn/zyvox   - day 3/3 of azith   - all cultures negative so far   - MRSA pcr negative, stop Zyvox   - Mg 2   - Phos 2.4   - K 3.7   - creat 0.97    Review of Systems  Review of Systems   Unable to perform ROS: Intubated        Vital Signs for last 24 hours   Pulse:  [] 61  Resp:  [2-23] 18  BP: ()/(50-82) 117/69  SpO2:  [85 %-100 %] 99 %    Hemodynamic parameters for last 24 hours       Respiratory Information for the last 24 hours  Vent Mode: APVCMV  Rate (breaths/min): 20  Vt Target (mL): 480  PEEP/CPAP: 10  MAP: 14  Control VTE (exp VT): 639    Physical Exam   Physical Exam  Vitals and nursing note reviewed.   Constitutional:       Appearance: He is ill-appearing. He is not toxic-appearing.      Comments: Pt appears older than stated age and chronically ill   HENT:      Head: Normocephalic and atraumatic.      Right Ear: External ear normal.      Left Ear: External ear normal.      Nose: Nose normal. No rhinorrhea.      Mouth/Throat:      Mouth: Mucous membranes are moist.      Comments: ETT in place  Eyes:      General: No scleral icterus.     Conjunctiva/sclera: Conjunctivae normal.      Pupils: Pupils are equal, round, and reactive to light.   Cardiovascular:      Rate and Rhythm: Normal rate and regular rhythm.      Pulses: Normal pulses.           Dorsalis pedis pulses are 2+ on the right side and 2+ on the left side.      Heart sounds: No murmur heard.  Pulmonary:      Breath sounds: No wheezing.       Comments: Breathing comfortably on the vent, diminished breath sounds on the right  Chest:      Chest wall: No tenderness.   Abdominal:      Palpations: Abdomen is soft.      Tenderness: There is no abdominal tenderness. There is no guarding or rebound.   Musculoskeletal:         General: Normal range of motion.      Cervical back: Normal range of motion and neck supple.      Right lower leg: No edema.      Left lower leg: No edema.   Lymphadenopathy:      Cervical: No cervical adenopathy.   Skin:     General: Skin is warm and dry.      Capillary Refill: Capillary refill takes less than 2 seconds.      Findings: No rash.   Neurological:      Comments: Weaker on right than the left, follows commands, opens eyes to voice   Psychiatric:      Comments: Unable to assess     No change to exam    Medications  Current Facility-Administered Medications   Medication Dose Route Frequency Provider Last Rate Last Admin    hydrocortisone sodium succinate PF (Solu-CORTEF) injection 100 mg  100 mg Intravenous Q8HRS Zhanna Heredia M.D.   100 mg at 03/30/25 0512    midodrine (Proamatine) tablet 10 mg  10 mg Enteral Tube Q8HRS Zhanna Heredia M.D.   10 mg at 03/30/25 0511    acetaminophen (Tylenol) suppository 650 mg  650 mg Rectal Q4HRS PRN Jeremiah Thompson P.A.-C.        Respiratory Therapy Consult   Nebulization Continuous RT Akhil Gomez M.D.        pantoprazole (Protonix) injection 40 mg  40 mg Intravenous DAILY Akhil Gomez M.D.   40 mg at 03/30/25 0512    MD Alert...ICU Electrolyte Replacement per Pharmacy   Other PHARMACY TO DOSE Ángel Harper M.D.        lidocaine (Xylocaine) 1 % injection 2 mL  2 mL Tracheal Tube Q30 MIN PRN Ángel Harper M.D.        fentaNYL (Sublimaze) injection 100 mcg  100 mcg Intravenous Q15 MIN PRN Ángel Harper M.D.        And    fentaNYL (Sublimaze) injection 200 mcg  200 mcg Intravenous Q15 MIN PRN Ángel Harper M.D.        And    fentaNYL (SUBLIMAZE) 50 mcg/mL in 50mL (Continuous  Infusion)   Intravenous Continuous Ángel Harper M.D. 2 mL/hr at 03/30/25 0036 100 mcg/hr at 03/30/25 0036    And    dexmedetomidine (Precedex) 400 mcg/100mL infusion  0-1.5 mcg/kg/hr (Ideal) Intravenous Continuous Ángel Harper M.D. 4 mL/hr at 03/30/25 0600 0.2 mcg/kg/hr at 03/30/25 0600    amantadine (Symmetrel) tablet 100 mg  100 mg Enteral Tube BID Ángel Harper M.D.   100 mg at 03/30/25 0512    aspirin (Asa) chewable tab 81 mg  81 mg Enteral Tube DAILY Ángel Harper M.D.   81 mg at 03/30/25 0511    carbidopa-levodopa (Sinemet)  MG tablet 2 Tablet  2 Tablet Enteral Tube 4X/DAY Ágnel Harper M.D.   2 Tablet at 03/29/25 2053    gabapentin (Neurontin) capsule 600 mg  600 mg Enteral Tube Q EVENING Ángel Harper M.D.   600 mg at 03/29/25 1724    oxybutynin (Ditropan) tablet 5 mg  5 mg Enteral Tube BID Ángel Harper M.D.   5 mg at 03/30/25 0512    ROPINIRole (Requip) tablet 4 mg  4 mg Enteral Tube TID Ángel Harper M.D.   4 mg at 03/30/25 0512    sodium bicarbonate tablet 1,300 mg  1,300 mg Enteral Tube TID Ángel Harper M.D.   1,300 mg at 03/29/25 2053    acetaminophen (Tylenol) tablet 650 mg  650 mg Enteral Tube Q6HRS PRN Ángel Harper M.D.   650 mg at 03/29/25 1729    Pharmacy Consult: Enteral tube insertion - review meds/change route/product selection  1 Each Other PHARMACY TO DOSE Ángel Harper M.D.        amiodarone (Nexterone) 360 mg/200 mL infusion  0.5 mg/min Intravenous Continuous Kristel L. Latona 16.7 mL/hr at 03/30/25 0600 0.5 mg/min at 03/30/25 0600    dextrose 5% infusion   Intravenous Continuous Kristel L. Latona 30 mL/hr at 03/30/25 0600 Rate Verify at 03/30/25 0600    phenylephrine 40 mg/250 mL NS premix  0-5 mcg/kg/min (Ideal) Intravenous Continuous Kristel L. Latona 7.5 mL/hr at 03/30/25 0600 0.25 mcg/kg/min at 03/30/25 0600    enoxaparin (Lovenox) inj 40 mg  40 mg Subcutaneous DAILY AT 1800 Karime Shafer M.D.   40 mg at 03/29/25 1725    ampicillin/sulbactam (Unasyn) 3 g in  mL  IVPB  3 g Intravenous Q6HRS Karime Shafer M.D.   Stopped at 03/30/25 0331    atorvastatin (Lipitor) tablet 80 mg  80 mg Oral Q EVENING Karime Shafer M.D.   80 mg at 03/29/25 1724    [Held by provider] metoprolol SR (Toprol XL) tablet 50 mg  50 mg Oral DAILY Karime Shafer M.D.   50 mg at 03/27/25 0535       Fluids    Intake/Output Summary (Last 24 hours) at 3/30/2025 0640  Last data filed at 3/30/2025 0600  Gross per 24 hour   Intake 4110.76 ml   Output 1060 ml   Net 3050.76 ml       Laboratory  Recent Labs     03/28/25  1055 03/28/25  1432 03/29/25  0328 03/30/25  0428   YOUOX13F 7.43  --   --   --    [id number] 39.3  --   --   --    [id number] 56.2*  --   --   --    LRQD0ICX 88.6*  --   --   --    ARTHCO3 26  --   --   --    ARTBE 2  --   --   --    ISTATAPH  --  7.501* 7.410 7.407   ISTATAPCO2  --  31.2* 42.1 43.5   ISTATAPO2  --  72* 83 85   ISTATATCO2  --  25* 28* 29*   UCAHHDI4FMD  --  96 96 96   ISTATARTHCO3  --  24.4 26.7 27.4   ISTATARTBE  --  2 2 2   ISTATTEMP  --  98.7 F 38.4 C 36.8 C   ISTATFIO2  --  100 70 60   ISTATSPEC  --  Arterial Arterial Arterial   ISTATAPHTC  --  7.500* 7.389 7.410   [id number]  --  72* 91 83         Recent Labs     03/28/25  0400 03/29/25  0343 03/30/25  0503   SODIUM 134* 133* 133*   POTASSIUM 4.1 3.7 4.6   CHLORIDE 100 98 96   CO2 22 26 26   BUN 22 22 18   CREATININE 0.99 0.97 0.71   MAGNESIUM  --  2.0 2.2   PHOSPHORUS  --  2.4* 2.4*   CALCIUM 8.8 8.0* 8.0*     Recent Labs     03/28/25  0400 03/29/25  0343 03/30/25  0503   ALTSGPT 21 17 34   ASTSGOT 28 86* 105*   ALKPHOSPHAT 102* 82 89   TBILIRUBIN 1.3 0.8 0.5   PREALBUMIN  --  <3.0*  --    GLUCOSE 119* 155* 195*     Recent Labs     03/28/25  0400 03/29/25  0343 03/29/25  0546 03/30/25  0503   WBC 20.4*  --  14.1* 15.3*   ASTSGOT 28 86*  --  105*   ALTSGPT 21 17  --  34   ALKPHOSPHAT 102* 82  --  89   TBILIRUBIN 1.3 0.8  --  0.5     Recent Labs     03/28/25  0400 03/29/25  0546 03/30/25  0503   RBC 5.01 4.72 4.51*  "  HEMOGLOBIN 14.8 13.6* 13.4*   HEMATOCRIT 44.5 43.0 41.0*   PLATELETCT 286 274 293       Imaging  CT head:  1. No evidence of acute intracranial process. 2. Cerebral atrophy as well as periventricular chronic small vessel ischemic change.     Assessment/Plan  * Pneumonia due to infectious organism- (present on admission)  Assessment & Plan  Continue unasyn x 5 days  S/p azithromycin x 3 days  Stopped linezolid with negative MRSA pcr  Follow blood cultures: negative so far  S/p right chest pigtail catheter on 3/28 -->water seal today  Following pleural fluid which so far does not having findings of empyema, likely more parapneumonic    Paroxysmal atrial fibrillation (HCC)  Assessment & Plan  Amio bolus and continue infusion  Optimize K to > 4 and Mg to > 2    Acute respiratory failure with hypoxia (HCC)  Assessment & Plan  Intubated on 3/28 for hypoxia and airway protection  Cont full vent support  RT/O2 protocols  Vent bundle protocols  I am actively adjusting vent settings based on ABGs/vent mechanics  Chest tube in place-->water seal  SAT/SBTs   Lasix 40mg IV x 1 today    Pleural effusion, right  Assessment & Plan  S/p right pigtail placed on 3/28  Chest tube to suction for now-->water seal today and ?d/c tomorrow  Studies indicate likely parapneumonic    Advance care planning  Assessment & Plan  3/28: \"Wife has changed him from DNR/DNI to Full code-->She would like to buy some time until the clinical picture is further elucidated\". Per Dr. Harper's encounter  3/29: pt back to DNR, I OK.  Discussed with patient, wife, and 2 adult children that we will do everything to set him up for success to liberate him from the ventilator; however, I asked if it was not possible to liberate him, would tracheostomy/LTAC be an option and the wife definitely was opposed to this.    Right upper quadrant abdominal pain- (present on admission)  Assessment & Plan  HIDA negative  Possibly pleurisy.     Sepsis with acute hypoxic " respiratory failure without septic shock (HCC)- (present on admission)  Assessment & Plan  This is Sepsis Present on admission  SIRS criteria identified on my evaluation include: Tachycardia, with heart rate greater than 90 BPM and Leukocytosis, with WBC greater than 12,000  Clinical indicators of end organ dysfunction include Toxic Metabolic Encephalopathy and GCS < 15  Source is Pulmonary  Sepsis protocol initiated  Crystalloid Fluid Administration: Resuscitation volume of 0 ordered. Reason that resuscitation volume of less than 30ml/kg was ordered concern for causing harm given hypoxia and pleural effusion  IV antibiotics as appropriate for source of sepsis  Reassessment: I have reassessed the patient's hemodynamic status    Follow cultures  Continue unasyn for 5 days  S/p azithromycin  Stopped Zyvox with negative MRSA PCR  S/p pigtail catheter to chest on 3/28, studies indicate exudative and likely parapneumonic-->water seal   MAP goals > 65  I am actively titrating phenylephrine for MAP goals     History of stroke- (present on admission)  Assessment & Plan  PT/OT     Parkinson disease (HCC)- (present on admission)  Assessment & Plan  Nonverbal, nonabmulatory at baseline  Goes to the Gym and has a   Communicates with hand signals / sign language  Cont home amantadine 100mg OG BID  Cont home Sinemet 25-250mg 2 tabs OG 4 times daily  Cont home gabapentin 600mg via OG at bedtime  Cont home ropinirole 4mg via OG TID    Other hyperlipidemia- (present on admission)  Assessment & Plan  Cont high intensity statin         VTE:  Lovenox  Ulcer: PPI  Lines: Bowen Catheter  Ongoing indication addressed and PIVs    I have performed a physical exam and reviewed and updated ROS and Plan today (3/30/2025). In review of yesterday's note (3/29/2025), there are no changes except as documented above.     Discussed patient condition and risk of morbidity and/or mortality with Family, RN, RT, Pharmacy, Code status disscussed,  Charge nurse / hot rounds, and Patient    The patient remains critically ill.  I have assessed and reassessed the respiratory status and made ventilator adjustments based upon arterial blood gas analysis, ventilator waveforms and airway mechanics.  I have assessed and reassessed the blood pressure, hemodynamics, cardiovascular status. This patient remains at high risk for worsening cardiopulmonary dysfunction and death without the above critical care interventions.    Critical care time = 106 minutes in directly providing and coordinating critical care and extensive data review.  No time overlap and excludes procedures.

## 2025-03-31 VITALS
HEART RATE: 67 BPM | TEMPERATURE: 98.8 F | RESPIRATION RATE: 26 BRPM | SYSTOLIC BLOOD PRESSURE: 112 MMHG | HEIGHT: 73 IN | DIASTOLIC BLOOD PRESSURE: 61 MMHG | OXYGEN SATURATION: 93 % | BODY MASS INDEX: 25.48 KG/M2 | WEIGHT: 192.24 LBS

## 2025-03-31 LAB
ALBUMIN SERPL BCP-MCNC: 2.4 G/DL (ref 3.2–4.9)
ALBUMIN/GLOB SERPL: 0.8 G/DL
ALP SERPL-CCNC: 110 U/L (ref 30–99)
ALT SERPL-CCNC: 120 U/L (ref 2–50)
ANION GAP SERPL CALC-SCNC: 10 MMOL/L (ref 7–16)
AST SERPL-CCNC: 251 U/L (ref 12–45)
BACTERIA FLD AEROBE CULT: NORMAL
BILIRUB SERPL-MCNC: 0.3 MG/DL (ref 0.1–1.5)
BUN SERPL-MCNC: 21 MG/DL (ref 8–22)
CALCIUM ALBUM COR SERPL-MCNC: 9.3 MG/DL (ref 8.5–10.5)
CALCIUM SERPL-MCNC: 8 MG/DL (ref 8.5–10.5)
CHLORIDE SERPL-SCNC: 97 MMOL/L (ref 96–112)
CO2 SERPL-SCNC: 27 MMOL/L (ref 20–33)
CREAT SERPL-MCNC: 0.68 MG/DL (ref 0.5–1.4)
CYTOLOGY REG CYTOL: NORMAL
ERYTHROCYTE [DISTWIDTH] IN BLOOD BY AUTOMATED COUNT: 42.3 FL (ref 35.9–50)
GFR SERPLBLD CREATININE-BSD FMLA CKD-EPI: 98 ML/MIN/1.73 M 2
GLOBULIN SER CALC-MCNC: 3 G/DL (ref 1.9–3.5)
GLUCOSE SERPL-MCNC: 212 MG/DL (ref 65–99)
GRAM STN SPEC: NORMAL
HCT VFR BLD AUTO: 36.8 % (ref 42–52)
HGB BLD-MCNC: 12.6 G/DL (ref 14–18)
MAGNESIUM SERPL-MCNC: 2.1 MG/DL (ref 1.5–2.5)
MCH RBC QN AUTO: 29.8 PG (ref 27–33)
MCHC RBC AUTO-ENTMCNC: 34.2 G/DL (ref 32.3–36.5)
MCV RBC AUTO: 87 FL (ref 81.4–97.8)
PHOSPHATE SERPL-MCNC: 1.8 MG/DL (ref 2.5–4.5)
PLATELET # BLD AUTO: 275 K/UL (ref 164–446)
PMV BLD AUTO: 9.5 FL (ref 9–12.9)
POTASSIUM SERPL-SCNC: 3.4 MMOL/L (ref 3.6–5.5)
PRELIMINARY RPT Q0601: NORMAL
PROT SERPL-MCNC: 5.4 G/DL (ref 6–8.2)
RBC # BLD AUTO: 4.23 M/UL (ref 4.7–6.1)
RHODAMINE-AURAMINE STN SPEC: NORMAL
SIGNIFICANT IND 70042: NORMAL
SITE SITE: NORMAL
SODIUM SERPL-SCNC: 134 MMOL/L (ref 135–145)
SOURCE SOURCE: NORMAL
WBC # BLD AUTO: 13.6 K/UL (ref 4.8–10.8)

## 2025-03-31 PROCEDURE — 80053 COMPREHEN METABOLIC PANEL: CPT

## 2025-03-31 PROCEDURE — 94669 MECHANICAL CHEST WALL OSCILL: CPT

## 2025-03-31 PROCEDURE — 85027 COMPLETE CBC AUTOMATED: CPT

## 2025-03-31 PROCEDURE — 770022 HCHG ROOM/CARE - ICU (200)

## 2025-03-31 PROCEDURE — 700111 HCHG RX REV CODE 636 W/ 250 OVERRIDE (IP): Mod: JZ | Performed by: INTERNAL MEDICINE

## 2025-03-31 PROCEDURE — 99292 CRITICAL CARE ADDL 30 MIN: CPT | Performed by: INTERNAL MEDICINE

## 2025-03-31 PROCEDURE — 83735 ASSAY OF MAGNESIUM: CPT

## 2025-03-31 PROCEDURE — 700111 HCHG RX REV CODE 636 W/ 250 OVERRIDE (IP): Mod: JZ | Performed by: STUDENT IN AN ORGANIZED HEALTH CARE EDUCATION/TRAINING PROGRAM

## 2025-03-31 PROCEDURE — 700102 HCHG RX REV CODE 250 W/ 637 OVERRIDE(OP): Performed by: STUDENT IN AN ORGANIZED HEALTH CARE EDUCATION/TRAINING PROGRAM

## 2025-03-31 PROCEDURE — A9270 NON-COVERED ITEM OR SERVICE: HCPCS | Performed by: INTERNAL MEDICINE

## 2025-03-31 PROCEDURE — 94799 UNLISTED PULMONARY SVC/PX: CPT

## 2025-03-31 PROCEDURE — 84100 ASSAY OF PHOSPHORUS: CPT

## 2025-03-31 PROCEDURE — 700102 HCHG RX REV CODE 250 W/ 637 OVERRIDE(OP): Performed by: INTERNAL MEDICINE

## 2025-03-31 PROCEDURE — 700111 HCHG RX REV CODE 636 W/ 250 OVERRIDE (IP): Performed by: HOSPITALIST

## 2025-03-31 PROCEDURE — 94003 VENT MGMT INPAT SUBQ DAY: CPT

## 2025-03-31 PROCEDURE — 99291 CRITICAL CARE FIRST HOUR: CPT | Performed by: INTERNAL MEDICINE

## 2025-03-31 PROCEDURE — 94640 AIRWAY INHALATION TREATMENT: CPT

## 2025-03-31 PROCEDURE — 94150 VITAL CAPACITY TEST: CPT

## 2025-03-31 PROCEDURE — 700105 HCHG RX REV CODE 258: Performed by: STUDENT IN AN ORGANIZED HEALTH CARE EDUCATION/TRAINING PROGRAM

## 2025-03-31 PROCEDURE — 700111 HCHG RX REV CODE 636 W/ 250 OVERRIDE (IP): Mod: JZ | Performed by: NURSE PRACTITIONER

## 2025-03-31 PROCEDURE — A9270 NON-COVERED ITEM OR SERVICE: HCPCS | Performed by: STUDENT IN AN ORGANIZED HEALTH CARE EDUCATION/TRAINING PROGRAM

## 2025-03-31 RX ORDER — LANSOPRAZOLE 30 MG/1
30 TABLET, ORALLY DISINTEGRATING, DELAYED RELEASE ORAL DAILY
Status: DISCONTINUED | OUTPATIENT
Start: 2025-04-01 | End: 2025-04-08

## 2025-03-31 RX ORDER — HYDROCORTISONE SODIUM SUCCINATE 100 MG/2ML
50 INJECTION INTRAMUSCULAR; INTRAVENOUS EVERY 6 HOURS
Status: COMPLETED | OUTPATIENT
Start: 2025-03-31 | End: 2025-04-01

## 2025-03-31 RX ORDER — FUROSEMIDE 10 MG/ML
40 INJECTION INTRAMUSCULAR; INTRAVENOUS 2 TIMES DAILY
Status: DISCONTINUED | OUTPATIENT
Start: 2025-03-31 | End: 2025-04-09

## 2025-03-31 RX ORDER — AMIODARONE HYDROCHLORIDE 200 MG/1
400 TABLET ORAL TWICE DAILY
Status: COMPLETED | OUTPATIENT
Start: 2025-03-31 | End: 2025-04-08

## 2025-03-31 RX ORDER — GUAIFENESIN 200 MG/10ML
5 LIQUID ORAL EVERY 4 HOURS PRN
Status: DISCONTINUED | OUTPATIENT
Start: 2025-03-31 | End: 2025-04-01

## 2025-03-31 RX ADMIN — ENOXAPARIN SODIUM 40 MG: 100 INJECTION SUBCUTANEOUS at 18:26

## 2025-03-31 RX ADMIN — HYDROCORTISONE SODIUM SUCCINATE 100 MG: 100 INJECTION, POWDER, FOR SOLUTION INTRAMUSCULAR; INTRAVENOUS at 06:03

## 2025-03-31 RX ADMIN — AMANTADINE HYDROCHLORIDE 100 MG: 100 TABLET ORAL at 06:04

## 2025-03-31 RX ADMIN — ASPIRIN 81 MG: 81 TABLET, CHEWABLE ORAL at 06:04

## 2025-03-31 RX ADMIN — HYDROCORTISONE SODIUM SUCCINATE 50 MG: 100 INJECTION, POWDER, FOR SOLUTION INTRAMUSCULAR; INTRAVENOUS at 12:27

## 2025-03-31 RX ADMIN — AMIODARONE HYDROCHLORIDE 400 MG: 200 TABLET ORAL at 18:28

## 2025-03-31 RX ADMIN — MIDODRINE HYDROCHLORIDE 10 MG: 5 TABLET ORAL at 23:04

## 2025-03-31 RX ADMIN — CARBIDOPA AND LEVODOPA 2 TABLET: 25; 250 TABLET ORAL at 20:36

## 2025-03-31 RX ADMIN — PANTOPRAZOLE SODIUM 40 MG: 40 INJECTION, POWDER, FOR SOLUTION INTRAVENOUS at 06:04

## 2025-03-31 RX ADMIN — DIBASIC SODIUM PHOSPHATE, MONOBASIC POTASSIUM PHOSPHATE AND MONOBASIC SODIUM PHOSPHATE 500 MG: 852; 155; 130 TABLET ORAL at 18:24

## 2025-03-31 RX ADMIN — OXYBUTYNIN CHLORIDE 5 MG: 5 TABLET ORAL at 06:05

## 2025-03-31 RX ADMIN — SODIUM BICARBONATE 1300 MG: 650 TABLET ORAL at 08:31

## 2025-03-31 RX ADMIN — MIDODRINE HYDROCHLORIDE 10 MG: 5 TABLET ORAL at 06:04

## 2025-03-31 RX ADMIN — OXYBUTYNIN CHLORIDE 5 MG: 5 TABLET ORAL at 18:26

## 2025-03-31 RX ADMIN — MIDODRINE HYDROCHLORIDE 10 MG: 5 TABLET ORAL at 16:24

## 2025-03-31 RX ADMIN — HYDROCORTISONE SODIUM SUCCINATE 50 MG: 100 INJECTION, POWDER, FOR SOLUTION INTRAMUSCULAR; INTRAVENOUS at 18:26

## 2025-03-31 RX ADMIN — AMANTADINE HYDROCHLORIDE 100 MG: 100 TABLET ORAL at 18:24

## 2025-03-31 RX ADMIN — ROPINIROLE HYDROCHLORIDE 4 MG: 2 TABLET, FILM COATED ORAL at 18:25

## 2025-03-31 RX ADMIN — AMIODARONE HYDROCHLORIDE 400 MG: 200 TABLET ORAL at 10:04

## 2025-03-31 RX ADMIN — FUROSEMIDE 40 MG: 10 INJECTION, SOLUTION INTRAVENOUS at 15:11

## 2025-03-31 RX ADMIN — AMPICILLIN AND SULBACTAM 3 G: 1; 2 INJECTION, POWDER, FOR SOLUTION INTRAMUSCULAR; INTRAVENOUS at 20:20

## 2025-03-31 RX ADMIN — DIBASIC SODIUM PHOSPHATE, MONOBASIC POTASSIUM PHOSPHATE AND MONOBASIC SODIUM PHOSPHATE 500 MG: 852; 155; 130 TABLET ORAL at 16:23

## 2025-03-31 RX ADMIN — AMPICILLIN AND SULBACTAM 3 G: 1; 2 INJECTION, POWDER, FOR SOLUTION INTRAMUSCULAR; INTRAVENOUS at 08:28

## 2025-03-31 RX ADMIN — AMPICILLIN AND SULBACTAM 3 G: 1; 2 INJECTION, POWDER, FOR SOLUTION INTRAMUSCULAR; INTRAVENOUS at 01:50

## 2025-03-31 RX ADMIN — AMIODARONE HYDROCHLORIDE 0.5 MG/MIN: 1.8 INJECTION, SOLUTION INTRAVENOUS at 01:44

## 2025-03-31 RX ADMIN — SODIUM BICARBONATE 1300 MG: 650 TABLET ORAL at 16:25

## 2025-03-31 RX ADMIN — SODIUM BICARBONATE 1300 MG: 650 TABLET ORAL at 20:36

## 2025-03-31 RX ADMIN — AMPICILLIN AND SULBACTAM 3 G: 1; 2 INJECTION, POWDER, FOR SOLUTION INTRAMUSCULAR; INTRAVENOUS at 15:09

## 2025-03-31 RX ADMIN — SENNOSIDES AND DOCUSATE SODIUM 2 TABLET: 50; 8.6 TABLET ORAL at 18:24

## 2025-03-31 RX ADMIN — ROPINIROLE HYDROCHLORIDE 4 MG: 2 TABLET, FILM COATED ORAL at 06:05

## 2025-03-31 RX ADMIN — CARBIDOPA AND LEVODOPA 2 TABLET: 25; 250 TABLET ORAL at 16:24

## 2025-03-31 RX ADMIN — CARBIDOPA AND LEVODOPA 2 TABLET: 25; 250 TABLET ORAL at 08:32

## 2025-03-31 ASSESSMENT — PAIN DESCRIPTION - PAIN TYPE
TYPE: ACUTE PAIN

## 2025-03-31 ASSESSMENT — PULMONARY FUNCTION TESTS: FVC: 1.1

## 2025-03-31 ASSESSMENT — FIBROSIS 4 INDEX: FIB4 SCORE: 4.43

## 2025-03-31 NOTE — CARE PLAN
The patient is Stable - Low risk of patient condition declining or worsening    Shift Goals  Clinical Goals: Hemodynamic stability, Bowel regimine, Mobilize  Patient Goals: JUANITA  Family Goals: Updated      Problem: Pain - Standard  Goal: Alleviation of pain or a reduction in pain to the patient’s comfort goal  Outcome: Progressing     Problem: Knowledge Deficit - Standard  Goal: Patient and family/care givers will demonstrate understanding of plan of care, disease process/condition, diagnostic tests and medications  Outcome: Progressing     Problem: Hemodynamics  Goal: Patient's hemodynamics, fluid balance and neurologic status will be stable or improve  Outcome: Progressing     Problem: Fluid Volume  Goal: Fluid volume balance will be maintained  Outcome: Progressing     Problem: Urinary - Renal Perfusion  Goal: Ability to achieve and maintain adequate renal perfusion and functioning will improve  Outcome: Progressing     Problem: Respiratory  Goal: Patient will achieve/maintain optimum respiratory ventilation and gas exchange  Outcome: Progressing     Problem: Mechanical Ventilation  Goal: Safe management of artificial airway and ventilation  Outcome: Progressing  Goal: Successful weaning off mechanical ventilator, spontaneously maintains adequate gas exchange  Outcome: Progressing  Goal: Patient will be able to express needs and understand communication  Outcome: Progressing     Problem: Physical Regulation  Goal: Diagnostic test results will improve  Outcome: Progressing  Goal: Signs and symptoms of infection will decrease  Outcome: Progressing     Problem: Skin Integrity  Goal: Skin integrity is maintained or improved  Outcome: Progressing     Problem: Fall Risk  Goal: Patient will remain free from falls  Outcome: Progressing     Problem: Safety - Medical Restraint  Goal: Remains free of injury from restraints (Restraint for Interference with Medical Device)  Outcome: Progressing

## 2025-03-31 NOTE — DIETARY
Nutrition Services Brief Update:    Problem: Nutritional:  Goal: Nutrition support tolerated and meeting greater than 85% of estimated needs  Outcome: Met    Tube feed at goal: Jevity 1.2 @ 70 mL/hr.  Pt was extubated this morning to [facility].    RD will follow.

## 2025-03-31 NOTE — PROGRESS NOTES
1020: RT and RN at bedside for extubation. Pt extubated and put on to HFNC 40L 50%. Vs stable, satting 99%.

## 2025-03-31 NOTE — PROGRESS NOTES
Critical Care Progress Note    Date of admission  3/26/2025    Chief Complaint  74 y.o. male admitted 3/26/2025 with pneumonia and right pleural effusion    Hospital Course  Mr. Huber is a 74 year old male with the past medical history significant for Parkinson's disease complicated by dysphagia, hypertension, dyslipidemia, GERD, and history of a stroke in 2020 attributed to a LV thrombus who was admitted on 3/27/2025 with pneumonia.  The patient was started on broad-spectrum antibiotics.  Due to the patient also complaining of right upper quadrant abdominal pain he had an ultrasound that was significant for stones/sludge and HIDA scan that was subsequently normal.  A CT of his chest on 3/28 showed a large right pleural effusion with collapse of the right lung.  The critical care team was consulted due to worsening mental status changes and being obtunded with increasing oxygen requirements.  The patient was originally a DNR/DNI; however, the patient's wife rescinded that in hopes that this was reversible.  The patient was then transferred to the ICU where he was intubated and a chest tube was placed.  3/29 - VD#2, SAT/SBT-->weak, in/out of AF on amio, small amount of phenylephrine  3/31 VD 3 SAT/SBT --> extubate    Interval Problem Update  Reviewed last 24 hour events:  Awake and alert on the vent  Natan off  I/O +6.9 L  Elevated LFTs; hold statin and tylenol  Amio gtt --> po  Replace phos  CXR small effusion - to suction to eval for volume removal  Tolerating TF, + BM  Finishing unasyn  Cultures NGTD    Wife updated at bedside  Brother, Tony Huber - retired OBGYN, updated over the phone    Review of Systems  Review of Systems   Unable to perform ROS: Critical illness        Vital Signs for last 24 hours   Pulse:  [54-72] 61  Resp:  [4-32] 22  BP: ()/(57-79) 109/61  SpO2:  [90 %-98 %] 96 %    Hemodynamic parameters for last 24 hours       Respiratory Information for the last 24 hours  Vent Mode: Spont  Rate  (breaths/min): 20  Vt Target (mL): 480  PEEP/CPAP: 10  P Support (PS + PEEP): 8  MAP: 13  Length of Weaning Trial (Hours): 1  Control VTE (exp VT): 446    Physical Exam   Physical Exam  Vitals and nursing note reviewed.   Constitutional:       Appearance: He is ill-appearing. He is not toxic-appearing.   HENT:      Head: Normocephalic and atraumatic.      Right Ear: External ear normal.      Left Ear: External ear normal.      Nose: Nose normal. No rhinorrhea.      Mouth/Throat:      Mouth: Mucous membranes are moist.      Comments: ETT in place  Eyes:      General: No scleral icterus.     Conjunctiva/sclera: Conjunctivae normal.      Pupils: Pupils are equal, round, and reactive to light.   Cardiovascular:      Rate and Rhythm: Normal rate and regular rhythm.      Pulses: Normal pulses.           Dorsalis pedis pulses are 2+ on the right side and 2+ on the left side.      Heart sounds: No murmur heard.  Pulmonary:      Effort: Pulmonary effort is normal. No respiratory distress.      Comments: Mechanical ventilation   Chest:      Chest wall: No tenderness.   Abdominal:      Palpations: Abdomen is soft.      Tenderness: There is no abdominal tenderness. There is no guarding or rebound.   Musculoskeletal:         General: Normal range of motion.      Cervical back: Normal range of motion and neck supple.      Right lower leg: No edema.      Left lower leg: No edema.   Lymphadenopathy:      Cervical: No cervical adenopathy.   Skin:     General: Skin is warm and dry.      Capillary Refill: Capillary refill takes less than 2 seconds.      Findings: No rash.   Neurological:      Comments: Weaker on right than the left, follows commands, opens eyes to voice   Psychiatric:      Comments: Unable to assess         Medications  Current Facility-Administered Medications   Medication Dose Route Frequency Provider Last Rate Last Admin    hydrocortisone sodium succinate PF (Solu-CORTEF) injection 50 mg  50 mg Intravenous Q6HRS  Darion Meza M.D.   50 mg at 03/31/25 1227    amiodarone (Cordarone) tablet 400 mg  400 mg Enteral Tube TWICE DAILY Darion Meza M.D.   400 mg at 03/31/25 1004    phosphorus (K-Phos-Neutral) per tablet 500 mg  500 mg Enteral Tube TID Darion Meza M.D.        [START ON 4/1/2025] lansoprazole (Prevacid) solutab 30 mg  30 mg Enteral Tube DAILY Darion Meza M.D.        senna-docusate (Pericolace Or Senokot S) 8.6-50 MG per tablet 2 Tablet  2 Tablet Enteral Tube Q EVENING Zhanna Heredia M.D.   2 Tablet at 03/30/25 1817    And    polyethylene glycol/lytes (Miralax) Packet 1 Packet  1 Packet Enteral Tube QDAY PRN Zhanna Heredia M.D.        [Held by provider] atorvastatin (Lipitor) tablet 80 mg  80 mg Enteral Tube Q EVENING Zhanna Heredia M.D.   80 mg at 03/30/25 1817    midodrine (Proamatine) tablet 10 mg  10 mg Enteral Tube Q8HRS Zhanna Heredia M.D.   10 mg at 03/31/25 0604    Respiratory Therapy Consult   Nebulization Continuous RT Akhil Gomez M.D.        MD Alert...ICU Electrolyte Replacement per Pharmacy   Other PHARMACY TO DOSE Ángel Harper M.D.        amantadine (Symmetrel) tablet 100 mg  100 mg Enteral Tube BID Ángel Harper M.D.   100 mg at 03/31/25 0604    aspirin (Asa) chewable tab 81 mg  81 mg Enteral Tube DAILY Ángel Harper M.D.   81 mg at 03/31/25 0604    carbidopa-levodopa (Sinemet)  MG tablet 2 Tablet  2 Tablet Enteral Tube 4X/DAY Ángel Harper M.D.   2 Tablet at 03/31/25 0832    [Held by provider] gabapentin (Neurontin) capsule 600 mg  600 mg Enteral Tube Q EVENING Ángel Harper M.D.   600 mg at 03/30/25 1818    oxybutynin (Ditropan) tablet 5 mg  5 mg Enteral Tube BID Ángel Harper M.D.   5 mg at 03/31/25 0605    ROPINIRole (Requip) tablet 4 mg  4 mg Enteral Tube TID Ángel Harper M.D.   4 mg at 03/31/25 0605    sodium bicarbonate tablet 1,300 mg  1,300 mg Enteral Tube TID Ángel Harper M.D.   1,300 mg at 03/31/25 0831    Pharmacy Consult: Enteral tube  insertion - review meds/change route/product selection  1 Each Other PHARMACY TO DOSE Ángel Harper M.D.        phenylephrine 40 mg/250 mL NS premix  0-5 mcg/kg/min (Ideal) Intravenous Continuous Kristel Oleary   Stopped at 03/30/25 1833    enoxaparin (Lovenox) inj 40 mg  40 mg Subcutaneous DAILY AT 1800 Karime Shafer M.D.   40 mg at 03/30/25 1817    ampicillin/sulbactam (Unasyn) 3 g in  mL IVPB  3 g Intravenous Q6HRS Karime Shafer M.D.   Stopped at 03/31/25 0858    [Held by provider] metoprolol SR (Toprol XL) tablet 50 mg  50 mg Oral DAILY Karime Shafer M.D.   50 mg at 03/27/25 0535       Fluids    Intake/Output Summary (Last 24 hours) at 3/31/2025 1336  Last data filed at 3/31/2025 1200  Gross per 24 hour   Intake 3119.15 ml   Output 1615 ml   Net 1504.15 ml       Laboratory  Recent Labs     03/28/25  1432 03/29/25  0328 03/30/25  0428   ISTATAPH 7.501* 7.410 7.407   ISTATAPCO2 31.2* 42.1 43.5   ISTATAPO2 72* 83 85   ISTATATCO2 25* 28* 29*   EVVADSZ3DTM 96 96 96   ISTATARTHCO3 24.4 26.7 27.4   ISTATARTBE 2 2 2   ISTATTEMP 98.7 F 38.4 C 36.8 C   ISTATFIO2 100 70 60   ISTATSPEC Arterial Arterial Arterial   ISTATAPHTC 7.500* 7.389 7.410   FTRZHSAO1MB 72* 91 83         Recent Labs     03/29/25  0343 03/30/25  0503 03/31/25  0632   SODIUM 133* 133* 134*   POTASSIUM 3.7 4.6 3.4*   CHLORIDE 98 96 97   CO2 26 26 27   BUN 22 18 21   CREATININE 0.97 0.71 0.68   MAGNESIUM 2.0 2.2 2.1   PHOSPHORUS 2.4* 2.4* 1.8*   CALCIUM 8.0* 8.0* 8.0*     Recent Labs     03/29/25  0343 03/30/25  0503 03/31/25  0632   ALTSGPT 17 34 120*   ASTSGOT 86* 105* 251*   ALKPHOSPHAT 82 89 110*   TBILIRUBIN 0.8 0.5 0.3   PREALBUMIN <3.0*  --   --    GLUCOSE 155* 195* 212*     Recent Labs     03/29/25  0343 03/29/25  0546 03/30/25  0503 03/31/25  0632   WBC  --  14.1* 15.3* 13.6*   ASTSGOT 86*  --  105* 251*   ALTSGPT 17  --  34 120*   ALKPHOSPHAT 82  --  89 110*   TBILIRUBIN 0.8  --  0.5 0.3     Recent Labs     03/29/25  0546  "03/30/25  0503 03/31/25  0632   RBC 4.72 4.51* 4.23*   HEMOGLOBIN 13.6* 13.4* 12.6*   HEMATOCRIT 43.0 41.0* 36.8*   PLATELETCT 274 293 275       Imaging  CT head:  1. No evidence of acute intracranial process. 2. Cerebral atrophy as well as periventricular chronic small vessel ischemic change.     Assessment/Plan  * Pneumonia due to infectious organism- (present on admission)  Assessment & Plan  Continue unasyn x 5 days  S/p azithromycin x 3 days  Stopped linezolid with negative MRSA pcr  Follow blood cultures: negative so far  S/p right chest pigtail catheter on 3/28 -->water seal today  Following pleural fluid which so far does not having findings of empyema, likely more parapneumonic    Paroxysmal atrial fibrillation (HCC)  Assessment & Plan  Amio bolus and continue infusion  Optimize K to > 4 and Mg to > 2    Acute respiratory failure with hypoxia (HCC)  Assessment & Plan  Intubated on 3/28 for hypoxia and airway protection  Cont full vent support  RT/O2 protocols  Vent bundle protocols  I am actively adjusting vent settings based on ABGs/vent mechanics  Chest tube in place-->water seal  SAT/SBTs   Diuretics     Pleural effusion, right  Assessment & Plan  S/p right pigtail placed on 3/28  Chest tube to suction for now-->water seal today and ?d/c tomorrow  Studies indicate likely parapneumonic    Advance care planning  Assessment & Plan  3/28: \"Wife has changed him from DNR/DNI to Full code-->She would like to buy some time until the clinical picture is further elucidated\". Per Dr. Harper's encounter  3/29: pt back to DNR, I OK.  Discussed with patient, wife, and 2 adult children that we will do everything to set him up for success to liberate him from the ventilator; however, I asked if it was not possible to liberate him, would tracheostomy/LTAC be an option and the wife definitely was opposed to this.    Right upper quadrant abdominal pain- (present on admission)  Assessment & Plan  HIDA negative  Possibly " pleurisy.     Sepsis with acute hypoxic respiratory failure without septic shock (HCC)- (present on admission)  Assessment & Plan  This is Sepsis Present on admission  SIRS criteria identified on my evaluation include: Tachycardia, with heart rate greater than 90 BPM and Leukocytosis, with WBC greater than 12,000  Clinical indicators of end organ dysfunction include Toxic Metabolic Encephalopathy and GCS < 15  Source is Pulmonary  Sepsis protocol initiated  Crystalloid Fluid Administration: Resuscitation volume of 0 ordered. Reason that resuscitation volume of less than 30ml/kg was ordered concern for causing harm given hypoxia and pleural effusion  IV antibiotics as appropriate for source of sepsis  Reassessment: I have reassessed the patient's hemodynamic status    Follow cultures  Continue unasyn for 5 days  S/p azithromycin  Stopped Zyvox with negative MRSA PCR  S/p pigtail catheter to chest on 3/28, studies indicate exudative and likely parapneumonic-->water seal   MAP goals > 65  I am actively titrating phenylephrine for MAP goals     History of stroke- (present on admission)  Assessment & Plan  PT/OT     Parkinson disease (HCC)- (present on admission)  Assessment & Plan  Nonverbal, nonabmulatory at baseline  Goes to the Gym and has a   Communicates with hand signals / sign language  Cont home amantadine 100mg OG BID  Cont home Sinemet 25-250mg 2 tabs OG 4 times daily  Home gabapentin 600mg - hold  Cont home ropinirole 4mg via OG TID    Other hyperlipidemia- (present on admission)  Assessment & Plan  Hold statin pending LFTs trend         VTE:  Lovenox  Ulcer: PPI  Lines: Bowen Catheter  Ongoing indication addressed and PIVs    I have performed a physical exam and reviewed and updated ROS and Plan today (3/31/2025). In review of yesterday's note (3/30/2025), there are no changes except as documented above.     Discussed patient condition and risk of morbidity and/or mortality with Family, RN, RT,  Pharmacy, Code status disscussed, Charge nurse / hot rounds, and Patient    The patient remains critically ill.  Critical care time = 109 minutes in directly providing and coordinating critical care and extensive data review.  No time overlap and excludes procedures.

## 2025-03-31 NOTE — CARE PLAN
The patient is Watcher - Medium risk of patient condition declining or worsening    Shift Goals  Clinical Goals: hemodynamic stability, stable/improve respiratory  Patient Goals: soren  Family Goals: updates    Progress made toward(s) clinical / shift goals:    Problem: Pain - Standard  Goal: Alleviation of pain or a reduction in pain to the patient’s comfort goal  Outcome: Progressing     Problem: Knowledge Deficit - Standard  Goal: Patient and family/care givers will demonstrate understanding of plan of care, disease process/condition, diagnostic tests and medications  Outcome: Progressing     Problem: Hemodynamics  Goal: Patient's hemodynamics, fluid balance and neurologic status will be stable or improve  Outcome: Progressing     Problem: Fluid Volume  Goal: Fluid volume balance will be maintained  Outcome: Progressing     Problem: Urinary - Renal Perfusion  Goal: Ability to achieve and maintain adequate renal perfusion and functioning will improve  Outcome: Progressing     Problem: Respiratory  Goal: Patient will achieve/maintain optimum respiratory ventilation and gas exchange  Outcome: Progressing     Problem: Mechanical Ventilation  Goal: Patient will be able to express needs and understand communication  Outcome: Progressing     Problem: Physical Regulation  Goal: Diagnostic test results will improve  Outcome: Progressing  Goal: Signs and symptoms of infection will decrease  Outcome: Progressing     Problem: Skin Integrity  Goal: Skin integrity is maintained or improved  Outcome: Progressing     Problem: Fall Risk  Goal: Patient will remain free from falls  Outcome: Progressing     Problem: Safety - Medical Restraint  Goal: Remains free of injury from restraints (Restraint for Interference with Medical Device)  Outcome: Progressing  Goal: Free from restraint(s) (Restraint for Interference with Medical Device)  Outcome: Progressing       Patient is not progressing towards the following goals:

## 2025-03-31 NOTE — THERAPY
Speech Language Therapy Contact Note    Patient Name: Jeremiah Huber  Age:  74 y.o., Sex:  male  Medical Record #: [id number]  Today's Date: 3/31/2025    Noted interval events- pt was intubated 3/28-3/31. Extubated to [facility] this AM.     Previous ST bedside swallow exam completed on 3/28 indicated signs of dysphagia, recommending pt remain NPO w/ a FEES to determine further ST POC.     Met with pt and two family members at bedside. They were agreeable to ST proceeding w/ FEES now that pt has been extubated. Order placed and ST to complete FEES tomorrow as able to be scheduled.

## 2025-03-31 NOTE — CARE PLAN
Problem: Ventilation  Goal: Ability to achieve and maintain unassisted ventilation or tolerate decreased levels of ventilator support  Description: Target End Date:  4 days Document on Vent flowsheet1.  Support and monitor invasive and noninvasive mechanical ventilation2.  Monitor ventilator weaning response3.  Perform ventilator associated pneumonia prevention interventions4.  Manage ventilation therapy by monitoring diagnostic test results  Outcome: Progressing     Ventilator Daily Summary    Vent Day #4  Airway: 8.0 ETT @26    Ventilator settings: APV/CMV 20 - 480 - 10 - 50%  Weaning trials: no  Respiratory Procedures: no    Plan: Continue current ventilator settings and wean mechanical ventilation as tolerated per physician orders.

## 2025-04-01 PROBLEM — E87.6 HYPOKALEMIA: Status: ACTIVE | Noted: 2025-01-01

## 2025-04-01 LAB
ALBUMIN SERPL BCP-MCNC: 2.1 G/DL (ref 3.2–4.9)
ALBUMIN/GLOB SERPL: 0.7 G/DL
ALP SERPL-CCNC: 111 U/L (ref 30–99)
ALT SERPL-CCNC: 130 U/L (ref 2–50)
ANION GAP SERPL CALC-SCNC: 10 MMOL/L (ref 7–16)
AST SERPL-CCNC: 197 U/L (ref 12–45)
BACTERIA BLD CULT: NORMAL
BACTERIA BLD CULT: NORMAL
BILIRUB SERPL-MCNC: 0.3 MG/DL (ref 0.1–1.5)
BUN SERPL-MCNC: 20 MG/DL (ref 8–22)
CALCIUM ALBUM COR SERPL-MCNC: 9.2 MG/DL (ref 8.5–10.5)
CALCIUM SERPL-MCNC: 7.7 MG/DL (ref 8.5–10.5)
CHLORIDE SERPL-SCNC: 99 MMOL/L (ref 96–112)
CO2 SERPL-SCNC: 29 MMOL/L (ref 20–33)
CREAT SERPL-MCNC: 0.67 MG/DL (ref 0.5–1.4)
ERYTHROCYTE [DISTWIDTH] IN BLOOD BY AUTOMATED COUNT: 43.6 FL (ref 35.9–50)
GFR SERPLBLD CREATININE-BSD FMLA CKD-EPI: 99 ML/MIN/1.73 M 2
GLOBULIN SER CALC-MCNC: 3.1 G/DL (ref 1.9–3.5)
GLUCOSE SERPL-MCNC: 214 MG/DL (ref 65–99)
HCT VFR BLD AUTO: 38.4 % (ref 42–52)
HGB BLD-MCNC: 12.7 G/DL (ref 14–18)
MAGNESIUM SERPL-MCNC: 2.1 MG/DL (ref 1.5–2.5)
MCH RBC QN AUTO: 29.3 PG (ref 27–33)
MCHC RBC AUTO-ENTMCNC: 33.1 G/DL (ref 32.3–36.5)
MCV RBC AUTO: 88.7 FL (ref 81.4–97.8)
PHOSPHATE SERPL-MCNC: 1.9 MG/DL (ref 2.5–4.5)
PLATELET # BLD AUTO: 310 K/UL (ref 164–446)
PMV BLD AUTO: 9.4 FL (ref 9–12.9)
POTASSIUM SERPL-SCNC: 2.9 MMOL/L (ref 3.6–5.5)
PROT SERPL-MCNC: 5.2 G/DL (ref 6–8.2)
RBC # BLD AUTO: 4.33 M/UL (ref 4.7–6.1)
SIGNIFICANT IND 70042: NORMAL
SIGNIFICANT IND 70042: NORMAL
SITE SITE: NORMAL
SITE SITE: NORMAL
SODIUM SERPL-SCNC: 138 MMOL/L (ref 135–145)
SOURCE SOURCE: NORMAL
SOURCE SOURCE: NORMAL
WBC # BLD AUTO: 12.8 K/UL (ref 4.8–10.8)

## 2025-04-01 PROCEDURE — 94640 AIRWAY INHALATION TREATMENT: CPT

## 2025-04-01 PROCEDURE — 80053 COMPREHEN METABOLIC PANEL: CPT

## 2025-04-01 PROCEDURE — 700102 HCHG RX REV CODE 250 W/ 637 OVERRIDE(OP): Performed by: STUDENT IN AN ORGANIZED HEALTH CARE EDUCATION/TRAINING PROGRAM

## 2025-04-01 PROCEDURE — 700111 HCHG RX REV CODE 636 W/ 250 OVERRIDE (IP): Mod: JZ | Performed by: INTERNAL MEDICINE

## 2025-04-01 PROCEDURE — 770000 HCHG ROOM/CARE - INTERMEDIATE ICU *

## 2025-04-01 PROCEDURE — A9270 NON-COVERED ITEM OR SERVICE: HCPCS | Performed by: HOSPITALIST

## 2025-04-01 PROCEDURE — 97602 WOUND(S) CARE NON-SELECTIVE: CPT

## 2025-04-01 PROCEDURE — 700102 HCHG RX REV CODE 250 W/ 637 OVERRIDE(OP): Performed by: INTERNAL MEDICINE

## 2025-04-01 PROCEDURE — 700105 HCHG RX REV CODE 258: Performed by: STUDENT IN AN ORGANIZED HEALTH CARE EDUCATION/TRAINING PROGRAM

## 2025-04-01 PROCEDURE — A9270 NON-COVERED ITEM OR SERVICE: HCPCS | Performed by: INTERNAL MEDICINE

## 2025-04-01 PROCEDURE — A9270 NON-COVERED ITEM OR SERVICE: HCPCS | Performed by: STUDENT IN AN ORGANIZED HEALTH CARE EDUCATION/TRAINING PROGRAM

## 2025-04-01 PROCEDURE — 92612 ENDOSCOPY SWALLOW (FEES) VID: CPT

## 2025-04-01 PROCEDURE — 94669 MECHANICAL CHEST WALL OSCILL: CPT

## 2025-04-01 PROCEDURE — 700111 HCHG RX REV CODE 636 W/ 250 OVERRIDE (IP): Mod: JZ | Performed by: STUDENT IN AN ORGANIZED HEALTH CARE EDUCATION/TRAINING PROGRAM

## 2025-04-01 PROCEDURE — 51798 US URINE CAPACITY MEASURE: CPT

## 2025-04-01 PROCEDURE — 99232 SBSQ HOSP IP/OBS MODERATE 35: CPT | Performed by: HOSPITALIST

## 2025-04-01 PROCEDURE — 85027 COMPLETE CBC AUTOMATED: CPT

## 2025-04-01 PROCEDURE — 83735 ASSAY OF MAGNESIUM: CPT

## 2025-04-01 PROCEDURE — 700102 HCHG RX REV CODE 250 W/ 637 OVERRIDE(OP): Performed by: HOSPITALIST

## 2025-04-01 PROCEDURE — 84100 ASSAY OF PHOSPHORUS: CPT

## 2025-04-01 PROCEDURE — 99233 SBSQ HOSP IP/OBS HIGH 50: CPT | Performed by: INTERNAL MEDICINE

## 2025-04-01 PROCEDURE — 700101 HCHG RX REV CODE 250: Performed by: INTERNAL MEDICINE

## 2025-04-01 PROCEDURE — 700105 HCHG RX REV CODE 258: Performed by: INTERNAL MEDICINE

## 2025-04-01 RX ORDER — GABAPENTIN 100 MG/1
200 CAPSULE ORAL EVERY EVENING
Status: DISCONTINUED | OUTPATIENT
Start: 2025-04-01 | End: 2025-04-09 | Stop reason: HOSPADM

## 2025-04-01 RX ORDER — GUAIFENESIN 200 MG/10ML
5 LIQUID ORAL EVERY 4 HOURS PRN
Status: DISCONTINUED | OUTPATIENT
Start: 2025-04-01 | End: 2025-04-01

## 2025-04-01 RX ORDER — GUAIFENESIN 200 MG/10ML
10 LIQUID ORAL 3 TIMES DAILY
Status: DISCONTINUED | OUTPATIENT
Start: 2025-04-01 | End: 2025-04-09 | Stop reason: HOSPADM

## 2025-04-01 RX ADMIN — AMANTADINE HYDROCHLORIDE 100 MG: 100 TABLET ORAL at 18:19

## 2025-04-01 RX ADMIN — FUROSEMIDE 40 MG: 10 INJECTION, SOLUTION INTRAVENOUS at 05:11

## 2025-04-01 RX ADMIN — CARBIDOPA AND LEVODOPA 2 TABLET: 25; 250 TABLET ORAL at 16:17

## 2025-04-01 RX ADMIN — ROPINIROLE HYDROCHLORIDE 4 MG: 2 TABLET, FILM COATED ORAL at 18:18

## 2025-04-01 RX ADMIN — SODIUM BICARBONATE 1300 MG: 650 TABLET ORAL at 21:09

## 2025-04-01 RX ADMIN — ENOXAPARIN SODIUM 40 MG: 100 INJECTION SUBCUTANEOUS at 18:18

## 2025-04-01 RX ADMIN — LANSOPRAZOLE 30 MG: 30 TABLET, ORALLY DISINTEGRATING, DELAYED RELEASE ORAL at 05:11

## 2025-04-01 RX ADMIN — MIDODRINE HYDROCHLORIDE 10 MG: 5 TABLET ORAL at 21:09

## 2025-04-01 RX ADMIN — AMIODARONE HYDROCHLORIDE 400 MG: 200 TABLET ORAL at 18:19

## 2025-04-01 RX ADMIN — ROPINIROLE HYDROCHLORIDE 4 MG: 2 TABLET, FILM COATED ORAL at 05:10

## 2025-04-01 RX ADMIN — SODIUM BICARBONATE 1300 MG: 650 TABLET ORAL at 16:17

## 2025-04-01 RX ADMIN — HYDROCORTISONE SODIUM SUCCINATE 50 MG: 100 INJECTION, POWDER, FOR SOLUTION INTRAMUSCULAR; INTRAVENOUS at 05:10

## 2025-04-01 RX ADMIN — POTASSIUM PHOSPHATE, MONOBASIC AND POTASSIUM PHOSPHATE, DIBASIC 30 MMOL: 224; 236 INJECTION, SOLUTION, CONCENTRATE INTRAVENOUS at 09:04

## 2025-04-01 RX ADMIN — OXYBUTYNIN CHLORIDE 5 MG: 5 TABLET ORAL at 05:12

## 2025-04-01 RX ADMIN — CARBIDOPA AND LEVODOPA 2 TABLET: 25; 250 TABLET ORAL at 21:10

## 2025-04-01 RX ADMIN — HYDROCORTISONE SODIUM SUCCINATE 50 MG: 100 INJECTION, POWDER, FOR SOLUTION INTRAMUSCULAR; INTRAVENOUS at 00:23

## 2025-04-01 RX ADMIN — AMPICILLIN AND SULBACTAM 3 G: 1; 2 INJECTION, POWDER, FOR SOLUTION INTRAMUSCULAR; INTRAVENOUS at 02:07

## 2025-04-01 RX ADMIN — AMANTADINE HYDROCHLORIDE 100 MG: 100 TABLET ORAL at 05:11

## 2025-04-01 RX ADMIN — GABAPENTIN 200 MG: 100 CAPSULE ORAL at 18:00

## 2025-04-01 RX ADMIN — ROPINIROLE HYDROCHLORIDE 4 MG: 2 TABLET, FILM COATED ORAL at 13:10

## 2025-04-01 RX ADMIN — AMIODARONE HYDROCHLORIDE 400 MG: 200 TABLET ORAL at 05:14

## 2025-04-01 RX ADMIN — CARBIDOPA AND LEVODOPA 2 TABLET: 25; 250 TABLET ORAL at 13:10

## 2025-04-01 RX ADMIN — SODIUM BICARBONATE 1300 MG: 650 TABLET ORAL at 09:37

## 2025-04-01 RX ADMIN — ASPIRIN 81 MG: 81 TABLET, CHEWABLE ORAL at 05:11

## 2025-04-01 RX ADMIN — MIDODRINE HYDROCHLORIDE 10 MG: 5 TABLET ORAL at 05:11

## 2025-04-01 RX ADMIN — MIDODRINE HYDROCHLORIDE 10 MG: 5 TABLET ORAL at 13:11

## 2025-04-01 RX ADMIN — GUAIFENESIN 200 MG: 100 LIQUID ORAL at 18:18

## 2025-04-01 RX ADMIN — CARBIDOPA AND LEVODOPA 2 TABLET: 25; 250 TABLET ORAL at 09:37

## 2025-04-01 RX ADMIN — FUROSEMIDE 40 MG: 10 INJECTION, SOLUTION INTRAVENOUS at 18:00

## 2025-04-01 ASSESSMENT — PAIN DESCRIPTION - PAIN TYPE
TYPE: ACUTE PAIN

## 2025-04-01 ASSESSMENT — FIBROSIS 4 INDEX: FIB4 SCORE: 6

## 2025-04-01 NOTE — ASSESSMENT & PLAN NOTE
4/3/25:  4/2 K:2.9  4/3: 3.6  Replace and monitor  Monitor labs  Recent Labs     04/05/25  0330 04/06/25  0541 04/07/25  0345   SODIUM 141 142 143   POTASSIUM 4.3 4.4 4.4   CHLORIDE 103 102 103   CO2 28 28 27   GLUCOSE 170* 155* 118*   BUN 25* 29* 32*   CREATININE 0.95 1.02 1.08

## 2025-04-01 NOTE — WOUND TEAM
[facility]  Inpatient Services  Wound and Skin Care Follow-up    Admission Date: 3/26/2025     Last order of IP CONSULT TO WOUND CARE was found on 3/28/2025 from Hospital Encounter on 3/26/2025     HPI, PMH, SH: Reviewed    Past Surgical History:   Procedure Laterality Date    NEURO DEST FACET L/S W/IG SNGL Right 2020    Procedure: DESTRUCTION, NERVE, FACET JOINT, LUMBOSACRAL, USING NEUROLYTIC AGENT, WITH FLUOROSCOPIC GUIDANCE;  Surgeon: Nazario Sandoval M.D.;  [facility];  Service: Pain Management    KNEE ARTHROSCOPY Right     KNEE REPLACEMENT, TOTAL Bilateral     SHOULDER SURGERY      right shoulder, a-c seperation    TONSILLECTOMY       Social History     Tobacco Use    Smoking status: Former     Current packs/day: 0.00     Types: Cigarettes     Quit date: 1970     Years since quittin.2    Smokeless tobacco: Never   Substance Use Topics    Alcohol use: Yes     Alcohol/week: 4.2 oz     Types: 7 Glasses of wine per week     Chief Complaint   Patient presents with    Abdominal Pain     Intermittent RUQ pain x1 week. -N/V, +fever at home but no longer present following acetaminophen administration at home, PMH: parkinsons, patient non-verbal, wife (POA) at bedside. Patient has been coughing x1 week with clear mucous.     Diagnosis: Pneumonia due to infectious organism [J18.9]    Unit where seen by Wound Team: T927/01     WOUND FOLLOW UP RELATED TO:  DTI sacrum       WOUND TEAM PLAN OF CARE - Frequency of Follow-up:   Nursing to follow dressing orders written for wound care. Contact wound team if area fails to progress, deteriorates or with any questions/concerns if something comes up before next scheduled follow up (See below as to whether wound is following and frequency of wound follow up)  Dressing changes by wound team:                   Weekly - Sacrum/ Buttocks    WOUND HISTORY:       Admitted for RUQ pain.  History of parkinson's, non-verbal, HTN, patient  had a cough.       WOUND ASSESSMENT/LDA  Wound 03/28/25 Pressure Injury Buttocks Evolving DTI (Active)   Date First Assessed/Time First Assessed: 03/28/25 2000   Present on Original Admission: No  Hand Hygiene Completed: Yes  Primary Wound Type: Pressure Injury  Location: Buttocks  Wound Description (Comments): Evolving DTI      Assessments 4/1/2025 10:00 AM   Wound Image        Site Assessment Purple;Red;Drainage   Periwound Assessment Red;Blistered   Margins Defined edges   Closure Adhesive bandage   Drainage Amount Small   Drainage Description Serous   Treatments Cleansed;Nonselective debridement;Site care   Wound Cleansing Approved Wound Cleanser   Periwound Protectant Mepitel   Dressing Status Removed   Dressing Changed New   Dressing Cleansing/Solutions Not Applicable   Dressing Options Mepitel One;Offloading Dressing - Sacral   Dressing Change/Treatment Frequency Every 72 hrs, and As Needed   NEXT Dressing Change/Treatment Date 04/04/25   NEXT Weekly Photo (Inpatient Only) 04/08/25   Wound Team Following Weekly   Pressure Injury Stage Deep Tissue   Wound Length (cm) 27 cm   Wound Width (cm) 8 cm   Wound Surface Area (cm^2) 216 cm^2   Shape U shaped   WOUND NURSE ONLY - Time Spent with Patient (mins) 60        Vascular:    MITUL:   No results found.    Lab Values:    Lab Results   Component Value Date/Time    WBC 12.8 (H) 04/01/2025 05:26 AM    RBC 4.33 (L) 04/01/2025 05:26 AM    HEMOGLOBIN 12.7 (L) 04/01/2025 05:26 AM    HEMATOCRIT 38.4 (L) 04/01/2025 05:26 AM    CREACTPROT 37.50 (H) 03/29/2025 03:43 AM    HBA1C 5.2 06/14/2020 04:44 AM         Culture Results show:  No results found for this or any previous visit (from the past 720 hours).    Pain Level/Medicated:  None, Tolerated without pain medication       INTERVENTIONS BY WOUND TEAM:  Chart and images reviewed. Discussed with bedside RN. All areas of concern (based on picture review, LDA review and discussion with bedside RN) have been thoroughly assessed.  Documentation of areas based on significant findings. This RN in to assess patient. Performed standard wound care which includes appropriate positioning, dressing removal and non-selective debridement. Pictures and measurements obtained weekly if/when required.    Wound:  Buttocks/ sacrum  Preparation for Dressing removal: Removed without difficulty  Cleansed/Non-selectively Debrided with:  Wound cleanser and Gauze  Marizol wound: Cleansed with Wound cleanser and Gauze, Prepped with No Sting  Primary Dressing:  Mepitel one  Secondary (Outer) Dressing: Sacral offloading dressing    Advanced Wound Care Discharge Planning  Number of Clinicians necessary to complete wound care: 1  Is patient requiring IV pain medications for dressing changes:  No   Length of time for dressing change 30 min. (This does not include chart review, pre-medication time, set up, clean up or time spent charting.)    Interdisciplinary consultation: Patient, Bedside RN (Kalie).    EVALUATION / RATIONALE FOR TREATMENT:     Date:  04/01/25  Wound Status:  Wound progressing as expected    Coccyx with small opening and a small amount of serous drainage. A mepitel one was placed only over the areas with blisters. The right gluteal cheek has a definitive red marking, however this area is now blanching and was not included in the measurements. The left buttocks continues to be nonblanching with the deepest purple areas, that will continue to evolve. It appears consolidated to the inferior portion of the wound. Offloading dressings were applied.     Ankles were assessed at this time and were found red but blanching.     Date:  03/29/25  Wound Status:  Initial evaluation    Patient with large evolving DTI from a bedpan.  The coccyx is beginning to open and the L. Buttocks with visible blood filled blisters.  This wound will most likely worsen.  Offloading adhesive foams applied           Goals: Steady decrease in wound area and depth weekly.    NURSING  PLAN OF CARE ORDERS:  Dressing changes: See Dressing Care orders    NUTRITION RECOMMENDATIONS   Wound Team Recommendations:  N/A     DIET ORDERS (From admission to next 24h)       Start     Ordered    03/28/25 5919  Diet NPO Restrict to: Sips with Medications  AT MIDNIGHT      Question:  Diet NPO Restrict to:  Answer:  Sips with Medications    03/28/25 0721    03/28/25 1539  Diet: Diet Tube Feed; Formula: Jevity; Jevity: 1.2 RTH; Goal Rate (mL/Hour): 70  ALL MEALS        Comments: Start at 25 ml/hour and advance per protocol.   Question Answer Comment   Diet Diet Tube Feed    Formula: Jevity    Jevity: 1.2 RTH    Goal Rate (mL/Hour) 70    Route Enteral Tube        03/28/25 1538                    PREVENTATIVE INTERVENTIONS:   Q shift Cuong - performed per nursing policy  Q shift pressure point assessments - performed per nursing policy    Surface/Positioning  ICU Low Airloss - Currently in Place  Reposition q 2 hours - Currently in Place  TAPs Turning system - Currently in Place    Offloading/Redistribution  Sacral offloading dressing (Silicone dressing) - Currently in Place  Heel float boots (Prevalon boot) - Currently in Place      Containment/Moisture Prevention    Purwick/Condom Cath - Currently in Place    Anticipated discharge plans:  TBD        Vac Discharge Needs:  Vac Discharge plan is purely a recommendation from wound team and not a requirement for discharge unless otherwise stated by physician.  Not Applicable Pt not on a wound vac

## 2025-04-01 NOTE — PROGRESS NOTES
Critical Care Progress Note    Date of admission  3/26/2025    Chief Complaint  74 y.o. male admitted 3/26/2025 with pneumonia and right pleural effusion    Hospital Course  Mr. Huber is a 74 year old male with the past medical history significant for Parkinson's disease complicated by dysphagia, hypertension, dyslipidemia, GERD, and history of a stroke in 2020 attributed to a LV thrombus who was admitted on 3/27/2025 with pneumonia.  The patient was started on broad-spectrum antibiotics.  Due to the patient also complaining of right upper quadrant abdominal pain he had an ultrasound that was significant for stones/sludge and HIDA scan that was subsequently normal.  A CT of his chest on 3/28 showed a large right pleural effusion with collapse of the right lung.  The critical care team was consulted due to worsening mental status changes and being obtunded with increasing oxygen requirements.  The patient was originally a DNR/DNI; however, the patient's wife rescinded that in hopes that this was reversible.  The patient was then transferred to the ICU where he was intubated and a chest tube was placed.  3/29 - VD#2, SAT/SBT-->weak, in/out of AF on amio, small amount of phenylephrine  3/31 VD 3 SAT/SBT --> extubate  4/1 Remains extubated; HFNC     Interval Problem Update  Reviewed last 24 hour events:  Awake and alert   Vasopressors remain off  I/O +6.9 L on diuretics  Elevated LFTs are improving, continue to hold statin and tylenol  Amio po on 3/31  Replace K/phos  Remove chest tube 04/01/25   Tolerating TF  FEES today  Cultures NGTD    Wife updated at bedside     Review of Systems  Review of Systems   Unable to perform ROS: Critical illness        Vital Signs for last 24 hours   Temp:  [36.6 °C (97.9 °F)-37.1 °C (98.8 °F)] 36.8 °C (98.2 °F)  Pulse:  [] 64  Resp:  [18-33] 24  BP: ()/(57-72) 130/70  SpO2:  [91 %-97 %] 96 %    Hemodynamic parameters for last 24 hours       Respiratory Information for the  last 24 hours       Physical Exam   Physical Exam  Vitals and nursing note reviewed.   Constitutional:       Appearance: He is ill-appearing. He is not toxic-appearing.   HENT:      Head: Normocephalic and atraumatic.      Right Ear: External ear normal.      Left Ear: External ear normal.      Nose: Nose normal. No rhinorrhea.      Mouth/Throat:      Mouth: Mucous membranes are moist.   Eyes:      General: No scleral icterus.     Conjunctiva/sclera: Conjunctivae normal.      Pupils: Pupils are equal, round, and reactive to light.   Cardiovascular:      Rate and Rhythm: Normal rate and regular rhythm.      Pulses: Normal pulses.           Dorsalis pedis pulses are 2+ on the right side and 2+ on the left side.      Heart sounds: No murmur heard.  Pulmonary:      Effort: Pulmonary effort is normal. No respiratory distress.   Chest:      Chest wall: No tenderness.   Abdominal:      Palpations: Abdomen is soft.      Tenderness: There is no abdominal tenderness. There is no guarding or rebound.   Musculoskeletal:         General: Normal range of motion.      Cervical back: Normal range of motion and neck supple.      Right lower leg: No edema.      Left lower leg: No edema.   Lymphadenopathy:      Cervical: No cervical adenopathy.   Skin:     General: Skin is warm and dry.      Capillary Refill: Capillary refill takes less than 2 seconds.      Findings: No rash.   Neurological:      Comments: Moves extremities, follows, mouths words         Medications  Current Facility-Administered Medications   Medication Dose Route Frequency Provider Last Rate Last Admin    potassium phosphate IVPB 30 mmol in 500 mL D5W (premix)  30 mmol Intravenous Once Darion Meza M.D. 83.3 mL/hr at 04/01/25 0904 30 mmol at 04/01/25 0904    guaiFENesin (Robitussin) 100 MG/5ML liquid 100 mg  5 mL Enteral Tube Q4HRS PRN Darion Meza M.D.        gabapentin (Neurontin) capsule 200 mg  200 mg Enteral Tube Q EVENING Darion Meza M.D.         amiodarone (Cordarone) tablet 400 mg  400 mg Enteral Tube TWICE DAILY Darion Meza M.D.   400 mg at 04/01/25 0514    lansoprazole (Prevacid) solutab 30 mg  30 mg Enteral Tube DAILY Darion Meza M.D.   30 mg at 04/01/25 0511    furosemide (Lasix) injection 40 mg  40 mg Intravenous BID Darion Meza M.D.   40 mg at 04/01/25 0511    senna-docusate (Pericolace Or Senokot S) 8.6-50 MG per tablet 2 Tablet  2 Tablet Enteral Tube Q EVENING Zhanna Heredia M.D.   2 Tablet at 03/31/25 1824    And    polyethylene glycol/lytes (Miralax) Packet 1 Packet  1 Packet Enteral Tube QDAY PRN Zhanna Heredia M.D.        [Held by provider] atorvastatin (Lipitor) tablet 80 mg  80 mg Enteral Tube Q EVENING Zhanna Heredia M.D.   80 mg at 03/30/25 1817    midodrine (Proamatine) tablet 10 mg  10 mg Enteral Tube Q8HRS Zhanna Heredia M.D.   10 mg at 04/01/25 0511    Respiratory Therapy Consult   Nebulization Continuous RT Akhil Gomez M.D. MD Alert...ICU Electrolyte Replacement per Pharmacy   Other PHARMACY TO DOSE Ángel Harper M.D.        amantadine (Symmetrel) tablet 100 mg  100 mg Enteral Tube BID Ángel Harper M.D.   100 mg at 04/01/25 0511    aspirin (Asa) chewable tab 81 mg  81 mg Enteral Tube DAILY Ángel Harper M.D.   81 mg at 04/01/25 0511    carbidopa-levodopa (Sinemet)  MG tablet 2 Tablet  2 Tablet Enteral Tube 4X/DAY Ángel Harper M.D.   2 Tablet at 04/01/25 0937    oxybutynin (Ditropan) tablet 5 mg  5 mg Enteral Tube BID Ángel Harper M.D.   5 mg at 04/01/25 0512    ROPINIRole (Requip) tablet 4 mg  4 mg Enteral Tube TID Ángel Harper M.D.   4 mg at 04/01/25 0510    sodium bicarbonate tablet 1,300 mg  1,300 mg Enteral Tube TID Ángel Harper M.D.   1,300 mg at 04/01/25 0937    Pharmacy Consult: Enteral tube insertion - review meds/change route/product selection  1 Each Other PHARMACY TO DOSE Ángel Harper M.D.        enoxaparin (Lovenox) inj 40 mg  40 mg Subcutaneous DAILY AT 1800  Karime Shafer M.D.   40 mg at 03/31/25 1826    [Held by provider] metoprolol SR (Toprol XL) tablet 50 mg  50 mg Oral DAILY Karime Shafer M.D.   50 mg at 03/27/25 0535       Fluids    Intake/Output Summary (Last 24 hours) at 4/1/2025 1041  Last data filed at 4/1/2025 0600  Gross per 24 hour   Intake 1508.52 ml   Output 1575 ml   Net -66.48 ml       Laboratory  Recent Labs     03/30/25  0428   ISTATAPH 7.407   ISTATAPCO2 43.5   ISTATAPO2 85   ISTATATCO2 29*   [id number] 96   ISTATARTHCO3 27.4   ISTATARTBE 2   ISTATTEMP 36.8 C   ISTATFIO2 60   ISTATSPEC Arterial   ISTATAPHTC 7.410   [id number] 83         Recent Labs     03/30/25  0503 03/31/25  0632 04/01/25  0526   SODIUM 133* 134* 138   POTASSIUM 4.6 3.4* 2.9*   CHLORIDE 96 97 99   CO2 26 27 29   BUN 18 21 20   CREATININE 0.71 0.68 0.67   MAGNESIUM 2.2 2.1 2.1   PHOSPHORUS 2.4* 1.8* 1.9*   CALCIUM 8.0* 8.0* 7.7*     Recent Labs     03/30/25  0503 03/31/25  0632 04/01/25  0526   ALTSGPT 34 120* 130*   ASTSGOT 105* 251* 197*   ALKPHOSPHAT 89 110* 111*   TBILIRUBIN 0.5 0.3 0.3   GLUCOSE 195* 212* 214*     Recent Labs     03/30/25  0503 03/31/25  0632 04/01/25  0526   WBC 15.3* 13.6* 12.8*   ASTSGOT 105* 251* 197*   ALTSGPT 34 120* 130*   ALKPHOSPHAT 89 110* 111*   TBILIRUBIN 0.5 0.3 0.3     Recent Labs     03/30/25  0503 03/31/25  0632 04/01/25  0526   RBC 4.51* 4.23* 4.33*   HEMOGLOBIN 13.4* 12.6* 12.7*   HEMATOCRIT 41.0* 36.8* 38.4*   PLATELETCT 293 275 310       Imaging  CT head:  1. No evidence of acute intracranial process. 2. Cerebral atrophy as well as periventricular chronic small vessel ischemic change.     Assessment/Plan  * Pneumonia due to infectious organism- (present on admission)  Assessment & Plan  Completed antibiotics   Stopped linezolid with negative MRSA pcr  Follow blood cultures: negative so far  S/p right chest pigtail catheter on 3/28   Following pleural fluid which so far does not having findings of empyema, likely more  "parapneumonic    Paroxysmal atrial fibrillation (HCC)  Assessment & Plan  Amio bolus and continue infusion  Optimize K to > 4 and Mg to > 2    Acute respiratory failure with hypoxia (HCC)  Assessment & Plan  Intubated on 3/28 for hypoxia and airway protection  Extubated 3/31    HFNC  SpO2 > 90%  Pulmonary hygiene   Diuretics     Pleural effusion, right  Assessment & Plan  S/p right pigtail placed on 3/28  Chest tube removed 04/01/25   Studies indicate likely parapneumonic    Advance care planning  Assessment & Plan  3/28: \"Wife has changed him from DNR/DNI to Full code-->She would like to buy some time until the clinical picture is further elucidated\". Per Dr. Harper's encounter  3/29: pt back to DNR, I OK.  Discussed with patient, wife, and 2 adult children do not wish for him to have a tracheostomy/LTAC should it come to this    Right upper quadrant abdominal pain- (present on admission)  Assessment & Plan  HIDA negative  Possibly pleurisy.     Sepsis with acute hypoxic respiratory failure without septic shock (HCC)- (present on admission)  Assessment & Plan  This is Sepsis Present on admission  SIRS criteria identified on my evaluation include: Tachycardia, with heart rate greater than 90 BPM and Leukocytosis, with WBC greater than 12,000  Clinical indicators of end organ dysfunction include Toxic Metabolic Encephalopathy and GCS < 15  Source is Pulmonary  Sepsis protocol initiated  Crystalloid Fluid Administration: Resuscitation volume of 0 ordered. Reason that resuscitation volume of less than 30ml/kg was ordered concern for causing harm given hypoxia and pleural effusion  IV antibiotics as appropriate for source of sepsis  Reassessment: I have reassessed the patient's hemodynamic status    Completed antibiotics  Cultures NGTD  S/p pigtail catheter to chest on 3/28, studies indicate exudative and likely parapneumonic  MAP goals > 65  I am actively titrating phenylephrine for MAP goals     History of stroke- " (present on admission)  Assessment & Plan  PT/OT     Parkinson disease (HCC)- (present on admission)  Assessment & Plan  Nonverbal, nonabmulatory at baseline  Goes to the Gym and has a   Communicates with hand signals / sign language  Cont home amantadine 100mg OG BID  Cont home Sinemet 25-250mg 2 tabs OG 4 times daily  Home gabapentin 600mg -> reduced to 200 mg q evening  Cont home ropinirole 4mg via OG TID    Other hyperlipidemia- (present on admission)  Assessment & Plan  Hold statin pending LFTs trend         VTE:  Lovenox  Ulcer: PPI  Lines: Bowen Catheter  Ongoing indication addressed and PIVs    I have performed a physical exam and reviewed and updated ROS and Plan today (4/1/2025). In review of yesterday's note (3/31/2025), there are no changes except as documented above.     Discussed patient condition and risk of morbidity and/or mortality with Family, RN, RT, Pharmacy, Code status disscussed, Charge nurse / hot rounds, and Patient

## 2025-04-01 NOTE — THERAPY
"Speech Language Pathology   Flexible Endoscopic Evaluation of Swallowing (FEES)        Patient Name: Jeremiah Huber  AGE:  74 y.o., SEX:  male  Medical Record #: [id number]  Date of Service: 4/1/2025    History of Present Illness  75 y/o M admit 3/26 w/ abdominal pain that has been going on for the last few days. Identified w/ PNA on admit. 3/28 Chest CT indicated large right pleural effusion w/ collapse of the right lung. 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 Considering this, pt does not appear to require acute care SLP services to address cognition at this time.\"  6/13/20 CSE rec EC7/TN0    CXR 4/1: \"1. Slowly improving moderate right and mild left base atelectasis.   2. Right pleural pigtail drain remains. No pneumothorax.  3. NG tube\"    Pertinent Information  Current Method of Nutrition: NPO until cleared by speech pathology, NGT  Patient Behaviors: Flat Affect   Dentition: Natural dentition   Feeding Tube: NGT intact to right nare   Tracheostomy: No       Factor(s) Affecting Performance: Impaired mental status     Discussed the risks, benefits, and alternatives of the FEES procedure. Patient/family acknowledged and agreed to proceed.    Assessment  Flexible Endoscopic Evaluation of Swallowing (FEES) completed at bedside today. The endoscope was passed transnasally via Left nare to evaluate the anatomy and physiology of swallowing. Pt tolerated the procedure with no apparent distress.    Anatomic Findings: Excrescences bilaterally on the posterior 1/3 of the vocal folds consistent w/ " intubtation. Mild to moderate erythema and edema of the pharynx. Pooled secretions in the pharynx prior to introduction of PO trials.     Vocal Fold Motion:  (Pt did not vocalize to command, during coughing appeared to have adduction)  Secretion Management: [place] Secretion Scale  Adapted from DOMONIQUE Arana Hunting, A.,Development, intra- and inter-rater reliability of the [place]  Secretion Scale (NZSS), International Journal of Speech-Language Pathology (2018)  Location: Secretions in laryngeal vestibule (beyond a healthy amount)- 2  Amount in Pyriform Sinus: Secretions in pyriform sinuses (greater than 80% or spilling over) - 2  Response: No response to secretions in laryngeal vestibule - 3  Total Score: 7  Comments: *A NZSS score > 4 has positive predictive value for development of aspiration pneumonia      Consistency PAS Score Timing Residue Comments   Thin Liquid 8 Pre Swallow, During swallow, Post swallow Vallecular Residue: Mild (5%-25%)  Pyriform Sinus Residue: Severe (>50%) Ice chips only    Max and Modal PAS 8   Mildly Thick 4 Post Swallow Vallecular Residue: Mild (5%-25%)  Pyriform Sinus Residue: Severe (>50%) Tsp, cup    Max and Modal PAS 4   Liquidised 4 Post Swallow Vallecular Residue: Moderate (25%-50%)  Pyriform Sinus Residue: Mild (5%-25%) Unable to clear despite cueing for multiple swallows. Residue only moderate but it was the entirety of the bolus given     Penetration-Aspiration Scale (PAS)  1     No contrast enters airway  2     Contrast enters the airway, remains above the vocal folds, and is ejected from the airway (not seen in the airway at the end of the swallow).  3     Contrast enters the airway, remains above the vocal folds, and is not ejected from the airway (is seen in the airway after the swallow).  4     Contrast enters the airway, contacts the vocal folds, and is ejected from the airway.  5     Contrast enters the airway, contacts the vocal folds, and is not ejected from  the airway  6     Contrast enters the airway, crosses the plane of the vocal folds, and is ejected from the airway.  7     Contrast enters the airway, crosses the plane of the vocal folds, and is not ejected from the airway despite effort.  8     Contrast enters the airway, crosses the plane of the vocal folds, is not ejected from the airway and there is no response to aspiration.    Oral phase: Posterior containment was impaired w/ premature spillage. Mastication was not assessed as solids were not given d/t severity of dysphagia. Oral clearance was fair for liquids trialled.     Pharyngeal Phase:   Initiation of the swallow was delayed. Base of tongue retraction was significantly reduced. Epiglottic movement was incomplete w/ a diminished white out phase. Pharyngeal contraction was reduced. Laryngeal vestibule closure was incomplete. Pt was noted w/ aspiration via both the anterior and posterior commissures indicating generalized airway protection deficit. PES opening was incomplete w/ residuals pooling above it and in the pyriform sinuses.   Sensory integrity was impaired. There was both silent aspiration indicative of RLN (recurrent laryngeal nerve) impairment and silent penetration events indicative of iSLN (internal branch of the superior laryngeal nerve) impairment. The pt was inconsistently able to cough to command- his cough was notably dystussic and ineffective in clearing his airway. The pt was able to complete additional swallow to commands though they were not effective in clearing his airway.     Compensatory Strategies: See above, cueing for hard swallows and coughs were not effective    Severity Rating:  Severity Rating: CHRIS     CHRIS: Moderate-Severe    Clinical Impressions  - The pt p/w moderate-severe oropharyngeal dysphagia, likely acute-on-chronic and multifactorial in the context of acuity of illness and deconditioning and dytussia superimposed on multiple medical co morbidities and neuro  "deficits from Parkinsons and prior CVA. Pt's swallow function has worsened since his last Modified Barium Swallow Study completed 2/2025.   - Swallow safety is impaired ; swallow efficiency is impaired.   - Pt appears to be at high risk for aspiration PNA and high risk for malnutrition/dehydration. They are at increased risk for an adverse event from aspiration due to deconditioning, acuity of illness, reduced respiratory function, weak cough (dystussia), dependence on others for oral care, reduced physical mobility, and presence of tubes (e.g., trach, NG, Dobhoff) that harbor bacteria that can be transferred to the lungs during prandial aspiration or with microaspiration.   - Swallow prognosis is good  given time for response to medical treatments and rehabilitation. The pt appears to be a good  candidate for behavioral swallow rehabilitation. Consider exercises targeting pharyngeal constriction and cough. Given FEES results, mildly thick liquids and liquidized textures may be used therapeutically for swallow exercises.     Recommendations  Diet Consistency: NPO w/ alternate means of nutrition/hydration (ice chips OK for swallow rehab and oral gratification)  Medication: Non Oral     Oral Care: Q4h  Additional Instrumentation: Repeat diagnostic study when clinically appropriate  Consult Referral(s): Dietician, Occupational Therapist, Physical Therapist    SLP Treatment Plan  Treatment Plan: Dysphagia Treatment  SLP Frequency: 4x Per Week  Estimated Duration: Until Therapy Goals Met    Anticipated Discharge Needs  Discharge Recommendations: Recommend post-acute placement for additional speech therapy services prior to discharge home   Therapy Recommendations Upon DC: Dysphagia Training     Patient / Family Goals  Patient / Family Goal #1: \"He's been having more difficulty swallowing\" per spouse  Goal #1 Outcome: Progressing as expected  Short Term Goal # 1: Patient will complete a diagnostic swallow study to further " assess swallow function and guide POC  Goal Outcome # 1: Goal met  Short Term Goal # 2: Pt will complete exercises targeting pharyngeal constriction and cough w/ mod cueing > 15x/session  Short Term Goal # 3: Pt will participate in repeat instrumentation to redefine swallow physiology and determine further ST POC after interval for improvement (e.g., 3-5 days)    Hannah Lopez, SLP

## 2025-04-01 NOTE — CARE PLAN
The patient is Stable - Low risk of patient condition declining or worsening    Shift Goals  Clinical Goals: hemodynamic stability, stable/improve respiratory  Patient Goals: soren  Family Goals: updates      Problem: Pain - Standard  Goal: Alleviation of pain or a reduction in pain to the patient’s comfort goal  Outcome: Progressing     Problem: Knowledge Deficit - Standard  Goal: Patient and family/care givers will demonstrate understanding of plan of care, disease process/condition, diagnostic tests and medications  Outcome: Progressing     Problem: Hemodynamics  Goal: Patient's hemodynamics, fluid balance and neurologic status will be stable or improve  Outcome: Progressing     Problem: Fluid Volume  Goal: Fluid volume balance will be maintained  Outcome: Progressing     Problem: Urinary - Renal Perfusion  Goal: Ability to achieve and maintain adequate renal perfusion and functioning will improve  Outcome: Progressing     Problem: Respiratory  Goal: Patient will achieve/maintain optimum respiratory ventilation and gas exchange  Outcome: Progressing     Problem: Mechanical Ventilation  Goal: Patient will be able to express needs and understand communication  Outcome: Progressing     Problem: Physical Regulation  Goal: Diagnostic test results will improve  Outcome: Progressing  Goal: Signs and symptoms of infection will decrease  Outcome: Progressing     Problem: Skin Integrity  Goal: Skin integrity is maintained or improved  Outcome: Progressing     Problem: Fall Risk  Goal: Patient will remain free from falls  Outcome: Progressing     Problem: Safety - Medical Restraint  Goal: Remains free of injury from restraints (Restraint for Interference with Medical Device)  Outcome: Met  Goal: Free from restraint(s) (Restraint for Interference with Medical Device)  Outcome: Met

## 2025-04-01 NOTE — PROGRESS NOTES
Hospital Medicine Daily Progress Note    Date of Service  4/1/2025    Chief Complaint  Abdominal pain RUQ x1 week.    Hospital Course  Farhat Huber is a 74-year-old male with a past medical history significant for Parkinson, hypertension, dysphagia, GERD, history of CVA (due to LV thrombus) on 6/2022.  He presented to the ER with a complaint of abdominal pain that has been going on for the last few days .    Presents in ER, patient noted to have WBC of 17.7, AST/ALT normal, lipase 19, procalcitonin 0.5.  Blood culture x 2 was ordered, patient was started on antibiotics for aspiration pneumonia including Unasyn and azithromycin.  Ultrasound of the abdomen showed stones/sludge of gallbladder, no evidence of acute cholecystitis CBD obstruction, and multiple liver cysts. ACT chest showed large right pleural effusion.     Echocardiogram showing EF of 60%, akinesis of the apex      Interval Problem Update  4/1/2025:  Son at bedside and wife on phone during my visit with Farhat.  He was awake but non-verbal.  Weak cough.  On HFNC 30L and 50FiO2    Consultants/Specialty  critical care    Code Status  DNAR, I OK    Disposition  The patient is not medically cleared for discharge to home or a post-acute facility.      I have placed the appropriate orders for post-discharge needs.    Review of Systems  Review of Systems   Unable to perform ROS: Mental acuity        Physical Exam  Temp:  [36.6 °C (97.9 °F)-37.1 °C (98.8 °F)] 36.8 °C (98.2 °F)  Pulse:  [] 64  Resp:  [18-33] 24  BP: ()/(57-72) 130/70  SpO2:  [91 %-97 %] 96 %    Physical Exam  Vitals reviewed.   Constitutional:       Appearance: He is ill-appearing.   HENT:      Head: Normocephalic.      Mouth/Throat:      Pharynx: Oropharyngeal exudate present.   Eyes:      General:         Right eye: No discharge.         Left eye: No discharge.      Conjunctiva/sclera: Conjunctivae normal.   Cardiovascular:      Rate and Rhythm: Normal rate and regular rhythm.       Heart sounds: Normal heart sounds. No murmur heard.  Pulmonary:      Effort: Pulmonary effort is normal. No respiratory distress.      Breath sounds: Normal breath sounds.   Abdominal:      General: Bowel sounds are normal. There is no distension.   Musculoskeletal:      Cervical back: No tenderness.      Right lower leg: No edema.      Left lower leg: No edema.   Lymphadenopathy:      Cervical: No cervical adenopathy.   Skin:     General: Skin is warm.   Neurological:      Mental Status: He is alert.      Cranial Nerves: Cranial nerve deficit present.      Motor: Weakness present.      Coordination: Coordination abnormal.         Fluids    Intake/Output Summary (Last 24 hours) at 4/1/2025 1159  Last data filed at 4/1/2025 0600  Gross per 24 hour   Intake 1508.52 ml   Output 1575 ml   Net -66.48 ml        Laboratory  Recent Labs     03/30/25  0503 03/31/25  0632 04/01/25  0526   WBC 15.3* 13.6* 12.8*   RBC 4.51* 4.23* 4.33*   HEMOGLOBIN 13.4* 12.6* 12.7*   HEMATOCRIT 41.0* 36.8* 38.4*   MCV 90.9 87.0 88.7   MCH 29.7 29.8 29.3   MCHC 32.7 34.2 33.1   RDW 44.7 42.3 43.6   PLATELETCT 293 275 310   MPV 9.7 9.5 9.4     Recent Labs     03/30/25  0503 03/31/25  0632 04/01/25  0526   SODIUM 133* 134* 138   POTASSIUM 4.6 3.4* 2.9*   CHLORIDE 96 97 99   CO2 26 27 29   GLUCOSE 195* 212* 214*   BUN 18 21 20   CREATININE 0.71 0.68 0.67   CALCIUM 8.0* 8.0* 7.7*                   Imaging  DX-CHEST-PORTABLE (1 VIEW)   Final Result         1. Slowly improving moderate right and mild left base atelectasis.      2. Right pleural pigtail drain remains. No pneumothorax.      3. NG tube                     DX-ABDOMEN FOR TUBE PLACEMENT   Final Result      Enteric feeding tube terminates in the left abdomen over the expected location of the stomach.      DX-CHEST-PORTABLE (1 VIEW)   Final Result         1. Right pleural pigtail drain. No pneumothorax.      2. Persistent moderate right lower lobe opacity.      3. Lines and tubes without  visible complication.                     EC-ECHOCARDIOGRAM LTD W/ CONT   Final Result      DX-CHEST-PORTABLE (1 VIEW)   Final Result         1. Right pleural pigtail drain. Some of the right lung opacity with parenchymal and pleural opacity remaining. No pneumothorax.      2. ETT 5 cm above yaa. NG tube in stomach.                     CT-HEAD W/O   Final Result      1.  No evidence of acute intracranial process.      2.  Cerebral atrophy as well as periventricular chronic small vessel ischemic change.               DX-CHEST-FOR LINE PLACEMENT Perform procedure in: Patient's Room   Final Result         1. Appropriately positioned endotracheal tube.   2. Interval improvement in aeration of the lung bases.      CT-CTA CHEST PULMONARY ARTERY W/ RECONS   Final Result      1.  Very large right pleural effusion causing collapse of the right lung.      2.  No CT evidence of pulmonary emboli.      3.  Multiple hepatic cysts.            DX-CHEST-PORTABLE (1 VIEW)   Final Result         1. Increased large amount of right-sided airspace disease with associated small to moderate right pleural effusion.   2. Stable left basilar airspace disease versus atelectasis.      CT-ABDOMEN-PELVIS W/O   Final Result      1.  No evidence of bowel obstruction or focal inflammatory change.      2.  Volume loss and consolidation within the right lung base with loculated right pleural effusion.      3.  Atelectasis within the left lung base.      4.  Constipation. Numerous sigmoid diverticulosis.      5.  Hepatic cysts.      6.  Gallstones.      NM-BILIARY (HIDA) SCAN WITH CCK   Final Result      No scintigraphic evidence of cholecystitis, biliary obstruction or other worrisome finding.      US-RUQ   Final Result      1.  Stones and sludge in the gallbladder.   2.  No evidence for acute cholecystitis or biliary obstruction.   3.  Multiple liver cysts again seen.   4.  Limited evaluation of pancreas and abdominal aorta.      DX-CHEST-PORTABLE  (1 VIEW)   Final Result      Hypoinflation with bibasilar atelectasis.  Superimposed pneumonia is difficult to exclude.           Assessment/Plan  * Pneumonia due to infectious organism- (present on admission)  Assessment & Plan  4/1/25:  Presenting with right upper quadrant pain, productive cough with clear sputum and fever at home  CXR reviewed, opacity on the right.  History of dysphagia due to Parkinson's, suspect aspiration pneumonia  S/P unasyn, azithro, zyvox    Hypokalemia  Assessment & Plan  4/1/25:  K:2.9  Monitor labs    Paroxysmal atrial fibrillation (HCC)  Assessment & Plan  4/1/25  HR:64  Amiodarone 400mg BID    Acute respiratory failure with hypoxia (HCC)  Assessment & Plan  4/1/25:  Concern of aspiration given advancing Reyna  Has a NG tube for enteral feeding currently  Aspiration precautions  Titrate O2 to keep SpO2 >90    Pleural effusion, right  Assessment & Plan  4/1/25:  On chest x-ray and CT scan   S/p Chest tube    Advance care planning  Assessment & Plan  4/1/25:  Patient is nonverbal . Patient wife who is poa and nursing staff were present for the conversation.   Code status changed to dnar/I okay    Right upper quadrant abdominal pain- (present on admission)  Assessment & Plan  4/1/25:  LFTs unremarkable,ush showing gallstone   Hida  normal , likley chest pain is from pleurisy    History of stroke- (present on admission)  Assessment & Plan  Continue home aspirin, atorvastatin    Parkinson disease (HCC)- (present on admission)  Assessment & Plan  4/1/25:  Continue carbidopa-levodopa  Nonverbal at baseline, uses a wheelchair but able to stand to urinate on his own and does not use a catheter  NPO, speech following (failed FEEs)  At high risk of aspiration even with enteral feeding given Parkisons      Restless leg syndrome  Assessment & Plan  Continue ropinirole    Other hyperlipidemia- (present on admission)  Assessment & Plan  4/1/25:  Continue atorvastatin         VTE prophylaxis:     enoxaparin ppx      I have performed a physical exam and reviewed and updated ROS and Plan today (4/1/2025). In review of yesterday's note (3/31/2025), there are no changes except as documented above.

## 2025-04-01 NOTE — ASSESSMENT & PLAN NOTE
4/3/25  HR:71  Amiodarone 400mg BID with plan to go to a maintenance dose  Vitals:    04/07/25 1339   BP:    Pulse:    Resp:    Temp: 36.8 °C (98.3 °F)   SpO2: 95%

## 2025-04-01 NOTE — DISCHARGE PLANNING
Case Management Discharge Planning    Admission Date: 3/26/2025  GMLOS: 4.9  ALOS: 5    6-Clicks ADL Score:    6-Clicks Mobility Score:        Anticipated Discharge Dispo: Discharge Disposition: D/T to home under HHA care in anticipation of covered skilled care (06)    DME Needed: Pt has an electric scooter, home O2.    Action(s) Taken:  Pt has orders to transfer out of Unit.   RNCM met with wife at bedside to discuss post acute options/scenarios. Pt is currently on 50L/30% HHFL NC.   Referrals sent to LTACH and SNF per protocol. SNF and LTACH orders/referrals placed per protocol.     Escalations Completed: None    Medically Clear: No    Next Steps: Choice when appropriate.     Barriers to Discharge: Medical clearance and Pending Placement, PT/OT Evals pending.    Is the patient up for discharge tomorrow: No

## 2025-04-01 NOTE — PROGRESS NOTES
Assumed care of patient, report received from ALYCE Wong.  Patient is resting in bed, alert, mouths responses to questions.  Denies pain.  Safety precautions in place, no needs at this time.

## 2025-04-01 NOTE — DOCUMENTATION QUERY
[facility]                                                                       Query Response Note      PATIENT:               GRIFFIN NEGRON  ACCT #:                  [id number]  MRN:                     [id number]  :                      1950  ADMIT DATE:       3/26/2025 10:03 PM  DISCH DATE:          RESPONDING  PROVIDER #:        [id number]           QUERY TEXT:    Documentation in the medical record indicates that this patient is being treated for ulcer of the following site(s):     Evolving Deep tissue injury pressure injury buttocks Not present on admission    Can the diagnosis of ulcer be further clarified as to type/etiology of the wound, location, and if present on admission (POA) based on the clinical indicators and treatment?    The patient's Clinical Indicators include:  Findings: 3/29 Wound team: Evolving Deep tissue injury pressure injury buttocks Not present on admission;  large evolving DTI from a bedpan.  The coccyx is beginning to open and the L. Buttocks with visible blood filled blisters.  This wound will most likely worsen.    Treatment: wound team, offloading dressing, reposition q 2 hours     Risk Factors: history of parkinsons, stroke,  here with sepsis aspiration pneumonia     Kalie Smith RN BSN  Shirleyrolando,  Clinical Documentation   [email or web address]  Connect via Vo[employer] Messenger    Note: If you agree with a diagnosis listed above, please remember to include it in your concurrent daily documentation and onto the Discharge Summary.  Options provided:   -- Evolving Deep tissue injury pressure injury buttocks not present on admission   -- Wound is not a Pressure Ulcer/DTI, (please specify type of wound)   -- Other explanation, (please specify other explanation)      Query created by: Kalie Smith on 3/31/2025 3:33 AM    RESPONSE TEXT:    Evolving Deep tissue injury pressure injury buttocks not present on  admission          Electronically signed by:  REEMA CORREA MD 4/1/2025 7:29 AM

## 2025-04-01 NOTE — ASSESSMENT & PLAN NOTE
Concern of ongoing aspiration risk given advancing [facility]  Has a NG tube for enteral feeding currently  Aspiration precautions and SLP  Titrate O2 to keep SpO2 >90  Currently down to 2L, improving

## 2025-04-02 LAB
ALBUMIN SERPL BCP-MCNC: 2.5 G/DL (ref 3.2–4.9)
ALBUMIN/GLOB SERPL: 0.9 G/DL
ALP SERPL-CCNC: 109 U/L (ref 30–99)
ALT SERPL-CCNC: 115 U/L (ref 2–50)
ANION GAP SERPL CALC-SCNC: 12 MMOL/L (ref 7–16)
AST SERPL-CCNC: 142 U/L (ref 12–45)
BILIRUB SERPL-MCNC: 0.4 MG/DL (ref 0.1–1.5)
BUN SERPL-MCNC: 20 MG/DL (ref 8–22)
CALCIUM ALBUM COR SERPL-MCNC: 9.2 MG/DL (ref 8.5–10.5)
CALCIUM SERPL-MCNC: 8 MG/DL (ref 8.5–10.5)
CHLORIDE SERPL-SCNC: 97 MMOL/L (ref 96–112)
CO2 SERPL-SCNC: 31 MMOL/L (ref 20–33)
CREAT SERPL-MCNC: 0.81 MG/DL (ref 0.5–1.4)
ERYTHROCYTE [DISTWIDTH] IN BLOOD BY AUTOMATED COUNT: 43.2 FL (ref 35.9–50)
GFR SERPLBLD CREATININE-BSD FMLA CKD-EPI: 93 ML/MIN/1.73 M 2
GLOBULIN SER CALC-MCNC: 2.8 G/DL (ref 1.9–3.5)
GLUCOSE SERPL-MCNC: 146 MG/DL (ref 65–99)
HCT VFR BLD AUTO: 40.6 % (ref 42–52)
HGB BLD-MCNC: 13.5 G/DL (ref 14–18)
MAGNESIUM SERPL-MCNC: 2 MG/DL (ref 1.5–2.5)
MCH RBC QN AUTO: 29.2 PG (ref 27–33)
MCHC RBC AUTO-ENTMCNC: 33.3 G/DL (ref 32.3–36.5)
MCV RBC AUTO: 87.7 FL (ref 81.4–97.8)
PHOSPHATE SERPL-MCNC: 2.5 MG/DL (ref 2.5–4.5)
PLATELET # BLD AUTO: 298 K/UL (ref 164–446)
PMV BLD AUTO: 8.9 FL (ref 9–12.9)
POTASSIUM SERPL-SCNC: 2.9 MMOL/L (ref 3.6–5.5)
POTASSIUM SERPL-SCNC: 3.5 MMOL/L (ref 3.6–5.5)
PROT SERPL-MCNC: 5.3 G/DL (ref 6–8.2)
RBC # BLD AUTO: 4.63 M/UL (ref 4.7–6.1)
SODIUM SERPL-SCNC: 140 MMOL/L (ref 135–145)
WBC # BLD AUTO: 10.7 K/UL (ref 4.8–10.8)

## 2025-04-02 PROCEDURE — A9270 NON-COVERED ITEM OR SERVICE: HCPCS | Performed by: INTERNAL MEDICINE

## 2025-04-02 PROCEDURE — 700111 HCHG RX REV CODE 636 W/ 250 OVERRIDE (IP): Performed by: HOSPITALIST

## 2025-04-02 PROCEDURE — 85027 COMPLETE CBC AUTOMATED: CPT

## 2025-04-02 PROCEDURE — 99233 SBSQ HOSP IP/OBS HIGH 50: CPT | Performed by: HOSPITALIST

## 2025-04-02 PROCEDURE — 700102 HCHG RX REV CODE 250 W/ 637 OVERRIDE(OP): Performed by: STUDENT IN AN ORGANIZED HEALTH CARE EDUCATION/TRAINING PROGRAM

## 2025-04-02 PROCEDURE — A9270 NON-COVERED ITEM OR SERVICE: HCPCS | Performed by: HOSPITALIST

## 2025-04-02 PROCEDURE — 94669 MECHANICAL CHEST WALL OSCILL: CPT

## 2025-04-02 PROCEDURE — 700102 HCHG RX REV CODE 250 W/ 637 OVERRIDE(OP): Performed by: INTERNAL MEDICINE

## 2025-04-02 PROCEDURE — 84132 ASSAY OF SERUM POTASSIUM: CPT

## 2025-04-02 PROCEDURE — 700111 HCHG RX REV CODE 636 W/ 250 OVERRIDE (IP): Mod: JZ | Performed by: INTERNAL MEDICINE

## 2025-04-02 PROCEDURE — 700102 HCHG RX REV CODE 250 W/ 637 OVERRIDE(OP): Performed by: HOSPITALIST

## 2025-04-02 PROCEDURE — 700111 HCHG RX REV CODE 636 W/ 250 OVERRIDE (IP): Mod: JZ | Performed by: STUDENT IN AN ORGANIZED HEALTH CARE EDUCATION/TRAINING PROGRAM

## 2025-04-02 PROCEDURE — A9270 NON-COVERED ITEM OR SERVICE: HCPCS | Performed by: STUDENT IN AN ORGANIZED HEALTH CARE EDUCATION/TRAINING PROGRAM

## 2025-04-02 PROCEDURE — 84100 ASSAY OF PHOSPHORUS: CPT

## 2025-04-02 PROCEDURE — 770000 HCHG ROOM/CARE - INTERMEDIATE ICU *

## 2025-04-02 PROCEDURE — 80053 COMPREHEN METABOLIC PANEL: CPT

## 2025-04-02 PROCEDURE — 83735 ASSAY OF MAGNESIUM: CPT

## 2025-04-02 RX ORDER — POTASSIUM CHLORIDE 7.45 MG/ML
10 INJECTION INTRAVENOUS
Status: COMPLETED | OUTPATIENT
Start: 2025-04-02 | End: 2025-04-02

## 2025-04-02 RX ORDER — POTASSIUM CHLORIDE 7.45 MG/ML
10 INJECTION INTRAVENOUS
Status: DISCONTINUED | OUTPATIENT
Start: 2025-04-02 | End: 2025-04-02

## 2025-04-02 RX ADMIN — OXYBUTYNIN CHLORIDE 5 MG: 5 TABLET ORAL at 17:23

## 2025-04-02 RX ADMIN — GUAIFENESIN 200 MG: 100 LIQUID ORAL at 05:19

## 2025-04-02 RX ADMIN — ASPIRIN 81 MG: 81 TABLET, CHEWABLE ORAL at 05:19

## 2025-04-02 RX ADMIN — AMANTADINE HYDROCHLORIDE 100 MG: 100 TABLET ORAL at 05:18

## 2025-04-02 RX ADMIN — POTASSIUM CHLORIDE 10 MEQ: 7.46 INJECTION, SOLUTION INTRAVENOUS at 09:35

## 2025-04-02 RX ADMIN — POTASSIUM BICARBONATE 50 MEQ: 978 TABLET, EFFERVESCENT ORAL at 11:12

## 2025-04-02 RX ADMIN — FUROSEMIDE 40 MG: 10 INJECTION, SOLUTION INTRAVENOUS at 17:24

## 2025-04-02 RX ADMIN — CARBIDOPA AND LEVODOPA 2 TABLET: 25; 250 TABLET ORAL at 16:30

## 2025-04-02 RX ADMIN — ROPINIROLE HYDROCHLORIDE 4 MG: 2 TABLET, FILM COATED ORAL at 16:33

## 2025-04-02 RX ADMIN — AMANTADINE HYDROCHLORIDE 100 MG: 100 TABLET ORAL at 17:24

## 2025-04-02 RX ADMIN — GUAIFENESIN 200 MG: 100 LIQUID ORAL at 17:23

## 2025-04-02 RX ADMIN — Medication 5 MG: at 01:09

## 2025-04-02 RX ADMIN — ENOXAPARIN SODIUM 40 MG: 100 INJECTION SUBCUTANEOUS at 17:24

## 2025-04-02 RX ADMIN — AMIODARONE HYDROCHLORIDE 400 MG: 200 TABLET ORAL at 17:23

## 2025-04-02 RX ADMIN — AMIODARONE HYDROCHLORIDE 400 MG: 200 TABLET ORAL at 05:18

## 2025-04-02 RX ADMIN — LANSOPRAZOLE 30 MG: 30 TABLET, ORALLY DISINTEGRATING, DELAYED RELEASE ORAL at 05:19

## 2025-04-02 RX ADMIN — POTASSIUM CHLORIDE 10 MEQ: 7.46 INJECTION, SOLUTION INTRAVENOUS at 11:13

## 2025-04-02 RX ADMIN — SODIUM BICARBONATE 1300 MG: 650 TABLET ORAL at 08:15

## 2025-04-02 RX ADMIN — CARBIDOPA AND LEVODOPA 2 TABLET: 25; 250 TABLET ORAL at 08:16

## 2025-04-02 RX ADMIN — MIDODRINE HYDROCHLORIDE 10 MG: 5 TABLET ORAL at 13:08

## 2025-04-02 RX ADMIN — SODIUM BICARBONATE 1300 MG: 650 TABLET ORAL at 20:12

## 2025-04-02 RX ADMIN — CARBIDOPA AND LEVODOPA 2 TABLET: 25; 250 TABLET ORAL at 13:08

## 2025-04-02 RX ADMIN — SODIUM BICARBONATE 1300 MG: 650 TABLET ORAL at 16:33

## 2025-04-02 RX ADMIN — POTASSIUM BICARBONATE 25 MEQ: 978 TABLET, EFFERVESCENT ORAL at 17:23

## 2025-04-02 RX ADMIN — GABAPENTIN 200 MG: 100 CAPSULE ORAL at 17:23

## 2025-04-02 RX ADMIN — FUROSEMIDE 40 MG: 10 INJECTION, SOLUTION INTRAVENOUS at 05:19

## 2025-04-02 RX ADMIN — MIDODRINE HYDROCHLORIDE 10 MG: 5 TABLET ORAL at 22:15

## 2025-04-02 RX ADMIN — MIDODRINE HYDROCHLORIDE 10 MG: 5 TABLET ORAL at 05:19

## 2025-04-02 RX ADMIN — CARBIDOPA AND LEVODOPA 2 TABLET: 25; 250 TABLET ORAL at 20:12

## 2025-04-02 RX ADMIN — SENNOSIDES AND DOCUSATE SODIUM 2 TABLET: 50; 8.6 TABLET ORAL at 17:23

## 2025-04-02 RX ADMIN — OXYBUTYNIN CHLORIDE 5 MG: 5 TABLET ORAL at 05:19

## 2025-04-02 RX ADMIN — ROPINIROLE HYDROCHLORIDE 4 MG: 2 TABLET, FILM COATED ORAL at 05:19

## 2025-04-02 RX ADMIN — GUAIFENESIN 200 MG: 100 LIQUID ORAL at 11:12

## 2025-04-02 RX ADMIN — ROPINIROLE HYDROCHLORIDE 4 MG: 2 TABLET, FILM COATED ORAL at 11:19

## 2025-04-02 ASSESSMENT — PAIN DESCRIPTION - PAIN TYPE
TYPE: ACUTE PAIN

## 2025-04-02 ASSESSMENT — COGNITIVE AND FUNCTIONAL STATUS - GENERAL
EATING MEALS: TOTAL
DAILY ACTIVITIY SCORE: 6
TURNING FROM BACK TO SIDE WHILE IN FLAT BAD: TOTAL
MOVING TO AND FROM BED TO CHAIR: TOTAL
TOILETING: TOTAL
STANDING UP FROM CHAIR USING ARMS: TOTAL
MOBILITY SCORE: 6
HELP NEEDED FOR BATHING: TOTAL
WALKING IN HOSPITAL ROOM: TOTAL
CLIMB 3 TO 5 STEPS WITH RAILING: TOTAL
MOVING FROM LYING ON BACK TO SITTING ON SIDE OF FLAT BED: TOTAL
SUGGESTED CMS G CODE MODIFIER MOBILITY: CN
SUGGESTED CMS G CODE MODIFIER DAILY ACTIVITY: CN
PERSONAL GROOMING: TOTAL
DRESSING REGULAR UPPER BODY CLOTHING: TOTAL
DRESSING REGULAR LOWER BODY CLOTHING: TOTAL

## 2025-04-02 ASSESSMENT — FIBROSIS 4 INDEX: FIB4 SCORE: 4.01

## 2025-04-02 NOTE — CARE PLAN
Problem: Pain - Standard  Goal: Alleviation of pain or a reduction in pain to the patient’s comfort goal  Outcome: Progressing  Flowsheets (Taken 4/2/2025 1500)  Non Verbal Scale: Calm  OB Pain Intervention:   Rest   Repositioned   Relaxation technique  Note: Pt assessed for pain Q4h and medicated PRN if necessary, Pt and family  instructed to notify RN of any new or increasing pain to prevent it from becoming intolerable. .       Problem: Knowledge Deficit - Standard  Goal: Patient and family/care givers will demonstrate understanding of plan of care, disease process/condition, diagnostic tests and medications  Outcome: Progressing  Note: Pt and family educated regarding plan of care for the day, activity, diet, and medications. Family Verbalized understanding.        Problem: Hemodynamics  Goal: Patient's hemodynamics, fluid balance and neurologic status will be stable or improve  Outcome: Progressing  Note: VS are stable      Problem: Skin Integrity  Goal: Skin integrity is maintained or improved  Outcome: Progressing  Note: Q2hr hour turns preformed, frequent cleansing of skin, skin protectant used. Frequent monitoring of skin integrity.       Problem: Fall Risk  Goal: Patient will remain free from falls  Outcome: Progressing  Note: Fall risk assessed and fall precautions in place, pt max  assist. Bed alarm on, appropriate signs in place, call light and personal belongings within reach. Pt and family educated to call for help and not get up without assistance. Verbalized understanding.    The patient is Watcher - Medium risk of patient condition declining or worsening    Shift Goals  Clinical Goals: Hemodynamic stability, wean O2, q2 turns  Patient Goals: sleep  Family Goals: updates    Progress made toward(s) clinical / shift goals:   Patient is non verbal but follows command, family at bedside, performed chlorehexidine bath and stephen care, call light within reach and bed alarm kept on,/56   Pulse 74    "Temp 37.2 °C (99 °F) (Oral)   Resp (!) 24   Ht 1.854 m (6' 1\")   Wt 94.8 kg (208 lb 15.9 oz)   SpO2 98%           "

## 2025-04-02 NOTE — CARE PLAN
The patient is Stable - Low risk of patient condition declining or worsening    Shift Goals  Clinical Goals: wean O2, HD stability, wound care    Problem: Pain - Standard  Goal: Alleviation of pain or a reduction in pain to the patient’s comfort goal  Description: Target End Date:  Prior to discharge or change in level of careDocument on Vitals flowsheet1.  Document pain using the appropriate pain scale per order or unit policy2.  Educate and implement non-pharmacologic comfort measures (i.e. relaxation, distraction, massage, cold/heat therapy, etc.)3.  Pain management medications as ordered4.  Reassess pain after pain med administration per policy5.  If opiods administered assess patient's response to pain medication is appropriate per POSS sedation scale6.  Follow pain management plan developed in collaboration with patient and interdisciplinary team (including palliative care or pain specialists if applicable)  Outcome: Progressing     Problem: Knowledge Deficit - Standard  Goal: Patient and family/care givers will demonstrate understanding of plan of care, disease process/condition, diagnostic tests and medications  Description: Target End Date:  1-3 days or as soon as patient condition allowsDocument in Patient Education1.  Patient and family/caregiver oriented to unit, equipment, visitation policy and means for communicating concern2.  Complete/review Learning Assessment3.  Assess knowledge level of disease process/condition, treatment plan, diagnostic tests and medications4.  Explain disease process/condition, treatment plan, diagnostic tests and medications  Outcome: Progressing     Problem: Hemodynamics  Goal: Patient's hemodynamics, fluid balance and neurologic status will be stable or improve  Description: Target End Date:  Prior to discharge or change in level of careDocument on Assessment and I/O flowsheet templates1.  Monitor vital signs, pulse oximetry and cardiac monitor per provider order and/or  policy2.  Maintain blood pressure per provider order3.  Hemodynamic monitoring per provider order4.  Manage IV fluids and IV infusions5.  Monitor intake and output6.  Daily weights per unit policy or provider order7.  Assess peripheral pulses and capillary refill8.  Assess color and body temperature9.  Position patient for maximum circulation/cardiac [id number]. Monitor for signs/symptoms of excessive [id number]. Assess mental status, restlessness and changes in level of [id number]. Monitor temperature and report fever or hypothermia to provider immediately. Consideration of targeted temperature management.  Outcome: Progressing     Problem: Fluid Volume  Goal: Fluid volume balance will be maintained  Description: Target End Date:  Prior to discharge or change in level of careDocument on I/O flowsheet1.  Monitor intake and output as ordered2.  Promote oral intake as appropriate3.  Report inadequate intake or output to physician4.  Administer IV therapy as ordered5.  Weights per provider order6.  Assess for signs and symptoms of bleeding7.  Monitor for signs of fluid overload (respiratory changes, edema, weight gain, increased abdominal girth)8.  Monitor of signs for inadequate fluid volume (poor skin turgor, dry mucous membranes)9.  Instruct patient on adherence to fluid restrictions  Outcome: Progressing     Problem: Urinary - Renal Perfusion  Goal: Ability to achieve and maintain adequate renal perfusion and functioning will improve  Description: Target End Date:  Prior to discharge or change in level of careDocument on I/O and Assessment flowsheet1.  Urine output will remain greater than 0.5ml/Kg/HR2.  Monitor amount and/or characteristics of urine per order/policy. Specific gravity per order/policy3.  Assess signs and symptoms of renal dysfunction  Outcome: Progressing     Problem: Respiratory  Goal: Patient will achieve/maintain optimum respiratory ventilation and gas exchange  Description: Target End Date:   Prior to discharge or change in level of careDocument on Assessment flowsheet1.  Assess and monitor rate, rhythm, depth and effort of respiration2.  Breath sounds assessed qshift and/or as needed3.  Assess O2 saturation, administer/titrate oxygen as ordered4.  Position patient for maximum ventilatory efficiency5.  Turn, cough, and deep breath with splinting to improve effectiveness6.  Collaborate with RT to administer medication/treatments per order7.  Encourage use of incentive spirometer and encourage patient to cough after use and utilize splinting techniques if applicable8.  Airway suctioning9.  Monitor sputum production for changes in color, consistency and [id number]. Perform frequent oral [id number]. Alternate physical activity with rest periods  Outcome: Progressing     Problem: Physical Regulation  Goal: Diagnostic test results will improve  Description: Target End Date:  Prior to discharge or change in level of care1.  Monitor lactic acid levels2.  Monitor ABG's3.  Monitor diagnostic test results  Outcome: Progressing  Goal: Signs and symptoms of infection will decrease  Description: Target End Date:  Prior to discharge or change in level of care1.  Remove potential routes of infection, such as central lines and urinary catheter2.  Follow facility protocol for changing IV tubing and sites3.  Collaborate with Infectious Disease4.  Antibiotic therapy per provider order5.  Note drug effects and monitor for antibiotic toxicity  Outcome: Progressing     Problem: Skin Integrity  Goal: Skin integrity is maintained or improved  Description: Target End Date:  Prior to discharge or change in level of careDocument interventions on Skin Risk/Cuong flowsheet groups and corresponding LDA1.  Assess and monitor skin integrity, appearance and/or temperature2.  Assess risk factors for impaired skin integrity and/or pressures ulcers3.  Implement precautions to protect skin integrity in collaboration with interdisciplinary  team4.  Implement pressure ulcer prevention protocol if at risk for skin breakdown5.  Confirm wound care consult if at risk for skin breakdown6.  Ensure patient use of pressure relieving devices  (Low air loss bed, waffle overlay, heel protectors, ROHO cushion, etc)  Outcome: Progressing     Problem: Fall Risk  Goal: Patient will remain free from falls  Description: Target End Date:  Prior to discharge or change in level of careDocument interventions on the [facility] Fall Risk Assessment1.  Assess for fall risk factors2.  Implement fall precautions  Outcome: Progressing     Patient Goals: JUANITA  Family Goals: stay updated on POC    Progress made toward(s) clinical / shift goals: weaning O2 down to 3L NC    Patient is not progressing towards the following goals:

## 2025-04-02 NOTE — PROGRESS NOTES
Patient transferred to [id number], report given to ALYCE Unger.  All patient belongings and medications sent with patient.

## 2025-04-02 NOTE — PROGRESS NOTES
Hospital Medicine Daily Progress Note    Date of Service  4/2/2025    Chief Complaint  Abdominal pain RUQ x1 week.    Hospital Course  Farhat Huber is a 74-year-old male with a past medical history significant for Parkinson, hypertension, dysphagia, GERD, history of CVA (due to LV thrombus) on 6/2022.  He presented to the ER with a complaint of abdominal pain that has been going on for the last few days .    Presents in ER, patient noted to have WBC of 17.7, AST/ALT normal, lipase 19, procalcitonin 0.5.  Blood culture x 2 was ordered, patient was started on antibiotics for aspiration pneumonia including Unasyn and azithromycin.  Ultrasound of the abdomen showed stones/sludge of gallbladder, no evidence of acute cholecystitis CBD obstruction, and multiple liver cysts. A CT chest showed large right pleural effusion.     Echocardiogram showing EF of 60%, akinesis of the apex.      Interval Problem Update  4/2/2025: K:2.9, LFT's slight improvement.  He opens his eyes and slowly follows commands.  Very weak cough.  NG tube in nares with rate 70cc/hr. Wife at bedside and discussed the possibility of new baseline dysphagia vs temporary change but like new baseline per my concern.  I discussed with them to consider his wishes for further end of life discussion vs PEG tube placement.  Wife states his baseline is sitting in a motorized wheelchair, and that she has to feed and dress him otherwise.    Consultants/Specialty  critical care    Code Status  DNAR, I OK    Disposition  The patient is not medically cleared for discharge to home or a post-acute facility.      I have placed the appropriate orders for post-discharge needs.    Review of Systems  Review of Systems   Unable to perform ROS: Mental acuity        Physical Exam  Temp:  [36.6 °C (97.9 °F)-36.8 °C (98.2 °F)] 36.7 °C (98 °F)  Pulse:  [54-73] 66  Resp:  [13-43] 29  BP: ()/(56-87) 101/62  SpO2:  [89 %-100 %] 95 %    Physical Exam  Vitals reviewed.    Constitutional:       Appearance: He is ill-appearing.   HENT:      Head: Normocephalic.      Mouth/Throat:      Pharynx: Oropharyngeal exudate present.   Eyes:      General:         Right eye: No discharge.         Left eye: No discharge.      Conjunctiva/sclera: Conjunctivae normal.   Cardiovascular:      Rate and Rhythm: Normal rate and regular rhythm.      Heart sounds: Normal heart sounds. No murmur heard.  Pulmonary:      Effort: Pulmonary effort is normal. No respiratory distress.      Breath sounds: Examination of the right-lower field reveals decreased breath sounds. Decreased breath sounds present.   Abdominal:      General: Bowel sounds are normal. There is no distension.   Musculoskeletal:      Cervical back: No tenderness.      Right lower leg: No edema.      Left lower leg: No edema.   Lymphadenopathy:      Cervical: No cervical adenopathy.   Skin:     General: Skin is warm.   Neurological:      Mental Status: He is alert.      Cranial Nerves: Cranial nerve deficit present.      Motor: Weakness present.      Coordination: Coordination abnormal.         Fluids    Intake/Output Summary (Last 24 hours) at 4/2/2025 0727  Last data filed at 4/2/2025 0600  Gross per 24 hour   Intake 1212 ml   Output 1750 ml   Net -538 ml        Laboratory  Recent Labs     03/31/25  0632 04/01/25  0526 04/02/25  0532   WBC 13.6* 12.8* 10.7   RBC 4.23* 4.33* 4.63*   HEMOGLOBIN 12.6* 12.7* 13.5*   HEMATOCRIT 36.8* 38.4* 40.6*   MCV 87.0 88.7 87.7   MCH 29.8 29.3 29.2   MCHC 34.2 33.1 33.3   RDW 42.3 43.6 43.2   PLATELETCT 275 310 298   MPV 9.5 9.4 8.9*     Recent Labs     03/31/25  0632 04/01/25  0526 04/02/25  0532   SODIUM 134* 138 140   POTASSIUM 3.4* 2.9* 2.9*   CHLORIDE 97 99 97   CO2 27 29 31   GLUCOSE 212* 214* 146*   BUN 21 20 20   CREATININE 0.68 0.67 0.81   CALCIUM 8.0* 7.7* 8.0*                   Imaging  DX-CHEST-PORTABLE (1 VIEW)   Final Result         1.  Pulmonary edema and/or infiltrates, stable since prior  study.   2.  Small right pleural effusion      DX-CHEST-PORTABLE (1 VIEW)   Final Result         1. Slowly improving moderate right and mild left base atelectasis.      2. Right pleural pigtail drain remains. No pneumothorax.      3. NG tube                     DX-ABDOMEN FOR TUBE PLACEMENT   Final Result      Enteric feeding tube terminates in the left abdomen over the expected location of the stomach.      DX-CHEST-PORTABLE (1 VIEW)   Final Result         1. Right pleural pigtail drain. No pneumothorax.      2. Persistent moderate right lower lobe opacity.      3. Lines and tubes without visible complication.                     EC-ECHOCARDIOGRAM LTD W/ CONT   Final Result      DX-CHEST-PORTABLE (1 VIEW)   Final Result         1. Right pleural pigtail drain. Some of the right lung opacity with parenchymal and pleural opacity remaining. No pneumothorax.      2. ETT 5 cm above yaa. NG tube in stomach.                     CT-HEAD W/O   Final Result      1.  No evidence of acute intracranial process.      2.  Cerebral atrophy as well as periventricular chronic small vessel ischemic change.               DX-CHEST-FOR LINE PLACEMENT Perform procedure in: Patient's Room   Final Result         1. Appropriately positioned endotracheal tube.   2. Interval improvement in aeration of the lung bases.      CT-CTA CHEST PULMONARY ARTERY W/ RECONS   Final Result      1.  Very large right pleural effusion causing collapse of the right lung.      2.  No CT evidence of pulmonary emboli.      3.  Multiple hepatic cysts.            DX-CHEST-PORTABLE (1 VIEW)   Final Result         1. Increased large amount of right-sided airspace disease with associated small to moderate right pleural effusion.   2. Stable left basilar airspace disease versus atelectasis.      CT-ABDOMEN-PELVIS W/O   Final Result      1.  No evidence of bowel obstruction or focal inflammatory change.      2.  Volume loss and consolidation within the right lung base  with loculated right pleural effusion.      3.  Atelectasis within the left lung base.      4.  Constipation. Numerous sigmoid diverticulosis.      5.  Hepatic cysts.      6.  Gallstones.      NM-BILIARY (HIDA) SCAN WITH CCK   Final Result      No scintigraphic evidence of cholecystitis, biliary obstruction or other worrisome finding.      US-RUQ   Final Result      1.  Stones and sludge in the gallbladder.   2.  No evidence for acute cholecystitis or biliary obstruction.   3.  Multiple liver cysts again seen.   4.  Limited evaluation of pancreas and abdominal aorta.      DX-CHEST-PORTABLE (1 VIEW)   Final Result      Hypoinflation with bibasilar atelectasis.  Superimposed pneumonia is difficult to exclude.           Assessment/Plan  * Pneumonia due to infectious organism- (present on admission)  Assessment & Plan  4/2/25:  Presenting with right upper quadrant pain, productive cough with clear sputum and fever at home  CXR reviewed, opacity on the right.  History of dysphagia due to Parkinson's, suspect aspiration pneumonia  S/P unasyn, azithro, zyvox    Hypokalemia  Assessment & Plan  4/2/25:  K:2.9  Replace and monitor  Monitor labs    Paroxysmal atrial fibrillation (HCC)  Assessment & Plan  4/2/25  HR:66  Amiodarone 400mg BID    Acute respiratory failure with hypoxia (HCC)  Assessment & Plan  4/2/25:  Concern of ongoing aspiration risk given advancing [facility]  Has a NG tube for enteral feeding currently  Aspiration precautions and SLP  Titrate O2 to keep SpO2 >90  Currently down to 5L NC    Pleural effusion, right  Assessment & Plan  4/2/25:  Noted on chest x-ray and prior CT scan this admit  4/2 CXR still showing right pleural effusion and possible pulmonary edema  S/p Chest tube      Advance care planning  Assessment & Plan  4/2/25:  Patient is nonverbal . Patient wife who is poa and nursing staff were present for the conversation.   Discussed need to consider further advanced care planning if he is not  improving from inability to protect his airway.  I feel he is at risk of secretion aspirations even if we placed a PEG tube.  Code status changed to dnar/I okay    Right upper quadrant abdominal pain- (present on admission)  Assessment & Plan  4/2/25:  LFTs unremarkable,ush showing gallstone   Hida  normal , maheshley chest pain is from pleurisy    History of stroke- (present on admission)  Assessment & Plan  Continue home aspirin, atorvastatin    Parkinson disease (HCC)- (present on admission)  Assessment & Plan  4/2/25:  Continue carbidopa-levodopa  Nonverbal at baseline, uses a wheelchair but able to stand to urinate on his own and does not use a catheter  NPO, speech following (failed FEEs)  At high risk of aspiration even with enteral feeding given Parkisons  I discussed 4/2 with wife and patient End of life considerations.  May need near future Hospice given his severe dysphagia in the interim let us see if any improvement with Speech therapy      Restless leg syndrome  Assessment & Plan  Continue ropinirole    Other hyperlipidemia- (present on admission)  Assessment & Plan  4/2/25:  Continue atorvastatin         VTE prophylaxis:    enoxaparin ppx      I have performed a physical exam and reviewed and updated ROS and Plan today (4/2/2025). In review of yesterday's note (4/1/2025), there are no changes except as documented above.

## 2025-04-03 LAB
ALBUMIN SERPL BCP-MCNC: 2.6 G/DL (ref 3.2–4.9)
ALBUMIN/GLOB SERPL: 0.8 G/DL
ALP SERPL-CCNC: 117 U/L (ref 30–99)
ALT SERPL-CCNC: 123 U/L (ref 2–50)
ANION GAP SERPL CALC-SCNC: 9 MMOL/L (ref 7–16)
AST SERPL-CCNC: 79 U/L (ref 12–45)
BILIRUB SERPL-MCNC: 0.5 MG/DL (ref 0.1–1.5)
BUN SERPL-MCNC: 17 MG/DL (ref 8–22)
CALCIUM ALBUM COR SERPL-MCNC: 9.2 MG/DL (ref 8.5–10.5)
CALCIUM SERPL-MCNC: 8.1 MG/DL (ref 8.5–10.5)
CHLORIDE SERPL-SCNC: 98 MMOL/L (ref 96–112)
CO2 SERPL-SCNC: 30 MMOL/L (ref 20–33)
CREAT SERPL-MCNC: 0.8 MG/DL (ref 0.5–1.4)
ERYTHROCYTE [DISTWIDTH] IN BLOOD BY AUTOMATED COUNT: 43.3 FL (ref 35.9–50)
GFR SERPLBLD CREATININE-BSD FMLA CKD-EPI: 94 ML/MIN/1.73 M 2
GLOBULIN SER CALC-MCNC: 3.1 G/DL (ref 1.9–3.5)
GLUCOSE SERPL-MCNC: 134 MG/DL (ref 65–99)
HCT VFR BLD AUTO: 41.8 % (ref 42–52)
HGB BLD-MCNC: 14.2 G/DL (ref 14–18)
MAGNESIUM SERPL-MCNC: 2.3 MG/DL (ref 1.5–2.5)
MCH RBC QN AUTO: 29.6 PG (ref 27–33)
MCHC RBC AUTO-ENTMCNC: 34 G/DL (ref 32.3–36.5)
MCV RBC AUTO: 87.1 FL (ref 81.4–97.8)
PHOSPHATE SERPL-MCNC: 3 MG/DL (ref 2.5–4.5)
PLATELET # BLD AUTO: 312 K/UL (ref 164–446)
PMV BLD AUTO: 9.4 FL (ref 9–12.9)
POTASSIUM SERPL-SCNC: 3.6 MMOL/L (ref 3.6–5.5)
PROT SERPL-MCNC: 5.7 G/DL (ref 6–8.2)
RBC # BLD AUTO: 4.8 M/UL (ref 4.7–6.1)
SODIUM SERPL-SCNC: 137 MMOL/L (ref 135–145)
WBC # BLD AUTO: 10.5 K/UL (ref 4.8–10.8)

## 2025-04-03 PROCEDURE — 700102 HCHG RX REV CODE 250 W/ 637 OVERRIDE(OP): Performed by: INTERNAL MEDICINE

## 2025-04-03 PROCEDURE — 700111 HCHG RX REV CODE 636 W/ 250 OVERRIDE (IP): Mod: JZ | Performed by: STUDENT IN AN ORGANIZED HEALTH CARE EDUCATION/TRAINING PROGRAM

## 2025-04-03 PROCEDURE — 700102 HCHG RX REV CODE 250 W/ 637 OVERRIDE(OP): Performed by: HOSPITALIST

## 2025-04-03 PROCEDURE — 97163 PT EVAL HIGH COMPLEX 45 MIN: CPT

## 2025-04-03 PROCEDURE — 700111 HCHG RX REV CODE 636 W/ 250 OVERRIDE (IP): Performed by: INTERNAL MEDICINE

## 2025-04-03 PROCEDURE — 94669 MECHANICAL CHEST WALL OSCILL: CPT

## 2025-04-03 PROCEDURE — 80053 COMPREHEN METABOLIC PANEL: CPT

## 2025-04-03 PROCEDURE — A9270 NON-COVERED ITEM OR SERVICE: HCPCS | Performed by: HOSPITALIST

## 2025-04-03 PROCEDURE — 84100 ASSAY OF PHOSPHORUS: CPT

## 2025-04-03 PROCEDURE — A9270 NON-COVERED ITEM OR SERVICE: HCPCS | Performed by: INTERNAL MEDICINE

## 2025-04-03 PROCEDURE — A9270 NON-COVERED ITEM OR SERVICE: HCPCS | Performed by: STUDENT IN AN ORGANIZED HEALTH CARE EDUCATION/TRAINING PROGRAM

## 2025-04-03 PROCEDURE — 85027 COMPLETE CBC AUTOMATED: CPT

## 2025-04-03 PROCEDURE — 700102 HCHG RX REV CODE 250 W/ 637 OVERRIDE(OP): Performed by: STUDENT IN AN ORGANIZED HEALTH CARE EDUCATION/TRAINING PROGRAM

## 2025-04-03 PROCEDURE — 97167 OT EVAL HIGH COMPLEX 60 MIN: CPT

## 2025-04-03 PROCEDURE — 83735 ASSAY OF MAGNESIUM: CPT

## 2025-04-03 PROCEDURE — 770001 HCHG ROOM/CARE - MED/SURG/GYN PRIV*

## 2025-04-03 PROCEDURE — 97535 SELF CARE MNGMENT TRAINING: CPT

## 2025-04-03 PROCEDURE — 99232 SBSQ HOSP IP/OBS MODERATE 35: CPT | Performed by: HOSPITALIST

## 2025-04-03 RX ORDER — AMIODARONE HYDROCHLORIDE 200 MG/1
200 TABLET ORAL DAILY
Status: DISCONTINUED | OUTPATIENT
Start: 2025-04-09 | End: 2025-04-09 | Stop reason: HOSPADM

## 2025-04-03 RX ADMIN — ROPINIROLE HYDROCHLORIDE 4 MG: 2 TABLET, FILM COATED ORAL at 12:48

## 2025-04-03 RX ADMIN — SODIUM BICARBONATE 1300 MG: 650 TABLET ORAL at 08:55

## 2025-04-03 RX ADMIN — SODIUM BICARBONATE 1300 MG: 650 TABLET ORAL at 21:10

## 2025-04-03 RX ADMIN — GUAIFENESIN 200 MG: 100 LIQUID ORAL at 05:30

## 2025-04-03 RX ADMIN — AMANTADINE HYDROCHLORIDE 100 MG: 100 TABLET ORAL at 17:38

## 2025-04-03 RX ADMIN — LANSOPRAZOLE 30 MG: 30 TABLET, ORALLY DISINTEGRATING, DELAYED RELEASE ORAL at 05:30

## 2025-04-03 RX ADMIN — OXYBUTYNIN CHLORIDE 5 MG: 5 TABLET ORAL at 05:30

## 2025-04-03 RX ADMIN — ROPINIROLE HYDROCHLORIDE 4 MG: 2 TABLET, FILM COATED ORAL at 05:29

## 2025-04-03 RX ADMIN — GUAIFENESIN 200 MG: 100 LIQUID ORAL at 12:47

## 2025-04-03 RX ADMIN — AMIODARONE HYDROCHLORIDE 400 MG: 200 TABLET ORAL at 17:37

## 2025-04-03 RX ADMIN — FUROSEMIDE 40 MG: 10 INJECTION, SOLUTION INTRAVENOUS at 17:37

## 2025-04-03 RX ADMIN — SODIUM BICARBONATE 1300 MG: 650 TABLET ORAL at 14:53

## 2025-04-03 RX ADMIN — SENNOSIDES AND DOCUSATE SODIUM 2 TABLET: 50; 8.6 TABLET ORAL at 17:37

## 2025-04-03 RX ADMIN — AMIODARONE HYDROCHLORIDE 400 MG: 200 TABLET ORAL at 05:30

## 2025-04-03 RX ADMIN — GABAPENTIN 200 MG: 100 CAPSULE ORAL at 17:36

## 2025-04-03 RX ADMIN — ROPINIROLE HYDROCHLORIDE 4 MG: 2 TABLET, FILM COATED ORAL at 17:35

## 2025-04-03 RX ADMIN — POTASSIUM BICARBONATE 25 MEQ: 978 TABLET, EFFERVESCENT ORAL at 05:29

## 2025-04-03 RX ADMIN — AMANTADINE HYDROCHLORIDE 100 MG: 100 TABLET ORAL at 05:29

## 2025-04-03 RX ADMIN — MIDODRINE HYDROCHLORIDE 10 MG: 5 TABLET ORAL at 05:30

## 2025-04-03 RX ADMIN — FUROSEMIDE 40 MG: 10 INJECTION, SOLUTION INTRAVENOUS at 05:30

## 2025-04-03 RX ADMIN — CARBIDOPA AND LEVODOPA 2 TABLET: 25; 250 TABLET ORAL at 08:55

## 2025-04-03 RX ADMIN — ENOXAPARIN SODIUM 40 MG: 100 INJECTION SUBCUTANEOUS at 17:38

## 2025-04-03 RX ADMIN — ASPIRIN 81 MG: 81 TABLET, CHEWABLE ORAL at 05:30

## 2025-04-03 RX ADMIN — CARBIDOPA AND LEVODOPA 2 TABLET: 25; 250 TABLET ORAL at 17:36

## 2025-04-03 RX ADMIN — POTASSIUM BICARBONATE 25 MEQ: 978 TABLET, EFFERVESCENT ORAL at 17:36

## 2025-04-03 RX ADMIN — Medication 5 MG: at 21:11

## 2025-04-03 RX ADMIN — MIDODRINE HYDROCHLORIDE 10 MG: 5 TABLET ORAL at 14:53

## 2025-04-03 RX ADMIN — MIDODRINE HYDROCHLORIDE 10 MG: 5 TABLET ORAL at 21:11

## 2025-04-03 RX ADMIN — CARBIDOPA AND LEVODOPA 2 TABLET: 25; 250 TABLET ORAL at 21:11

## 2025-04-03 RX ADMIN — CARBIDOPA AND LEVODOPA 2 TABLET: 25; 250 TABLET ORAL at 12:47

## 2025-04-03 RX ADMIN — GUAIFENESIN 200 MG: 100 LIQUID ORAL at 17:36

## 2025-04-03 RX ADMIN — OXYBUTYNIN CHLORIDE 5 MG: 5 TABLET ORAL at 17:37

## 2025-04-03 ASSESSMENT — ACTIVITIES OF DAILY LIVING (ADL): TOILETING: REQUIRES ASSIST

## 2025-04-03 ASSESSMENT — COGNITIVE AND FUNCTIONAL STATUS - GENERAL
SUGGESTED CMS G CODE MODIFIER DAILY ACTIVITY: CM
CLIMB 3 TO 5 STEPS WITH RAILING: TOTAL
DRESSING REGULAR UPPER BODY CLOTHING: A LOT
STANDING UP FROM CHAIR USING ARMS: A LOT
HELP NEEDED FOR BATHING: TOTAL
TURNING FROM BACK TO SIDE WHILE IN FLAT BAD: A LOT
TOILETING: TOTAL
SUGGESTED CMS G CODE MODIFIER MOBILITY: CL
WALKING IN HOSPITAL ROOM: TOTAL
EATING MEALS: TOTAL
MOVING FROM LYING ON BACK TO SITTING ON SIDE OF FLAT BED: A LOT
PERSONAL GROOMING: A LOT
DAILY ACTIVITIY SCORE: 8
MOBILITY SCORE: 10
DRESSING REGULAR LOWER BODY CLOTHING: TOTAL
MOVING TO AND FROM BED TO CHAIR: A LOT

## 2025-04-03 ASSESSMENT — PAIN DESCRIPTION - PAIN TYPE
TYPE: ACUTE PAIN

## 2025-04-03 ASSESSMENT — FIBROSIS 4 INDEX: FIB4 SCORE: 3.2

## 2025-04-03 ASSESSMENT — GAIT ASSESSMENTS: GAIT LEVEL OF ASSIST: UNABLE TO PARTICIPATE

## 2025-04-03 NOTE — CARE PLAN
The patient is Stable - Low risk of patient condition declining or worsening    Shift Goals  Clinical Goals: Hemodynamic stability, q2 turns, tube feed  Patient Goals: Rest  Family Goals: Updates    Progress made toward(s) clinical / shift goals:    Problem: Pain - Standard  Goal: Alleviation of pain or a reduction in pain to the patient’s comfort goal  Outcome: Progressing     Problem: Knowledge Deficit - Standard  Goal: Patient and family/care givers will demonstrate understanding of plan of care, disease process/condition, diagnostic tests and medications  Outcome: Progressing     Problem: Hemodynamics  Goal: Patient's hemodynamics, fluid balance and neurologic status will be stable or improve  Outcome: Progressing     Problem: Fluid Volume  Goal: Fluid volume balance will be maintained  Outcome: Progressing     Problem: Urinary - Renal Perfusion  Goal: Ability to achieve and maintain adequate renal perfusion and functioning will improve  Outcome: Progressing     Problem: Respiratory  Goal: Patient will achieve/maintain optimum respiratory ventilation and gas exchange  Outcome: Progressing     Problem: Physical Regulation  Goal: Diagnostic test results will improve  Outcome: Progressing  Goal: Signs and symptoms of infection will decrease  Outcome: Progressing     Problem: Skin Integrity  Goal: Skin integrity is maintained or improved  Outcome: Progressing     Problem: Fall Risk  Goal: Patient will remain free from falls  Outcome: Progressing

## 2025-04-03 NOTE — PROGRESS NOTES
"Report received from [facility] RN  Assumed care of patient at 0715.    Pt is Alert, but orientation is hard to obtain as pt is non verbal at baseline. Pt can verbalize \"yes\" and \"no\" and intermittently gestures appropriately. Pt follows commands. BUE 5/5 with some stiffness to movement. BLE 4/5 with stiffness. Pt is very fatigued and has a flat expression. Per pt's wife, he is not at his baseline and has been declining throughout the admission. Wife was educated on hospital delirium. Windows were opened and pt was engaged in activity to prevent sleeping during the day. Dr Holliday was updated on wife's concern.   Pain was denied by pt.   Nausea denied by pt.   Tolerating TF with protein mixture via NGT to rt nare. NPO at this time. Wife is aware, but she became very frustrated as dysphagia is the pt's baseline. Wife hoping for ENT consult and increased SLP. Dr Holliday was made aware. Per pt, wife has been providing him with PO liquids when RN not present. Wife denies this and verbalizes understanding of NPO status. Bedside swallow eval completed and failed. Pt does tolerate moist swabs. Oral care completed frequently.   + Urine output via condom catheter - site is clean dry and intact at this time  + BM    + Flatus  Up x 2 assist to chair. Pt was assisted to chair by physical therapy. Pt completes ROM exercises and is Q2 turns.   SCD's in place and on  Wife is at bedside and assists with care. She is very supportive and eager to engage the patient.   Wife requesting that CM reach out to her regarding DC plan and potential facilities. CM was updated.   Wife hoping to transfer to neuro unit today. Dr Holliday was made aware.   Bed in lowest position and locked.  Bed alarm and chair alarm in use.   Pt resting comfortably now.  Review plan of care with patient and wife  Call light within reach  Hourly rounds in place  All needs met at this time  Shift rounding completed with Dr Holliday. Presentation, POC, medications, continued " fatigue/flat affect, etc were discussed.

## 2025-04-03 NOTE — THERAPY
Physical Therapy   Initial Evaluation     Patient Name: Jeremiah Huber  Age:  74 y.o., Sex:  male  Medical Record #: [id number]  Today's Date: 4/3/2025     Precautions  Precautions: Fall Risk;Swallow Precautions;Nasogastric Tube  Comments: PD, non-verbal    Assessment  Patient is a 74 y.o. male who was admitted with sepsis and acute hypoxic respiratory failure. CT showed R pleural effusion, s/p chest tube now removed. Pt extubated 3/31. PMH significant for Parkinson's disease, HTN, dysphagia, CVA, MI, cardiomyopathy, B TKA.     Pt received in bed and agreeable to PT evaluation. Wife at bedside provided with hx and PLOF. Pt is non-verbal at baseline, but able to follow commands. Pt presents with functional mobility below baseline level secondary to generalized weakness with RLE more impaired than LLE today and at baseline. Pt required max A of 2 for bed mobility and transfer this session. Pt typically uses a power scooter to mobilize and is independent with transfers. Pt would benefit from post-acute placement for further rehab prior to return home. Will follow for acute PT to progress.    Plan    Physical Therapy Initial Treatment Plan   Treatment Plan : Bed Mobility, Neuro Re-Education / Balance, Self Care / Home Evaluation, Therapeutic Activities, Therapeutic Exercise, Family / Caregiver Training  Treatment Frequency: 3 Times per Week  Duration: Until Therapy Goals Met    DC Equipment Recommendations: Unable to determine at this time  Discharge Recommendations: Recommend post-acute placement for additional physical therapy services prior to discharge home     Subjective    Pt non-verbal at baseline.     Objective       04/03/25 1215   Precautions   Precautions Fall Risk;Swallow Precautions   Comments PD, non-verbal   Vitals   Pulse Oximetry 95 %   O2 (LPM) 2   O2 Delivery Device Silicone Nasal Cannula   Vitals Comments /62 in sitting   Pain 0 - 10 Group   Therapist Pain Assessment Post Activity Pain Same as  Prior to Activity;Nurse Notified;0   Prior Living Situation   Prior Services Continuous (24 Hour) Care Giving Family   Housing / Facility 1 Story House   Steps Into Home 0   Steps In Home 0   Equipment Owned Scooter;Power Wheel Chair;Ramp   Lives with - Patient's Self Care Capacity Spouse   Comments Pt lives with his wife, who provides assist as needed.   Prior Level of Functional Mobility   Comments Pt typically is independent with transfers to his power scooter and standing to use a urinal. Pt uses the scooter for all mobility. Pt recently had setback due to a medication, requiring assist with transfers and had gotten close to normal prior to this hospitalization per wife.   History of Falls   History of Falls No   Cognition    Speech/ Communication Unable to Communicate   Level of Consciousness Alert   Ability To Follow Commands 1 Step   Comments Pt non-verbal x2 years per wife. Can mouth words and give thumbs up motion. Follows simple commands appropriately.   Passive ROM Lower Body   Passive ROM Lower Body WDL   Active ROM Lower Body    Comments Limited by weakness/rigidity   Strength Lower Body   Comments BLE grossly 3- to 3/5 with R more impaired due to tone   Sensation Lower Body   Comments Denies sensory changes   Lower Body Muscle Tone   Comments BLE rigid and hypertonic   Neurological Concerns   Sitting Posture During ADL's Lateral Lean Right   Coordination Lower Body    Comments Impaired R>L   Balance Assessment   Sitting Balance (Static) Fair -   Sitting Balance (Dynamic) Poor   Standing Balance (Static) Trace +   Standing Balance (Dynamic) Trace   Weight Shift Sitting Poor   Weight Shift Standing Poor   Comments HHA x2, shuffled steps. R lean in sitting, wife reports this is typical   Bed Mobility    Supine to Sit Maximal Assist   Scooting Maximal Assist   Rolling Maximal Assist to Rt.   Comments HOB elevated, extra time   Gait Analysis   Gait Level Of Assist Unable to Participate   Functional Mobility    Sit to Stand Maximal Assist   Bed, Chair, Wheelchair Transfer Maximal Assist   Transfer Method Stand Step   Mobility bed>chair   Comments 2P HHA, able to take small shuffled steps to the chair   6 Clicks Assessment - How much HELP from from another person do you currently need... (If the patient hasn't done an activity recently, how much help from another person do you think he/she would need if he/she tried?)   Turning from your back to your side while in a flat bed without using bedrails? 2   Moving from lying on your back to sitting on the side of a flat bed without using bedrails? 2   Moving to and from a bed to a chair (including a wheelchair)? 2   Standing up from a chair using your arms (e.g., wheelchair, or bedside chair)? 2   Walking in hospital room? 1   Climbing 3-5 steps with a railing? 1   6 clicks Mobility Score 10   Short Term Goals    Short Term Goal # 1 Pt will complete bed mobility with supervision from a flat bed to progress function in 6 visits.   Short Term Goal # 2 Pt will transfer with FWW and min A to progress function in 6 visits.   Education Group   Education Provided Role of Physical Therapist   Role of Physical Therapist Patient Response Patient;Acceptance;Explanation;Verbal Demonstration   Physical Therapy Initial Treatment Plan    Treatment Plan  Bed Mobility;Neuro Re-Education / Balance;Self Care / Home Evaluation;Therapeutic Activities;Therapeutic Exercise;Family / Caregiver Training   Treatment Frequency 3 Times per Week   Duration Until Therapy Goals Met   Problem List    Problems Impaired Bed Mobility;Impaired Transfers;Functional Strength Deficit;Impaired Balance;Decreased Activity Tolerance   Anticipated Discharge Equipment and Recommendations   DC Equipment Recommendations Unable to determine at this time   Discharge Recommendations Recommend post-acute placement for additional physical therapy services prior to discharge home   Interdisciplinary Plan of Care Collaboration    IDT Collaboration with  Nursing;Family / Caregiver;Occupational Therapist   Patient Position at End of Therapy Seated;Chair Alarm On;Call Light within Reach;Tray Table within Reach;Family / Friend in Room   Collaboration Comments RN updated   Session Information   Date / Session Number  4/3-1 (1/3, 4/9)     Patient seen for team evaluation with Occupational Therapist for the following reason(s):  Patient required 2 person assistance for safety and to provide effective interventions. Each discipline assisted patient with appropriate and separate goals. Due to the medical complexity, the skill of both practitioners is needed to monitor vitals, patient status, and adjust the intervention to fit the patient's needs and goals.

## 2025-04-03 NOTE — DIETARY
"Nutrition Services Weekly Nutrition Support Update     Day 7 of admit. Jeremiah Huber is a 74 y.o. male with admitting DX of Pneumonia due to infectious organism [J18.9]    Tube feeding initiated on 3/28/25. Current TF via NGT is Jevity 1.2, goal rate 70 ml/hr, providing 2016 kcals, 93 grams protein, 1356 mL free water in 24 hrs.       Nutrition Assessment:      Height: 185.4 cm (6' 1\")  Weight: 92.1 kg (203 lb 0.7 oz)  Weight to Use in Calculations: 86.1 kg (189 lb 13.1 oz)  Ideal Body Weight: 83.5 kg (184 lb)  Weight taken via bed scale  Body mass index is 26.79 kg/m². BMI classification: Overweight.    Weight trend: +6 kg from admit wt; pt w/ generalized trace edema; I/Os shows +2.8L since admit. Wt to use in calculations: 86.1 kg    REE with MSJ 1666 x 1.2 = 2000 kcal  Total Calories / day: 2000 - 2200 Calories / k.2 - 25.5   Total Grams Protein / day: 108-120 Grams Protein / k.25-1.4    Re-estimation of nutrition needs as indicated: indicated due to increased protein needs to support DTPI healing    Objective:  Enteral feeds Jevity 1.2 continue at goal rate 70 mL/hr continuous  Per last SLP assessment 25, FEES completed, found w/ moderate-severe oropharyngeal dysphagia; NPO w/ alternate means of nutrition/hydration recommended by SLP.  Pertinent labs: glucose 134, Ca 8.1, corrected Ca 9.2, albumin 2.6, pre-albumin (3/29/25)=<3.0, CRP (3/29/25)=37.50  Pertinent meds: sinemet, lasix, prevacid, midodrine, Klyte, NaBicarb  Skin/wounds: DTPI to buttocks from a bed pan per Wound Team note, initially evaluated by Wound Team 3/29/25   Last BM 4/3, type 5    Current feeding: continue current TF regimen; add TID beneprotein flushes to meet increased protein needs w/ DTPI     Current diet order:   NPO w/ TF     Nutrition Diagnosis:      Inadequate Oral Intake related to intubation as evidenced by patient NPO with need for tube feeding.   Resolved - pt extubated    (New 4/3/25): Difficulty swallowing r/t " moderate-severe oropharyngeal dysphagia AEB pt is NPO per SLP assessment.    Nutrition Interventions:      Continue Jevity 1.2 at goal rate 70 mL/hr continuous feeds, providing  in 24 hrs 2016 kcals, 93 grams protein, 1356 mL free water,   + Beneprotein, 3 packets/d, providing 75 kcals, 18 grams (gm protein). Total kcal and protein from TF + TID Beneprotein is 2091 kcal and 111 gm protein.  Add 1 packet Beneprotein to  ml warm water. Mix and administer by syringe through feeding tube. Flush afterwards with a minimum of 30-60 ml water.   Additional fluids per provider.  Patient aware of active plan of care as appropriate.     Nutrition Monitoring and Evaluation:     Monitor GOC/POC.  Monitor weight trends, wound progression.      RD following and will provide updated recommendations as indicated.

## 2025-04-03 NOTE — PROGRESS NOTES
"Hospital Medicine Daily Progress Note    Date of Service  4/3/2025    Chief Complaint  Abdominal pain RUQ x1 week.    Hospital Course  Farhat Huber is a 74-year-old male with a past medical history significant for Parkinson, hypertension, dysphagia, GERD, history of CVA (due to LV thrombus) on 6/2022.  He presented to the ER with a complaint of abdominal pain that has been going on for the last few days .    Presents in ER, patient noted to have WBC of 17.7, AST/ALT normal, lipase 19, procalcitonin 0.5.  Blood culture x 2 was ordered, patient was started on antibiotics for aspiration pneumonia including Unasyn and azithromycin.  Ultrasound of the abdomen showed stones/sludge of gallbladder, no evidence of acute cholecystitis CBD obstruction, and multiple liver cysts. A CT chest showed large right pleural effusion.     Echocardiogram showing EF of 60%, akinesis of the apex.      Interval Problem Update  4/3/25:  Remains non-verbal but nods head to questions.  Weak.  I have Voalte his prior neurologist Dr Mclain for any possible medication change.  Yesterday I reached out to Dr Tc Gregorio for request to review for further input on the patient and he replied with \" I don't have anything concretely to add for patient.\"    Consultants/Specialty  critical care    Code Status  DNAR, I OK    Disposition  The patient is not medically cleared for discharge to home or a post-acute facility.      I have placed the appropriate orders for post-discharge needs.    Review of Systems  Review of Systems   Unable to perform ROS: Mental acuity        Physical Exam  Temp:  [36.2 °C (97.2 °F)-37.2 °C (99 °F)] 36.3 °C (97.4 °F)  Pulse:  [67-82] 71  Resp:  [20-31] 29  BP: ()/(55-81) 114/71  SpO2:  [96 %-100 %] 96 %    Physical Exam  Vitals reviewed.   Constitutional:       Appearance: He is ill-appearing.   HENT:      Head: Normocephalic.      Mouth/Throat:      Pharynx: Oropharyngeal exudate present.   Eyes:      General:    "      Right eye: No discharge.         Left eye: No discharge.      Conjunctiva/sclera: Conjunctivae normal.   Cardiovascular:      Rate and Rhythm: Normal rate and regular rhythm.      Heart sounds: Normal heart sounds. No murmur heard.  Pulmonary:      Effort: Pulmonary effort is normal. No respiratory distress.      Breath sounds: Examination of the right-lower field reveals decreased breath sounds. Decreased breath sounds present.   Abdominal:      General: Bowel sounds are normal. There is no distension.   Musculoskeletal:      Cervical back: No tenderness.      Right lower leg: No edema.      Left lower leg: No edema.   Lymphadenopathy:      Cervical: No cervical adenopathy.   Skin:     General: Skin is warm.      Coloration: Skin is pale.   Neurological:      Mental Status: He is alert.      Cranial Nerves: Cranial nerve deficit present.      Motor: Weakness present.      Coordination: Coordination abnormal.         Fluids    Intake/Output Summary (Last 24 hours) at 4/3/2025 0731  Last data filed at 4/3/2025 0600  Gross per 24 hour   Intake 260 ml   Output 4450 ml   Net -4190 ml        Laboratory  Recent Labs     04/01/25  0526 04/02/25  0532 04/03/25  0245   WBC 12.8* 10.7 10.5   RBC 4.33* 4.63* 4.80   HEMOGLOBIN 12.7* 13.5* 14.2   HEMATOCRIT 38.4* 40.6* 41.8*   MCV 88.7 87.7 87.1   MCH 29.3 29.2 29.6   MCHC 33.1 33.3 34.0   RDW 43.6 43.2 43.3   PLATELETCT 310 298 312   MPV 9.4 8.9* 9.4     Recent Labs     04/01/25  0526 04/02/25  0532 04/02/25  1449 04/03/25  0245   SODIUM 138 140  --  137   POTASSIUM 2.9* 2.9* 3.5* 3.6   CHLORIDE 99 97  --  98   CO2 29 31  --  30   GLUCOSE 214* 146*  --  134*   BUN 20 20  --  17   CREATININE 0.67 0.81  --  0.80   CALCIUM 7.7* 8.0*  --  8.1*                   Imaging  DX-CHEST-PORTABLE (1 VIEW)   Final Result         1.  Pulmonary edema and/or infiltrates, stable since prior study.   2.  Small bilateral pleural effusions, right greater than left      DX-CHEST-PORTABLE (1  VIEW)   Final Result         1.  Pulmonary edema and/or infiltrates, stable since prior study.   2.  Small right pleural effusion      DX-CHEST-PORTABLE (1 VIEW)   Final Result         1. Slowly improving moderate right and mild left base atelectasis.      2. Right pleural pigtail drain remains. No pneumothorax.      3. NG tube                     DX-ABDOMEN FOR TUBE PLACEMENT   Final Result      Enteric feeding tube terminates in the left abdomen over the expected location of the stomach.      DX-CHEST-PORTABLE (1 VIEW)   Final Result         1. Right pleural pigtail drain. No pneumothorax.      2. Persistent moderate right lower lobe opacity.      3. Lines and tubes without visible complication.                     EC-ECHOCARDIOGRAM LTD W/ CONT   Final Result      DX-CHEST-PORTABLE (1 VIEW)   Final Result         1. Right pleural pigtail drain. Some of the right lung opacity with parenchymal and pleural opacity remaining. No pneumothorax.      2. ETT 5 cm above yaa. NG tube in stomach.                     CT-HEAD W/O   Final Result      1.  No evidence of acute intracranial process.      2.  Cerebral atrophy as well as periventricular chronic small vessel ischemic change.               DX-CHEST-FOR LINE PLACEMENT Perform procedure in: Patient's Room   Final Result         1. Appropriately positioned endotracheal tube.   2. Interval improvement in aeration of the lung bases.      CT-CTA CHEST PULMONARY ARTERY W/ RECONS   Final Result      1.  Very large right pleural effusion causing collapse of the right lung.      2.  No CT evidence of pulmonary emboli.      3.  Multiple hepatic cysts.            DX-CHEST-PORTABLE (1 VIEW)   Final Result         1. Increased large amount of right-sided airspace disease with associated small to moderate right pleural effusion.   2. Stable left basilar airspace disease versus atelectasis.      CT-ABDOMEN-PELVIS W/O   Final Result      1.  No evidence of bowel obstruction or  focal inflammatory change.      2.  Volume loss and consolidation within the right lung base with loculated right pleural effusion.      3.  Atelectasis within the left lung base.      4.  Constipation. Numerous sigmoid diverticulosis.      5.  Hepatic cysts.      6.  Gallstones.      NM-BILIARY (HIDA) SCAN WITH CCK   Final Result      No scintigraphic evidence of cholecystitis, biliary obstruction or other worrisome finding.      US-RUQ   Final Result      1.  Stones and sludge in the gallbladder.   2.  No evidence for acute cholecystitis or biliary obstruction.   3.  Multiple liver cysts again seen.   4.  Limited evaluation of pancreas and abdominal aorta.      DX-CHEST-PORTABLE (1 VIEW)   Final Result      Hypoinflation with bibasilar atelectasis.  Superimposed pneumonia is difficult to exclude.           Assessment/Plan  * Pneumonia due to infectious organism- (present on admission)  Assessment & Plan  4/3/25:  Presenting with right upper quadrant pain, productive cough with clear sputum and fever at home  CXR reviewed, opacity on the right.  History of dysphagia due to Parkinson's, suspect aspiration pneumonia  S/P unasyn, azithro, zyvox  3/28/25 Procalcitonin:1.45    Hypokalemia  Assessment & Plan  4/3/25:  4/2 K:2.9  4/3: 3.6  Replace and monitor  Monitor labs    Paroxysmal atrial fibrillation (HCC)  Assessment & Plan  4/3/25  HR:71  Amiodarone 400mg BID with plan to go to a maintenance dose    Acute respiratory failure with hypoxia (HCC)  Assessment & Plan  4/3/25:  Concern of ongoing aspiration risk given advancing [facility]  Has a NG tube for enteral feeding currently  Aspiration precautions and SLP  Titrate O2 to keep SpO2 >90  Currently down to 2-4L NC    Pleural effusion, right  Assessment & Plan  4/3/25:  Noted on chest x-ray and prior CT scan this admit  4/2 CXR still showing right pleural effusion and possible pulmonary edema  4/3 CXR unchanged  S/p Chest tube      Advance care planning  Assessment &  Plan  4/3/25:  Patient is nonverbal . Patient wife who is poa and nursing staff were present for the conversation.   Discussed need to consider further advanced care planning if he is not improving from inability to protect his airway.  I feel he is at risk of secretion aspirations even if we placed a PEG tube.  Code status changed to dnar/I okay    Right upper quadrant abdominal pain- (present on admission)  Assessment & Plan  4/3/25:  No current chest pain  LFTs unremarkable,ush showing gallstone   Hida  normal , likley chest pain is from pleurisy    History of stroke- (present on admission)  Assessment & Plan  4/3:  Continue home aspirin, atorvastatin    Parkinson disease (HCC)- (present on admission)  Assessment & Plan  4/3/25:  Continue carbidopa-levodopa  Nonverbal at baseline, uses a wheelchair but able to stand to urinate on his own and does not use a catheter  NPO, speech following (failed FEEs)  At high risk of aspiration even with enteral feeding given Parkisons  Dr Tc Gregorio, neurologist, states nothing more to add  I have reached out to his outpatient neurologist as well  I discussed 4/2 with wife and patient End of life considerations.  May need near future Hospice given his severe dysphagia in the interim let us see if any improvement with Speech therapy      Restless leg syndrome  Assessment & Plan  Continue ropinirole    Other hyperlipidemia- (present on admission)  Assessment & Plan  4/3/25:  Continue atorvastatin         VTE prophylaxis:   SCDs/TEDs      I have performed a physical exam and reviewed and updated ROS and Plan today (4/3/2025). In review of yesterday's note (4/2/2025), there are no changes except as documented above.

## 2025-04-03 NOTE — THERAPY
Occupational Therapy   Initial Evaluation     Patient Name: Jeremiah Huber  Age:  74 y.o., Sex:  male  Medical Record #: [id number]  Today's Date: 4/3/2025       Precautions:  Fall Risk, Swallow Precautions, Nasogastric Tube  Comments:  PD    Assessment  Patient is 74 y.o. male with a past medical history significant for Parkinson, hypertension, dysphagia, GERD, history of CVA (due to LV thrombus) on 6/2022. He presented to the ER with a complaint of abdominal pain that has been going on for the last few days, admitted d/t sepsis with acute hypoxic respiratory failure. A CT chest showed large right pleural effusion, pt was intubated with chest tube, extubated 3/31.   Pt seen for OT eval. Pt nonverbal at baseline, wife present and provided all PLOF, assists pt at baseline for majority of ADLs, pt uses power scooter at baseline, usually can stand/pivot transfer and stand to use urinal on his own. Pt required maxAx2 for functional transfer to chair, RUE more impaired than LUE at baseline. Pt required maxA for majority of ADLs. Discussed with pt and spouse, POC, recommendations. Pt and spouse agreeable to short term rehab. Will continue to benefit from inpt OT      Plan    Occupational Therapy Initial Treatment Plan   Treatment Interventions: (P) Self Care / Activities of Daily Living, Adaptive Equipment, Neuro Re-Education / Balance, Therapeutic Exercises, Therapeutic Activity  Treatment Frequency: (P) 3 Times per Week  Duration: (P) Until Therapy Goals Met    DC Equipment Recommendations: (P) Unable to determine at this time  Discharge Recommendations: (P) Recommend post-acute placement for additional occupational therapy services prior to discharge home     Subjective    Pt nodding head appropriately and agreeable to OT eval      Objective       04/03/25 1219   Prior Living Situation   Prior Services Continuous (24 Hour) Care Giving Family   Housing / Facility 1 Story House   Steps Into Home   (ramp)   Bathroom Set  up Walk In Shower;Built-In Shower Chair;Grab Bars   Equipment Owned Grab Bar(s) In Tub / Shower;Raised Toilet Seat With Arms;Tub / Shower Seat;Hand Held Shower;Ramp;Power Wheel Chair;Scooter  (power scooter)   Lives with - Patient's Self Care Capacity Spouse;Adult Children   Comments Pt lives with spouse who provides assist as needed, assist for majority of ADLs and transfers. pt usually uses power scooter that can fit through doorways at home   Prior Level of ADL Function   Self Feeding Independent   Grooming / Hygiene Requires Assist   Bathing Requires Assist   Dressing Requires Assist   Toileting Requires Assist   Prior Level of IADL Function   Comments dep for IADLs   Precautions   Precautions Fall Risk;Swallow Precautions;Nasogastric Tube   Comments PD   Vitals   Pulse 84   Patient BP Position Sitting   Blood Pressure  98/61   Pulse Oximetry 95 %   O2 (LPM) 2   O2 Delivery Device Silicone Nasal Cannula   Pain   Intervention Declines   Pain 0 - 10 Group   Therapist Pain Assessment During Activity;Post Activity Pain Same as Prior to Activity;Nurse Notified  (did not indicate pain)   Non Verbal Descriptors   Non Verbal Scale  Calm   Cognition    Cognition / Consciousness X   Speech/ Communication Unable to Communicate  (mouths words)   Level of Consciousness Alert   Ability To Follow Commands 1 Step   Comments pt nonverbal, mouths words, gives thumbs up, follows 1 step commands   Passive ROM Upper Body   Passive ROM Upper Body WDL   Active ROM Upper Body   Active ROM Upper Body  X   Dominant Hand Right   Rt Shoulder Flexion Degrees 90   Rt Elbow Flexion Degrees 90   Rt Hand Moderate Impaired   Comments RUE > impaired vs L at baseline d/t PD   Strength Upper Body   Upper Body Strength  X   Comments RUE impaired, grossly 2+/5, LUE 3+/5   Sensation Upper Body   Upper Extremity Sensation  Not Tested   Upper Body Muscle Tone   Upper Body Muscle Tone  X   Rt Upper Extremity Muscle Tone Hypertonic;Rigidity   Neurological  Concerns   Neurological Concerns Yes   Sitting Posture During ADL's Lateral Lean Right   Rt Upper Extremity Functional Use Impaired   Right In Hand Manipulation Impaired   Coordination Upper Body   Coordination X   Comments impaired R > L   Balance Assessment   Sitting Balance (Static) Fair -   Sitting Balance (Dynamic) Poor   Standing Balance (Static) Trace +   Standing Balance (Dynamic) Trace   Weight Shift Sitting Poor   Weight Shift Standing Poor   Comments HHA x2   Bed Mobility    Supine to Sit Maximal Assist   Rolling Maximal Assist to Rt.   Comments HOB elevated   ADL Assessment   Eating Total Assist  (NG)   Grooming Maximal Assist   Upper Body Dressing Maximal Assist   Lower Body Dressing Total Assist   Toileting Total Assist   Functional Mobility   Sit to Stand Maximal Assist   Bed, Chair, Wheelchair Transfer Maximal Assist   Transfer Method Stand Step   Mobility sup>sit EOB> STS> chair   Comments HHA x2   Visual Perception   Visual Perception  X   Comments difficulty following formal assessment, diminished vision in R eye   Edema / Skin Assessment   Edema / Skin  Not Assessed   Activity Tolerance   Sitting in Chair post session   Sitting Edge of Bed 5 min   Standing 1 min   Comments limited d/t weakness   Patient / Family Goals   Patient / Family Goal #1 per spouse to get up more   Short Term Goals   Short Term Goal # 1 Pt will complete toilet transfer modA   Short Term Goal # 2 Pt will complete seated grooming tasks SBA with set-up   Short Term Goal # 3 Pt will complete UB dressing Jarad   Education Group   Education Provided Role of Occupational Therapist;Activities of Daily Living;Home Safety   Role of Occupational Therapist Patient Response Patient;Significant Other;Acceptance;Explanation;Demonstration;Verbal Demonstration;Action Demonstration   Home Safety Patient Response Patient;Significant Other;Acceptance;Demonstration;Explanation;Verbal Demonstration;Action Demonstration   ADL Patient Response  Patient;Significant Other;Acceptance;Explanation;Demonstration;Verbal Demonstration;Action Demonstration   Occupational Therapy Initial Treatment Plan    Treatment Interventions Self Care / Activities of Daily Living;Adaptive Equipment;Neuro Re-Education / Balance;Therapeutic Exercises;Therapeutic Activity   Treatment Frequency 3 Times per Week   Duration Until Therapy Goals Met   Problem List   Problem List Decreased Active Daily Living Skills;Decreased Upper Extremity Strength Right;Decreased Upper Extremity Strength Left;Decreased Functional Mobility;Decreased Activity Tolerance;Decreased Upper Extremity AROM Right;Safety Awareness Deficits / Cognition;Impaired Posture / Trunk Alignment;Impaired Coordination Right Upper Extremity;Impaired Upper Extremity Tone Right;Impaired Postural Control / Balance   Anticipated Discharge Equipment and Recommendations   DC Equipment Recommendations Unable to determine at this time   Discharge Recommendations Recommend post-acute placement for additional occupational therapy services prior to discharge home   Interdisciplinary Plan of Care Collaboration   IDT Collaboration with  Nursing;Family / Caregiver   Patient Position at End of Therapy Seated;Chair Alarm On;Call Light within Reach;Tray Table within Reach   Collaboration Comments RN updated   Session Information   Date / Session Number  4/3, #1 (1/3, 4/9)       Patient seen for team evaluation with Physical Therapist for the following reason(s):  Patient required 2 person assistance for safety and to provide effective interventions. Each discipline assisted patient with appropriate and separate goals. Occupational Therapist facilitated ADLs, static sitting  while Physical Therapist simultaneously treated pt according to POC.

## 2025-04-04 PROBLEM — R13.19 OTHER DYSPHAGIA: Status: ACTIVE | Noted: 2025-01-01

## 2025-04-04 LAB
ALBUMIN SERPL BCP-MCNC: 2.9 G/DL (ref 3.2–4.9)
ALBUMIN/GLOB SERPL: 0.8 G/DL
ALP SERPL-CCNC: 109 U/L (ref 30–99)
ALT SERPL-CCNC: 96 U/L (ref 2–50)
ANION GAP SERPL CALC-SCNC: 11 MMOL/L (ref 7–16)
AST SERPL-CCNC: 44 U/L (ref 12–45)
BILIRUB SERPL-MCNC: 0.6 MG/DL (ref 0.1–1.5)
BUN SERPL-MCNC: 25 MG/DL (ref 8–22)
CALCIUM ALBUM COR SERPL-MCNC: 9.4 MG/DL (ref 8.5–10.5)
CALCIUM SERPL-MCNC: 8.5 MG/DL (ref 8.5–10.5)
CHLORIDE SERPL-SCNC: 99 MMOL/L (ref 96–112)
CO2 SERPL-SCNC: 28 MMOL/L (ref 20–33)
CREAT SERPL-MCNC: 0.82 MG/DL (ref 0.5–1.4)
ERYTHROCYTE [DISTWIDTH] IN BLOOD BY AUTOMATED COUNT: 44.2 FL (ref 35.9–50)
GFR SERPLBLD CREATININE-BSD FMLA CKD-EPI: 93 ML/MIN/1.73 M 2
GLOBULIN SER CALC-MCNC: 3.5 G/DL (ref 1.9–3.5)
GLUCOSE SERPL-MCNC: 170 MG/DL (ref 65–99)
HCT VFR BLD AUTO: 43.9 % (ref 42–52)
HGB BLD-MCNC: 14.5 G/DL (ref 14–18)
MAGNESIUM SERPL-MCNC: 2.5 MG/DL (ref 1.5–2.5)
MCH RBC QN AUTO: 29.1 PG (ref 27–33)
MCHC RBC AUTO-ENTMCNC: 33 G/DL (ref 32.3–36.5)
MCV RBC AUTO: 88.2 FL (ref 81.4–97.8)
PHOSPHATE SERPL-MCNC: 2.7 MG/DL (ref 2.5–4.5)
PLATELET # BLD AUTO: 353 K/UL (ref 164–446)
PMV BLD AUTO: 9.6 FL (ref 9–12.9)
POTASSIUM SERPL-SCNC: 3.7 MMOL/L (ref 3.6–5.5)
PROT SERPL-MCNC: 6.4 G/DL (ref 6–8.2)
RBC # BLD AUTO: 4.98 M/UL (ref 4.7–6.1)
SODIUM SERPL-SCNC: 138 MMOL/L (ref 135–145)
WBC # BLD AUTO: 11.2 K/UL (ref 4.8–10.8)

## 2025-04-04 PROCEDURE — 700102 HCHG RX REV CODE 250 W/ 637 OVERRIDE(OP): Performed by: INTERNAL MEDICINE

## 2025-04-04 PROCEDURE — 770006 HCHG ROOM/CARE - MED/SURG/GYN SEMI*

## 2025-04-04 PROCEDURE — 700102 HCHG RX REV CODE 250 W/ 637 OVERRIDE(OP): Performed by: STUDENT IN AN ORGANIZED HEALTH CARE EDUCATION/TRAINING PROGRAM

## 2025-04-04 PROCEDURE — A9270 NON-COVERED ITEM OR SERVICE: HCPCS | Performed by: HOSPITALIST

## 2025-04-04 PROCEDURE — 85027 COMPLETE CBC AUTOMATED: CPT

## 2025-04-04 PROCEDURE — 83735 ASSAY OF MAGNESIUM: CPT

## 2025-04-04 PROCEDURE — A9270 NON-COVERED ITEM OR SERVICE: HCPCS | Performed by: STUDENT IN AN ORGANIZED HEALTH CARE EDUCATION/TRAINING PROGRAM

## 2025-04-04 PROCEDURE — 99233 SBSQ HOSP IP/OBS HIGH 50: CPT | Performed by: INTERNAL MEDICINE

## 2025-04-04 PROCEDURE — 700102 HCHG RX REV CODE 250 W/ 637 OVERRIDE(OP): Performed by: HOSPITALIST

## 2025-04-04 PROCEDURE — 84100 ASSAY OF PHOSPHORUS: CPT

## 2025-04-04 PROCEDURE — 700111 HCHG RX REV CODE 636 W/ 250 OVERRIDE (IP): Performed by: INTERNAL MEDICINE

## 2025-04-04 PROCEDURE — 92612 ENDOSCOPY SWALLOW (FEES) VID: CPT

## 2025-04-04 PROCEDURE — 94669 MECHANICAL CHEST WALL OSCILL: CPT

## 2025-04-04 PROCEDURE — 36415 COLL VENOUS BLD VENIPUNCTURE: CPT

## 2025-04-04 PROCEDURE — 92526 ORAL FUNCTION THERAPY: CPT

## 2025-04-04 PROCEDURE — 80053 COMPREHEN METABOLIC PANEL: CPT

## 2025-04-04 PROCEDURE — 700111 HCHG RX REV CODE 636 W/ 250 OVERRIDE (IP): Mod: JZ | Performed by: STUDENT IN AN ORGANIZED HEALTH CARE EDUCATION/TRAINING PROGRAM

## 2025-04-04 PROCEDURE — 99497 ADVNCD CARE PLAN 30 MIN: CPT | Performed by: INTERNAL MEDICINE

## 2025-04-04 PROCEDURE — A9270 NON-COVERED ITEM OR SERVICE: HCPCS | Performed by: INTERNAL MEDICINE

## 2025-04-04 RX ORDER — GAUZE BANDAGE 2" X 2"
100 BANDAGE TOPICAL DAILY
Status: DISCONTINUED | OUTPATIENT
Start: 2025-04-05 | End: 2025-04-05

## 2025-04-04 RX ADMIN — ROPINIROLE HYDROCHLORIDE 4 MG: 2 TABLET, FILM COATED ORAL at 05:55

## 2025-04-04 RX ADMIN — GABAPENTIN 200 MG: 100 CAPSULE ORAL at 17:50

## 2025-04-04 RX ADMIN — CARBIDOPA AND LEVODOPA 2 TABLET: 25; 250 TABLET ORAL at 15:22

## 2025-04-04 RX ADMIN — SODIUM BICARBONATE 1300 MG: 650 TABLET ORAL at 15:22

## 2025-04-04 RX ADMIN — SODIUM BICARBONATE 1300 MG: 650 TABLET ORAL at 20:41

## 2025-04-04 RX ADMIN — GUAIFENESIN 200 MG: 100 LIQUID ORAL at 05:54

## 2025-04-04 RX ADMIN — AMANTADINE HYDROCHLORIDE 100 MG: 100 TABLET ORAL at 05:55

## 2025-04-04 RX ADMIN — POTASSIUM BICARBONATE 25 MEQ: 978 TABLET, EFFERVESCENT ORAL at 17:50

## 2025-04-04 RX ADMIN — OXYBUTYNIN CHLORIDE 5 MG: 5 TABLET ORAL at 05:55

## 2025-04-04 RX ADMIN — AMIODARONE HYDROCHLORIDE 400 MG: 200 TABLET ORAL at 17:50

## 2025-04-04 RX ADMIN — OXYBUTYNIN CHLORIDE 5 MG: 5 TABLET ORAL at 17:50

## 2025-04-04 RX ADMIN — SENNOSIDES AND DOCUSATE SODIUM 2 TABLET: 50; 8.6 TABLET ORAL at 17:50

## 2025-04-04 RX ADMIN — ROPINIROLE HYDROCHLORIDE 4 MG: 2 TABLET, FILM COATED ORAL at 12:05

## 2025-04-04 RX ADMIN — MIDODRINE HYDROCHLORIDE 10 MG: 5 TABLET ORAL at 20:41

## 2025-04-04 RX ADMIN — GUAIFENESIN 200 MG: 100 LIQUID ORAL at 17:49

## 2025-04-04 RX ADMIN — CARBIDOPA AND LEVODOPA 2 TABLET: 25; 250 TABLET ORAL at 20:41

## 2025-04-04 RX ADMIN — AMANTADINE HYDROCHLORIDE 100 MG: 100 TABLET ORAL at 17:50

## 2025-04-04 RX ADMIN — Medication 5 MG: at 20:41

## 2025-04-04 RX ADMIN — SODIUM BICARBONATE 1300 MG: 650 TABLET ORAL at 08:22

## 2025-04-04 RX ADMIN — POTASSIUM BICARBONATE 25 MEQ: 978 TABLET, EFFERVESCENT ORAL at 05:54

## 2025-04-04 RX ADMIN — AMIODARONE HYDROCHLORIDE 400 MG: 200 TABLET ORAL at 05:53

## 2025-04-04 RX ADMIN — ROPINIROLE HYDROCHLORIDE 4 MG: 2 TABLET, FILM COATED ORAL at 17:50

## 2025-04-04 RX ADMIN — ENOXAPARIN SODIUM 40 MG: 100 INJECTION SUBCUTANEOUS at 17:49

## 2025-04-04 RX ADMIN — FUROSEMIDE 40 MG: 10 INJECTION, SOLUTION INTRAVENOUS at 17:50

## 2025-04-04 RX ADMIN — LANSOPRAZOLE 30 MG: 30 TABLET, ORALLY DISINTEGRATING, DELAYED RELEASE ORAL at 05:55

## 2025-04-04 RX ADMIN — CARBIDOPA AND LEVODOPA 2 TABLET: 25; 250 TABLET ORAL at 08:22

## 2025-04-04 RX ADMIN — ASPIRIN 81 MG: 81 TABLET, CHEWABLE ORAL at 05:55

## 2025-04-04 RX ADMIN — GUAIFENESIN 200 MG: 100 LIQUID ORAL at 12:04

## 2025-04-04 RX ADMIN — CARBIDOPA AND LEVODOPA 2 TABLET: 25; 250 TABLET ORAL at 12:05

## 2025-04-04 RX ADMIN — MIDODRINE HYDROCHLORIDE 10 MG: 5 TABLET ORAL at 05:55

## 2025-04-04 RX ADMIN — MIDODRINE HYDROCHLORIDE 10 MG: 5 TABLET ORAL at 15:22

## 2025-04-04 RX ADMIN — FUROSEMIDE 40 MG: 10 INJECTION, SOLUTION INTRAVENOUS at 05:53

## 2025-04-04 ASSESSMENT — COGNITIVE AND FUNCTIONAL STATUS - GENERAL
HELP NEEDED FOR BATHING: TOTAL
WALKING IN HOSPITAL ROOM: A LOT
MOVING TO AND FROM BED TO CHAIR: A LOT
EATING MEALS: A LOT
SUGGESTED CMS G CODE MODIFIER MOBILITY: CL
PERSONAL GROOMING: A LOT
MOVING FROM LYING ON BACK TO SITTING ON SIDE OF FLAT BED: A LOT
DRESSING REGULAR UPPER BODY CLOTHING: A LOT
DRESSING REGULAR LOWER BODY CLOTHING: TOTAL
STANDING UP FROM CHAIR USING ARMS: A LOT
DAILY ACTIVITIY SCORE: 9
TOILETING: TOTAL
TURNING FROM BACK TO SIDE WHILE IN FLAT BAD: A LOT
CLIMB 3 TO 5 STEPS WITH RAILING: TOTAL
MOBILITY SCORE: 11
SUGGESTED CMS G CODE MODIFIER DAILY ACTIVITY: CL

## 2025-04-04 ASSESSMENT — PAIN DESCRIPTION - PAIN TYPE
TYPE: ACUTE PAIN
TYPE: ACUTE PAIN

## 2025-04-04 NOTE — CARE PLAN
The patient is Watcher - Medium risk of patient condition declining or worsening    Shift Goals  Clinical Goals: safety, maintain skin integrity  Patient Goals: soren  Family Goals: swallow evaluation, pt comfort    Progress made toward(s) clinical / shift goals:    Problem: Knowledge Deficit - Standard  Goal: Patient and family/care givers will demonstrate understanding of plan of care, disease process/condition, diagnostic tests and medications  Outcome: Progressing  Note: Pt and spouse at bedside updated on POC for the day. Plan to work with SLP and encourage mobilization as able.      Problem: Skin Integrity  Goal: Skin integrity is maintained or improved  Outcome: Progressing  Note: Q2h turns, PRANAV, TAPS, mepliex and heel float boots in place for prevention measures. Wound care completed per orders.        Patient is not progressing towards the following goals: n/a

## 2025-04-04 NOTE — CARE PLAN
Problem: Hemodynamics  Goal: Patient's hemodynamics, fluid balance and neurologic status will be stable or improve  Description: Target End Date:  Prior to discharge or change in level of careDocument on Assessment and I/O flowsheet templates1.  Monitor vital signs, pulse oximetry and cardiac monitor per provider order and/or policy2.  Maintain blood pressure per provider order3.  Hemodynamic monitoring per provider order4.  Manage IV fluids and IV infusions5.  Monitor intake and output6.  Daily weights per unit policy or provider order7.  Assess peripheral pulses and capillary refill8.  Assess color and body temperature9.  Position patient for maximum circulation/cardiac hhyiab63. Monitor for signs/symptoms of excessive [id number]. Assess mental status, restlessness and changes in level of [id number]. Monitor temperature and report fever or hypothermia to provider immediately. Consideration of targeted temperature management.  Outcome: Progressing     Problem: Skin Integrity  Goal: Skin integrity is maintained or improved  Description: Target End Date:  Prior to discharge or change in level of careDocument interventions on Skin Risk/Cuong flowsheet groups and corresponding LDA1.  Assess and monitor skin integrity, appearance and/or temperature2.  Assess risk factors for impaired skin integrity and/or pressures ulcers3.  Implement precautions to protect skin integrity in collaboration with interdisciplinary team4.  Implement pressure ulcer prevention protocol if at risk for skin breakdown5.  Confirm wound care consult if at risk for skin breakdown6.  Ensure patient use of pressure relieving devices  (Low air loss bed, waffle overlay, heel protectors, ROHO cushion, etc)  Outcome: Progressing   The patient is Stable - Low risk of patient condition declining or worsening    Shift Goals  Clinical Goals: hemodynamic stability , skin integrity  Patient Goals: soren  Family Goals: soren    Progress made toward(s)  clinical / shift goals:  Patient is alert oriented to self. He is able to follow commands and replies by nodding . Turned every 2 hours. Wound dressing done . Hemodynamically stable . Hourly rounds done to ensure safety and comfort.     Patient is not progressing towards the following goals:

## 2025-04-04 NOTE — THERAPY
"Speech Language Pathology   Daily Treatment     Patient Name: Jeremiah Huber  AGE:  74 y.o., SEX:  male  Medical Record #: [id number]  Date of Service: 4/4/2025      Precautions:  Precautions: Fall Risk, Swallow Precautions, Nasogastric Tube         Subjective  Patient received awake, sitting upright in bed. Spouse at bedside; who endorsed she is really wanting pt to receive a repeat diagnostic swallow study.       Assessment  PO of ice chips and mildly thick liquids presented. Pt with good oral bolus acceptance and containment. Multiple swallows completed per bolus. Single throat clear appreciated throughout PO. Unable to assess vocal quality 2/2 limited voicing from pt. Discussed results of previous of FEES and need for a repeat study prior to initiation of oral diet. Pt and spouse endorsed wanting to complete repeat FEES today.      Clinical Impressions  Per FEES completed on 4/1, patient presented with a moderate-severe oropharyngeal dysphagia. Pt and spouse requesting repeat swallow study be completed today. Recommend pt remain NPO w/ NGT except minimal ice chips per hour after oral care, pending study. Pt scheduled to complete FEES at 1:30pm today.      Recommendations  Diet Consistency: NPO/NGT  Instrumentation: FEES  Medication: Non Oral  Oral Care: Q4h       SLP Treatment Plan  Treatment Plan: Dysphagia Treatment  SLP Frequency: 4x Per Week  Estimated Duration: N/A - Evaluation Only      Anticipated Discharge Needs  Discharge Recommendations: Recommend post-acute placement for additional speech therapy services prior to discharge home  Therapy Recommendations Upon DC: Dysphagia Training      Patient / Family Goals  Patient / Family Goal #1: \"He's been having more difficulty swallowing\" per spouse  Goal #1 Outcome: Progressing as expected  Short Term Goals  Short Term Goal # 1: Patient will complete a diagnostic swallow study to further assess swallow function and guide POC  Goal Outcome # 1: Goal met  Short " Term Goal # 2: Pt will complete exercises targeting pharyngeal constriction and cough w/ mod cueing > 15x/session  Short Term Goal # 3: Pt will participate in repeat instrumentation to redefine swallow physiology and determine further ST POC after interval for improvement (e.g., 3-5 days)      Isrrael Galvez, SLP

## 2025-04-04 NOTE — THERAPY
"Speech Language Pathology   Flexible Endoscopic Evaluation of Swallowing (FEES)        Patient Name: Jeremiah Huber  AGE:  74 y.o., SEX:  male  Medical Record #: [id number]  Date of Service: 4/4/2025      History of Present Illness  75 y/o M admit 3/26 w/ abdominal pain that has been going on for the last few days. Identified w/ PNA on admit. 3/28 Chest CT indicated large right pleural effusion w/ collapse of the right lung. 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 Considering this, pt does not appear to require acute care SLP services to address cognition at this time.\"  6/13/20 CSE rec EC7/TN0      Pertinent Information  Current Method of Nutrition: NPO until cleared by speech pathology, NGT  Patient Behaviors: Fatigue   Dentition: Natural dentition   Feeding Tube: NGT intact to right nare   Tracheostomy: No          Factor(s) Affecting Performance: Impaired endurance, Impaired command following     Discussed the risks, benefits, and alternatives of the FEES procedure. Patient/family acknowledged and agreed to proceed.      Assessment  Flexible Endoscopic Evaluation of Swallowing (FEES) completed at bedside today. The endoscope was passed transnasally via Left nare to evaluate the anatomy and physiology of swallowing. Pt tolerated the procedure with no apparent distress.    Anatomic Findings: Excrescences bilaterally on the posterior 1/3 of the vocal folds consistent w/ intubtation. Mild to moderate erythema and edema of the pharynx. Severe pooled secretions in the pharynx prior to introduction of " PO trials. Black/white spots on BOT.    Vocal Fold Motion: Not tested  Secretion Management: Excess secretions, suctioning required  PO Trials: Ice Chips, Thin Liquid, Mildly Thick Liquid      Consistency PAS Score Timing Residue Comments   Thin Liquid 8 Pre Swallow, During swallow, Post swallow Vallecular Residue: Mild (5%-25%)  Pyriform Sinus Residue: Severe (>50%) Tsp, straw   Mildly Thick 8 Pre Swallow, Post Swallow Vallecular Residue: Mild (5%-25%)  Pyriform Sinus Residue: Moderate (25%-50%) tsp     Penetration-Aspiration Scale (PAS)  1     No contrast enters airway  2     Contrast enters the airway, remains above the vocal folds, and is ejected from the airway (not seen in the airway at the end of the swallow).  3     Contrast enters the airway, remains above the vocal folds, and is not ejected from the airway (is seen in the airway after the swallow).  4     Contrast enters the airway, contacts the vocal folds, and is ejected from the airway.  5     Contrast enters the airway, contacts the vocal folds, and is not ejected from the airway  6     Contrast enters the airway, crosses the plane of the vocal folds, and is ejected from the airway.  7     Contrast enters the airway, crosses the plane of the vocal folds, and is not ejected from the airway despite effort.  8     Contrast enters the airway, crosses the plane of the vocal folds, is not ejected from the airway and there is no response to aspiration.      Oral phase: Very prolonged bolus hold w/trials of thins by tsp (~30sec), pooling of bolus in pyriform prior to swallow initiation. Loss of bolus control resulted in airway invasion before the swallow with trial of thins by straw and mildly thick by tsp. Limited PO trials and chewable solids not trialed d/t concern for swallow safety.      Pharyngeal phase: Pharyngeal phase marked by impairments in BOT retraction, epiglottic inversion, sensory integrity, laryngeal vestibule closure (LVC), laryngeal  vestibule closure mistiming, pharyngeal shortening, pharyngeal constriction, and PES opening. Resulting in the following:  -Silent aspiration before, during (inferred) and post swallow with trials of thins. Silent aspiration before and post swallow with trials of mildly thick. Unable clear aspirate w/cued cough. Bolus remained in laryngeal vestibule, pharynx and airway post swallow.  -Mild vallecular residue across all trials.  -Moderate-severe pyriform sinus residue across all trials.     (Aspiration pre swallow)              Compensatory Strategies:  -Cued cough - not effective to clear aspirate  -Cued and spontaneous cleansing swallow - not effective to clear residue    Severity Rating:  Severity Rating: CHRIS     CHRIS: Moderate-Severe      Clinical Impressions  Patient presents with a moderate-severe oropharyngeal dysphagia, likely acute-on-chronic and multifactorial in the context of acuity of illness and deconditioning and dytussia superimposed on multiple medical co morbidities and neuro deficits from Parkinsons and prior CVA. . Swallow safety and efficiency are both impaired. Primary deficits include BOT retraction, epiglottic inversion, sensory integrity, laryngeal vestibule closure (LVC), laryngeal vestibule closure mistiming, pharyngeal shortening, pharyngeal constriction, and PES opening. Deficits are resistant to diet modification and compensatory strategies. Recommend continuing alternate source of nutrition/hydration/meds. Patient is a guarded candidate for behavioral and exercise-based swallow rehabilitation. A repeat swallowing diagnostic is likely indicated prior to diet advancement related to silent nature of aspiration events. Consider training the following evidence-based swallowing exercises in therapy based on patient-specific pathophysiology: effortful swallow, tongue pull back, Mendelsohn maneuver, and expiratory muscle strength training (EMST; with PEEP valve).        Recommendations  Diet  "Consistency: NPO/NGT; pre-feeding trials with SLP only   -Clear for minimal ice chips/hour 1:1 w/RN, when alert, after oral care to reduce xerostomia and mitigate disuse atrophy of swallow musculature  Medication: Non Oral  Oral Care: Q2h  Additional Instrumentation: Repeat diagnostic study when clinically appropriate  Consult Referral(s):  (Palliative Care)      SLP Treatment Plan  Treatment Plan: Dysphagia Treatment, Patient/Family/Caregiver Training  SLP Frequency: 4x Per Week  Estimated Duration: Until Therapy Goals Met      Anticipated Discharge Needs  Discharge Recommendations: Recommend post-acute placement for additional speech therapy services prior to discharge home   Therapy Recommendations Upon DC: Dysphagia Training, Community Re-Integration, Patient / Family / Caregiver Education, Expression Training       Patient / Family Goals  Patient / Family Goal #1: \"He's been having more difficulty swallowing\" per spouse  Goal #1 Outcome: Progressing as expected  Short Term Goal # 1: Patient will complete a diagnostic swallow study to further assess swallow function and guide POC  Goal Outcome # 1: Goal met  Short Term Goal # 2: Pt will complete exercises targeting pharyngeal constriction and cough w/ mod cueing > 15x/session  Short Term Goal # 3: Pt will participate in repeat instrumentation to redefine swallow physiology and determine further ST POC after interval for improvement (e.g., 3-5 days)  Goal Outcome  # 3: Goal met      FAIZAN Allred  "

## 2025-04-04 NOTE — PROGRESS NOTES
4 Eyes Skin Assessment Completed by ALYCE garcia and ALYCE navarro.    Head Scabbed right cheek  Ears WDL  Nose WDL  Mouth Discoloration tongue  Neck Redness and Blanching  Breast/Chest Redness and Blanching  Shoulder Blades WDL  Spine Redness and Blanching  (R) Arm/Elbow/Hand Redness and Blanching  (L) Arm/Elbow/Hand Redness and Blanching  Abdomen Redness and Blanching  Groin Redness and Blanching  Scrotum/Coccyx/Buttocks Redness and Blanching, DTI  (R) Leg Redness, Blanching, and Scab  (L) Leg Redness, Blanching, and Scar  (R) Heel/Foot/Toe Redness, Blanching, and Boggy  (L) Heel/Foot/Toe Redness, Blanching, and Boggy          Devices In Places Blood Pressure Cuff, Pulse Ox, SCD's, Condom Cath, and Nasal Cannula      Interventions In Place Gray Ear Foams, NC W/Ear Foams, Heel Mepilex, and Sacral Mepilex    Possible Skin Injury Yes    Pictures Uploaded Into Epic Yes  Wound Consult Placed Yes  RN Wound Prevention Protocol Ordered Yes

## 2025-04-04 NOTE — PROGRESS NOTES
Change of shift report received and report then called to RICKEY Barker, for transfer of Pt. Bed assigned.

## 2025-04-05 LAB
ALBUMIN SERPL BCP-MCNC: 3 G/DL (ref 3.2–4.9)
ALBUMIN/GLOB SERPL: 0.8 G/DL
ALP SERPL-CCNC: 117 U/L (ref 30–99)
ALT SERPL-CCNC: 87 U/L (ref 2–50)
ANION GAP SERPL CALC-SCNC: 10 MMOL/L (ref 7–16)
AST SERPL-CCNC: 35 U/L (ref 12–45)
BILIRUB SERPL-MCNC: 0.6 MG/DL (ref 0.1–1.5)
BUN SERPL-MCNC: 25 MG/DL (ref 8–22)
CALCIUM ALBUM COR SERPL-MCNC: 9.5 MG/DL (ref 8.5–10.5)
CALCIUM SERPL-MCNC: 8.7 MG/DL (ref 8.5–10.5)
CHLORIDE SERPL-SCNC: 103 MMOL/L (ref 96–112)
CO2 SERPL-SCNC: 28 MMOL/L (ref 20–33)
CREAT SERPL-MCNC: 0.95 MG/DL (ref 0.5–1.4)
ERYTHROCYTE [DISTWIDTH] IN BLOOD BY AUTOMATED COUNT: 46.1 FL (ref 35.9–50)
GFR SERPLBLD CREATININE-BSD FMLA CKD-EPI: 85 ML/MIN/1.73 M 2
GLOBULIN SER CALC-MCNC: 4 G/DL (ref 1.9–3.5)
GLUCOSE SERPL-MCNC: 170 MG/DL (ref 65–99)
HCT VFR BLD AUTO: 45 % (ref 42–52)
HGB BLD-MCNC: 14.8 G/DL (ref 14–18)
MAGNESIUM SERPL-MCNC: 2.5 MG/DL (ref 1.5–2.5)
MCH RBC QN AUTO: 29.5 PG (ref 27–33)
MCHC RBC AUTO-ENTMCNC: 32.9 G/DL (ref 32.3–36.5)
MCV RBC AUTO: 89.8 FL (ref 81.4–97.8)
PHOSPHATE SERPL-MCNC: 3 MG/DL (ref 2.5–4.5)
PLATELET # BLD AUTO: 401 K/UL (ref 164–446)
PMV BLD AUTO: 9.6 FL (ref 9–12.9)
POTASSIUM SERPL-SCNC: 4.3 MMOL/L (ref 3.6–5.5)
PROCALCITONIN SERPL-MCNC: 0.09 NG/ML
PROT SERPL-MCNC: 7 G/DL (ref 6–8.2)
RBC # BLD AUTO: 5.01 M/UL (ref 4.7–6.1)
SODIUM SERPL-SCNC: 141 MMOL/L (ref 135–145)
WBC # BLD AUTO: 13.4 K/UL (ref 4.8–10.8)

## 2025-04-05 PROCEDURE — 84145 PROCALCITONIN (PCT): CPT

## 2025-04-05 PROCEDURE — A9270 NON-COVERED ITEM OR SERVICE: HCPCS | Performed by: INTERNAL MEDICINE

## 2025-04-05 PROCEDURE — 94669 MECHANICAL CHEST WALL OSCILL: CPT

## 2025-04-05 PROCEDURE — 700102 HCHG RX REV CODE 250 W/ 637 OVERRIDE(OP): Performed by: HOSPITALIST

## 2025-04-05 PROCEDURE — 84100 ASSAY OF PHOSPHORUS: CPT

## 2025-04-05 PROCEDURE — G0316 PR PROLONGED IP/OBS E&M EA 15 MIN: HCPCS | Performed by: INTERNAL MEDICINE

## 2025-04-05 PROCEDURE — 770001 HCHG ROOM/CARE - MED/SURG/GYN PRIV*

## 2025-04-05 PROCEDURE — 700102 HCHG RX REV CODE 250 W/ 637 OVERRIDE(OP): Performed by: INTERNAL MEDICINE

## 2025-04-05 PROCEDURE — A9270 NON-COVERED ITEM OR SERVICE: HCPCS | Performed by: STUDENT IN AN ORGANIZED HEALTH CARE EDUCATION/TRAINING PROGRAM

## 2025-04-05 PROCEDURE — 700102 HCHG RX REV CODE 250 W/ 637 OVERRIDE(OP): Performed by: STUDENT IN AN ORGANIZED HEALTH CARE EDUCATION/TRAINING PROGRAM

## 2025-04-05 PROCEDURE — A9270 NON-COVERED ITEM OR SERVICE: HCPCS | Performed by: HOSPITALIST

## 2025-04-05 PROCEDURE — 700111 HCHG RX REV CODE 636 W/ 250 OVERRIDE (IP): Mod: JZ | Performed by: INTERNAL MEDICINE

## 2025-04-05 PROCEDURE — 83735 ASSAY OF MAGNESIUM: CPT

## 2025-04-05 PROCEDURE — 36415 COLL VENOUS BLD VENIPUNCTURE: CPT

## 2025-04-05 PROCEDURE — 80053 COMPREHEN METABOLIC PANEL: CPT

## 2025-04-05 PROCEDURE — 99222 1ST HOSP IP/OBS MODERATE 55: CPT | Performed by: INTERNAL MEDICINE

## 2025-04-05 PROCEDURE — 99233 SBSQ HOSP IP/OBS HIGH 50: CPT | Performed by: INTERNAL MEDICINE

## 2025-04-05 PROCEDURE — 700105 HCHG RX REV CODE 258: Performed by: INTERNAL MEDICINE

## 2025-04-05 PROCEDURE — 85027 COMPLETE CBC AUTOMATED: CPT

## 2025-04-05 PROCEDURE — 700111 HCHG RX REV CODE 636 W/ 250 OVERRIDE (IP): Mod: JZ | Performed by: STUDENT IN AN ORGANIZED HEALTH CARE EDUCATION/TRAINING PROGRAM

## 2025-04-05 RX ORDER — GAUZE BANDAGE 2" X 2"
100 BANDAGE TOPICAL DAILY
Status: DISCONTINUED | OUTPATIENT
Start: 2025-04-06 | End: 2025-04-09 | Stop reason: HOSPADM

## 2025-04-05 RX ADMIN — AMIODARONE HYDROCHLORIDE 400 MG: 200 TABLET ORAL at 17:42

## 2025-04-05 RX ADMIN — CARBIDOPA AND LEVODOPA 2 TABLET: 25; 250 TABLET ORAL at 15:13

## 2025-04-05 RX ADMIN — GUAIFENESIN 200 MG: 100 LIQUID ORAL at 17:42

## 2025-04-05 RX ADMIN — FUROSEMIDE 40 MG: 10 INJECTION, SOLUTION INTRAVENOUS at 05:55

## 2025-04-05 RX ADMIN — GUAIFENESIN 200 MG: 100 LIQUID ORAL at 05:53

## 2025-04-05 RX ADMIN — LANSOPRAZOLE 30 MG: 30 TABLET, ORALLY DISINTEGRATING, DELAYED RELEASE ORAL at 05:54

## 2025-04-05 RX ADMIN — FUROSEMIDE 40 MG: 10 INJECTION, SOLUTION INTRAVENOUS at 17:43

## 2025-04-05 RX ADMIN — CARBIDOPA AND LEVODOPA 2 TABLET: 25; 250 TABLET ORAL at 11:40

## 2025-04-05 RX ADMIN — ASPIRIN 81 MG: 81 TABLET, CHEWABLE ORAL at 05:55

## 2025-04-05 RX ADMIN — CARBIDOPA AND LEVODOPA 2 TABLET: 25; 250 TABLET ORAL at 09:13

## 2025-04-05 RX ADMIN — OXYBUTYNIN CHLORIDE 5 MG: 5 TABLET ORAL at 17:42

## 2025-04-05 RX ADMIN — GUAIFENESIN 200 MG: 100 LIQUID ORAL at 11:40

## 2025-04-05 RX ADMIN — MIDODRINE HYDROCHLORIDE 10 MG: 5 TABLET ORAL at 15:13

## 2025-04-05 RX ADMIN — Medication 5 MG: at 20:11

## 2025-04-05 RX ADMIN — AMANTADINE HYDROCHLORIDE 100 MG: 100 TABLET ORAL at 17:42

## 2025-04-05 RX ADMIN — AMANTADINE HYDROCHLORIDE 100 MG: 100 TABLET ORAL at 05:54

## 2025-04-05 RX ADMIN — SENNOSIDES AND DOCUSATE SODIUM 2 TABLET: 50; 8.6 TABLET ORAL at 17:42

## 2025-04-05 RX ADMIN — OXYBUTYNIN CHLORIDE 5 MG: 5 TABLET ORAL at 05:54

## 2025-04-05 RX ADMIN — POTASSIUM BICARBONATE 25 MEQ: 978 TABLET, EFFERVESCENT ORAL at 05:52

## 2025-04-05 RX ADMIN — ROPINIROLE HYDROCHLORIDE 4 MG: 2 TABLET, FILM COATED ORAL at 05:55

## 2025-04-05 RX ADMIN — THERA TABS 1 TABLET: TAB at 05:53

## 2025-04-05 RX ADMIN — ROPINIROLE HYDROCHLORIDE 4 MG: 2 TABLET, FILM COATED ORAL at 11:39

## 2025-04-05 RX ADMIN — AMIODARONE HYDROCHLORIDE 400 MG: 200 TABLET ORAL at 05:55

## 2025-04-05 RX ADMIN — AMPICILLIN SODIUM, SULBACTAM SODIUM 3 G: 2; 1 INJECTION, POWDER, FOR SOLUTION INTRAMUSCULAR; INTRAVENOUS at 23:53

## 2025-04-05 RX ADMIN — AMPICILLIN SODIUM, SULBACTAM SODIUM 3 G: 2; 1 INJECTION, POWDER, FOR SOLUTION INTRAMUSCULAR; INTRAVENOUS at 20:18

## 2025-04-05 RX ADMIN — MIDODRINE HYDROCHLORIDE 10 MG: 5 TABLET ORAL at 20:11

## 2025-04-05 RX ADMIN — SODIUM BICARBONATE 1300 MG: 650 TABLET ORAL at 20:11

## 2025-04-05 RX ADMIN — ROPINIROLE HYDROCHLORIDE 4 MG: 2 TABLET, FILM COATED ORAL at 17:41

## 2025-04-05 RX ADMIN — CARBIDOPA AND LEVODOPA 2 TABLET: 25; 250 TABLET ORAL at 20:11

## 2025-04-05 RX ADMIN — SODIUM BICARBONATE 1300 MG: 650 TABLET ORAL at 09:13

## 2025-04-05 RX ADMIN — ENOXAPARIN SODIUM 40 MG: 100 INJECTION SUBCUTANEOUS at 17:42

## 2025-04-05 RX ADMIN — Medication 100 MG: at 05:54

## 2025-04-05 RX ADMIN — SODIUM BICARBONATE 1300 MG: 650 TABLET ORAL at 15:13

## 2025-04-05 RX ADMIN — MIDODRINE HYDROCHLORIDE 10 MG: 5 TABLET ORAL at 05:53

## 2025-04-05 RX ADMIN — GABAPENTIN 200 MG: 100 CAPSULE ORAL at 17:42

## 2025-04-05 RX ADMIN — POTASSIUM BICARBONATE 25 MEQ: 978 TABLET, EFFERVESCENT ORAL at 17:42

## 2025-04-05 ASSESSMENT — FIBROSIS 4 INDEX: FIB4 SCORE: 0.67

## 2025-04-05 ASSESSMENT — PAIN DESCRIPTION - PAIN TYPE: TYPE: ACUTE PAIN

## 2025-04-05 NOTE — PROGRESS NOTES
"Hospital Medicine Daily Progress Note    Date of Service  4/4/2025    Chief Complaint  Abdominal pain RUQ x1 week.    Hospital Course  Farhat Huber is a 74-year-old male with a past medical history significant for Parkinson, hypertension, dysphagia, GERD, history of CVA (due to LV thrombus) on 6/2022.  He presented to the ER with a complaint of abdominal pain that has been going on for the last few days .    Presents in ER, patient noted to have WBC of 17.7, AST/ALT normal, lipase 19, procalcitonin 0.5.  Blood culture x 2 was ordered, patient was started on antibiotics for aspiration pneumonia including Unasyn and azithromycin.  Ultrasound of the abdomen showed stones/sludge of gallbladder, no evidence of acute cholecystitis CBD obstruction, and multiple liver cysts. A CT chest showed large right pleural effusion.     Echocardiogram showing EF of 60%, akinesis of the apex.    4/3/25:  Remains non-verbal but nods head to questions.  Weak.  I have Voalte his prior neurologist Dr Mclain for any possible medication change.  Yesterday I reached out to Dr Tc Gregorio for request to review for further input on the patient and he replied with \" I don't have anything concretely to add for patient.\"        Interval Problem Update  Patient seen and examine at bedside  Managing dysphagia/NG tube, aspiration pneumonia (completing antibiotics) in the setting of Parkinson's (followed by Dr. Mclain), hypertension, dysphagia, GERD, history of CVA (due to LV thrombus) on 6/2022. He first presented with abdominal pain.     He has no capacity. He has an NGT. Wife at bedside and I spoke with her for a while. We discussed codes status and goals o care. I formally consulted Palliative. Speech discussed about Palliative however we queried them if patient will need a PEG or will he improve off NG tube. We discussed artificial nutrition, DNR but I OK, goals of care and at this time wife feels he is not a candidate for dialysis or " cardiac cath or invasive procedures but may be OK with PEG and artifical nutrition and intubation.     I reviewed the chart along with vitals, labs, imaging, test (both pending and resulted) and recommendations from specialists and interdisciplinary team.  I have discussed this patient's plan of care and discharge plan at IDT rounds today with Case Management, Nursing, Nursing leadership, and other members of the IDT team.      Consultants/Specialty  critical care    Code Status  DNAR, I OK    Disposition  Medically Cleared  I have placed the appropriate orders for post-discharge needs.    Review of Systems  Review of Systems   Unable to perform ROS: Mental acuity        Physical Exam  Temp:  [36.5 °C (97.7 °F)-36.8 °C (98.2 °F)] 36.7 °C (98.1 °F)  Pulse:  [72-82] 72  Resp:  [18] 18  BP: ()/(62-77) 102/63  SpO2:  [94 %-97 %] 97 %    Physical Exam  Vitals reviewed.   Constitutional:       Appearance: He is ill-appearing.   HENT:      Head: Normocephalic.      Mouth/Throat:      Pharynx: Oropharyngeal exudate present.   Eyes:      General:         Right eye: No discharge.         Left eye: No discharge.      Conjunctiva/sclera: Conjunctivae normal.   Cardiovascular:      Rate and Rhythm: Normal rate and regular rhythm.      Heart sounds: Normal heart sounds. No murmur heard.  Pulmonary:      Effort: Pulmonary effort is normal. No respiratory distress.      Breath sounds: Examination of the right-lower field reveals decreased breath sounds. Decreased breath sounds present.   Abdominal:      General: Bowel sounds are normal. There is no distension.   Musculoskeletal:      Cervical back: No tenderness.      Right lower leg: No edema.      Left lower leg: No edema.   Lymphadenopathy:      Cervical: No cervical adenopathy.   Skin:     General: Skin is warm.      Coloration: Skin is pale.   Neurological:      Mental Status: He is alert.      Cranial Nerves: Cranial nerve deficit present.      Motor: Weakness present.       Coordination: Coordination abnormal.         Fluids    Intake/Output Summary (Last 24 hours) at 4/4/2025 1902  Last data filed at 4/4/2025 1728  Gross per 24 hour   Intake 50 ml   Output 2600 ml   Net -2550 ml        Laboratory  Recent Labs     04/02/25  0532 04/03/25  0245 04/04/25  0309   WBC 10.7 10.5 11.2*   RBC 4.63* 4.80 4.98   HEMOGLOBIN 13.5* 14.2 14.5   HEMATOCRIT 40.6* 41.8* 43.9   MCV 87.7 87.1 88.2   MCH 29.2 29.6 29.1   MCHC 33.3 34.0 33.0   RDW 43.2 43.3 44.2   PLATELETCT 298 312 353   MPV 8.9* 9.4 9.6     Recent Labs     04/02/25  0532 04/02/25  1449 04/03/25  0245 04/04/25  0309   SODIUM 140  --  137 138   POTASSIUM 2.9* 3.5* 3.6 3.7   CHLORIDE 97  --  98 99   CO2 31  --  30 28   GLUCOSE 146*  --  134* 170*   BUN 20  --  17 25*   CREATININE 0.81  --  0.80 0.82   CALCIUM 8.0*  --  8.1* 8.5                   Imaging  DX-CHEST-PORTABLE (1 VIEW)   Final Result      1.  Small right pleural effusion and trace left pleural effusion.      2.  Right lower lobe airspace opacity consistent with atelectasis and/or pneumonitis.      3.  NG tube extends in the fundus of stomach.      DX-CHEST-PORTABLE (1 VIEW)   Final Result         1.  Pulmonary edema and/or infiltrates, stable since prior study.   2.  Small bilateral pleural effusions, right greater than left      DX-CHEST-PORTABLE (1 VIEW)   Final Result         1.  Pulmonary edema and/or infiltrates, stable since prior study.   2.  Small right pleural effusion      DX-CHEST-PORTABLE (1 VIEW)   Final Result         1. Slowly improving moderate right and mild left base atelectasis.      2. Right pleural pigtail drain remains. No pneumothorax.      3. NG tube                     DX-ABDOMEN FOR TUBE PLACEMENT   Final Result      Enteric feeding tube terminates in the left abdomen over the expected location of the stomach.      DX-CHEST-PORTABLE (1 VIEW)   Final Result         1. Right pleural pigtail drain. No pneumothorax.      2. Persistent moderate right  lower lobe opacity.      3. Lines and tubes without visible complication.                     EC-ECHOCARDIOGRAM LTD W/ CONT   Final Result      DX-CHEST-PORTABLE (1 VIEW)   Final Result         1. Right pleural pigtail drain. Some of the right lung opacity with parenchymal and pleural opacity remaining. No pneumothorax.      2. ETT 5 cm above yaa. NG tube in stomach.                     CT-HEAD W/O   Final Result      1.  No evidence of acute intracranial process.      2.  Cerebral atrophy as well as periventricular chronic small vessel ischemic change.               DX-CHEST-FOR LINE PLACEMENT Perform procedure in: Patient's Room   Final Result         1. Appropriately positioned endotracheal tube.   2. Interval improvement in aeration of the lung bases.      CT-CTA CHEST PULMONARY ARTERY W/ RECONS   Final Result      1.  Very large right pleural effusion causing collapse of the right lung.      2.  No CT evidence of pulmonary emboli.      3.  Multiple hepatic cysts.            DX-CHEST-PORTABLE (1 VIEW)   Final Result         1. Increased large amount of right-sided airspace disease with associated small to moderate right pleural effusion.   2. Stable left basilar airspace disease versus atelectasis.      CT-ABDOMEN-PELVIS W/O   Final Result      1.  No evidence of bowel obstruction or focal inflammatory change.      2.  Volume loss and consolidation within the right lung base with loculated right pleural effusion.      3.  Atelectasis within the left lung base.      4.  Constipation. Numerous sigmoid diverticulosis.      5.  Hepatic cysts.      6.  Gallstones.      NM-BILIARY (HIDA) SCAN WITH CCK   Final Result      No scintigraphic evidence of cholecystitis, biliary obstruction or other worrisome finding.      US-RUQ   Final Result      1.  Stones and sludge in the gallbladder.   2.  No evidence for acute cholecystitis or biliary obstruction.   3.  Multiple liver cysts again seen.   4.  Limited evaluation of  pancreas and abdominal aorta.      DX-CHEST-PORTABLE (1 VIEW)   Final Result      Hypoinflation with bibasilar atelectasis.  Superimposed pneumonia is difficult to exclude.           Assessment/Plan  * Pneumonia due to infectious organism- (present on admission)  Assessment & Plan  4/3/25:  Presenting with right upper quadrant pain, productive cough with clear sputum and fever at home  CXR reviewed, opacity on the right.  History of dysphagia due to Parkinson's, suspect aspiration pneumonia  S/P unasyn, azithro, zyvox  3/28/25 Procalcitonin:1.45    Other dysphagia  Assessment & Plan  NG t  SLP following    Hypokalemia  Assessment & Plan  4/3/25:  4/2 K:2.9  4/3: 3.6  Replace and monitor  Monitor labs    Paroxysmal atrial fibrillation (HCC)  Assessment & Plan  4/3/25  HR:71  Amiodarone 400mg BID with plan to go to a maintenance dose    Acute respiratory failure with hypoxia (HCC)  Assessment & Plan  4/3/25:  [facility]  Has a NG tube for enteral feeding currently  Aspiration precautions and SLP  Titrate O2 to keep SpO2 >90  Currently down to 2-4L NC    Pleural effusion, right  Assessment & Plan  4/3/25:  Noted on chest x-ray and prior CT scan this admit  4/2 CXR still showing right pleural effusion and possible pulmonary edema  4/3 CXR unchanged  S/p Chest tube      Advance care planning  Assessment & Plan  4/3/25:  Patient is nonverbal . Patient wife who is poa and nursing staff were present for the conversation.   Discussed need to consider further advanced care planning if he is not improving from inability to protect his airway.  I feel he is at risk of secretion aspirations even if we placed a PEG tube.  Code status changed to dnar/I okay    Right upper quadrant abdominal pain- (present on admission)  Assessment & Plan  4/3/25:  No current chest pain  LFTs unremarkable,ush showing gallstone   Hida  normal , maheshley chest pain is from pleurisy    History of stroke-  (present on admission)  Assessment & Plan  4/3:  Continue home aspirin, atorvastatin    Parkinson disease (HCC)- (present on admission)  Assessment & Plan  4/3/25:  Continue carbidopa-levodopa  Nonverbal at baseline, uses a wheelchair but able to stand to urinate on his own and does not use a catheter  NPO, speech following (failed FEEs)  At high risk of aspiration even with enteral feeding given Parkisons  Dr Tc Gregorio, neurologist, states nothing more to add  I have reached out to his outpatient neurologist as well  I discussed 4/2 with wife and patient End of life considerations.  May need near future Hospice given his severe dysphagia in the interim let us see if any improvement with Speech therapy      Restless leg syndrome  Assessment & Plan  Continue ropinirole    Other hyperlipidemia- (present on admission)  Assessment & Plan  4/3/25:  Continue atorvastatin         VTE prophylaxis: VTE Selection    I have performed a physical exam and reviewed and updated ROS and Plan today (4/4/2025). In review of yesterday's note (4/3/2025), there are no changes except as documented above.    Patient is has a high medical complexity, complex decision making and is at high risk for complication, morbidity, and mortality, thus requiring the highest level of my preparedness for sudden, emergent intervention. Medical decision making is therefore complex. I provided  services, which included ordering labs and/or imaging, and discussing the case with various consultants.medication orders, frequent reevaluations of the patient's condition and response to treatment. Time was also devoted to counseling and coordinating care including review of records, pertinent lab data and studies, as well as discussing diagnostic evaluation and work up, planned therapeutic interventions and future disposition of care. Where indicated, the assessment and plan reflect discussion of patient with consultants, other healthcare providers,  family members, and additional research needed to obtain further information in formulating the plan of care for Jeremiah Huber. Total time spent was 55 minutes.   In addition to that I spent another 20 minutes for advanced care/counseling discussing and confirming code status and goals of care with patient, family, consultants and Palliative team.

## 2025-04-05 NOTE — CARE PLAN
Problem: Hyperinflation  Goal: Prevent or improve atelectasis  Description: Target End Date:  3 to 4 days  1. Instruct incentive spirometry usage  2.  Perform hyperinflation therapy as indicated  Outcome: Progressing   PEP QID  **Pt completed >48 hours of IPV**

## 2025-04-05 NOTE — CONSULTS
"Gastroenterology Initial Consult Note               Author:  Ju Smith M.D. Date & Time Created: 4/5/2025 2:21 PM       Patient ID:  Name:             Jeremiah Huber  YOB: 1950  Age:                 74 y.o.  male  MRN:               [id number]      Referring Provider:  Jarad Castillo MD        Presenting Chief Complaint:  Request for PEG      History of Present Illness:    This is a 74 y.o. male with Parkinson's, hypertension, dyslipidemia, history of stroke, CAD who presented with abdominal pain and fever on March 27, 2025.  On presentation his wife volunteered that he has trouble swallowing pills but ate a cheeseburger that night. Found to have right sided pneumonia and on CT had large right pleural effusion with collapse right lung.  Respiratory PCR was negative.  Suspicion for aspiration pneumonia    Patient is awake, nonverbal, but mouths yes-no appropriately.  He does not want to go home without means for nutrition and pass away peacefully.        Review of Systems:  Review of Systems   Unable to perform ROS: Patient nonverbal             Past Medical History:  Past Medical History:   Diagnosis Date    Arthritis     osteo    Back spasm     Blood clotting disorder ([facility]) 06/2020    \"heart\"    GOUT     Gout     Hypertension     Myocardial infarct ([facility]) 06/2020    Parkinson disease ([facility])     Stroke ([facility]) 06/2020     Active Hospital Problems    Diagnosis     Other dysphagia [R13.19]     Hypokalemia [E87.6]     Acute respiratory failure with hypoxia ([facility]) [J96.01]     Paroxysmal atrial fibrillation ([facility]) [I48.0]     Pleural effusion, right [J90]     Pneumonia due to infectious organism [J18.9]     Sepsis with acute hypoxic respiratory failure without septic shock ([facility]) [A41.9, R65.20, J96.01]     Right upper quadrant abdominal pain [R10.11]     Advance care planning [Z71.89]     History of stroke [Z86.73]     Parkinson disease (HCC) [G20.A1]     Other hyperlipidemia [E78.49]          Past " Surgical History:  Past Surgical History:   Procedure Laterality Date    NEURO DEST FACET L/S W/IG SNGL Right 9/29/2020    Procedure: DESTRUCTION, NERVE, FACET JOINT, LUMBOSACRAL, USING NEUROLYTIC AGENT, WITH FLUOROSCOPIC GUIDANCE;  Surgeon: Nazario Sandoval M.D.;  [facility];  Service: Pain Management    KNEE ARTHROSCOPY Right     KNEE REPLACEMENT, TOTAL Bilateral     SHOULDER SURGERY      right shoulder, a-c seperation    TONSILLECTOMY             Hospital Medications:  Current Facility-Administered Medications   Medication Dose Frequency Provider Last Rate Last Admin    [START ON 4/6/2025] multivitamin tablet 1 Tablet  1 Tablet DAILY Jarad Castillo M.D.        [START ON 4/6/2025] thiamine (Vitamin B-1) tablet 100 mg  100 mg DAILY Jarad Castillo M.D.        [START ON 4/9/2025] amiodarone (Cordarone) tablet 200 mg  200 mg DAILY Jeremiah Holliday D.O.        melatonin tablet 5 mg  5 mg Nightly Gilberto Boles M.D.   5 mg at 04/04/25 2041    potassium bicarbonate (Klyte) effervescent tablet 25 mEq  25 mEq BID KALYN PlunkettOCarlos   25 mEq at 04/05/25 0552    gabapentin (Neurontin) capsule 200 mg  200 mg Q EVENING Darion Meza M.D.   200 mg at 04/04/25 1750    guaiFENesin (Robitussin) 100 MG/5ML liquid 200 mg  10 mL TID KALYN PlunkettOCarlos   200 mg at 04/05/25 1140    amiodarone (Cordarone) tablet 400 mg  400 mg TWICE DAILY KALYN PlunkettOCarlos   400 mg at 04/05/25 0555    lansoprazole (Prevacid) solutab 30 mg  30 mg DAILY Darion Meza M.D.   30 mg at 04/05/25 0554    furosemide (Lasix) injection 40 mg  40 mg BID Darion Meza M.D.   40 mg at 04/05/25 0555    senna-docusate (Pericolace Or Senokot S) 8.6-50 MG per tablet 2 Tablet  2 Tablet Q EVENING Zhanna Heredia M.D.   2 Tablet at 04/04/25 1750    And    polyethylene glycol/lytes (Miralax) Packet 1 Packet  1 Packet QDAY PRN Zhanna Heredia M.D.        [Held by provider] atorvastatin (Lipitor) tablet 80 mg  80 mg Q  EVENING Zhanna Heredia M.D.   80 mg at 03/30/25 1817    midodrine (Proamatine) tablet 10 mg  10 mg Q8HRS Zhanna Heredia M.D.   10 mg at 04/05/25 0553    Respiratory Therapy Consult   Continuous RT Akhil Gomez M.D.        amantadine (Symmetrel) tablet 100 mg  100 mg BID Ángel Harper M.D.   100 mg at 04/05/25 0554    aspirin (Asa) chewable tab 81 mg  81 mg DAILY Ángel Harper M.D.   81 mg at 04/05/25 0555    carbidopa-levodopa (Sinemet)  MG tablet 2 Tablet  2 Tablet 4X/DAY Ángel Harper M.D.   2 Tablet at 04/05/25 1140    oxybutynin (Ditropan) tablet 5 mg  5 mg BID Ángel Harper M.D.   5 mg at 04/05/25 0554    ROPINIRole (Requip) tablet 4 mg  4 mg TID Ángel Harper M.D.   4 mg at 04/05/25 1139    sodium bicarbonate tablet 1,300 mg  1,300 mg TID Ángel Harper M.D.   1,300 mg at 04/05/25 0913    Pharmacy Consult: Enteral tube insertion - review meds/change route/product selection  1 Each PHARMACY TO DOSE Ángel Harper M.D.        enoxaparin (Lovenox) inj 40 mg  40 mg DAILY AT [address] DAYANNA Shafer M.D.   40 mg at 04/04/25 1749   Last reviewed on 3/27/2025 12:13 AM by Carlos Enrique       Current Outpatient Medications:  Facility-Administered Medications Prior to Admission   Medication Dose Route Frequency Provider Last Rate Last Admin    [DISCONTINUED] IncobotulinumtoxinA (Xeomin) injection 50 Units  50 Units Intramuscular Q90 DAYS          Medications Prior to Admission   Medication Sig Dispense Refill Last Dose/Taking    oxybutynin (DITROPAN) 5 MG Tab Take 5 mg by mouth 2 times a day.   3/26/2025 at  6:00 PM    Dextromethorphan-guaiFENesin (MUCINEX DM PO) Take 1 Tablet by mouth every 12 hours as needed (congestion).   3/26/2025 Morning    Cetirizine-Pseudoephedrine (ZYRTEC-D PO) Take 1 Tablet by mouth every 12 hours as needed (allergies).   3/26/2025    Multiple Vitamins-Minerals (MULTIVITAMIN GUMMIES ADULT) Chew Tab Chew 2 Each every day.   3/26/2025 Morning    amantadine (SYMMETREL) 100 MG  Tab Take 1 Tablet by mouth 2 times a day. 180 Tablet 3 3/26/2025 at  6:00 PM    ROPINIRole (REQUIP) 4 MG tablet Take 1 Tablet by mouth 3 times a day. 270 Tablet 3 3/26/2025 at  6:00 PM    gabapentin (NEURONTIN) 300 MG Cap TAKE 2 CAPSULES BY MOUTH EVERY EVENING (Patient taking differently: Take 600 mg by mouth every evening. 2 capsules= 600mg) 180 Capsule 3 3/26/2025 at  8:30 PM    carbidopa-levodopa (SINEMET)  MG Tab Take 2 Tablets by mouth 4 times a day. 6AM, 10AM, 2PM, and 6PM 720 Tablet 3 3/26/2025 at  6:00 PM    metoprolol SR (TOPROL XL) 50 MG TABLET SR 24 HR Take 1 Tablet by mouth every day. 90 Tablet 3 3/26/2025 Morning    atorvastatin (LIPITOR) 80 MG tablet Take 1 Tablet by mouth every evening. 90 Tablet 3 3/26/2025 at  6:00 PM    meloxicam (MOBIC) 15 MG tablet Take 1 Tablet by mouth 1 time a day as needed for Mild Pain. 100 Tablet 3 3/26/2025 Morning    omeprazole (PRILOSEC) 20 MG delayed-release capsule TAKE 1 CAPSULE BY MOUTH ONCE A DAY 30 MINUTES BEFORE BREAKFAST MEAL (Patient taking differently: Take 20 mg by mouth every day. TAKE 1 CAPSULE BY MOUTH ONCE A DAY 30 MINUTES BEFORE BREAKFAST MEAL) 90 Capsule 3 3/26/2025 Morning    aspirin 81 MG EC tablet TAKE 1 TABLET BY MOUTH EVERY DAY (Patient taking differently: Take 81 mg by mouth every day.) 90 Tablet 1 3/26/2025 Morning         Medication Allergies:  Allergies   Allergen Reactions    Crexont [Carbidopa-Levodopa Er] Unspecified     Reported lost of mobility and bladder         Family Medical History:  Family History   Problem Relation Age of Onset    Heart Disease Mother     Heart Disease Father     Cancer Father         prostate cancer    Arthritis Brother          Social History:  Social History     Socioeconomic History    Marital status:      Spouse name: Not on file    Number of children: Not on file    Years of education: Not on file    Highest education level: Master's degree (e.g., MA, MS, Phylicia, MEd, MSW, MURIEL)   Occupational History     Not on file   Tobacco Use    Smoking status: Former     Current packs/day: 0.00     Types: Cigarettes     Quit date: 1970     Years since quittin.2    Smokeless tobacco: Never   Vaping Use    Vaping status: Never Used   Substance and Sexual Activity    Alcohol use: Yes     Alcohol/week: 4.2 oz     Types: 7 Glasses of wine per week    Drug use: No    Sexual activity: Yes     Partners: Female   Other Topics Concern     Service No    Blood Transfusions No    Caffeine Concern No    Occupational Exposure No    Hobby Hazards No    Sleep Concern No    Stress Concern No    Weight Concern No    Special Diet No    Back Care Yes     Comment: Streching    Exercise No    Bike Helmet Yes    Seat Belt Yes    Self-Exams Yes   Social History Narrative    Not on file     Social Drivers of Health     Financial Resource Strain: Low Risk  (12/10/2022)    Overall Financial Resource Strain (CARDIA)     Difficulty of Paying Living Expenses: Not hard at all   Food Insecurity: No Food Insecurity (3/27/2025)    Hunger Vital Sign     Worried About Running Out of Food in the Last Year: Never true     Ran Out of Food in the Last Year: Never true   Transportation Needs: No Transportation Needs (3/27/2025)    PRAPARE - Transportation     Lack of Transportation (Medical): No     Lack of Transportation (Non-Medical): No   Physical Activity: Sufficiently Active (12/10/2022)    Exercise Vital Sign     Days of Exercise per Week: 5 days     Minutes of Exercise per Session: 30 min   Stress: Stress Concern Present (12/10/2022)    [employer] - Occupational Stress Questionnaire     Feeling of Stress : To some extent   Social Connections: Unknown (12/10/2022)    Social Connection and Isolation Panel [NHANES]     Frequency of Communication with Friends and Family: More than three times a week     Frequency of Social Gatherings with Friends and Family: More than three times a week     Attends [employer] Services:  "Patient declined     Active Member of Clubs or Organizations: No     Attends Club or Organization Meetings: Patient declined     Marital Status:    Intimate Partner Violence: Not At Risk (3/27/2025)    Humiliation, Afraid, Rape, and Kick questionnaire     Fear of Current or Ex-Partner: No     Emotionally Abused: No     Physically Abused: No     Sexually Abused: No   Housing Stability: Low Risk  (3/27/2025)    Housing Stability Vital Sign     Unable to Pay for Housing in the Last Year: No     Number of Times Moved in the Last Year: 1     Homeless in the Last Year: No         Vital signs:  Weight/BMI: Body mass index is 26.76 kg/m².  BP (!) 145/96   Pulse 82   Temp 37 °C (98.6 °F) (Temporal)   Resp 18   Ht 1.854 m (6' 1\")   Wt 92 kg (202 lb 13.2 oz)   SpO2 92%   Vitals:    04/04/25 2100 04/05/25 0512 04/05/25 0712 04/05/25 0749   BP: 124/80 114/74  (!) 145/96   Pulse: 77 78 77 82   Resp: 18 20 20 18   Temp:  36.7 °C (98.1 °F)  37 °C (98.6 °F)   TempSrc:  Temporal  Temporal   SpO2: 92% 92% 94% 92%   Weight:  92 kg (202 lb 13.2 oz)     Height:         Oxygen Therapy:  Pulse Oximetry: 92 %, O2 (LPM): 3, O2 Delivery Device: Nasal Cannula    Intake/Output Summary (Last 24 hours) at 4/5/2025 1421  Last data filed at 4/5/2025 1138  Gross per 24 hour   Intake 30 ml   Output 2200 ml   Net -2170 ml         Physical Exam:  Physical Exam  Constitutional:       General: He is not in acute distress.     Appearance: Normal appearance. He is normal weight. He is not toxic-appearing or diaphoretic.   HENT:      Head: Normocephalic and atraumatic.      Nose: Nose normal.      Mouth/Throat:      Mouth: Mucous membranes are moist.   Eyes:      Pupils: Pupils are equal, round, and reactive to light.   Cardiovascular:      Rate and Rhythm: Regular rhythm.      Heart sounds: Normal heart sounds.   Pulmonary:      Effort: Pulmonary effort is normal.      Breath sounds: Normal breath sounds.   Abdominal:      General: Bowel " sounds are normal. There is no distension.      Palpations: Abdomen is soft.      Tenderness: There is no abdominal tenderness.   Musculoskeletal:      Cervical back: Neck supple.   Skin:     General: Skin is warm and dry.   Neurological:      Mental Status: He is alert. Mental status is at baseline.                 Labs:  Recent Labs     04/03/25 0245 04/04/25 0309 04/05/25  0330   SODIUM 137 138 141   POTASSIUM 3.6 3.7 4.3   CHLORIDE 98 99 103   CO2 30 28 28   BUN 17 25* 25*   CREATININE 0.80 0.82 0.95   MAGNESIUM 2.3 2.5 2.5   PHOSPHORUS 3.0 2.7 3.0   CALCIUM 8.1* 8.5 8.7     Recent Labs     04/03/25 0245 04/04/25 0309 04/05/25  0330   ALTSGPT 123* 96* 87*   ASTSGOT 79* 44 35   ALKPHOSPHAT 117* 109* 117*   TBILIRUBIN 0.5 0.6 0.6   GLUCOSE 134* 170* 170*     Recent Labs     04/03/25 0245 04/04/25 0309 04/05/25  0330   WBC 10.5 11.2* 13.4*   ASTSGOT 79* 44 35   ALTSGPT 123* 96* 87*   ALKPHOSPHAT 117* 109* 117*   TBILIRUBIN 0.5 0.6 0.6     Recent Labs     04/03/25 0245 04/04/25 0309 04/05/25  0330   RBC 4.80 4.98 5.01   HEMOGLOBIN 14.2 14.5 14.8   HEMATOCRIT 41.8* 43.9 45.0   PLATELETCT 312 353 401     Recent Results (from the past 24 hours)   CBC WITHOUT DIFFERENTIAL    Collection Time: 04/05/25  3:30 AM   Result Value Ref Range    WBC 13.4 (H) 4.8 - 10.8 K/uL    RBC 5.01 4.70 - 6.10 M/uL    Hemoglobin 14.8 14.0 - 18.0 g/dL    Hematocrit 45.0 42.0 - 52.0 %    MCV 89.8 81.4 - 97.8 fL    MCH 29.5 27.0 - 33.0 pg    MCHC 32.9 32.3 - 36.5 g/dL    RDW 46.1 35.9 - 50.0 fL    Platelet Count 401 164 - 446 K/uL    MPV 9.6 9.0 - 12.9 fL   Comp Metabolic Panel    Collection Time: 04/05/25  3:30 AM   Result Value Ref Range    Sodium 141 135 - 145 mmol/L    Potassium 4.3 3.6 - 5.5 mmol/L    Chloride 103 96 - 112 mmol/L    Co2 28 20 - 33 mmol/L    Anion Gap 10.0 7.0 - 16.0    Glucose 170 (H) 65 - 99 mg/dL    Bun 25 (H) 8 - 22 mg/dL    Creatinine 0.95 0.50 - 1.40 mg/dL    Calcium 8.7 8.5 - 10.5 mg/dL    Correct Calcium 9.5  8.5 - 10.5 mg/dL    AST(SGOT) 35 12 - 45 U/L    ALT(SGPT) 87 (H) 2 - 50 U/L    Alkaline Phosphatase 117 (H) 30 - 99 U/L    Total Bilirubin 0.6 0.1 - 1.5 mg/dL    Albumin 3.0 (L) 3.2 - 4.9 g/dL    Total Protein 7.0 6.0 - 8.2 g/dL    Globulin 4.0 (H) 1.9 - 3.5 g/dL    A-G Ratio 0.8 g/dL   MAGNESIUM    Collection Time: 04/05/25  3:30 AM   Result Value Ref Range    Magnesium 2.5 1.5 - 2.5 mg/dL   PHOSPHORUS    Collection Time: 04/05/25  3:30 AM   Result Value Ref Range    Phosphorus 3.0 2.5 - 4.5 mg/dL   ESTIMATED GFR    Collection Time: 04/05/25  3:30 AM   Result Value Ref Range    GFR (CKD-EPI) 85 >60 mL/min/1.73 m 2         Radiology Review:  DX-CHEST-PORTABLE (1 VIEW)   Final Result         1.  Pulmonary edema and/or infiltrates, stable since prior study.   2.  Small bilateral pleural effusions, right greater than left      DX-CHEST-PORTABLE (1 VIEW)   Final Result      1.  Small right pleural effusion and trace left pleural effusion.      2.  Right lower lobe airspace opacity consistent with atelectasis and/or pneumonitis.      3.  NG tube extends in the fundus of stomach.      DX-CHEST-PORTABLE (1 VIEW)   Final Result         1.  Pulmonary edema and/or infiltrates, stable since prior study.   2.  Small bilateral pleural effusions, right greater than left      DX-CHEST-PORTABLE (1 VIEW)   Final Result         1.  Pulmonary edema and/or infiltrates, stable since prior study.   2.  Small right pleural effusion      DX-CHEST-PORTABLE (1 VIEW)   Final Result         1. Slowly improving moderate right and mild left base atelectasis.      2. Right pleural pigtail drain remains. No pneumothorax.      3. NG tube                     DX-ABDOMEN FOR TUBE PLACEMENT   Final Result      Enteric feeding tube terminates in the left abdomen over the expected location of the stomach.      DX-CHEST-PORTABLE (1 VIEW)   Final Result         1. Right pleural pigtail drain. No pneumothorax.      2. Persistent moderate right lower lobe  opacity.      3. Lines and tubes without visible complication.                     EC-ECHOCARDIOGRAM LTD W/ CONT   Final Result      DX-CHEST-PORTABLE (1 VIEW)   Final Result         1. Right pleural pigtail drain. Some of the right lung opacity with parenchymal and pleural opacity remaining. No pneumothorax.      2. ETT 5 cm above yaa. NG tube in stomach.                     CT-HEAD W/O   Final Result      1.  No evidence of acute intracranial process.      2.  Cerebral atrophy as well as periventricular chronic small vessel ischemic change.               DX-CHEST-FOR LINE PLACEMENT Perform procedure in: Patient's Room   Final Result         1. Appropriately positioned endotracheal tube.   2. Interval improvement in aeration of the lung bases.      CT-CTA CHEST PULMONARY ARTERY W/ RECONS   Final Result      1.  Very large right pleural effusion causing collapse of the right lung.      2.  No CT evidence of pulmonary emboli.      3.  Multiple hepatic cysts.            DX-CHEST-PORTABLE (1 VIEW)   Final Result         1. Increased large amount of right-sided airspace disease with associated small to moderate right pleural effusion.   2. Stable left basilar airspace disease versus atelectasis.      CT-ABDOMEN-PELVIS W/O   Final Result      1.  No evidence of bowel obstruction or focal inflammatory change.      2.  Volume loss and consolidation within the right lung base with loculated right pleural effusion.      3.  Atelectasis within the left lung base.      4.  Constipation. Numerous sigmoid diverticulosis.      5.  Hepatic cysts.      6.  Gallstones.      NM-BILIARY (HIDA) SCAN WITH CCK   Final Result      No scintigraphic evidence of cholecystitis, biliary obstruction or other worrisome finding.      US-RUQ   Final Result      1.  Stones and sludge in the gallbladder.   2.  No evidence for acute cholecystitis or biliary obstruction.   3.  Multiple liver cysts again seen.   4.  Limited evaluation of pancreas  and abdominal aorta.      DX-CHEST-PORTABLE (1 VIEW)   Final Result      Hypoinflation with bibasilar atelectasis.  Superimposed pneumonia is difficult to exclude.            MDM (Data Review):   -Records reviewed and summarized in current documentation  -I personally reviewed and interpreted the laboratory results  -I personally reviewed the radiology images    Assessment/Recommendations:    IMPRESSION:  Oropharyngeal dysphagia  Suspected aspiration pneumonia  Advanced Parkinson's disease      RECS:  Ok to continue ASA  OK to give Lovenox night before PEG    Thorough discussion with Dr. Castillo, wife and patient re: PEG tube and aspiration.  PEG with jejunal extension tube is not a guarantee against aspiration as the tubes frequently pull back into the stomach.  Surgical J tubes are not ideal due to leaking.  He is at risk of aspiration due to OP dysphagia but by keeping him >45 degrees upright 2 hours after bolus feeding may be the best we can do to prevent aspiration.  Wife understands completely and is very engaged in his care.  Discussed risks, benefits, contraindications.      Will plan on Monday for PEG.  Ancef 2 gm in OR      Ju Smith M.D.          Core Quality Measures   Reviewed items:  Labs, Medications and Radiology reports reviewed

## 2025-04-05 NOTE — CARE PLAN
Problem: Hemodynamics  Goal: Patient's hemodynamics, fluid balance and neurologic status will be stable or improve  Description: Target End Date:  Prior to discharge or change in level of careDocument on Assessment and I/O flowsheet templates1.  Monitor vital signs, pulse oximetry and cardiac monitor per provider order and/or policy2.  Maintain blood pressure per provider order3.  Hemodynamic monitoring per provider order4.  Manage IV fluids and IV infusions5.  Monitor intake and output6.  Daily weights per unit policy or provider order7.  Assess peripheral pulses and capillary refill8.  Assess color and body temperature9.  Position patient for maximum circulation/cardiac [id number]. Monitor for signs/symptoms of excessive [id number]. Assess mental status, restlessness and changes in level of [id number]. Monitor temperature and report fever or hypothermia to provider immediately. Consideration of targeted temperature management.  Outcome: Progressing     Problem: Respiratory  Goal: Patient will achieve/maintain optimum respiratory ventilation and gas exchange  Description: Target End Date:  Prior to discharge or change in level of careDocument on Assessment flowsheet1.  Assess and monitor rate, rhythm, depth and effort of respiration2.  Breath sounds assessed qshift and/or as needed3.  Assess O2 saturation, administer/titrate oxygen as ordered4.  Position patient for maximum ventilatory efficiency5.  Turn, cough, and deep breath with splinting to improve effectiveness6.  Collaborate with RT to administer medication/treatments per order7.  Encourage use of incentive spirometer and encourage patient to cough after use and utilize splinting techniques if applicable8.  Airway suctioning9.  Monitor sputum production for changes in color, consistency and [id number]. Perform frequent oral gyipyuw15. Alternate physical activity with rest periods  Outcome: Progressing     Problem: Skin Integrity  Goal: Skin integrity  is maintained or improved  Description: Target End Date:  Prior to discharge or change in level of careDocument interventions on Skin Risk/Cuong flowsheet groups and corresponding LDA1.  Assess and monitor skin integrity, appearance and/or temperature2.  Assess risk factors for impaired skin integrity and/or pressures ulcers3.  Implement precautions to protect skin integrity in collaboration with interdisciplinary team4.  Implement pressure ulcer prevention protocol if at risk for skin breakdown5.  Confirm wound care consult if at risk for skin breakdown6.  Ensure patient use of pressure relieving devices  (Low air loss bed, waffle overlay, heel protectors, ROHO cushion, etc)  Outcome: Progressing   The patient is Stable - Low risk of patient condition declining or worsening    Shift Goals  Clinical Goals: monitor neuro status, maintain skin integrity  Patient Goals: soren  Family Goals: soren    Progress made toward(s) clinical / shift goals:  Patient is A0x2, oriented to self and place. Full bed bath given. Turned every 2 hours. Dressing intact . Oxygen support maintained. Oral care with suctioning done. Patient needs attended.     Patient is not progressing towards the following goals:

## 2025-04-06 LAB
ALBUMIN SERPL BCP-MCNC: 3.2 G/DL (ref 3.2–4.9)
ALBUMIN/GLOB SERPL: 0.7 G/DL
ALP SERPL-CCNC: 120 U/L (ref 30–99)
ALT SERPL-CCNC: 92 U/L (ref 2–50)
ANION GAP SERPL CALC-SCNC: 12 MMOL/L (ref 7–16)
AST SERPL-CCNC: 47 U/L (ref 12–45)
BILIRUB SERPL-MCNC: 0.8 MG/DL (ref 0.1–1.5)
BUN SERPL-MCNC: 29 MG/DL (ref 8–22)
CALCIUM ALBUM COR SERPL-MCNC: 9.3 MG/DL (ref 8.5–10.5)
CALCIUM SERPL-MCNC: 8.7 MG/DL (ref 8.5–10.5)
CHLORIDE SERPL-SCNC: 102 MMOL/L (ref 96–112)
CO2 SERPL-SCNC: 28 MMOL/L (ref 20–33)
CREAT SERPL-MCNC: 1.02 MG/DL (ref 0.5–1.4)
ERYTHROCYTE [DISTWIDTH] IN BLOOD BY AUTOMATED COUNT: 46.2 FL (ref 35.9–50)
GFR SERPLBLD CREATININE-BSD FMLA CKD-EPI: 78 ML/MIN/1.73 M 2
GLOBULIN SER CALC-MCNC: 4.3 G/DL (ref 1.9–3.5)
GLUCOSE SERPL-MCNC: 155 MG/DL (ref 65–99)
HCT VFR BLD AUTO: 47.4 % (ref 42–52)
HGB BLD-MCNC: 15.1 G/DL (ref 14–18)
MAGNESIUM SERPL-MCNC: 2.6 MG/DL (ref 1.5–2.5)
MCH RBC QN AUTO: 28.5 PG (ref 27–33)
MCHC RBC AUTO-ENTMCNC: 31.9 G/DL (ref 32.3–36.5)
MCV RBC AUTO: 89.6 FL (ref 81.4–97.8)
PHOSPHATE SERPL-MCNC: 3.2 MG/DL (ref 2.5–4.5)
PLATELET # BLD AUTO: 412 K/UL (ref 164–446)
PMV BLD AUTO: 9.4 FL (ref 9–12.9)
POTASSIUM SERPL-SCNC: 4.4 MMOL/L (ref 3.6–5.5)
PROT SERPL-MCNC: 7.5 G/DL (ref 6–8.2)
RBC # BLD AUTO: 5.29 M/UL (ref 4.7–6.1)
SODIUM SERPL-SCNC: 142 MMOL/L (ref 135–145)
WBC # BLD AUTO: 14.8 K/UL (ref 4.8–10.8)

## 2025-04-06 PROCEDURE — 700102 HCHG RX REV CODE 250 W/ 637 OVERRIDE(OP): Performed by: HOSPITALIST

## 2025-04-06 PROCEDURE — A9270 NON-COVERED ITEM OR SERVICE: HCPCS | Performed by: INTERNAL MEDICINE

## 2025-04-06 PROCEDURE — 99233 SBSQ HOSP IP/OBS HIGH 50: CPT | Performed by: INTERNAL MEDICINE

## 2025-04-06 PROCEDURE — 80053 COMPREHEN METABOLIC PANEL: CPT

## 2025-04-06 PROCEDURE — 85027 COMPLETE CBC AUTOMATED: CPT

## 2025-04-06 PROCEDURE — 99232 SBSQ HOSP IP/OBS MODERATE 35: CPT | Performed by: NURSE PRACTITIONER

## 2025-04-06 PROCEDURE — 770001 HCHG ROOM/CARE - MED/SURG/GYN PRIV*

## 2025-04-06 PROCEDURE — 700102 HCHG RX REV CODE 250 W/ 637 OVERRIDE(OP): Performed by: STUDENT IN AN ORGANIZED HEALTH CARE EDUCATION/TRAINING PROGRAM

## 2025-04-06 PROCEDURE — 700111 HCHG RX REV CODE 636 W/ 250 OVERRIDE (IP): Mod: JZ | Performed by: INTERNAL MEDICINE

## 2025-04-06 PROCEDURE — 700105 HCHG RX REV CODE 258: Performed by: INTERNAL MEDICINE

## 2025-04-06 PROCEDURE — A9270 NON-COVERED ITEM OR SERVICE: HCPCS | Performed by: HOSPITALIST

## 2025-04-06 PROCEDURE — A9270 NON-COVERED ITEM OR SERVICE: HCPCS | Performed by: STUDENT IN AN ORGANIZED HEALTH CARE EDUCATION/TRAINING PROGRAM

## 2025-04-06 PROCEDURE — 83735 ASSAY OF MAGNESIUM: CPT

## 2025-04-06 PROCEDURE — 700102 HCHG RX REV CODE 250 W/ 637 OVERRIDE(OP): Performed by: INTERNAL MEDICINE

## 2025-04-06 PROCEDURE — 94669 MECHANICAL CHEST WALL OSCILL: CPT

## 2025-04-06 PROCEDURE — 700111 HCHG RX REV CODE 636 W/ 250 OVERRIDE (IP): Mod: JZ | Performed by: STUDENT IN AN ORGANIZED HEALTH CARE EDUCATION/TRAINING PROGRAM

## 2025-04-06 PROCEDURE — 36415 COLL VENOUS BLD VENIPUNCTURE: CPT

## 2025-04-06 PROCEDURE — 84100 ASSAY OF PHOSPHORUS: CPT

## 2025-04-06 RX ADMIN — ENOXAPARIN SODIUM 40 MG: 100 INJECTION SUBCUTANEOUS at 17:24

## 2025-04-06 RX ADMIN — AMIODARONE HYDROCHLORIDE 400 MG: 200 TABLET ORAL at 04:22

## 2025-04-06 RX ADMIN — GABAPENTIN 200 MG: 100 CAPSULE ORAL at 17:22

## 2025-04-06 RX ADMIN — ASPIRIN 81 MG: 81 TABLET, CHEWABLE ORAL at 04:22

## 2025-04-06 RX ADMIN — GUAIFENESIN 200 MG: 100 LIQUID ORAL at 17:23

## 2025-04-06 RX ADMIN — LANSOPRAZOLE 30 MG: 30 TABLET, ORALLY DISINTEGRATING, DELAYED RELEASE ORAL at 04:22

## 2025-04-06 RX ADMIN — AMANTADINE HYDROCHLORIDE 100 MG: 100 TABLET ORAL at 04:22

## 2025-04-06 RX ADMIN — MIDODRINE HYDROCHLORIDE 10 MG: 5 TABLET ORAL at 04:22

## 2025-04-06 RX ADMIN — CARBIDOPA AND LEVODOPA 2 TABLET: 25; 250 TABLET ORAL at 12:29

## 2025-04-06 RX ADMIN — Medication 5 MG: at 20:05

## 2025-04-06 RX ADMIN — MIDODRINE HYDROCHLORIDE 10 MG: 5 TABLET ORAL at 20:05

## 2025-04-06 RX ADMIN — CARBIDOPA AND LEVODOPA 2 TABLET: 25; 250 TABLET ORAL at 20:05

## 2025-04-06 RX ADMIN — GUAIFENESIN 200 MG: 100 LIQUID ORAL at 12:29

## 2025-04-06 RX ADMIN — SENNOSIDES AND DOCUSATE SODIUM 2 TABLET: 50; 8.6 TABLET ORAL at 17:23

## 2025-04-06 RX ADMIN — CARBIDOPA AND LEVODOPA 2 TABLET: 25; 250 TABLET ORAL at 15:37

## 2025-04-06 RX ADMIN — SODIUM BICARBONATE 1300 MG: 650 TABLET ORAL at 09:08

## 2025-04-06 RX ADMIN — ROPINIROLE HYDROCHLORIDE 4 MG: 2 TABLET, FILM COATED ORAL at 17:23

## 2025-04-06 RX ADMIN — POTASSIUM BICARBONATE 25 MEQ: 978 TABLET, EFFERVESCENT ORAL at 17:23

## 2025-04-06 RX ADMIN — CARBIDOPA AND LEVODOPA 2 TABLET: 25; 250 TABLET ORAL at 09:08

## 2025-04-06 RX ADMIN — OXYBUTYNIN CHLORIDE 5 MG: 5 TABLET ORAL at 04:22

## 2025-04-06 RX ADMIN — ROPINIROLE HYDROCHLORIDE 4 MG: 2 TABLET, FILM COATED ORAL at 12:29

## 2025-04-06 RX ADMIN — FUROSEMIDE 40 MG: 10 INJECTION, SOLUTION INTRAVENOUS at 04:22

## 2025-04-06 RX ADMIN — AMPICILLIN SODIUM, SULBACTAM SODIUM 3 G: 2; 1 INJECTION, POWDER, FOR SOLUTION INTRAMUSCULAR; INTRAVENOUS at 04:40

## 2025-04-06 RX ADMIN — AMPICILLIN SODIUM, SULBACTAM SODIUM 3 G: 2; 1 INJECTION, POWDER, FOR SOLUTION INTRAMUSCULAR; INTRAVENOUS at 23:16

## 2025-04-06 RX ADMIN — MIDODRINE HYDROCHLORIDE 10 MG: 5 TABLET ORAL at 15:37

## 2025-04-06 RX ADMIN — AMANTADINE HYDROCHLORIDE 100 MG: 100 TABLET ORAL at 17:23

## 2025-04-06 RX ADMIN — AMPICILLIN SODIUM, SULBACTAM SODIUM 3 G: 2; 1 INJECTION, POWDER, FOR SOLUTION INTRAMUSCULAR; INTRAVENOUS at 13:25

## 2025-04-06 RX ADMIN — MULTIVITAMIN TABLET 1 TABLET: TABLET at 05:13

## 2025-04-06 RX ADMIN — Medication 100 MG: at 05:13

## 2025-04-06 RX ADMIN — ROPINIROLE HYDROCHLORIDE 4 MG: 2 TABLET, FILM COATED ORAL at 04:23

## 2025-04-06 RX ADMIN — AMPICILLIN SODIUM, SULBACTAM SODIUM 3 G: 2; 1 INJECTION, POWDER, FOR SOLUTION INTRAMUSCULAR; INTRAVENOUS at 18:00

## 2025-04-06 RX ADMIN — OXYBUTYNIN CHLORIDE 5 MG: 5 TABLET ORAL at 17:24

## 2025-04-06 RX ADMIN — SODIUM BICARBONATE 1300 MG: 650 TABLET ORAL at 20:05

## 2025-04-06 RX ADMIN — POTASSIUM BICARBONATE 25 MEQ: 978 TABLET, EFFERVESCENT ORAL at 04:22

## 2025-04-06 RX ADMIN — AMIODARONE HYDROCHLORIDE 400 MG: 200 TABLET ORAL at 17:23

## 2025-04-06 RX ADMIN — GUAIFENESIN 200 MG: 100 LIQUID ORAL at 04:22

## 2025-04-06 RX ADMIN — FUROSEMIDE 40 MG: 10 INJECTION, SOLUTION INTRAVENOUS at 17:25

## 2025-04-06 RX ADMIN — SODIUM BICARBONATE 1300 MG: 650 TABLET ORAL at 15:37

## 2025-04-06 ASSESSMENT — PAIN DESCRIPTION - PAIN TYPE
TYPE: ACUTE PAIN

## 2025-04-06 NOTE — PROGRESS NOTES
"Hospital Medicine Daily Progress Note    Date of Service  4/5/2025    Chief Complaint  Abdominal pain RUQ x1 week.    Hospital Course  Farhat Huber is a 74-year-old male with a past medical history significant for Parkinson, hypertension, dysphagia, GERD, history of CVA (due to LV thrombus) on 6/2022.  He presented to the ER with a complaint of abdominal pain that has been going on for the last few days .    Presents in ER, patient noted to have WBC of 17.7, AST/ALT normal, lipase 19, procalcitonin 0.5.  Blood culture x 2 was ordered, patient was started on antibiotics for aspiration pneumonia including Unasyn and azithromycin.  Ultrasound of the abdomen showed stones/sludge of gallbladder, no evidence of acute cholecystitis CBD obstruction, and multiple liver cysts. A CT chest showed large right pleural effusion.     Echocardiogram showing EF of 60%, akinesis of the apex.    4/3/25:  Remains non-verbal but nods head to questions.  Weak.  I have Voalte his prior neurologist Dr Mclain for any possible medication change.  Yesterday I reached out to Dr Tc Gregorio for request to review for further input on the patient and he replied with \" I don't have anything concretely to add for patient.\"        Interval Problem Update  Patient seen and examine at bedside  Medically cleared: No   Pending: GI to eval for PEG prob MONDAY  Estimated Discharge Day:  Disposition:      Managing dysphagia/NG tube, aspiration pneumonia (completing antibiotics) in the setting of Parkinson's (followed by Dr. Mclain), hypertension, dysphagia, GERD, history of CVA (due to LV thrombus) on 6/2022. He first presented with abdominal pain. He has very linited capacity. He has an NGT. Wife at bedside and I spoke with her for a while. We discussed codes status and goals o care. I formally consulted Palliative. Speech discussed about Palliative however we queried them if patient will need a PEG or will he improve off NG tube. We discussed " artificial nutrition, DNR but I OK, goals of care and at this time wife feels he is not a candidate for dialysis or cardiac cath or invasive procedures but may be OK with PEG and artifical nutrition and intubation. Palliative consulted. I spoke with Speech They recommend a PEG tube. I spoke with wife and patient. They confirmed they want a PEG tube.  I consulted MARIE Guy for PEG tube placement. Long discussion with patient, Dr. Smith. Wife believes patient wants the PEG tube and has some form of capscity to want it. She does uindestands that wheter its PO intake, feediung tube or PEG tube this will NOT IMPROVE aspiration. She does understand that she will continue to do suctioning and she is given tipsregarding HOP elvation and trickle/bolus tube feeding to minimize reflux. Ultimately, she wants a PEG tube placed which is planned TOMORROW or MONDAY tentatively.     WBC 15, has add Abx for aspiration; ordered CXR showing edema/infiltrates; restart antibiotics for aspiration.     I reviewed the chart along with vitals, labs, imaging, test (both pending and resulted) and recommendations from specialists and interdisciplinary team.  I have discussed this patient's plan of care and discharge plan at IDT rounds today with Case Management, Nursing, Nursing leadership, and other members of the IDT team.      Consultants/Specialty  critical care    Code Status  DNAR, I OK    Disposition  Medically Cleared  I have placed the appropriate orders for post-discharge needs.    Review of Systems  Review of Systems   Unable to perform ROS: Mental acuity        Physical Exam  Temp:  [36.5 °C (97.7 °F)-37 °C (98.6 °F)] 36.5 °C (97.7 °F)  Pulse:  [77-88] 88  Resp:  [18-20] 18  BP: (102-145)/(63-96) 113/75  SpO2:  [91 %-94 %] 91 %    Physical Exam  Vitals reviewed.   Constitutional:       Appearance: He is ill-appearing.   HENT:      Head: Normocephalic.      Mouth/Throat:      Pharynx: Oropharyngeal exudate present.   Eyes:       General:         Right eye: No discharge.         Left eye: No discharge.      Conjunctiva/sclera: Conjunctivae normal.   Cardiovascular:      Rate and Rhythm: Normal rate and regular rhythm.      Heart sounds: Normal heart sounds. No murmur heard.  Pulmonary:      Effort: Pulmonary effort is normal. No respiratory distress.      Breath sounds: Examination of the right-lower field reveals decreased breath sounds. Decreased breath sounds present.   Abdominal:      General: Bowel sounds are normal. There is no distension.   Musculoskeletal:      Cervical back: No tenderness.      Right lower leg: No edema.      Left lower leg: No edema.   Lymphadenopathy:      Cervical: No cervical adenopathy.   Skin:     General: Skin is warm.      Coloration: Skin is pale.   Neurological:      Mental Status: He is alert.      Cranial Nerves: Cranial nerve deficit present.      Motor: Weakness present.      Coordination: Coordination abnormal.         Fluids    Intake/Output Summary (Last 24 hours) at 4/5/2025 1727  Last data filed at 4/5/2025 1200  Gross per 24 hour   Intake 60 ml   Output 2200 ml   Net -2140 ml        Laboratory  Recent Labs     04/03/25  0245 04/04/25  0309 04/05/25  0330   WBC 10.5 11.2* 13.4*   RBC 4.80 4.98 5.01   HEMOGLOBIN 14.2 14.5 14.8   HEMATOCRIT 41.8* 43.9 45.0   MCV 87.1 88.2 89.8   MCH 29.6 29.1 29.5   MCHC 34.0 33.0 32.9   RDW 43.3 44.2 46.1   PLATELETCT 312 353 401   MPV 9.4 9.6 9.6     Recent Labs     04/03/25  0245 04/04/25  0309 04/05/25  0330   SODIUM 137 138 141   POTASSIUM 3.6 3.7 4.3   CHLORIDE 98 99 103   CO2 30 28 28   GLUCOSE 134* 170* 170*   BUN 17 25* 25*   CREATININE 0.80 0.82 0.95   CALCIUM 8.1* 8.5 8.7                   Imaging  DX-CHEST-PORTABLE (1 VIEW)   Final Result         1.  Pulmonary edema and/or infiltrates, stable since prior study.   2.  Small bilateral pleural effusions, right greater than left      DX-CHEST-PORTABLE (1 VIEW)   Final Result      1.  Small right pleural  effusion and trace left pleural effusion.      2.  Right lower lobe airspace opacity consistent with atelectasis and/or pneumonitis.      3.  NG tube extends in the fundus of stomach.      DX-CHEST-PORTABLE (1 VIEW)   Final Result         1.  Pulmonary edema and/or infiltrates, stable since prior study.   2.  Small bilateral pleural effusions, right greater than left      DX-CHEST-PORTABLE (1 VIEW)   Final Result         1.  Pulmonary edema and/or infiltrates, stable since prior study.   2.  Small right pleural effusion      DX-CHEST-PORTABLE (1 VIEW)   Final Result         1. Slowly improving moderate right and mild left base atelectasis.      2. Right pleural pigtail drain remains. No pneumothorax.      3. NG tube                     DX-ABDOMEN FOR TUBE PLACEMENT   Final Result      Enteric feeding tube terminates in the left abdomen over the expected location of the stomach.      DX-CHEST-PORTABLE (1 VIEW)   Final Result         1. Right pleural pigtail drain. No pneumothorax.      2. Persistent moderate right lower lobe opacity.      3. Lines and tubes without visible complication.                     EC-ECHOCARDIOGRAM LTD W/ CONT   Final Result      DX-CHEST-PORTABLE (1 VIEW)   Final Result         1. Right pleural pigtail drain. Some of the right lung opacity with parenchymal and pleural opacity remaining. No pneumothorax.      2. ETT 5 cm above yaa. NG tube in stomach.                     CT-HEAD W/O   Final Result      1.  No evidence of acute intracranial process.      2.  Cerebral atrophy as well as periventricular chronic small vessel ischemic change.               DX-CHEST-FOR LINE PLACEMENT Perform procedure in: Patient's Room   Final Result         1. Appropriately positioned endotracheal tube.   2. Interval improvement in aeration of the lung bases.      CT-CTA CHEST PULMONARY ARTERY W/ RECONS   Final Result      1.  Very large right pleural effusion causing collapse of the right lung.      2.  No  CT evidence of pulmonary emboli.      3.  Multiple hepatic cysts.            DX-CHEST-PORTABLE (1 VIEW)   Final Result         1. Increased large amount of right-sided airspace disease with associated small to moderate right pleural effusion.   2. Stable left basilar airspace disease versus atelectasis.      CT-ABDOMEN-PELVIS W/O   Final Result      1.  No evidence of bowel obstruction or focal inflammatory change.      2.  Volume loss and consolidation within the right lung base with loculated right pleural effusion.      3.  Atelectasis within the left lung base.      4.  Constipation. Numerous sigmoid diverticulosis.      5.  Hepatic cysts.      6.  Gallstones.      NM-BILIARY (HIDA) SCAN WITH CCK   Final Result      No scintigraphic evidence of cholecystitis, biliary obstruction or other worrisome finding.      US-RUQ   Final Result      1.  Stones and sludge in the gallbladder.   2.  No evidence for acute cholecystitis or biliary obstruction.   3.  Multiple liver cysts again seen.   4.  Limited evaluation of pancreas and abdominal aorta.      DX-CHEST-PORTABLE (1 VIEW)   Final Result      Hypoinflation with bibasilar atelectasis.  Superimposed pneumonia is difficult to exclude.           Assessment/Plan  * Pneumonia due to infectious organism- (present on admission)  Assessment & Plan  4/3/25:  Presenting with right upper quadrant pain, productive cough with clear sputum and fever at home  CXR reviewed, opacity on the right.  History of dysphagia due to Parkinson's, suspect aspiration pneumonia  S/P unasyn, azithro, zyvox  3/28/25 Procalcitonin:1.45    Other dysphagia  Assessment & Plan  NG t  SLP following    Hypokalemia  Assessment & Plan  4/3/25:  4/2 K:2.9  4/3: 3.6  Replace and monitor  Monitor labs  Recent Labs     04/03/25  0245 04/04/25  0309 04/05/25  0330   SODIUM 137 138 141   POTASSIUM 3.6 3.7 4.3   CHLORIDE 98 99 103   CO2 30 28 28   GLUCOSE 134* 170* 170*   BUN 17 25* 25*   CREATININE 0.80 0.82  0.95         Paroxysmal atrial fibrillation (HCC)  Assessment & Plan  4/3/25  HR:71  Amiodarone 400mg BID with plan to go to a maintenance dose  Vitals:    04/05/25 1605   BP: 113/75   Pulse: 88   Resp: 18   Temp: 36.5 °C (97.7 °F)   SpO2: 91%         Acute respiratory failure with hypoxia (HCC)  Assessment & Plan  4/3/25:  Concern of ongoing aspiration risk given advancing [facility]  Has a NG tube for enteral feeding currently  Aspiration precautions and SLP  Titrate O2 to keep SpO2 >90  Currently down to 2-4L NC    Pleural effusion, right  Assessment & Plan  4/3/25:  Noted on chest x-ray and prior CT scan this admit  4/2 CXR still showing right pleural effusion and possible pulmonary edema  4/3 CXR unchanged  S/p Chest tube      Advance care planning  Assessment & Plan  4/3/25:  Patient is nonverbal . Patient wife who is poa and nursing staff were present for the conversation.   Discussed need to consider further advanced care planning if he is not improving from inability to protect his airway.  I feel he is at risk of secretion aspirations even if we placed a PEG tube.  Code status changed to dnar/I okay    Right upper quadrant abdominal pain- (present on admission)  Assessment & Plan  4/3/25:  No current chest pain  LFTs unremarkable,ush showing gallstone   Hida  normal , likley chest pain is from pleurisy    History of stroke- (present on admission)  Assessment & Plan  4/3:  Continue home aspirin, atorvastatin    Parkinson disease (HCC)- (present on admission)  Assessment & Plan  4/3/25:  Continue carbidopa-levodopa  Nonverbal at baseline, uses a wheelchair but able to stand to urinate on his own and does not use a catheter  NPO, speech following (failed FEEs)  At high risk of aspiration even with enteral feeding given [facility]  Dr Tc Gregorio, neurologist, states nothing more to add  I have reached out to his outpatient neurologist as well  I discussed 4/2 with wife and patient End of life  considerations.  May need near future Hospice given his severe dysphagia in the interim let us see if any improvement with Speech therapy      Restless leg syndrome  Assessment & Plan  Continue ropinirole    Other hyperlipidemia- (present on admission)  Assessment & Plan  4/3/25:  Continue atorvastatin         VTE prophylaxis: VTE Selection    I have performed a physical exam and reviewed and updated ROS and Plan today (4/5/2025). In review of yesterday's note (4/4/2025), there are no changes except as documented above.    Patient is has a high medical complexity, complex decision making and is at high risk for complication, morbidity, and mortality, thus requiring the highest level of my preparedness for sudden, emergent intervention. Medical decision making is therefore complex. I provided  services, which included ordering labs and/or imaging, and discussing the case with various consultants.medication orders, frequent reevaluations of the patient's condition and response to treatment. Time was also devoted to counseling and coordinating care including review of records, pertinent lab data and studies, as well as discussing diagnostic evaluation and work up, planned therapeutic interventions and future disposition of care. Where indicated, the assessment and plan reflect discussion of patient with consultants, other healthcare providers, family members, and additional research needed to obtain further information in formulating the plan of care for Jeremiah Huber. Total time spent was 70 minutes.   In addition to that I spent another 15 minutes for advanced care/counseling discussing and confirming code status and goals of care with patient, family, consultants and Palliative team.

## 2025-04-06 NOTE — CARE PLAN
The patient is Watcher - Medium risk of patient condition declining or worsening    Shift Goals  Clinical Goals: stable neuro status.mainatain skin integrity  Patient Goals: soren  Family Goals: SOREN    Progress made toward(s) clinical / shift goals:  stable neuro status,able to sleep  Problem: Hemodynamics  Goal: Patient's hemodynamics, fluid balance and neurologic status will be stable or improve  Outcome: Progressing     Problem: Urinary - Renal Perfusion  Goal: Ability to achieve and maintain adequate renal perfusion and functioning will improve  Outcome: Progressing     Problem: Respiratory  Goal: Patient will achieve/maintain optimum respiratory ventilation and gas exchange  Outcome: Progressing       Patient is not progressing towards the following goals:

## 2025-04-06 NOTE — CARE PLAN
The patient is Stable - Low risk of patient condition declining or worsening    Shift Goals  Clinical Goals: Stable neuro status, maintain skin integrity  Patient Goals: JUANITA  Family Goals: JUANITA    Progress made toward(s) clinical / shift goals:    Problem: Respiratory  Goal: Patient will achieve/maintain optimum respiratory ventilation and gas exchange  Description: Target End Date:  Prior to discharge or change in level of careDocument on Assessment flowsheet1.  Assess and monitor rate, rhythm, depth and effort of respiration2.  Breath sounds assessed qshift and/or as needed3.  Assess O2 saturation, administer/titrate oxygen as ordered4.  Position patient for maximum ventilatory efficiency5.  Turn, cough, and deep breath with splinting to improve effectiveness6.  Collaborate with RT to administer medication/treatments per order7.  Encourage use of incentive spirometer and encourage patient to cough after use and utilize splinting techniques if applicable8.  Airway suctioning9.  Monitor sputum production for changes in color, consistency and [id number]. Perform frequent oral [id number]. Alternate physical activity with rest periods  Target End Date:  Prior to discharge or change in level of careDocument on Assessment flowsheet1.  Assess and monitor rate, rhythm, depth and effort of respiration2.  Breath sounds assessed qshift and/or as needed3.  Assess O2 saturation, administer/titrate oxygen as ordered4.  Position patient for maximum ventilatory efficiency5.  Turn, cough, and deep breath with splinting to improve effectiveness6.  Collaborate with RT to administer medication/treatments per order7.  Encourage use of incentive spirometer and encourage patient to cough after use and utilize splinting techniques if applicable8.  Airway suctioning9.  Monitor sputum production for changes in color, consistency and [id number]. Perform frequent oral [id number]. Alternate physical activity with rest periods  Outcome:  Progressing     Problem: Skin Integrity  Goal: Skin integrity is maintained or improved  Description: Target End Date:  Prior to discharge or change in level of careDocument interventions on Skin Risk/Cuong flowsheet groups and corresponding LDA1.  Assess and monitor skin integrity, appearance and/or temperature2.  Assess risk factors for impaired skin integrity and/or pressures ulcers3.  Implement precautions to protect skin integrity in collaboration with interdisciplinary team4.  Implement pressure ulcer prevention protocol if at risk for skin breakdown5.  Confirm wound care consult if at risk for skin breakdown6.  Ensure patient use of pressure relieving devices  (Low air loss bed, waffle overlay, heel protectors, ROHO cushion, etc)  Outcome: Progressing     Problem: Self Care  Goal: Patient will have the ability to perform ADLs independently or with assistance (bathe, groom, dress, toilet and feed)  Description: Target End Date:  Prior to discharge or change in level of careDocument on ADL flowsheet1.  Assess the capability and level of deficiency to perform ADLs2.  Encourage family/care giver involvement3.  Provide assistive devices4.  Consider PT/OT evaluations5.  Maintain support, give positive feedback, encourage self-care allowing extra time and verbal cuing as needed6.  Avoid doing something for patients they can do themselves, but provide assistance as needed7.  Assist in anticipating/planning individual needs8.  Collaborate with Case Management and  to meet discharge needs  Outcome: Progressing       Patient is not progressing towards the following goals:

## 2025-04-06 NOTE — PROGRESS NOTES
"Hospital Medicine Daily Progress Note    Date of Service  4/6/2025    Chief Complaint  Abdominal pain RUQ x1 week.    Hospital Course  Farhat Huber is a 74-year-old male with a past medical history significant for Parkinson, hypertension, dysphagia, GERD, history of CVA (due to LV thrombus) on 6/2022.  He presented to the ER with a complaint of abdominal pain that has been going on for the last few days .    Presents in ER, patient noted to have WBC of 17.7, AST/ALT normal, lipase 19, procalcitonin 0.5.  Blood culture x 2 was ordered, patient was started on antibiotics for aspiration pneumonia including Unasyn and azithromycin.  Ultrasound of the abdomen showed stones/sludge of gallbladder, no evidence of acute cholecystitis CBD obstruction, and multiple liver cysts. A CT chest showed large right pleural effusion.     Echocardiogram showing EF of 60%, akinesis of the apex.    4/3/25:  Remains non-verbal but nods head to questions.  Weak.  I have Voalte his prior neurologist Dr Mclain for any possible medication change.  Yesterday I reached out to Dr Tc Gregorio for request to review for further input on the patient and he replied with \" I don't have anything concretely to add for patient.\"        Interval Problem Update  Patient seen and examine at bedside  Medically cleared: No   Pending: PEG MONDAY  Estimated Discharge Day:  Disposition: SNF     Managing dysphagia/NG tube, aspiration pneumonia (completing antibiotics) in the setting of Parkinson's (followed by Dr. Mclain), hypertension, dysphagia, GERD, history of CVA (due to LV thrombus) on 6/2022. He first presented with abdominal pain.      He has very limited capacity. He has an NGT. Wife at bedside and I spoke with her for a while. We discussed codes status and goals o care. I formally consulted Palliative. Speech discussed about Palliative however we queried them if patient will need a PEG or will he improve off NG tube. We discussed artificial " nutrition, DNR but I OK, goals of care and at this time wife feels he is not a candidate for dialysis or cardiac cath or invasive procedures but may be OK with PEG and artifical nutrition and intubation. Palliative consulted. I spoke with Speech They recommend a PEG tube. I spoke with wife and patient. They confirmed they want a PEG tube.  I consulted Dr. Smith GI for PEG tube placement. Long discussion with patient, Dr. Smith. Wife believes patient wants the PEG tube and has some form of capscity to want it. She does uindestands that wheter its PO intake, feediung tube or PEG tube this will NOT IMPROVE aspiration. She does understand that she will continue to do suctioning and she is given tipsregarding HOP elvation and trickle/bolus tube feeding to minimize reflux. Ultimately, she wants a PEG tube placed which is now planned MONDAY    Reiterated plan to wife at bedside. Patient otherwise stable and keeping NG tube in.     I reviewed the chart along with vitals, labs, imaging, test (both pending and resulted) and recommendations from specialists and interdisciplinary team.  I have discussed this patient's plan of care and discharge plan at IDT rounds today with Case Management, Nursing, Nursing leadership, and other members of the IDT team.      Consultants/Specialty  critical care    Code Status  DNAR, I OK    Disposition  Medically Cleared  I have placed the appropriate orders for post-discharge needs.    Review of Systems  Review of Systems   Unable to perform ROS: Mental acuity        Physical Exam  Temp:  [36.5 °C (97.7 °F)-37.1 °C (98.8 °F)] 37.1 °C (98.8 °F)  Pulse:  [76-88] 82  Resp:  [17-22] 22  BP: (107-113)/(71-75) 108/71  SpO2:  [91 %] 91 %    Physical Exam  Vitals reviewed.   Constitutional:       Appearance: He is ill-appearing.   HENT:      Head: Normocephalic.      Mouth/Throat:      Pharynx: Oropharyngeal exudate present.   Eyes:      General:         Right eye: No discharge.         Left  eye: No discharge.      Conjunctiva/sclera: Conjunctivae normal.   Cardiovascular:      Rate and Rhythm: Normal rate and regular rhythm.      Heart sounds: Normal heart sounds. No murmur heard.  Pulmonary:      Effort: Pulmonary effort is normal. No respiratory distress.      Breath sounds: Examination of the right-lower field reveals decreased breath sounds. Decreased breath sounds present.   Abdominal:      General: Bowel sounds are normal. There is no distension.   Musculoskeletal:      Cervical back: No tenderness.      Right lower leg: No edema.      Left lower leg: No edema.   Lymphadenopathy:      Cervical: No cervical adenopathy.   Skin:     General: Skin is warm.      Coloration: Skin is pale.   Neurological:      Mental Status: He is alert.      Cranial Nerves: Cranial nerve deficit present.      Motor: Weakness present.      Coordination: Coordination abnormal.         Fluids    Intake/Output Summary (Last 24 hours) at 4/6/2025 1442  Last data filed at 4/6/2025 1409  Gross per 24 hour   Intake 90 ml   Output 2200 ml   Net -2110 ml        Laboratory  Recent Labs     04/04/25  0309 04/05/25  0330 04/06/25  0541   WBC 11.2* 13.4* 14.8*   RBC 4.98 5.01 5.29   HEMOGLOBIN 14.5 14.8 15.1   HEMATOCRIT 43.9 45.0 47.4   MCV 88.2 89.8 89.6   MCH 29.1 29.5 28.5   MCHC 33.0 32.9 31.9*   RDW 44.2 46.1 46.2   PLATELETCT 353 401 412   MPV 9.6 9.6 9.4     Recent Labs     04/04/25  0309 04/05/25  0330 04/06/25  0541   SODIUM 138 141 142   POTASSIUM 3.7 4.3 4.4   CHLORIDE 99 103 102   CO2 28 28 28   GLUCOSE 170* 170* 155*   BUN 25* 25* 29*   CREATININE 0.82 0.95 1.02   CALCIUM 8.5 8.7 8.7                   Imaging  DX-CHEST-PORTABLE (1 VIEW)   Final Result      Stable bibasilar airspace disease      DX-CHEST-PORTABLE (1 VIEW)   Final Result         1.  Pulmonary edema and/or infiltrates, stable since prior study.   2.  Small bilateral pleural effusions, right greater than left      DX-CHEST-PORTABLE (1 VIEW)   Final Result       1.  Small right pleural effusion and trace left pleural effusion.      2.  Right lower lobe airspace opacity consistent with atelectasis and/or pneumonitis.      3.  NG tube extends in the fundus of stomach.      DX-CHEST-PORTABLE (1 VIEW)   Final Result         1.  Pulmonary edema and/or infiltrates, stable since prior study.   2.  Small bilateral pleural effusions, right greater than left      DX-CHEST-PORTABLE (1 VIEW)   Final Result         1.  Pulmonary edema and/or infiltrates, stable since prior study.   2.  Small right pleural effusion      DX-CHEST-PORTABLE (1 VIEW)   Final Result         1. Slowly improving moderate right and mild left base atelectasis.      2. Right pleural pigtail drain remains. No pneumothorax.      3. NG tube                     DX-ABDOMEN FOR TUBE PLACEMENT   Final Result      Enteric feeding tube terminates in the left abdomen over the expected location of the stomach.      DX-CHEST-PORTABLE (1 VIEW)   Final Result         1. Right pleural pigtail drain. No pneumothorax.      2. Persistent moderate right lower lobe opacity.      3. Lines and tubes without visible complication.                     EC-ECHOCARDIOGRAM LTD W/ CONT   Final Result      DX-CHEST-PORTABLE (1 VIEW)   Final Result         1. Right pleural pigtail drain. Some of the right lung opacity with parenchymal and pleural opacity remaining. No pneumothorax.      2. ETT 5 cm above yaa. NG tube in stomach.                     CT-HEAD W/O   Final Result      1.  No evidence of acute intracranial process.      2.  Cerebral atrophy as well as periventricular chronic small vessel ischemic change.               DX-CHEST-FOR LINE PLACEMENT Perform procedure in: Patient's Room   Final Result         1. Appropriately positioned endotracheal tube.   2. Interval improvement in aeration of the lung bases.      CT-CTA CHEST PULMONARY ARTERY W/ RECONS   Final Result      1.  Very large right pleural effusion causing collapse of  the right lung.      2.  No CT evidence of pulmonary emboli.      3.  Multiple hepatic cysts.            DX-CHEST-PORTABLE (1 VIEW)   Final Result         1. Increased large amount of right-sided airspace disease with associated small to moderate right pleural effusion.   2. Stable left basilar airspace disease versus atelectasis.      CT-ABDOMEN-PELVIS W/O   Final Result      1.  No evidence of bowel obstruction or focal inflammatory change.      2.  Volume loss and consolidation within the right lung base with loculated right pleural effusion.      3.  Atelectasis within the left lung base.      4.  Constipation. Numerous sigmoid diverticulosis.      5.  Hepatic cysts.      6.  Gallstones.      NM-BILIARY (HIDA) SCAN WITH CCK   Final Result      No scintigraphic evidence of cholecystitis, biliary obstruction or other worrisome finding.      US-RUQ   Final Result      1.  Stones and sludge in the gallbladder.   2.  No evidence for acute cholecystitis or biliary obstruction.   3.  Multiple liver cysts again seen.   4.  Limited evaluation of pancreas and abdominal aorta.      DX-CHEST-PORTABLE (1 VIEW)   Final Result      Hypoinflation with bibasilar atelectasis.  Superimposed pneumonia is difficult to exclude.           Assessment/Plan  * Pneumonia due to infectious organism- (present on admission)  Assessment & Plan  4/3/25:  Presenting with right upper quadrant pain, productive cough with clear sputum and fever at home  CXR reviewed, opacity on the right.  History of dysphagia due to Parkinson's, suspect aspiration pneumonia  S/P unasyn, azithro, zyvox  3/28/25 Procalcitonin:1.45    Other dysphagia  Assessment & Plan  NG t  SLP following  GI to place PEG tentative 4/7    Hypokalemia  Assessment & Plan  4/3/25:  4/2 K:2.9  4/3: 3.6  Replace and monitor  Monitor labs  Recent Labs     04/04/25  0309 04/05/25  0330 04/06/25  0541   SODIUM 138 141 142   POTASSIUM 3.7 4.3 4.4   CHLORIDE 99 103 102   CO2 28 28 28    GLUCOSE 170* 170* 155*   BUN 25* 25* 29*   CREATININE 0.82 0.95 1.02         Paroxysmal atrial fibrillation (HCC)  Assessment & Plan  4/3/25  HR:71  Amiodarone 400mg BID with plan to go to a maintenance dose  Vitals:    04/05/25 1605   BP: 113/75   Pulse: 88   Resp: 18   Temp: 36.5 °C (97.7 °F)   SpO2: 91%         Acute respiratory failure with hypoxia (HCC)  Assessment & Plan  4/3/25:  Concern of ongoing aspiration risk given advancing Reyna  Has a NG tube for enteral feeding currently  Aspiration precautions and SLP  Titrate O2 to keep SpO2 >90  Currently down to 2-4L NC    Pleural effusion, right  Assessment & Plan  4/3/25:  Noted on chest x-ray and prior CT scan this admit  4/2 CXR still showing right pleural effusion and possible pulmonary edema  4/3 CXR unchanged  S/p Chest tube      Advance care planning  Assessment & Plan  4/3/25:  Patient is nonverbal . Patient wife who is poa and nursing staff were present for the conversation.   Discussed need to consider further advanced care planning if he is not improving from inability to protect his airway.  I feel he is at risk of secretion aspirations even if we placed a PEG tube.  Code status changed to dnar/I okay    Right upper quadrant abdominal pain- (present on admission)  Assessment & Plan  4/3/25:  No current chest pain  LFTs unremarkable,ush showing gallstone   Hida  normal , likley chest pain is from pleurisy    History of stroke- (present on admission)  Assessment & Plan  4/3:  Continue home aspirin, atorvastatin    Parkinson disease (HCC)- (present on admission)  Assessment & Plan  4/3/25:  Continue carbidopa-levodopa  Nonverbal at baseline, uses a wheelchair but able to stand to urinate on his own and does not use a catheter  NPO, speech following (failed FEEs)  At high risk of aspiration even with enteral feeding given Reyna Gregorio, neurologist, states nothing more to add  I have reached out to his outpatient neurologist as  well  I discussed 4/2 with wife and patient End of life considerations.  May need near future Hospice given his severe dysphagia in the interim let us see if any improvement with Speech therapy      Restless leg syndrome  Assessment & Plan  Continue ropinirole    Other hyperlipidemia- (present on admission)  Assessment & Plan  4/3/25:  Continue atorvastatin         VTE prophylaxis: VTE Selection    I have performed a physical exam and reviewed and updated ROS and Plan today (4/6/2025). In review of yesterday's note (4/5/2025), there are no changes except as documented above.    Patient is has a high medical complexity, complex decision making and is at high risk for complication, morbidity, and mortality, thus requiring the highest level of my preparedness for sudden, emergent intervention. Medical decision making is therefore complex. I provided  services, which included ordering labs and/or imaging, and discussing the case with various consultants.medication orders, frequent reevaluations of the patient's condition and response to treatment. Time was also devoted to counseling and coordinating care including review of records, pertinent lab data and studies, as well as discussing diagnostic evaluation and work up, planned therapeutic interventions and future disposition of care. Where indicated, the assessment and plan reflect discussion of patient with consultants, other healthcare providers, family members, and additional research needed to obtain further information in formulating the plan of care for Jeremiah Huber. Total time spent was 70 minutes.   In addition to that I spent another 15 minutes for advanced care/counseling discussing and confirming code status and goals of care with patient, family, consultants and Palliative team.

## 2025-04-07 LAB
ALBUMIN SERPL BCP-MCNC: 3 G/DL (ref 3.2–4.9)
ALBUMIN/GLOB SERPL: 0.7 G/DL
ALP SERPL-CCNC: 114 U/L (ref 30–99)
ALT SERPL-CCNC: 75 U/L (ref 2–50)
ANION GAP SERPL CALC-SCNC: 13 MMOL/L (ref 7–16)
AST SERPL-CCNC: 40 U/L (ref 12–45)
BILIRUB SERPL-MCNC: 0.8 MG/DL (ref 0.1–1.5)
BUN SERPL-MCNC: 32 MG/DL (ref 8–22)
CALCIUM ALBUM COR SERPL-MCNC: 9.5 MG/DL (ref 8.5–10.5)
CALCIUM SERPL-MCNC: 8.7 MG/DL (ref 8.5–10.5)
CHLORIDE SERPL-SCNC: 103 MMOL/L (ref 96–112)
CO2 SERPL-SCNC: 27 MMOL/L (ref 20–33)
CREAT SERPL-MCNC: 1.08 MG/DL (ref 0.5–1.4)
ERYTHROCYTE [DISTWIDTH] IN BLOOD BY AUTOMATED COUNT: 47.2 FL (ref 35.9–50)
GFR SERPLBLD CREATININE-BSD FMLA CKD-EPI: 73 ML/MIN/1.73 M 2
GLOBULIN SER CALC-MCNC: 4.3 G/DL (ref 1.9–3.5)
GLUCOSE SERPL-MCNC: 118 MG/DL (ref 65–99)
HCT VFR BLD AUTO: 45.6 % (ref 42–52)
HGB BLD-MCNC: 14.8 G/DL (ref 14–18)
MAGNESIUM SERPL-MCNC: 2.7 MG/DL (ref 1.5–2.5)
MCH RBC QN AUTO: 29.2 PG (ref 27–33)
MCHC RBC AUTO-ENTMCNC: 32.5 G/DL (ref 32.3–36.5)
MCV RBC AUTO: 89.9 FL (ref 81.4–97.8)
PATHOLOGY CONSULT NOTE: NORMAL
PHOSPHATE SERPL-MCNC: 3.6 MG/DL (ref 2.5–4.5)
PLATELET # BLD AUTO: 369 K/UL (ref 164–446)
PMV BLD AUTO: 9.5 FL (ref 9–12.9)
POTASSIUM SERPL-SCNC: 4.4 MMOL/L (ref 3.6–5.5)
PROCALCITONIN SERPL-MCNC: 0.11 NG/ML
PROT SERPL-MCNC: 7.3 G/DL (ref 6–8.2)
RBC # BLD AUTO: 5.07 M/UL (ref 4.7–6.1)
SODIUM SERPL-SCNC: 143 MMOL/L (ref 135–145)
WBC # BLD AUTO: 14.9 K/UL (ref 4.8–10.8)

## 2025-04-07 PROCEDURE — 160035 HCHG PACU - 1ST 60 MINS PHASE I: Performed by: SPECIALIST

## 2025-04-07 PROCEDURE — 83735 ASSAY OF MAGNESIUM: CPT

## 2025-04-07 PROCEDURE — 80053 COMPREHEN METABOLIC PANEL: CPT

## 2025-04-07 PROCEDURE — 160002 HCHG RECOVERY MINUTES (STAT): Performed by: SPECIALIST

## 2025-04-07 PROCEDURE — 94669 MECHANICAL CHEST WALL OSCILL: CPT

## 2025-04-07 PROCEDURE — 700111 HCHG RX REV CODE 636 W/ 250 OVERRIDE (IP): Mod: JZ | Performed by: INTERNAL MEDICINE

## 2025-04-07 PROCEDURE — 160208 HCHG ENDO MINUTES - EA ADDL 1 MIN LEVEL 4: Performed by: SPECIALIST

## 2025-04-07 PROCEDURE — 99222 1ST HOSP IP/OBS MODERATE 55: CPT | Mod: 25

## 2025-04-07 PROCEDURE — 88305 TISSUE EXAM BY PATHOLOGIST: CPT | Performed by: PATHOLOGY

## 2025-04-07 PROCEDURE — A9270 NON-COVERED ITEM OR SERVICE: HCPCS | Performed by: STUDENT IN AN ORGANIZED HEALTH CARE EDUCATION/TRAINING PROGRAM

## 2025-04-07 PROCEDURE — 700102 HCHG RX REV CODE 250 W/ 637 OVERRIDE(OP): Performed by: INTERNAL MEDICINE

## 2025-04-07 PROCEDURE — 36415 COLL VENOUS BLD VENIPUNCTURE: CPT

## 2025-04-07 PROCEDURE — 0DH63UZ INSERTION OF FEEDING DEVICE INTO STOMACH, PERCUTANEOUS APPROACH: ICD-10-PCS | Performed by: SPECIALIST

## 2025-04-07 PROCEDURE — 700111 HCHG RX REV CODE 636 W/ 250 OVERRIDE (IP): Mod: JZ | Performed by: STUDENT IN AN ORGANIZED HEALTH CARE EDUCATION/TRAINING PROGRAM

## 2025-04-07 PROCEDURE — 84100 ASSAY OF PHOSPHORUS: CPT

## 2025-04-07 PROCEDURE — 0DB68ZZ EXCISION OF STOMACH, VIA NATURAL OR ARTIFICIAL OPENING ENDOSCOPIC: ICD-10-PCS | Performed by: SPECIALIST

## 2025-04-07 PROCEDURE — 85027 COMPLETE CBC AUTOMATED: CPT

## 2025-04-07 PROCEDURE — 160009 HCHG ANES TIME/MIN: Performed by: SPECIALIST

## 2025-04-07 PROCEDURE — 160015 HCHG STAT PREOP MINUTES: Performed by: SPECIALIST

## 2025-04-07 PROCEDURE — 700111 HCHG RX REV CODE 636 W/ 250 OVERRIDE (IP): Performed by: INTERNAL MEDICINE

## 2025-04-07 PROCEDURE — 700105 HCHG RX REV CODE 258: Performed by: INTERNAL MEDICINE

## 2025-04-07 PROCEDURE — 160048 HCHG OR STATISTICAL LEVEL 1-5: Performed by: SPECIALIST

## 2025-04-07 PROCEDURE — 88305 TISSUE EXAM BY PATHOLOGIST: CPT | Mod: 26 | Performed by: PATHOLOGY

## 2025-04-07 PROCEDURE — A9270 NON-COVERED ITEM OR SERVICE: HCPCS | Performed by: HOSPITALIST

## 2025-04-07 PROCEDURE — 700102 HCHG RX REV CODE 250 W/ 637 OVERRIDE(OP): Performed by: STUDENT IN AN ORGANIZED HEALTH CARE EDUCATION/TRAINING PROGRAM

## 2025-04-07 PROCEDURE — 700102 HCHG RX REV CODE 250 W/ 637 OVERRIDE(OP): Performed by: HOSPITALIST

## 2025-04-07 PROCEDURE — 43251 EGD REMOVE LESION SNARE: CPT | Performed by: SPECIALIST

## 2025-04-07 PROCEDURE — A9270 NON-COVERED ITEM OR SERVICE: HCPCS | Performed by: INTERNAL MEDICINE

## 2025-04-07 PROCEDURE — 84145 PROCALCITONIN (PCT): CPT

## 2025-04-07 PROCEDURE — 160203 HCHG ENDO MINUTES - 1ST 30 MINS LEVEL 4: Performed by: SPECIALIST

## 2025-04-07 PROCEDURE — 99233 SBSQ HOSP IP/OBS HIGH 50: CPT | Performed by: INTERNAL MEDICINE

## 2025-04-07 PROCEDURE — 43246 EGD PLACE GASTROSTOMY TUBE: CPT | Performed by: SPECIALIST

## 2025-04-07 PROCEDURE — 770001 HCHG ROOM/CARE - MED/SURG/GYN PRIV*

## 2025-04-07 PROCEDURE — 99497 ADVNCD CARE PLAN 30 MIN: CPT

## 2025-04-07 PROCEDURE — 110371 HCHG SHELL REV 272: Performed by: SPECIALIST

## 2025-04-07 DEVICE — KIT ENDOVIVE 24FR SAFETY PEG PULL WITH ENFIT (2EA/BX): Type: IMPLANTABLE DEVICE | Site: ABDOMEN | Status: FUNCTIONAL

## 2025-04-07 RX ORDER — DIPHENHYDRAMINE HYDROCHLORIDE 50 MG/ML
12.5 INJECTION, SOLUTION INTRAMUSCULAR; INTRAVENOUS
Status: DISCONTINUED | OUTPATIENT
Start: 2025-04-07 | End: 2025-04-07 | Stop reason: HOSPADM

## 2025-04-07 RX ORDER — LABETALOL HYDROCHLORIDE 5 MG/ML
5 INJECTION, SOLUTION INTRAVENOUS
Status: DISCONTINUED | OUTPATIENT
Start: 2025-04-07 | End: 2025-04-07 | Stop reason: HOSPADM

## 2025-04-07 RX ORDER — OXYCODONE HCL 5 MG/5 ML
10 SOLUTION, ORAL ORAL
Status: DISCONTINUED | OUTPATIENT
Start: 2025-04-07 | End: 2025-04-07 | Stop reason: HOSPADM

## 2025-04-07 RX ORDER — SODIUM CHLORIDE, SODIUM LACTATE, POTASSIUM CHLORIDE, CALCIUM CHLORIDE 600; 310; 30; 20 MG/100ML; MG/100ML; MG/100ML; MG/100ML
INJECTION, SOLUTION INTRAVENOUS CONTINUOUS
Status: DISCONTINUED | OUTPATIENT
Start: 2025-04-07 | End: 2025-04-07 | Stop reason: HOSPADM

## 2025-04-07 RX ORDER — METOPROLOL TARTRATE 1 MG/ML
1 INJECTION, SOLUTION INTRAVENOUS
Status: DISCONTINUED | OUTPATIENT
Start: 2025-04-07 | End: 2025-04-07 | Stop reason: HOSPADM

## 2025-04-07 RX ORDER — HYDROMORPHONE HYDROCHLORIDE 1 MG/ML
0.1 INJECTION, SOLUTION INTRAMUSCULAR; INTRAVENOUS; SUBCUTANEOUS
Status: DISCONTINUED | OUTPATIENT
Start: 2025-04-07 | End: 2025-04-07 | Stop reason: HOSPADM

## 2025-04-07 RX ORDER — HYDROMORPHONE HYDROCHLORIDE 1 MG/ML
0.2 INJECTION, SOLUTION INTRAMUSCULAR; INTRAVENOUS; SUBCUTANEOUS
Status: DISCONTINUED | OUTPATIENT
Start: 2025-04-07 | End: 2025-04-07 | Stop reason: HOSPADM

## 2025-04-07 RX ORDER — HYDROMORPHONE HYDROCHLORIDE 1 MG/ML
0.4 INJECTION, SOLUTION INTRAMUSCULAR; INTRAVENOUS; SUBCUTANEOUS
Status: DISCONTINUED | OUTPATIENT
Start: 2025-04-07 | End: 2025-04-07 | Stop reason: HOSPADM

## 2025-04-07 RX ORDER — HALOPERIDOL 5 MG/ML
1 INJECTION INTRAMUSCULAR
Status: DISCONTINUED | OUTPATIENT
Start: 2025-04-07 | End: 2025-04-07 | Stop reason: HOSPADM

## 2025-04-07 RX ORDER — ALBUTEROL SULFATE 5 MG/ML
2.5 SOLUTION RESPIRATORY (INHALATION)
Status: DISCONTINUED | OUTPATIENT
Start: 2025-04-07 | End: 2025-04-07 | Stop reason: HOSPADM

## 2025-04-07 RX ORDER — EPHEDRINE SULFATE 50 MG/ML
5 INJECTION, SOLUTION INTRAVENOUS
Status: DISCONTINUED | OUTPATIENT
Start: 2025-04-07 | End: 2025-04-07 | Stop reason: HOSPADM

## 2025-04-07 RX ORDER — ONDANSETRON 2 MG/ML
4 INJECTION INTRAMUSCULAR; INTRAVENOUS
Status: DISCONTINUED | OUTPATIENT
Start: 2025-04-07 | End: 2025-04-07 | Stop reason: HOSPADM

## 2025-04-07 RX ORDER — OXYCODONE HCL 5 MG/5 ML
5 SOLUTION, ORAL ORAL
Status: DISCONTINUED | OUTPATIENT
Start: 2025-04-07 | End: 2025-04-07 | Stop reason: HOSPADM

## 2025-04-07 RX ADMIN — MIDODRINE HYDROCHLORIDE 10 MG: 5 TABLET ORAL at 04:19

## 2025-04-07 RX ADMIN — AMANTADINE HYDROCHLORIDE 100 MG: 100 TABLET ORAL at 18:16

## 2025-04-07 RX ADMIN — SODIUM BICARBONATE 1300 MG: 650 TABLET ORAL at 21:36

## 2025-04-07 RX ADMIN — AMPICILLIN SODIUM, SULBACTAM SODIUM 3 G: 2; 1 INJECTION, POWDER, FOR SOLUTION INTRAMUSCULAR; INTRAVENOUS at 04:36

## 2025-04-07 RX ADMIN — GABAPENTIN 200 MG: 100 CAPSULE ORAL at 18:16

## 2025-04-07 RX ADMIN — Medication 100 MG: at 04:19

## 2025-04-07 RX ADMIN — FUROSEMIDE 40 MG: 10 INJECTION, SOLUTION INTRAVENOUS at 04:19

## 2025-04-07 RX ADMIN — CARBIDOPA AND LEVODOPA 2 TABLET: 25; 250 TABLET ORAL at 09:24

## 2025-04-07 RX ADMIN — MULTIVITAMIN TABLET 1 TABLET: TABLET at 04:19

## 2025-04-07 RX ADMIN — FUROSEMIDE 40 MG: 10 INJECTION, SOLUTION INTRAVENOUS at 18:17

## 2025-04-07 RX ADMIN — SODIUM BICARBONATE 1300 MG: 650 TABLET ORAL at 08:41

## 2025-04-07 RX ADMIN — AMPICILLIN SODIUM, SULBACTAM SODIUM 3 G: 2; 1 INJECTION, POWDER, FOR SOLUTION INTRAMUSCULAR; INTRAVENOUS at 18:38

## 2025-04-07 RX ADMIN — AMANTADINE HYDROCHLORIDE 100 MG: 100 TABLET ORAL at 04:19

## 2025-04-07 RX ADMIN — POTASSIUM BICARBONATE 25 MEQ: 978 TABLET, EFFERVESCENT ORAL at 18:16

## 2025-04-07 RX ADMIN — OXYBUTYNIN CHLORIDE 5 MG: 5 TABLET ORAL at 18:20

## 2025-04-07 RX ADMIN — CARBIDOPA AND LEVODOPA 2 TABLET: 25; 250 TABLET ORAL at 18:14

## 2025-04-07 RX ADMIN — ASPIRIN 81 MG: 81 TABLET, CHEWABLE ORAL at 04:20

## 2025-04-07 RX ADMIN — ROPINIROLE HYDROCHLORIDE 4 MG: 2 TABLET, FILM COATED ORAL at 04:20

## 2025-04-07 RX ADMIN — ENOXAPARIN SODIUM 40 MG: 100 INJECTION SUBCUTANEOUS at 18:15

## 2025-04-07 RX ADMIN — GUAIFENESIN 200 MG: 100 LIQUID ORAL at 04:19

## 2025-04-07 RX ADMIN — Medication 5 MG: at 21:36

## 2025-04-07 RX ADMIN — MIDODRINE HYDROCHLORIDE 10 MG: 5 TABLET ORAL at 21:36

## 2025-04-07 RX ADMIN — GUAIFENESIN 200 MG: 100 LIQUID ORAL at 18:15

## 2025-04-07 RX ADMIN — AMIODARONE HYDROCHLORIDE 400 MG: 200 TABLET ORAL at 04:19

## 2025-04-07 RX ADMIN — OXYBUTYNIN CHLORIDE 5 MG: 5 TABLET ORAL at 04:19

## 2025-04-07 RX ADMIN — LANSOPRAZOLE 30 MG: 30 TABLET, ORALLY DISINTEGRATING, DELAYED RELEASE ORAL at 04:19

## 2025-04-07 RX ADMIN — POTASSIUM BICARBONATE 25 MEQ: 978 TABLET, EFFERVESCENT ORAL at 04:19

## 2025-04-07 RX ADMIN — AMIODARONE HYDROCHLORIDE 400 MG: 200 TABLET ORAL at 18:15

## 2025-04-07 RX ADMIN — ROPINIROLE HYDROCHLORIDE 4 MG: 2 TABLET, FILM COATED ORAL at 18:17

## 2025-04-07 RX ADMIN — CARBIDOPA AND LEVODOPA 2 TABLET: 25; 250 TABLET ORAL at 21:36

## 2025-04-07 RX ADMIN — SENNOSIDES AND DOCUSATE SODIUM 2 TABLET: 50; 8.6 TABLET ORAL at 18:15

## 2025-04-07 ASSESSMENT — COGNITIVE AND FUNCTIONAL STATUS - GENERAL
SUGGESTED CMS G CODE MODIFIER MOBILITY: CL
MOVING FROM LYING ON BACK TO SITTING ON SIDE OF FLAT BED: A LOT
SUGGESTED CMS G CODE MODIFIER DAILY ACTIVITY: CM
DRESSING REGULAR UPPER BODY CLOTHING: A LOT
CLIMB 3 TO 5 STEPS WITH RAILING: TOTAL
DRESSING REGULAR LOWER BODY CLOTHING: TOTAL
TURNING FROM BACK TO SIDE WHILE IN FLAT BAD: A LOT
MOVING TO AND FROM BED TO CHAIR: A LOT
HELP NEEDED FOR BATHING: TOTAL
STANDING UP FROM CHAIR USING ARMS: A LOT
DAILY ACTIVITIY SCORE: 8
MOBILITY SCORE: 11
PERSONAL GROOMING: A LOT
TOILETING: TOTAL
WALKING IN HOSPITAL ROOM: A LOT
EATING MEALS: TOTAL

## 2025-04-07 ASSESSMENT — PAIN DESCRIPTION - PAIN TYPE
TYPE: ACUTE PAIN
TYPE: ACUTE PAIN;SURGICAL PAIN

## 2025-04-07 NOTE — THERAPY
Speech Language Therapy Contact Note    Patient Name: Jeremiah Huber  Age:  74 y.o., Sex:  male  Medical Record #: [id number]  Today's Date: 4/7/2025 04/07/25 1415   Treatment Variance   Reason For Missed Therapy Medical - Patient  in Procedure   Interdisciplinary Plan of Care Collaboration   IDT Collaboration with    (EMR)   Collaboration Comments Per EMR - patient is off-floor for PEG placement. SLP to re-attempt as medically appropriate post-procedurally and as schedule allows.     - [place]ia Gautam, SLP

## 2025-04-07 NOTE — OR NURSING
1339- pt arrives from OR to PACU 1, report received from RN and anesthesia. Pt place on monitor. VSS,  NAD noted. O2 3L via mask.    Abdominal dressing CDI    1355-wife called and updated on pt's status/ POC    1420-pt sleeping, opens eyes to voice, mouths no when asked if having pain.     1435-report given to RICKEY Joe RN  Pt placed on transport    1440-wife brought to bedside.     1445-pt more responsive with wife at bedside    1455-pt transported back to S[facility], all belongings accounted for.

## 2025-04-07 NOTE — PROGRESS NOTES
4 Eyes Skin Assessment Completed by ALYCE Olivia and ALYCE Donald.    Head Scab to left cheek  Ears Redness and Blanching  Nose WDL  Mouth Scabs, black spots on tongue  Neck Redness and Blanching  Breast/Chest WDL  Shoulder Blades WDL  Spine WDL  (R) Arm/Elbow/Hand Redness and Blanching  (L) Arm/Elbow/Hand Redness and Blanching  Abdomen Incision, LUQ PEG tube  Groin Redness and Blanching  Scrotum/Coccyx/Buttocks Redness, Blanching, and Excoriation, pressure wound to sacrum, purple, scabbed  (R) Leg Redness and Blanching, pressure wound to posterior thigh  (L) Leg Redness and Blanching  (R) Heel/Foot/Toe Redness and Blanching  (L) Heel/Foot/Toe Redness and Blanching                    Devices In Places Blood Pressure Cuff, Pulse Ox, SCD's, and Nasal Cannula      Interventions In Place Gray Ear Foams, Heel Mepilex, Sacral Mepilex, TAP System, Pillows, Q2 Turns, Low Air Loss Mattress, and Barrier Cream    Possible Skin Injury Yes    Pictures Uploaded Into Epic Yes  Wound Consult Placed N/A  RN Wound Prevention Protocol Ordered Yes    Patient's wife, Anupama, updated on DC plan. Informed that patient was accepted to [facility] acute rehab and that pickup via medicar set for 1330. Wife is agreeable to plan.

## 2025-04-07 NOTE — PROCEDURES
PATIENT: Jeremiah Huber  1950      Location : [facility]    PREOPERATIVE DIAGNOSIS/INDICATION: oropharyngeal dysphagia    POSTOPERATIVE DIAGNOSES: Successful PEG placement, gastric polyp, gastritis, portal hypertensive gastropathy    PROCEDURE:  EGD WITH PEG placement, snare polypectomy    SURGEON:  Christine Carvalho MD     ANESTHESIA:  Per Anesthesia team.     CONSENT:  After confirming the patient's identity, the procedure was explained in detail to the patient. Risks of the procedure including but not limited to bleeding, infection, perforation, sedation risks and risks of missed lesions were explained to the patient. Patient has signed informed consent.    DESCRIPTION:  The patient presented to the procedure room.  After routine checkup was performed, the patient was brought into the endoscopy suite, placed in the left lateral decubitus position and was sedated by anesthesia.  Vital signs were monitored throughout the procedure.  Oxygenation support was provided throughout the procedure. Upper endoscope was inserted into patient's mouth and advanced toward the second portion of the duodenum under direct visualization.  Once the site was reached and examined, the upper endoscope was withdrawn.  Retroflexion was made within the gastric cardia.  The patient tolerated the procedure well.  There were no immediate complications.    PEG placement: An appropriate site was selected and the stomach was trans-illuminated and an optimal position for the PEG tube was identified using good 1:1 transmission method. The skin was infiltrated with local anesthetic and the trocar and sheath were inserted through the abdomen into the stomach under direct visualization. The needle was removed and a guidewire was inserted through the sheath. The guidewire was grasped within the stomach with a snare. It was removed completely out of the mouth and the PEG tube was secured to the guidewire.    The guidewire and PEG tube were then  pulled through the mouth and esophagus and snug to the abdominal wall.The external bumper fixer was at 3 cm at the level of the skin. There was no evidence of bleeding. Gastroscope was reintroduced to look for adequate positioning of the PEG and for complications. No complications or bleeding seen. The Bolster was placed on the PEG site. The patient tolerated the procedure well and was transferred to recovery room in stable condition    Medications given: Ancef 2 gram IVPB for prophylaxis    EGD: normal esophagus  Stomach: 10 mm polyp, cold snare polypectomy. Erythematous stomach. Portal hypertensive gastopathy  Duodenum - normal to second portion    RECOMMENDATIONS:    1. PEG placed without complication.  2. PPI  Await pathology    Please ensure proper PEG care and tube feeding:  - keep head of bed elevated to at least 30 degrees during tube feeding. The largest factor in preventing aspiration during tube feeding is not the placement of the tube into the stomach or jejunum, but keeping the head elevated to at least 30 degrees during feeding  - Medications and water can be started 4 hours after placement. Tube feeds can be started tomorrow morning.   - PEG needs 0.5-1cm of laxity freely mobile in and out of the gastrostomy tract. Overtightening may impair tract maturation by inducing localized ischemia. Over tightening may also result in buried bumper syndrome.    2. No NSAIDS for 7 days. For full dose anti-coagulation, please hold 24 hours after the procedure. Anti-platelet per the primary/cardiology team.

## 2025-04-07 NOTE — PROGRESS NOTES
Gastroenterology Progress Note               Author:  VALENTIN Meza Date & Time Created: 4/6/2025 6:05 PM       Patient ID:  Name:             Jeremiah Huber  YOB: 1950  Age:                 74 y.o.  male  MRN:               [id number]        Medical Decision Making, by Problem:  Active Hospital Problems    Diagnosis     Other dysphagia [R13.19]     Hypokalemia [E87.6]     Acute respiratory failure with hypoxia (HCC) [J96.01]     Paroxysmal atrial fibrillation (HCC) [I48.0]     Pleural effusion, right [J90]     Pneumonia due to infectious organism [J18.9]     Sepsis with acute hypoxic respiratory failure without septic shock (HCC) [A41.9, R65.20, J96.01]     Right upper quadrant abdominal pain [R10.11]     Advance care planning [Z71.89]     History of stroke [Z86.73]     Parkinson disease (HCC) [G20.A1]     Other hyperlipidemia [E78.49]            Presenting Chief Complaint:  Request for PEG        History of Present Illness:    This is a 74 y.o. male with Parkinson's, hypertension, dyslipidemia, history of stroke, CAD who presented with abdominal pain and fever on March 27, 2025.  On presentation his wife volunteered that he has trouble swallowing pills but ate a cheeseburger that night. Found to have right sided pneumonia and on CT had large right pleural effusion with collapse right lung.  Respiratory PCR was negative.  Suspicion for aspiration pneumonia     Patient is awake, nonverbal, but mouths yes-no appropriately.  He does not want to go home without means for nutrition and pass away peacefully.         Interval History:  4/6/2025: Patient seen at bedside with wife and daughter.  Awake, alert, he is able to mouth/soft to speak words.  Confirmed the patient himself wants to proceed with PEG tube.  Discussed with him that he is at increased risk of bleeding due to aspirin. He is agreeable to proceed.         Hospital Medications:  Current Facility-Administered Medications    Medication Dose Frequency Provider Last Rate Last Admin    multivitamin tablet 1 Tablet  1 Tablet DAILY Jarad Castillo M.D.   1 Tablet at 04/06/25 0513    thiamine (Vitamin B-1) tablet 100 mg  100 mg DAILY Jarad Castillo M.D.   100 mg at 04/06/25 0513    Respiratory Therapy Consult   Continuous RT Jarad Castillo M.D.        ampicillin/sulbactam (Unasyn) 3 g in  mL IVPB  3 g Q6HRS Jarad Castillo M.D. 200 mL/hr at 04/06/25 1800 3 g at 04/06/25 1800    [START ON 4/9/2025] amiodarone (Cordarone) tablet 200 mg  200 mg DAILY Jeremiah Holliday D.O.        melatonin tablet 5 mg  5 mg Nightly Gilberto Boles M.D.   5 mg at 04/05/25 2011    potassium bicarbonate (Klyte) effervescent tablet 25 mEq  25 mEq BID Jeremiah Holliday D.O.   25 mEq at 04/06/25 1723    gabapentin (Neurontin) capsule 200 mg  200 mg Q EVENING Darion Meza M.D.   200 mg at 04/06/25 1722    guaiFENesin (Robitussin) 100 MG/5ML liquid 200 mg  10 mL TID Jeremiah Holliday D.O.   200 mg at 04/06/25 1723    amiodarone (Cordarone) tablet 400 mg  400 mg TWICE DAILY Jeremiah Holliday D.O.   400 mg at 04/06/25 1723    lansoprazole (Prevacid) solutab 30 mg  30 mg DAILY Darion Meza M.D.   30 mg at 04/06/25 0422    furosemide (Lasix) injection 40 mg  40 mg BID Darion Meza M.D.   40 mg at 04/06/25 1725    senna-docusate (Pericolace Or Senokot S) 8.6-50 MG per tablet 2 Tablet  2 Tablet Q EVENING Zhanna Heredia M.D.   2 Tablet at 04/06/25 1723    And    polyethylene glycol/lytes (Miralax) Packet 1 Packet  1 Packet QDAY PRN Zhanna Heredia M.D.        [Held by provider] atorvastatin (Lipitor) tablet 80 mg  80 mg Q EVENING Zhanna Heredia M.D.   80 mg at 03/30/25 1817    midodrine (Proamatine) tablet 10 mg  10 mg Q8HRS Zhanna Heredia M.D.   10 mg at 04/06/25 1537    amantadine (Symmetrel) tablet 100 mg  100 mg BID Ángel Harper M.D.   100 mg at 04/06/25 1723    aspirin (Asa) chewable tab 81 mg  81 mg DAILY Ángel Harper M.D.   81 mg at  "04/06/25 0422    carbidopa-levodopa (Sinemet)  MG tablet 2 Tablet  2 Tablet 4X/DAY Ángel Harper M.D.   2 Tablet at 04/06/25 1537    oxybutynin (Ditropan) tablet 5 mg  5 mg BID Ángel Harper M.D.   5 mg at 04/06/25 1724    ROPINIRole (Requip) tablet 4 mg  4 mg TID Ángel Harper M.D.   4 mg at 04/06/25 1723    sodium bicarbonate tablet 1,300 mg  1,300 mg TID Ángel Harper M.D.   1,300 mg at 04/06/25 1537    Pharmacy Consult: Enteral tube insertion - review meds/change route/product selection  1 Each PHARMACY TO DOSE Ángel Harper M.D.        enoxaparin (Lovenox) inj 40 mg  40 mg DAILY AT 1800 [place] DAYANNA Shafer M.D.   40 mg at 04/06/25 1724   Last reviewed on 3/27/2025 12:13 AM by Carlos Enrique       Review of Systems:  Review of Systems   Unable to perform ROS: Medical condition         Vital signs:  Weight/BMI: Body mass index is 26.76 kg/m².  /79   Pulse 82   Temp 36.7 °C (98.1 °F) (Temporal)   Resp (!) 22   Ht 1.854 m (6' 1\")   Wt 92 kg (202 lb 13.2 oz)   SpO2 95%   Vitals:    04/06/25 0404 04/06/25 0749 04/06/25 1617 04/06/25 1723   BP: 108/71 108/71 122/79 (P) 106/68   Pulse: 76 82 82 (P) 77   Resp: 17 (!) 22 (!) 22    Temp: 36.7 °C (98.1 °F) 37.1 °C (98.8 °F) 36.7 °C (98.1 °F)    TempSrc: Temporal Temporal Temporal    SpO2: 91% 91% 95%    Weight:       Height:         Oxygen Therapy:  Pulse Oximetry: 95 %, O2 (LPM): 2, O2 Delivery Device: Silicone Nasal Cannula    Intake/Output Summary (Last 24 hours) at 4/6/2025 1805  Last data filed at 4/6/2025 1409  Gross per 24 hour   Intake 90 ml   Output 2200 ml   Net -2110 ml         Physical Exam:  Physical Exam  Vitals and nursing note reviewed.   Constitutional:       General: He is awake.      Appearance: He is ill-appearing (Chronically).      Comments: Frail elderly male   HENT:      Head: Normocephalic and atraumatic.      Nose: Nose normal. No congestion.      Comments: NG tube in place with tube feed in progress without difficulty  Eyes: "      General: No scleral icterus.     Extraocular Movements: Extraocular movements intact.      Conjunctiva/sclera: Conjunctivae normal.   Cardiovascular:      Rate and Rhythm: Normal rate and regular rhythm.      Pulses: Normal pulses.      Heart sounds: Normal heart sounds.   Pulmonary:      Effort: Pulmonary effort is normal.      Breath sounds: Normal breath sounds.   Abdominal:      General: Abdomen is flat. Bowel sounds are normal. There is no distension.      Palpations: Abdomen is soft.      Tenderness: There is no abdominal tenderness. There is no guarding.   Musculoskeletal:      Right lower leg: No edema.      Left lower leg: No edema.   Skin:     General: Skin is warm and dry.   Neurological:      Mental Status: He is alert.             Labs:  Recent Labs     04/04/25  0309 04/05/25  0330 04/06/25  0541   SODIUM 138 141 142   POTASSIUM 3.7 4.3 4.4   CHLORIDE 99 103 102   CO2 28 28 28   BUN 25* 25* 29*   CREATININE 0.82 0.95 1.02   MAGNESIUM 2.5 2.5 2.6*   PHOSPHORUS 2.7 3.0 3.2   CALCIUM 8.5 8.7 8.7     Recent Labs     04/04/25  0309 04/05/25  0330 04/06/25  0541   ALTSGPT 96* 87* 92*   ASTSGOT 44 35 47*   ALKPHOSPHAT 109* 117* 120*   TBILIRUBIN 0.6 0.6 0.8   GLUCOSE 170* 170* 155*     Recent Labs     04/04/25  0309 04/05/25  0330 04/06/25  0541   WBC 11.2* 13.4* 14.8*   ASTSGOT 44 35 47*   ALTSGPT 96* 87* 92*   ALKPHOSPHAT 109* 117* 120*   TBILIRUBIN 0.6 0.6 0.8     Recent Labs     04/04/25  0309 04/05/25  0330 04/06/25  0541   RBC 4.98 5.01 5.29   HEMOGLOBIN 14.5 14.8 15.1   HEMATOCRIT 43.9 45.0 47.4   PLATELETCT 353 401 412     Recent Results (from the past 24 hours)   Procalcitonin    Collection Time: 04/05/25  6:07 PM   Result Value Ref Range    Procalcitonin 0.09 <0.25 ng/mL   CBC WITHOUT DIFFERENTIAL    Collection Time: 04/06/25  5:41 AM   Result Value Ref Range    WBC 14.8 (H) 4.8 - 10.8 K/uL    RBC 5.29 4.70 - 6.10 M/uL    Hemoglobin 15.1 14.0 - 18.0 g/dL    Hematocrit 47.4 42.0 - 52.0 %    MCV  89.6 81.4 - 97.8 fL    MCH 28.5 27.0 - 33.0 pg    MCHC 31.9 (L) 32.3 - 36.5 g/dL    RDW 46.2 35.9 - 50.0 fL    Platelet Count 412 164 - 446 K/uL    MPV 9.4 9.0 - 12.9 fL   Comp Metabolic Panel    Collection Time: 04/06/25  5:41 AM   Result Value Ref Range    Sodium 142 135 - 145 mmol/L    Potassium 4.4 3.6 - 5.5 mmol/L    Chloride 102 96 - 112 mmol/L    Co2 28 20 - 33 mmol/L    Anion Gap 12.0 7.0 - 16.0    Glucose 155 (H) 65 - 99 mg/dL    Bun 29 (H) 8 - 22 mg/dL    Creatinine 1.02 0.50 - 1.40 mg/dL    Calcium 8.7 8.5 - 10.5 mg/dL    Correct Calcium 9.3 8.5 - 10.5 mg/dL    AST(SGOT) 47 (H) 12 - 45 U/L    ALT(SGPT) 92 (H) 2 - 50 U/L    Alkaline Phosphatase 120 (H) 30 - 99 U/L    Total Bilirubin 0.8 0.1 - 1.5 mg/dL    Albumin 3.2 3.2 - 4.9 g/dL    Total Protein 7.5 6.0 - 8.2 g/dL    Globulin 4.3 (H) 1.9 - 3.5 g/dL    A-G Ratio 0.7 g/dL   MAGNESIUM    Collection Time: 04/06/25  5:41 AM   Result Value Ref Range    Magnesium 2.6 (H) 1.5 - 2.5 mg/dL   PHOSPHORUS    Collection Time: 04/06/25  5:41 AM   Result Value Ref Range    Phosphorus 3.2 2.5 - 4.5 mg/dL   ESTIMATED GFR    Collection Time: 04/06/25  5:41 AM   Result Value Ref Range    GFR (CKD-EPI) 78 >60 mL/min/1.73 m 2       Radiology Review:  DX-CHEST-PORTABLE (1 VIEW)   Final Result      Stable bibasilar airspace disease      DX-CHEST-PORTABLE (1 VIEW)   Final Result         1.  Pulmonary edema and/or infiltrates, stable since prior study.   2.  Small bilateral pleural effusions, right greater than left      DX-CHEST-PORTABLE (1 VIEW)   Final Result      1.  Small right pleural effusion and trace left pleural effusion.      2.  Right lower lobe airspace opacity consistent with atelectasis and/or pneumonitis.      3.  NG tube extends in the fundus of stomach.      DX-CHEST-PORTABLE (1 VIEW)   Final Result         1.  Pulmonary edema and/or infiltrates, stable since prior study.   2.  Small bilateral pleural effusions, right greater than left      DX-CHEST-PORTABLE (1  VIEW)   Final Result         1.  Pulmonary edema and/or infiltrates, stable since prior study.   2.  Small right pleural effusion      DX-CHEST-PORTABLE (1 VIEW)   Final Result         1. Slowly improving moderate right and mild left base atelectasis.      2. Right pleural pigtail drain remains. No pneumothorax.      3. NG tube                     DX-ABDOMEN FOR TUBE PLACEMENT   Final Result      Enteric feeding tube terminates in the left abdomen over the expected location of the stomach.      DX-CHEST-PORTABLE (1 VIEW)   Final Result         1. Right pleural pigtail drain. No pneumothorax.      2. Persistent moderate right lower lobe opacity.      3. Lines and tubes without visible complication.                     EC-ECHOCARDIOGRAM LTD W/ CONT   Final Result      DX-CHEST-PORTABLE (1 VIEW)   Final Result         1. Right pleural pigtail drain. Some of the right lung opacity with parenchymal and pleural opacity remaining. No pneumothorax.      2. ETT 5 cm above yaa. NG tube in stomach.                     CT-HEAD W/O   Final Result      1.  No evidence of acute intracranial process.      2.  Cerebral atrophy as well as periventricular chronic small vessel ischemic change.               DX-CHEST-FOR LINE PLACEMENT Perform procedure in: Patient's Room   Final Result         1. Appropriately positioned endotracheal tube.   2. Interval improvement in aeration of the lung bases.      CT-CTA CHEST PULMONARY ARTERY W/ RECONS   Final Result      1.  Very large right pleural effusion causing collapse of the right lung.      2.  No CT evidence of pulmonary emboli.      3.  Multiple hepatic cysts.            DX-CHEST-PORTABLE (1 VIEW)   Final Result         1. Increased large amount of right-sided airspace disease with associated small to moderate right pleural effusion.   2. Stable left basilar airspace disease versus atelectasis.      CT-ABDOMEN-PELVIS W/O   Final Result      1.  No evidence of bowel obstruction or  focal inflammatory change.      2.  Volume loss and consolidation within the right lung base with loculated right pleural effusion.      3.  Atelectasis within the left lung base.      4.  Constipation. Numerous sigmoid diverticulosis.      5.  Hepatic cysts.      6.  Gallstones.      NM-BILIARY (HIDA) SCAN WITH CCK   Final Result      No scintigraphic evidence of cholecystitis, biliary obstruction or other worrisome finding.      US-RUQ   Final Result      1.  Stones and sludge in the gallbladder.   2.  No evidence for acute cholecystitis or biliary obstruction.   3.  Multiple liver cysts again seen.   4.  Limited evaluation of pancreas and abdominal aorta.      DX-CHEST-PORTABLE (1 VIEW)   Final Result      Hypoinflation with bibasilar atelectasis.  Superimposed pneumonia is difficult to exclude.            MDM (Data Review):   -Records reviewed and summarized in current documentation  -I personally reviewed and interpreted the laboratory results  -I personally reviewed the radiology images    Assessment/Recommendations:  IMPRESSION:  Oropharyngeal dysphagia  Suspected aspiration pneumonia  Advanced Parkinson's disease       PEG with jejunal extension tube is not a guarantee against aspiration as the tubes frequently pull back into the stomach.  Surgical J tubes are not ideal due to leaking.  He is at risk of aspiration due to OP dysphagia but by keeping him >45 degrees upright 2 hours after bolus feeding may be the best we can do to prevent aspiration.  Discussed that he is at an increased risk of bleeding due to Ongoing aspirin administration.  He and wife acknowledge risks and are agreeable to proceed.     RECS:  Ok to continue ASA  OK to give Lovenox tonight  N.p.o. at midnight  Plan for PEG tube placement tomorrow  Last bowel movement 4/3/2025: Okay to have Ancef 2 g in OR       Discussed with patient, wife and daughter at bedside, Dr. Carvalho.     Core Quality Measures   Reviewed items::  Labs, Medications and  Radiology reports reviewed

## 2025-04-07 NOTE — CONSULTS
MRN: [id number]  Date of palliative consult: 2025  Reason for consult: Goals of care/advance care planning  Referring provider: Dr. Castillo  Location of consult: [facility]  Additional consulting services: Critical care, Gastroenterology    HPI:   Jeremiah Huber is a 74 y.o. male with medical history significant for Parkinson's (nonverbal at baseline), hypertension, dyslipidemia, GERD, CVA in , dysphagia admitted 3/26 after presenting with right upper quadrant pain, productive cough, and a fever at home.  Found to have pneumonia (likely aspiration) and positive SIRS criteria.  CT chest revealed large right pleural effusion.  3/28 had increasing oxygen demands with a change in mental status, was intubated and had a chest tube placed.  3/29 noted to have atrial fibrillation with RVR placed on amiodarone.  3/31 extubated.   placed on high flow nasal cannula transferred to a lower level of care.  Since then patient has failed swallow evaluation and remains at increased risk for aspiration.   patient and wife agreeable to PEG tube which will be placed today.  Palliative care consulted during this hospitalization for goals of care and advance care planning conversation.    Additional Pertinent Medical History:    ROS:    Review of Systems   Unable to perform ROS: Medical condition (patient nonverbal)       PE:   Recent vital signs  BMI: Body mass index is 26.76 kg/m².    Temp (24hrs), Av.7 °C (98.1 °F), Min:36.4 °C (97.5 °F), Max:37.1 °C (98.8 °F)  Temperature: 37.1 °C (98.8 °F), Monitored Temp: 36.7 °C (98.1 °F)  Pulse  Av.9  Min: 52  Max: 161   Blood Pressure : 116/77       Physical Exam  Vitals and nursing note reviewed.   Constitutional:       General: He is not in acute distress.  HENT:      Head:      Comments: NG tube in place  Cardiovascular:      Rate and Rhythm: Normal rate.      Heart sounds: Normal heart sounds.   Pulmonary:      Effort: Tachypnea present.      Breath sounds: Decreased  breath sounds present.   Abdominal:      General: Abdomen is flat. Bowel sounds are decreased.      Palpations: Abdomen is soft.   Skin:     Coloration: Skin is pale.   Neurological:      Mental Status: He is alert. Mental status is at baseline.      Motor: Weakness present.      Comments: Patient nonverbal at baseline   Psychiatric:         Attention and Perception: Attention normal.         Cognition and Memory: Cognition is not impaired. Memory is not impaired.         ASSESSMENT/PLAN WITH SHARED DECISION MAKING:   PHYSICAL ASPECTS OF CARE  Palliative Performance Scale: 30-40%    # Aspiration pneumonia  # Acute hypoxic respiratory failure with hypoxia  # Dysphagia  # Paroxysmal atrial fibrillation  # Pleural effusion  # History of CVA  # Parkinson's  # Hyperlipidemia  # GERD  # Hypertension    SOCIAL ASPECTS OF CARE  Patient's wife Nolvia shares that she and Farhat have been together for 51 years.  They live in [place] in a single level home that was designed by the patient for aging in place.  He is using a motorized scooter for the last 2 years and receives help from his wife with all ADLs.  He has been receiving strength band training at home.  Has been having difficulty swallowing since February.  Wife Nolvia reports decline in status after starting a new Parkinson's medication at the end of February, to which he had a bad reaction.    SPIRITUAL ASPECTS OF CARE   Nolvia reports that Farhat is not spiritual or Mormon and declines a visit from the .    GOALS OF CARE/SERIOUS ILLNESS CONVERSATION  Introduced myself to Farhat and Nolvia. Discussed role of palliative care and reason for consult.  Farhat and Nolvia agreeable to discuss goals of care; Nolvia explains that Jeremiah is going for his PEG tube placement and half an hour.  Nolvia speaks for Farhat who is nonverbal at baseline.  She shares that they have identified his goals and wishes, as he is DNAR, intubation okay and that remains his wish at this  time.  They also decided this weekend that a PEG tube would be placed, knowing the risks which include aspirating on his secretions due to his dysphagia.  Nolvia explains that she has provided care for Farhat for a number of years and she is ready for him to have a short stay in rehab and come home quickly where she believes he will get better faster.  Nolvia is aware of comfort-focused treatment, including hospice; neither the patient or his wife are interested at this time.    POLST completed-reads DNAR, I okay (selective treatment).  Artificial nutrition and IV fluids okay.    Provided palliative care contact information and encouraged Jeremiah and Nolvia to reach out with any questions/needs.  Palliative care signing off.    Code Status: DNR, I okay    ACP Documents: Medical power of     17 minutes spent discussing advance care planning, this time excludes any other billed services.    Interval diagnostic studies and medical documentation entries pertinent to this case were reviewed independently by me. This patient has at least one acute or chronic illness or injury that poses a threat to life or bodily function. This patient suffers from a high risk of morbidity from additional invasive diagnostic testing or intensive treatment. Discussion of recommendations and coordination of care undertaken with primary provider/treatment team.      Jody Dumas DNP, [facility]  Inpatient Palliative Care Provider  [phone]

## 2025-04-07 NOTE — ANESTHESIA PREPROCEDURE EVALUATION
Case: [id number] Date/Time: 04/07/25 1315    Procedure: INSERTION, PEG TUBE (Esophagus)    Anesthesia type: General    Pre-op diagnosis: Oropharyngeal dysphagia    Location: [facility] ROOM 26 / SURGERY SAME DAY [facility]    Surgeons: Christine Carvalho M.D.            Relevant Problems   PULMONARY   (positive) Pneumonia due to infectious organism      CARDIAC   (positive) Myocardial infarct, old - distal PDA occlusion, LVEF 49%   (positive) Paroxysmal atrial fibrillation (HCC)       Physical Exam    Anesthesia Plan    ASA 3   ASA physical status 3 criteria: other (comment)    Plan - general       Airway plan will be ETT    (parkinsons)      Induction: intravenous    Postoperative Plan: Postoperative administration of opioids is intended.    Pertinent diagnostic labs and testing reviewed    Informed Consent:    Anesthetic plan and risks discussed with patient.    Use of blood products discussed with: patient whom consented to blood products.

## 2025-04-07 NOTE — PROGRESS NOTES
"Hospital Medicine Daily Progress Note    Date of Service  4/7/2025    Chief Complaint  Abdominal pain RUQ x1 week.    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  He presented to the ER with a complaint of abdominal pain that has been going on for the last few days .    Presents in ER, patient noted to have WBC of 17.7, AST/ALT normal, lipase 19, procalcitonin 0.5.  Blood culture x 2 was ordered, patient was started on antibiotics for aspiration pneumonia including Unasyn and azithromycin.  Ultrasound of the abdomen showed stones/sludge of gallbladder, no evidence of acute cholecystitis CBD obstruction, and multiple liver cysts. A CT chest showed large right pleural effusion.     Echocardiogram showing EF of 60%, akinesis of the apex.    4/3/25:  Remains non-verbal but nods head to questions.  Weak.  I have Voalte his prior neurologist Dr Mclain for any possible medication change.  Yesterday I reached out to Dr Tc Gregorio for request to review for further input on the patient and he replied with \" I don't have anything concretely to add for patient.\"        Interval Problem Update  Patient seen and examine at bedside  Managing dysphagia/NG tube, aspiration pneumonia (completing antibiotics) in the setting of Parkinson's (followed by Dr. Mclain), hypertension, dysphagia, GERD, history of CVA (due to LV thrombus) on 6/2022. He first presented with abdominal pain.      He has very limited capacity. He has an NGT. Wife at bedside and I spoke with her for a while. We discussed codes status and goals o care. I formally consulted Palliative. Speech discussed about Palliative however we queried them if patient will need a PEG or will he improve off NG tube. We discussed artificial nutrition, DNR but I OK, goals of care and at this time wife feels he is not a candidate for " dialysis or cardiac cath or invasive procedures but may be OK with PEG and artifical nutrition and intubation. Palliative consulted. I spoke with Speech They recommend a PEG tube. I spoke with wife and patient. They confirmed they want a PEG tube.  I consulted MARIE Guy for PEG tube placement. Long discussion with patient, Dr. Smith. Wife believes patient wants the PEG tube and has some form of capscity to want it. She does uindestands that wheter its PO intake, feediung tube or PEG tube this will NOT IMPROVE aspiration. She does understand that she will continue to do suctioning and she is given tipsregarding HOP elvation and trickle/bolus tube feeding to minimize reflux. Ultimately, she wants a PEG tube placed which is now planned TODAY    Anticipating PEG tube TODAY and initiation of tube feeds TONIGHT.    I reviewed the chart along with vitals, labs, imaging, test (both pending and resulted) and recommendations from specialists and interdisciplinary team.  I have discussed this patient's plan of care and discharge plan at IDT rounds today with Case Management, Nursing, Nursing leadership, and other members of the IDT team.      Consultants/Specialty  critical care    Code Status  DNAR, I OK    Disposition  Medically Cleared  I have placed the appropriate orders for post-discharge needs.    Review of Systems  Review of Systems   Unable to perform ROS: Mental acuity        Physical Exam  Temp:  [36.4 °C (97.5 °F)-37.1 °C (98.8 °F)] 36.8 °C (98.3 °F)  Pulse:  [77-85] 83  Resp:  [17-22] 18  BP: (105-122)/(68-81) 122/76  SpO2:  [90 %-95 %] 95 %    Physical Exam  Vitals reviewed.   Constitutional:       Appearance: He is ill-appearing.   HENT:      Head: Normocephalic.      Mouth/Throat:      Pharynx: Oropharyngeal exudate present.   Eyes:      General:         Right eye: No discharge.         Left eye: No discharge.      Conjunctiva/sclera: Conjunctivae normal.   Cardiovascular:      Rate and Rhythm: Normal  rate and regular rhythm.      Heart sounds: Normal heart sounds. No murmur heard.  Pulmonary:      Effort: Pulmonary effort is normal. No respiratory distress.      Breath sounds: Examination of the right-lower field reveals decreased breath sounds. Decreased breath sounds present.   Abdominal:      General: Bowel sounds are normal. There is no distension.   Musculoskeletal:      Cervical back: No tenderness.      Right lower leg: No edema.      Left lower leg: No edema.   Lymphadenopathy:      Cervical: No cervical adenopathy.   Skin:     General: Skin is warm.      Coloration: Skin is pale.   Neurological:      Mental Status: He is alert.      Cranial Nerves: Cranial nerve deficit present.      Motor: Weakness present.      Coordination: Coordination abnormal.         Fluids    Intake/Output Summary (Last 24 hours) at 4/7/2025 1349  Last data filed at 4/7/2025 1341  Gross per 24 hour   Intake 70 ml   Output 3510 ml   Net -3440 ml        Laboratory  Recent Labs     04/05/25  0330 04/06/25  0541 04/07/25  0345   WBC 13.4* 14.8* 14.9*   RBC 5.01 5.29 5.07   HEMOGLOBIN 14.8 15.1 14.8   HEMATOCRIT 45.0 47.4 45.6   MCV 89.8 89.6 89.9   MCH 29.5 28.5 29.2   MCHC 32.9 31.9* 32.5   RDW 46.1 46.2 47.2   PLATELETCT 401 412 369   MPV 9.6 9.4 9.5     Recent Labs     04/05/25  0330 04/06/25  0541 04/07/25  0345   SODIUM 141 142 143   POTASSIUM 4.3 4.4 4.4   CHLORIDE 103 102 103   CO2 28 28 27   GLUCOSE 170* 155* 118*   BUN 25* 29* 32*   CREATININE 0.95 1.02 1.08   CALCIUM 8.7 8.7 8.7                   Imaging  DX-CHEST-PORTABLE (1 VIEW)   Final Result         1.  Hazy bilateral lower lobe infiltrates, greater on the left, similar to prior study.   2.  Trace bilateral pleural effusions      DX-CHEST-PORTABLE (1 VIEW)   Final Result      Stable bibasilar airspace disease      DX-CHEST-PORTABLE (1 VIEW)   Final Result         1.  Pulmonary edema and/or infiltrates, stable since prior study.   2.  Small bilateral pleural effusions,  right greater than left      DX-CHEST-PORTABLE (1 VIEW)   Final Result      1.  Small right pleural effusion and trace left pleural effusion.      2.  Right lower lobe airspace opacity consistent with atelectasis and/or pneumonitis.      3.  NG tube extends in the fundus of stomach.      DX-CHEST-PORTABLE (1 VIEW)   Final Result         1.  Pulmonary edema and/or infiltrates, stable since prior study.   2.  Small bilateral pleural effusions, right greater than left      DX-CHEST-PORTABLE (1 VIEW)   Final Result         1.  Pulmonary edema and/or infiltrates, stable since prior study.   2.  Small right pleural effusion      DX-CHEST-PORTABLE (1 VIEW)   Final Result         1. Slowly improving moderate right and mild left base atelectasis.      2. Right pleural pigtail drain remains. No pneumothorax.      3. NG tube                     DX-ABDOMEN FOR TUBE PLACEMENT   Final Result      Enteric feeding tube terminates in the left abdomen over the expected location of the stomach.      DX-CHEST-PORTABLE (1 VIEW)   Final Result         1. Right pleural pigtail drain. No pneumothorax.      2. Persistent moderate right lower lobe opacity.      3. Lines and tubes without visible complication.                     EC-ECHOCARDIOGRAM LTD W/ CONT   Final Result      DX-CHEST-PORTABLE (1 VIEW)   Final Result         1. Right pleural pigtail drain. Some of the right lung opacity with parenchymal and pleural opacity remaining. No pneumothorax.      2. ETT 5 cm above yaa. NG tube in stomach.                     CT-HEAD W/O   Final Result      1.  No evidence of acute intracranial process.      2.  Cerebral atrophy as well as periventricular chronic small vessel ischemic change.               DX-CHEST-FOR LINE PLACEMENT Perform procedure in: Patient's Room   Final Result         1. Appropriately positioned endotracheal tube.   2. Interval improvement in aeration of the lung bases.      CT-CTA CHEST PULMONARY ARTERY W/ RECONS    Final Result      1.  Very large right pleural effusion causing collapse of the right lung.      2.  No CT evidence of pulmonary emboli.      3.  Multiple hepatic cysts.            DX-CHEST-PORTABLE (1 VIEW)   Final Result         1. Increased large amount of right-sided airspace disease with associated small to moderate right pleural effusion.   2. Stable left basilar airspace disease versus atelectasis.      CT-ABDOMEN-PELVIS W/O   Final Result      1.  No evidence of bowel obstruction or focal inflammatory change.      2.  Volume loss and consolidation within the right lung base with loculated right pleural effusion.      3.  Atelectasis within the left lung base.      4.  Constipation. Numerous sigmoid diverticulosis.      5.  Hepatic cysts.      6.  Gallstones.      NM-BILIARY (HIDA) SCAN WITH CCK   Final Result      No scintigraphic evidence of cholecystitis, biliary obstruction or other worrisome finding.      US-RUQ   Final Result      1.  Stones and sludge in the gallbladder.   2.  No evidence for acute cholecystitis or biliary obstruction.   3.  Multiple liver cysts again seen.   4.  Limited evaluation of pancreas and abdominal aorta.      DX-CHEST-PORTABLE (1 VIEW)   Final Result      Hypoinflation with bibasilar atelectasis.  Superimposed pneumonia is difficult to exclude.           Assessment/Plan  * Pneumonia due to infectious organism- (present on admission)  Assessment & Plan  4/3/25:  Presenting with right upper quadrant pain, productive cough with clear sputum and fever at home  CXR reviewed, opacity on the right.  History of dysphagia due to Parkinson's, suspect aspiration pneumonia  S/P unasyn, azithro, zyvox  3/28/25 Procalcitonin:1.45    Other dysphagia  Assessment & Plan  NG t  SLP following  GI to place PEG TODAY    Hypokalemia  Assessment & Plan  4/3/25:  4/2 K:2.9  4/3: 3.6  Replace and monitor  Monitor labs  Recent Labs     04/05/25  0330 04/06/25  0541 04/07/25  0345   SODIUM 141 142  143   POTASSIUM 4.3 4.4 4.4   CHLORIDE 103 102 103   CO2 28 28 27   GLUCOSE 170* 155* 118*   BUN 25* 29* 32*   CREATININE 0.95 1.02 1.08         Paroxysmal atrial fibrillation (HCC)  Assessment & Plan  4/3/25  HR:71  Amiodarone 400mg BID with plan to go to a maintenance dose  Vitals:    04/07/25 1339   BP:    Pulse:    Resp:    Temp: 36.8 °C (98.3 °F)   SpO2: 95%         Acute respiratory failure with hypoxia (HCC)  Assessment & Plan  4/3/25:  Concern of ongoing aspiration risk given advancing Reyna  Has a NG tube for enteral feeding currently  Aspiration precautions and SLP  Titrate O2 to keep SpO2 >90  Currently down to 2-4L NC    Pleural effusion, right  Assessment & Plan  4/3/25:  Noted on chest x-ray and prior CT scan this admit  4/2 CXR still showing right pleural effusion and possible pulmonary edema  4/3 CXR unchanged  S/p Chest tube      Advance care planning  Assessment & Plan  4/3/25:  Patient is nonverbal . Patient wife who is poa and nursing staff were present for the conversation.   Discussed need to consider further advanced care planning if he is not improving from inability to protect his airway.  I feel he is at risk of secretion aspirations even if we placed a PEG tube.  Code status changed to dnar/I okay    Right upper quadrant abdominal pain- (present on admission)  Assessment & Plan  4/3/25:  No current chest pain  LFTs unremarkable,ush showing gallstone   Hida  normal , likley chest pain is from pleurisy    History of stroke- (present on admission)  Assessment & Plan  4/3:  Continue home aspirin, atorvastatin    Parkinson disease (HCC)- (present on admission)  Assessment & Plan  4/3/25:  Continue carbidopa-levodopa  Nonverbal at baseline, uses a wheelchair but able to stand to urinate on his own and does not use a catheter  NPO, speech following (failed FEEs)  At high risk of aspiration even with enteral feeding given Reyna Gregorio, neurologist, states nothing more to  add  I have reached out to his outpatient neurologist as well  I discussed 4/2 with wife and patient End of life considerations.  May need near future Hospice given his severe dysphagia in the interim let us see if any improvement with Speech therapy      Restless leg syndrome  Assessment & Plan  Continue ropinirole    Other hyperlipidemia- (present on admission)  Assessment & Plan  4/3/25:  Continue atorvastatin         VTE prophylaxis: VTE Selection  Lovenox ppx  I have performed a physical exam and reviewed and updated ROS and Plan today (4/7/2025). In review of yesterday's note (4/6/2025), there are no changes except as documented above.    Patient is has a high medical complexity, complex decision making and is at high risk for complication, morbidity, and mortality, thus requiring the highest level of my preparedness for sudden, emergent intervention. Medical decision making is therefore complex. I provided  services, which included ordering labs and/or imaging, and discussing the case with various consultants.medication orders, frequent reevaluations of the patient's condition and response to treatment. Time was also devoted to counseling and coordinating care including review of records, pertinent lab data and studies, as well as discussing diagnostic evaluation and work up, planned therapeutic interventions and future disposition of care. Where indicated, the assessment and plan reflect discussion of patient with consultants, other healthcare providers, family members, and additional research needed to obtain further information in formulating the plan of care for Jeremiah Huber. Total time spent was 51 minutes.

## 2025-04-07 NOTE — CARE PLAN
The patient is Watcher - Medium risk of patient condition declining or worsening    Shift Goals  Clinical Goals: stable neuro status,maintain skin integrity  Patient Goals: comfort  Family Goals: JUANITA    Progress made toward(s) clinical / shift goals:  stable neuro status,NPO after midnight for peg tube placement  Problem: Pain - Standard  Goal: Alleviation of pain or a reduction in pain to the patient’s comfort goal  Outcome: Progressing     Problem: Hemodynamics  Goal: Patient's hemodynamics, fluid balance and neurologic status will be stable or improve  Outcome: Progressing     Problem: Respiratory  Goal: Patient will achieve/maintain optimum respiratory ventilation and gas exchange  Outcome: Progressing       Patient is not progressing towards the following goals:

## 2025-04-07 NOTE — ANESTHESIA TIME REPORT
Anesthesia Start and Stop Event Times       Date Time Event    4/7/2025 1249 Ready for Procedure     1302 Anesthesia Start     1342 Anesthesia Stop          Responsible Staff  04/07/25      Name Role Begin End    Navid Hairston M.D. Anesth 1302 1342          Overtime Reason:  no overtime (within assigned shift)    Comments:

## 2025-04-07 NOTE — ANESTHESIA PROCEDURE NOTES
Airway    Date/Time: 4/7/2025 1:11 PM    Performed by: Navid Hairston M.D.  Authorized by: Navid Hairston M.D.    Location:  OR  Urgency:  Elective  Indications for Airway Management:  Anesthesia      Spontaneous Ventilation: absent    Sedation Level:  Deep  Preoxygenated: Yes    Patient Position:  Sniffing  Mask Difficulty Assessment:  1 - vent by mask  Final Airway Type:  Endotracheal airway  Final Endotracheal Airway:  ETT  Cuffed: Yes    Technique Used for Successful ETT Placement:  Direct laryngoscopy    Insertion Site:  Oral  Blade Type:  Meaz  Laryngoscope Blade/Videolaryngoscope Blade Size:  2  ETT Size (mm):  6.5  Measured from:  Teeth  ETT to Teeth (cm):  22  Placement Verified by: auscultation and capnometry    Cormack-Lehane Classification:  Grade I - full view of glottis  Number of Attempts at Approach:  1

## 2025-04-07 NOTE — CARE PLAN
The patient is Stable - Low risk of patient condition declining or worsening    Shift Goals  Clinical Goals: Stable neuro status, maintain skin integrity  Patient Goals: Comfort  Family Goals: JUANITA    Progress made toward(s) clinical / shift goals:    Problem: Skin Integrity  Goal: Skin integrity is maintained or improved  Description: Target End Date:  Prior to discharge or change in level of careDocument interventions on Skin Risk/Cuong flowsheet groups and corresponding LDA1.  Assess and monitor skin integrity, appearance and/or temperature2.  Assess risk factors for impaired skin integrity and/or pressures ulcers3.  Implement precautions to protect skin integrity in collaboration with interdisciplinary team4.  Implement pressure ulcer prevention protocol if at risk for skin breakdown5.  Confirm wound care consult if at risk for skin breakdown6.  Ensure patient use of pressure relieving devices  (Low air loss bed, waffle overlay, heel protectors, ROHO cushion, etc)  Outcome: Progressing     Problem: Pain - Standard  Goal: Alleviation of pain or a reduction in pain to the patient’s comfort goal  Description: Target End Date:  Prior to discharge or change in level of careDocument on Vitals flowsheet1.  Document pain using the appropriate pain scale per order or unit policy2.  Educate and implement non-pharmacologic comfort measures (i.e. relaxation, distraction, massage, cold/heat therapy, etc.)3.  Pain management medications as ordered4.  Reassess pain after pain med administration per policy5.  If opiods administered assess patient's response to pain medication is appropriate per POSS sedation scale6.  Follow pain management plan developed in collaboration with patient and interdisciplinary team (including palliative care or pain specialists if applicable)  Outcome: Progressing       Patient is not progressing towards the following goals:

## 2025-04-07 NOTE — ANESTHESIA POSTPROCEDURE EVALUATION
Patient: Jeremiah Huber    Procedure Summary       Date: 04/07/25 Room / Location: [facility] ROOM 26 / SURGERY SAME DAY [facility]    Anesthesia Start: 1302 Anesthesia Stop: 1342    Procedures:       INSERTION, PEG TUBE (Esophagus)      GASTROSCOPY, WITH POLYPECTOMY (Esophagus) Diagnosis: (Oropharyngeal dysphagia, PEG placement, gastric polyp)    Surgeons: Christine Carvalho M.D. Responsible Provider: Navid Hairston M.D.    Anesthesia Type: general ASA Status: 3            Final Anesthesia Type: general  Last vitals  BP   Blood Pressure : 122/76    Temp   36.8 °C (98.3 °F)    Pulse   83   Resp   18    SpO2   95 %      Anesthesia Post Evaluation    Patient location during evaluation: PACU  Patient participation: complete - patient participated  Level of consciousness: awake and alert  Pain score: 0    Airway patency: patent  Anesthetic complications: no  Cardiovascular status: hemodynamically stable  Respiratory status: acceptable  Hydration status: euvolemic    PONV: none          No notable events documented.     Nurse Pain Score: 0 (NPRS)

## 2025-04-07 NOTE — CARE PLAN
The patient is Stable - Low risk of patient condition declining or worsening    Shift Goals  Clinical Goals: Stable neuro status, maintain skin integrity  Patient Goals: Rest  Family Goals: JUANITA    Progress made toward(s) clinical / shift goals:      Problem: Neuro Status  Goal: Neuro status will remain stable or improve  Outcome: Progressing    Q4H neuro checks in place. Pt's neurological status (A/Ox1) remains unchanged throughout shift. Bed alarm is on, call light within reach. Patient in procedure and unable to do 1200 neuro check.    Problem: Skin Integrity  Goal: Skin integrity is maintained or improved  Outcome: Progressing   Pt turned and repositioned Q2H or as requested. Barrier cream and mepilexes used to prevent skin breakdown on delicate perineal areas and bony prominences. Linen changes provided as needed to avoid risk of developing pressure ulcers. Patient turned every two hours while on floor, patient off unit for procedure.     Patient is not progressing towards the following goals:

## 2025-04-08 LAB
ALBUMIN SERPL BCP-MCNC: 3.1 G/DL (ref 3.2–4.9)
ALBUMIN/GLOB SERPL: 0.7 G/DL
ALP SERPL-CCNC: 106 U/L (ref 30–99)
ALT SERPL-CCNC: 11 U/L (ref 2–50)
ANION GAP SERPL CALC-SCNC: 13 MMOL/L (ref 7–16)
AST SERPL-CCNC: 22 U/L (ref 12–45)
BILIRUB SERPL-MCNC: 1 MG/DL (ref 0.1–1.5)
BUN SERPL-MCNC: 35 MG/DL (ref 8–22)
CALCIUM ALBUM COR SERPL-MCNC: 9.6 MG/DL (ref 8.5–10.5)
CALCIUM SERPL-MCNC: 8.9 MG/DL (ref 8.5–10.5)
CHLORIDE SERPL-SCNC: 105 MMOL/L (ref 96–112)
CO2 SERPL-SCNC: 28 MMOL/L (ref 20–33)
CREAT SERPL-MCNC: 1.14 MG/DL (ref 0.5–1.4)
ERYTHROCYTE [DISTWIDTH] IN BLOOD BY AUTOMATED COUNT: 47.8 FL (ref 35.9–50)
GFR SERPLBLD CREATININE-BSD FMLA CKD-EPI: 68 ML/MIN/1.73 M 2
GLOBULIN SER CALC-MCNC: 4.4 G/DL (ref 1.9–3.5)
GLUCOSE SERPL-MCNC: 108 MG/DL (ref 65–99)
HCT VFR BLD AUTO: 46.2 % (ref 42–52)
HGB BLD-MCNC: 14.9 G/DL (ref 14–18)
MAGNESIUM SERPL-MCNC: 2.9 MG/DL (ref 1.5–2.5)
MCH RBC QN AUTO: 29.4 PG (ref 27–33)
MCHC RBC AUTO-ENTMCNC: 32.3 G/DL (ref 32.3–36.5)
MCV RBC AUTO: 91.3 FL (ref 81.4–97.8)
PHOSPHATE SERPL-MCNC: 4.2 MG/DL (ref 2.5–4.5)
PLATELET # BLD AUTO: 427 K/UL (ref 164–446)
PMV BLD AUTO: 9.7 FL (ref 9–12.9)
POTASSIUM SERPL-SCNC: 4.1 MMOL/L (ref 3.6–5.5)
PROT SERPL-MCNC: 7.5 G/DL (ref 6–8.2)
RBC # BLD AUTO: 5.06 M/UL (ref 4.7–6.1)
SODIUM SERPL-SCNC: 146 MMOL/L (ref 135–145)
WBC # BLD AUTO: 15.6 K/UL (ref 4.8–10.8)

## 2025-04-08 PROCEDURE — 700111 HCHG RX REV CODE 636 W/ 250 OVERRIDE (IP): Mod: JZ | Performed by: INTERNAL MEDICINE

## 2025-04-08 PROCEDURE — A9270 NON-COVERED ITEM OR SERVICE: HCPCS | Performed by: STUDENT IN AN ORGANIZED HEALTH CARE EDUCATION/TRAINING PROGRAM

## 2025-04-08 PROCEDURE — 97530 THERAPEUTIC ACTIVITIES: CPT | Mod: CQ

## 2025-04-08 PROCEDURE — 700102 HCHG RX REV CODE 250 W/ 637 OVERRIDE(OP): Performed by: HOSPITALIST

## 2025-04-08 PROCEDURE — 700102 HCHG RX REV CODE 250 W/ 637 OVERRIDE(OP): Performed by: STUDENT IN AN ORGANIZED HEALTH CARE EDUCATION/TRAINING PROGRAM

## 2025-04-08 PROCEDURE — 97535 SELF CARE MNGMENT TRAINING: CPT

## 2025-04-08 PROCEDURE — A9270 NON-COVERED ITEM OR SERVICE: HCPCS | Performed by: INTERNAL MEDICINE

## 2025-04-08 PROCEDURE — 700102 HCHG RX REV CODE 250 W/ 637 OVERRIDE(OP): Performed by: INTERNAL MEDICINE

## 2025-04-08 PROCEDURE — 700105 HCHG RX REV CODE 258: Performed by: INTERNAL MEDICINE

## 2025-04-08 PROCEDURE — A9270 NON-COVERED ITEM OR SERVICE: HCPCS | Performed by: HOSPITALIST

## 2025-04-08 PROCEDURE — 97110 THERAPEUTIC EXERCISES: CPT | Mod: CQ

## 2025-04-08 PROCEDURE — 85027 COMPLETE CBC AUTOMATED: CPT

## 2025-04-08 PROCEDURE — 99233 SBSQ HOSP IP/OBS HIGH 50: CPT | Performed by: STUDENT IN AN ORGANIZED HEALTH CARE EDUCATION/TRAINING PROGRAM

## 2025-04-08 PROCEDURE — 36415 COLL VENOUS BLD VENIPUNCTURE: CPT

## 2025-04-08 PROCEDURE — 83735 ASSAY OF MAGNESIUM: CPT

## 2025-04-08 PROCEDURE — 99232 SBSQ HOSP IP/OBS MODERATE 35: CPT | Performed by: NURSE PRACTITIONER

## 2025-04-08 PROCEDURE — 700111 HCHG RX REV CODE 636 W/ 250 OVERRIDE (IP): Mod: JZ | Performed by: STUDENT IN AN ORGANIZED HEALTH CARE EDUCATION/TRAINING PROGRAM

## 2025-04-08 PROCEDURE — 307059 PAD,EAR PROTECTOR: Performed by: INTERNAL MEDICINE

## 2025-04-08 PROCEDURE — 84100 ASSAY OF PHOSPHORUS: CPT

## 2025-04-08 PROCEDURE — 770001 HCHG ROOM/CARE - MED/SURG/GYN PRIV*

## 2025-04-08 PROCEDURE — 80053 COMPREHEN METABOLIC PANEL: CPT

## 2025-04-08 RX ORDER — LANSOPRAZOLE 30 MG/1
30 TABLET, ORALLY DISINTEGRATING, DELAYED RELEASE ORAL DAILY
Status: DISCONTINUED | OUTPATIENT
Start: 2025-04-09 | End: 2025-04-09 | Stop reason: HOSPADM

## 2025-04-08 RX ORDER — BISACODYL 10 MG
10 SUPPOSITORY, RECTAL RECTAL DAILY
Status: DISCONTINUED | OUTPATIENT
Start: 2025-04-09 | End: 2025-04-09 | Stop reason: HOSPADM

## 2025-04-08 RX ORDER — OMEPRAZOLE 20 MG/1
20 CAPSULE, DELAYED RELEASE ORAL DAILY
Status: DISCONTINUED | OUTPATIENT
Start: 2025-04-09 | End: 2025-04-08

## 2025-04-08 RX ADMIN — CARBIDOPA AND LEVODOPA 2 TABLET: 25; 250 TABLET ORAL at 13:15

## 2025-04-08 RX ADMIN — AMPICILLIN SODIUM, SULBACTAM SODIUM 3 G: 2; 1 INJECTION, POWDER, FOR SOLUTION INTRAMUSCULAR; INTRAVENOUS at 01:32

## 2025-04-08 RX ADMIN — LANSOPRAZOLE 30 MG: 30 TABLET, ORALLY DISINTEGRATING, DELAYED RELEASE ORAL at 06:14

## 2025-04-08 RX ADMIN — Medication 5 MG: at 20:59

## 2025-04-08 RX ADMIN — ROPINIROLE HYDROCHLORIDE 4 MG: 2 TABLET, FILM COATED ORAL at 13:19

## 2025-04-08 RX ADMIN — CARBIDOPA AND LEVODOPA 2 TABLET: 25; 250 TABLET ORAL at 09:01

## 2025-04-08 RX ADMIN — OXYBUTYNIN CHLORIDE 5 MG: 5 TABLET ORAL at 17:27

## 2025-04-08 RX ADMIN — GUAIFENESIN 200 MG: 100 LIQUID ORAL at 13:19

## 2025-04-08 RX ADMIN — CARBIDOPA AND LEVODOPA 2 TABLET: 25; 250 TABLET ORAL at 17:27

## 2025-04-08 RX ADMIN — AMPICILLIN SODIUM, SULBACTAM SODIUM 3 G: 2; 1 INJECTION, POWDER, FOR SOLUTION INTRAMUSCULAR; INTRAVENOUS at 13:42

## 2025-04-08 RX ADMIN — AMIODARONE HYDROCHLORIDE 400 MG: 200 TABLET ORAL at 17:27

## 2025-04-08 RX ADMIN — AMPICILLIN SODIUM, SULBACTAM SODIUM 3 G: 2; 1 INJECTION, POWDER, FOR SOLUTION INTRAMUSCULAR; INTRAVENOUS at 06:20

## 2025-04-08 RX ADMIN — ROPINIROLE HYDROCHLORIDE 4 MG: 2 TABLET, FILM COATED ORAL at 17:27

## 2025-04-08 RX ADMIN — AMPICILLIN SODIUM, SULBACTAM SODIUM 3 G: 2; 1 INJECTION, POWDER, FOR SOLUTION INTRAMUSCULAR; INTRAVENOUS at 17:29

## 2025-04-08 RX ADMIN — ROPINIROLE HYDROCHLORIDE 4 MG: 2 TABLET, FILM COATED ORAL at 06:14

## 2025-04-08 RX ADMIN — POLYETHYLENE GLYCOL 3350 1 PACKET: 17 POWDER, FOR SOLUTION ORAL at 06:11

## 2025-04-08 RX ADMIN — Medication 100 MG: at 06:14

## 2025-04-08 RX ADMIN — FUROSEMIDE 40 MG: 10 INJECTION, SOLUTION INTRAVENOUS at 17:27

## 2025-04-08 RX ADMIN — MULTIVITAMIN TABLET 1 TABLET: TABLET at 06:14

## 2025-04-08 RX ADMIN — POTASSIUM BICARBONATE 25 MEQ: 978 TABLET, EFFERVESCENT ORAL at 06:14

## 2025-04-08 RX ADMIN — ENOXAPARIN SODIUM 40 MG: 100 INJECTION SUBCUTANEOUS at 17:28

## 2025-04-08 RX ADMIN — MIDODRINE HYDROCHLORIDE 10 MG: 5 TABLET ORAL at 13:14

## 2025-04-08 RX ADMIN — OXYBUTYNIN CHLORIDE 5 MG: 5 TABLET ORAL at 06:14

## 2025-04-08 RX ADMIN — AMANTADINE HYDROCHLORIDE 100 MG: 100 TABLET ORAL at 17:27

## 2025-04-08 RX ADMIN — GABAPENTIN 200 MG: 100 CAPSULE ORAL at 17:27

## 2025-04-08 RX ADMIN — AMIODARONE HYDROCHLORIDE 400 MG: 200 TABLET ORAL at 06:14

## 2025-04-08 RX ADMIN — MIDODRINE HYDROCHLORIDE 10 MG: 5 TABLET ORAL at 06:14

## 2025-04-08 RX ADMIN — FUROSEMIDE 40 MG: 10 INJECTION, SOLUTION INTRAVENOUS at 06:14

## 2025-04-08 RX ADMIN — MIDODRINE HYDROCHLORIDE 10 MG: 5 TABLET ORAL at 20:59

## 2025-04-08 RX ADMIN — SENNOSIDES AND DOCUSATE SODIUM 2 TABLET: 50; 8.6 TABLET ORAL at 17:27

## 2025-04-08 RX ADMIN — GUAIFENESIN 200 MG: 100 LIQUID ORAL at 17:26

## 2025-04-08 RX ADMIN — GUAIFENESIN 200 MG: 100 LIQUID ORAL at 06:14

## 2025-04-08 RX ADMIN — AMANTADINE HYDROCHLORIDE 100 MG: 100 TABLET ORAL at 06:14

## 2025-04-08 RX ADMIN — CARBIDOPA AND LEVODOPA 2 TABLET: 25; 250 TABLET ORAL at 20:59

## 2025-04-08 RX ADMIN — ASPIRIN 81 MG: 81 TABLET, CHEWABLE ORAL at 06:14

## 2025-04-08 ASSESSMENT — COGNITIVE AND FUNCTIONAL STATUS - GENERAL
TOILETING: TOTAL
DAILY ACTIVITIY SCORE: 8
CLIMB 3 TO 5 STEPS WITH RAILING: TOTAL
SUGGESTED CMS G CODE MODIFIER DAILY ACTIVITY: CM
DRESSING REGULAR UPPER BODY CLOTHING: A LOT
DRESSING REGULAR LOWER BODY CLOTHING: TOTAL
STANDING UP FROM CHAIR USING ARMS: A LOT
MOVING FROM LYING ON BACK TO SITTING ON SIDE OF FLAT BED: A LOT
MOVING TO AND FROM BED TO CHAIR: A LOT
PERSONAL GROOMING: A LOT
MOBILITY SCORE: 10
HELP NEEDED FOR BATHING: TOTAL
EATING MEALS: TOTAL
TURNING FROM BACK TO SIDE WHILE IN FLAT BAD: A LOT
WALKING IN HOSPITAL ROOM: TOTAL
SUGGESTED CMS G CODE MODIFIER MOBILITY: CL

## 2025-04-08 ASSESSMENT — PAIN DESCRIPTION - PAIN TYPE: TYPE: ACUTE PAIN

## 2025-04-08 ASSESSMENT — GAIT ASSESSMENTS: GAIT LEVEL OF ASSIST: UNABLE TO PARTICIPATE

## 2025-04-08 NOTE — PROGRESS NOTES
Gastroenterology Progress Note               Author:  VALENTIN Meza Date & Time Created: 4/8/2025 7:38 AM       Patient ID:  Name:             Jeremiah Huber  YOB: 1950  Age:                 74 y.o.  male  MRN:               [id number]    Medical Decision Making, by Problem:  Active Hospital Problems    Diagnosis     Other dysphagia [R13.19]     Hypokalemia [E87.6]     Acute respiratory failure with hypoxia (HCC) [J96.01]     Paroxysmal atrial fibrillation (HCC) [I48.0]     Pleural effusion, right [J90]     Pneumonia due to infectious organism [J18.9]     Sepsis with acute hypoxic respiratory failure without septic shock (HCC) [A41.9, R65.20, J96.01]     Right upper quadrant abdominal pain [R10.11]     Advance care planning [Z71.89]     History of stroke [Z86.73]     Parkinson disease (HCC) [G20.A1]     Other hyperlipidemia [E78.49]      Presenting Chief Complaint:  Request for PEG      History of Present Illness:    This is a 74 y.o. male with Parkinson's, hypertension, dyslipidemia, history of stroke, CAD who presented with abdominal pain and fever on March 27, 2025.  On presentation his wife volunteered that he has trouble swallowing pills but ate a cheeseburger that night. Found to have right sided pneumonia and on CT had large right pleural effusion with collapse right lung.  Respiratory PCR was negative.  Suspicion for aspiration pneumonia     Patient is awake, nonverbal, but mouths yes-no appropriately.  He does not want to go home without means for nutrition and pass away peacefully.     4/8/2025: PEG placement, noted to have gastric polyp, gastritis, and portal hypertensive gastropathy    Interval History:  4/6/2025: Patient seen at bedside with wife and daughter.  Awake, alert, he is able to mouth/soft to speak words.  Confirmed the patient himself wants to proceed with PEG tube.  Discussed with him that he is at increased risk of bleeding due to aspirin. He is agreeable to  proceed.     4/8/2025: Patient seen with daughter bedside. Bumper loosened to 3.5 cm at skin. Education provided to p[patient and daughter bedside.     Hospital Medications:  Current Facility-Administered Medications   Medication Dose Frequency Provider Last Rate Last Admin    multivitamin tablet 1 Tablet  1 Tablet DAILY Jarad Castillo M.D.   1 Tablet at 04/08/25 0614    thiamine (Vitamin B-1) tablet 100 mg  100 mg DAILY Jarad Castillo M.D.   100 mg at 04/08/25 0614    Respiratory Therapy Consult   Continuous RT Jarad Castillo M.D.        ampicillin/sulbactam (Unasyn) 3 g in  mL IVPB  3 g Q6HRS Jarad Castillo M.D.   Stopped at 04/08/25 0650    [START ON 4/9/2025] amiodarone (Cordarone) tablet 200 mg  200 mg DAILY Jeremiah Holliday D.O.        melatonin tablet 5 mg  5 mg Nightly Gilberto Boles M.D.   5 mg at 04/07/25 2136    potassium bicarbonate (Klyte) effervescent tablet 25 mEq  25 mEq BID KALYN PlunkettOCarlso   25 mEq at 04/08/25 0614    gabapentin (Neurontin) capsule 200 mg  200 mg Q EVENING Darion Meza M.D.   200 mg at 04/07/25 1816    guaiFENesin (Robitussin) 100 MG/5ML liquid 200 mg  10 mL TID Jeremiah Holliday D.O.   200 mg at 04/08/25 0614    amiodarone (Cordarone) tablet 400 mg  400 mg TWICE DAILY Jeremiah Holliday D.O.   400 mg at 04/08/25 0614    lansoprazole (Prevacid) solutab 30 mg  30 mg DAILY Darion Meza M.D.   30 mg at 04/08/25 0614    furosemide (Lasix) injection 40 mg  40 mg BID Darion Meza M.D.   40 mg at 04/08/25 0614    senna-docusate (Pericolace Or Senokot S) 8.6-50 MG per tablet 2 Tablet  2 Tablet Q EVENING Zhanna Heredia M.D.   2 Tablet at 04/07/25 1815    And    polyethylene glycol/lytes (Miralax) Packet 1 Packet  1 Packet QDAY PRN Zhanna Heredia M.D.   1 Packet at 04/08/25 0611    [Held by provider] atorvastatin (Lipitor) tablet 80 mg  80 mg Q EVENING Zhanna Heredia M.D.   80 mg at 03/30/25 1817    midodrine (Proamatine) tablet 10 mg  10 mg Q8HRS Zhanna  "MICHELINE Heredia M.D.   10 mg at 04/08/25 0614    amantadine (Symmetrel) tablet 100 mg  100 mg BID Ángel Harper M.D.   100 mg at 04/08/25 0614    aspirin (Asa) chewable tab 81 mg  81 mg DAILY Ángel Harper M.D.   81 mg at 04/08/25 0614    carbidopa-levodopa (Sinemet)  MG tablet 2 Tablet  2 Tablet 4X/DAY Ángel Harper M.D.   2 Tablet at 04/07/25 2136    oxybutynin (Ditropan) tablet 5 mg  5 mg BID Ángel Harper M.D.   5 mg at 04/08/25 0614    ROPINIRole (Requip) tablet 4 mg  4 mg TID Ángel Harper M.D.   4 mg at 04/08/25 0614    sodium bicarbonate tablet 1,300 mg  1,300 mg TID Ángel Harper M.D.   1,300 mg at 04/07/25 2136    Pharmacy Consult: Enteral tube insertion - review meds/change route/product selection  1 Each PHARMACY TO DOSE Ángel Harper M.D.        enoxaparin (Lovenox) inj 40 mg  40 mg DAILY AT [facility] DAYANNA Shafer M.D.   40 mg at 04/07/25 1815   Last reviewed on 4/7/2025  1:34 PM by Taylor Cheema R.N.       Review of Systems:  Review of Systems   Unable to perform ROS: Medical condition         Vital signs:  Weight/BMI: Body mass index is 26.76 kg/m².  /67   Pulse 80   Temp 37 °C (98.6 °F) (Temporal)   Resp 18   Ht 1.854 m (6' 1\")   Wt 92 kg (202 lb 13.2 oz)   SpO2 93%   Vitals:    04/07/25 2200 04/07/25 2300 04/08/25 0142 04/08/25 0500   BP: 100/70  113/76 103/67   Pulse: 83 84 79 80   Resp:   20 18   Temp:       TempSrc:   Temporal Temporal   SpO2: 93% 93% 94% 93%   Weight:       Height:         Oxygen Therapy:  Pulse Oximetry: 93 %, O2 (LPM): 4, O2 Delivery Device: Oxymask    Intake/Output Summary (Last 24 hours) at 4/8/2025 0738  Last data filed at 4/8/2025 0500  Gross per 24 hour   Intake 40 ml   Output 3610 ml   Net -3570 ml       Physical Exam  Vitals and nursing note reviewed.   Constitutional:       General: He is awake.      Appearance: He is ill-appearing (Chronically).      Comments: Frail elderly male   HENT:      Head: Normocephalic and atraumatic.      Nose: Nose " normal. No congestion.      Comments: NG tube in place with tube feed in progress without difficulty  Eyes:      General: No scleral icterus.     Extraocular Movements: Extraocular movements intact.      Conjunctiva/sclera: Conjunctivae normal.   Cardiovascular:      Rate and Rhythm: Normal rate and regular rhythm.      Pulses: Normal pulses.      Heart sounds: Normal heart sounds.   Pulmonary:      Effort: Pulmonary effort is normal.      Breath sounds: Normal breath sounds.   Abdominal:      General: Abdomen is flat. Bowel sounds are normal. There is no distension.      Palpations: Abdomen is soft.      Tenderness: There is no abdominal tenderness. There is no guarding.      Comments: PEG left upper quadrant   Musculoskeletal:      Right lower leg: No edema.      Left lower leg: No edema.   Skin:     General: Skin is warm and dry.   Neurological:      Mental Status: He is alert.         Labs:  Recent Labs     04/06/25  0541 04/07/25 0345 04/08/25  0146   SODIUM 142 143 146*   POTASSIUM 4.4 4.4 4.1   CHLORIDE 102 103 105   CO2 28 27 28   BUN 29* 32* 35*   CREATININE 1.02 1.08 1.14   MAGNESIUM 2.6* 2.7* 2.9*   PHOSPHORUS 3.2 3.6 4.2   CALCIUM 8.7 8.7 8.9     Recent Labs     04/06/25  0541 04/07/25  0345 04/08/25  0146   ALTSGPT 92* 75* 11   ASTSGOT 47* 40 22   ALKPHOSPHAT 120* 114* 106*   TBILIRUBIN 0.8 0.8 1.0   GLUCOSE 155* 118* 108*     Recent Labs     04/06/25  0541 04/07/25  0345 04/08/25  0146   WBC 14.8* 14.9* 15.6*   ASTSGOT 47* 40 22   ALTSGPT 92* 75* 11   ALKPHOSPHAT 120* 114* 106*   TBILIRUBIN 0.8 0.8 1.0     Recent Labs     04/06/25  0541 04/07/25  0345 04/08/25  0146   RBC 5.29 5.07 5.06   HEMOGLOBIN 15.1 14.8 14.9   HEMATOCRIT 47.4 45.6 46.2   PLATELETCT 412 369 427     Recent Results (from the past 24 hours)   Histology Request    Collection Time: 04/07/25  1:18 PM   Result Value Ref Range    Pathology Request Sent to Histo    CBC WITHOUT DIFFERENTIAL    Collection Time: 04/08/25  1:46 AM   Result  Value Ref Range    WBC 15.6 (H) 4.8 - 10.8 K/uL    RBC 5.06 4.70 - 6.10 M/uL    Hemoglobin 14.9 14.0 - 18.0 g/dL    Hematocrit 46.2 42.0 - 52.0 %    MCV 91.3 81.4 - 97.8 fL    MCH 29.4 27.0 - 33.0 pg    MCHC 32.3 32.3 - 36.5 g/dL    RDW 47.8 35.9 - 50.0 fL    Platelet Count 427 164 - 446 K/uL    MPV 9.7 9.0 - 12.9 fL   Comp Metabolic Panel    Collection Time: 04/08/25  1:46 AM   Result Value Ref Range    Sodium 146 (H) 135 - 145 mmol/L    Potassium 4.1 3.6 - 5.5 mmol/L    Chloride 105 96 - 112 mmol/L    Co2 28 20 - 33 mmol/L    Anion Gap 13.0 7.0 - 16.0    Glucose 108 (H) 65 - 99 mg/dL    Bun 35 (H) 8 - 22 mg/dL    Creatinine 1.14 0.50 - 1.40 mg/dL    Calcium 8.9 8.5 - 10.5 mg/dL    Correct Calcium 9.6 8.5 - 10.5 mg/dL    AST(SGOT) 22 12 - 45 U/L    ALT(SGPT) 11 2 - 50 U/L    Alkaline Phosphatase 106 (H) 30 - 99 U/L    Total Bilirubin 1.0 0.1 - 1.5 mg/dL    Albumin 3.1 (L) 3.2 - 4.9 g/dL    Total Protein 7.5 6.0 - 8.2 g/dL    Globulin 4.4 (H) 1.9 - 3.5 g/dL    A-G Ratio 0.7 g/dL   MAGNESIUM    Collection Time: 04/08/25  1:46 AM   Result Value Ref Range    Magnesium 2.9 (H) 1.5 - 2.5 mg/dL   PHOSPHORUS    Collection Time: 04/08/25  1:46 AM   Result Value Ref Range    Phosphorus 4.2 2.5 - 4.5 mg/dL   ESTIMATED GFR    Collection Time: 04/08/25  1:46 AM   Result Value Ref Range    GFR (CKD-EPI) 68 >60 mL/min/1.73 m 2       Radiology Review:  DX-CHEST-PORTABLE (1 VIEW)   Final Result         1.  Hazy bilateral lower lobe infiltrates, greater on the left, similar to prior study.   2.  Trace bilateral pleural effusions      DX-CHEST-PORTABLE (1 VIEW)   Final Result      Stable bibasilar airspace disease      DX-CHEST-PORTABLE (1 VIEW)   Final Result         1.  Pulmonary edema and/or infiltrates, stable since prior study.   2.  Small bilateral pleural effusions, right greater than left      DX-CHEST-PORTABLE (1 VIEW)   Final Result      1.  Small right pleural effusion and trace left pleural effusion.      2.  Right lower  lobe airspace opacity consistent with atelectasis and/or pneumonitis.      3.  NG tube extends in the fundus of stomach.      DX-CHEST-PORTABLE (1 VIEW)   Final Result         1.  Pulmonary edema and/or infiltrates, stable since prior study.   2.  Small bilateral pleural effusions, right greater than left      DX-CHEST-PORTABLE (1 VIEW)   Final Result         1.  Pulmonary edema and/or infiltrates, stable since prior study.   2.  Small right pleural effusion      DX-CHEST-PORTABLE (1 VIEW)   Final Result         1. Slowly improving moderate right and mild left base atelectasis.      2. Right pleural pigtail drain remains. No pneumothorax.      3. NG tube                     DX-ABDOMEN FOR TUBE PLACEMENT   Final Result      Enteric feeding tube terminates in the left abdomen over the expected location of the stomach.      DX-CHEST-PORTABLE (1 VIEW)   Final Result         1. Right pleural pigtail drain. No pneumothorax.      2. Persistent moderate right lower lobe opacity.      3. Lines and tubes without visible complication.                     EC-ECHOCARDIOGRAM LTD W/ CONT   Final Result      DX-CHEST-PORTABLE (1 VIEW)   Final Result         1. Right pleural pigtail drain. Some of the right lung opacity with parenchymal and pleural opacity remaining. No pneumothorax.      2. ETT 5 cm above yaa. NG tube in stomach.                     CT-HEAD W/O   Final Result      1.  No evidence of acute intracranial process.      2.  Cerebral atrophy as well as periventricular chronic small vessel ischemic change.               DX-CHEST-FOR LINE PLACEMENT Perform procedure in: Patient's Room   Final Result         1. Appropriately positioned endotracheal tube.   2. Interval improvement in aeration of the lung bases.      CT-CTA CHEST PULMONARY ARTERY W/ RECONS   Final Result      1.  Very large right pleural effusion causing collapse of the right lung.      2.  No CT evidence of pulmonary emboli.      3.  Multiple hepatic  cysts.            DX-CHEST-PORTABLE (1 VIEW)   Final Result         1. Increased large amount of right-sided airspace disease with associated small to moderate right pleural effusion.   2. Stable left basilar airspace disease versus atelectasis.      CT-ABDOMEN-PELVIS W/O   Final Result      1.  No evidence of bowel obstruction or focal inflammatory change.      2.  Volume loss and consolidation within the right lung base with loculated right pleural effusion.      3.  Atelectasis within the left lung base.      4.  Constipation. Numerous sigmoid diverticulosis.      5.  Hepatic cysts.      6.  Gallstones.      NM-BILIARY (HIDA) SCAN WITH CCK   Final Result      No scintigraphic evidence of cholecystitis, biliary obstruction or other worrisome finding.      US-RUQ   Final Result      1.  Stones and sludge in the gallbladder.   2.  No evidence for acute cholecystitis or biliary obstruction.   3.  Multiple liver cysts again seen.   4.  Limited evaluation of pancreas and abdominal aorta.      DX-CHEST-PORTABLE (1 VIEW)   Final Result      Hypoinflation with bibasilar atelectasis.  Superimposed pneumonia is difficult to exclude.      DX-CHEST-PORTABLE (1 VIEW)    (Results Pending)         MDM (Data Review):   -Records reviewed and summarized in current documentation  -I personally reviewed and interpreted the laboratory results  -I personally reviewed the radiology images    Assessment/Recommendations:  IMPRESSION:  Oropharyngeal dysphagia  Suspected aspiration pneumonia  Advanced Parkinson's disease  Gastric polyp with negative pathology  Portal hypertensive gastropathy  Stratus     PEG with jejunal extension tube is not a guarantee against aspiration as the tubes frequently pull back into the stomach.  Surgical J tubes are not ideal due to leaking.  He is at risk of aspiration due to OP dysphagia but by keeping him >45 degrees upright 2 hours after bolus feeding may be the best we can do to prevent aspiration.   Discussed that he is at an increased risk of bleeding due to Ongoing aspirin administration.  He and wife acknowledge risks and are agreeable to proceed.     RECOMMENDATIONS:   PPI for 4 weeks then can discontinue  Please ensure proper PEG care and tube feeding:  - keep head of bed elevated to at least 30 degrees during tube feeding. The largest factor in preventing aspiration during tube feeding is not the placement of the tube into the stomach or jejunum, but keeping the head elevated to at least 30 degrees during feeding  - Medications and water can be started 4 hours after placement. Tube feeds started this morning  3. PEG needs 0.5-1cm of laxity freely mobile in and out of the gastrostomy tract. Overtightening may impair tract maturation by inducing localized ischemia. Over tightening may also result in buried bumper syndrome.  4.  No NSAIDS for 7 days. For full dose anti-coagulation, please hold 24 hours after the procedure. Anti-platelet per the primary/cardiology team.     Discussed with patient, daughter at bedside, Dr. Carvalho.     ..Azra Montgomery, KEIKO,  APRN    Core Quality Measures   Reviewed items::  Labs, Medications and Radiology reports reviewed

## 2025-04-08 NOTE — DIETARY
Nutrition Services: Brief Nutrition Support Update    Pt has new PEG tube placed 4/7. Current TF order is Jevity 1.2 with goal rate 70 ml/hr and kitchen is currently out of stock.     While out of Jevity 1.2, switch to Osmolite 1.2 is appropriate with goal rate 70 ml/hr. This will provide 2016 kcals, 93 gm protein, and 1378 ml free water per day.     RD following, will update TF orders.

## 2025-04-08 NOTE — CARE PLAN
The patient is Stable - Low risk of patient condition declining or worsening    Shift Goals  Clinical Goals: Monitor neuro status, maintain skin integrity  Patient Goals: Sleep  Family Goals: soren    Progress made toward(s) clinical / shift goals:      Problem: Skin Integrity  Goal: Skin integrity is maintained or improved  Outcome: Progressing   Pt turned and repositioned Q2H or as requested. Barrier cream and mepilexes used to prevent skin breakdown on delicate perineal areas and bony prominences. Linen changes provided as needed to avoid risk of developing pressure ulcers.     Problem: Neuro Status  Goal: Neuro status will remain stable or improve  Outcome: Progressing    Q4H neuro checks in place. Pt's neurological status remains unchanged throughout shift. Bed alarm is on, call light within reach.     Patient is not progressing towards the following goals:

## 2025-04-08 NOTE — THERAPY
Occupational Therapy  Daily Treatment     Patient Name: Jeremiah Huber  Age:  74 y.o., Sex:  male  Medical Record #: [id number]  Today's Date: 4/8/2025     Precautions  Precautions: Fall Risk, Swallow Precautions, PEG Tube  Comments: PD    Assessment    Pt seen for follow up OT tx session, pts spouse states he's been up multiple times to chair during stay and seems a bit lethargic compared to previous day due to surgery. Pt participatory and motivated will continue to see for skilled therapy while admitted as well as recommend post-acute placement.    Plan    Treatment Plan Status: (P) Continue Current Treatment Plan  Type of Treatment: Self Care / Activities of Daily Living, Adaptive Equipment, Neuro Re-Education / Balance, Therapeutic Exercises, Therapeutic Activity  Treatment Frequency: 3 Times per Week  Treatment Duration: Until Therapy Goals Met    DC Equipment Recommendations: (P) Unable to determine at this time  Discharge Recommendations: (P) Recommend post-acute placement for additional occupational therapy services prior to discharge home    Objective       04/08/25 1248   Precautions   Precautions Fall Risk;Swallow Precautions;PEG Tube   Vitals   O2 (LPM) 2   O2 Delivery Device Silicone Nasal Cannula   Pain 0 - 10 Group   Therapist Pain Assessment Post Activity Pain Same as Prior to Activity;Nurse Notified   Cognition    Cognition / Consciousness X   Speech/ Communication Unable to Communicate  (mouths words)   Level of Consciousness Alert   Ability To Follow Commands 1 Step   Strength Upper Body   Upper Body Strength  X   Gross Strength Generalized Weakness, Equal Bilaterally.    Balance   Sitting Balance (Static) Poor   Sitting Balance (Dynamic) Poor -   Standing Balance (Static) Trace   Standing Balance (Dynamic) Trace   Weight Shift Sitting Poor   Weight Shift Standing Poor   Skilled Intervention Verbal Cuing;Facilitation   Comments w/ HHA x2   Bed Mobility    Supine to Sit Maximal Assist   Scooting  Maximal Assist   Rolling Maximal Assist to Rt.   Skilled Intervention Verbal Cuing;Facilitation   Activities of Daily Living   Upper Body Dressing Maximal Assist   Lower Body Dressing Total Assist   Skilled Intervention Verbal Cuing;Facilitation   How much help from another person does the patient currently need...   Putting on and taking off regular lower body clothing? 1   Bathing (including washing, rinsing, and drying)? 1   Toileting, which includes using a toilet, bedpan, or urinal? 1   Putting on and taking off regular upper body clothing? 2   Taking care of personal grooming such as brushing teeth? 2   Eating meals? 1   6 Clicks Daily Activity Score 8   Functional Mobility   Sit to Stand Maximal Assist   Bed, Chair, Wheelchair Transfer Maximal Assist   Transfer Method Stand Step   Mobility bed mobility, transfer to chair, seated ADLs, left in chair   Skilled Intervention Verbal Cuing;Facilitation   Comments w/ HHA x2   Activity Tolerance   Sitting in Chair left seated in chair with PTA   Sitting Edge of Bed 5 min   Standing 1 min   Patient / Family Goals   Patient / Family Goal #1 per spouse to get up more   Goal #1 Outcome Progressing as expected   Short Term Goals   Short Term Goal # 1 Pt will complete toilet transfer modA   Goal Outcome # 1 Progressing as expected   Short Term Goal # 2 Pt will complete seated grooming tasks SBA with set-up   Goal Outcome # 2 Progressing as expected   Short Term Goal # 3 Pt will complete UB dressing Jarad   Goal Outcome # 3 Progressing as expected   Education Group   Education Provided Role of Occupational Therapist   Role of Occupational Therapist Patient Response Patient;Significant Other;Acceptance;Explanation;Demonstration;Verbal Demonstration;Action Demonstration   Occupational Therapy Treatment Plan    O.T. Treatment Plan Continue Current Treatment Plan   Anticipated Discharge Equipment and Recommendations   DC Equipment Recommendations Unable to determine at this time    Discharge Recommendations Recommend post-acute placement for additional occupational therapy services prior to discharge home   Interdisciplinary Plan of Care Collaboration   IDT Collaboration with  Nursing;Physical Therapist Assistant (PTA)   Patient Position at End of Therapy Seated;Call Light within Reach;Tray Table within Reach;Phone within Reach  (left with PTA)   Collaboration Comments RN updated

## 2025-04-08 NOTE — DISCHARGE PLANNING
"Case Management Discharge Planning    Admission Date: 3/26/2025  GMLOS: 4.9  ALOS: 12    6-Clicks ADL Score: 8  6-Clicks Mobility Score: 10  PT and/or OT Eval ordered: Yes  Post-acute Referrals Ordered: Yes  Post-acute Choice Obtained: Yes  Has referral(s) been sent to post-acute provider:  Yes      Anticipated Discharge Dispo: D/T to SNF with Medicare cert in anticipation of skilled care (03)     DME Needed: No    Action(s) Taken:     SW met with pt and spouse at bedside.  Pt's spouse POA provided SNF choice for [facility].  Pt's spouse requesting education on PEG care, IDT aware.  Pt's spouse reports home is single level with wide hallways and doorways designed for aging in place.  Pt uses motorized scooter at baseline.       GALEN contacted [facility] for assistance with PASRR, per representative Ronny PASRR is incomplete and saved under \"Farhat Huber\".  Requested assistance with completing saved PASRR from ALYCE ORDONEZ that initiated PASRR, attempts to edit PASRR and several attempts to call [facility] by RN CM and GALEN for assistance were unsuccessful as phone calls were dropped repeatedly.  Issue escalated to [facility] leadership to expedite PASRR completion.    Escalations Completed: Leadership  Escalated to [facility] leadership to expedite PASRR completion.    Medically Clear: No    Next Steps: Contact [facility] to assist with PASRR completion.    Barriers to Discharge: Medical clearance PASRR completion.          "

## 2025-04-08 NOTE — DISCHARGE PLANNING
Attempted to complete PASSR, could not due to technical issues. Submitted an FA21 correction form.

## 2025-04-08 NOTE — THERAPY
Physical Therapy   Daily Treatment     Patient Name: Jeremiah Huber  Age:  74 y.o., Sex:  male  Medical Record #: [id number]  Today's Date: 4/8/2025     Precautions  Precautions: Fall Risk;Swallow Precautions;PEG Tube  Comments: PD    Assessment    Pt greeted and seen for PT treatment. Pt req'd MaxA for bed mobility, MaxAx2 person to stand and pivot to bed side chair then stand again, pt took some shuffle steps. Pt performed therex, detailed below, per wife pt strength trains x3/wk at baseline. Pt currently limited by impaired balance and slow rigid movements 2/2 PD which negatively impacts functional mobility. Pt will continue to benefit from skilled PT to address deficits.       Plan    Treatment Plan Status: Continue Current Treatment Plan  Type of Treatment: Bed Mobility, Neuro Re-Education / Balance, Self Care / Home Evaluation, Therapeutic Activities, Therapeutic Exercise, Family / Caregiver Training  Treatment Frequency: 3 Times per Week  Treatment Duration: Until Therapy Goals Met    DC Equipment Recommendations: Unable to determine at this time  Discharge Recommendations: Recommend post-acute placement for additional physical therapy services prior to discharge home       04/08/25 1301   Vitals   O2 (LPM) 2   O2 Delivery Device Silicone Nasal Cannula   Pain 0 - 10 Group   Therapist Pain Assessment Post Activity Pain Same as Prior to Activity;Nurse Notified  (no c/o pain)   Cognition    Level of Consciousness Alert   Comments non verbal, mouths words, gives thumbs up, follows 1 step commands.   Sitting Lower Body Exercises   Bilateral Row 1 set of 10;Bilateral   Ankle Pumps 1 set of 10;Bilateral  (toe/heel raises)   Hip Abduction 1 set of 10;Bilateral   Hip Adduction 1 set of 10;Bilateral   Long Arc Quad 1 set of 10;Bilateral   Marching Reciprocal;2 sets of 10   Hamstring Curl 1 set of 10;Bilateral   Other Exercises overhead reach B x10, alternating   Comments manual resistance applied to all. wife reported  pt strength trains x3/wk with .   Balance   Sitting Balance (Static) Poor -   Sitting Balance (Dynamic) Poor   Standing Balance (Static) Trace   Standing Balance (Dynamic) Trace   Weight Shift Sitting Poor   Weight Shift Standing Poor   Skilled Intervention Verbal Cuing;Tactile Cuing;Sequencing;Facilitation;Compensatory Strategies   Comments physical assist x2 person   Bed Mobility    Supine to Sit Maximal Assist   Scooting Maximal Assist   Rolling Maximal Assist to Rt.;Maximum Assist to Lt.   Skilled Intervention Verbal Cuing;Tactile Cuing;Sequencing;Facilitation;Compensatory Strategies   Comments HOB elevated   Gait Analysis   Gait Level Of Assist Unable to Participate   Functional Mobility   Sit to Stand Maximal Assist   Bed, Chair, Wheelchair Transfer Maximal Assist   Transfer Method Stand Step   Mobility bed>chair, STS once more   Skilled Intervention Verbal Cuing;Tactile Cuing;Sequencing;Facilitation;Compensatory Strategies   Comments x2 person. some shuffle steps to the chair.   Short Term Goals    Short Term Goal # 1 Pt will complete bed mobility with supervision from a flat bed to progress function in 6 visits.   Goal Outcome # 1 goal not met   Short Term Goal # 2 Pt will transfer with FWW and min A to progress function in 6 visits.   Goal Outcome # 2 Goal not met       Patient seen for team treatment with Occupational Therapist and Physical Therapy Assistant for the following reason(s):  Patient required 2 person assistance for safety and to provide effective interventions. Each discipline assisted patient with appropriate and separate goals.

## 2025-04-09 VITALS
HEIGHT: 73 IN | DIASTOLIC BLOOD PRESSURE: 91 MMHG | WEIGHT: 202.82 LBS | RESPIRATION RATE: 18 BRPM | OXYGEN SATURATION: 91 % | TEMPERATURE: 97.7 F | HEART RATE: 110 BPM | SYSTOLIC BLOOD PRESSURE: 137 MMHG | BODY MASS INDEX: 26.88 KG/M2

## 2025-04-09 LAB
ALBUMIN SERPL BCP-MCNC: 3.1 G/DL (ref 3.2–4.9)
ALBUMIN/GLOB SERPL: 0.6 G/DL
ALP SERPL-CCNC: 106 U/L (ref 30–99)
ALT SERPL-CCNC: 42 U/L (ref 2–50)
ANION GAP SERPL CALC-SCNC: 11 MMOL/L (ref 7–16)
AST SERPL-CCNC: 36 U/L (ref 12–45)
BILIRUB SERPL-MCNC: 0.8 MG/DL (ref 0.1–1.5)
BUN SERPL-MCNC: 38 MG/DL (ref 8–22)
CALCIUM ALBUM COR SERPL-MCNC: 9.5 MG/DL (ref 8.5–10.5)
CALCIUM SERPL-MCNC: 8.8 MG/DL (ref 8.5–10.5)
CHLORIDE SERPL-SCNC: 109 MMOL/L (ref 96–112)
CO2 SERPL-SCNC: 27 MMOL/L (ref 20–33)
CREAT SERPL-MCNC: 1.27 MG/DL (ref 0.5–1.4)
ERYTHROCYTE [DISTWIDTH] IN BLOOD BY AUTOMATED COUNT: 48.2 FL (ref 35.9–50)
GFR SERPLBLD CREATININE-BSD FMLA CKD-EPI: 60 ML/MIN/1.73 M 2
GLOBULIN SER CALC-MCNC: 4.8 G/DL (ref 1.9–3.5)
GLUCOSE SERPL-MCNC: 182 MG/DL (ref 65–99)
HCT VFR BLD AUTO: 47.6 % (ref 42–52)
HGB BLD-MCNC: 15.4 G/DL (ref 14–18)
MAGNESIUM SERPL-MCNC: 3 MG/DL (ref 1.5–2.5)
MCH RBC QN AUTO: 29.6 PG (ref 27–33)
MCHC RBC AUTO-ENTMCNC: 32.4 G/DL (ref 32.3–36.5)
MCV RBC AUTO: 91.4 FL (ref 81.4–97.8)
PHOSPHATE SERPL-MCNC: 2.6 MG/DL (ref 2.5–4.5)
PLATELET # BLD AUTO: 477 K/UL (ref 164–446)
PMV BLD AUTO: 9.8 FL (ref 9–12.9)
POTASSIUM SERPL-SCNC: 3.7 MMOL/L (ref 3.6–5.5)
PROT SERPL-MCNC: 7.9 G/DL (ref 6–8.2)
RBC # BLD AUTO: 5.21 M/UL (ref 4.7–6.1)
SODIUM SERPL-SCNC: 147 MMOL/L (ref 135–145)
WBC # BLD AUTO: 12.9 K/UL (ref 4.8–10.8)

## 2025-04-09 PROCEDURE — A9270 NON-COVERED ITEM OR SERVICE: HCPCS | Performed by: HOSPITALIST

## 2025-04-09 PROCEDURE — 700102 HCHG RX REV CODE 250 W/ 637 OVERRIDE(OP): Performed by: STUDENT IN AN ORGANIZED HEALTH CARE EDUCATION/TRAINING PROGRAM

## 2025-04-09 PROCEDURE — 36415 COLL VENOUS BLD VENIPUNCTURE: CPT

## 2025-04-09 PROCEDURE — A9270 NON-COVERED ITEM OR SERVICE: HCPCS | Performed by: INTERNAL MEDICINE

## 2025-04-09 PROCEDURE — 700102 HCHG RX REV CODE 250 W/ 637 OVERRIDE(OP): Performed by: HOSPITALIST

## 2025-04-09 PROCEDURE — 85027 COMPLETE CBC AUTOMATED: CPT

## 2025-04-09 PROCEDURE — 700102 HCHG RX REV CODE 250 W/ 637 OVERRIDE(OP)

## 2025-04-09 PROCEDURE — 83735 ASSAY OF MAGNESIUM: CPT

## 2025-04-09 PROCEDURE — 80053 COMPREHEN METABOLIC PANEL: CPT

## 2025-04-09 PROCEDURE — 700102 HCHG RX REV CODE 250 W/ 637 OVERRIDE(OP): Performed by: INTERNAL MEDICINE

## 2025-04-09 PROCEDURE — 99239 HOSP IP/OBS DSCHRG MGMT >30: CPT | Performed by: STUDENT IN AN ORGANIZED HEALTH CARE EDUCATION/TRAINING PROGRAM

## 2025-04-09 PROCEDURE — A9270 NON-COVERED ITEM OR SERVICE: HCPCS | Performed by: STUDENT IN AN ORGANIZED HEALTH CARE EDUCATION/TRAINING PROGRAM

## 2025-04-09 PROCEDURE — A9270 NON-COVERED ITEM OR SERVICE: HCPCS

## 2025-04-09 PROCEDURE — 700111 HCHG RX REV CODE 636 W/ 250 OVERRIDE (IP): Mod: JZ | Performed by: INTERNAL MEDICINE

## 2025-04-09 PROCEDURE — 84100 ASSAY OF PHOSPHORUS: CPT

## 2025-04-09 PROCEDURE — 700105 HCHG RX REV CODE 258: Performed by: INTERNAL MEDICINE

## 2025-04-09 PROCEDURE — 97602 WOUND(S) CARE NON-SELECTIVE: CPT

## 2025-04-09 RX ORDER — GUAIFENESIN 200 MG/10ML
10 LIQUID ORAL 3 TIMES DAILY
Qty: 90 ML | Refills: 0 | Status: ON HOLD
Start: 2025-04-09 | End: 2025-04-11 | Stop reason: SDUPTHER

## 2025-04-09 RX ORDER — LANSOPRAZOLE 30 MG/1
30 TABLET, ORALLY DISINTEGRATING, DELAYED RELEASE ORAL DAILY
Qty: 30 TABLET | Refills: 0 | Status: ON HOLD
Start: 2025-04-10 | End: 2025-04-11 | Stop reason: SDUPTHER

## 2025-04-09 RX ORDER — OXYBUTYNIN CHLORIDE 5 MG/1
5 TABLET ORAL 2 TIMES DAILY
Qty: 90 TABLET | Refills: 0 | Status: ON HOLD
Start: 2025-04-09 | End: 2025-04-11

## 2025-04-09 RX ORDER — AMIODARONE HYDROCHLORIDE 200 MG/1
200 TABLET ORAL DAILY
Qty: 30 TABLET | Refills: 0 | Status: ON HOLD
Start: 2025-04-10 | End: 2025-04-11

## 2025-04-09 RX ORDER — GABAPENTIN 100 MG/1
200 CAPSULE ORAL EVERY EVENING
Qty: 90 CAPSULE | Refills: 0 | Status: ON HOLD
Start: 2025-04-09 | End: 2025-04-11 | Stop reason: SDUPTHER

## 2025-04-09 RX ORDER — MIDODRINE HYDROCHLORIDE 10 MG/1
10 TABLET ORAL EVERY 8 HOURS
Qty: 90 TABLET | Refills: 0 | Status: ON HOLD
Start: 2025-04-09 | End: 2025-04-11 | Stop reason: SDUPTHER

## 2025-04-09 RX ADMIN — AMOXICILLIN AND CLAVULANATE POTASSIUM 1 TABLET: 875; 125 TABLET, FILM COATED ORAL at 09:25

## 2025-04-09 RX ADMIN — GUAIFENESIN 200 MG: 100 LIQUID ORAL at 05:59

## 2025-04-09 RX ADMIN — AMPICILLIN SODIUM, SULBACTAM SODIUM 3 G: 2; 1 INJECTION, POWDER, FOR SOLUTION INTRAMUSCULAR; INTRAVENOUS at 06:04

## 2025-04-09 RX ADMIN — AMIODARONE HYDROCHLORIDE 200 MG: 200 TABLET ORAL at 05:59

## 2025-04-09 RX ADMIN — MULTIVITAMIN TABLET 1 TABLET: TABLET at 06:00

## 2025-04-09 RX ADMIN — GUAIFENESIN 200 MG: 100 LIQUID ORAL at 13:33

## 2025-04-09 RX ADMIN — BISACODYL 10 MG: 10 SUPPOSITORY RECTAL at 05:59

## 2025-04-09 RX ADMIN — OXYBUTYNIN CHLORIDE 5 MG: 5 TABLET ORAL at 06:00

## 2025-04-09 RX ADMIN — CARBIDOPA AND LEVODOPA 2 TABLET: 25; 250 TABLET ORAL at 13:34

## 2025-04-09 RX ADMIN — Medication 100 MG: at 06:00

## 2025-04-09 RX ADMIN — AMPICILLIN SODIUM, SULBACTAM SODIUM 3 G: 2; 1 INJECTION, POWDER, FOR SOLUTION INTRAMUSCULAR; INTRAVENOUS at 00:08

## 2025-04-09 RX ADMIN — LANSOPRAZOLE 30 MG: 30 TABLET, ORALLY DISINTEGRATING, DELAYED RELEASE ORAL at 06:00

## 2025-04-09 RX ADMIN — ROPINIROLE HYDROCHLORIDE 4 MG: 2 TABLET, FILM COATED ORAL at 06:00

## 2025-04-09 RX ADMIN — MIDODRINE HYDROCHLORIDE 10 MG: 5 TABLET ORAL at 13:33

## 2025-04-09 RX ADMIN — MIDODRINE HYDROCHLORIDE 10 MG: 5 TABLET ORAL at 06:00

## 2025-04-09 RX ADMIN — ROPINIROLE HYDROCHLORIDE 4 MG: 2 TABLET, FILM COATED ORAL at 13:34

## 2025-04-09 RX ADMIN — ASPIRIN 81 MG: 81 TABLET, CHEWABLE ORAL at 05:59

## 2025-04-09 RX ADMIN — CARBIDOPA AND LEVODOPA 2 TABLET: 25; 250 TABLET ORAL at 09:25

## 2025-04-09 RX ADMIN — AMANTADINE HYDROCHLORIDE 100 MG: 100 TABLET ORAL at 05:59

## 2025-04-09 RX ADMIN — FUROSEMIDE 40 MG: 10 INJECTION, SOLUTION INTRAVENOUS at 06:00

## 2025-04-09 NOTE — DISCHARGE SUMMARY
Discharge Summary    CHIEF COMPLAINT ON ADMISSION  Chief Complaint   Patient presents with    Abdominal Pain     Intermittent RUQ pain x1 week. -N/V, +fever at home but no longer present following acetaminophen administration at home, PMH: parkinsons, patient non-verbal, wife (POA) at bedside. Patient has been coughing x1 week with clear mucous.       Reason for Admission  Abd Pain    Admission Date  3/26/2025     CODE STATUS  DNAR, I OK    HPI & HOSPITAL COURSE    Mr. Huber is a 74 year old male with the past medical history significant for Parkinson's disease complicated by dysphagia, hypertension, dyslipidemia, GERD, atrial fibrillation, and history of a stroke in 2020 attributed to a LV thrombus who was admitted on 3/27/2025 with pneumonia.  The patient was started on broad-spectrum antibiotics.  Due to the patient also complaining of right upper quadrant abdominal pain he had an ultrasound that was significant for stones/sludge and HIDA scan that was subsequently normal.  A CT of his chest on 3/28 showed a large right pleural effusion with collapse of the right lung.  The critical care team was consulted due to worsening mental status changes and being obtunded with increasing oxygen requirements.  The patient was then transferred to the ICU where he was intubated and a chest tube was placed. Patient ultimately weaned off ventilator, chest tube removed. Patient on antibiotics for pneumonia and parapneumonic effusion. Follow up chest x rays show improved pleural effusion, he was also treated with IV lasix diuresis with good output. Patient down to 2L oxygen at time of discharge, hope that this can be further weaned off in the future. He is to complete antibiotic course with augmentin on 4/10. He was found to have atrial fibrillation and transitioned from home metoprolol to amiodarone which he is tolerating well. Unclear why patient was taken off anticoagulation by his doctor after completing course for history  of LV thrombus, he does have underlying a fib and ongoing stroke risk. At this time wife states she will follow up with her cardiologist to discuss anticoagulation for a fib in the future. Patient not tolerating oral intake well, ultimately PEG tube placed for ongoing nutrition. He is tolerating PEG tube feeding well. Patient is medically stable, he is accepted to SNF for ongoing PT/OT/SLP services. He is to follow up with his PCP and cardiologist.       Therefore, he is discharged in fair and stable condition to skilled nursing facility.    The patient met 2-midnight criteria for an inpatient stay at the time of discharge.      FOLLOW UP ITEMS POST DISCHARGE  Take medications as prescribed.  Follow up with PCP, cardiology.    DISCHARGE DIAGNOSES  Principal Problem:    Pneumonia due to infectious organism (POA: Yes)  Active Problems:    Other hyperlipidemia (POA: Yes)    Parkinson disease (HCC) (POA: Yes)    History of stroke (POA: Yes)    Sepsis with acute hypoxic respiratory failure without septic shock (HCC) (POA: Yes)    Right upper quadrant abdominal pain (POA: Yes)    Advance care planning (POA: Unknown)    Pleural effusion, right (POA: Unknown)    Acute respiratory failure with hypoxia (HCC) (POA: No)    Paroxysmal atrial fibrillation (HCC) (POA: No)    Hypokalemia (POA: Unknown)    Other dysphagia (POA: Unknown)  Resolved Problems:    * No resolved hospital problems. *      FOLLOW UP  Future Appointments   Date Time Provider Department Center   5/13/2025  2:00 PM Demian Matos D.O. [facility] None   5/23/2025 11:00 AM Harrison Rodríguez M.D. [facility] None     [facility]  [address] [address] [facility] [address]  [facility] [address]  [phone]        [address] [address]  [phone]          MEDICATIONS ON DISCHARGE     Medication List        START taking these medications        Instructions   amiodarone 200 MG Tabs  Start taking on: April 10, 2025  Commonly known as:  Cordarone   1 Tablet by Enteral Tube route every day.  Dose: 200 mg     amoxicillin-clavulanate 875-125 MG Tabs  Commonly known as: Augmentin   1 Tablet by Enteral Tube route every 12 hours for 1 day.  Dose: 1 Tablet     guaiFENesin 100 MG/5ML liquid  Commonly known as: Robitussin   10 mL by Enteral Tube route 3 times a day for 3 days.  Dose: 10 mL     lansoprazole 30 MG Tbdd  Start taking on: April 10, 2025  Commonly known as: Prevacid   1 Tablet by Enteral Tube route every day.  Dose: 30 mg     midodrine 10 MG tablet  Commonly known as: Proamatine   1 Tablet by Enteral Tube route every 8 hours.  Dose: 10 mg     multivitamin Tabs  Start taking on: April 10, 2025   1 Tablet by Enteral Tube route every day.  Dose: 1 Tablet            CHANGE how you take these medications        Instructions   aspirin 81 MG EC tablet  What changed: when to take this   TAKE 1 TABLET BY MOUTH EVERY DAY     gabapentin 100 MG Caps  What changed:   medication strength  how much to take  how to take this  Commonly known as: Neurontin   2 Capsules by Enteral Tube route every evening.  Dose: 200 mg     oxybutynin 5 MG Tabs  What changed: how to take this  Commonly known as: Ditropan   1 Tablet by Enteral Tube route 2 times a day.  Dose: 5 mg            CONTINUE taking these medications        Instructions   amantadine 100 MG Tabs  Commonly known as: Symmetrel   Doctor's comments: [email or web address]  Take 1 Tablet by mouth 2 times a day.  Dose: 100 mg     atorvastatin 80 MG tablet  Commonly known as: Lipitor   Take 1 Tablet by mouth every evening.  Dose: 80 mg     carbidopa-levodopa  MG Tabs  Commonly known as: Sinemet   Take 2 Tablets by mouth 4 times a day. 6AM, 10AM, 2PM, and 6PM  Dose: 2 Tablet     ropinirole 4 MG tablet  Commonly known as: Requip   Doctor's comments: [email or web address]  Take 1 Tablet by mouth 3 times a day.  Dose: 4 mg     ZYRTEC-D PO   Take 1 Tablet by mouth every 12 hours as needed  (allergies).  Dose: 1 Tablet            STOP taking these medications      meloxicam 15 MG tablet  Commonly known as: Mobic     metoprolol SR 50 MG Tb24  Commonly known as: Toprol XL     MUCINEX DM PO     Multivitamin Gummies Adult Chew     omeprazole 20 MG delayed-release capsule  Commonly known as: PriLOSEC              Allergies  Allergies   Allergen Reactions    Crexont [Carbidopa-Levodopa Er] Unspecified     Reported lost of mobility and bladder       DIET  Orders Placed This Encounter   Procedures    Diet NPO Restrict to: Sips with Medications (Turn tube feed off at midnight)     Standing Status:   Standing     Number of Occurrences:   8     Diet NPO Restrict to::   Sips with Medications [3]              Turn tube feed off at midnight    Diet: Diet Tube Feed; Formula: Osmolite; Osmolite: Osmolite 1.2 RTH; Goal Rate (mL/Hour): 70; Duration: 24 HR     To administer beneprotein: Add 1 packet Beneprotein to  ml warm water. Mix and administer by syringe through feeding tube. Flush afterwards with a minimum of 30-60 ml water     Standing Status:   Standing     Number of Occurrences:   1     Diet:   Diet Tube Feed [35]     Formula::   Osmolite     Osmolite::   Osmolite 1.2 RTH     Goal Rate (mL/Hour):   70     Route:   Enteral Tube     Duration:   24 HR     Additive::   Beneprotein Packet     Additive Frequency:   TID     Number of Packets Per Day::   Three       ACTIVITY  As tolerated and directed by skilled nursing.  Weight bearing as tolerated    LINES, DRAINS, AND WOUNDS  This is an automated list. Peripheral IVs will be removed prior to discharge.  Peripheral IV 03/28/25 20 G Anterior;Left Forearm (Active)   Site Assessment Dry;Intact 04/09/25 1200   Dressing Type Transparent 04/09/25 1200   Line Status Scrubbed the hub prior to access 04/09/25 1200   Dressing Status Dry;Intact 04/09/25 1200   Dressing Intervention N/A 04/09/25 1200   Dressing Change Due 04/12/25 04/07/25 0800   Date Primary Tubing Changed  04/01/25 04/01/25 2100   Date Secondary Tubing Changed 04/01/25 04/01/25 2100   NEXT Primary Tubing Change  04/08/25 04/01/25 2100   NEXT Secondary Tubing Change  04/08/25 04/01/25 2100   Date IV Connector(s) Changed 03/29/25 03/29/25 0100   Infiltration Grading ([facility]) 0 04/09/25 1200   Phlebitis Scale ([facility] Only) 0 04/09/25 1200     External Urinary Catheter ([facility]) (Active)   Collection Container Standard drainage bag 04/08/25 2000   Output (mL) 500 mL 04/08/25 0500      Wound 03/28/25 Traumatic Lip Upper lip redness, slow to vicente post intubation (Active)   Wound Image   03/28/25 1910   Site Assessment Clean;Dry;Red;Pink;Scabbed 04/08/25 2000   Periwound Assessment Clean;Dry;Intact 04/08/25 2000   Margins Defined edges 04/08/25 2000   Closure None 04/07/25 0800   Drainage Amount None 04/07/25 0800   Treatments Offloading 04/04/25 2000   Offloading/DME Other (comment) 03/29/25 2000   Dressing Status Open to Air 04/08/25 2000       Wound 03/28/25 Pressure Injury Buttocks Evolving DTI (Active)   Wound Image     04/09/25 0930   Site Assessment Pink;Purple;Fragile 04/09/25 0930   Periwound Assessment Pink 04/09/25 0930   Margins Defined edges 04/09/25 0930   Closure Secondary intention 04/09/25 0930   Drainage Amount Scant 04/09/25 0930   Drainage Description Serosanguineous 04/09/25 0930   Treatments Cleansed;Site care;Offloading 04/09/25 0930   Offloading/DME Other (comment) 04/09/25 0930   Wound Cleansing Approved Wound Cleanser 04/09/25 0930   Periwound Protectant Mepitel 04/03/25 0845   Dressing Status Intact 04/09/25 0930   Dressing Changed Changed 04/09/25 0930   Dressing Cleansing/Solutions Not Applicable 04/09/25 0930   Dressing Options Hydrofiber;Offloading Dressing - Sacral 04/09/25 0930   Dressing Change/Treatment Frequency Every 48 hrs, and As Needed 04/09/25 0930   NEXT Dressing Change/Treatment Date 04/11/25 04/09/25 0930   NEXT Weekly Photo (Inpatient Only) 04/16/25 04/09/25 0930   Wound  Team Following Weekly 04/09/25 0930   WOUND NURSE ONLY - Pressure Injury Stage DTPI 04/09/25 0930   Wound Length (cm) 27 cm 04/09/25 0930   Wound Width (cm) 1.5 cm 04/09/25 0930   Wound Surface Area (cm^2) 40.5 cm^2 04/09/25 0930   Shape U shaped 04/09/25 0930   Wound Odor None 04/09/25 0930   WOUND NURSE ONLY - Time Spent with Patient (mins) 30 04/09/25 0930       Wound 04/07/25 Incision Abdomen (Active)   Wound Image   04/07/25 1515   Site Assessment Clean;Dry;Intact 04/08/25 2000   Periwound Assessment Clean;Dry;Intact 04/08/25 2000   Margins Attached edges 04/07/25 2000   Drainage Amount None 04/07/25 1515   Dressing Status Clean;Dry;Intact 04/08/25 2000   Dressing Options Dry Gauze;Tape 04/07/25 1515       Peripheral IV 03/28/25 20 G Anterior;Left Forearm (Active)   Site Assessment Dry;Intact 04/09/25 1200   Dressing Type Transparent 04/09/25 1200   Line Status Scrubbed the hub prior to access 04/09/25 1200   Dressing Status Dry;Intact 04/09/25 1200   Dressing Intervention N/A 04/09/25 1200   Dressing Change Due 04/12/25 04/07/25 0800   Date Primary Tubing Changed 04/01/25 04/01/25 2100   Date Secondary Tubing Changed 04/01/25 04/01/25 2100   NEXT Primary Tubing Change  04/08/25 04/01/25 2100   NEXT Secondary Tubing Change  04/08/25 04/01/25 2100   Date IV Connector(s) Changed 03/29/25 03/29/25 0100   Infiltration Grading ([facility]) 0 04/09/25 1200   Phlebitis Scale ([facility] Only) 0 04/09/25 1200               MENTAL STATUS ON TRANSFER      Alert, non verbal at baseline       CONSULTATIONS  Pulmonary/critical care  Gastroenterology  Palliative    PROCEDURES  3/28: chest tube placement  4/7: EGD with PEG tube placement    LABORATORY  Lab Results   Component Value Date    SODIUM 147 (H) 04/09/2025    POTASSIUM 3.7 04/09/2025    CHLORIDE 109 04/09/2025    CO2 27 04/09/2025    GLUCOSE 182 (H) 04/09/2025    BUN 38 (H) 04/09/2025    CREATININE 1.27 04/09/2025        Lab Results   Component Value Date    WBC 12.9  (H) 04/09/2025    HEMOGLOBIN 15.4 04/09/2025    HEMATOCRIT 47.6 04/09/2025    PLATELETCT 477 (H) 04/09/2025        Total time of the discharge process exceeds 37 minutes.

## 2025-04-09 NOTE — DISCHARGE PLANNING
DC Transport Scheduled    Transport Company Scheduled:  [employer]  Spoke with Taryn at [facility] to schedule transport.    Scheduled Date: 4/9/2025  Scheduled Time: 1700    Transport Type: [place]  Destination: [facility] at [address] [place] [address] Rolando WELLS     Notified care team of scheduled transport via Vo[employer]te.     If there are any changes needed to the DC transportation scheduled, please contact [facility] at ext. [phone] between the hours of [phone]. If outside those hours, contact the ED Case Manager at ext. [phone].

## 2025-04-09 NOTE — WOUND TEAM
[facility]  Inpatient Services  Wound and Skin Care Follow-up    Admission Date: 3/26/2025     Last order of IP CONSULT TO WOUND CARE was found on 3/28/2025 from Hospital Encounter on 3/26/2025     HPI, PMH, SH: Reviewed    Past Surgical History:   Procedure Laterality Date    LA PLACE PERCUT GASTROSTOMY TUBE N/A 2025    Procedure: INSERTION, PEG TUBE;  Surgeon: Christine Carvalho M.D.;  Location: SURGERY SAME DAY [facility];  Service: Gastroenterology    LA UP GI ENDOSCOPY,REMV TUMOR,SNARE N/A 2025    Procedure: GASTROSCOPY, WITH POLYPECTOMY;  Surgeon: Christine Carvalho M.D.;  Location: SURGERY SAME DAY [facility];  Service: Gastroenterology    NEURO DEST FACET L/S W/IG SNGL Right 2020    Procedure: DESTRUCTION, NERVE, FACET JOINT, LUMBOSACRAL, USING NEUROLYTIC AGENT, WITH FLUOROSCOPIC GUIDANCE;  Surgeon: Nazario Sandoval M.D.;  Location: SURGERY REHAB PAIN MANAGEMENT;  Service: Pain Management    KNEE ARTHROSCOPY Right     KNEE REPLACEMENT, TOTAL Bilateral     SHOULDER SURGERY      right shoulder, a-c seperation    TONSILLECTOMY       Social History     Tobacco Use    Smoking status: Former     Current packs/day: 0.00     Types: Cigarettes     Quit date: 1970     Years since quittin.3    Smokeless tobacco: Never   Substance Use Topics    Alcohol use: Yes     Alcohol/week: 4.2 oz     Types: 7 Glasses of wine per week     Chief Complaint   Patient presents with    Abdominal Pain     Intermittent RUQ pain x1 week. -N/V, +fever at home but no longer present following acetaminophen administration at home, PMH: parkinsons, patient non-verbal, wife (POA) at bedside. Patient has been coughing x1 week with clear mucous.     Diagnosis: Pneumonia due to infectious organism [J18.9]  Acute respiratory failure with hypoxia (HCC) [J96.01]    Unit where seen by Wound Team: S179/02     WOUND FOLLOW UP RELATED TO:  Buttocks       WOUND TEAM PLAN OF CARE - Frequency of Follow-up:   Nursing to follow  dressing orders written for wound care. Contact wound team if area fails to progress, deteriorates or with any questions/concerns if something comes up before next scheduled follow up (See below as to whether wound is following and frequency of wound follow up)  Dressing changes by wound team:                   Weekly - buttocks    WOUND HISTORY:     IP Wound History  Admitted for RUQ pain.  History of parkinson's, non-verbal, HTN, patient had a cough.        WOUND ASSESSMENT/LDA  Wound 03/28/25 Pressure Injury Buttocks Evolving DTI (Active)   Date First Assessed/Time First Assessed: 03/28/25 2000   Present on Original Admission: No  Hand Hygiene Completed: Yes  Primary Wound Type: Pressure Injury  Location: Buttocks  Wound Description (Comments): Evolving DTI      Assessments 4/9/2025  9:30 AM   Wound Image       Site Assessment Pink;Purple;Fragile   Periwound Assessment Pink   Margins Defined edges   Closure Secondary intention   Drainage Amount Scant   Drainage Description Serosanguineous   Treatments Cleansed;Site care;Offloading   Offloading/DME Other (comment)   Wound Cleansing Approved Wound Cleanser   Dressing Status Intact   Dressing Changed Changed   Dressing Cleansing/Solutions Not Applicable   Dressing Options Hydrofiber;Offloading Dressing - Sacral   Dressing Change/Treatment Frequency Every 48 hrs, and As Needed   NEXT Dressing Change/Treatment Date 04/11/25   NEXT Weekly Photo (Inpatient Only) 04/16/25   Wound Team Following Weekly   Pressure Injury Stage Deep Tissue   Wound Length (cm) 27 cm   Wound Width (cm) 1.5 cm   Wound Surface Area (cm^2) 40.5 cm^2   Shape U shaped   Wound Odor None   WOUND NURSE ONLY - Time Spent with Patient (mins) 30        Vascular:    MITUL:   No results found.    Lab Values:    Lab Results   Component Value Date/Time    WBC 12.9 (H) 04/09/2025 03:14 AM    RBC 5.21 04/09/2025 03:14 AM    HEMOGLOBIN 15.4 04/09/2025 03:14 AM    HEMATOCRIT 47.6 04/09/2025 03:14 AM    CREACTPROT  37.50 (H) 03/29/2025 03:43 AM    HBA1C 5.2 06/14/2020 04:44 AM         Culture Results show:  No results found for this or any previous visit (from the past 720 hours).    Pain Level/Medicated:  None, Tolerated without pain medication       INTERVENTIONS BY WOUND TEAM:  Chart and images reviewed. Discussed with bedside RN. All areas of concern (based on picture review, LDA review and discussion with bedside RN) have been thoroughly assessed. Documentation of areas based on significant findings. This RN in to assess patient. Performed standard wound care which includes appropriate positioning, dressing removal and non-selective debridement. Pictures and measurements obtained weekly if/when required.    Wound:  Buttocks  Preparation for Dressing removal: Removed without difficulty  Cleansed/Non-selectively Debrided with:  Wound cleanser and Gauze  Stephen wound: Cleansed with Wound cleanser and Gauze, Prepped with N/A  Primary Dressing:  Hydrofiber  Secondary (Outer) Dressing: Sacral offloading dressing    Advanced Wound Care Discharge Planning  Number of Clinicians necessary to complete wound care: 2 - for turning  Is patient requiring IV pain medications for dressing changes:  No   Length of time for dressing change 20 min. (This does not include chart review, pre-medication time, set up, clean up or time spent charting.)    Interdisciplinary consultation: Patient, Bedside RN (Marialuisa), N/A.  Pressure injury and staging reviewed with N/A.    EVALUATION / RATIONALE FOR TREATMENT:     Date:  04/09/25  Wound Status:  Wound progressing as expected    Left Buttocks area is starting to open and evolve. Most of the wound bed is pink with some areas of purple. Wound is denuded with fragile skin covering some areas. Changed Mepitel One to Hydrofiber to manage bioburden, absorb exudate, and maintain a moist wound environment without laterally wicking exudate therefore reducing stephen-wound maceration. Right buttocks to hip area of  the wound is pink yet intact. Offloading dressing applied to all areas to offloading.     Date:  04/01/25  Wound Status:  Wound progressing as expected     Coccyx with small opening and a small amount of serous drainage. A mepitel one was placed only over the areas with blisters. The right gluteal cheek has a definitive red marking, however this area is now blanching and was not included in the measurements. The left buttocks continues to be nonblanching with the deepest purple areas, that will continue to evolve. It appears consolidated to the inferior portion of the wound. Offloading dressings were applied.      Ankles were assessed at this time and were found red but blanching.      Date:  03/29/25  Wound Status:  Initial evaluation     Patient with large evolving DTI from a bedpan.  The coccyx is beginning to open and the L. Buttocks with visible blood filled blisters.  This wound will most likely worsen.  Offloading adhesive foams applied        Goals: Steady decrease in wound area and depth weekly.    NURSING PLAN OF CARE ORDERS:  Dressing changes: See Dressing Care orders    NUTRITION RECOMMENDATIONS   Wound Team Recommendations:  High protein diet  Protein supplements - dietary consult placed for wound healing optimization    DIET ORDERS (From admission to next 24h)       Start     Ordered    04/08/25 0940  Diet: Diet Tube Feed; Formula: Osmolite; Osmolite: Osmolite 1.2 RTH; Goal Rate (mL/Hour): 70; Duration: 24 HR  ALL MEALS        Comments: To administer beneprotein: Add 1 packet Beneprotein to  ml warm water. Mix and administer by syringe through feeding tube. Flush afterwards with a minimum of 30-60 ml water   Question Answer Comment   Diet Diet Tube Feed    Formula: Osmolite    Osmolite: Osmolite 1.2 RTH    Goal Rate (mL/Hour) 70    Route Enteral Tube    Duration 24 HR    Additive: Beneprotein Packet    Additive Frequency TID    Number of Packets Per Day: Three        04/08/25 0940    04/06/25  5380  Diet NPO Restrict to: Sips with Medications (Turn tube feed off at midnight)  AT MIDNIGHT      Question:  Diet NPO Restrict to:  Answer:  Sips with Medications  Comment:  Turn tube feed off at midnight    04/06/25 1810                    PREVENTATIVE INTERVENTIONS:   Q shift Cuong - performed per nursing policy  Q shift pressure point assessments - performed per nursing policy    Surface/Positioning  Low Airloss - Currently in Place  Reposition q 2 hours - Currently in Place  TAPs Turning system - Currently in Place    Offloading/Redistribution  Sacral offloading dressing (Silicone dressing) - Currently in Place  Heel offloading dressing (Silicone dressing) - Currently in Place  Heel float boots (Prevalon boot) - Currently in Place  Pillows for support - Currently in Place      Respiratory  Silicone O2 tubing - Currently in Place  Gray Foam Ear protectors - Currently in Place    Containment/Moisture Prevention    Dri-keeley pad - Currently in Place  Purwick/Condom Cath - Currently in Place    Mobilization      Unable to assess     Anticipated discharge plans:  Skilled Nursing        Vac Discharge Needs:  Vac Discharge plan is purely a recommendation from wound team and not a requirement for discharge unless otherwise stated by physician.  Not Applicable Pt not on a wound vac

## 2025-04-09 NOTE — PROGRESS NOTES
"Hospital Medicine Daily Progress Note    Date of Service  4/9/2025    Chief Complaint  Abdominal pain RUQ x1 week.    Hospital Course  Farhat Huber is a 74-year-old male with a past medical history significant for Parkinson, hypertension, dysphagia, GERD, history of CVA (due to LV thrombus) on 6/2022.  He presented to the ER with a complaint of abdominal pain that has been going on for the last few days .    Presents in ER, patient noted to have WBC of 17.7, AST/ALT normal, lipase 19, procalcitonin 0.5.  Blood culture x 2 was ordered, patient was started on antibiotics for aspiration pneumonia including Unasyn and azithromycin.  Ultrasound of the abdomen showed stones/sludge of gallbladder, no evidence of acute cholecystitis CBD obstruction, and multiple liver cysts. A CT chest showed large right pleural effusion.     Echocardiogram showing EF of 60%, akinesis of the apex.    4/3/25:  Remains non-verbal but nods head to questions.  Weak.  I have Voalte his prior neurologist Dr Mclain for any possible medication change.  Yesterday I reached out to Dr Tc Gregorio for request to review for further input on the patient and he replied with \" I don't have anything concretely to add for patient.\"        Interval Problem Update  No acute events overnight.  Patient seen at bedside with wife, he denies any focal pain. He is in no acute distress.  CXR reviewed showing improved pleural effusions, air under diaphragm likely related to recent PEG tube placement.   He is tolerating tube feeds via PEG tube well.  Lasix stopped.  On augmentin for aspiration pneumonia, completes 4/10.  Patient is medically cleared for SNF.        I have discussed this patient's plan of care and discharge plan at IDT rounds today with Case Management, Nursing, Nursing leadership, and other members of the IDT team.      Consultants/Specialty  critical care    Code Status  DNAR, I OK    Disposition  Medically Cleared  I have placed the appropriate " orders for post-discharge needs.    Review of Systems  Review of Systems   Unable to perform ROS: Mental acuity        Physical Exam  Temp:  [36.6 °C (97.9 °F)-37.2 °C (99 °F)] 36.6 °C (97.9 °F)  Pulse:  [94-98] 94  Resp:  [18] 18  BP: (101-117)/(71-82) 107/73  SpO2:  [91 %-92 %] 92 %    Physical Exam  Vitals reviewed.   Constitutional:       Appearance: He is ill-appearing.   HENT:      Head: Normocephalic.      Mouth/Throat:      Pharynx: Oropharyngeal exudate present.   Eyes:      General:         Right eye: No discharge.         Left eye: No discharge.      Conjunctiva/sclera: Conjunctivae normal.   Cardiovascular:      Rate and Rhythm: Normal rate and regular rhythm.      Heart sounds: Normal heart sounds. No murmur heard.  Pulmonary:      Effort: Pulmonary effort is normal. No respiratory distress.      Breath sounds: Examination of the right-lower field reveals decreased breath sounds. Decreased breath sounds present.   Abdominal:      General: Bowel sounds are normal. There is no distension.   Musculoskeletal:      Cervical back: No tenderness.      Right lower leg: No edema.      Left lower leg: No edema.   Lymphadenopathy:      Cervical: No cervical adenopathy.   Skin:     General: Skin is warm.      Coloration: Skin is pale.   Neurological:      Mental Status: He is alert.      Cranial Nerves: Cranial nerve deficit present.      Motor: Weakness present.      Coordination: Coordination abnormal.         Fluids    Intake/Output Summary (Last 24 hours) at 4/9/2025 1430  Last data filed at 4/9/2025 0806  Gross per 24 hour   Intake 90 ml   Output 2300 ml   Net -2210 ml        Laboratory  Recent Labs     04/07/25  0345 04/08/25  0146 04/09/25  0314   WBC 14.9* 15.6* 12.9*   RBC 5.07 5.06 5.21   HEMOGLOBIN 14.8 14.9 15.4   HEMATOCRIT 45.6 46.2 47.6   MCV 89.9 91.3 91.4   MCH 29.2 29.4 29.6   MCHC 32.5 32.3 32.4   RDW 47.2 47.8 48.2   PLATELETCT 369 427 477*   MPV 9.5 9.7 9.8     Recent Labs     04/07/25  0345  04/08/25  0146 04/09/25  0314   SODIUM 143 146* 147*   POTASSIUM 4.4 4.1 3.7   CHLORIDE 103 105 109   CO2 27 28 27   GLUCOSE 118* 108* 182*   BUN 32* 35* 38*   CREATININE 1.08 1.14 1.27   CALCIUM 8.7 8.9 8.8                   Imaging  DX-CHEST-PORTABLE (1 VIEW)   Final Result         1.  Hazy bilateral lower lobe infiltrates, greater on the left, similar to prior study.   2.  Intra-abdominal free air, appears somewhat more pronounced since prior study. Could represent redistribution of the abdominal air and/or related to changes in positioning. Radiographic appearance cannot exclude viscus perforation.   3.  Trace bilateral pleural effusions, stable since prior study      DX-CHEST-PORTABLE (1 VIEW)   Final Result      Free intraperitoneal air likely related to recent surgery.      Small pleural effusions with bibasilar atelectasis. Correlate clinically for infection.      DX-CHEST-PORTABLE (1 VIEW)   Final Result         1.  Hazy bilateral lower lobe infiltrates, greater on the left, similar to prior study.   2.  Trace bilateral pleural effusions      DX-CHEST-PORTABLE (1 VIEW)   Final Result      Stable bibasilar airspace disease      DX-CHEST-PORTABLE (1 VIEW)   Final Result         1.  Pulmonary edema and/or infiltrates, stable since prior study.   2.  Small bilateral pleural effusions, right greater than left      DX-CHEST-PORTABLE (1 VIEW)   Final Result      1.  Small right pleural effusion and trace left pleural effusion.      2.  Right lower lobe airspace opacity consistent with atelectasis and/or pneumonitis.      3.  NG tube extends in the fundus of stomach.      DX-CHEST-PORTABLE (1 VIEW)   Final Result         1.  Pulmonary edema and/or infiltrates, stable since prior study.   2.  Small bilateral pleural effusions, right greater than left      DX-CHEST-PORTABLE (1 VIEW)   Final Result         1.  Pulmonary edema and/or infiltrates, stable since prior study.   2.  Small right pleural effusion       DX-CHEST-PORTABLE (1 VIEW)   Final Result         1. Slowly improving moderate right and mild left base atelectasis.      2. Right pleural pigtail drain remains. No pneumothorax.      3. NG tube                     DX-ABDOMEN FOR TUBE PLACEMENT   Final Result      Enteric feeding tube terminates in the left abdomen over the expected location of the stomach.      DX-CHEST-PORTABLE (1 VIEW)   Final Result         1. Right pleural pigtail drain. No pneumothorax.      2. Persistent moderate right lower lobe opacity.      3. Lines and tubes without visible complication.                     EC-ECHOCARDIOGRAM LTD W/ CONT   Final Result      DX-CHEST-PORTABLE (1 VIEW)   Final Result         1. Right pleural pigtail drain. Some of the right lung opacity with parenchymal and pleural opacity remaining. No pneumothorax.      2. ETT 5 cm above yaa. NG tube in stomach.                     CT-HEAD W/O   Final Result      1.  No evidence of acute intracranial process.      2.  Cerebral atrophy as well as periventricular chronic small vessel ischemic change.               DX-CHEST-FOR LINE PLACEMENT Perform procedure in: Patient's Room   Final Result         1. Appropriately positioned endotracheal tube.   2. Interval improvement in aeration of the lung bases.      CT-CTA CHEST PULMONARY ARTERY W/ RECONS   Final Result      1.  Very large right pleural effusion causing collapse of the right lung.      2.  No CT evidence of pulmonary emboli.      3.  Multiple hepatic cysts.            DX-CHEST-PORTABLE (1 VIEW)   Final Result         1. Increased large amount of right-sided airspace disease with associated small to moderate right pleural effusion.   2. Stable left basilar airspace disease versus atelectasis.      CT-ABDOMEN-PELVIS W/O   Final Result      1.  No evidence of bowel obstruction or focal inflammatory change.      2.  Volume loss and consolidation within the right lung base with loculated right pleural effusion.       3.  Atelectasis within the left lung base.      4.  Constipation. Numerous sigmoid diverticulosis.      5.  Hepatic cysts.      6.  Gallstones.      NM-BILIARY (HIDA) SCAN WITH CCK   Final Result      No scintigraphic evidence of cholecystitis, biliary obstruction or other worrisome finding.      US-RUQ   Final Result      1.  Stones and sludge in the gallbladder.   2.  No evidence for acute cholecystitis or biliary obstruction.   3.  Multiple liver cysts again seen.   4.  Limited evaluation of pancreas and abdominal aorta.      DX-CHEST-PORTABLE (1 VIEW)   Final Result      Hypoinflation with bibasilar atelectasis.  Superimposed pneumonia is difficult to exclude.           Assessment/Plan  * Pneumonia due to infectious organism- (present on admission)  Assessment & Plan  4/3/25:  Presenting with right upper quadrant pain, productive cough with clear sputum and fever at home  CXR reviewed, opacity on the right.  History of dysphagia due to Parkinson's, suspect aspiration pneumonia  S/P unasyn, azithro, zyvox  3/28/25 Procalcitonin:1.45    Other dysphagia  Assessment & Plan  NG t  SLP following  S/p PEG tube, tolerating tube feeding well    Hypokalemia  Assessment & Plan  4/3/25:  4/2 K:2.9  4/3: 3.6  Replace and monitor  Monitor labs  Recent Labs     04/05/25  0330 04/06/25  0541 04/07/25  0345   SODIUM 141 142 143   POTASSIUM 4.3 4.4 4.4   CHLORIDE 103 102 103   CO2 28 28 27   GLUCOSE 170* 155* 118*   BUN 25* 29* 32*   CREATININE 0.95 1.02 1.08         Paroxysmal atrial fibrillation (HCC)  Assessment & Plan  4/3/25  HR:71  Amiodarone 400mg BID with plan to go to a maintenance dose  Vitals:    04/07/25 1339   BP:    Pulse:    Resp:    Temp: 36.8 °C (98.3 °F)   SpO2: 95%         Acute respiratory failure with hypoxia (HCC)  Assessment & Plan  Concern of ongoing aspiration risk given [facility]  Has a NG tube for enteral feeding currently  Aspiration precautions and SLP  Titrate O2 to keep SpO2  >90  Currently down to 2L, improving    Pleural effusion, right  Assessment & Plan  4/3/25:  Noted on chest x-ray and prior CT scan this admit  4/2 CXR still showing right pleural effusion and possible pulmonary edema  4/3 CXR unchanged  S/p Chest tube      Advance care planning  Assessment & Plan  4/3/25:  Patient is nonverbal . Patient wife who is poa and nursing staff were present for the conversation.   Discussed need to consider further advanced care planning if he is not improving from inability to protect his airway.  I feel he is at risk of secretion aspirations even if we placed a PEG tube.  Code status changed to dnar/I okay    Right upper quadrant abdominal pain- (present on admission)  Assessment & Plan  4/3/25:  No current chest pain  LFTs unremarkable,ush showing gallstone   Hida  normal , likley chest pain is from pleurisy    History of stroke- (present on admission)  Assessment & Plan  4/3:  Continue home aspirin, atorvastatin    Parkinson disease (HCC)- (present on admission)  Assessment & Plan  4/3/25:  Continue carbidopa-levodopa  Nonverbal at baseline, uses a wheelchair but able to stand to urinate on his own and does not use a catheter  NPO, speech following (failed FEEs)  At high risk of aspiration even with enteral feeding given Parkisons  Dr Tc Gregorio, neurologist, states nothing more to add  I have reached out to his outpatient neurologist as well  I discussed 4/2 with wife and patient End of life considerations.  May need near future Hospice given his severe dysphagia in the interim let us see if any improvement with Speech therapy      Restless leg syndrome  Assessment & Plan  Continue ropinirole    Other hyperlipidemia- (present on admission)  Assessment & Plan  4/3/25:  Continue atorvastatin         VTE prophylaxis: VTE Selection  Lovenox ppx

## 2025-04-09 NOTE — DIETARY
"Nutrition Services Weekly Nutrition Support Update     Day 13 of admit. Jeremiah Huber is a 74 y.o. male with admitting DX of Pneumonia due to infectious organism and Acute respiratory failure with hypoxia.    Tube feeding initiated on 3/28. Current TF via PEG tube is Osmolite 1.2 with goal rate 70 ml/hr and beneprotein packets TID that provide a total of 2091 kcals, 111 gm protein, and 1378 ml free water per day. Current FWF of 30 ml Q 4 hours.       Nutrition Assessment:      Height: 185.4 cm (6' 1\")  Weight: 92 kg (202 lb 13.2 oz)  Weight to Use in Calculations: 86.1 kg (189 lb 13.1 oz)  Ideal Body Weight: 83.5 kg (184 lb)  Weight taken via bed scale  Body mass index is 26.76 kg/m². BMI classification: Overweight.  Wt Readings from Last 6 Encounters:   25 92 kg (202 lb 13.2 oz)   25 89.5 kg (197 lb 5 oz)   24 91.1 kg (200 lb 13.4 oz)   24 92.5 kg (204 lb)   24 93 kg (205 lb)   24 93 kg (205 lb 0.4 oz)      Weight trend: Weight has increased during admit.     Calculation/Equation: REE per MSJ x 1.2 = 2000 kcals  Total Calories / day: 2000 - 2300  (Calories / k - 27)  Total Grams Protein / day: 103 - 129  (Grams Protein / k.2 - 1.5)     Objective:  Consult received for pressure injury stage 2-4. Per wound team note from today, pt with DTI that is evolving. No staging noted and wound is progressing as expected.  Pt is receiving TF at goal rate. PEG tube was placed on .   SLP recommended NPO on  after FEES done.   Pertinent labs: Na 147, glucose 182, BUN 38, Alk phos 106, Mag 3.0  Pertinent meds: dulcolax, sinemet, neurontin, prevacid, MVI, senna, thiamine, miralax prn  Skin/wounds: Wound noted above. No edema noted.   Last BM: 4/3 Type 4  Transition to bolus feeds is indicated.      Current diet order:   NPO TF     Nutrition Diagnosis:      PES here ***    Nutrition Dx Status: Ongoing or Resolved     Nutrition Interventions:      Continue TF formula and rate of " ***  Patient aware of active plan of care as appropriate.     Nutrition Monitoring and Evaluation:     Monitor nutrition POC  Additional fluids per MD/DO  Monitor vital signs pertinent to nutrition       RD following and will provide updated recommendations as indicated.

## 2025-04-09 NOTE — DISCHARGE PLANNING
Case Management Discharge Planning    Admission Date: 3/26/2025  GMLOS: 4.9  ALOS: 13    6-Clicks ADL Score: 8  6-Clicks Mobility Score: 10  PT and/or OT Eval ordered: Yes  Post-acute Referrals Ordered: Yes  Post-acute Choice Obtained: Yes  Has referral(s) been sent to post-acute provider:  Yes      Anticipated Discharge Dispo: Discharge Disposition: D/T to SNF with Medicare cert in anticipation of skilled care (03)    DME Needed: No    Action(s) Taken: Acceptance Received and Transport Arranged   Patient is being transferred to [facility] at 1700 with [facility] transport. @1540 COBRA and IMM signed by wife who is patient's POA.  @1600 Transfer packet completed and delivered to nursing station.   Escalations Completed: None    Medically Clear: Yes    Next Steps: follow for any barriers    Barriers to Discharge: None    Is the patient up for discharge tomorrow: No - today

## 2025-04-09 NOTE — PROGRESS NOTES
"Hospital Medicine Daily Progress Note    Date of Service  4/8/2025    Chief Complaint  Abdominal pain RUQ x1 week.    Hospital Course  Farhat Huber is a 74-year-old male with a past medical history significant for Parkinson, hypertension, dysphagia, GERD, history of CVA (due to LV thrombus) on 6/2022.  He presented to the ER with a complaint of abdominal pain that has been going on for the last few days .    Presents in ER, patient noted to have WBC of 17.7, AST/ALT normal, lipase 19, procalcitonin 0.5.  Blood culture x 2 was ordered, patient was started on antibiotics for aspiration pneumonia including Unasyn and azithromycin.  Ultrasound of the abdomen showed stones/sludge of gallbladder, no evidence of acute cholecystitis CBD obstruction, and multiple liver cysts. A CT chest showed large right pleural effusion.     Echocardiogram showing EF of 60%, akinesis of the apex.    4/3/25:  Remains non-verbal but nods head to questions.  Weak.  I have Voalte his prior neurologist Dr Mclain for any possible medication change.  Yesterday I reached out to Dr Tc Gregorio for request to review for further input on the patient and he replied with \" I don't have anything concretely to add for patient.\"        Interval Problem Update  No acute events overnight.  Patient non verbal but mouths out words, states he feels fine.  Discussed with patients wife and daughter at bedside.  PEG tube placed yesterday, started tube feeding today. Monitor for toleration. If tolerating well, anticipate patient can discharge to SNF tomorrow.  Patient with history of a fib but was taken off anticoagulation by his doctor per wife, recommend to follow up with them to discuss any further anticoagulation. They deny history of bleeding.  CXR reviewed showing improved pleural effusions.  Continue IV lasix for today, transition to oral dosing tomorrow for short course.  Continue antibiotics, unasyn for aspiration pneumonia.  Anticipate patient will " be medically cleared tomorrow for SNF.      I have discussed this patient's plan of care and discharge plan at IDT rounds today with Case Management, Nursing, Nursing leadership, and other members of the IDT team.      Consultants/Specialty  critical care    Code Status  DNAR, I OK    Disposition  Medically Cleared  I have placed the appropriate orders for post-discharge needs.    Review of Systems  Review of Systems   Unable to perform ROS: Mental acuity        Physical Exam  Temp:  [36.7 °C (98.1 °F)-37.2 °C (99 °F)] 37.2 °C (99 °F)  Pulse:  [79-95] 95  Resp:  [17-20] 18  BP: (100-121)/(67-82) 117/82  SpO2:  [89 %-94 %] 91 %    Physical Exam  Vitals reviewed.   Constitutional:       Appearance: He is ill-appearing.   HENT:      Head: Normocephalic.      Mouth/Throat:      Pharynx: Oropharyngeal exudate present.   Eyes:      General:         Right eye: No discharge.         Left eye: No discharge.      Conjunctiva/sclera: Conjunctivae normal.   Cardiovascular:      Rate and Rhythm: Normal rate and regular rhythm.      Heart sounds: Normal heart sounds. No murmur heard.  Pulmonary:      Effort: Pulmonary effort is normal. No respiratory distress.      Breath sounds: Examination of the right-lower field reveals decreased breath sounds. Decreased breath sounds present.   Abdominal:      General: Bowel sounds are normal. There is no distension.   Musculoskeletal:      Cervical back: No tenderness.      Right lower leg: No edema.      Left lower leg: No edema.   Lymphadenopathy:      Cervical: No cervical adenopathy.   Skin:     General: Skin is warm.      Coloration: Skin is pale.   Neurological:      Mental Status: He is alert.      Cranial Nerves: Cranial nerve deficit present.      Motor: Weakness present.      Coordination: Coordination abnormal.         Fluids    Intake/Output Summary (Last 24 hours) at 4/8/2025 1700  Last data filed at 4/8/2025 0900  Gross per 24 hour   Intake 60 ml   Output 2850 ml   Net -7210  ml        Laboratory  Recent Labs     04/06/25  0541 04/07/25  0345 04/08/25  0146   WBC 14.8* 14.9* 15.6*   RBC 5.29 5.07 5.06   HEMOGLOBIN 15.1 14.8 14.9   HEMATOCRIT 47.4 45.6 46.2   MCV 89.6 89.9 91.3   MCH 28.5 29.2 29.4   MCHC 31.9* 32.5 32.3   RDW 46.2 47.2 47.8   PLATELETCT 412 369 427   MPV 9.4 9.5 9.7     Recent Labs     04/06/25  0541 04/07/25  0345 04/08/25  0146   SODIUM 142 143 146*   POTASSIUM 4.4 4.4 4.1   CHLORIDE 102 103 105   CO2 28 27 28   GLUCOSE 155* 118* 108*   BUN 29* 32* 35*   CREATININE 1.02 1.08 1.14   CALCIUM 8.7 8.7 8.9                   Imaging  DX-CHEST-PORTABLE (1 VIEW)   Final Result      Free intraperitoneal air likely related to recent surgery.      Small pleural effusions with bibasilar atelectasis. Correlate clinically for infection.      DX-CHEST-PORTABLE (1 VIEW)   Final Result         1.  Hazy bilateral lower lobe infiltrates, greater on the left, similar to prior study.   2.  Trace bilateral pleural effusions      DX-CHEST-PORTABLE (1 VIEW)   Final Result      Stable bibasilar airspace disease      DX-CHEST-PORTABLE (1 VIEW)   Final Result         1.  Pulmonary edema and/or infiltrates, stable since prior study.   2.  Small bilateral pleural effusions, right greater than left      DX-CHEST-PORTABLE (1 VIEW)   Final Result      1.  Small right pleural effusion and trace left pleural effusion.      2.  Right lower lobe airspace opacity consistent with atelectasis and/or pneumonitis.      3.  NG tube extends in the fundus of stomach.      DX-CHEST-PORTABLE (1 VIEW)   Final Result         1.  Pulmonary edema and/or infiltrates, stable since prior study.   2.  Small bilateral pleural effusions, right greater than left      DX-CHEST-PORTABLE (1 VIEW)   Final Result         1.  Pulmonary edema and/or infiltrates, stable since prior study.   2.  Small right pleural effusion      DX-CHEST-PORTABLE (1 VIEW)   Final Result         1. Slowly improving moderate right and mild left base  atelectasis.      2. Right pleural pigtail drain remains. No pneumothorax.      3. NG tube                     DX-ABDOMEN FOR TUBE PLACEMENT   Final Result      Enteric feeding tube terminates in the left abdomen over the expected location of the stomach.      DX-CHEST-PORTABLE (1 VIEW)   Final Result         1. Right pleural pigtail drain. No pneumothorax.      2. Persistent moderate right lower lobe opacity.      3. Lines and tubes without visible complication.                     EC-ECHOCARDIOGRAM LTD W/ CONT   Final Result      DX-CHEST-PORTABLE (1 VIEW)   Final Result         1. Right pleural pigtail drain. Some of the right lung opacity with parenchymal and pleural opacity remaining. No pneumothorax.      2. ETT 5 cm above yaa. NG tube in stomach.                     CT-HEAD W/O   Final Result      1.  No evidence of acute intracranial process.      2.  Cerebral atrophy as well as periventricular chronic small vessel ischemic change.               DX-CHEST-FOR LINE PLACEMENT Perform procedure in: Patient's Room   Final Result         1. Appropriately positioned endotracheal tube.   2. Interval improvement in aeration of the lung bases.      CT-CTA CHEST PULMONARY ARTERY W/ RECONS   Final Result      1.  Very large right pleural effusion causing collapse of the right lung.      2.  No CT evidence of pulmonary emboli.      3.  Multiple hepatic cysts.            DX-CHEST-PORTABLE (1 VIEW)   Final Result         1. Increased large amount of right-sided airspace disease with associated small to moderate right pleural effusion.   2. Stable left basilar airspace disease versus atelectasis.      CT-ABDOMEN-PELVIS W/O   Final Result      1.  No evidence of bowel obstruction or focal inflammatory change.      2.  Volume loss and consolidation within the right lung base with loculated right pleural effusion.      3.  Atelectasis within the left lung base.      4.  Constipation. Numerous sigmoid diverticulosis.      5.   Hepatic cysts.      6.  Gallstones.      NM-BILIARY (HIDA) SCAN WITH CCK   Final Result      No scintigraphic evidence of cholecystitis, biliary obstruction or other worrisome finding.      US-RUQ   Final Result      1.  Stones and sludge in the gallbladder.   2.  No evidence for acute cholecystitis or biliary obstruction.   3.  Multiple liver cysts again seen.   4.  Limited evaluation of pancreas and abdominal aorta.      DX-CHEST-PORTABLE (1 VIEW)   Final Result      Hypoinflation with bibasilar atelectasis.  Superimposed pneumonia is difficult to exclude.           Assessment/Plan  * Pneumonia due to infectious organism- (present on admission)  Assessment & Plan  4/3/25:  Presenting with right upper quadrant pain, productive cough with clear sputum and fever at home  CXR reviewed, opacity on the right.  History of dysphagia due to Parkinson's, suspect aspiration pneumonia  S/P unasyn, azithro, zyvox  3/28/25 Procalcitonin:1.45    Other dysphagia  Assessment & Plan  NG t  SLP following  GI to place PEG TODAY    Hypokalemia  Assessment & Plan  4/3/25:  4/2 K:2.9  4/3: 3.6  Replace and monitor  Monitor labs  Recent Labs     04/05/25  0330 04/06/25  0541 04/07/25  0345   SODIUM 141 142 143   POTASSIUM 4.3 4.4 4.4   CHLORIDE 103 102 103   CO2 28 28 27   GLUCOSE 170* 155* 118*   BUN 25* 29* 32*   CREATININE 0.95 1.02 1.08         Paroxysmal atrial fibrillation (HCC)  Assessment & Plan  4/3/25  HR:71  Amiodarone 400mg BID with plan to go to a maintenance dose  Vitals:    04/07/25 1339   BP:    Pulse:    Resp:    Temp: 36.8 °C (98.3 °F)   SpO2: 95%         Acute respiratory failure with hypoxia (HCC)  Assessment & Plan  4/3/25:  Concern of ongoing aspiration risk given advancing [facility]  Has a NG tube for enteral feeding currently  Aspiration precautions and SLP  Titrate O2 to keep SpO2 >90  Currently down to 2-4L NC    Pleural effusion, right  Assessment & Plan  4/3/25:  Noted on chest x-ray and prior CT scan this  admit  4/2 CXR still showing right pleural effusion and possible pulmonary edema  4/3 CXR unchanged  S/p Chest tube      Advance care planning  Assessment & Plan  4/3/25:  Patient is nonverbal . Patient wife who is poa and nursing staff were present for the conversation.   Discussed need to consider further advanced care planning if he is not improving from inability to protect his airway.  I feel he is at risk of secretion aspirations even if we placed a PEG tube.  Code status changed to dnar/I okay    Right upper quadrant abdominal pain- (present on admission)  Assessment & Plan  4/3/25:  No current chest pain  LFTs unremarkable,ush showing gallstone   Hida  normal , likley chest pain is from pleurisy    History of stroke- (present on admission)  Assessment & Plan  4/3:  Continue home aspirin, atorvastatin    Parkinson disease (HCC)- (present on admission)  Assessment & Plan  4/3/25:  Continue carbidopa-levodopa  Nonverbal at baseline, uses a wheelchair but able to stand to urinate on his own and does not use a catheter  NPO, speech following (failed FEEs)  At high risk of aspiration even with enteral feeding given Parkisons  Dr Tc Gregorio, neurologist, states nothing more to add  I have reached out to his outpatient neurologist as well  I discussed 4/2 with wife and patient End of life considerations.  May need near future Hospice given his severe dysphagia in the interim let us see if any improvement with Speech therapy      Restless leg syndrome  Assessment & Plan  Continue ropinirole    Other hyperlipidemia- (present on admission)  Assessment & Plan  4/3/25:  Continue atorvastatin         VTE prophylaxis: VTE Selection  Lovenox ppx  My total time spent caring for the patient on the day of the encounter was 46 minutes. This time includes reviewing the hospital chart vitals, lab tests, and imaging; counseling and educating the patient about their diagnoses; coordinating care with the nurse, pharmacist,  and specialists; documenting clinical information in the electronic medical record.  This does not include time spent on separately billable procedures/tests.

## 2025-04-10 PROBLEM — R79.89 TROPONIN LEVEL ELEVATED: Status: ACTIVE | Noted: 2025-01-01

## 2025-04-10 PROBLEM — N17.9 ACUTE KIDNEY INJURY (HCC): Status: ACTIVE | Noted: 2025-01-01

## 2025-04-10 PROBLEM — E87.20 LACTIC ACIDOSIS: Status: ACTIVE | Noted: 2025-01-01

## 2025-04-10 PROBLEM — A41.9 SEPSIS (HCC): Status: ACTIVE | Noted: 2025-01-01

## 2025-04-10 PROBLEM — J18.9 PNEUMONIA: Status: ACTIVE | Noted: 2025-01-01

## 2025-04-10 NOTE — ED NOTES
Med rec completed per MAR sent with patient from [facility]. Patient was admitted at [facility] 3/26/2025 - 4/9/2025, discharged to [facility].    Allergies reviewed per MAR.    On 4/9/2025 patient was prescribed Augmentin [id number] with directions to take 1 tablet every 12 hours for 2 doses. MAR indicates this antibiotic has been completed.    ANTICOAGULANTS: NONE.    *No administrations recorded on MAR for patient's PRN cetirizine. Lansoprazole was recorded as not given 4/10/2025.

## 2025-04-10 NOTE — ED NOTES
Provided pt with clean brief and sheets.     Documented with photos sacral wounds and skin on lower legs of pt. Dresses sacral wounds with barrier cream and mepelex.

## 2025-04-10 NOTE — ED PROVIDER NOTES
ED Provider Note    CHIEF COMPLAINT  Chief Complaint   Patient presents with    Shortness of Breath     Pt experiencing an increase in SOB. Pt is on 4L NC. Change in LOC as stated by EMS and family.        EXTERNAL RECORDS REVIEWED  Reviewed recent hospitalization records baseline laboratory studies physician notes imaging studies    HPI/ROS  LIMITATION TO HISTORY   Patient is nonverbal, obtunded  OUTSIDE HISTORIAN(S):  Wife and EMS provided additional history    Jeremiah Huber is a 74 y.o. male who presents evaluation of increasing shortness of breath fever altered mental status.  The patient has a complex medical history including advanced Parkinson's.  He is apparently nonverbal.  He was recently hospitalized for large right-sided pleural effusion with collapse of the lung.  He ultimately required intubation and a chest tube was placed.  He was weaned off the ventilator chest tube removed and he was remarkably transferred home yesterday.  He apparently was in good spirits he can communicate with hand gestures.  Wife noted the patient had increased work of breathing, EMS was activated and they noted the patient was hypoxic, had fever and elevated heart rate.  He was brought in by ambulance for further evaluation.    PAST MEDICAL HISTORY   has a past medical history of Arthritis, Back spasm, Blood clotting disorder (HCC) (06/2020), GOUT, Gout, Hypertension, Myocardial infarct (HCC) (06/2020), Parkinson disease (HCC), and Stroke ([facility]) (06/2020).    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    FAMILY HISTORY  Family History   Problem Relation Age of Onset    Heart Disease Mother     Heart Disease Father     Cancer Father         prostate cancer     Arthritis Brother        SOCIAL HISTORY  Social History     Tobacco Use    Smoking status: Former     Current packs/day: 0.00     Types: Cigarettes     Quit date: 1970     Years since quittin.3    Smokeless tobacco: Never   Vaping Use    Vaping status: Never Used   Substance and Sexual Activity    Alcohol use: Yes     Alcohol/week: 4.2 oz     Types: 7 Glasses of wine per week    Drug use: No    Sexual activity: Yes     Partners: Female       CURRENT MEDICATIONS  Home Medications       Reviewed by Carlos Jarvis (Pharmacy Tech) on 04/10/25 at 1112  Med List Status: Complete     Medication Last Dose Status   amantadine (SYMMETREL) 100 MG Tab 4/10/2025 Active   amiodarone (CORDARONE) 200 MG Tab 4/10/2025 Active   amoxicillin-clavulanate (AUGMENTIN) 875-125 MG Tab 4/10/2025 Active   aspirin 81 MG EC tablet 4/10/2025 Active   atorvastatin (LIPITOR) 80 MG tablet 2025 Active   carbidopa-levodopa (SINEMET)  MG Tab 4/10/2025 Active   cetirizine (ZYRTEC) 10 MG Tab Unknown Active   gabapentin (NEURONTIN) 100 MG Cap 2025 Active   guaiFENesin (ROBITUSSIN) 100 MG/5ML liquid 4/10/2025 Active   lansoprazole (PREVACID) 30 MG Tablet Delayed Release Dispersible New Rx Active   midodrine (PROAMATINE) 10 MG tablet 4/10/2025 Active   multivitamin Tab 4/10/2025 Active   oxybutynin (DITROPAN) 5 MG Tab 4/10/2025 Active   ROPINIRole (REQUIP) 4 MG tablet 4/10/2025 Active                  Audit from Redirected Encounters    **Home medications have not yet been reviewed for this encounter**         ALLERGIES  Allergies   Allergen Reactions    Crexont [Carbidopa-Levodopa Er] Unspecified     Reported lost of mobility and bladder       PHYSICAL EXAM  VITAL SIGNS:    04/10/25 1530 04/10/25 1515 04/10/25 1503 04/10/25 1500    Vital Signs   BP 81/54 Abnormal  85/54 Abnormal  188/121 Abnormal  --   Pulse 92 95 96 --   Resp 20 26 Abnormal  22 Abnormal  25 Abnormal    SpO2 93 % 93 % 92 % --   O2 (LPM) 5 5 5 5   O2 Delivery Device  Nasal Cannula Nasal        Pulse ox interpretation: Hypoxic on room air I interpret this pulse ox as normal.  Constitutional: Ill-appearing mottled skin  HEENT: Atraumatic normocephalic, pupils are equal round reactive to light extraocular movements are intact. The nares is clear, external ears are normal, mouth shows moist mucous membranes normal dentition for age  Neck: Supple, no JVD no tracheal deviation  Cardiovascular: Tachycardic no murmur rub or gallop 2+ pulses peripherally x4  Thorax & Lungs: Increased work of breathing diminished breath sounds on the right side.   GI: Soft nontender nondistended positive bowel sounds, no peritoneal signs PEG tube is clean dry and intact in the left mid abdomen  Skin: Mottled skin with delayed capillary refill   musculoskeletal: Moving all extremities with full range and 5 of 5 strength no acute  deformity  Neurologic: Cranial nerves III through XII are grossly intact no sensory deficit no cerebellar dysfunction   Psychiatric: Appropriate affect for situation at this time          EKG/LABS  Results for orders placed or performed during the hospital encounter of 04/10/25   CBC with Differential    Collection Time: 04/10/25 10:41 AM   Result Value Ref Range    WBC 16.5 (H) 4.8 - 10.8 K/uL    RBC 5.68 4.70 - 6.10 M/uL    Hemoglobin 16.7 14.0 - 18.0 g/dL    Hematocrit 52.4 (H) 42.0 - 52.0 %    MCV 92.3 81.4 - 97.8 fL    MCH 29.4 27.0 - 33.0 pg    MCHC 31.9 (L) 32.3 - 36.5 g/dL    RDW 50.8 (H) 35.9 - 50.0 fL    Platelet Count 457 (H) 164 - 446 K/uL    MPV 10.7 9.0 - 12.9 fL    Neutrophils-Polys 84.60 (H) 44.00 - 72.00 %    Lymphocytes 6.50 (L) 22.00 - 41.00 %    Monocytes 7.20 0.00 - 13.40 %    Eosinophils 0.00 0.00 - 6.90 %    Basophils 0.70 0.00 - 1.80 %    Immature Granulocytes 1.00 (H) 0.00 - 0.90 %    Nucleated RBC 0.20 0.00 - 0.20 /100 WBC    Neutrophils (Absolute) 13.97 (H) 1.82 - 7.42 K/uL    Lymphs (Absolute) 1.07 1.00 - 4.80 K/uL    Monos (Absolute) 1.19 (H) 0.00 -  0.85 K/uL    Eos (Absolute) 0.00 0.00 - 0.51 K/uL    Baso (Absolute) 0.12 0.00 - 0.12 K/uL    Immature Granulocytes (abs) 0.17 (H) 0.00 - 0.11 K/uL    NRBC (Absolute) 0.04 K/uL   Comp Metabolic Panel    Collection Time: 04/10/25 10:41 AM   Result Value Ref Range    Sodium 156 (H) 135 - 145 mmol/L    Potassium 3.8 3.6 - 5.5 mmol/L    Chloride 111 96 - 112 mmol/L    Co2 26 20 - 33 mmol/L    Anion Gap 19.0 (H) 7.0 - 16.0    Glucose 194 (H) 65 - 99 mg/dL    Bun 48 (H) 8 - 22 mg/dL    Creatinine 2.21 (H) 0.50 - 1.40 mg/dL    Calcium 8.8 8.5 - 10.5 mg/dL    Correct Calcium 9.3 8.5 - 10.5 mg/dL    AST(SGOT) 66 (H) 12 - 45 U/L    ALT(SGPT) 92 (H) 2 - 50 U/L    Alkaline Phosphatase 88 30 - 99 U/L    Total Bilirubin 0.7 0.1 - 1.5 mg/dL    Albumin 3.4 3.2 - 4.9 g/dL    Total Protein 8.2 6.0 - 8.2 g/dL    Globulin 4.8 (H) 1.9 - 3.5 g/dL    A-G Ratio 0.7 g/dL   proBrain Natriuretic Peptide, NT    Collection Time: 04/10/25 10:41 AM   Result Value Ref Range    NT-proBNP 2722 (H) 0 - 125 pg/mL   Troponin    Collection Time: 04/10/25 10:41 AM   Result Value Ref Range    Troponin T 67 (H) 6 - 19 ng/L   Lactic Acid    Collection Time: 04/10/25 10:41 AM   Result Value Ref Range    Lactic Acid 3.0 (H) 0.5 - 2.0 mmol/L   Urinalysis    Collection Time: 04/10/25 10:41 AM    Specimen: Urine   Result Value Ref Range    Color Dark Yellow     Character Clear     Specific Gravity 1.020 <1.035    Ph 5.0 5.0 - 8.0    Glucose Negative Negative mg/dL    Ketones Trace (A) Negative mg/dL    Protein 100 (A) Negative mg/dL    Bilirubin Small (A) Negative    Urobilinogen, Urine 2.0 (A) <=1.0 EU/dL    Nitrite Negative Negative    Leukocyte Esterase Negative Negative    Occult Blood Large (A) Negative    Micro Urine Req Microscopic    Blood Culture - Draw one from central line and one from peripheral site    Collection Time: 04/10/25 10:41 AM    Specimen: Line; Blood   Result Value Ref Range    Significant Indicator NEG     Source BLD     Site Peripheral      Culture Result       No Growth  Note: Blood cultures are incubated for 5 days and  are monitored continuously.Positive blood cultures  are called to the RN and reported as soon as  they are identified.     PROCALCITONIN    Collection Time: 04/10/25 10:41 AM   Result Value Ref Range    Procalcitonin 0.64 (H) <0.25 ng/mL   URINE MICROSCOPIC (W/UA)    Collection Time: 04/10/25 10:41 AM   Result Value Ref Range    WBC 0-2 /hpf    RBC  (A) 0 - 2 /hpf    Bacteria None Seen None /hpf    Epithelial Cells 0-2 0 - 5 /hpf    Urine Casts 3-5 (A) 0 - 2 /lpf    Hyaline Cast Present /lpf   ESTIMATED GFR    Collection Time: 04/10/25 10:41 AM   Result Value Ref Range    GFR (CKD-EPI) 31 (A) >60 mL/min/1.73 m 2   EKG    Collection Time: 04/10/25 10:42 AM   Result Value Ref Range    Report       [facility] Emergency Dept.    Test Date:  2025-04-10  Pt Name:    GRIFFIN NEGRON                 Department: ER  MRN:        [id number]                      Room:       [facility]  Gender:     Male                         Technician: [id number]  :        1950                   Requested By:ER TRIAGE PROTOCOL  Order #:    [id number]                    Reading MD:    Measurements  Intervals                                Axis  Rate:       117                          P:          -4  MI:         129                          QRS:        -56  QRSD:       91                           T:          98  QT:         378  QTc:        528    Interpretive Statements  Sinus tachycardia  Inferior infarct, old  Abnormal lateral Q waves  Anterior infarct, old  Prolonged QT interval  Compared to ECG 2025 10:28:22  Q waves now present  Prolonged QT interval now present  Sinus rhythm no longer present  First degree AV block no longer present  ST (T wave) deviation no longer present  Myoc ardial infarct finding still present     Blood Culture - Draw one from central line and one from peripheral site    Collection Time: 04/10/25 11:08 AM     Specimen: Peripheral; Blood   Result Value Ref Range    Significant Indicator NEG     Source BLD     Site PERIPHERAL     Culture Result       No Growth  Note: Blood cultures are incubated for 5 days and  are monitored continuously.Positive blood cultures  are called to the RN and reported as soon as  they are identified.     POC CoV-2, FLU A/B, RSV by PCR    Collection Time: 04/10/25 12:19 PM   Result Value Ref Range    POC Influenza A RNA, PCR Negative Negative    POC Influenza B RNA, PCR Negative Negative    POC RSV, by PCR Negative Negative    POC SARS-CoV-2, PCR NotDetected NotDetected   Lactic Acid    Collection Time: 04/10/25 12:38 PM   Result Value Ref Range    Lactic Acid 2.4 (H) 0.5 - 2.0 mmol/L     *Note: Due to a large number of results and/or encounters for the requested time period, some results have not been displayed. A complete set of results can be found in Results Review.      I have independently interpreted this EKG    RADIOLOGY/PROCEDURES   I have independently interpreted the diagnostic imaging associated with this visit and am waiting the final reading from the radiologist.   My preliminary interpretation is as follows: Right lower lobe and middle pneumonia with G-tube in place pneumoperitoneum likely secondary to recent G-tube    Radiologist interpretation:  CT-CHEST,ABDOMEN,PELVIS W/O   Final Result      1.  RIGHT lower and middle lobe consolidation, atelectasis and/or pneumonia.   2.  Gastrostomy tube in place with the distal stomach.   3.  Pneumoperitoneum, likely related to recent gastrostomy placement although bowel perforation is difficult to exclude.   4.  No bowel obstruction.   5.  Cholelithiasis.   6.  Probable liver cysts, unchanged.      DX-CHEST-PORTABLE (1 VIEW)   Final Result      There has been no significant interval change from the prior exam.          COURSE & MEDICAL DECISION MAKING    ASSESSMENT, COURSE AND PLAN  Care Narrative:     This is a critically ill 74-year-old  gentleman was recently hospitalized for a right sided pleural effusion with pneumothorax who has discharged and comes back with fever hypotension tachycardia and worsening hypoxia.  Septic protocol was immediately initiated.  2 large-bore IVs were established.  Sepsis bolus was administered and broad-spectrum antibiotics in the form of Zyvox and cefepime were administered after consultation with the ER pharmacist.  This patient is critically ill.  Source of infection is very likely pulmonic.  Subsequent CT scan of the chest abdomen pelvis was performed without contrast due to new onset of MATT.  There does appear to be right lower middle lobe pneumonia and some pneumoperitoneum likely related to recent G-tube placement.  He does not have peritoneal signs on physical exam.  He is quite mottled and critically ill.  After sepsis bolus his blood pressure initially responded but then he became hypotensive again therefore required Levophed.  Patient also has evidence of MATT elevated procalcitonin slightly elevated troponin and elevated sodium at 156.  He is critically ill.  I do long discussion with the patient's wife and additional family.  They understand the gravity of his illness.  They would prefer not to have him intubated which I think is reasonable.  They apparently are amenable and have already been in contact with transitioning to hospice services but are not ready to do that yet.  I think it is reasonable to admit him to the intensive care unit and/or stepdown unit for aggressive resuscitation for the next 12 to 24 hours.  Patient may very well require central venous catheter.  Patient is quite ill.  Family understands the gravity of the situation.  We did offer and Dr. Del Angel and I discussed possible placement of a central venous catheter but the wife has declined.    Hydration: Based on the patient's presentation of Acute Kidney Injury, Sepsis, and Tachycardia the patient was given IV fluids. IV Hydration was  used because oral hydration was not adequate alone. Upon recheck following hydration, the patient was not improved therefore the patient required vasopressors.          ADDITIONAL PROBLEMS MANAGED      DISPOSITION AND DISCUSSIONS  I have discussed management of the patient with the following physicians and ORION's: Discussed plan of care with intensivist    Discussion of management with other QHP or appropriate source(s): Discussed plan of care with ER pharmacist    Escalation of care considered, and ultimately not performed: Offered central venous catheter but family declined    Barriers to care at this time, including but not limited to: None.     Decision tools and prescription drugs considered including, but not limited to: None.    FINAL DIAGNOSIS  1. SIRS (systemic inflammatory response syndrome) (HCC)        2. Hospital-acquired pneumonia        3. Acute kidney injury (HCC)        4. Septic shock (HCC)          CRITICAL CARE  The very real possibilty of a deterioration of this patient's condition required the highest level of my preparedness for sudden, emergent intervention.  I provided critical care services, which included medication orders, frequent reevaluations of the patient's condition and response to treatment, ordering and reviewing test results, and discussing the case with various consultants.  The critical care time associated with the care of the patient was 45 minutes. Review chart for interventions. This time is exclusive of any other billable procedures.        Electronically signed by: Navid Hammer M.D., 4/10/2025 11:04 AM

## 2025-04-10 NOTE — H&P
[facility] Internal Medicine History & Physical Note    Date of Service  4/10/2025    [facility] Team: [facility] ICU Gold Team   Attending: Nakul Bruce M.d.  Senior Resident: Dr. Carr  Contact Number: [phone]    Primary Care Physician  An Perez M.D.    Consultants  critical care      Code Status  DNAR, I OK    Chief Complaint  Chief Complaint   Patient presents with    Shortness of Breath     Pt experiencing an increase in SOB. Pt is on 4L NC. Change in LOC as stated by EMS and family.        History of Presenting Illness (HPI):   Jeremiah Huber is a 74 y.o. male with medical history of Parkinson's disease complicated by dysphagia, hypertension, dyslipidemia, GERD, atrial fibrillation, and history of a stroke in 2020 attributed to a LV thrombus who presented shortness of breath and fever on 4/10/2025.      From chart review, patient was recently admitted on 3/26/25 with pneumonia, treated broach spectrum antibiotics. [facility] a large right pleural effusion with collapse of the right lung. Due to worsening mental status and being obtunded with increased oxygen demand, patient was transferred to ICU and intubated. Also chest tube was placed and found parapneumonic effusion. He was weaned off venteilator and chest tube was removed. He was also treated with diuretics during hospital course. Patient was down to 2L of O2 at discharge. He was discharged to skilled nursing homeon 4/9 with augmentin until 4/10. During hospital course, his home metoprolol was switched to amiodarone and was planning to discuss anticoagulation with cardiologist at outpatient. Also Peg tube was placed since patient not tolerated oral intake.    Patient has aphasia due to Parkinson's disease. All the history was obtained by his wife, Nolvia.  Patient arrived SNF at 1800 last night. Nolvia was with him until next morning. Patient has significant shortness of breath and his oxygen was 80s. His wife is not sure how long patient was  You may receive a survey regarding the care you received during your visit. Your input is valuable to us. We encourage you to complete and return your survey. We hope you will choose us in the future for your healthcare needs. on low oxygen. His both legs were purple color. Skilled nursing home sent patient to [facility] emergency room. Per his wife, patient was active and able to go to gym prior to previous admission. He could do daily activities. His weakness is new. His wife would consider comfort care if his conditions does not improve and has poor quality of life.    AT ED, VS was remarkable of fever of 101.2, tachycardia 118, hypotension 80s/50s, tachpenea of 22 and  oxygen saturating well at 4L. CBC was remarkable of leukocytosis of 16.5 and thrombocytosis of 457. Chem panel was remarkable of hypernatremia of 156, elevated BUN of 48 and elevated creatinine of 2.21, transaminitis (AST 66 and ALT 92). Elevated lactic acidosis of 3.0 trended down to 1.7. Elevated troponin of 67 and elevated pro-BNP 2722. UA presented hematuria and proteinuria. Elevated procalcitonin of 0.64. Chest-Xray presented unchanged bilateral lower lobe infiltrates. CT chest abdomen/pelvis presented right lower and middle lobe consolidation and pneumoperiotoneum. At ED, 1 dose of cefepime, Linezolid, 3L of bolus were given. Levophed was initiated. Patient was admitted with septic shock.         I discussed the plan of care with patient and family.    Review of Systems  Review of Systems   Reason unable to perform ROS: Patient has aphasia. Unable to obtain ROS.       Past Medical History   has a past medical history of Arthritis, Back spasm, Blood clotting disorder (HCC) (06/2020), GOUT, Gout, Hypertension, Myocardial infarct (HCC) (06/2020), Parkinson disease (HCC), and Stroke ([facility]) (06/2020).    Surgical History   has a past surgical history that includes shoulder surgery; tonsillectomy; knee arthroscopy (Right); knee replacement, total (Bilateral); neuro dest facet l/s w/ig sngl (Right, 9/29/2020); pr place percut gastrostomy tube (N/A, 4/7/2025); and pr up gi endoscopy,remv tumor,snare (N/A, 4/7/2025).     Family History  family history includes Arthritis in his  brother; Cancer in his father; Heart Disease in his father and mother.   Family history reviewed with patient.     Social History  Tobacco: Denies  Alcohol: Denies  Recreational drugs (illegal or prescription): Denies      Allergies  Allergies   Allergen Reactions    Crexont [Carbidopa-Levodopa Er] Unspecified     Reported lost of mobility and bladder       Medications  Prior to Admission Medications   Prescriptions Last Dose Informant Patient Reported? Taking?   ROPINIRole (REQUIP) 4 MG tablet 4/10/2025 at  8:00 AM MAR from Other Facility No Yes   Sig: Take 1 Tablet by mouth 3 times a day.   amantadine (SYMMETREL) 100 MG Tab 4/10/2025 at  8:00 AM MAR from Other Facility No Yes   Sig: Take 1 Tablet by mouth 2 times a day.   amiodarone (CORDARONE) 200 MG Tab 4/10/2025 at  8:00 AM MAR from Other Facility No Yes   Si Tablet by Enteral Tube route every day.   amoxicillin-clavulanate (AUGMENTIN) 875-125 MG Tab 4/10/2025 at  8:00 AM MAR from Other Facility No Yes   Si Tablet by Enteral Tube route every 12 hours for 1 day.   aspirin 81 MG EC tablet 4/10/2025 at  8:00 AM MAR from Other Facility Yes Yes   Sig: Take 81 mg by mouth every day.   atorvastatin (LIPITOR) 80 MG tablet 2025 at  8:00 PM MAR from Other Facility No Yes   Sig: Take 1 Tablet by mouth every evening.   carbidopa-levodopa (SINEMET)  MG Tab 4/10/2025 at  6:00 AM MAR from Other Facility No Yes   Sig: Take 2 Tablets by mouth 4 times a day. 6AM, 10AM, 2PM, and 6PM   cetirizine (ZYRTEC) 10 MG Tab Unknown MAR from Other Facility Yes No   Sig: Take 10 mg by mouth every 12 hours as needed for Allergies.   gabapentin (NEURONTIN) 100 MG Cap 2025 at 10:20 PM MAR from Other Facility No Yes   Si Capsules by Enteral Tube route every evening.   guaiFENesin (ROBITUSSIN) 100 MG/5ML liquid 4/10/2025 at  8:00 AM MAR from Other Facility No Yes   Sig: 10 mL by Enteral Tube route 3 times a day for 3 days.   lansoprazole (PREVACID) 30 MG Tablet Delayed  Release Dispersible New Rx MAR from Other Facility No Yes   Si Tablet by Enteral Tube route every day.   midodrine (PROAMATINE) 10 MG tablet 4/10/2025 at  6:00 AM MAR from Other Facility No Yes   Si Tablet by Enteral Tube route every 8 hours.   multivitamin Tab 4/10/2025 at  8:00 AM MAR from Other Facility No Yes   Si Tablet by Enteral Tube route every day.   oxybutynin (DITROPAN) 5 MG Tab 4/10/2025 at  8:00 AM MAR from Other Facility No Yes   Si Tablet by Enteral Tube route 2 times a day.      Facility-Administered Medications: None       Physical Exam  Temp:  [36.9 °C (98.4 °F)-38.4 °C (101.2 °F)] 37.3 °C (99.1 °F)  Pulse:  [] 95  Resp:  [10-31] 29  BP: ()/(50-73) 120/73  SpO2:  [91 %-95 %] 93 %  Blood Pressure : 103/63   Temperature: 36.9 °C (98.4 °F)   Pulse: 91   Respiration: 16   Pulse Oximetry: 94 %       Physical Exam  Constitutional:       General: He is not in acute distress.     Appearance: He is ill-appearing.   HENT:      Head: Normocephalic and atraumatic.      Mouth/Throat:      Mouth: Mucous membranes are moist.      Pharynx: Oropharynx is clear. No oropharyngeal exudate.   Eyes:      Extraocular Movements: Extraocular movements intact.      Pupils: Pupils are equal, round, and reactive to light.   Cardiovascular:      Rate and Rhythm: Normal rate and regular rhythm.      Heart sounds: No murmur heard.  Pulmonary:      [place]: Pulmonary effort is normal. No respiratory distress.      Breath sounds: Normal breath sounds. No wheezing.   Abdominal:      General: Abdomen is flat.      Palpations: Abdomen is soft.   Musculoskeletal:      Right lower leg: No edema.      Left lower leg: No edema.   Skin:     General: Skin is warm and dry.   Neurological:      Mental Status: He is alert.      Comments: Aphasia. Able to follow instruction such as squeezing hand         Laboratory:  Recent Labs     25  0146 25  0314 04/10/25  1041   WBC 15.6* 12.9* 16.5*   RBC 5.06 5.21  5.68   HEMOGLOBIN 14.9 15.4 16.7   HEMATOCRIT 46.2 47.6 52.4*   MCV 91.3 91.4 92.3   MCH 29.4 29.6 29.4   MCHC 32.3 32.4 31.9*   RDW 47.8 48.2 50.8*   PLATELETCT 427 477* 457*   MPV 9.7 9.8 10.7     Recent Labs     04/08/25  0146 04/09/25  0314 04/10/25  1041   SODIUM 146* 147* 156*   POTASSIUM 4.1 3.7 3.8   CHLORIDE 105 109 111   CO2 28 27 26   GLUCOSE 108* 182* 194*   BUN 35* 38* 48*   CREATININE 1.14 1.27 2.21*   CALCIUM 8.9 8.8 8.8     Recent Labs     04/08/25  0146 04/09/25  0314 04/10/25  1041   ALTSGPT 11 42 92*   ASTSGOT 22 36 66*   ALKPHOSPHAT 106* 106* 88   TBILIRUBIN 1.0 0.8 0.7   GLUCOSE 108* 182* 194*         Recent Labs     04/10/25  1041   NTPROBNP 2722*         Recent Labs     04/10/25  1041   TROPONINT 67*       Imaging:  CT-CHEST,ABDOMEN,PELVIS W/O   Final Result      1.  RIGHT lower and middle lobe consolidation, atelectasis and/or pneumonia.   2.  Gastrostomy tube in place with the distal stomach.   3.  Pneumoperitoneum, likely related to recent gastrostomy placement although bowel perforation is difficult to exclude.   4.  No bowel obstruction.   5.  Cholelithiasis.   6.  Probable liver cysts, unchanged.      DX-CHEST-PORTABLE (1 VIEW)   Final Result      There has been no significant interval change from the prior exam.          X-Ray:  I have personally reviewed the images and compared with prior images.  EKG:  I have personally reviewed the images and compared with prior images.    Assessment/Plan:  Problem Representation:   Jeremiah Huber is a 74 y.o. male with medical history of Parkinson's disease complicated by dysphagia, hypertension, dyslipidemia, GERD, atrial fibrillation, and history of a stroke in 2020 attributed to a LV thrombus who was admitted with Septic shock  I anticipate this patient will require at least two midnights for appropriate medical management, necessitating inpatient admission because Septic shock    Patient will need a ICU (Adult and Pediatrics) bed on MEDICAL  service .  The need is secondary to Septic shock.    * Sepsis (HCC)- (present on admission)  Assessment & Plan  This is Septic shock Present on admission  SIRS criteria identified on my evaluation include: Fever, with temperature greater than 100.9 deg F, Tachycardia, with heart rate greater than 90 BPM, and Leukocytosis, with WBC greater than 12,000  Clinical indicators of end organ dysfunction include Hypotension with systolic blood pressure less than 90 or MAP less than 65, Lactic Acid greater than 2, and Acute On Chronic Renal Failure, with creatinine >0.5 above baseline level  Indicators of septic shock include: Sepsis present and persistent hypotension despite fluid resuscitation   Sources is: Pneumonia  Sepsis protocol initiated  Crystalloid Fluid Administration: Fluid resuscitation ordered per standard protocol - 30 mL/kg per current or ideal body weight  IV antibiotics as appropriate for source of sepsis  Reassessment: I have reassessed the patient's hemodynamic status  - Continue Leovophed    Acute kidney injury (HCC)  Assessment & Plan  Presented with elevated creatinine of 2.21. Baseline 0.71. Likely hypoperfusion secondary to septic shock  Received 2.7 L at ED  - CMP in the morning    Acute respiratory failure with hypoxia (HCC) secondary to pneumonia- (present on admission)  Assessment & Plan  Recently hospitalized due to pneumonia and discharged with augmentin.  CXR presented no changes with yesterday's CXR  - Cefepime for 5 days  - Follow up blood culture  - CBC in AM    Troponin level elevated  Assessment & Plan  Elevated troponin of 67  Likely demand ischemia. EKG did not present ischemic changes  - Trend troponin    Lactic acidosis  Assessment & Plan  Resolved after fluid resusciation  Likely secondary to sepsis      Pneumonia  Assessment & Plan  Recently hospitalized. Will broaden and treat antibiotics empirically.  - same plan as acute hypoxic respiratory failure    Advance care planning-  (present on admission)  Assessment & Plan  Multiple recent hospitalization. Patient's wife (DPOA) would consider hospice care if patient has poor prognosis and has low quality of life.      Other dysphagia- (present on admission)  Assessment & Plan  PEG tube placed on last hospitalization, tolerating tube feeding well   - Continue peg tube feeding    Paroxysmal atrial fibrillation (HCC)- (present on admission)  Assessment & Plan  Not on home anticoagulation. From discharge summary on last admission, patient was planning to discuss with outpatient cardiologist.  - Hold home amiodarone in the setting of septic shock    History of stroke- (present on admission)  Assessment & Plan  - Continue home aspirin and atorvastatine    Parkinson disease (HCC)- (present on admission)  Assessment & Plan  - Continue home carbidopa-levodopa and amantadine       Restless leg syndrome- (present on admission)  Assessment & Plan  - Continue home ropinirole      DNR/I cece  VTE prophylaxis: heparin ppx

## 2025-04-10 NOTE — TELEPHONE ENCOUNTER
Received voicemail from pt's wife, Nolvia.   Dr Carvalho placed a PEG tube during his recent hospitalization and pt has been discharged to [facility]. Nolvia wondering if patient can be transitioned from continuous feeds to bolus feedings.     Called and spoke to patient's wife. Discussed that GI does not manage feedings, this is handled by the providers at [facility] and the dietician. Nolvia stated she will discuss with the providers at [facility]. She was appreciate of the call.

## 2025-04-10 NOTE — TELEPHONE ENCOUNTER
This Home Health referral has been forwarded to [facility].  Patient was contacted by nursing supervisor to discuss transfer of care.      [place] has accepted.

## 2025-04-10 NOTE — ED TRIAGE NOTES
"Pt BIB[facility]. Pt appears tachypnic, on 4L NC.    Chief Complaint   Patient presents with    Shortness of Breath     Pt experiencing an increase in SOB. Pt is on 4L NC. Change in LOC as stated by EMS and family.    /66   Pulse (!) 118   Temp (!) 38.4 °C (101.2 °F) (Temporal)   Resp (!) 22   Ht 1.854 m (6' 1\")   Wt 92 kg (202 lb 13.2 oz)   SpO2 92%   BMI 26.76 kg/m²   "

## 2025-04-10 NOTE — PROGRESS NOTES
Report given to RN at [facility]. Pt left with [facility].  IV removed.  ALl questions answered.

## 2025-04-10 NOTE — ED NOTES
Notified ERP about elevated BNP levels and the need for the remaining amount of fluids. ERP states to continue fluids.     Labs drawn and sent.

## 2025-04-11 NOTE — ASSESSMENT & PLAN NOTE
This is Septic shock Present on admission  SIRS criteria identified on my evaluation include: Fever, with temperature greater than 100.9 deg F, Tachycardia, with heart rate greater than 90 BPM, and Leukocytosis, with WBC greater than 12,000  Clinical indicators of end organ dysfunction include Hypotension with systolic blood pressure less than 90 or MAP less than 65, Lactic Acid greater than 2, and Acute On Chronic Renal Failure, with creatinine >0.5 above baseline level  Indicators of septic shock include: Sepsis present and persistent hypotension despite fluid resuscitation   Sources is: Pneumonia  Sepsis protocol initiated  Crystalloid Fluid Administration: Fluid resuscitation ordered per standard protocol - 30 mL/kg per current or ideal body weight  IV antibiotics as appropriate for source of sepsis  Reassessment: I have reassessed the patient's hemodynamic status  - Continue Leovophed

## 2025-04-11 NOTE — PROGRESS NOTES
4 Eyes Skin Assessment Completed by  ALYCE Murry and ALYCE Scherer.    Head WDL  Ears WDL, R ear red, blanching  Nose WDL  Mouth WDL, R cheek red and blanching   Neck WDL  Breast/Chest scaring lateral chest   Shoulder Blades Redness and Non-Blanching  Spine Redness and Non-Blanching, R lateral lower ribs red indent   (R) Arm/Elbow/Hand Bruising, Abrasion, and Scab to R forearm and wrist  (L) Arm/Elbow/Hand Redness, Blanching, and Bruising, L index finger redness and slow to vicente   Abdomen Rash and Scar Lateral abd  Groin moisture noted  Scrotum/Coccyx/Buttocks Redness, Non-Blanching, Excoriation, Moisture Fissure, and Discoloration to sacrum. DTI.   (R) Leg Redness, slow to vicente thigh with mottling, knee replacement scars  (L) Leg Redness and Scar, knee replacement scar, mottling   (R) Heel/Foot/Toe Redness heal and ankle red spot slow to vicente, lateral leg red and slow to vicente. Dryness and cracking.   (L) Heel/Foot/Toe Redness slow to vicente ankle           Devices In Places Blood Pressure Cuff, Pulse Ox, SCD's, Condom Cath, Nasal Cannula, and G Tube      Interventions In Place NC W/Ear Foams, Sacral Mepilex, TAP System, Pillows, Q2 Turns, and Low Air Loss Mattress    Possible Skin Injury Yes    Pictures Uploaded Into Epic Yes  Wound Consult Placed Yes  RN Wound Prevention Protocol Ordered Yes

## 2025-04-11 NOTE — ASSESSMENT & PLAN NOTE
Multiple recent hospitalization. Patient's wife (DPOA) would consider hospice care if patient has poor prognosis and has low quality of life.

## 2025-04-11 NOTE — DISCHARGE PLANNING
DC Transport Scheduled    Transport Company Scheduled:  [employer]  Spoke with Pham at [facility] to schedule transport.    Scheduled Date: 4/11/2025  Scheduled Time: 1300    Transport Type: [place]  Destination: home   Destination address: [address] [place] [address] [address] [id number]    Notified care team of scheduled transport via Vo[employer]te.     If there are any changes needed to the DC transportation scheduled, please contact [facility] at ext. [phone] between the hours of 4197-6189. If outside those hours, contact the ED Case Manager at ext. [phone].

## 2025-04-11 NOTE — CARE PLAN
The patient is Watcher - Medium risk of patient condition declining or worsening    Shift Goals  Clinical Goals: Map > 65, or SPB > 90  Patient Goals: JUANITA  Family Goals: Updates    Progress made toward(s) clinical / shift goals:    Problem: Skin Integrity  Goal: Skin integrity is maintained or improved  Description: Target End Date:  Prior to discharge or change in level of careDocument interventions on Skin Risk/Cuong flowsheet groups and corresponding LDA 1.  Assess and monitor skin integrity, appearance and/or temperature 2.  Assess risk factors for impaired skin integrity and/or pressures ulcers 3.  Implement precautions to protect skin integrity in collaboration with interdisciplinary team 4.  Implement pressure ulcer prevention protocol if at risk for skin breakdown 5.  Confirm wound care consult if at risk for skin breakdown 6.  Ensure patient use of pressure relieving devices  (Low air loss bed, waffle overlay, heel protectors, ROHO cushion, etc)  Outcome: Progressing  Pt's pressure injury has been assessed and documented. Wound care following as pt was recently seen in hospital. No new or worsening pressure injuries.    Problem: Pain - Standard  Goal: Alleviation of pain or a reduction in pain to the patient’s comfort goal  Description: Target End Date:  Prior to discharge or change in level of careDocument on Vitals flowsheet1.  Document pain using the appropriate pain scale per order or unit policy2.  Educate and implement non-pharmacologic comfort measures (i.e. relaxation, distraction, massage, cold/heat therapy, etc.)3.  Pain management medications as ordered4.  Reassess pain after pain med administration per policy5.  If opiods administered assess patient's response to pain medication is appropriate per POSS sedation scale6.  Follow pain management plan developed in collaboration with patient and interdisciplinary team (including palliative care or pain specialists if applicable)  4/11/2025 0313 by  Christine Malik R.N.  Outcome: Progressing     Problem: Hemodynamics  Goal: Patient's hemodynamics, fluid balance and neurologic status will be stable or improve  Description: Target End Date:  Prior to discharge or change in level of careDocument on Assessment and I/O flowsheet templates1.  Monitor vital signs, pulse oximetry and cardiac monitor per provider order and/or policy2.  Maintain blood pressure per provider order3.  Hemodynamic monitoring per provider order4.  Manage IV fluids and IV infusions5.  Monitor intake and output6.  Daily weights per unit policy or provider order7.  Assess peripheral pulses and capillary refill8.  Assess color and body temperature9.  Position patient for maximum circulation/cardiac [id number]. Monitor for signs/symptoms of excessive [id number]. Assess mental status, restlessness and changes in level of [id number]. Monitor temperature and report fever or hypothermia to provider immediately. Consideration of targeted temperature management.  Outcome: Progressing  Q2 pain assessments, medications available per MAR for complaints of pain.

## 2025-04-11 NOTE — ASSESSMENT & PLAN NOTE
Not on home anticoagulation. From discharge summary on last admission, patient was planning to discuss with outpatient cardiologist.  - Hold home amiodarone in the setting of septic shock

## 2025-04-11 NOTE — ASSESSMENT & PLAN NOTE
Recently hospitalized. Will broaden and treat antibiotics empirically.  - same plan as acute hypoxic respiratory failure

## 2025-04-11 NOTE — ASSESSMENT & PLAN NOTE
PEG tube placed on last hospitalization, tolerating tube feeding well   - Continue peg tube feeding

## 2025-04-11 NOTE — HOSPICE
[facility] Hospice referral/consult response    Is this patient accepted to [facility]?: yes  What hospice level of care?: routine  Approved by provider: Dr. Arreguin    Anticipated DC date:  today  DC Barriers:  Additional Information: DME will be delivered by 1200.  Please setup transport for 1300.  Consents signed.  Home meds ordered.

## 2025-04-11 NOTE — DIETARY
"Nutrition Services: Initial Assessment     Day 1 of admit. Jeremiah Huber is 74 y.o., male with admitting DX of Sepsis (HCC) [A41.9].    Consult Received for: EN    Current Hospital Problems List:    Principal Problem:    Sepsis (HCC) (POA: Yes)  Active Problems:    Restless leg syndrome (POA: Yes)    Parkinson disease (HCC) (POA: Yes)    History of stroke (POA: Yes)    Advance care planning (POA: Yes)    Acute respiratory failure with hypoxia (HCC) secondary to pneumonia (POA: Yes)    Paroxysmal atrial fibrillation (HCC) (POA: Yes)    Other dysphagia (POA: Yes)    Pneumonia (POA: Unknown)    Acute kidney injury (HCC) (POA: Unknown)    Lactic acidosis (POA: Unknown)    Troponin level elevated (POA: Unknown)  Resolved Problems:    * No resolved hospital problems. *    Nutrition Assessment:      Height: 185.4 cm (6' 1\")  Weight: 81.8 kg (180 lb 5.4 oz)  Weight taken via: Bed Scale  BMI Calculated: 23.79  BMI Classification: WNL       Weight Readings from Last 5 encounters:   Wt Readings from Last 5 Encounters:   25 81.8 kg (180 lb 5.4 oz)   25 92 kg (202 lb 13.2 oz)   25 89.5 kg (197 lb 5 oz)   24 91.1 kg (200 lb 13.4 oz)   24 92.5 kg (204 lb)       Calculation Equation: REE with MSJ 1623 kcal x 1.2 - 1.3 = 1948 - 2110 kcal  Total Calories / day: 1950 - 2150 Calories / k.8 - 26   Total Grams Protein / day:  82 - 98 Grams Protein / k - 1.2      Objective:   Pertinent Medical Hx: dysphagia, PEG, aphasia  Indication for Nutrition Support:  dysphagia  Enteral Access:   Enteral Tube 04/10/25 Abdomen (Active)   Per chart review of outside medical record, patient received Jevity 1.2 at goal rate of 70 ml/hour at SNF.  Pertinent Labs: : Na 153 (H), K+ 3.7, Glu 122 (H), BUN 42 (H), Creat 1.75 (H), Alb 2.9 (L), Phos 3.6, Mg 2.8 (H)  Pertinent Meds: Levophed at 0.07 mcg/kg/min, Lipitor, Sinemet, Cefepime, MVI  Skin/Wounds:  redness, excoriation, moisture fissure, DTI to sacrum, " buttocks per RN skin assessment. No wound team consult.  Food Allergies: None noted  Last BM:  Not observed   (pta)   Formula based on RD Eval: Osmolite 1.5 Javier, fiber free formula appropriate for patient requiring pressor.      Current Diet Order/Intake:   NPO      Nutrition Focused Physical Exam (NFPE)  Weight Loss: Large drop in weight in a week of ~10 kg. Difference may be due to scale used. If feasible, recommend re-zero bed scale and reweigh to confirm.  Muscle Mass: Unable to identify at this time  Subcutaneous Fat: Unable to identify at this time  Fluid Accumulation: None noted  Reduced  Strength: N/A in acute care setting.    Nutrition Diagnosis:      Swallowing Difficulty related to dysphagia as evidenced by patient with feeds via PEG.      Based on RD assessment at this time, Unable to fully assess for malnutrition at this time. Recommend reweigh.    Nutrition Interventions:      Initiate Osmolite 1.5 at 25 ml/hr continuous and advance per protocol to goal rate of 60 ml/hr.  Goal tube feed volume provides 2160 kcals, 90 g protein, and 1097 ml free water daily.   Additional fluids per Provider.  Patient aware of active plan of care as appropriate.       Nutrition Monitoring and Evaluation:      Monitor nutrition POC, goal for >85% nutrition needs met via tube feeding.  Monitor vital signs pertinent to nutrition.    RD following and will provide updated recommendations as indicated.      Hilda Gomze R.D.                                         [facility] MALNUTRITION

## 2025-04-11 NOTE — CARE PLAN
The patient is Watcher - Medium risk of patient condition declining or worsening    Shift Goals  Clinical Goals: MAP>65, SPB>90, comfort, mobility, pulmn hygiene  Patient Goals: soren  Family Goals: updates    Progress made toward(s) clinical / shift goals:    Problem: Knowledge Deficit - Standard  Goal: Patient and family/care givers will demonstrate understanding of plan of care, disease process/condition, diagnostic tests and medications  Outcome: Progressing     Problem: Skin Integrity  Goal: Skin integrity is maintained or improved  Outcome: Progressing     Problem: Fall Risk  Goal: Patient will remain free from falls  Outcome: Progressing     Problem: Pain - Standard  Goal: Alleviation of pain or a reduction in pain to the patient’s comfort goal  Outcome: Progressing     Problem: Hemodynamics  Goal: Patient's hemodynamics, fluid balance and neurologic status will be stable or improve  Outcome: Progressing

## 2025-04-11 NOTE — ASSESSMENT & PLAN NOTE
Elevated troponin of 67  Likely demand ischemia. EKG did not present ischemic changes  - Trend troponin

## 2025-04-11 NOTE — DISCHARGE PLANNING
Case Management Discharge Planning    Admission Date: 4/10/2025  GMLOS: 4.9  ALOS: 1      PT and/or OT Eval ordered: NA  Post-acute Referrals Ordered: Yes  Post-acute Choice Obtained: Yes- [facility]   Has referral(s) been sent to post-acute provider:  Yes      Anticipated Discharge Dispo:      DME Needed: Yes    DME Ordered: Yes By [facility]    Action(s) Taken: Updated Provider/Nurse on Discharge Plan, Referral(s) sent, and Transport Arranged     Confirmed home address [address] [place] [address] dr gallardo, Pt wife will be at the residence.     Escalations Completed: None    Medically Clear: Yes    Next Steps: 1300 eta  for transport home with hospice     Barriers to Discharge: None    Is the patient up for discharge tomorrow: No

## 2025-04-11 NOTE — PROGRESS NOTES
"Critical Care Progress Note    Date of admission  4/10/2025    Chief Complaint/Hospital Course  74 y.o. male \"with a past medical history significant for Parkinson disease complicated by significant dysphagia, history of hypertension, dyslipidemia, gastroesophageal reflux disease, atrial fibrillation who presented with shortness of breath and fever.  Patient was recently admitted to [facility] on 3/26/2025 with pneumonia complicated by right-sided pleural effusion and required to be intubated and had a chest tube placed.  He was gradually weaned off mechanical ventilatory support and the chest tube was discontinued and was discharged to skilled nursing facility on 4/9/2025 on Augmentin.  Patient had a PEG tube placed during that admission given significant dysphagia.  I met with patient's wife Nolvia at bedside.  I obtained history from her.  Apparently patient had increased work of breathing and was significantly short of breath and hypoxic.  He also was febrile.     Patient has a complicated medical history and wife is leaning towards transitioning to comfort measures once more family arrives.  Plan is to continue peripheral vasopressors and treatment of septic shock.  Patient currently has a POLST form that confirmed DNAR, I okay.\" Dr. Bruce      Interval Problem Update  Reviewed last 24 hour events:  Cardiac: HR 90s, BP 90-100s- on levo  Pulm: 5L NC  Heme: hgb 14.5, plts 286  /renal: cr improving 1.75  GI/endo: NPO  ID:  WBC improving 14.1, on cefepime, linezolid. MRSA neg.   I/O: +500cc  Additional Labs/Imaging:   - Na improved to 153 - start free water flushes with TF  - trop up to 68, BNP 2722    Review of Systems  Review of Systems   Unable to perform ROS: Critical illness        Vital Signs for last 24 hours   Temp:  [36.9 °C (98.5 °F)-37.6 °C (99.7 °F)] 36.9 °C (98.5 °F)  Pulse:  [] 100  Resp:  [6-49] 22  BP: ()/() 90/54  SpO2:  [90 %-98 %] 93 %    Hemodynamic parameters for last 24 " hours       Respiratory Information for the last 24 hours       Physical Exam   Physical Exam  Vitals and nursing note reviewed.   HENT:      Head: Normocephalic.      Mouth/Throat:      Mouth: Mucous membranes are moist.   Eyes:      Conjunctiva/sclera: Conjunctivae normal.   Cardiovascular:      Rate and Rhythm: Regular rhythm. Tachycardia present.   Pulmonary:      Effort: Pulmonary effort is normal.   Abdominal:      Palpations: Abdomen is soft.   Skin:     General: Skin is warm.   Neurological:      Mental Status: He is alert.         Medications  Current Facility-Administered Medications   Medication Dose Route Frequency Provider Last Rate Last Admin    midodrine (Proamatine) tablet 10 mg  10 mg Oral TID WITH MEALS Regi Davis M.D.   10 mg at 04/11/25 1141    norepinephrine (Levophed) 8 mg in 250 mL NS infusion (premix)  0-1 mcg/kg/min (Ideal) Intravenous Continuous Navid Hammer M.D.   Paused at 04/11/25 1140    Respiratory Therapy Consult   Nebulization Continuous RT Prince BERTA Carr D.O.        heparin injection 5,000 Units  5,000 Units Subcutaneous Q8HRS KARIN Hodgson.O.   5,000 Units at 04/11/25 0543    Pharmacy Consult Request ...Pain Management Review 1 Each  1 Each Other PHARMACY TO DOSE Prince BERTA Carr D.O.        labetalol (Normodyne/Trandate) injection 10 mg  10 mg Intravenous Q4HRS PRN KALYN HodgsonO.        gabapentin (Neurontin) capsule 200 mg  200 mg Enteral Tube Q EVENING KALYN HodgsonO.   200 mg at 04/10/25 1800    guaiFENesin (Robitussin) 100 MG/5ML liquid 200 mg  10 mL Enteral Tube TID KARIN Hodgson.O.   200 mg at 04/11/25 1141    lansoprazole (Prevacid) solutab 30 mg  30 mg Enteral Tube DAILY KALYN HodgsonO.   30 mg at 04/11/25 0543    multivitamin tablet 1 Tablet  1 Tablet Enteral Tube DAILY KARIN Hodgson.O.   1 Tablet at 04/11/25 0543    ROPINIRole (Requip) tablet 4 mg  4 mg Enteral Tube TID KALYN HodgsonO.   4 mg at 04/11/25 0544    cefepime (Maxipime) 2 g in NS  100 mL IVPB  2 g Intravenous Q8HRS Prince BERTA Carr D.O.   Stopped at 04/11/25 0610    acetaminophen (Tylenol) tablet 650 mg  650 mg Enteral Tube Q6HRS PRN Prince BERTA Carr D.O.        atorvastatin (Lipitor) tablet 80 mg  80 mg Enteral Tube Q EVENING Prince BERTA Carr D.O.   80 mg at 04/10/25 1946    aspirin (Asa) chewable tab 81 mg  81 mg Enteral Tube DAILY Prince BERTA Carr D.O.   81 mg at 04/11/25 0543    carbidopa-levodopa (Sinemet)  MG tablet 2 Tablet  2 Tablet Enteral Tube 4X/DAY Prince BERTA Carr D.O.   2 Tablet at 04/11/25 1014    oxyCODONE immediate-release (Roxicodone) tablet 2.5 mg  2.5 mg Enteral Tube Q3HRS PRN Prince BERTA Carr D.O.        Or    oxyCODONE immediate-release (Roxicodone) tablet 5 mg  5 mg Enteral Tube Q3HRS PRN Prince BERTA Carr D.O.        Or    morphine 4 MG/ML injection 2 mg  2 mg Intravenous Q3HRS PRN Prince BERTA Carr D.O.        polyethylene glycol/lytes (Miralax) Packet 1 Packet  1 Packet Enteral Tube QDAY PRN Prince BERTA Carr D.O.   1 Packet at 04/11/25 0543    Pharmacy Consult: Enteral tube insertion - review meds/change route/product selection   Other PHARMACY TO DOSE Prince BERTA Carr D.O.        amantadine (Symmetrel) tablet 100 mg  100 mg Enteral Tube BID Prince BERTA Carr D.O.   100 mg at 04/11/25 0543     Current Outpatient Medications   Medication Sig Dispense Refill    amantadine (SYMMETREL) 100 MG Tab Take 1 Tablet by mouth 2 times a day. 60 Tablet 11    carbidopa-levodopa (SINEMET)  MG Tab Take 2 Tablets by Enteral Tube route 4 times a day. 6AM, 10AM, 2PM, and 6PM 240 Tablet 11    gabapentin (NEURONTIN) 100 MG Cap Give 2 Capsules by Enteral Tube route every evening. 60 Capsule 11    guaiFENesin (ROBITUSSIN) 100 MG/5ML liquid Give 10 mL by Enteral Tube route 3 times a day for 6 days. 180 mL 0    omeprazole (PRILOSEC) 40 MG delayed-release capsule Give 1 Capsule by Enteral Tube route every day. 30 Capsule 0    midodrine (PROAMATINE) 10 MG tablet Give 1 Tablet by Enteral Tube route every 8 hours. 90  Tablet 11    ropinirole (REQUIP) 4 MG tablet Take 1 Tablet by mouth 3 times a day. Through Feeding Tube 90 Tablet 11    acetaminophen (TYLENOL) 500 MG Tab Give 2 Tablets by Enteral Tube route every 6 hours as needed for Mild Pain or Fever. HOSPICE MEDICATION DO NOT DISCONTINUE. 30 Tablet 10    bisacodyl (DULCOLAX) 10 MG Suppos Insert 1 Suppository into the rectum as needed (No BM (bowel movement) in 3 days.). HOSPICE MEDICATION DO NOT DISCONTINUE. 5 Suppository 10    LORazepam (ATIVAN) 2 MG/ML Conc 0.5-1 mL by Enteral Tube route every 2 hours as needed (anxiety or shortness of breath despite morphine). 360 mL 0    morphine (ROXANOL) 20 MG/ML Solution Give 0.5-1 mL by Enteral Tube route every 1 hour as needed (moderate to severe pain or shortness of breath). HOSPICE  mL 0    ondansetron (ZOFRAN ODT) 4 MG TABLET DISPERSIBLE Give 1 Tablet by Enteral Tube route every 6 hours as needed for Nausea/Vomiting 15 Tablet 10    sennosides-docusate sodium (SENOKOT-S) 8.6-50 MG tablet Take 2 Tablets by mouth 2 times a day as needed (constipation. Hold for loose stool.). HOSPICE MEDICATION DO NOT DISCONTINUE. 28 Tablet 10    amoxicillin-clavulanate (AUGMENTIN) 875-125 MG Tab Take 1 Tablet by Enteral Tube route 2 times a day for 7 days. 14 Tablet 0    azithromycin (ZITHROMAX) 250 MG Tab Take 2 Tablets every day for 1 day, THEN 1 Tablet every day for 4 days. 6 Tablet 0       Fluids    Intake/Output Summary (Last 24 hours) at 4/11/2025 1451  Last data filed at 4/11/2025 1200  Gross per 24 hour   Intake 1332.87 ml   Output 875 ml   Net 457.87 ml       Laboratory          Recent Labs     04/09/25  0314 04/10/25  1041 04/11/25  0030 04/11/25  0440   SODIUM 147* 156* 151* 153*   POTASSIUM 3.7 3.8 3.2* 3.7   CHLORIDE 109 111 116* 114*   CO2 27 26 24 28   BUN 38* 48* 38* 42*   CREATININE 1.27 2.21* 1.34 1.75*   MAGNESIUM 3.0*  --   --  2.8*   PHOSPHORUS 2.6  --   --  3.6   CALCIUM 8.8 8.8 7.3* 8.5     Recent Labs     04/09/25  0319  04/10/25  1041 04/11/25  0030 04/11/25  0440   ALTSGPT 42 92*  --  33   ASTSGOT 36 66*  --  46*   ALKPHOSPHAT 106* 88  --  74   TBILIRUBIN 0.8 0.7  --  0.9   GLUCOSE 182* 194* 119* 122*     Recent Labs     04/09/25  0314 04/10/25  1041 04/11/25  0440   WBC 12.9* 16.5* 14.1*   NEUTSPOLYS  --  84.60*  --    LYMPHOCYTES  --  6.50*  --    MONOCYTES  --  7.20  --    EOSINOPHILS  --  0.00  --    BASOPHILS  --  0.70  --    ASTSGOT 36 66* 46*   ALTSGPT 42 92* 33   ALKPHOSPHAT 106* 88 74   TBILIRUBIN 0.8 0.7 0.9     Recent Labs     04/09/25  0314 04/10/25  1041 04/11/25  0440   RBC 5.21 5.68 4.96   HEMOGLOBIN 15.4 16.7 14.5   HEMATOCRIT 47.6 52.4* 46.3   PLATELETCT 477* 457* 286       Imaging  Reviewed     Assessment/Plan    Parkinson's disease  Dysphagia - with PEG tube in place  Atrial fibrillation  Hx of stroke   Septic shock  Pneumonia - c/f aspiration pneumonia  Lactic acidosis  MATT  Elevated troponin  Acute on chronic hypoxic respiratory failure  Had a discussion with patient's wife bedside. She states that she spoke with Priscilla about hospice in the ER yesterday and that she would like to take patient home on hospice today. He was briefly off levophed earlier today and current MAP is in 70s on levo 0.07 which we are actively titrating so will start some midodrine to help with a smoother transition home and with transport home. I spoke with hospice and they will arrange for him to go home with hospice this afternoon.          VTE:  Not Indicated  Ulcer: Not Indicated  Lines: None    I have performed a physical exam and reviewed and updated ROS and Plan today (4/11/2025). In review of yesterday's note (4/10/2025), there are no changes except as documented above.     Discussed patient condition and risk of morbidity and/or mortality with Family, RN, RT, Pharmacy, Charge nurse / hot rounds, and Patient  The patient remains critically ill.  Critical care time = 50 minutes in directly providing and coordinating critical care  and extensive data review.  No time overlap and excludes procedures.        Regi Davis MD  Pulmonary and Critical Care Medicine  [facility]

## 2025-04-11 NOTE — ASSESSMENT & PLAN NOTE
Presented with elevated creatinine of 2.21. Baseline 0.71. Likely hypoperfusion secondary to septic shock  Received 2.7 L at ED  - CMP in the morning

## 2025-04-11 NOTE — ASSESSMENT & PLAN NOTE
Recently hospitalized due to pneumonia and discharged with augmentin.  CXR presented no changes with yesterday's CXR  - Cefepime for 5 days  - Follow up blood culture  - CBC in AM

## 2025-04-23 NOTE — DOCUMENTATION QUERY
"                                                                         [facility]                                                                       Query Response Note      PATIENT:               GRIFFIN NEGRON  ACCT #:                  [id number]  MRN:                     [id number]  :                      1950  ADMIT DATE:       3/26/2025 10:03 PM  DISCH DATE:        2025 5:30 PM  RESPONDING  PROVIDER #:        [id number]           QUERY TEXT:    There is conflicting documentation with regards to the diagnosis of aspiration pneumonia.    Throughout documentation \"likely\" or \"suspected\" aspiration pneumonia is documented.  In Discharge Summary only pneumonia is documented.    Please provide the most accurate diagnosis based upon the clinical indicators and treatment.    The patient's Clinical Indicators include:  Clinical Indicators: Throughout documentation \"likely\" or \"suspected\" aspiration pneumonia is documented.  In Discharge Summary only pneumonia is documented.  Pleural effusion.  Atelectasis.    Treatment:  Unasyn and azithromycin.  Right chest tube insertion.  ETT tube and vent.    Risk Factors: Sepsis, severe sepsis.  Parkinson's disease,    [email or web address]  Options provided:   -- Aspiration pneumonia is Ruled In   -- Aspiration pneumonia is Ruled Out   -- Other explanation, (please specify other explanation)   -- Unable to determine      Query created by: Coby An on 4/15/2025 5:44 AM    RESPONSE TEXT:    Aspiration pneumonia is Ruled In          Electronically signed by:  JOYCE WRIGHT MD 2025 8:52 PM              "

## 2025-04-24 LAB
FUNGUS SPEC CULT: NORMAL
FUNGUS SPEC FUNGUS STN: NORMAL
SIGNIFICANT IND 70042: NORMAL
SITE SITE: NORMAL
SOURCE SOURCE: NORMAL

## 2025-05-02 NOTE — DISCHARGE SUMMARY
Discharge Summary    CHIEF COMPLAINT ON ADMISSION  Chief Complaint   Patient presents with    Shortness of Breath     Pt experiencing an increase in SOB. Pt is on 4L NC. Change in LOC as stated by EMS and family.        Reason for Admission  ems    Admission Date  4/10/2025     CODE STATUS  DNR    HPI & HOSPITAL COURSE  This is a 74 y.o. male with history of Parkinson's disease here with dyspnea and fever. Patient was admitted with worsening hypoxic respiratory failure with concern for aspiration pneumonia. He was given IVFs, started on antibiotics and vasopressors. I had a discussion with patient's wife who wanted to proceed with comfort measures and had already spoken with a hospice liaison in the ER the night prior. I discussed case with hospice who arranged for home hospice.       Therefore, he is discharged in guarded and stable condition to hospice.    The patient's family decided to proceed with hospice so he was discharged more quickly than anticipated on admission.      FOLLOW UP ITEMS POST DISCHARGE  NA    DISCHARGE DIAGNOSES  Principal Problem:    Sepsis (HCC) (POA: Yes)  Active Problems:    Restless leg syndrome (POA: Yes)    Parkinson disease (HCC) (POA: Yes)    History of stroke (POA: Yes)    Advance care planning (POA: Yes)    Acute respiratory failure with hypoxia (HCC) secondary to pneumonia (POA: Yes)    Paroxysmal atrial fibrillation (HCC) (POA: Yes)    Other dysphagia (POA: Yes)    Pneumonia (POA: Unknown)    Acute kidney injury (HCC) (POA: Unknown)    Lactic acidosis (POA: Unknown)    Troponin level elevated (POA: Unknown)  Resolved Problems:    * No resolved hospital problems. *      FOLLOW UP  No future appointments.  An Perez M.D.  [address] [facility]  [address]  [place] [place] [address]  [phone]          Lalo Arreguin D.O.  [address]  [address]  [facility] [place] [address]  [phone]            MEDICATIONS ON DISCHARGE     Medication List        STOP taking these medications       guaiFENesin 100 MG/5ML liquid  Commonly known as: Robitussin     lansoprazole 30 MG Tbdd  Commonly known as: Prevacid  Replaced by: omeprazole 40 MG delayed-release capsule            ASK your doctor about these medications        Instructions   Acetaminophen Extra Strength 500 MG Tabs   Give 2 Tablets by Enteral Tube route every 6 hours as needed for Mild Pain or Fever. HOSPICE MEDICATION DO NOT DISCONTINUE.  Dose: 1,000 mg     amantadine 100 MG Tabs  Commonly known as: Symmetrel  Ask about: Which instructions should I use?   Take 1 Tablet by mouth 2 times a day.  Dose: 100 mg     * amoxicillin-clavulanate 875-125 MG Tabs  Commonly known as: Augmentin  Ask about: Should I take this medication?   1 Tablet by Enteral Tube route every 12 hours for 1 day.  Dose: 1 Tablet     * amoxicillin-clavulanate 875-125 MG Tabs  Commonly known as: Augmentin  Ask about: Should I take this medication?   Take 1 Tablet by Enteral Tube route 2 times a day for 7 days.  Dose: 1 Tablet     azithromycin 250 MG Tabs  Start taking on: April 11, 2025  Commonly known as: Zithromax  Ask about: Should I take this medication?   Take 2 Tablets every day for 1 day, THEN 1 Tablet every day for 4 days.     bisacodyl 10 MG Supp  Commonly known as: Dulcolax   Insert 1 Suppository into the rectum as needed (No BM (bowel movement) in 3 days.). HOSPICE MEDICATION DO NOT DISCONTINUE.  Dose: 10 mg     carbidopa-levodopa  MG Tabs  Commonly known as: Sinemet  Ask about: Which instructions should I use?   Doctor's comments: Please send 120 tabs every 14 days.  Take 2 Tablets by Enteral Tube route 4 times a day. 6AM, 10AM, 2PM, and 6PM  Dose: 2 Tablet     Chest Congestion Relief 100 MG/5ML liquid  Generic drug: guaiFENesin  Ask about: Should I take this medication?   Give 10 mL by Enteral Tube route 3 times a day for 6 days.  Dose: 10 mL     gabapentin 100 MG Caps  Commonly known as: Neurontin  Ask about: Which instructions should I use?   Give 2  Capsules by Enteral Tube route every evening.  Dose: 200 mg     LORazepam Intensol 2 MG/ML Conc  Generic drug: LORazepam   Doctor's comments: HOSPICE MEDICATION DO NOT DISCONTINUE. Please dispense 7 day supply (30mL).  0.5-1 mL by Enteral Tube route every 2 hours as needed (anxiety or shortness of breath despite morphine).  Dose: 1-2 mg     midodrine 10 MG tablet  Commonly known as: Proamatine  Ask about: Which instructions should I use?   Give 1 Tablet by Enteral Tube route every 8 hours.  Dose: 10 mg     morphine 100 MG/5ML Soln   Doctor's comments: Please dispense 7 day supply (30mL).  Give 0.5-1 mL by Enteral Tube route every 1 hour as needed (moderate to severe pain or shortness of breath). HOSPICE MED  Dose: 10-20 mg     omeprazole 40 MG delayed-release capsule  Commonly known as: PriLOSEC  Replaces: lansoprazole 30 MG Tbdd   Give 1 Capsule by Enteral Tube route every day.  Dose: 40 mg     ondansetron 4 MG Tbdp  Commonly known as: Zofran ODT   Give 1 Tablet by Enteral Tube route every 6 hours as needed for Nausea/Vomiting  Dose: 4 mg     ropinirole 4 MG tablet  Commonly known as: Requip  Ask about: Which instructions should I use?   Take 1 Tablet by mouth 3 times a day. Through Feeding Tube  Dose: 4 mg     Stool Softener Plus Laxative 8.6-50 MG Tabs  Generic drug: senna-docusate   Take 2 Tablets by mouth 2 times a day as needed (constipation. Hold for loose stool.). HOSPICE MEDICATION DO NOT DISCONTINUE.  Dose: 2 Tablet           * This list has 2 medication(s) that are the same as other medications prescribed for you. Read the directions carefully, and ask your doctor or other care provider to review them with you.                  Allergies  Allergies   Allergen Reactions    Crexont [Carbidopa-Levodopa Er] Unspecified     Reported lost of mobility and bladder       DIET  No orders of the defined types were placed in this encounter.      ACTIVITY  As tolerated.  Weight bearing as tolerated    LINES, DRAINS,  AND WOUNDS  This is an automated list. Peripheral IVs will be removed prior to discharge.       Wound 03/28/25 Traumatic Lip Upper lip redness, slow to vicente post intubation (Active)   Site Assessment Clean;Dry;Intact 04/11/25 0800   Periwound Assessment Clean;Dry;Intact 04/11/25 0800   Margins Attached edges 04/11/25 1200       Wound 03/28/25 Pressure Injury Buttocks Evolving DTI (Active)   Site Assessment Clean;Dry;[place] 04/11/25 0800   Periwound Assessment Clean;Dry;Pink 04/11/25 0800   Margins Attached edges 04/11/25 1200   Closure None 04/11/25 0800   Drainage Amount None 04/11/25 0800   Treatments Offloading 04/11/25 0800   Dressing Status Clean;Dry;Intact 04/11/25 0800   Dressing Changed Observed 04/11/25 0800   Dressing Options Offloading Dressing - Sacral 04/11/25 0800       Wound 04/07/25 Incision Abdomen (Active)   Site Assessment Clean;Dry;Intact 04/11/25 0800   Periwound Assessment Clean;Dry;Intact 04/11/25 0800   Margins Attached edges 04/11/25 1200   Drainage Amount None 04/10/25 2000   Wound Cleansing Soap and Water 04/10/25 2000   Dressing Options Open to Air 04/11/25 0800                  MENTAL STATUS ON TRANSFER             CONSULTATIONS  Hospice    PROCEDURES  None    LABORATORY  Lab Results   Component Value Date    SODIUM 153 (H) 04/11/2025    POTASSIUM 3.7 04/11/2025    CHLORIDE 114 (H) 04/11/2025    CO2 28 04/11/2025    GLUCOSE 122 (H) 04/11/2025    BUN 42 (H) 04/11/2025    CREATININE 1.75 (H) 04/11/2025        Lab Results   Component Value Date    WBC 14.1 (H) 04/11/2025    HEMOGLOBIN 14.5 04/11/2025    HEMATOCRIT 46.3 04/11/2025    PLATELETCT 286 04/11/2025        Total time of the discharge process exceeds 25 minutes.

## 2025-05-13 ENCOUNTER — APPOINTMENT (OUTPATIENT)
Dept: NEUROLOGY | Facility: MEDICAL CENTER | Age: 75
End: 2025-05-13
Attending: INTERNAL MEDICINE
Payer: MEDICARE

## 2025-05-24 LAB
FINAL REPORT Q0603: NORMAL
PRELIMINARY RPT Q0601: NORMAL
RHODAMINE-AURAMINE STN SPEC: NORMAL

## (undated) DEVICE — SODIUM CHL IRRIGATION 0.9% 1000ML (12EA/CA)

## (undated) DEVICE — KIT CUSTOM PROCEDURE SINGLE FOR ENDO (15/CA)

## (undated) DEVICE — TUBE E-T HI-LO CUFF 6.5MM (10EA/BX)

## (undated) DEVICE — SNARECAPTIVATOR 13MM SMALL HEXAGONAL SNARE (10/BX)

## (undated) DEVICE — BLOCK BITE MAXI DENTAL RETENTION RIM (100EA/BX)

## (undated) DEVICE — CANISTER SUCTION RIGID RED 1500CC (40EA/CA)

## (undated) DEVICE — FILM CASSETTE ENDO

## (undated) DEVICE — TUBING CLEARLINK DUO-VENT - C-FLO (48EA/CA)

## (undated) DEVICE — SENSOR OXIMETER ADULT SPO2 RD SET (20EA/BX)

## (undated) DEVICE — LACTATED RINGERS INJ 1000 ML - (14EA/CA 60CA/PF)

## (undated) DEVICE — TRAP POLYP E-TRAP (25EA/BX)

## (undated) DEVICE — PORT AUXILLARY WATER (50EA/BX)

## (undated) DEVICE — WATER IRRIGATION STERILE 1000ML (12EA/CA)

## (undated) DEVICE — BUTTON ENDOSCOPY DISPOSABLE

## (undated) DEVICE — ELECTRODE 850 FOAM ADHESIVE - HYDROGEL RADIOTRNSPRNT (50/PK)

## (undated) DEVICE — TUBE CONNECTING SUCTION - CLEAR PLASTIC STERILE 72 IN (50EA/CA)

## (undated) DEVICE — SUCTION INSTRUMENT YANKAUER OPEN TIP W/O VENT (50EA/CA)

## (undated) DEVICE — TOWEL STOP TIMEOUT SAFETY FLAG (40EA/CA)

## (undated) DEVICE — BLADE SURGICAL CLIPPER - (50EA/CA)

## (undated) DEVICE — NEPTUNE 4 PORT MANIFOLD - (20/PK)

## (undated) DEVICE — CONTAINER, SPECIMEN, STERILE

## (undated) DEVICE — MASK OXYGEN VNYL ADLT MED CONC WITH 7 FOOT TUBING - (50EA/CA)

## (undated) DEVICE — KIT ANESTHESIA W/CIRCUIT & 3/LT BAG W/FILTER (20EA/CA)